# Patient Record
Sex: MALE | Race: WHITE | NOT HISPANIC OR LATINO | Employment: UNEMPLOYED | URBAN - NONMETROPOLITAN AREA
[De-identification: names, ages, dates, MRNs, and addresses within clinical notes are randomized per-mention and may not be internally consistent; named-entity substitution may affect disease eponyms.]

---

## 2017-07-14 ENCOUNTER — HOSPITAL ENCOUNTER (EMERGENCY)
Facility: HOSPITAL | Age: 67
Discharge: HOME | End: 2017-07-14
Attending: EMERGENCY MEDICINE | Admitting: EMERGENCY MEDICINE

## 2017-07-14 ENCOUNTER — TRANSCRIBE ORDERS (OUTPATIENT)
Dept: ULTRASOUND IMAGING | Facility: HOSPITAL | Age: 67
End: 2017-07-14

## 2017-07-14 ENCOUNTER — HOSPITAL ENCOUNTER (OUTPATIENT)
Dept: ULTRASOUND IMAGING | Facility: HOSPITAL | Age: 67
Discharge: HOME | End: 2017-07-14
Admitting: NURSE PRACTITIONER

## 2017-07-14 VITALS
HEART RATE: 92 BPM | SYSTOLIC BLOOD PRESSURE: 152 MMHG | OXYGEN SATURATION: 96 % | WEIGHT: 234 LBS | DIASTOLIC BLOOD PRESSURE: 95 MMHG | HEIGHT: 70 IN | TEMPERATURE: 97.4 F | RESPIRATION RATE: 18 BRPM | BODY MASS INDEX: 33.5 KG/M2

## 2017-07-14 DIAGNOSIS — R10.0 ACUTE ABDOMINAL PAIN SYNDROME: Primary | ICD-10-CM

## 2017-07-14 DIAGNOSIS — R10.0 ACUTE ABDOMINAL PAIN SYNDROME: ICD-10-CM

## 2017-07-14 DIAGNOSIS — K80.00 CALCULUS OF GALLBLADDER WITH ACUTE CHOLECYSTITIS WITHOUT OBSTRUCTION: Primary | ICD-10-CM

## 2017-07-14 LAB
ALBUMIN SERPL-MCNC: 4.6 G/DL (ref 3.4–4.8)
ALBUMIN/GLOB SERPL: 1.8 G/DL (ref 1.5–2.5)
ALP SERPL-CCNC: 83 U/L (ref 40–129)
ALT SERPL W P-5'-P-CCNC: 58 U/L (ref 10–44)
AMYLASE SERPL-CCNC: 10 U/L (ref 28–100)
ANION GAP SERPL CALCULATED.3IONS-SCNC: 7.1 MMOL/L (ref 3.6–11.2)
AST SERPL-CCNC: 31 U/L (ref 10–34)
BASOPHILS # BLD AUTO: 0.01 10*3/MM3 (ref 0–0.3)
BASOPHILS NFR BLD AUTO: 0.2 % (ref 0–2)
BILIRUB SERPL-MCNC: 0.4 MG/DL (ref 0.2–1.8)
BILIRUB UR QL STRIP: NEGATIVE
BUN BLD-MCNC: 11 MG/DL (ref 7–21)
BUN/CREAT SERPL: 16.7 (ref 7–25)
CALCIUM SPEC-SCNC: 9.4 MG/DL (ref 7.7–10)
CHLORIDE SERPL-SCNC: 105 MMOL/L (ref 99–112)
CLARITY UR: CLEAR
CO2 SERPL-SCNC: 27.9 MMOL/L (ref 24.3–31.9)
COLOR UR: YELLOW
CREAT BLD-MCNC: 0.66 MG/DL (ref 0.43–1.29)
DEPRECATED RDW RBC AUTO: 40.3 FL (ref 37–54)
EOSINOPHIL # BLD AUTO: 0.12 10*3/MM3 (ref 0–0.7)
EOSINOPHIL NFR BLD AUTO: 1.9 % (ref 0–7)
ERYTHROCYTE [DISTWIDTH] IN BLOOD BY AUTOMATED COUNT: 12.5 % (ref 11.5–14.5)
GFR SERPL CREATININE-BSD FRML MDRD: 121 ML/MIN/1.73
GLOBULIN UR ELPH-MCNC: 2.6 GM/DL
GLUCOSE BLD-MCNC: 98 MG/DL (ref 70–110)
GLUCOSE UR STRIP-MCNC: NEGATIVE MG/DL
HCT VFR BLD AUTO: 42.2 % (ref 42–52)
HGB BLD-MCNC: 14 G/DL (ref 14–18)
HGB UR QL STRIP.AUTO: NEGATIVE
IMM GRANULOCYTES # BLD: 0.04 10*3/MM3 (ref 0–0.03)
IMM GRANULOCYTES NFR BLD: 0.6 % (ref 0–0.5)
KETONES UR QL STRIP: NEGATIVE
LEUKOCYTE ESTERASE UR QL STRIP.AUTO: NEGATIVE
LIPASE SERPL-CCNC: 26 U/L (ref 13–60)
LYMPHOCYTES # BLD AUTO: 1.91 10*3/MM3 (ref 1–3)
LYMPHOCYTES NFR BLD AUTO: 30.1 % (ref 16–46)
MCH RBC QN AUTO: 29.9 PG (ref 27–33)
MCHC RBC AUTO-ENTMCNC: 33.2 G/DL (ref 33–37)
MCV RBC AUTO: 90 FL (ref 80–94)
MONOCYTES # BLD AUTO: 0.74 10*3/MM3 (ref 0.1–0.9)
MONOCYTES NFR BLD AUTO: 11.7 % (ref 0–12)
NEUTROPHILS # BLD AUTO: 3.53 10*3/MM3 (ref 1.4–6.5)
NEUTROPHILS NFR BLD AUTO: 55.5 % (ref 40–75)
NITRITE UR QL STRIP: NEGATIVE
OSMOLALITY SERPL CALC.SUM OF ELEC: 278.8 MOSM/KG (ref 273–305)
PH UR STRIP.AUTO: 6 [PH] (ref 5–8)
PLATELET # BLD AUTO: 233 10*3/MM3 (ref 130–400)
PMV BLD AUTO: 10.6 FL (ref 6–10)
POTASSIUM BLD-SCNC: 4 MMOL/L (ref 3.5–5.3)
PROT SERPL-MCNC: 7.2 G/DL (ref 6–8)
PROT UR QL STRIP: NEGATIVE
RBC # BLD AUTO: 4.69 10*6/MM3 (ref 4.7–6.1)
SODIUM BLD-SCNC: 140 MMOL/L (ref 135–153)
SP GR UR STRIP: 1.02 (ref 1–1.03)
UROBILINOGEN UR QL STRIP: NORMAL
WBC NRBC COR # BLD: 6.35 10*3/MM3 (ref 4.5–12.5)

## 2017-07-14 PROCEDURE — 81003 URINALYSIS AUTO W/O SCOPE: CPT | Performed by: PHYSICIAN ASSISTANT

## 2017-07-14 PROCEDURE — 83690 ASSAY OF LIPASE: CPT | Performed by: PHYSICIAN ASSISTANT

## 2017-07-14 PROCEDURE — 85025 COMPLETE CBC W/AUTO DIFF WBC: CPT | Performed by: PHYSICIAN ASSISTANT

## 2017-07-14 PROCEDURE — 76705 ECHO EXAM OF ABDOMEN: CPT | Performed by: RADIOLOGY

## 2017-07-14 PROCEDURE — 80053 COMPREHEN METABOLIC PANEL: CPT | Performed by: PHYSICIAN ASSISTANT

## 2017-07-14 PROCEDURE — 82150 ASSAY OF AMYLASE: CPT | Performed by: PHYSICIAN ASSISTANT

## 2017-07-14 PROCEDURE — 76705 ECHO EXAM OF ABDOMEN: CPT

## 2017-07-14 PROCEDURE — 96360 HYDRATION IV INFUSION INIT: CPT

## 2017-07-14 PROCEDURE — 99283 EMERGENCY DEPT VISIT LOW MDM: CPT

## 2017-07-14 RX ORDER — LOPERAMIDE HYDROCHLORIDE 2 MG/1
2 CAPSULE ORAL 4 TIMES DAILY PRN
Qty: 12 CAPSULE | Refills: 0 | Status: SHIPPED | OUTPATIENT
Start: 2017-07-14 | End: 2017-07-17

## 2017-07-14 RX ORDER — SODIUM CHLORIDE 0.9 % (FLUSH) 0.9 %
10 SYRINGE (ML) INJECTION AS NEEDED
Status: DISCONTINUED | OUTPATIENT
Start: 2017-07-14 | End: 2017-07-14 | Stop reason: HOSPADM

## 2017-07-14 RX ORDER — ONDANSETRON 4 MG/1
4 TABLET, FILM COATED ORAL EVERY 8 HOURS PRN
Qty: 15 TABLET | Refills: 0 | Status: SHIPPED | OUTPATIENT
Start: 2017-07-14 | End: 2017-07-19

## 2017-07-14 RX ADMIN — SODIUM CHLORIDE 500 ML: 9 INJECTION, SOLUTION INTRAVENOUS at 18:48

## 2017-07-14 NOTE — DISCHARGE INSTRUCTIONS
Please take imodium as needed for diarrhea and zofran as needed for nausea. Call Dr. Mckee's office Monday for an appointment. Please return to ER if symptoms worsen.    Hypertension  Hypertension, commonly called high blood pressure, is when the force of blood pumping through your arteries is too strong. Your arteries are the blood vessels that carry blood from your heart throughout your body. A blood pressure reading consists of a higher number over a lower number, such as 110/72. The higher number (systolic) is the pressure inside your arteries when your heart pumps. The lower number (diastolic) is the pressure inside your arteries when your heart relaxes. Ideally you want your blood pressure below 120/80.  Hypertension forces your heart to work harder to pump blood. Your arteries may become narrow or stiff. Having untreated or uncontrolled hypertension can cause heart attack, stroke, kidney disease, and other problems.  RISK FACTORS  Some risk factors for high blood pressure are controllable. Others are not.   Risk factors you cannot control include:   · Race. You may be at higher risk if you are .  · Age. Risk increases with age.  · Gender. Men are at higher risk than women before age 45 years. After age 65, women are at higher risk than men.  Risk factors you can control include:  · Not getting enough exercise or physical activity.  · Being overweight.  · Getting too much fat, sugar, calories, or salt in your diet.  · Drinking too much alcohol.  SIGNS AND SYMPTOMS  Hypertension does not usually cause signs or symptoms. Extremely high blood pressure (hypertensive crisis) may cause headache, anxiety, shortness of breath, and nosebleed.  DIAGNOSIS  To check if you have hypertension, your health care provider will measure your blood pressure while you are seated, with your arm held at the level of your heart. It should be measured at least twice using the same arm. Certain conditions can cause a  difference in blood pressure between your right and left arms. A blood pressure reading that is higher than normal on one occasion does not mean that you need treatment. If it is not clear whether you have high blood pressure, you may be asked to return on a different day to have your blood pressure checked again. Or, you may be asked to monitor your blood pressure at home for 1 or more weeks.  TREATMENT  Treating high blood pressure includes making lifestyle changes and possibly taking medicine. Living a healthy lifestyle can help lower high blood pressure. You may need to change some of your habits.  Lifestyle changes may include:  · Following the DASH diet. This diet is high in fruits, vegetables, and whole grains. It is low in salt, red meat, and added sugars.  · Keep your sodium intake below 2,300 mg per day.  · Getting at least 30-45 minutes of aerobic exercise at least 4 times per week.  · Losing weight if necessary.  · Not smoking.  · Limiting alcoholic beverages.  · Learning ways to reduce stress.  Your health care provider may prescribe medicine if lifestyle changes are not enough to get your blood pressure under control, and if one of the following is true:  · You are 18-59 years of age and your systolic blood pressure is above 140.  · You are 60 years of age or older, and your systolic blood pressure is above 150.  · Your diastolic blood pressure is above 90.  · You have diabetes, and your systolic blood pressure is over 140 or your diastolic blood pressure is over 90.  · You have kidney disease and your blood pressure is above 140/90.  · You have heart disease and your blood pressure is above 140/90.  Your personal target blood pressure may vary depending on your medical conditions, your age, and other factors.  HOME CARE INSTRUCTIONS  · Have your blood pressure rechecked as directed by your health care provider.    · Take medicines only as directed by your health care provider. Follow the directions  carefully. Blood pressure medicines must be taken as prescribed. The medicine does not work as well when you skip doses. Skipping doses also puts you at risk for problems.  · Do not smoke.    · Monitor your blood pressure at home as directed by your health care provider.   SEEK MEDICAL CARE IF:   · You think you are having a reaction to medicines taken.  · You have recurrent headaches or feel dizzy.  · You have swelling in your ankles.  · You have trouble with your vision.  SEEK IMMEDIATE MEDICAL CARE IF:  · You develop a severe headache or confusion.  · You have unusual weakness, numbness, or feel faint.  · You have severe chest or abdominal pain.  · You vomit repeatedly.  · You have trouble breathing.  MAKE SURE YOU:   · Understand these instructions.  · Will watch your condition.  · Will get help right away if you are not doing well or get worse.     This information is not intended to replace advice given to you by your health care provider. Make sure you discuss any questions you have with your health care provider.     Document Released: 12/18/2006 Document Revised: 05/03/2016 Document Reviewed: 10/10/2014  [employer] Patient Education ©2017 [employer]

## 2017-07-14 NOTE — ED PROVIDER NOTES
Subjective   HPI Comments: This 67-year-old male patient presents to the ER with a chief complaint of nausea and vomiting.  Patient says that one week ago on Friday, he began having symptoms of feeling weak as well as having diarrhea multiple times a day.  He said he couldn't get out of bed for 3 days because of this weakness.  The symptoms did continue.  He went to see his primary care physician Dr. Patricia yesterday who believed he may be having some gallbladder issues.  He then had a gallbladder ultrasound today that revealed gallstones and cholecystitis.  Besides the diarrhea and the patient has also been experiencing bloating of his abdomen as well as nausea and vomiting that started yesterday.  The vomiting quality is clear.  His history is significant for peptic ulcer disease but no other GI issues.  The patient endorses no abdominal pain.    Patient is a 67 y.o. male presenting with vomiting.   Vomiting   The primary symptoms include nausea, vomiting and diarrhea. Primary symptoms do not include fever, fatigue, abdominal pain or dysuria. The illness began 6 to 7 days ago. The onset was sudden. The problem has been gradually worsening.   The illness is also significant for bloating. The illness does not include chills, constipation or back pain. Significant associated medical issues include gallstones and PUD. Associated medical issues do not include inflammatory bowel disease, GERD, liver disease, alcohol abuse or irritable bowel syndrome.       Review of Systems   Constitutional: Negative.  Negative for chills, fatigue and fever.   HENT: Negative.  Negative for congestion and sore throat.    Respiratory: Negative.  Negative for cough, shortness of breath and wheezing.    Cardiovascular: Negative.  Negative for chest pain.   Gastrointestinal: Positive for bloating, diarrhea, nausea and vomiting. Negative for abdominal pain and constipation.   Endocrine: Negative.    Genitourinary: Negative.  Negative for  dysuria, frequency and urgency.   Musculoskeletal: Negative for back pain.   Skin: Negative.    Neurological: Negative.    Psychiatric/Behavioral: Negative.    All other systems reviewed and are negative.      Past Medical History:   Diagnosis Date   • Arthritis    • Herpes    • Peptic ulcer        Allergies   Allergen Reactions   • Penicillins        Past Surgical History:   Procedure Laterality Date   • ABDOMINAL SURGERY     • HERNIA REPAIR     • SINUS SURGERY         History reviewed. No pertinent family history.    Social History     Social History   • Marital status:      Spouse name: N/A   • Number of children: N/A   • Years of education: N/A     Social History Main Topics   • Smoking status: Current Every Day Smoker   • Smokeless tobacco: Never Used   • Alcohol use No   • Drug use: No   • Sexual activity: Not Asked     Other Topics Concern   • None     Social History Narrative   • None           Objective   Physical Exam   Constitutional: He is oriented to person, place, and time. He appears well-developed and well-nourished. No distress.   HENT:   Head: Normocephalic and atraumatic.   Right Ear: External ear normal.   Left Ear: External ear normal.   Nose: Nose normal.   Eyes: Conjunctivae and EOM are normal. Pupils are equal, round, and reactive to light.   Neck: Normal range of motion. Neck supple. No JVD present. No tracheal deviation present.   Cardiovascular: Normal rate, regular rhythm and normal heart sounds.    No murmur heard.  Pulmonary/Chest: Effort normal and breath sounds normal. No respiratory distress. He has no wheezes.   Abdominal: Soft. Bowel sounds are normal. He exhibits distension. There is no tenderness. There is no rebound and no guarding.   Musculoskeletal: Normal range of motion. He exhibits no edema or deformity.   Neurological: He is alert and oriented to person, place, and time. No cranial nerve deficit.   Skin: Skin is warm and dry. No rash noted. He is not diaphoretic. No  erythema. No pallor.   Psychiatric: He has a normal mood and affect. His behavior is normal. Thought content normal.   Nursing note and vitals reviewed.      Procedures         ED Course  ED Course   Comment By Time   Spoke with Dr. Mckee and he said the patient can follow up with him next week; no need for admission at this time. ANDERSON Castillo 07/14 1940   Will d/c the patient with zofran and imodium. Will call Dr. Mckee's office Monday to set up appointment. Will return to ER if symptoms worsen. ANDERSON Castillo 07/14 1942                  MDM  Number of Diagnoses or Management Options  Calculus of gallbladder with acute cholecystitis without obstruction:      Amount and/or Complexity of Data Reviewed  Clinical lab tests: ordered and reviewed  Discuss the patient with other providers: yes        Final diagnoses:   Calculus of gallbladder with acute cholecystitis without obstruction            ANDERSON Castillo  07/14/17 1947

## 2017-07-24 ENCOUNTER — APPOINTMENT (OUTPATIENT)
Dept: PREADMISSION TESTING | Facility: HOSPITAL | Age: 67
End: 2017-07-24

## 2017-07-24 RX ORDER — SENNOSIDES 8.6 MG
650 CAPSULE ORAL EVERY 8 HOURS PRN
COMMUNITY
End: 2021-12-08

## 2017-07-25 ENCOUNTER — ANESTHESIA (OUTPATIENT)
Dept: PERIOP | Facility: HOSPITAL | Age: 67
End: 2017-07-25

## 2017-07-25 ENCOUNTER — ANESTHESIA EVENT (OUTPATIENT)
Dept: PERIOP | Facility: HOSPITAL | Age: 67
End: 2017-07-25

## 2017-07-25 ENCOUNTER — APPOINTMENT (OUTPATIENT)
Dept: GENERAL RADIOLOGY | Facility: HOSPITAL | Age: 67
End: 2017-07-25

## 2017-07-25 ENCOUNTER — HOSPITAL ENCOUNTER (OUTPATIENT)
Facility: HOSPITAL | Age: 67
Setting detail: HOSPITAL OUTPATIENT SURGERY
Discharge: HOME | End: 2017-07-25
Attending: SURGERY | Admitting: SURGERY

## 2017-07-25 VITALS
TEMPERATURE: 97.9 F | HEIGHT: 69 IN | DIASTOLIC BLOOD PRESSURE: 90 MMHG | BODY MASS INDEX: 34.8 KG/M2 | RESPIRATION RATE: 16 BRPM | SYSTOLIC BLOOD PRESSURE: 166 MMHG | HEART RATE: 82 BPM | OXYGEN SATURATION: 100 % | WEIGHT: 235 LBS

## 2017-07-25 DIAGNOSIS — K80.20 CHOLELITHIASIS: ICD-10-CM

## 2017-07-25 PROCEDURE — 25010000002 MIDAZOLAM PER 1 MG: Performed by: NURSE ANESTHETIST, CERTIFIED REGISTERED

## 2017-07-25 PROCEDURE — 25010000002 NEOSTIGMINE 10 MG/10ML SOLUTION: Performed by: NURSE ANESTHETIST, CERTIFIED REGISTERED

## 2017-07-25 PROCEDURE — 25010000002 FENTANYL CITRATE (PF) 100 MCG/2ML SOLUTION: Performed by: NURSE ANESTHETIST, CERTIFIED REGISTERED

## 2017-07-25 PROCEDURE — 76000 FLUOROSCOPY <1 HR PHYS/QHP: CPT

## 2017-07-25 PROCEDURE — 74300 X-RAY BILE DUCTS/PANCREAS: CPT | Performed by: RADIOLOGY

## 2017-07-25 PROCEDURE — 25010000002 ONDANSETRON PER 1 MG: Performed by: NURSE ANESTHETIST, CERTIFIED REGISTERED

## 2017-07-25 PROCEDURE — 25010000002 KETOROLAC TROMETHAMINE PER 15 MG: Performed by: ANESTHESIOLOGY

## 2017-07-25 PROCEDURE — 25010000002 PROPOFOL 10 MG/ML EMULSION: Performed by: NURSE ANESTHETIST, CERTIFIED REGISTERED

## 2017-07-25 PROCEDURE — 25010000002 DEXAMETHASONE PER 1 MG: Performed by: NURSE ANESTHETIST, CERTIFIED REGISTERED

## 2017-07-25 PROCEDURE — 0 IOPAMIDOL 61 % SOLUTION: Performed by: SURGERY

## 2017-07-25 PROCEDURE — 25010000002 PROMETHAZINE PER 50 MG: Performed by: ANESTHESIOLOGY

## 2017-07-25 PROCEDURE — C1726 CATH, BAL DIL, NON-VASCULAR: HCPCS | Performed by: SURGERY

## 2017-07-25 PROCEDURE — 25010000002 DIPHENHYDRAMINE PER 50 MG: Performed by: NURSE ANESTHETIST, CERTIFIED REGISTERED

## 2017-07-25 PROCEDURE — 25010000002 HYDRALAZINE PER 20 MG: Performed by: NURSE ANESTHETIST, CERTIFIED REGISTERED

## 2017-07-25 RX ORDER — ROCURONIUM BROMIDE 10 MG/ML
INJECTION, SOLUTION INTRAVENOUS AS NEEDED
Status: DISCONTINUED | OUTPATIENT
Start: 2017-07-25 | End: 2017-07-25 | Stop reason: SURG

## 2017-07-25 RX ORDER — OMEPRAZOLE 20 MG/1
20 CAPSULE, DELAYED RELEASE ORAL DAILY
COMMUNITY
End: 2022-03-31 | Stop reason: SDUPTHER

## 2017-07-25 RX ORDER — FENTANYL CITRATE 50 UG/ML
50 INJECTION, SOLUTION INTRAMUSCULAR; INTRAVENOUS
Status: DISCONTINUED | OUTPATIENT
Start: 2017-07-25 | End: 2017-07-25 | Stop reason: HOSPADM

## 2017-07-25 RX ORDER — ONDANSETRON 2 MG/ML
INJECTION INTRAMUSCULAR; INTRAVENOUS AS NEEDED
Status: DISCONTINUED | OUTPATIENT
Start: 2017-07-25 | End: 2017-07-25 | Stop reason: SURG

## 2017-07-25 RX ORDER — MIDAZOLAM HYDROCHLORIDE 1 MG/ML
2 INJECTION INTRAMUSCULAR; INTRAVENOUS
Status: DISCONTINUED | OUTPATIENT
Start: 2017-07-25 | End: 2017-07-25 | Stop reason: HOSPADM

## 2017-07-25 RX ORDER — PROPOFOL 10 MG/ML
VIAL (ML) INTRAVENOUS AS NEEDED
Status: DISCONTINUED | OUTPATIENT
Start: 2017-07-25 | End: 2017-07-25 | Stop reason: SURG

## 2017-07-25 RX ORDER — PROMETHAZINE HYDROCHLORIDE 25 MG/ML
6.25 INJECTION, SOLUTION INTRAMUSCULAR; INTRAVENOUS ONCE
Status: COMPLETED | OUTPATIENT
Start: 2017-07-25 | End: 2017-07-25

## 2017-07-25 RX ORDER — ONDANSETRON 2 MG/ML
4 INJECTION INTRAMUSCULAR; INTRAVENOUS ONCE AS NEEDED
Status: DISCONTINUED | OUTPATIENT
Start: 2017-07-25 | End: 2017-07-25 | Stop reason: HOSPADM

## 2017-07-25 RX ORDER — FENTANYL CITRATE 50 UG/ML
INJECTION, SOLUTION INTRAMUSCULAR; INTRAVENOUS AS NEEDED
Status: DISCONTINUED | OUTPATIENT
Start: 2017-07-25 | End: 2017-07-25 | Stop reason: SURG

## 2017-07-25 RX ORDER — MEPERIDINE HYDROCHLORIDE 25 MG/ML
12.5 INJECTION INTRAMUSCULAR; INTRAVENOUS; SUBCUTANEOUS
Status: DISCONTINUED | OUTPATIENT
Start: 2017-07-25 | End: 2017-07-25 | Stop reason: HOSPADM

## 2017-07-25 RX ORDER — IPRATROPIUM BROMIDE AND ALBUTEROL SULFATE 2.5; .5 MG/3ML; MG/3ML
3 SOLUTION RESPIRATORY (INHALATION) ONCE AS NEEDED
Status: DISCONTINUED | OUTPATIENT
Start: 2017-07-25 | End: 2017-07-25 | Stop reason: HOSPADM

## 2017-07-25 RX ORDER — BUPIVACAINE HYDROCHLORIDE AND EPINEPHRINE 5; 5 MG/ML; UG/ML
INJECTION, SOLUTION EPIDURAL; INTRACAUDAL; PERINEURAL AS NEEDED
Status: DISCONTINUED | OUTPATIENT
Start: 2017-07-25 | End: 2017-07-25 | Stop reason: HOSPADM

## 2017-07-25 RX ORDER — KETOROLAC TROMETHAMINE 30 MG/ML
30 INJECTION, SOLUTION INTRAMUSCULAR; INTRAVENOUS ONCE
Status: COMPLETED | OUTPATIENT
Start: 2017-07-25 | End: 2017-07-25

## 2017-07-25 RX ORDER — SODIUM CHLORIDE 0.9 % (FLUSH) 0.9 %
1-10 SYRINGE (ML) INJECTION AS NEEDED
Status: DISCONTINUED | OUTPATIENT
Start: 2017-07-25 | End: 2017-07-25 | Stop reason: HOSPADM

## 2017-07-25 RX ORDER — HYDRALAZINE HYDROCHLORIDE 20 MG/ML
INJECTION INTRAMUSCULAR; INTRAVENOUS AS NEEDED
Status: DISCONTINUED | OUTPATIENT
Start: 2017-07-25 | End: 2017-07-25 | Stop reason: SURG

## 2017-07-25 RX ORDER — SODIUM CHLORIDE, SODIUM LACTATE, POTASSIUM CHLORIDE, CALCIUM CHLORIDE 600; 310; 30; 20 MG/100ML; MG/100ML; MG/100ML; MG/100ML
125 INJECTION, SOLUTION INTRAVENOUS CONTINUOUS
Status: DISCONTINUED | OUTPATIENT
Start: 2017-07-25 | End: 2017-07-25 | Stop reason: HOSPADM

## 2017-07-25 RX ORDER — DIPHENHYDRAMINE HYDROCHLORIDE 50 MG/ML
INJECTION INTRAMUSCULAR; INTRAVENOUS AS NEEDED
Status: DISCONTINUED | OUTPATIENT
Start: 2017-07-25 | End: 2017-07-25 | Stop reason: SURG

## 2017-07-25 RX ORDER — NEOSTIGMINE METHYLSULFATE 1 MG/ML
INJECTION, SOLUTION INTRAVENOUS AS NEEDED
Status: DISCONTINUED | OUTPATIENT
Start: 2017-07-25 | End: 2017-07-25 | Stop reason: SURG

## 2017-07-25 RX ORDER — SODIUM CHLORIDE 9 MG/ML
INJECTION, SOLUTION INTRAVENOUS AS NEEDED
Status: DISCONTINUED | OUTPATIENT
Start: 2017-07-25 | End: 2017-07-25 | Stop reason: HOSPADM

## 2017-07-25 RX ORDER — HYDROCODONE BITARTRATE AND ACETAMINOPHEN 7.5; 325 MG/1; MG/1
1-2 TABLET ORAL EVERY 4 HOURS PRN
Qty: 24 TABLET | Refills: 0 | Status: SHIPPED | OUTPATIENT
Start: 2017-07-25 | End: 2021-11-23

## 2017-07-25 RX ORDER — FAMOTIDINE 10 MG/ML
INJECTION, SOLUTION INTRAVENOUS AS NEEDED
Status: DISCONTINUED | OUTPATIENT
Start: 2017-07-25 | End: 2017-07-25 | Stop reason: SURG

## 2017-07-25 RX ORDER — GLYCOPYRROLATE 0.2 MG/ML
INJECTION INTRAMUSCULAR; INTRAVENOUS AS NEEDED
Status: DISCONTINUED | OUTPATIENT
Start: 2017-07-25 | End: 2017-07-25 | Stop reason: SURG

## 2017-07-25 RX ORDER — MIDAZOLAM HYDROCHLORIDE 1 MG/ML
INJECTION INTRAMUSCULAR; INTRAVENOUS AS NEEDED
Status: DISCONTINUED | OUTPATIENT
Start: 2017-07-25 | End: 2017-07-25 | Stop reason: SURG

## 2017-07-25 RX ORDER — MIDAZOLAM HYDROCHLORIDE 1 MG/ML
1 INJECTION INTRAMUSCULAR; INTRAVENOUS
Status: DISCONTINUED | OUTPATIENT
Start: 2017-07-25 | End: 2017-07-25 | Stop reason: HOSPADM

## 2017-07-25 RX ORDER — OXYCODONE HYDROCHLORIDE AND ACETAMINOPHEN 5; 325 MG/1; MG/1
1 TABLET ORAL ONCE AS NEEDED
Status: DISCONTINUED | OUTPATIENT
Start: 2017-07-25 | End: 2017-07-25 | Stop reason: HOSPADM

## 2017-07-25 RX ORDER — MAGNESIUM HYDROXIDE 1200 MG/15ML
LIQUID ORAL AS NEEDED
Status: DISCONTINUED | OUTPATIENT
Start: 2017-07-25 | End: 2017-07-25 | Stop reason: HOSPADM

## 2017-07-25 RX ORDER — DEXAMETHASONE SODIUM PHOSPHATE 10 MG/ML
INJECTION INTRAMUSCULAR; INTRAVENOUS AS NEEDED
Status: DISCONTINUED | OUTPATIENT
Start: 2017-07-25 | End: 2017-07-25 | Stop reason: SURG

## 2017-07-25 RX ORDER — LIDOCAINE HYDROCHLORIDE 20 MG/ML
INJECTION, SOLUTION INFILTRATION; PERINEURAL AS NEEDED
Status: DISCONTINUED | OUTPATIENT
Start: 2017-07-25 | End: 2017-07-25 | Stop reason: SURG

## 2017-07-25 RX ADMIN — ONDANSETRON 4 MG: 2 INJECTION, SOLUTION INTRAMUSCULAR; INTRAVENOUS at 11:34

## 2017-07-25 RX ADMIN — ONDANSETRON 4 MG: 2 INJECTION, SOLUTION INTRAMUSCULAR; INTRAVENOUS at 10:01

## 2017-07-25 RX ADMIN — LIDOCAINE HYDROCHLORIDE 60 MG: 20 INJECTION, SOLUTION INFILTRATION; PERINEURAL at 10:01

## 2017-07-25 RX ADMIN — OXYCODONE HYDROCHLORIDE AND ACETAMINOPHEN 1 TABLET: 5; 325 TABLET ORAL at 12:21

## 2017-07-25 RX ADMIN — FENTANYL CITRATE 100 MCG: 50 INJECTION INTRAMUSCULAR; INTRAVENOUS at 09:56

## 2017-07-25 RX ADMIN — SODIUM CHLORIDE, POTASSIUM CHLORIDE, SODIUM LACTATE AND CALCIUM CHLORIDE: 600; 310; 30; 20 INJECTION, SOLUTION INTRAVENOUS at 10:55

## 2017-07-25 RX ADMIN — GLYCOPYRROLATE 0.4 MG: 0.2 INJECTION, SOLUTION INTRAMUSCULAR; INTRAVENOUS at 10:58

## 2017-07-25 RX ADMIN — KETOROLAC TROMETHAMINE 30 MG: 30 INJECTION, SOLUTION INTRAMUSCULAR; INTRAVENOUS at 12:21

## 2017-07-25 RX ADMIN — CEFAZOLIN 1 G: 1 INJECTION, POWDER, FOR SOLUTION INTRAMUSCULAR; INTRAVENOUS; PARENTERAL at 10:50

## 2017-07-25 RX ADMIN — HYDRALAZINE HYDROCHLORIDE 5 MG: 20 INJECTION INTRAMUSCULAR; INTRAVENOUS at 10:58

## 2017-07-25 RX ADMIN — FENTANYL CITRATE 50 MCG: 50 INJECTION INTRAMUSCULAR; INTRAVENOUS at 11:05

## 2017-07-25 RX ADMIN — FENTANYL CITRATE 50 MCG: 50 INJECTION INTRAMUSCULAR; INTRAVENOUS at 11:38

## 2017-07-25 RX ADMIN — HYDRALAZINE HYDROCHLORIDE 5 MG: 20 INJECTION INTRAMUSCULAR; INTRAVENOUS at 10:25

## 2017-07-25 RX ADMIN — PROPOFOL 150 MG: 10 INJECTION, EMULSION INTRAVENOUS at 10:01

## 2017-07-25 RX ADMIN — SODIUM CHLORIDE, POTASSIUM CHLORIDE, SODIUM LACTATE AND CALCIUM CHLORIDE 125 ML/HR: 600; 310; 30; 20 INJECTION, SOLUTION INTRAVENOUS at 08:52

## 2017-07-25 RX ADMIN — ROCURONIUM BROMIDE 30 MG: 10 INJECTION INTRAVENOUS at 10:01

## 2017-07-25 RX ADMIN — FENTANYL CITRATE 50 MCG: 50 INJECTION INTRAMUSCULAR; INTRAVENOUS at 11:30

## 2017-07-25 RX ADMIN — NEOSTIGMINE METHYLSULFATE 3 MG: 1 INJECTION, SOLUTION INTRAVENOUS at 10:58

## 2017-07-25 RX ADMIN — FENTANYL CITRATE 100 MCG: 50 INJECTION INTRAMUSCULAR; INTRAVENOUS at 10:01

## 2017-07-25 RX ADMIN — MIDAZOLAM HYDROCHLORIDE 2 MG: 1 INJECTION, SOLUTION INTRAMUSCULAR; INTRAVENOUS at 09:56

## 2017-07-25 RX ADMIN — PROMETHAZINE HYDROCHLORIDE 6.25 MG: 25 INJECTION INTRAMUSCULAR; INTRAVENOUS at 11:50

## 2017-07-25 RX ADMIN — FAMOTIDINE 20 MG: 10 INJECTION, SOLUTION INTRAVENOUS at 10:01

## 2017-07-25 RX ADMIN — DEXAMETHASONE SODIUM PHOSPHATE 4 MG: 10 INJECTION INTRAMUSCULAR; INTRAVENOUS at 10:01

## 2017-07-25 RX ADMIN — DIPHENHYDRAMINE HYDROCHLORIDE 12.5 MG: 50 INJECTION INTRAMUSCULAR; INTRAVENOUS at 11:01

## 2017-07-25 NOTE — PLAN OF CARE
Problem: Perioperative Period (Adult)  Goal: Signs and Symptoms of Listed Potential Problems Will be Absent or Manageable (Perioperative Period)  Outcome: Ongoing (interventions implemented as appropriate)    07/25/17 0803   Perioperative Period   Problems Assessed (Perioperative Period) pain   Problems Present (Perioperative Period) pain

## 2017-07-25 NOTE — PLAN OF CARE
Problem: Patient Care Overview (Adult)  Goal: Plan of Care Review  Outcome: Ongoing (interventions implemented as appropriate)    07/25/17 0855   Coping/Psychosocial Response Interventions   Plan Of Care Reviewed With patient   Patient Care Overview   Progress progress toward functional goals as expected

## 2017-07-25 NOTE — PLAN OF CARE
Problem: Patient Care Overview (Adult)  Goal: Discharge Needs Assessment  Outcome: Ongoing (interventions implemented as appropriate)    07/25/17 0854   Discharge Needs Assessment   Concerns To Be Addressed no discharge needs identified   Readmission Within The Last 30 Days no previous admission in last 30 days   Equipment Needed After Discharge none   Discharge Disposition home or self-care   Current Health   Anticipated Changes Related to Illness none   Self-Care   Equipment Currently Used at Home none   Living Environment   Transportation Available car

## 2017-07-25 NOTE — OP NOTE
Procedure Note  Donaldo Roberts    Surgeon: Luis Coe MD    Pre-op Dx:  Symptomatic gallstones  Post-op Dx:  same  Procedure laparoscopic cholecystectomy with cholangiogram    Indications: Right upper quadrant pain and gallstones         Surgeon: Luis Coe MD     Assistants: Harriett Garcia    Anesthesia: General endotracheal anesthesia    ASA Class: 2          Findings:  Acutely inflamed gallbladder with 100s of stones    Estimated Blood Loss:  *No blood loss documented*           Drains: None                   Specimens:   ID Type Source Tests Collected by Time Destination   A : GALLBLADDER Tissue Gallbladder TISSUE EXAM Luis Coe MD 7/25/2017 1023               Implants and Grafts: * No implants in log *           Complications:  None    Details of operation:  After satisfactory general endotracheal anesthesia was obtained, the abdomen was prepped and draped in the usual fashion.  A subumbilical incision was made and a varies needle was introduced into the abdominal cavity.  3 L of CO2 gas were insufflated and a 5 mm port was placed.  5 mm retracting clamps were introduced subcostally at the anterior axillary line and the midclavicular line.  A 5 mm port was placed in the subxiphoid position.  Adhesions were lysed and the cystic duct was dissected out.  A negative intraoperative cholangiogram was obtained and the cystic duct and cystic artery were doubly hemoclipped and divided.  The gallbladder was dissected from the gallbladder fossa and removed through the subxiphoid position.  The subxiphoid fascia was closed with 2-0 PDS suture and then all air and all instruments were removed.  Incisions were closed with 4-0 Prolene and the wounds were injected with Marcaine.  Sterile dressings were applied.           Disposition: PACU - hemodynamically stable.           Condition: stable     C.C.:  Dr. Roderick Colmenares and Dr. Luis Coe

## 2017-07-25 NOTE — ANESTHESIA PROCEDURE NOTES
Airway  Urgency: elective    Date/Time: 7/25/2017 10:02 AM  Airway not difficult    General Information and Staff    Patient location during procedure: OR  Anesthesiologist: CHRISTIANA LOPEZ  CRNA: WILMA CABRERA    Indications and Patient Condition  Indications for airway management: airway protection    Preoxygenated: yes  MILS maintained throughout  Mask difficulty assessment: 0 - not attempted    Final Airway Details  Final airway type: endotracheal airway      Successful airway: ETT  Cuffed: yes   Successful intubation technique: direct laryngoscopy  Facilitating devices/methods: intubating stylet  Endotracheal tube insertion site: oral  Blade: Cecilia  Blade size: #3  ETT size: 7.0 mm  Cormack-Lehane Classification: grade IIb - view of arytenoids or posterior of glottis only  Placement verified by: chest auscultation, capnometry and palpation of cuff   Cuff volume (mL): 10  Measured from: lips  ETT to lips (cm): 21  Number of attempts at approach: 1    Additional Comments  Dentition and lips same as preop

## 2017-07-25 NOTE — ANESTHESIA PREPROCEDURE EVALUATION
Anesthesia Evaluation     no history of anesthetic complications:  NPO Solid Status: > 8 hours  NPO Liquid Status: > 8 hours     Airway   Mallampati: III  TM distance: >3 FB  Neck ROM: full  no difficulty expected  Dental    (+) edentulous    Pulmonary - normal exam   Cardiovascular - normal exam        Neuro/Psych  (+) psychiatric history Depression,    GI/Hepatic/Renal/Endo    (+)  GERD, PUD, liver disease,     Musculoskeletal     Abdominal  - normal exam   Substance History      OB/GYN          Other                                      Anesthesia Plan    ASA 3     general     intravenous induction   Anesthetic plan and risks discussed with patient.

## 2017-07-25 NOTE — ANESTHESIA POSTPROCEDURE EVALUATION
Patient: Donaldo Roberts    Procedure Summary     Date Anesthesia Start Anesthesia Stop Room / Location    07/25/17 0956 1108  [facility] OR 01 / [facility]       Procedure Diagnosis Surgeon Provider    CHOLECYSTECTOMY LAPAROSCOPIC INTRAOPERATIVE CHOLANGIOGRAM (N/A Abdomen) (k80.10) MD Reinier Espino MD          Anesthesia Type: general  Last vitals  BP   131/80 (07/25/17 1203)    Temp   98.1 °F (36.7 °C) (07/25/17 1138)    Pulse   88 (07/25/17 1203)   Resp   16 (07/25/17 1203)    SpO2   100 % (07/25/17 1203)      Post Anesthesia Care and Evaluation    Patient location during evaluation: PHASE II  Patient participation: complete - patient participated  Level of consciousness: awake and alert  Pain score: 1  Pain management: adequate  Airway patency: patent  Anesthetic complications: No anesthetic complications  PONV Status: controlled  Cardiovascular status: acceptable  Respiratory status: acceptable  Hydration status: acceptable

## 2017-07-31 LAB
LAB AP CASE REPORT: NORMAL
Lab: NORMAL
PATH REPORT.FINAL DX SPEC: NORMAL

## 2018-02-07 DIAGNOSIS — A60.01 HERPESVIRAL INFECTION OF PENIS: Primary | ICD-10-CM

## 2018-02-07 RX ORDER — ACYCLOVIR 50 MG/G
OINTMENT TOPICAL
Qty: 30 G | Refills: 0 | Status: SHIPPED | OUTPATIENT
Start: 2018-02-07 | End: 2018-07-06 | Stop reason: SDUPTHER

## 2018-07-06 ENCOUNTER — OFFICE VISIT (OUTPATIENT)
Dept: UROLOGY | Facility: CLINIC | Age: 68
End: 2018-07-06

## 2018-07-06 VITALS — HEIGHT: 69 IN | WEIGHT: 235 LBS | BODY MASS INDEX: 34.8 KG/M2

## 2018-07-06 DIAGNOSIS — N40.1 BENIGN PROSTATIC HYPERPLASIA WITH URINARY OBSTRUCTION: Primary | ICD-10-CM

## 2018-07-06 DIAGNOSIS — A60.01 HERPESVIRAL INFECTION OF PENIS: ICD-10-CM

## 2018-07-06 DIAGNOSIS — N13.8 BENIGN PROSTATIC HYPERPLASIA WITH URINARY OBSTRUCTION: Primary | ICD-10-CM

## 2018-07-06 LAB — PSA SERPL-MCNC: 0.78 NG/ML (ref 0–4)

## 2018-07-06 PROCEDURE — 99406 BEHAV CHNG SMOKING 3-10 MIN: CPT | Performed by: UROLOGY

## 2018-07-06 PROCEDURE — 99214 OFFICE O/P EST MOD 30 MIN: CPT | Performed by: UROLOGY

## 2018-07-06 PROCEDURE — 84153 ASSAY OF PSA TOTAL: CPT | Performed by: UROLOGY

## 2018-07-06 RX ORDER — ACYCLOVIR 50 MG/G
OINTMENT TOPICAL
Qty: 30 G | Refills: 4 | Status: SHIPPED | OUTPATIENT
Start: 2018-07-06 | End: 2019-10-28

## 2018-07-06 RX ORDER — TAMSULOSIN HYDROCHLORIDE 0.4 MG/1
1 CAPSULE ORAL NIGHTLY
Qty: 90 CAPSULE | Refills: 3 | Status: SHIPPED | OUTPATIENT
Start: 2018-07-06 | End: 2021-11-23 | Stop reason: ALTCHOICE

## 2018-07-06 NOTE — PROGRESS NOTES
Chief Complaint:          BPH and herpes    HPI:   68 y.o. male.  Pt has nocturia x 1 to 2 but he drinks propel water 1 to 2.  HPI  Pt has hx of herpes simplex and he has been using zovirax cream.  He has not had a psa this year.  Pt has some decreased force of stream and post void dribbling.      Past Medical History:        Past Medical History:   Diagnosis Date   • Acid reflux    • Anxiety    • Arthritis    • Herpes    • History of lipoma    • History of transfusion    • Inguinal hernia    • Peptic ulcer          Current Meds:     Current Outpatient Prescriptions   Medication Sig Dispense Refill   • acetaminophen (TYLENOL) 650 MG 8 hr tablet Take 650 mg by mouth Every 8 (Eight) Hours As Needed for Mild Pain (1-3).     • acyclovir (ZOVIRAX) 5 % ointment Apply thin layer to affected area 30 g 4   • HYDROcodone-acetaminophen (NORCO) 7.5-325 MG per tablet Take 1-2 tablets by mouth Every 4 (Four) Hours As Needed for Moderate Pain (4-6) (Pain). 24 tablet 0   • omeprazole (priLOSEC) 20 MG capsule Take 20 mg by mouth Daily.     • tamsulosin (FLOMAX) 0.4 MG capsule 24 hr capsule Take 1 capsule by mouth Every Night. 90 capsule 3     No current facility-administered medications for this visit.         Allergies:      Allergies   Allergen Reactions   • Penicillins Hives     Mother told him that, has taken amoxicillin with no problems        Past Surgical History:     Past Surgical History:   Procedure Laterality Date   • ABDOMINAL SURGERY     • CHOLECYSTECTOMY WITH INTRAOPERATIVE CHOLANGIOGRAM N/A 7/25/2017    Procedure: CHOLECYSTECTOMY LAPAROSCOPIC INTRAOPERATIVE CHOLANGIOGRAM;  Surgeon: Luis Coe MD;  Location: [facility];  Service:    • HERNIA REPAIR     • SINUS SURGERY     • TUMOR EXCISION           Social History:     Social History     Social History   • Marital status:      Spouse name: N/A   • Number of children: N/A   • Years of education: N/A     Occupational History   • Not on file.     Social  History Main Topics   • Smoking status: Current Every Day Smoker     Types: Cigarettes   • Smokeless tobacco: Never Used      Comment: 2 cigs per day   • Alcohol use No   • Drug use: No   • Sexual activity: Defer     Other Topics Concern   • Not on file     Social History Narrative   • No narrative on file       Family History:     History reviewed. No pertinent family history.    Review of Systems:     Review of Systems   Constitutional: Negative for chills and fever.   Respiratory: Negative for chest tightness, shortness of breath and wheezing.    Cardiovascular: Negative for chest pain.   Gastrointestinal: Negative for abdominal pain, nausea and vomiting.   Genitourinary: Negative for difficulty urinating, flank pain, frequency and urgency.   Musculoskeletal: Negative for back pain.   Neurological: Negative for dizziness, weakness and numbness.   Psychiatric/Behavioral: Negative for confusion.       I have reviewed the follow portions of the patient's history and confirmed they are accurate today:  allergies, current medications, past family history, past medical history, past social history, past surgical history, problem list and ROS  Physical Exam:     Physical Exam   Constitutional: He is oriented to person, place, and time.   Morbidly obese   HENT:   Head: Normocephalic and atraumatic.   Right Ear: External ear normal.   Left Ear: External ear normal.   Nose: Nose normal.   Mouth/Throat: Oropharynx is clear and moist.   Eyes: Conjunctivae and EOM are normal. Pupils are equal, round, and reactive to light.   Neck: Normal range of motion. Neck supple. No thyromegaly present.   Cardiovascular: Normal rate, regular rhythm, normal heart sounds and intact distal pulses.    No murmur heard.  Pulmonary/Chest: Effort normal and breath sounds normal. No respiratory distress. He has no wheezes. He has no rales. He exhibits no tenderness.   Abdominal: Soft. Bowel sounds are normal. He exhibits no distension and no mass.  "There is no tenderness. No hernia.   Genitourinary: Rectum normal, prostate normal and penis normal.   Musculoskeletal: Normal range of motion. He exhibits no edema or tenderness.   Lymphadenopathy:     He has no cervical adenopathy.   Neurological: He is alert and oriented to person, place, and time. No cranial nerve deficit. He exhibits normal muscle tone. Coordination normal.   Skin: Skin is warm. No rash noted.   Psychiatric: He has a normal mood and affect. His behavior is normal. Judgment and thought content normal.   Nursing note and vitals reviewed.      Ht 175.3 cm (69.02\")   Wt 107 kg (235 lb)   BMI 34.69 kg/m²    Procedure:         Assessment:     Encounter Diagnoses   Name Primary?   • Benign prostatic hyperplasia with urinary obstruction Yes   • Herpesviral infection of penis      Orders Placed This Encounter   Procedures   • PSA DIAGNOSTIC     Standing Status:   Future     Standing Expiration Date:   7/6/2019       Plan:   Will see pt back in a year    Patient's Body mass index is 34.69 kg/m². BMI is above normal parameters. Recommendations include: educational material.    I advised Donaldo of the risks of continuing to use tobacco, and I provided him with tobacco cessation educational materials in the After Visit Summary.     During this visit, I spent 6 minutes counseling the patient regarding tobacco cessation.    Counseling was given to patient for the following topics instructions for management as follows: bph and herpes. The interim medical history and current results were reviewed.  A treatment plan with follow-up was made for Benign prostatic hyperplasia with urinary obstruction [N40.1, N13.8].    This document has been electronically signed by Reinier Wayne MD July 6, 2018 2:01 PM  "

## 2019-01-17 DIAGNOSIS — B00.9 HERPES: Primary | ICD-10-CM

## 2019-01-17 RX ORDER — ACYCLOVIR 400 MG/1
400 TABLET ORAL 2 TIMES DAILY
Qty: 60 TABLET | Refills: 11 | Status: SHIPPED | OUTPATIENT
Start: 2019-01-17 | End: 2019-02-16

## 2019-09-05 ENCOUNTER — HOSPITAL ENCOUNTER (EMERGENCY)
Facility: HOSPITAL | Age: 69
Discharge: HOME | End: 2019-09-05
Attending: EMERGENCY MEDICINE | Admitting: EMERGENCY MEDICINE

## 2019-09-05 ENCOUNTER — APPOINTMENT (OUTPATIENT)
Dept: GENERAL RADIOLOGY | Facility: HOSPITAL | Age: 69
End: 2019-09-05

## 2019-09-05 ENCOUNTER — APPOINTMENT (OUTPATIENT)
Dept: CT IMAGING | Facility: HOSPITAL | Age: 69
End: 2019-09-05

## 2019-09-05 VITALS
SYSTOLIC BLOOD PRESSURE: 147 MMHG | HEIGHT: 69 IN | HEART RATE: 89 BPM | BODY MASS INDEX: 35.55 KG/M2 | DIASTOLIC BLOOD PRESSURE: 81 MMHG | TEMPERATURE: 98.2 F | OXYGEN SATURATION: 96 % | WEIGHT: 240 LBS | RESPIRATION RATE: 18 BRPM

## 2019-09-05 DIAGNOSIS — M47.22 CERVICAL RADICULOPATHY DUE TO DEGENERATIVE JOINT DISEASE OF SPINE: Primary | ICD-10-CM

## 2019-09-05 LAB
6-ACETYL MORPHINE: NEGATIVE
ALBUMIN SERPL-MCNC: 4.5 G/DL (ref 3.5–5.2)
ALBUMIN/GLOB SERPL: 1.5 G/DL
ALP SERPL-CCNC: 83 U/L (ref 39–117)
ALT SERPL W P-5'-P-CCNC: 32 U/L (ref 1–41)
AMPHET+METHAMPHET UR QL: NEGATIVE
ANION GAP SERPL CALCULATED.3IONS-SCNC: 11.8 MMOL/L (ref 5–15)
AST SERPL-CCNC: 20 U/L (ref 1–40)
BARBITURATES UR QL SCN: NEGATIVE
BASOPHILS # BLD AUTO: 0.02 10*3/MM3 (ref 0–0.2)
BASOPHILS NFR BLD AUTO: 0.2 % (ref 0–1.5)
BENZODIAZ UR QL SCN: NEGATIVE
BILIRUB SERPL-MCNC: 0.6 MG/DL (ref 0.2–1.2)
BILIRUB UR QL STRIP: NEGATIVE
BUN BLD-MCNC: 14 MG/DL (ref 8–23)
BUN/CREAT SERPL: 24.6 (ref 7–25)
BUPRENORPHINE SERPL-MCNC: NEGATIVE NG/ML
CALCIUM SPEC-SCNC: 9.5 MG/DL (ref 8.6–10.5)
CANNABINOIDS SERPL QL: NEGATIVE
CHLORIDE SERPL-SCNC: 102 MMOL/L (ref 98–107)
CLARITY UR: CLEAR
CO2 SERPL-SCNC: 25.2 MMOL/L (ref 22–29)
COCAINE UR QL: NEGATIVE
COLOR UR: YELLOW
CREAT BLD-MCNC: 0.57 MG/DL (ref 0.76–1.27)
D-LACTATE SERPL-SCNC: 1.2 MMOL/L (ref 0.5–2)
DEPRECATED RDW RBC AUTO: 43 FL (ref 37–54)
EOSINOPHIL # BLD AUTO: 0.16 10*3/MM3 (ref 0–0.4)
EOSINOPHIL NFR BLD AUTO: 1.7 % (ref 0.3–6.2)
ERYTHROCYTE [DISTWIDTH] IN BLOOD BY AUTOMATED COUNT: 12.8 % (ref 12.3–15.4)
ETHANOL BLD-MCNC: <10 MG/DL (ref 0–10)
ETHANOL UR QL: <0.01 %
GFR SERPL CREATININE-BSD FRML MDRD: 143 ML/MIN/1.73
GLOBULIN UR ELPH-MCNC: 3.1 GM/DL
GLUCOSE BLD-MCNC: 118 MG/DL (ref 65–99)
GLUCOSE UR STRIP-MCNC: NEGATIVE MG/DL
HCT VFR BLD AUTO: 45.8 % (ref 37.5–51)
HGB BLD-MCNC: 14.5 G/DL (ref 13–17.7)
HGB UR QL STRIP.AUTO: NEGATIVE
HOLD SPECIMEN: NORMAL
HOLD SPECIMEN: NORMAL
IMM GRANULOCYTES # BLD AUTO: 0.03 10*3/MM3 (ref 0–0.05)
IMM GRANULOCYTES NFR BLD AUTO: 0.3 % (ref 0–0.5)
KETONES UR QL STRIP: NEGATIVE
LEUKOCYTE ESTERASE UR QL STRIP.AUTO: NEGATIVE
LYMPHOCYTES # BLD AUTO: 2.13 10*3/MM3 (ref 0.7–3.1)
LYMPHOCYTES NFR BLD AUTO: 22 % (ref 19.6–45.3)
MCH RBC QN AUTO: 29.8 PG (ref 26.6–33)
MCHC RBC AUTO-ENTMCNC: 31.7 G/DL (ref 31.5–35.7)
MCV RBC AUTO: 94.2 FL (ref 79–97)
METHADONE UR QL SCN: NEGATIVE
MONOCYTES # BLD AUTO: 0.98 10*3/MM3 (ref 0.1–0.9)
MONOCYTES NFR BLD AUTO: 10.1 % (ref 5–12)
NEUTROPHILS # BLD AUTO: 6.34 10*3/MM3 (ref 1.7–7)
NEUTROPHILS NFR BLD AUTO: 65.7 % (ref 42.7–76)
NITRITE UR QL STRIP: NEGATIVE
NT-PROBNP SERPL-MCNC: 49.1 PG/ML (ref 5–900)
OPIATES UR QL: NEGATIVE
OXYCODONE UR QL SCN: NEGATIVE
PCP UR QL SCN: NEGATIVE
PH UR STRIP.AUTO: 6.5 [PH] (ref 5–8)
PLATELET # BLD AUTO: 259 10*3/MM3 (ref 140–450)
PMV BLD AUTO: 11.5 FL (ref 6–12)
POTASSIUM BLD-SCNC: 3.9 MMOL/L (ref 3.5–5.2)
PROT SERPL-MCNC: 7.6 G/DL (ref 6–8.5)
PROT UR QL STRIP: ABNORMAL
RBC # BLD AUTO: 4.86 10*6/MM3 (ref 4.14–5.8)
SODIUM BLD-SCNC: 139 MMOL/L (ref 136–145)
SP GR UR STRIP: 1.02 (ref 1–1.03)
T4 FREE SERPL-MCNC: 1.01 NG/DL (ref 0.93–1.7)
TROPONIN T SERPL-MCNC: <0.01 NG/ML (ref 0–0.03)
TSH SERPL DL<=0.05 MIU/L-ACNC: 0.48 UIU/ML (ref 0.27–4.2)
UROBILINOGEN UR QL STRIP: ABNORMAL
WBC NRBC COR # BLD: 9.66 10*3/MM3 (ref 3.4–10.8)
WHOLE BLOOD HOLD SPECIMEN: NORMAL
WHOLE BLOOD HOLD SPECIMEN: NORMAL

## 2019-09-05 PROCEDURE — 81003 URINALYSIS AUTO W/O SCOPE: CPT | Performed by: EMERGENCY MEDICINE

## 2019-09-05 PROCEDURE — 85025 COMPLETE CBC W/AUTO DIFF WBC: CPT | Performed by: EMERGENCY MEDICINE

## 2019-09-05 PROCEDURE — 71045 X-RAY EXAM CHEST 1 VIEW: CPT | Performed by: RADIOLOGY

## 2019-09-05 PROCEDURE — 71045 X-RAY EXAM CHEST 1 VIEW: CPT

## 2019-09-05 PROCEDURE — 80307 DRUG TEST PRSMV CHEM ANLYZR: CPT | Performed by: EMERGENCY MEDICINE

## 2019-09-05 PROCEDURE — 70450 CT HEAD/BRAIN W/O DYE: CPT | Performed by: RADIOLOGY

## 2019-09-05 PROCEDURE — 84443 ASSAY THYROID STIM HORMONE: CPT | Performed by: EMERGENCY MEDICINE

## 2019-09-05 PROCEDURE — 80053 COMPREHEN METABOLIC PANEL: CPT | Performed by: EMERGENCY MEDICINE

## 2019-09-05 PROCEDURE — 25010000002 ONDANSETRON PER 1 MG: Performed by: EMERGENCY MEDICINE

## 2019-09-05 PROCEDURE — 74176 CT ABD & PELVIS W/O CONTRAST: CPT

## 2019-09-05 PROCEDURE — 70450 CT HEAD/BRAIN W/O DYE: CPT

## 2019-09-05 PROCEDURE — 84484 ASSAY OF TROPONIN QUANT: CPT | Performed by: EMERGENCY MEDICINE

## 2019-09-05 PROCEDURE — 74176 CT ABD & PELVIS W/O CONTRAST: CPT | Performed by: RADIOLOGY

## 2019-09-05 PROCEDURE — 93010 ELECTROCARDIOGRAM REPORT: CPT | Performed by: INTERNAL MEDICINE

## 2019-09-05 PROCEDURE — 93005 ELECTROCARDIOGRAM TRACING: CPT | Performed by: EMERGENCY MEDICINE

## 2019-09-05 PROCEDURE — 84439 ASSAY OF FREE THYROXINE: CPT | Performed by: EMERGENCY MEDICINE

## 2019-09-05 PROCEDURE — 99285 EMERGENCY DEPT VISIT HI MDM: CPT

## 2019-09-05 PROCEDURE — 96374 THER/PROPH/DIAG INJ IV PUSH: CPT

## 2019-09-05 PROCEDURE — 83880 ASSAY OF NATRIURETIC PEPTIDE: CPT | Performed by: EMERGENCY MEDICINE

## 2019-09-05 PROCEDURE — 83605 ASSAY OF LACTIC ACID: CPT | Performed by: EMERGENCY MEDICINE

## 2019-09-05 RX ORDER — IBUPROFEN 600 MG/1
600 TABLET ORAL EVERY 6 HOURS PRN
Qty: 30 TABLET | Refills: 0 | Status: SHIPPED | OUTPATIENT
Start: 2019-09-05 | End: 2021-11-23 | Stop reason: ALTCHOICE

## 2019-09-05 RX ORDER — ONDANSETRON 2 MG/ML
4 INJECTION INTRAMUSCULAR; INTRAVENOUS ONCE
Status: COMPLETED | OUTPATIENT
Start: 2019-09-05 | End: 2019-09-05

## 2019-09-05 RX ORDER — ACETAMINOPHEN AND CODEINE PHOSPHATE 300; 30 MG/1; MG/1
1 TABLET ORAL EVERY 6 HOURS PRN
Qty: 12 TABLET | Refills: 0 | Status: SHIPPED | OUTPATIENT
Start: 2019-09-05 | End: 2021-11-23 | Stop reason: ALTCHOICE

## 2019-09-05 RX ORDER — SODIUM CHLORIDE 0.9 % (FLUSH) 0.9 %
10 SYRINGE (ML) INJECTION AS NEEDED
Status: DISCONTINUED | OUTPATIENT
Start: 2019-09-05 | End: 2019-09-05 | Stop reason: HOSPADM

## 2019-09-05 RX ORDER — CLONIDINE HYDROCHLORIDE 0.1 MG/1
0.1 TABLET ORAL ONCE
Status: DISCONTINUED | OUTPATIENT
Start: 2019-09-05 | End: 2019-09-05 | Stop reason: HOSPADM

## 2019-09-05 RX ORDER — CYCLOBENZAPRINE HCL 10 MG
10 TABLET ORAL 3 TIMES DAILY PRN
Qty: 21 TABLET | Refills: 0 | Status: SHIPPED | OUTPATIENT
Start: 2019-09-05 | End: 2021-11-23 | Stop reason: ALTCHOICE

## 2019-09-05 RX ADMIN — SODIUM CHLORIDE 1000 ML: 9 INJECTION, SOLUTION INTRAVENOUS at 07:51

## 2019-09-05 RX ADMIN — ONDANSETRON 4 MG: 2 INJECTION, SOLUTION INTRAMUSCULAR; INTRAVENOUS at 07:54

## 2019-09-05 NOTE — ED NOTES
pts work excuse was corrected and return date was hand written on it for returning on Monday, sept. 9, 2019     Lesley Rich RN  09/05/19 0450

## 2019-09-05 NOTE — ED PROVIDER NOTES
Subjective   89-year-old male in the emergency department complaining of generalized weakness, myalgias, abdominal pain, and generalized ill feelings.  He reports this is been progressing for the last few days, and came to the emergency department for further evaluation.  Denies chest pain or shortness of breath.  Denies vomiting or diarrhea.  Patient has no significant past medical history other than high blood pressure, for which he does not take medication for anymore.  Smokes 1 pack cigarettes a day.  Denies any neuro deficits.  NIH score at evaluation was a 0.        History provided by:  Patient   used: No    Weakness - Generalized   Severity:  Mild  Onset quality:  Gradual  Timing:  Constant  Progression:  Worsening  Chronicity:  New  Context: not alcohol use, not allergies, not change in medication, not decreased sleep, not dehydration, not drug use, not increased activity, not pinched nerve, not recent infection, not stress and not urinary tract infection    Relieved by:  Nothing  Worsened by:  Activity  Ineffective treatments:  None tried  Associated symptoms: abdominal pain, arthralgias, dizziness, myalgias and nausea    Associated symptoms: no anorexia, no aphasia, no ataxia, no chest pain, no cough, no diarrhea, no difficulty walking, no drooling, no dysphagia, no dysuria, no numbness in extremities, no falls, no fever, no foul-smelling urine, no frequency, no headaches, no hematochezia, no lethargy, no loss of consciousness, no melena, no near-syncope, no seizures, no sensory-motor deficit, no shortness of breath, no stroke symptoms, no syncope, no urgency, no vision change and no vomiting    Abdominal pain:     Location:  Generalized    Quality: aching      Severity:  Mild    Onset quality:  Gradual    Timing:  Constant    Progression:  Worsening  Dizziness:     Severity:  Mild    Timing:  Intermittent    Progression:  Worsening  Myalgias:     Location:  Generalized    Quality:   Aching    Severity:  Mild    Onset quality:  Gradual    Timing:  Constant    Progression:  Worsening  Nausea:     Severity:  Mild    Onset quality:  Gradual    Timing:  Constant    Progression:  Worsening  Risk factors: no anemia, no congestive heart failure, no coronary artery disease, no diabetes, no excessive menstruation, no family hx of stroke, no heart disease, no neurologic disease, no new medications and no recent stressors        Review of Systems   Constitutional: Negative for fever.   HENT: Negative for drooling.    Respiratory: Negative for cough and shortness of breath.    Cardiovascular: Negative for chest pain, syncope and near-syncope.   Gastrointestinal: Positive for abdominal pain and nausea. Negative for anorexia, diarrhea, dysphagia, hematochezia, melena and vomiting.   Genitourinary: Negative for dysuria, frequency and urgency.   Musculoskeletal: Positive for arthralgias and myalgias. Negative for falls.   Neurological: Positive for dizziness. Negative for seizures, loss of consciousness and headaches.   All other systems reviewed and are negative.      Past Medical History:   Diagnosis Date   • Acid reflux    • Anxiety    • Arthritis    • Herpes    • History of lipoma    • History of transfusion    • Inguinal hernia    • Peptic ulcer        Allergies   Allergen Reactions   • Penicillins Hives     Mother told him that, has taken amoxicillin with no problems       Past Surgical History:   Procedure Laterality Date   • ABDOMINAL SURGERY     • CHOLECYSTECTOMY WITH INTRAOPERATIVE CHOLANGIOGRAM N/A 7/25/2017    Procedure: CHOLECYSTECTOMY LAPAROSCOPIC INTRAOPERATIVE CHOLANGIOGRAM;  Surgeon: Luis Coe MD;  Location: [facility];  Service:    • HERNIA REPAIR     • SINUS SURGERY     • TUMOR EXCISION         History reviewed. No pertinent family history.    Social History     Socioeconomic History   • Marital status:      Spouse name: Not on file   • Number of children: Not on file   • Years  of education: Not on file   • Highest education level: Not on file   Tobacco Use   • Smoking status: Current Every Day Smoker     Types: Cigarettes   • Smokeless tobacco: Never Used   • Tobacco comment: 2 cigs per day   Substance and Sexual Activity   • Alcohol use: No   • Drug use: No   • Sexual activity: Defer           Objective   Physical Exam   Constitutional: He is oriented to person, place, and time. He appears well-developed and well-nourished.  Non-toxic appearance. No distress.   HENT:   Head: Normocephalic and atraumatic.   Right Ear: External ear normal.   Left Ear: External ear normal.   Nose: Nose normal.   Mouth/Throat: Oropharynx is clear and moist and mucous membranes are normal. No oropharyngeal exudate. No tonsillar exudate.   Eyes: Conjunctivae, EOM and lids are normal. Pupils are equal, round, and reactive to light.   Neck: Normal range of motion and full passive range of motion without pain. Neck supple. No thyromegaly present.   Cardiovascular: Normal rate, regular rhythm, S1 normal, S2 normal, normal heart sounds, intact distal pulses and normal pulses.   Pulmonary/Chest: Effort normal and breath sounds normal. No tachypnea. No respiratory distress. He has no decreased breath sounds. He has no wheezes. He has no rales. He exhibits no tenderness.   Abdominal: Soft. Normal appearance and bowel sounds are normal. He exhibits no distension. There is no tenderness. There is no rebound and no guarding.   Musculoskeletal: Normal range of motion. He exhibits no edema, tenderness or deformity.   Lymphadenopathy:     He has no cervical adenopathy.   Neurological: He is alert and oriented to person, place, and time. He has normal strength. No cranial nerve deficit or sensory deficit. GCS eye subscore is 4. GCS verbal subscore is 5. GCS motor subscore is 6.   Skin: Skin is warm, dry and intact. No rash noted. He is not diaphoretic. No erythema. No pallor.   Psychiatric: He has a normal mood and affect.  His speech is normal and behavior is normal. Judgment and thought content normal. Cognition and memory are normal.   Nursing note and vitals reviewed.      Procedures  Results for orders placed or performed during the hospital encounter of 09/05/19   Comprehensive Metabolic Panel   Result Value Ref Range    Glucose 118 (H) 65 - 99 mg/dL    BUN 14 8 - 23 mg/dL    Creatinine 0.57 (L) 0.76 - 1.27 mg/dL    Sodium 139 136 - 145 mmol/L    Potassium 3.9 3.5 - 5.2 mmol/L    Chloride 102 98 - 107 mmol/L    CO2 25.2 22.0 - 29.0 mmol/L    Calcium 9.5 8.6 - 10.5 mg/dL    Total Protein 7.6 6.0 - 8.5 g/dL    Albumin 4.50 3.50 - 5.20 g/dL    ALT (SGPT) 32 1 - 41 U/L    AST (SGOT) 20 1 - 40 U/L    Alkaline Phosphatase 83 39 - 117 U/L    Total Bilirubin 0.6 0.2 - 1.2 mg/dL    eGFR Non African Amer 143 >60 mL/min/1.73    Globulin 3.1 gm/dL    A/G Ratio 1.5 g/dL    BUN/Creatinine Ratio 24.6 7.0 - 25.0    Anion Gap 11.8 5.0 - 15.0 mmol/L   T4, Free   Result Value Ref Range    Free T4 1.01 0.93 - 1.70 ng/dL   TSH   Result Value Ref Range    TSH 0.478 0.270 - 4.200 uIU/mL   Urine Drug Screen - Urine, Clean Catch   Result Value Ref Range    Amphetamine Screen, Urine Negative Negative    Barbiturates Screen, Urine Negative Negative    Benzodiazepine Screen, Urine Negative Negative    Cocaine Screen, Urine Negative Negative    Methadone Screen, Urine Negative Negative    Opiate Screen Negative Negative    Phencyclidine (PCP), Urine Negative Negative    THC, Screen, Urine Negative Negative    6-ACETYL MORPHINE Negative Negative    Buprenorphine, Screen, Urine Negative Negative    Oxycodone Screen, Urine Negative Negative   Ethanol   Result Value Ref Range    Ethanol <10 0 - 10 mg/dL    Ethanol % <0.010 %   Troponin   Result Value Ref Range    Troponin T <0.010 0.000 - 0.030 ng/mL   BNP   Result Value Ref Range    proBNP 49.1 5.0 - 900.0 pg/mL   Lactic Acid, Plasma   Result Value Ref Range    Lactate 1.2 0.5 - 2.0 mmol/L   Urinalysis With  Microscopic If Indicated (No Culture) - Urine, Clean Catch   Result Value Ref Range    Color, UA Yellow Yellow, Straw    Appearance, UA Clear Clear    pH, UA 6.5 5.0 - 8.0    Specific Gravity, UA 1.025 1.005 - 1.030    Glucose, UA Negative Negative    Ketones, UA Negative Negative    Bilirubin, UA Negative Negative    Blood, UA Negative Negative    Protein, UA Trace (A) Negative    Leuk Esterase, UA Negative Negative    Nitrite, UA Negative Negative    Urobilinogen, UA 1.0 E.U./dL 0.2 - 1.0 E.U./dL   CBC Auto Differential   Result Value Ref Range    WBC 9.66 3.40 - 10.80 10*3/mm3    RBC 4.86 4.14 - 5.80 10*6/mm3    Hemoglobin 14.5 13.0 - 17.7 g/dL    Hematocrit 45.8 37.5 - 51.0 %    MCV 94.2 79.0 - 97.0 fL    MCH 29.8 26.6 - 33.0 pg    MCHC 31.7 31.5 - 35.7 g/dL    RDW 12.8 12.3 - 15.4 %    RDW-SD 43.0 37.0 - 54.0 fl    MPV 11.5 6.0 - 12.0 fL    Platelets 259 140 - 450 10*3/mm3    Neutrophil % 65.7 42.7 - 76.0 %    Lymphocyte % 22.0 19.6 - 45.3 %    Monocyte % 10.1 5.0 - 12.0 %    Eosinophil % 1.7 0.3 - 6.2 %    Basophil % 0.2 0.0 - 1.5 %    Immature Grans % 0.3 0.0 - 0.5 %    Neutrophils, Absolute 6.34 1.70 - 7.00 10*3/mm3    Lymphocytes, Absolute 2.13 0.70 - 3.10 10*3/mm3    Monocytes, Absolute 0.98 (H) 0.10 - 0.90 10*3/mm3    Eosinophils, Absolute 0.16 0.00 - 0.40 10*3/mm3    Basophils, Absolute 0.02 0.00 - 0.20 10*3/mm3    Immature Grans, Absolute 0.03 0.00 - 0.05 10*3/mm3   Light Blue Top   Result Value Ref Range    Extra Tube hold for add-on    Green Top (Gel)   Result Value Ref Range    Extra Tube Hold for add-ons.    Lavender Top   Result Value Ref Range    Extra Tube hold for add-on    Gold Top - SST   Result Value Ref Range    Extra Tube Hold for add-ons.      Ct Abdomen Pelvis Without Contrast    Result Date: 9/5/2019  Narrative: CT ABDOMEN PELVIS WO CONTRAST-  CLINICAL INDICATION: Pain, unspecified   COMPARISON: None available  TECHNIQUE: Axial images were acquired from the lung bases through the pubic  symphysis without any IV or oral contrast. Reformatted images were created in both the coronal and sagittal planes.  Radiation dose reduction techniques were utilized per ALARA protocol. Automated exposure control was initiated through either or [employer] or [employer] software packages by  protocol.     FINDINGS: Extensive coronary artery calcifications are present.  The liver is homogeneous. There is no evidence of focal hepatic mass  The spleen is homogeneous  There is no peripancreatic stranding or pancreatic head mass.  There is no adrenal enlargement.  Nonobstructing right intrarenal stone. Tiny 6 mm focus of increased density in superior pole the left kidney nonspecific and of uncertain etiology  Arthritic change in the spine.  Otherwise I do not see any free fluid or walled off fluid collections.  Sigmoid diverticuli are present but no diverticulitis.  There is no evidence of mesenteric or retroperitoneal adenopathy        Impression: : 1. Extensive coronary artery calcifications 2. Sigmoid diverticuli 3. Other findings as above     This report was finalized on 9/5/2019 8:21 AM by Dr. Jeremy Jenkins MD.      Xr Chest 1 View    Result Date: 9/5/2019  Narrative: XR CHEST 1 VW-  CLINICAL INDICATION: WEAKNESS   COMPARISON: None available  TECHNIQUE: Single frontal view of the chest.  FINDINGS:  There is no focal alveolar infiltrate or effusion. The cardiac silhouette is normal. The pulmonary vasculature is unremarkable. There is no evidence of an acute osseous abnormality. There are no suspicious-appearing parenchymal soft tissue nodules.       Impression: No evidence of active or acute cardiopulmonary disease on today's chest radiograph.  This report was finalized on 9/5/2019 8:22 AM by Dr. Jeremy Jenkins MD.      Ct Head Without Contrast Stroke Protocol    Result Date: 9/5/2019  Narrative: CT HEAD WO CONTRAST STROKE PROTOCOL-  CLINICAL INDICATION: Headache, chronic, new features or neuro deficit    COMPARISON: None available  TECHNIQUE: Axial images of the brain were obtained with out intravenous contrast.  Reformatted images were created in the sagittal and coronal planes.  DOSE:  Radiation dose reduction techniques were utilized per ALARA protocol. Automated exposure control was initiated through either or [employer] or [employer] software packages by  protocol.     FINDINGS: Today's study shows no mass, hemorrhage, or midline shift. The ventricles, cisterns, and sulci are unremarkable. There is no hydrocephalus. There is no evidence of acute ischemia. I do not see epidural or subdural hematoma. The gray-white differentiation is appropriate. The bone window setting images show no destructive calvarial lesion or acute calvarial fracture. The posterior fossa is unremarkable.       Impression: No evidence of an acute ischemic event.  Results were transferred to the emergency department at 7:51 AM. I was made aware of this exam at 7:48 AM  This report was finalized on 9/5/2019 7:52 AM by Dr. Jeremy Jenkins MD.               ED Course  ED Course as of Sep 05 1152   Thu Sep 05, 2019   0737 Sinus rhythm with sinus arrhythmia with 1st degree AV block  No Acute ST Elevation  No previous ECGs available  Vent. rate 75 BPM  RI interval 248 ms  QRS duration 102 ms  QT/QTc 398/444 ms  P-R-T axes 10 35 23 ECG 12 Lead [ES]   1141 Patient feels better at this time.  Work-up with no significant abnormalities.  Patient describes pain in his neck radiating to his arms and causing headaches in the back of his head.  Most likely consistent with some cervical arthritis and possible radiculopathy.  He also complains of being under increased stress.  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  Will discharge home and recommend he follow-up with his doctor may possibly need outpatient MRI.  [BC]      ED Course User Index  [BC] Alessio Stveenson MD  [ES] Rush Castro MD         Final Diagnosis: as of Sep  05 1152   Cervical radiculopathy due to degenerative joint disease of spine                  MDM  Number of Diagnoses or Management Options     Amount and/or Complexity of Data Reviewed  Clinical lab tests: ordered and reviewed  Tests in the radiology section of CPT®: ordered and reviewed  Tests in the medicine section of CPT®: reviewed and ordered  Review and summarize past medical records: yes  Discuss the patient with other providers: yes  Independent visualization of images, tracings, or specimens: yes    Risk of Complications, Morbidity, and/or Mortality  Presenting problems: moderate  Diagnostic procedures: moderate  Management options: moderate    Patient Progress  Patient progress: stable               Alessio Stevenson MD  09/05/19 1151       Alessio Stevneson MD  09/05/19 1152

## 2019-10-28 ENCOUNTER — OFFICE VISIT (OUTPATIENT)
Dept: UROLOGY | Facility: CLINIC | Age: 69
End: 2019-10-28

## 2019-10-28 VITALS — HEIGHT: 69 IN | WEIGHT: 247 LBS | BODY MASS INDEX: 36.58 KG/M2

## 2019-10-28 DIAGNOSIS — B00.9 HERPES SIMPLEX: Primary | ICD-10-CM

## 2019-10-28 DIAGNOSIS — N40.1 BENIGN PROSTATIC HYPERPLASIA WITH WEAK URINARY STREAM: ICD-10-CM

## 2019-10-28 DIAGNOSIS — A60.01 HERPESVIRAL INFECTION OF PENIS: ICD-10-CM

## 2019-10-28 DIAGNOSIS — R39.12 BENIGN PROSTATIC HYPERPLASIA WITH WEAK URINARY STREAM: ICD-10-CM

## 2019-10-28 PROCEDURE — 99213 OFFICE O/P EST LOW 20 MIN: CPT | Performed by: UROLOGY

## 2019-10-28 RX ORDER — ACYCLOVIR 50 MG/G
OINTMENT TOPICAL
Qty: 15 G | Refills: 7 | Status: SHIPPED | OUTPATIENT
Start: 2019-10-28 | End: 2021-11-23

## 2019-10-28 NOTE — PROGRESS NOTES
Chief Complaint:        BPH      HPI:   69 y.o. male.  Pt has cataracts and was advised to stop flomax.  He still has a slow stream and his symptoms are about the same.  HPI      Past Medical History:        Past Medical History:   Diagnosis Date   • Acid reflux    • Anxiety    • Arthritis    • Herpes    • History of lipoma    • History of transfusion    • Inguinal hernia    • Peptic ulcer          Current Meds:     Current Outpatient Medications   Medication Sig Dispense Refill   • acetaminophen (TYLENOL) 650 MG 8 hr tablet Take 650 mg by mouth Every 8 (Eight) Hours As Needed for Mild Pain (1-3).     • acetaminophen-codeine (TYLENOL #3) 300-30 MG per tablet Take 1 tablet by mouth Every 6 (Six) Hours As Needed for Moderate Pain . 12 tablet 0   • acyclovir (ZOVIRAX) 5 % ointment Apply thin layer to affected area 15 g 7   • cyclobenzaprine (FLEXERIL) 10 MG tablet Take 1 tablet by mouth 3 (Three) Times a Day As Needed for Muscle Spasms. 21 tablet 0   • HYDROcodone-acetaminophen (NORCO) 7.5-325 MG per tablet Take 1-2 tablets by mouth Every 4 (Four) Hours As Needed for Moderate Pain (4-6) (Pain). 24 tablet 0   • ibuprofen (ADVIL,MOTRIN) 600 MG tablet Take 1 tablet by mouth Every 6 (Six) Hours As Needed for Moderate Pain . 30 tablet 0   • omeprazole (priLOSEC) 20 MG capsule Take 20 mg by mouth Daily.     • tamsulosin (FLOMAX) 0.4 MG capsule 24 hr capsule Take 1 capsule by mouth Every Night. 90 capsule 3     No current facility-administered medications for this visit.         Allergies:      Allergies   Allergen Reactions   • Penicillins Hives     Mother told him that, has taken amoxicillin with no problems        Past Surgical History:     Past Surgical History:   Procedure Laterality Date   • ABDOMINAL SURGERY     • CHOLECYSTECTOMY WITH INTRAOPERATIVE CHOLANGIOGRAM N/A 7/25/2017    Procedure: CHOLECYSTECTOMY LAPAROSCOPIC INTRAOPERATIVE CHOLANGIOGRAM;  Surgeon: Luis Coe MD;  Location: [facility];  Service:    •  HERNIA REPAIR     • SINUS SURGERY     • TUMOR EXCISION           Social History:     Social History     Socioeconomic History   • Marital status:      Spouse name: Not on file   • Number of children: Not on file   • Years of education: Not on file   • Highest education level: Not on file   Tobacco Use   • Smoking status: Current Every Day Smoker     Types: Cigarettes   • Smokeless tobacco: Never Used   • Tobacco comment: 2 cigs per day   Substance and Sexual Activity   • Alcohol use: No   • Drug use: No   • Sexual activity: Defer       Family History:     Family History   Problem Relation Age of Onset   • No Known Problems Father    • No Known Problems Mother        Review of Systems:     Review of Systems   Constitutional: Negative for chills, fatigue and fever.   HENT: Negative for congestion and sinus pressure.    Respiratory: Negative for shortness of breath.    Cardiovascular: Negative for chest pain.   Gastrointestinal: Negative for abdominal pain, constipation, diarrhea, nausea and vomiting.   Genitourinary: Negative for frequency and urgency.   Musculoskeletal: Negative for back pain and neck pain.   Neurological: Negative for dizziness and headaches.   Hematological: Does not bruise/bleed easily.   Psychiatric/Behavioral: The patient is not nervous/anxious.        IPSS Questionnaire (AUA-7):  Over the past month…    1)  Incomplete Emptying  How often have you had a sensation of not emptying your bladder?  0 - Not at all   2)  Frequency  How often have you had to urinate less than every two hours? 5 - Almost always   3)  Intermittency  How often have you found you stopped and started again several times when you urinated?  0 - Not at all   4) Urgency  How often have you found it difficult to postpone urination?  5 - Almost always   5) Weak Stream  How often have you had a weak urinary stream?  5 - Almost always   6) Straining  How often have you had to push or strain to begin urination?  0 - Not at  "all   7) Nocturia  How many times did you typically get up at night to urinate?  2 - 2 times   Total Score:  17       Quality of life due to urinary symptoms:  If you were to spend the rest of your life with your urinary condition the way it is now, how would you feel about that? 4-Mostly Dissatisfied   Urine Leakage (Incontinence) 0-No Leakage       I have reviewed the follow portions of the patient's history and confirmed they are accurate today:  allergies, current medications, past family history, past medical history, past social history, past surgical history, problem list and ROS  Physical Exam:     Physical Exam    Ht 175.3 cm (69\")   Wt 112 kg (247 lb)   BMI 36.48 kg/m²    Procedure:         Assessment:     Encounter Diagnoses   Name Primary?   • Herpes simplex Yes   • Benign prostatic hyperplasia with weak urinary stream    • Herpesviral infection of penis        No orders of the defined types were placed in this encounter.      Patient reports that he is not currently experiencing any symptoms of urinary incontinence.      Plan:   Since he has had to stop flomax will try daily cialis.  His psa by hx is 1.    Patient's Body mass index is 36.48 kg/m². BMI is above normal parameters. Recommendations include: educational material.      Smoking Cessation Counseling:  Current some day smoker. less than 3 minutes spent counseling. Agreeable to stop.  Patient is going to follow up with PCP to discuss treatment/medication options to quit tobacco use.     Counseling was given to patient for the following topics impressions as follows: BPH. The interim medical history and current results were reviewed.  A treatment plan with follow-up was made for Herpes simplex [B00.9].    This document has been electronically signed by Reinier Wayne MD October 28, 2019 11:55 AM      "

## 2020-06-04 ENCOUNTER — TRANSCRIBE ORDERS (OUTPATIENT)
Dept: LAB | Facility: HOSPITAL | Age: 70
End: 2020-06-04

## 2020-06-04 DIAGNOSIS — Z01.818 PRE-OPERATIVE CLEARANCE: Primary | ICD-10-CM

## 2020-06-08 ENCOUNTER — LAB (OUTPATIENT)
Dept: LAB | Facility: HOSPITAL | Age: 70
End: 2020-06-08

## 2020-06-08 DIAGNOSIS — Z01.818 PRE-OPERATIVE CLEARANCE: ICD-10-CM

## 2020-06-08 LAB
REF LAB TEST METHOD: NORMAL
SARS-COV-2 RNA RESP QL NAA+PROBE: NOT DETECTED

## 2020-06-08 PROCEDURE — C9803 HOPD COVID-19 SPEC COLLECT: HCPCS

## 2020-06-08 PROCEDURE — U0002 COVID-19 LAB TEST NON-CDC: HCPCS | Performed by: OPHTHALMOLOGY

## 2020-06-08 PROCEDURE — U0004 COV-19 TEST NON-CDC HGH THRU: HCPCS | Performed by: OPHTHALMOLOGY

## 2021-02-26 ENCOUNTER — IMMUNIZATION (OUTPATIENT)
Dept: VACCINE CLINIC | Facility: HOSPITAL | Age: 71
End: 2021-02-26

## 2021-02-26 PROCEDURE — 0001A: CPT | Performed by: INTERNAL MEDICINE

## 2021-02-26 PROCEDURE — 91300 HC SARSCOV02 VAC 30MCG/0.3ML IM: CPT | Performed by: INTERNAL MEDICINE

## 2021-03-19 ENCOUNTER — IMMUNIZATION (OUTPATIENT)
Dept: VACCINE CLINIC | Facility: HOSPITAL | Age: 71
End: 2021-03-19

## 2021-03-19 PROCEDURE — 0002A: CPT | Performed by: INTERNAL MEDICINE

## 2021-03-19 PROCEDURE — 91300 HC SARSCOV02 VAC 30MCG/0.3ML IM: CPT | Performed by: INTERNAL MEDICINE

## 2021-10-21 ENCOUNTER — OFFICE VISIT (OUTPATIENT)
Dept: CARDIOLOGY | Facility: CLINIC | Age: 71
End: 2021-10-21

## 2021-10-21 VITALS
WEIGHT: 246 LBS | BODY MASS INDEX: 36.43 KG/M2 | RESPIRATION RATE: 16 BRPM | HEART RATE: 73 BPM | TEMPERATURE: 97.7 F | HEIGHT: 69 IN | DIASTOLIC BLOOD PRESSURE: 77 MMHG | SYSTOLIC BLOOD PRESSURE: 142 MMHG

## 2021-10-21 DIAGNOSIS — G47.33 OSA (OBSTRUCTIVE SLEEP APNEA): ICD-10-CM

## 2021-10-21 DIAGNOSIS — E78.2 MIXED HYPERLIPIDEMIA: ICD-10-CM

## 2021-10-21 DIAGNOSIS — R07.2 PRECORDIAL CHEST PAIN: ICD-10-CM

## 2021-10-21 DIAGNOSIS — I65.22 CAROTID STENOSIS, ASYMPTOMATIC, LEFT: ICD-10-CM

## 2021-10-21 DIAGNOSIS — R00.2 PALPITATIONS: Primary | ICD-10-CM

## 2021-10-21 DIAGNOSIS — I73.9 PAD (PERIPHERAL ARTERY DISEASE) (HCC): ICD-10-CM

## 2021-10-21 PROCEDURE — 99204 OFFICE O/P NEW MOD 45 MIN: CPT | Performed by: SPECIALIST

## 2021-10-21 PROCEDURE — 93000 ELECTROCARDIOGRAM COMPLETE: CPT | Performed by: SPECIALIST

## 2021-10-21 RX ORDER — LISINOPRIL 20 MG/1
20 TABLET ORAL DAILY
COMMUNITY
End: 2021-11-23

## 2021-10-21 RX ORDER — AMLODIPINE BESYLATE 10 MG/1
10 TABLET ORAL NIGHTLY
COMMUNITY
End: 2022-03-31 | Stop reason: SDUPTHER

## 2021-10-21 RX ORDER — ASPIRIN 81 MG/1
81 TABLET ORAL DAILY
COMMUNITY
End: 2022-03-31 | Stop reason: SDUPTHER

## 2021-10-21 RX ORDER — CLOPIDOGREL BISULFATE 75 MG/1
75 TABLET ORAL 2 TIMES WEEKLY
COMMUNITY
End: 2021-12-11 | Stop reason: HOSPADM

## 2021-10-21 NOTE — PROGRESS NOTES
Subjective     Donaldo Roberts is a 71 y.o. male who presents to day for No chief complaint on file..    CHIEF COMPLIANT  No chief complaint on file.      Active Problems:  Problem List Items Addressed This Visit     None          HPI  HPI    PRIOR MEDS  Current Outpatient Medications on File Prior to Visit   Medication Sig Dispense Refill   • amLODIPine (NORVASC) 10 MG tablet Take 10 mg by mouth Daily.     • aspirin 81 MG EC tablet Take 81 mg by mouth Daily.     • lisinopril (PRINIVIL,ZESTRIL) 20 MG tablet Take 20 mg by mouth Daily.     • omeprazole (priLOSEC) 20 MG capsule Take 20 mg by mouth Daily.     • acetaminophen (TYLENOL) 650 MG 8 hr tablet Take 650 mg by mouth Every 8 (Eight) Hours As Needed for Mild Pain (1-3).     • acetaminophen-codeine (TYLENOL #3) 300-30 MG per tablet Take 1 tablet by mouth Every 6 (Six) Hours As Needed for Moderate Pain . 12 tablet 0   • acyclovir (ZOVIRAX) 5 % ointment Apply thin layer to affected area 15 g 7   • cyclobenzaprine (FLEXERIL) 10 MG tablet Take 1 tablet by mouth 3 (Three) Times a Day As Needed for Muscle Spasms. 21 tablet 0   • HYDROcodone-acetaminophen (NORCO) 7.5-325 MG per tablet Take 1-2 tablets by mouth Every 4 (Four) Hours As Needed for Moderate Pain (4-6) (Pain). 24 tablet 0   • ibuprofen (ADVIL,MOTRIN) 600 MG tablet Take 1 tablet by mouth Every 6 (Six) Hours As Needed for Moderate Pain . 30 tablet 0   • tamsulosin (FLOMAX) 0.4 MG capsule 24 hr capsule Take 1 capsule by mouth Every Night. 90 capsule 3     No current facility-administered medications on file prior to visit.       ALLERGIES  Penicillins    HISTORY  Past Medical History:   Diagnosis Date   • Acid reflux    • Anxiety    • Arthritis    • Herpes    • History of lipoma    • History of transfusion    • Inguinal hernia    • Peptic ulcer        Social History     Socioeconomic History   • Marital status:    Tobacco Use   • Smoking status: Current Every Day Smoker     Types: Cigarettes   • Smokeless  tobacco: Never Used   • Tobacco comment: 2 cigs per day   Substance and Sexual Activity   • Alcohol use: No   • Drug use: No   • Sexual activity: Defer       Family History   Problem Relation Age of Onset   • No Known Problems Father    • No Known Problems Mother        Review of Systems   Constitutional: Positive for fatigue.   HENT: Positive for congestion.    Respiratory: Positive for shortness of breath.    Cardiovascular: Positive for chest pain, palpitations and leg swelling.   Genitourinary: Positive for difficulty urinating, frequency and urgency.   Musculoskeletal: Positive for arthralgias, back pain, joint swelling and myalgias.   Neurological: Positive for numbness and headaches.        Hx stroke   Hematological: Bruises/bleeds easily.   Psychiatric/Behavioral: Positive for dysphoric mood. The patient is nervous/anxious.        Objective     VITALS: There were no vitals taken for this visit.    LABS:   Lab Results (most recent)     None          IMAGING:   No Images in the past 120 days found..    EXAM:  Physical Exam    Procedure     ECG 12 Lead    Date/Time: 10/21/2021 11:57 AM  Performed by: Sonali Melgar MD  Authorized by: Sonali Melgar MD                  Assessment/Plan     There are no diagnoses linked to this encounter.    No follow-ups on file.      Advance Care Planning   {Advance Directive Status:33082}          MEDS ORDERED DURING VISIT:  No orders of the defined types were placed in this encounter.      As always, Roderick Patricia MD  I appreciate very much the opportunity to participate in the cardiovascular care of your patients. Please do not hesitate to call me with any questions with regards to Donaldo Roberts evaluation and management.         This document has been electronically signed by Sonali Melgar MD  October 21, 2021 11:55 EDT

## 2021-10-21 NOTE — PROGRESS NOTES
Sonali Melgar MD   Physician   Specialty:  Cardiology   Progress Notes       Signed   Encounter Date:  10/21/2021              Procedure Orders   ECG 12 Lead [[id number]] ordered by Sonali Melgar MD     Post-procedure Diagnoses   Pre-operative cardiovascular examination [Z01.810]          Signed        Expand AllCollapse All           Subjective      Initial consultation, preoperative risk assessment for knee surgery  Donaldo Roberts 71 y.o. male who presents to day for evaluation for preoperative risk assessment for knee surgery   CHIEF COMPLIANT       Chief Complaint   Patient presents with   • Palpitations       races,skips   • Chest Pain       rated 5-6, sharp    • Shortness of Breath       routine activity   • Med Management       AVS summary list         Active Problems:       Problem List Items Addressed This Visit                 Cardiac and Vasculature      Precordial chest pain      Relevant Orders      Stress Test With Myocardial Perfusion One Day      Adult Transthoracic Echo Complete w/ Color, Spectral and Contrast if necessary per protocol      Essential hypertension      Relevant Orders      Lipid Panel      Comprehensive Metabolic Panel      Second degree AV block, Mobitz type I      Relevant Orders      Holter Monitor - 24 Hour      Carotid stenosis, asymptomatic, right      Relevant Orders      Duplex Carotid Ultrasound CAR      Dyslipidemia      PAD (peripheral artery disease) (HCC)      Relevant Orders      US Arterial Doppler Lower Extremity Complete            Pulmonary and Pneumonias      Shortness of breath      Relevant Orders      Stress Test With Myocardial Perfusion One Day      Adult Transthoracic Echo Complete w/ Color, Spectral and Contrast if necessary per protocol            Tobacco      Cigarette nicotine dependence in remission                Other Visit Diagnoses      Pre-operative cardiovascular examination    -  Primary     Relevant Orders     Stress Test With Myocardial  Perfusion One Day     Adult Transthoracic Echo Complete w/ Color, Spectral and Contrast if necessary per protocol     PATTY (obstructive sleep apnea)         Relevant Orders     Home Sleep Study             HPI  HPI  Being here today to assess for knee surgery he was admitted to the [facility] on the last year but he had carotid endarterectomy he was told that the time has some stenosis also in the right side and he was told he that he did have an old heart attack in the past he cannot remember anything about that he walks with a walker and he does have occasional chest discomfort and quite short of breath just walking few steps no claudications but he said he has no pulses in his lower extremity is slight edema has hypertension no diabetes but he denies claudication, has hyperlipidemia he quit about 2 months ago smoked about 1 pack a week is about 45 years no cardiac family history according to him and his wife he snores at night he also quits breathing at night and he feels sleepy during the day and very fatigued and tired  PRIOR MEDS         Current Outpatient Medications on File Prior to Visit   Medication Sig Dispense Refill   • amLODIPine (NORVASC) 5 MG tablet Take 5 mg by mouth Daily.       • ARIPiprazole (ABILIFY) 2 MG tablet Take 2 mg by mouth Daily.   0   • aspirin 81 MG EC tablet Take 81 mg by mouth Daily.       • diclofenac (VOLTAREN) 1 % gel gel Apply 4 g topically to the appropriate area as directed 4 (Four) Times a Day As Needed.       • diphenhydrAMINE (BENADRYL ALLERGY) 25 mg capsule Take  by mouth.       • ezetimibe (ZETIA) 10 MG tablet Take  by mouth.       • FLUoxetine (PROzac) 10 MG capsule Take 10 mg by mouth Daily.       • hydrochlorothiazide (HYDRODIURIL) 12.5 MG tablet Take 12.5 mg by mouth Daily.       • losartan-hydrochlorothiazide (HYZAAR) 100-25 MG per tablet Take 1 tablet by mouth Daily.       • Melatonin 10 MG tablet Take  by mouth.       • mirtazapine (REMERON)  "30 MG tablet Take 30 mg by mouth Every Night.       • pantoprazole (PROTONIX) 40 MG EC tablet Take 40 mg by mouth Daily.       • rosuvastatin (CRESTOR) 20 MG tablet Take 40 mg by mouth Every Night.       • clonazePAM (KlonoPIN) 1 MG tablet Take 1 mg by mouth 2 (Two) Times a Day As Needed for seizures.       • Ezetimibe (ZETIA PO) Take 1 tablet by mouth Daily.       • gabapentin (NEURONTIN) 300 MG capsule Take 300 mg by mouth Every Night.       • methocarbamol (ROBAXIN) 500 MG tablet Take 1 tablet by mouth 2 (Two) Times a Day. 12 tablet 0   • methylPREDNISolone (MEDROL) 4 MG dose pack Take as directed on package instructions. 21 tablet 0      No current facility-administered medications on file prior to visit.         ALLERGIES  Bactrim [sulfamethoxazole-trimethoprim] and Penicillins     HISTORY       Past Medical History:   Diagnosis Date   • Acid reflux     • Diverticulitis     • Hyperlipidemia     • Hypertension     • Sleep apnea           Social History            Socioeconomic History   • Marital status:    Tobacco Use   • Smoking status: Former Smoker       Packs/day: 0.50       Types: Cigarettes       Quit date:        Years since quittin.8   • Smokeless tobacco: Never Used   Substance and Sexual Activity   • Alcohol use: No   • Drug use: No   • Sexual activity: Defer               Family History   Problem Relation Age of Onset   • Osteoporosis Sister     • Diabetes Sister     • Hypertension Sister     • Gout Brother     • Cancer Brother     • Diabetes Brother     • Hypertension Brother           Review of Systems   Respiratory: Positive for apnea, chest tightness and shortness of breath. Negative for cough, choking, wheezing and stridor.    Cardiovascular: Positive for leg swelling. Negative for chest pain and palpitations.               Objective         VITALS: /74 (BP Location: Right leg, Patient Position: Sitting)   Pulse 72   Temp 97.1 °F (36.2 °C)   Resp 16   Ht 162.6 cm (64\")  "  Wt 66.7 kg (147 lb)   BMI 25.23 kg/m²      LABS:       Lab Results (most recent)      None             IMAGING:   XR Femur 2 View Left     Result Date: 9/9/2021  No acute bony abnormality  This report was finalized on 9/9/2021 12:23 PM by Dr. Jeremy Jenkins MD.       CT Pelvis Without Contrast     Result Date: 9/9/2021  1. No acute fracture of the left hip identified on the presented study. 2. Horseshoe kidney 3. Right hip prosthesis  This report was finalized on 9/9/2021 12:28 PM by Dr. Jeremy Jenkins MD.          EXAM:  Physical Exam  Vitals reviewed.   Constitutional:       Appearance: She is well-developed.   HENT:      Head: Normocephalic and atraumatic.   Eyes:      Pupils: Pupils are equal, round, and reactive to light.   Neck:      Thyroid: No thyromegaly.      Vascular: No JVD.      Comments: Healed left carotid endarterectomy scar  Cardiovascular:      Rate and Rhythm: Normal rate and regular rhythm.      Heart sounds: Normal heart sounds. No murmur heard.  No friction rub. No gallop.       Comments: Irregular heartbeat consistent with the Mobitz type I  Trace lower extremity edema with diminished peripheral pulsations  Pulmonary:      Effort: Pulmonary effort is normal. No respiratory distress.      Breath sounds: Normal breath sounds. No stridor. No wheezing or rales.   Chest:      Chest wall: No tenderness.   Musculoskeletal:         General: No tenderness or deformity.      Cervical back: Neck supple.   Skin:     General: Skin is warm and dry.   Neurological:      Mental Status: She is alert and oriented to person, place, and time.      Cranial Nerves: No cranial nerve deficit.      Coordination: Coordination normal.                Procedure        ECG 12 Lead     Date/Time: 10/21/2021 12:32 PM  Performed by: Sonali Melgar MD  Authorized by: Sonali Melgar MD               EKG: Normal sinus rhythm with second-degree AV block Mobitz type I otherwise unremarkable EKG compared with EKG on 11/24/2020 no  heart block at the time but there was LVH pattern              Assessment/Plan         Diagnoses and all orders for this visit:     1. Pre-operative cardiovascular examination (Primary)  -     Stress Test With Myocardial Perfusion One Day; Future  -     Adult Transthoracic Echo Complete w/ Color, Spectral and Contrast if necessary per protocol; Future     2. Carotid stenosis, asymptomatic, right  -     Duplex Carotid Ultrasound CAR; Future     3. Dyslipidemia     4. Essential hypertension  -     Lipid Panel; Future  -     Comprehensive Metabolic Panel; Future     5. PAD (peripheral artery disease) (HCC)  -     US Arterial Doppler Lower Extremity Complete; Future     6. Precordial chest pain  -     Stress Test With Myocardial Perfusion One Day; Future  -     Adult Transthoracic Echo Complete w/ Color, Spectral and Contrast if necessary per protocol; Future     7. Second degree AV block, Mobitz type I  -     Holter Monitor - 24 Hour; Future     8. Shortness of breath  -     Stress Test With Myocardial Perfusion One Day; Future  -     Adult Transthoracic Echo Complete w/ Color, Spectral and Contrast if necessary per protocol; Future     9. Cigarette nicotine dependence in remission     10. PATTY (obstructive sleep apnea)  -     Home Sleep Study; Future     Other orders  -     ECG 12 Lead     1. His blood pressure was very high now repeat blood pressure is much better I suspect he has diastolic dysfunction with exercise-induced hypotension will monitor for now  2. We'll try to get records from the [facility]  3. He has chest pain get nuclear stress testing he is walking with a walker we'll get a pharmacological nuclear stress test for assessment of ischemia  4. We'll get an echocardiac to his cardiac function wall motion valve morphology  5. He had carotid endarterectomy last year he was told that the other side also has some blockage but it was at the time was not severe enough I'm going to repeat the  carotid Doppler for assessment  6. His symptoms are consistent with sleep apnea will refer him for sleep study  7. Guarding Duongitz two block it could be related to sleep apnea I'm going to get a Holter monitor for assessment  8. I'll get his lipid profile for assessment of hyperlipidemia  9. He has diminished his lower extremity I'm going to get lower extremity arterial Doppler for assessment of the nurses     Return after stress test.              Advance Care Planning     ACP discussion was declined by the patient. Patient does not have an advance directive, declines further assistance.               MEDS ORDERED DURING VISIT:  No orders of the defined types were placed in this encounter.        As always,  Roderick Colmenares MD  I appreciate very much the opportunity to participate in the cardiovascular care of your patients. Please do not hesitate to call me with any questions with regards to Adriana Maresdaendre evaluation and management.           This document has been electronically signed by Sonali Melgar MD  October 21, 2021 12:51 EDT

## 2021-10-22 ENCOUNTER — PATIENT ROUNDING (BHMG ONLY) (OUTPATIENT)
Dept: CARDIOLOGY | Facility: CLINIC | Age: 71
End: 2021-10-22

## 2021-10-22 NOTE — PROGRESS NOTES
October 22, 2021    Hello, may I speak with Donaldo Roberts?    My name is Niki      I am  with [facility]  [facility]  [address] [place] [address] [phone].    Before we get started may I verify your date of birth? 1950    I am calling to officially welcome you to our practice and ask about your recent visit. Is this a good time to talk? YES     Tell me about your visit with us. What things went well?  Everything was fine. Tests were ordered for me at the hospital.       We're always looking for ways to make our patients' experiences even better. Do you have recommendations on ways we may improve?  NO    Overall were you satisfied with your first visit to our practice?  NO       I appreciate you taking the time to speak with me today. Is there anything else I can do for you? NO      Thank you, and have a great day.

## 2021-10-25 NOTE — PROGRESS NOTES
Procedure     ECG 12 Lead    Date/Time: 10/25/2021 12:59 PM  Performed by: Sonali Melgar MD  Authorized by: Sonali Melgar MD           EKG: Normal sinus rhythm with Wenckebach block and old inferior infarction, no previous EKGs available for comparison

## 2021-11-01 ENCOUNTER — HOSPITAL ENCOUNTER (OUTPATIENT)
Dept: CARDIOLOGY | Facility: HOSPITAL | Age: 71
Discharge: HOME | End: 2021-11-01

## 2021-11-01 ENCOUNTER — HOSPITAL ENCOUNTER (OUTPATIENT)
Dept: NUCLEAR MEDICINE | Facility: HOSPITAL | Age: 71
Discharge: HOME | End: 2021-11-01

## 2021-11-01 DIAGNOSIS — I65.22 CAROTID STENOSIS, ASYMPTOMATIC, LEFT: Primary | ICD-10-CM

## 2021-11-01 DIAGNOSIS — R07.2 PRECORDIAL CHEST PAIN: ICD-10-CM

## 2021-11-01 DIAGNOSIS — R00.2 PALPITATIONS: ICD-10-CM

## 2021-11-01 DIAGNOSIS — I65.22 CAROTID STENOSIS, ASYMPTOMATIC, LEFT: ICD-10-CM

## 2021-11-01 DIAGNOSIS — I73.9 PAD (PERIPHERAL ARTERY DISEASE) (HCC): ICD-10-CM

## 2021-11-01 LAB
BH CV ECHO MEAS - ACS: 3 CM
BH CV ECHO MEAS - AO ROOT AREA (BSA CORRECTED): 1.6
BH CV ECHO MEAS - AO ROOT AREA: 9.9 CM^2
BH CV ECHO MEAS - AO ROOT DIAM: 3.6 CM
BH CV ECHO MEAS - BSA(HAYCOCK): 2.4 M^2
BH CV ECHO MEAS - BSA: 2.3 M^2
BH CV ECHO MEAS - BZI_BMI: 36.3 KILOGRAMS/M^2
BH CV ECHO MEAS - BZI_METRIC_HEIGHT: 175.3 CM
BH CV ECHO MEAS - BZI_METRIC_WEIGHT: 111.6 KG
BH CV ECHO MEAS - EDV(CUBED): 68.9 ML
BH CV ECHO MEAS - EDV(MOD-SP4): 53.9 ML
BH CV ECHO MEAS - EDV(TEICH): 74.2 ML
BH CV ECHO MEAS - EF(CUBED): 37.8 %
BH CV ECHO MEAS - EF(MOD-SP4): 54.5 %
BH CV ECHO MEAS - EF(TEICH): 31.5 %
BH CV ECHO MEAS - ESV(CUBED): 42.9 ML
BH CV ECHO MEAS - ESV(MOD-SP4): 24.5 ML
BH CV ECHO MEAS - ESV(TEICH): 50.9 ML
BH CV ECHO MEAS - FS: 14.6 %
BH CV ECHO MEAS - IVS/LVPW: 0.94
BH CV ECHO MEAS - IVSD: 1.5 CM
BH CV ECHO MEAS - LA DIMENSION: 4.8 CM
BH CV ECHO MEAS - LA/AO: 1.3
BH CV ECHO MEAS - LV DIASTOLIC VOL/BSA (35-75): 23.9 ML/M^2
BH CV ECHO MEAS - LV MASS(C)D: 253.8 GRAMS
BH CV ECHO MEAS - LV MASS(C)DI: 112.5 GRAMS/M^2
BH CV ECHO MEAS - LV SYSTOLIC VOL/BSA (12-30): 10.9 ML/M^2
BH CV ECHO MEAS - LVIDD: 4.1 CM
BH CV ECHO MEAS - LVIDS: 3.5 CM
BH CV ECHO MEAS - LVLD AP4: 6.7 CM
BH CV ECHO MEAS - LVLS AP4: 5.6 CM
BH CV ECHO MEAS - LVOT AREA (M): 4.2 CM^2
BH CV ECHO MEAS - LVOT AREA: 4.2 CM^2
BH CV ECHO MEAS - LVOT DIAM: 2.3 CM
BH CV ECHO MEAS - LVPWD: 1.6 CM
BH CV ECHO MEAS - MV A MAX VEL: 44.1 CM/SEC
BH CV ECHO MEAS - MV E MAX VEL: 134 CM/SEC
BH CV ECHO MEAS - MV E/A: 3
BH CV ECHO MEAS - PA ACC TIME: 0.08 SEC
BH CV ECHO MEAS - PA PR(ACCEL): 42.6 MMHG
BH CV ECHO MEAS - SI(CUBED): 11.5 ML/M^2
BH CV ECHO MEAS - SI(MOD-SP4): 13 ML/M^2
BH CV ECHO MEAS - SI(TEICH): 10.4 ML/M^2
BH CV ECHO MEAS - SV(CUBED): 26 ML
BH CV ECHO MEAS - SV(MOD-SP4): 29.4 ML
BH CV ECHO MEAS - SV(TEICH): 23.4 ML
BH CV NUCLEAR PRIOR STUDY: 3
BH CV REST NUCLEAR ISOTOPE DOSE: 9.2 MCI
BH CV STRESS BP STAGE 1: NORMAL
BH CV STRESS BP STAGE 2: NORMAL
BH CV STRESS COMMENTS STAGE 1: NORMAL
BH CV STRESS COMMENTS STAGE 2: NORMAL
BH CV STRESS DOSE REGADENOSON STAGE 1: 0.4
BH CV STRESS DURATION MIN STAGE 1: 0
BH CV STRESS DURATION MIN STAGE 2: 4
BH CV STRESS DURATION SEC STAGE 1: 10
BH CV STRESS DURATION SEC STAGE 2: 0
BH CV STRESS HR STAGE 1: 93
BH CV STRESS HR STAGE 2: 96
BH CV STRESS NUCLEAR ISOTOPE DOSE: 29.2 MCI
BH CV STRESS PROTOCOL 1: NORMAL
BH CV STRESS RECOVERY BP: NORMAL MMHG
BH CV STRESS RECOVERY HR: 85 BPM
BH CV STRESS STAGE 1: 1
BH CV STRESS STAGE 2: 2
LV EF NUC BP: 53 %
MAXIMAL PREDICTED HEART RATE: 151 BPM
PERCENT MAX PREDICTED HR: 63.58 %
STRESS BASELINE BP: NORMAL MMHG
STRESS BASELINE HR: 76 BPM
STRESS PERCENT HR: 75 %
STRESS POST PEAK BP: NORMAL MMHG
STRESS POST PEAK HR: 96 BPM
STRESS TARGET HR: 128 BPM

## 2021-11-01 PROCEDURE — 93880 EXTRACRANIAL BILAT STUDY: CPT

## 2021-11-01 PROCEDURE — 78452 HT MUSCLE IMAGE SPECT MULT: CPT

## 2021-11-01 PROCEDURE — 93926 LOWER EXTREMITY STUDY: CPT

## 2021-11-01 PROCEDURE — A9500 TC99M SESTAMIBI: HCPCS | Performed by: SPECIALIST

## 2021-11-01 PROCEDURE — 0 TECHNETIUM SESTAMIBI: Performed by: SPECIALIST

## 2021-11-01 PROCEDURE — 25010000002 REGADENOSON 0.4 MG/5ML SOLUTION: Performed by: SPECIALIST

## 2021-11-01 PROCEDURE — 93018 CV STRESS TEST I&R ONLY: CPT | Performed by: SPECIALIST

## 2021-11-01 PROCEDURE — 93926 LOWER EXTREMITY STUDY: CPT | Performed by: RADIOLOGY

## 2021-11-01 PROCEDURE — 78452 HT MUSCLE IMAGE SPECT MULT: CPT | Performed by: SPECIALIST

## 2021-11-01 PROCEDURE — 93017 CV STRESS TEST TRACING ONLY: CPT

## 2021-11-01 PROCEDURE — 93880 EXTRACRANIAL BILAT STUDY: CPT | Performed by: RADIOLOGY

## 2021-11-01 PROCEDURE — 93306 TTE W/DOPPLER COMPLETE: CPT | Performed by: SPECIALIST

## 2021-11-01 PROCEDURE — 93306 TTE W/DOPPLER COMPLETE: CPT

## 2021-11-01 RX ADMIN — REGADENOSON 0.4 MG: 0.08 INJECTION, SOLUTION INTRAVENOUS at 10:31

## 2021-11-01 RX ADMIN — TECHNETIUM TC 99M SESTAMIBI 1 DOSE: 1 INJECTION INTRAVENOUS at 10:31

## 2021-11-01 RX ADMIN — TECHNETIUM TC 99M SESTAMIBI 1 DOSE: 1 INJECTION INTRAVENOUS at 09:02

## 2021-11-23 ENCOUNTER — OFFICE VISIT (OUTPATIENT)
Dept: CARDIOLOGY | Facility: CLINIC | Age: 71
End: 2021-11-23

## 2021-11-23 VITALS
TEMPERATURE: 97 F | OXYGEN SATURATION: 94 % | DIASTOLIC BLOOD PRESSURE: 78 MMHG | HEIGHT: 69 IN | WEIGHT: 252 LBS | HEART RATE: 84 BPM | SYSTOLIC BLOOD PRESSURE: 149 MMHG | BODY MASS INDEX: 37.33 KG/M2

## 2021-11-23 DIAGNOSIS — I44.1 WENCKEBACH BLOCK: ICD-10-CM

## 2021-11-23 DIAGNOSIS — Z01.810 PRE-OPERATIVE CARDIOVASCULAR EXAMINATION: ICD-10-CM

## 2021-11-23 DIAGNOSIS — I10 ESSENTIAL HYPERTENSION: ICD-10-CM

## 2021-11-23 DIAGNOSIS — I65.21 CAROTID ARTERY STENOSIS, SYMPTOMATIC, RIGHT: ICD-10-CM

## 2021-11-23 DIAGNOSIS — G47.33 OSA (OBSTRUCTIVE SLEEP APNEA): ICD-10-CM

## 2021-11-23 DIAGNOSIS — E78.5 DYSLIPIDEMIA: ICD-10-CM

## 2021-11-23 DIAGNOSIS — F17.211 CIGARETTE NICOTINE DEPENDENCE IN REMISSION: ICD-10-CM

## 2021-11-23 DIAGNOSIS — R06.02 SHORTNESS OF BREATH: Primary | ICD-10-CM

## 2021-11-23 DIAGNOSIS — A60.01 HERPESVIRAL INFECTION OF PENIS: ICD-10-CM

## 2021-11-23 PROCEDURE — 99214 OFFICE O/P EST MOD 30 MIN: CPT | Performed by: SPECIALIST

## 2021-11-23 RX ORDER — LISINOPRIL 40 MG/1
40 TABLET ORAL DAILY
Qty: 90 TABLET | Refills: 1 | Status: SHIPPED | OUTPATIENT
Start: 2021-11-23 | End: 2022-03-31 | Stop reason: SDUPTHER

## 2021-11-23 RX ORDER — LISINOPRIL 40 MG/1
40 TABLET ORAL DAILY
Qty: 90 TABLET | Refills: 1 | Status: SHIPPED | COMMUNITY
Start: 2021-11-23 | End: 2021-11-23 | Stop reason: SDUPTHER

## 2021-11-23 NOTE — PROGRESS NOTES
Subjective   Follow up  Donaldo Roberts is a 71 y.o. male who presents to day for Follow-up (STRESS/ ECHO/DOPPLER/CAROTID-THEY WERE NEVER CALLED ABOUT SLEEP SYUDY).    CHIEF COMPLIANT  Chief Complaint   Patient presents with   • Follow-up     STRESS/ ECHO/DOPPLER/CAROTID-THEY WERE NEVER CALLED ABOUT SLEEP SYUDY       Active Problems:  Problem List Items Addressed This Visit        Cardiac and Vasculature    Carotid artery stenosis, symptomatic, right    Relevant Orders    CT Angiogram Carotids    Essential hypertension    Relevant Medications    lisinopril (PRINIVIL,ZESTRIL) 40 MG tablet    Dyslipidemia    Relevant Orders    Comprehensive Metabolic Panel    Lipid Panel    Wenckebach block    Relevant Orders    Holter Monitor - 24 Hour       Pulmonary and Pneumonias    Shortness of breath - Primary       Sleep    PATTY (obstructive sleep apnea)    Relevant Orders    Home Sleep Study       Tobacco    Cigarette nicotine dependence in remission       Other    Herpesviral infection of penis          HPI  HPI  Is no new change no recent chest pain he is sedentary because of bilateral knee arthritis he is continues have shortness of breath of exertion no edema no palpitations no chest pain no dizziness having symptoms sleep apnea  PRIOR MEDS  Current Outpatient Medications on File Prior to Visit   Medication Sig Dispense Refill   • acetaminophen (TYLENOL) 650 MG 8 hr tablet Take 650 mg by mouth Every 8 (Eight) Hours As Needed for Mild Pain (1-3).     • amLODIPine (NORVASC) 10 MG tablet Take 10 mg by mouth Daily.     • aspirin 81 MG EC tablet Take 81 mg by mouth Daily.     • clopidogrel (PLAVIX) 75 MG tablet Take 75 mg by mouth 3 (Three) Times a Week.     • omeprazole (priLOSEC) 20 MG capsule Take 20 mg by mouth Daily.     • [DISCONTINUED] lisinopril (PRINIVIL,ZESTRIL) 20 MG tablet Take 20 mg by mouth Daily.     • [DISCONTINUED] lisinopril (PRINIVIL,ZESTRIL) 40 MG tablet Take 1 tablet by mouth Daily. 90 tablet 1   •  [DISCONTINUED] acetaminophen-codeine (TYLENOL #3) 300-30 MG per tablet Take 1 tablet by mouth Every 6 (Six) Hours As Needed for Moderate Pain . 12 tablet 0   • [DISCONTINUED] acyclovir (ZOVIRAX) 5 % ointment Apply thin layer to affected area 15 g 7   • [DISCONTINUED] cyclobenzaprine (FLEXERIL) 10 MG tablet Take 1 tablet by mouth 3 (Three) Times a Day As Needed for Muscle Spasms. 21 tablet 0   • [DISCONTINUED] HYDROcodone-acetaminophen (NORCO) 7.5-325 MG per tablet Take 1-2 tablets by mouth Every 4 (Four) Hours As Needed for Moderate Pain (4-6) (Pain). 24 tablet 0   • [DISCONTINUED] ibuprofen (ADVIL,MOTRIN) 600 MG tablet Take 1 tablet by mouth Every 6 (Six) Hours As Needed for Moderate Pain . 30 tablet 0   • [DISCONTINUED] tamsulosin (FLOMAX) 0.4 MG capsule 24 hr capsule Take 1 capsule by mouth Every Night. 90 capsule 3     No current facility-administered medications on file prior to visit.       ALLERGIES  Penicillins    HISTORY  Past Medical History:   Diagnosis Date   • Acid reflux    • Anxiety    • Arthritis    • Carotid artery stenosis, symptomatic, right 11/23/2021   • Essential hypertension 11/23/2021   • Herpes    • History of lipoma    • History of transfusion    • Inguinal hernia    • Peptic ulcer        Social History     Socioeconomic History   • Marital status:    Tobacco Use   • Smoking status: Current Some Day Smoker     Packs/day: 0.50     Types: Cigarettes   • Smokeless tobacco: Never Used   • Tobacco comment: 2 cigs per day   Substance and Sexual Activity   • Alcohol use: No   • Drug use: No   • Sexual activity: Defer       Family History   Problem Relation Age of Onset   • No Known Problems Father    • Heart disease Mother        Review of Systems   Respiratory: Positive for shortness of breath. Negative for apnea, cough, choking, chest tightness, wheezing and stridor.    Cardiovascular: Negative for chest pain, palpitations and leg swelling.       Objective     VITALS: /78   Pulse  "84   Temp 97 °F (36.1 °C)   Ht 175.3 cm (69\")   Wt 114 kg (252 lb)   SpO2 94%   BMI 37.21 kg/m²     LABS:   Lab Results (most recent)     None          IMAGING:   US Arterial Doppler Lower Extremity Left    Result Date: 11/1/2021  Atherosclerotic changes are noted in the artery of the left lower extremity. No occluded vessels or focal areas of stenosis were demonstrated.  This report was finalized on 11/1/2021 9:47 AM by Dr. Donaldo Rausch II, MD.      US Carotid Bilateral    Result Date: 11/1/2021  High-grade stenosis is noted in the internal carotid artery on the right with peak systolic velocity of 257 cm/s. This would correspond to a 70-99% stenosis. On the left, there is no elevated velocity at the site of the previous endarterectomy. Both vertebral arteries show antegrade flow.      Grading of ICA stenosis  <50% stenosis      PSV <125 cm/sec       PSVR <2.0  50-69% stenosis   -229 cm/sec  PSVR = 2.0-3.9  70-99% stenosis   PSV >230 cm/sec       PSVR >4  This report was finalized on 11/1/2021 9:47 AM by Dr. Donaldo Rausch II, MD.        EXAM:  Physical Exam  Constitutional:       Appearance: He is well-developed.   HENT:      Head: Normocephalic and atraumatic.   Eyes:      Pupils: Pupils are equal, round, and reactive to light.   Neck:      Thyroid: No thyromegaly.      Vascular: No JVD.      Comments: Left carotid endartrectomy scar  Cardiovascular:      Rate and Rhythm: Normal rate and regular rhythm.      Chest Wall: PMI is not displaced.      Pulses: Normal pulses.      Heart sounds: Normal heart sounds, S1 normal and S2 normal. No murmur heard.  No friction rub. No gallop.    Pulmonary:      Effort: Pulmonary effort is normal. No respiratory distress.      Breath sounds: Normal breath sounds. No stridor. No wheezing or rales.   Chest:      Chest wall: No tenderness.   Abdominal:      General: Bowel sounds are normal. There is no distension.      Palpations: Abdomen is soft. There is no mass.    "   Tenderness: There is no abdominal tenderness. There is no guarding or rebound.   Musculoskeletal:      Cervical back: Neck supple. No edema.   Skin:     General: Skin is warm and dry.      Coloration: Skin is not pale.      Findings: No erythema or rash.   Neurological:      Mental Status: He is alert and oriented to person, place, and time.      Cranial Nerves: No cranial nerve deficit.      Coordination: Coordination normal.   Psychiatric:         Behavior: Behavior normal.         Procedure   Procedures       Assessment/Plan     Diagnoses and all orders for this visit:    1. Shortness of breath (Primary)    2. Herpesviral infection of penis    3. Wenckebach block  -     Holter Monitor - 24 Hour; Future    4. Essential hypertension  -     lisinopril (PRINIVIL,ZESTRIL) 40 MG tablet; Take 1 tablet by mouth Daily.  Dispense: 90 tablet; Refill: 1    5. Dyslipidemia  -     Comprehensive Metabolic Panel; Future  -     Lipid Panel; Future    6. Carotid artery stenosis, symptomatic, right  -     CT Angiogram Carotids; Future    7. Cigarette nicotine dependence in remission    8. PATTY (obstructive sleep apnea)  -     Home Sleep Study; Future    1.  We discussed the results of the stress test and that showed no ischemia echocardiogram also discussed with LVH and normal systolic function  2.  His blood pressure is elevated I am going to increase the dose of lisinopril to 20 mg/day  3.  We discussed the results of the carotid Doppler with high-grade stenosis of the right carotid we will going to do a CTA of the carotids  4.  He did not have the sleep study we will reschedule the sleep study  5.  He did not have the Holter monitor with the Wenckebach block we will try to reschedule that  6.  He did not have any labs done we will try to reschedule the labs including lipids and CMP  7.  Discussed the results of the lower extremity Doppler with no significant stenosis  8.  Final reassessment of the preoperative cardiac risk  after the work-up is finished    Return in about 4 weeks (around 12/21/2021).             MEDS ORDERED DURING VISIT:  New Medications Ordered This Visit   Medications   • lisinopril (PRINIVIL,ZESTRIL) 40 MG tablet     Sig: Take 1 tablet by mouth Daily.     Dispense:  90 tablet     Refill:  1       As always, Roderick Patricia MD  I appreciate very much the opportunity to participate in the cardiovascular care of your patients. Please do not hesitate to call me with any questions with regards to Donaldo Roberts evaluation and management.         This document has been electronically signed by Sonali Melgar MD  November 23, 2021 12:08 EST

## 2021-11-29 ENCOUNTER — HOSPITAL ENCOUNTER (OUTPATIENT)
Dept: RESPIRATORY THERAPY | Facility: HOSPITAL | Age: 71
Discharge: HOME | End: 2021-11-29

## 2021-11-29 ENCOUNTER — LAB (OUTPATIENT)
Dept: LAB | Facility: HOSPITAL | Age: 71
End: 2021-11-29

## 2021-11-29 DIAGNOSIS — I44.1 WENCKEBACH BLOCK: ICD-10-CM

## 2021-11-29 DIAGNOSIS — E78.5 DYSLIPIDEMIA: ICD-10-CM

## 2021-11-29 LAB
ALBUMIN SERPL-MCNC: 4.86 G/DL (ref 3.5–5.2)
ALBUMIN/GLOB SERPL: 1.5 G/DL
ALP SERPL-CCNC: 102 U/L (ref 39–117)
ALT SERPL W P-5'-P-CCNC: 23 U/L (ref 1–41)
ANION GAP SERPL CALCULATED.3IONS-SCNC: 10.4 MMOL/L (ref 5–15)
AST SERPL-CCNC: 17 U/L (ref 1–40)
BILIRUB SERPL-MCNC: 0.4 MG/DL (ref 0–1.2)
BUN SERPL-MCNC: 17 MG/DL (ref 8–23)
BUN/CREAT SERPL: 23.9 (ref 7–25)
CALCIUM SPEC-SCNC: 10.3 MG/DL (ref 8.6–10.5)
CHLORIDE SERPL-SCNC: 100 MMOL/L (ref 98–107)
CHOLEST SERPL-MCNC: 245 MG/DL (ref 0–200)
CO2 SERPL-SCNC: 28.6 MMOL/L (ref 22–29)
CREAT SERPL-MCNC: 0.71 MG/DL (ref 0.76–1.27)
GFR SERPL CREATININE-BSD FRML MDRD: 110 ML/MIN/1.73
GLOBULIN UR ELPH-MCNC: 3.2 GM/DL
GLUCOSE SERPL-MCNC: 107 MG/DL (ref 65–99)
HDLC SERPL-MCNC: 45 MG/DL (ref 40–60)
LDLC SERPL CALC-MCNC: 161 MG/DL (ref 0–100)
LDLC/HDLC SERPL: 3.5 {RATIO}
POTASSIUM SERPL-SCNC: 4.5 MMOL/L (ref 3.5–5.2)
PROT SERPL-MCNC: 8.1 G/DL (ref 6–8.5)
SODIUM SERPL-SCNC: 139 MMOL/L (ref 136–145)
TRIGL SERPL-MCNC: 213 MG/DL (ref 0–150)
VLDLC SERPL-MCNC: 39 MG/DL (ref 5–40)

## 2021-11-29 PROCEDURE — 93225 XTRNL ECG REC<48 HRS REC: CPT

## 2021-11-29 PROCEDURE — 80061 LIPID PANEL: CPT

## 2021-11-29 PROCEDURE — 80053 COMPREHEN METABOLIC PANEL: CPT

## 2021-11-29 PROCEDURE — 36415 COLL VENOUS BLD VENIPUNCTURE: CPT

## 2021-12-08 ENCOUNTER — HOSPITAL ENCOUNTER (INPATIENT)
Facility: HOSPITAL | Age: 71
LOS: 2 days | Discharge: HOME | End: 2021-12-11
Attending: EMERGENCY MEDICINE | Admitting: HOSPITALIST

## 2021-12-08 ENCOUNTER — APPOINTMENT (OUTPATIENT)
Dept: GENERAL RADIOLOGY | Facility: HOSPITAL | Age: 71
End: 2021-12-08

## 2021-12-08 ENCOUNTER — TELEPHONE (OUTPATIENT)
Dept: CARDIOLOGY | Facility: CLINIC | Age: 71
End: 2021-12-08

## 2021-12-08 DIAGNOSIS — Z01.810 PRE-OPERATIVE CARDIOVASCULAR EXAMINATION: ICD-10-CM

## 2021-12-08 DIAGNOSIS — I44.30 AV BLOCK: Primary | ICD-10-CM

## 2021-12-08 DIAGNOSIS — Z95.0 S/P PLACEMENT OF CARDIAC PACEMAKER: ICD-10-CM

## 2021-12-08 LAB
ALBUMIN SERPL-MCNC: 4.68 G/DL (ref 3.5–5.2)
ALBUMIN/GLOB SERPL: 1.5 G/DL
ALP SERPL-CCNC: 100 U/L (ref 39–117)
ALT SERPL W P-5'-P-CCNC: 25 U/L (ref 1–41)
ANION GAP SERPL CALCULATED.3IONS-SCNC: 9.1 MMOL/L (ref 5–15)
APTT PPP: 29.1 SECONDS (ref 25.5–35.4)
AST SERPL-CCNC: 18 U/L (ref 1–40)
BASOPHILS # BLD AUTO: 0.03 10*3/MM3 (ref 0–0.2)
BASOPHILS NFR BLD AUTO: 0.4 % (ref 0–1.5)
BILIRUB SERPL-MCNC: 0.2 MG/DL (ref 0–1.2)
BUN SERPL-MCNC: 12 MG/DL (ref 8–23)
BUN/CREAT SERPL: 16 (ref 7–25)
CALCIUM SPEC-SCNC: 10.2 MG/DL (ref 8.6–10.5)
CHLORIDE SERPL-SCNC: 98 MMOL/L (ref 98–107)
CO2 SERPL-SCNC: 30.9 MMOL/L (ref 22–29)
CREAT SERPL-MCNC: 0.75 MG/DL (ref 0.76–1.27)
DEPRECATED RDW RBC AUTO: 44.1 FL (ref 37–54)
EOSINOPHIL # BLD AUTO: 0.14 10*3/MM3 (ref 0–0.4)
EOSINOPHIL NFR BLD AUTO: 2 % (ref 0.3–6.2)
ERYTHROCYTE [DISTWIDTH] IN BLOOD BY AUTOMATED COUNT: 12.9 % (ref 12.3–15.4)
FLUAV RNA RESP QL NAA+PROBE: NOT DETECTED
FLUBV RNA RESP QL NAA+PROBE: NOT DETECTED
GFR SERPL CREATININE-BSD FRML MDRD: 103 ML/MIN/1.73
GLOBULIN UR ELPH-MCNC: 3 GM/DL
GLUCOSE SERPL-MCNC: 110 MG/DL (ref 65–99)
HCT VFR BLD AUTO: 47.4 % (ref 37.5–51)
HGB BLD-MCNC: 15.1 G/DL (ref 13–17.7)
HOLD SPECIMEN: NORMAL
HOLD SPECIMEN: NORMAL
IMM GRANULOCYTES # BLD AUTO: 0.04 10*3/MM3 (ref 0–0.05)
IMM GRANULOCYTES NFR BLD AUTO: 0.6 % (ref 0–0.5)
INR PPP: 0.82 (ref 0.9–1.1)
LYMPHOCYTES # BLD AUTO: 1.7 10*3/MM3 (ref 0.7–3.1)
LYMPHOCYTES NFR BLD AUTO: 23.7 % (ref 19.6–45.3)
MAGNESIUM SERPL-MCNC: 2.1 MG/DL (ref 1.6–2.4)
MCH RBC QN AUTO: 29.5 PG (ref 26.6–33)
MCHC RBC AUTO-ENTMCNC: 31.9 G/DL (ref 31.5–35.7)
MCV RBC AUTO: 92.8 FL (ref 79–97)
MONOCYTES # BLD AUTO: 0.78 10*3/MM3 (ref 0.1–0.9)
MONOCYTES NFR BLD AUTO: 10.9 % (ref 5–12)
NEUTROPHILS NFR BLD AUTO: 4.48 10*3/MM3 (ref 1.7–7)
NEUTROPHILS NFR BLD AUTO: 62.4 % (ref 42.7–76)
NRBC BLD AUTO-RTO: 0 /100 WBC (ref 0–0.2)
PLATELET # BLD AUTO: 263 10*3/MM3 (ref 140–450)
PMV BLD AUTO: 10.5 FL (ref 6–12)
POTASSIUM SERPL-SCNC: 4.6 MMOL/L (ref 3.5–5.2)
PROT SERPL-MCNC: 7.7 G/DL (ref 6–8.5)
PROTHROMBIN TIME: 11.7 SECONDS (ref 12.8–14.5)
RBC # BLD AUTO: 5.11 10*6/MM3 (ref 4.14–5.8)
SARS-COV-2 RNA RESP QL NAA+PROBE: NOT DETECTED
SODIUM SERPL-SCNC: 138 MMOL/L (ref 136–145)
T4 FREE SERPL-MCNC: 1.11 NG/DL (ref 0.93–1.7)
TROPONIN T SERPL-MCNC: <0.01 NG/ML (ref 0–0.03)
TROPONIN T SERPL-MCNC: <0.01 NG/ML (ref 0–0.03)
TSH SERPL DL<=0.05 MIU/L-ACNC: 0.68 UIU/ML (ref 0.27–4.2)
WBC NRBC COR # BLD: 7.17 10*3/MM3 (ref 3.4–10.8)
WHOLE BLOOD HOLD SPECIMEN: NORMAL
WHOLE BLOOD HOLD SPECIMEN: NORMAL

## 2021-12-08 PROCEDURE — 83735 ASSAY OF MAGNESIUM: CPT | Performed by: EMERGENCY MEDICINE

## 2021-12-08 PROCEDURE — 93005 ELECTROCARDIOGRAM TRACING: CPT | Performed by: EMERGENCY MEDICINE

## 2021-12-08 PROCEDURE — 84484 ASSAY OF TROPONIN QUANT: CPT | Performed by: EMERGENCY MEDICINE

## 2021-12-08 PROCEDURE — 71045 X-RAY EXAM CHEST 1 VIEW: CPT

## 2021-12-08 PROCEDURE — 84443 ASSAY THYROID STIM HORMONE: CPT | Performed by: EMERGENCY MEDICINE

## 2021-12-08 PROCEDURE — 87636 SARSCOV2 & INF A&B AMP PRB: CPT | Performed by: EMERGENCY MEDICINE

## 2021-12-08 PROCEDURE — 85025 COMPLETE CBC W/AUTO DIFF WBC: CPT | Performed by: EMERGENCY MEDICINE

## 2021-12-08 PROCEDURE — 80053 COMPREHEN METABOLIC PANEL: CPT | Performed by: EMERGENCY MEDICINE

## 2021-12-08 PROCEDURE — 85610 PROTHROMBIN TIME: CPT | Performed by: EMERGENCY MEDICINE

## 2021-12-08 PROCEDURE — 99284 EMERGENCY DEPT VISIT MOD MDM: CPT

## 2021-12-08 PROCEDURE — 36415 COLL VENOUS BLD VENIPUNCTURE: CPT

## 2021-12-08 PROCEDURE — 84439 ASSAY OF FREE THYROXINE: CPT | Performed by: EMERGENCY MEDICINE

## 2021-12-08 PROCEDURE — 93227 XTRNL ECG REC<48 HR R&I: CPT | Performed by: SPECIALIST

## 2021-12-08 PROCEDURE — 85730 THROMBOPLASTIN TIME PARTIAL: CPT | Performed by: EMERGENCY MEDICINE

## 2021-12-08 PROCEDURE — 93010 ELECTROCARDIOGRAM REPORT: CPT | Performed by: INTERNAL MEDICINE

## 2021-12-08 RX ORDER — ASPIRIN 81 MG/1
324 TABLET, CHEWABLE ORAL ONCE
Status: COMPLETED | OUTPATIENT
Start: 2021-12-08 | End: 2021-12-08

## 2021-12-08 RX ADMIN — ASPIRIN 324 MG: 81 TABLET, CHEWABLE ORAL at 20:41

## 2021-12-08 NOTE — TELEPHONE ENCOUNTER
Patient was called by me regarding the results of the Holter monitor which showed high-grade AV block as well as 7 beats run of ventricular tachycardia, he said that he was asymptomatic with no syncope or palpitations, however advised him to go immediately to the emergency room to be admitted and being put on monitor as likely he will need to have a pacemaker placement, patient understood and he said he will be heading to the ER, I called the ER informed them of the patient arrival and his status

## 2021-12-09 PROBLEM — I44.30 AV BLOCK: Status: ACTIVE | Noted: 2021-12-09

## 2021-12-09 LAB
NT-PROBNP SERPL-MCNC: 71.1 PG/ML (ref 0–900)
QT INTERVAL: 410 MS
QTC INTERVAL: 433 MS

## 2021-12-09 PROCEDURE — 25010000002 HEPARIN (PORCINE) PER 1000 UNITS: Performed by: PHYSICIAN ASSISTANT

## 2021-12-09 PROCEDURE — 83880 ASSAY OF NATRIURETIC PEPTIDE: CPT | Performed by: HOSPITALIST

## 2021-12-09 PROCEDURE — 99223 1ST HOSP IP/OBS HIGH 75: CPT | Performed by: PHYSICIAN ASSISTANT

## 2021-12-09 PROCEDURE — 94799 UNLISTED PULMONARY SVC/PX: CPT

## 2021-12-09 RX ORDER — PANTOPRAZOLE SODIUM 40 MG/1
40 TABLET, DELAYED RELEASE ORAL
Status: DISCONTINUED | OUTPATIENT
Start: 2021-12-09 | End: 2021-12-11 | Stop reason: HOSPADM

## 2021-12-09 RX ORDER — ATORVASTATIN CALCIUM 40 MG/1
80 TABLET, FILM COATED ORAL NIGHTLY
Status: DISCONTINUED | OUTPATIENT
Start: 2021-12-09 | End: 2021-12-11 | Stop reason: HOSPADM

## 2021-12-09 RX ORDER — AMLODIPINE BESYLATE 10 MG/1
10 TABLET ORAL NIGHTLY
Status: DISCONTINUED | OUTPATIENT
Start: 2021-12-09 | End: 2021-12-11 | Stop reason: HOSPADM

## 2021-12-09 RX ORDER — CLOPIDOGREL BISULFATE 75 MG/1
75 TABLET ORAL 2 TIMES WEEKLY
Status: DISCONTINUED | OUTPATIENT
Start: 2021-12-10 | End: 2021-12-09

## 2021-12-09 RX ORDER — CHOLECALCIFEROL (VITAMIN D3) 125 MCG
5 CAPSULE ORAL NIGHTLY PRN
Status: DISCONTINUED | OUTPATIENT
Start: 2021-12-09 | End: 2021-12-11 | Stop reason: HOSPADM

## 2021-12-09 RX ORDER — SODIUM CHLORIDE 0.9 % (FLUSH) 0.9 %
1-10 SYRINGE (ML) INJECTION AS NEEDED
Status: DISCONTINUED | OUTPATIENT
Start: 2021-12-09 | End: 2021-12-11 | Stop reason: HOSPADM

## 2021-12-09 RX ORDER — ASPIRIN 81 MG/1
81 TABLET ORAL DAILY
Status: DISCONTINUED | OUTPATIENT
Start: 2021-12-10 | End: 2021-12-11 | Stop reason: HOSPADM

## 2021-12-09 RX ORDER — SODIUM CHLORIDE 0.9 % (FLUSH) 0.9 %
10 SYRINGE (ML) INJECTION EVERY 12 HOURS SCHEDULED
Status: DISCONTINUED | OUTPATIENT
Start: 2021-12-09 | End: 2021-12-11 | Stop reason: HOSPADM

## 2021-12-09 RX ORDER — ATORVASTATIN CALCIUM 40 MG/1
40 TABLET, FILM COATED ORAL NIGHTLY
Status: DISCONTINUED | OUTPATIENT
Start: 2021-12-09 | End: 2021-12-09

## 2021-12-09 RX ORDER — LISINOPRIL 10 MG/1
40 TABLET ORAL DAILY
Status: DISCONTINUED | OUTPATIENT
Start: 2021-12-09 | End: 2021-12-11 | Stop reason: HOSPADM

## 2021-12-09 RX ORDER — ACETAMINOPHEN 325 MG/1
650 TABLET ORAL EVERY 6 HOURS PRN
Status: DISCONTINUED | OUTPATIENT
Start: 2021-12-09 | End: 2021-12-11 | Stop reason: HOSPADM

## 2021-12-09 RX ORDER — HEPARIN SODIUM 5000 [USP'U]/ML
5000 INJECTION, SOLUTION INTRAVENOUS; SUBCUTANEOUS EVERY 12 HOURS SCHEDULED
Status: DISCONTINUED | OUTPATIENT
Start: 2021-12-09 | End: 2021-12-11 | Stop reason: HOSPADM

## 2021-12-09 RX ADMIN — HEPARIN SODIUM 5000 UNITS: 5000 INJECTION INTRAVENOUS; SUBCUTANEOUS at 15:54

## 2021-12-09 RX ADMIN — AMLODIPINE BESYLATE 10 MG: 10 TABLET ORAL at 20:08

## 2021-12-09 RX ADMIN — HEPARIN SODIUM 5000 UNITS: 5000 INJECTION INTRAVENOUS; SUBCUTANEOUS at 20:09

## 2021-12-09 RX ADMIN — ATORVASTATIN CALCIUM 80 MG: 40 TABLET, FILM COATED ORAL at 20:09

## 2021-12-09 RX ADMIN — PANTOPRAZOLE SODIUM 40 MG: 40 TABLET, DELAYED RELEASE ORAL at 11:46

## 2021-12-09 RX ADMIN — LISINOPRIL 40 MG: 10 TABLET ORAL at 11:46

## 2021-12-09 RX ADMIN — SODIUM CHLORIDE, PRESERVATIVE FREE 10 ML: 5 INJECTION INTRAVENOUS at 15:38

## 2021-12-09 RX ADMIN — ACETAMINOPHEN 650 MG: 325 TABLET ORAL at 15:54

## 2021-12-09 NOTE — CONSULTS
Date of Admit: 12/8/2021  Date of Consult: 12/09/21  No ref. provider found        * No active hospital problems. *      Assessment      1. High-grade AV block on Holter monitor  2. 7 beat runs of monomorphic nonsustained V. tach on the Holter monitor  3. Echocardiogram 11/1/2021 with EF about 66-70%  4. Stress test 11/1/2021 with no ischemia  5. High-grade right carotid stenosis on carotid Doppler, CT angio of the carotid is pending  6. Probable obstructive sleep apnea pending sleep study results  7. Hyperlipidemia      Recommendations     1. Patient will need probable dual-chamber pacemaker for intermittent high-grade block  2. We will get CT angio of the carotid  3. Once patient has pacemaker will advance beta-blockade for the short run of V. Tach  4. Will start statin for significant hyperlipidemia        Reason for consultation: High grade AV block    Subjective       Subjective     History of Present Illness     Donaldo Roberts is a 71 year old male with a past medical history significant for carotid artery stenosis, essential hypertension and arrhythmia.  Patient was seen in the cardiology office and a 24-hour Holter monitor was ordered.  Holter monitor showed first-degree AV block intermittent episodes of high grade block with 7 beat run of ventricular tachycardia and was contacted by the cardiology office to come to the ER for further evaluation.  Patient states he has been having intermittent palpitations and weakness over the last couple of months.  Denies any chest pain or shortness of breath.  Troponin has been negative.  Magnesium normal.  Echocardiogram in November 2021 showed normal LV function with mild to moderate concentric hypertrophy.  TSH normal.  Nuclear stress test on November 1, 2021 showed no evidence of ischemia.        Cardiac risk factors:hypercholesterolemia, hypertension, Sedentary life style and Obesity    Last Echo: 11/1/2021  · Left ventricular wall thickness is consistent with mild  to moderate concentric hypertrophy.  · Left ventricular ejection fraction appears to be 66 - 70%. Left ventricular systolic function is normal.  · Left ventricular diastolic function was normal.  · The right atrial cavity is mildly dilated.    Last Stress: 11/1/2021  · Left ventricular ejection fraction is borderline normal. (Calculated EF = 53%).  · Myocardial perfusion imaging indicates a normal myocardial perfusion study with no evidence of ischemia.  · Impressions are consistent with a low risk study.  · There is no prior study available for comparison.  · Findings consistent with a normal ECG stress test.    Past Medical History:   Diagnosis Date   • Acid reflux    • Anxiety    • Arthritis    • Carotid artery stenosis, symptomatic, right 11/23/2021   • Essential hypertension 11/23/2021   • Herpes    • History of lipoma    • History of transfusion    • Inguinal hernia    • Peptic ulcer      Past Surgical History:   Procedure Laterality Date   • ABDOMINAL SURGERY     • CHOLECYSTECTOMY WITH INTRAOPERATIVE CHOLANGIOGRAM N/A 7/25/2017    Procedure: CHOLECYSTECTOMY LAPAROSCOPIC INTRAOPERATIVE CHOLANGIOGRAM;  Surgeon: Luis Coe MD;  Location: [facility];  Service:    • HERNIA REPAIR     • SINUS SURGERY     • TUMOR EXCISION       Family History   Problem Relation Age of Onset   • No Known Problems Father    • Heart disease Mother      Social History     Tobacco Use   • Smoking status: Current Some Day Smoker     Packs/day: 0.50     Types: Cigarettes   • Smokeless tobacco: Never Used   • Tobacco comment: 2 cigs per day   Substance Use Topics   • Alcohol use: No   • Drug use: No     (Not in a hospital admission)    Allergies:  Penicillins    Review of Systems   Constitutional: Positive for fatigue. Negative for diaphoresis and fever.   HENT: Negative for congestion and trouble swallowing.    Eyes: Negative for photophobia and visual disturbance.   Respiratory: Negative for chest tightness and shortness of breath.     Cardiovascular: Negative for chest pain, palpitations and leg swelling.   Gastrointestinal: Negative for abdominal pain, nausea and vomiting.   Endocrine: Negative for polyphagia and polyuria.   Genitourinary: Negative for dysuria and hematuria.   Musculoskeletal: Negative for neck pain and neck stiffness.   Skin: Negative for rash and wound.   Allergic/Immunologic: Negative for food allergies and immunocompromised state.   Neurological: Positive for weakness. Negative for dizziness and syncope.   Hematological: Negative for adenopathy. Does not bruise/bleed easily.   Psychiatric/Behavioral: Negative for confusion and suicidal ideas.       Objective       Objective      Vital Signs  Temp:  [98.5 °F (36.9 °C)] 98.5 °F (36.9 °C)  Heart Rate:  [40-81] 62  Resp:  [20] 20  BP: (115-170)/(67-99) 144/99  Vital Signs (last 72 hrs)       12/06 0700  12/07 0659 12/07 0700  12/08 0659 12/08 0700 12/09 0659 12/09 0700  12/09 1112   Most Recent      Temp (°F)       98.5       98.5 (36.9) 12/08 1938    Heart Rate     40 -  81    41 -  76     62 12/09 1103    Resp       20       20 12/08 1938    BP     135/73 -  169/97    115/72 -  170/91     144/99 12/09 1103    SpO2 (%)     90 -  99    93 -  100     99 12/09 1103        Body mass index is 36.18 kg/m².  Documented weights    12/08/21 1938   Weight: 111 kg (245 lb)          No intake or output data in the 24 hours ending 12/09/21 1112     Physical Exam  Constitutional:       General: He is not in acute distress.     Appearance: Normal appearance. He is well-developed.   HENT:      Head: Normocephalic and atraumatic.      Mouth/Throat:      Mouth: Mucous membranes are moist.   Eyes:      Extraocular Movements: Extraocular movements intact.      Pupils: Pupils are equal, round, and reactive to light.   Neck:      Vascular: No JVD.   Cardiovascular:      Rate and Rhythm: Normal rate and regular rhythm.      Heart sounds: Normal heart sounds. No murmur heard.  No S3 or S4 sounds.     Pulmonary:      Effort: Pulmonary effort is normal. No respiratory distress.      Breath sounds: Normal breath sounds. No wheezing.   Abdominal:      General: Bowel sounds are normal. There is no distension.      Palpations: Abdomen is soft. There is no hepatomegaly.      Tenderness: There is no abdominal tenderness.   Musculoskeletal:         General: Normal range of motion.      Cervical back: Normal range of motion and neck supple.   Skin:     General: Skin is warm and dry.      Coloration: Skin is not jaundiced or pale.   Neurological:      General: No focal deficit present.      Mental Status: He is alert and oriented to person, place, and time. Mental status is at baseline.   Psychiatric:         Mood and Affect: Mood normal.         Behavior: Behavior normal.         Thought Content: Thought content normal.         Judgment: Judgment normal.         Results review     Results Review:    I reviewed the patient's new clinical results.  Results from last 7 days   Lab Units 12/08/21  2216 12/08/21 2000   TROPONIN T ng/mL <0.010 <0.010     Results from last 7 days   Lab Units 12/08/21 2000   WBC 10*3/mm3 7.17   HEMOGLOBIN g/dL 15.1   PLATELETS 10*3/mm3 263     Results from last 7 days   Lab Units 12/08/21 2000   SODIUM mmol/L 138   POTASSIUM mmol/L 4.6   CHLORIDE mmol/L 98   CO2 mmol/L 30.9*   BUN mg/dL 12   CREATININE mg/dL 0.75*   CALCIUM mg/dL 10.2   GLUCOSE mg/dL 110*   ALT (SGPT) U/L 25   AST (SGOT) U/L 18     Lab Results   Component Value Date    INR 0.82 (L) 12/08/2021     Lab Results   Component Value Date    MG 2.1 12/08/2021     Lab Results   Component Value Date    TSH 0.681 12/08/2021    PSA 0.780 07/06/2018    TRIG 213 (H) 11/29/2021    HDL 45 11/29/2021     (H) 11/29/2021      Lab Results   Component Value Date    PROBNP 49.1 09/05/2019       ECG         ECG/EMG Results (last 24 hours)     Procedure Component Value Units Date/Time    ECG 12 Lead [[id number]] Collected: 12/08/21 1959      Updated: 12/08/21 2000        Imaging Results (Last 72 Hours)     Procedure Component Value Units Date/Time    XR Chest 1 View [[id number]] Collected: 12/08/21 2007     Updated: 12/08/21 2009    Narrative:      CR Chest 1 Vw    INDICATION:   Chest pain.     COMPARISON:    9/5/2019    FINDINGS:  Portable AP view(s) of the chest.  The heart and mediastinal contours are normal. Low lung volumes and bibasilar atelectasis appear similar to prior.. No pneumothorax or pleural effusion.      Impression:      No acute cardiopulmonary findings. Chronic changes appear stable.    Signer Name: Elo Wells MD   Signed: 12/8/2021 8:07 PM   Workstation Name: [id number]    [facility]          I have discussed my impression and recommendations with the patient and family.    Thank you very much for asking us to be involved in this patient's care.  We will follow along with you.    Electronically signed by LARY Kelley, 12/09/21, 4:57 PM EST.  Electronically signed by Sonali Melgar MD, 12/09/21, 5:09 PM EST.    Please note that portions of this note were completed with a voice recognition program.

## 2021-12-09 NOTE — PLAN OF CARE
Goal Outcome Evaluation:  Plan of Care Reviewed With: patient        Progress: no change  Outcome Summary: Pt resting in bed. Pt voices no concerns or complaints at this time. No s/s of acute distress. Will follow care of plan.

## 2021-12-09 NOTE — H&P
[facility]  History & Physical    Patient Identification:  Name:  Donaldo Roberts  Age:  71 y.o.  Sex:  male  :  1950  MRN:  [id number]   Visit Number:  [id number]  Admit Date: 2021   Primary Care Physician:  Roderick Patricia MD    Subjective     Chief complaint: Sent by Dr. Melgar for admission and pacemaker    History of presenting illness:      Donaldo Roberts is a 71 y.o. male with past medical history significant for carotid artery stenosis, essential HTN, PUD, inguinal hernia, arrhythmia.  He has been following with Dr. Melgar's office and wearing holter monitor for further evaluation of Wenkebach block.  He was contacted on 21 by Dr. Melgar's office as recent Holter monitor showed high-grade AV block as well as 7 beat run of ventricular tachycardia.  He did report over the phone that he was somatic without syncope or palpitations.  He was advised to go immediately to the emergency department to be admitted and to be put on a monitor as he will likely need to have a pacemaker placement.  He expressed understanding and then presented further to the emergency department.      Upon arrival to the ED on the evening of 21, vital signs were T 98.5F, pulse 68 but later as low as 41, RR 20, and /79 with room air oxygen saturation of 96%.  Troponin T was unremarkable x2.  CO2 was found to be 30.9 glucose 110 TSH 0.681.  Hemoglobin was within normal limits.  Platelet count was within normal limits.    Mr. Roberts denies chest pain and cough.  He reports periodic shortness of breath with known COPD and denies shortness of breath being outside of prior values.  He reports he does have an upcoming appointment with Dr. Becerra on  for further evaluation of right carotid stenosis. He denies abdominal pain, nausea, and vomiting.  He denies headache.  He reports he was having some dizziness and concern for arrythmia.  He denies known syncopal events. He  reports he was a  for over 40 years prior to retiring.        ---------------------------------------------------------------------------------------------------------------------   Review of Systems   Constitutional: Positive for fatigue. Negative for chills and fever.   HENT: Negative for congestion and drooling.    Eyes: Negative for pain and discharge.   Respiratory: Negative for cough, shortness of breath and wheezing.    Cardiovascular: Negative for chest pain and leg swelling.   Gastrointestinal: Negative for abdominal distention, diarrhea, nausea and vomiting.   Endocrine: Negative for cold intolerance and heat intolerance.   Genitourinary: Negative for difficulty urinating and dysuria.   Musculoskeletal: Negative for arthralgias and back pain.   Skin: Negative for color change and rash.   Allergic/Immunologic: Negative for environmental allergies.   Neurological: Negative for dizziness and headaches.   Hematological: Negative for adenopathy. Does not bruise/bleed easily.   Psychiatric/Behavioral: Positive for confusion. Negative for agitation.        ---------------------------------------------------------------------------------------------------------------------   Past Medical History:   Diagnosis Date   • Acid reflux    • Anxiety    • Arthritis    • Carotid artery stenosis, symptomatic, right 11/23/2021   • Essential hypertension 11/23/2021   • Herpes    • History of lipoma    • History of transfusion    • Inguinal hernia    • Peptic ulcer      Past Surgical History:   Procedure Laterality Date   • ABDOMINAL SURGERY     • CHOLECYSTECTOMY WITH INTRAOPERATIVE CHOLANGIOGRAM N/A 7/25/2017    Procedure: CHOLECYSTECTOMY LAPAROSCOPIC INTRAOPERATIVE CHOLANGIOGRAM;  Surgeon: uLis Coe MD;  Location: [facility];  Service:    • HERNIA REPAIR     • SINUS SURGERY     • TUMOR EXCISION       Family History   Problem Relation Age of Onset   • No Known Problems Father    • Heart disease Mother       Social History     Socioeconomic History   • Marital status:    Tobacco Use   • Smoking status: Current Some Day Smoker     Packs/day: 0.50     Types: Cigarettes   • Smokeless tobacco: Never Used   • Tobacco comment: 2 cigs per day   Substance and Sexual Activity   • Alcohol use: No   • Drug use: No   • Sexual activity: Defer     ---------------------------------------------------------------------------------------------------------------------   Allergies:  Penicillins  ---------------------------------------------------------------------------------------------------------------------   Home medications:    Medications below are reported home medications pulling from within the system; at this time, these medications have not been reconciled unless otherwise specified and are in the verification process for further verifcation as current home medications.  Medications Prior to Admission   Medication Sig Dispense Refill Last Dose   • amLODIPine (NORVASC) 10 MG tablet Take 10 mg by mouth Every Night.   12/7/2021 at Unknown time   • aspirin 81 MG EC tablet Take 81 mg by mouth Daily.   12/8/2021 at 0800   • clopidogrel (PLAVIX) 75 MG tablet Take 75 mg by mouth 2 (Two) Times a Week. PTA: takes on mondays and fridays 12/6/2021 at Unknown time   • lisinopril (PRINIVIL,ZESTRIL) 40 MG tablet Take 1 tablet by mouth Daily. 90 tablet 1 12/8/2021 at 0800   • omeprazole (priLOSEC) 20 MG capsule Take 20 mg by mouth Daily.   12/8/2021 at 0800       Hospital Scheduled Meds:  amLODIPine, 10 mg, Oral, Nightly  [START ON 12/10/2021] aspirin, 81 mg, Oral, Daily  atorvastatin, 40 mg, Oral, Nightly  [START ON 12/10/2021] clopidogrel, 75 mg, Oral, Once per day on Mon Fri  heparin (porcine), 5,000 Units, Subcutaneous, Q12H  lisinopril, 40 mg, Oral, Daily  pantoprazole, 40 mg, Oral, QAM AC  sodium chloride, 10 mL, Intravenous, Q12H           Current listed hospital scheduled medications may not yet reflect those currently  placed in orders that are signed and held awaiting patient's arrival to floor.   ---------------------------------------------------------------------------------------------------------------------     Objective     Vital Signs:  Temp:  [98.4 °F (36.9 °C)-98.5 °F (36.9 °C)] 98.4 °F (36.9 °C)  Heart Rate:  [40-82] 70  Resp:  [18-20] 18  BP: (115-170)/(67-99) 168/93      12/08/21 1938   Weight: 111 kg (245 lb)     Body mass index is 36.18 kg/m².  ---------------------------------------------------------------------------------------------------------------------       Physical Exam  Vitals and nursing note reviewed.   Constitutional:       General: He is awake.      Appearance: Normal appearance. He is well-developed and normal weight.   HENT:      Head: Normocephalic and atraumatic.      Mouth/Throat:      Lips: Pink.      Mouth: Mucous membranes are moist.   Eyes:      General: Lids are normal.      Conjunctiva/sclera:      Right eye: Right conjunctiva is not injected.      Left eye: Left conjunctiva is not injected.   Cardiovascular:      Rate and Rhythm: Normal rate and regular rhythm.      Heart sounds: S1 normal and S2 normal.   Pulmonary:      Effort: No tachypnea or bradypnea.      Breath sounds: No decreased breath sounds, wheezing, rhonchi or rales.   Abdominal:      General: Bowel sounds are normal.      Palpations: Abdomen is soft.      Tenderness: There is no abdominal tenderness.   Musculoskeletal:      Right lower leg: No swelling. No edema.      Left lower leg: No swelling. No edema.   Skin:     General: Skin is warm and dry.   Neurological:      Mental Status: He is alert and oriented to person, place, and time.   Psychiatric:         Attention and Perception: Attention normal.         Mood and Affect: Mood normal.         Behavior: Behavior is cooperative.           ---------------------------------------------------------------------------------------------------------------------  EKG:                       ---------------------------------------------------------------------------------------------------------------------   Results from last 7 days   Lab Units 12/08/21 2000   WBC 10*3/mm3 7.17   HEMOGLOBIN g/dL 15.1   HEMATOCRIT % 47.4   MCV fL 92.8   MCHC g/dL 31.9   PLATELETS 10*3/mm3 263   INR  0.82*         Results from last 7 days   Lab Units 12/08/21 2000   SODIUM mmol/L 138   POTASSIUM mmol/L 4.6   MAGNESIUM mg/dL 2.1   CHLORIDE mmol/L 98   CO2 mmol/L 30.9*   BUN mg/dL 12   CREATININE mg/dL 0.75*   EGFR IF NONAFRICN AM mL/min/1.73 103   CALCIUM mg/dL 10.2   GLUCOSE mg/dL 110*   ALBUMIN g/dL 4.68   BILIRUBIN mg/dL 0.2   ALK PHOS U/L 100   AST (SGOT) U/L 18   ALT (SGPT) U/L 25   Estimated Creatinine Clearance: 107 mL/min (A) (by C-G formula based on SCr of 0.75 mg/dL (L)).  No results found for: AMMONIA  Results from last 7 days   Lab Units 12/08/21  2216 12/08/21 2000   TROPONIN T ng/mL <0.010 <0.010         No results found for: HGBA1C  Lab Results   Component Value Date    TSH 0.681 12/08/2021    FREET4 1.11 12/08/2021     No results found for: PREGTESTUR, PREGSERUM, HCG, HCGQUANT  Pain Management Panel     Pain Management Panel Latest Ref Rng & Units 9/5/2019    AMPHETAMINES SCREEN, URINE Negative Negative    BARBITURATES SCREEN Negative Negative    BENZODIAZEPINE SCREEN, URINE Negative Negative    BUPRENORPHINEUR Negative Negative    COCAINE SCREEN, URINE Negative Negative    METHADONE SCREEN, URINE Negative Negative        No results found for: BLOODCX  No results found for: URINECX  No results found for: WOUNDCX  No results found for: STOOLCX      ---------------------------------------------------------------------------------------------------------------------  Imaging Results (Last 7 Days)     Procedure Component Value Units Date/Time    XR Chest 1 View [[id number]] Collected: 12/08/21 2007     Updated: 12/08/21 2009    Narrative:      CR Chest 1 Vw    INDICATION:   Chest pain.      COMPARISON:    9/5/2019    FINDINGS:  Portable AP view(s) of the chest.  The heart and mediastinal contours are normal. Low lung volumes and bibasilar atelectasis appear similar to prior.. No pneumothorax or pleural effusion.      Impression:      No acute cardiopulmonary findings. Chronic changes appear stable.    Signer Name: Elo Wells MD   Signed: 12/8/2021 8:07 PM   Workstation Name: [id number]    [facility]          Cultures:  No results found for: BLOODCX, URINECX, WOUNDCX, MRSACX, RESPCX, STOOLCX    Last echocardiogram:  Results for orders placed during the hospital encounter of 11/01/21    Adult Transthoracic Echo Complete w/ Color, Spectral and Contrast if necessary per protocol    Interpretation Summary  · Left ventricular wall thickness is consistent with mild to moderate concentric hypertrophy.  · Left ventricular ejection fraction appears to be 66 - 70%. Left ventricular systolic function is normal.  · Left ventricular diastolic function was normal.  · The right atrial cavity is mildly dilated.                I have personally reviewed the above radiology images and read the final radiology report on 12/09/21  ---------------------------------------------------------------------------------------------------------------------  Assessment / Plan     Active Hospital Problems    Diagnosis  POA   • AV block [I44.30]  Yes       ASSESSMENT/PLAN:  -First degre AV block with intermittent high-grade block  -7 beats run of Vtach  • Consult Cardiology  • Continuous cardiac monitoring.   • NPO after midnight with likelihood of pacemaker placement.   • TSH WNL.   • Holter monitor report reviewed.   • Cardiac diet placed per discussion with Cards NP regarding likely no procedure on this date.   • Telemetry protocol in place.     -High-grade stenosis of BRYSON with 70-99% stenosis:   -Hx prior left carotid endarterectomy:   · Keep 12/23 appointment with Dr. Becerra.    · Neuro checks.    · Advise tobacco cessation.   · Plavix on board.   · 11/29/21 lipid panel reviewed. Total cholesterol 245 with  and HDL 45 with triglycerides of 213.    · Atorvastatin 40mg added.        -Essential HTN:   · Vitals per hospital protocol.   · Home Norvasc on board.     -GERD:  -PUD:   · PPI in place.         ----------  -DVT prophylaxis: SQ heparin added  -Activity: Up with assist/Fall precuations  -Expected length of stay: INPATIENT status due to the need for care which can only be reasonably provided in an hospital setting such as aggressive/expedited ancillary services and/or consultation services, the necessity for IV medications, close physician monitoring and/or the possible need for procedures.  In such, I feel patient’s risk for adverse outcomes and need for care warrant INPATIENT evaluation and predict the patient’s care encounter to likely last beyond 2 midnights.  -Disposition:  Plans to return home at discharge    High risk secondary to high grade AV block  Requiring possible PPM placement        Yakelin Stacy PA-C  12/09/21  14:03 EST  Pager # [phone]  ---------------------------------------------------------------------------------------------------------------------

## 2021-12-09 NOTE — ED NOTES
MEDICAL SCREENING:    Reason for Visit: Chest Pain    Patient initially seen in triage.  The patient was advised further evaluation and diagnostic testing will be needed, some of the treatment and testing will be initiated in the lobby in order to begin the process.  The patient will be returned to the waiting area for the time being and possibly be re-assessed by a subsequent ED provider.  The patient will be brought back to the treatment area in as timely manner as possible.       Rush Castro MD  12/08/21 1916

## 2021-12-09 NOTE — ED PROVIDER NOTES
Subjective   71-year-old male presents upon referral from cardiology office.  He has a history of irregular heartbeat and just completed a Holter monitor which showed intermittent high-grade AV block and a run of VT.  He needs a pacemaker placed.  He reports chronic fatigue and shortness of breath.  He denies chest pain or other complaints at this time.  No syncopal events.  No exacerbating or alleviating factors.      History provided by:  Patient and medical records   used: No        Review of Systems   Constitutional: Positive for fatigue. Negative for fever.   HENT: Negative.    Eyes: Negative.    Respiratory: Positive for shortness of breath (chronic).    Cardiovascular: Negative.  Negative for chest pain.   Gastrointestinal: Negative.  Negative for abdominal pain.   Genitourinary: Negative.  Negative for dysuria.   Musculoskeletal: Negative.    Skin: Negative.    Neurological: Negative.    Psychiatric/Behavioral: Negative.    All other systems reviewed and are negative.      Past Medical History:   Diagnosis Date   • Acid reflux    • Anxiety    • Arthritis    • Carotid artery stenosis, symptomatic, right 11/23/2021   • Essential hypertension 11/23/2021   • Herpes    • History of lipoma    • History of transfusion    • Inguinal hernia    • Peptic ulcer        Allergies   Allergen Reactions   • Penicillins Hives     Mother told him that, has taken amoxicillin with no problems       Past Surgical History:   Procedure Laterality Date   • ABDOMINAL SURGERY     • CHOLECYSTECTOMY WITH INTRAOPERATIVE CHOLANGIOGRAM N/A 7/25/2017    Procedure: CHOLECYSTECTOMY LAPAROSCOPIC INTRAOPERATIVE CHOLANGIOGRAM;  Surgeon: Luis Coe MD;  Location: [facility];  Service:    • HERNIA REPAIR     • SINUS SURGERY     • TUMOR EXCISION         Family History   Problem Relation Age of Onset   • No Known Problems Father    • Heart disease Mother        Social History     Socioeconomic History   • Marital status:     Tobacco Use   • Smoking status: Current Some Day Smoker     Packs/day: 0.50     Types: Cigarettes   • Smokeless tobacco: Never Used   • Tobacco comment: 2 cigs per day   Substance and Sexual Activity   • Alcohol use: No   • Drug use: No   • Sexual activity: Defer           Objective   Physical Exam  Vitals and nursing note reviewed.   Constitutional:       General: He is not in acute distress.     Appearance: He is well-developed. He is not diaphoretic.   HENT:      Head: Normocephalic and atraumatic.      Right Ear: External ear normal.      Left Ear: External ear normal.      Nose: Nose normal.   Eyes:      Conjunctiva/sclera: Conjunctivae normal.      Pupils: Pupils are equal, round, and reactive to light.   Neck:      Vascular: No JVD.      Trachea: No tracheal deviation.   Cardiovascular:      Rate and Rhythm: Normal rate. Rhythm irregular.      Heart sounds: Normal heart sounds. No murmur heard.      Pulmonary:      Effort: Pulmonary effort is normal. No respiratory distress.      Breath sounds: Normal breath sounds. No wheezing.   Abdominal:      General: Bowel sounds are normal.      Palpations: Abdomen is soft.      Tenderness: There is no abdominal tenderness.   Musculoskeletal:         General: No deformity. Normal range of motion.      Cervical back: Normal range of motion and neck supple.   Skin:     General: Skin is warm and dry.      Coloration: Skin is not pale.      Findings: No erythema or rash.   Neurological:      Mental Status: He is alert and oriented to person, place, and time.      Cranial Nerves: No cranial nerve deficit.   Psychiatric:         Behavior: Behavior normal.         Thought Content: Thought content normal.         Procedures       XR Chest 1 View   Final Result   No acute cardiopulmonary findings. Chronic changes appear stable.      Signer Name: Elo Wells MD    Signed: 12/8/2021 8:07 PM    Workstation Name: [id number]     [facility]             ED Course  ED Course as of 12/09/21 1403   Wed Dec 08, 2021   2031 EKG: ? Sinus with variable block, rate 67, QRS 78, QTc 433, no STEMI. [DH]   2130 No beds available for admission at this time, will board pending bed availability and cardiology eval in the morning. [DH]   Thu Dec 09, 2021   0650 Patient care transferred to Dr. Schumacher at change of shift.    Roderick Munoz MD  6:50 AM EST [DH]   0912 I assumed care of this patient at shift change.  I spoke with Dr. Monte to consider for possible permanent pacemaker placement.  He noted that the patient will require [SF]   1402 CBC and CCP noted no significant abnormalities.  Troponin within normal limits.  Coagulation studies unremarkable.  I spoke with the hospitalist team about the cardiology requested pacemaker placement.  Cardiology is involved.  Recommend admission for further work up and treatment.  Hospitalist team consulted and made aware of the patient.  Consults and orders placed per hospitalist request.  Patient was agreeable to admission plan.  Vitals stable on admission. [SF]      ED Course User Index  [DH] Roderick Munoz MD  [SF] Vahe Schumacher,                                                  [facility]    Final diagnoses:   AV block       ED Disposition  ED Disposition     ED Disposition Condition Comment    Decision to Admit  Level of Care: Telemetry [5]   Diagnosis: AV block [421639]   Certification: I Certify That Inpatient Hospital Services Are Medically Necessary For Greater Than 2 Midnights            No follow-up provider specified.       Medication List      No changes were made to your prescriptions during this visit.          Vahe Schumacher DO  12/09/21 1403       Vahe Schumacher DO  12/09/21 1407

## 2021-12-10 ENCOUNTER — APPOINTMENT (OUTPATIENT)
Dept: ULTRASOUND IMAGING | Facility: HOSPITAL | Age: 71
End: 2021-12-10

## 2021-12-10 ENCOUNTER — APPOINTMENT (OUTPATIENT)
Dept: GENERAL RADIOLOGY | Facility: HOSPITAL | Age: 71
End: 2021-12-10

## 2021-12-10 LAB
ANION GAP SERPL CALCULATED.3IONS-SCNC: 11.8 MMOL/L (ref 5–15)
BASOPHILS # BLD AUTO: 0.02 10*3/MM3 (ref 0–0.2)
BASOPHILS NFR BLD AUTO: 0.3 % (ref 0–1.5)
BILIRUB UR QL STRIP: NEGATIVE
BUN SERPL-MCNC: 13 MG/DL (ref 8–23)
BUN/CREAT SERPL: 17.8 (ref 7–25)
CALCIUM SPEC-SCNC: 10 MG/DL (ref 8.6–10.5)
CHLORIDE SERPL-SCNC: 99 MMOL/L (ref 98–107)
CLARITY UR: CLEAR
CO2 SERPL-SCNC: 26.2 MMOL/L (ref 22–29)
COLOR UR: YELLOW
CREAT SERPL-MCNC: 0.73 MG/DL (ref 0.76–1.27)
DEPRECATED RDW RBC AUTO: 43.5 FL (ref 37–54)
EOSINOPHIL # BLD AUTO: 0.11 10*3/MM3 (ref 0–0.4)
EOSINOPHIL NFR BLD AUTO: 1.6 % (ref 0.3–6.2)
ERYTHROCYTE [DISTWIDTH] IN BLOOD BY AUTOMATED COUNT: 12.9 % (ref 12.3–15.4)
GFR SERPL CREATININE-BSD FRML MDRD: 107 ML/MIN/1.73
GLUCOSE BLDC GLUCOMTR-MCNC: 111 MG/DL (ref 70–130)
GLUCOSE SERPL-MCNC: 109 MG/DL (ref 65–99)
GLUCOSE UR STRIP-MCNC: NEGATIVE MG/DL
HCT VFR BLD AUTO: 48.9 % (ref 37.5–51)
HGB BLD-MCNC: 15.5 G/DL (ref 13–17.7)
HGB UR QL STRIP.AUTO: NEGATIVE
IMM GRANULOCYTES # BLD AUTO: 0.04 10*3/MM3 (ref 0–0.05)
IMM GRANULOCYTES NFR BLD AUTO: 0.6 % (ref 0–0.5)
KETONES UR QL STRIP: NEGATIVE
LEUKOCYTE ESTERASE UR QL STRIP.AUTO: NEGATIVE
LYMPHOCYTES # BLD AUTO: 2.12 10*3/MM3 (ref 0.7–3.1)
LYMPHOCYTES NFR BLD AUTO: 30.7 % (ref 19.6–45.3)
MCH RBC QN AUTO: 29.2 PG (ref 26.6–33)
MCHC RBC AUTO-ENTMCNC: 31.7 G/DL (ref 31.5–35.7)
MCV RBC AUTO: 92.1 FL (ref 79–97)
MONOCYTES # BLD AUTO: 0.66 10*3/MM3 (ref 0.1–0.9)
MONOCYTES NFR BLD AUTO: 9.6 % (ref 5–12)
NEUTROPHILS NFR BLD AUTO: 3.95 10*3/MM3 (ref 1.7–7)
NEUTROPHILS NFR BLD AUTO: 57.2 % (ref 42.7–76)
NITRITE UR QL STRIP: NEGATIVE
NRBC BLD AUTO-RTO: 0 /100 WBC (ref 0–0.2)
PH UR STRIP.AUTO: 7 [PH] (ref 5–8)
PLATELET # BLD AUTO: 257 10*3/MM3 (ref 140–450)
PMV BLD AUTO: 10.4 FL (ref 6–12)
POTASSIUM SERPL-SCNC: 4.4 MMOL/L (ref 3.5–5.2)
PROT UR QL STRIP: NEGATIVE
QT INTERVAL: 426 MS
QT INTERVAL: 426 MS
QT INTERVAL: 438 MS
QTC INTERVAL: 407 MS
QTC INTERVAL: 509 MS
QTC INTERVAL: 549 MS
RBC # BLD AUTO: 5.31 10*6/MM3 (ref 4.14–5.8)
SODIUM SERPL-SCNC: 137 MMOL/L (ref 136–145)
SP GR UR STRIP: 1.02 (ref 1–1.03)
UROBILINOGEN UR QL STRIP: NORMAL
WBC NRBC COR # BLD: 6.9 10*3/MM3 (ref 3.4–10.8)

## 2021-12-10 PROCEDURE — 85025 COMPLETE CBC W/AUTO DIFF WBC: CPT | Performed by: PHYSICIAN ASSISTANT

## 2021-12-10 PROCEDURE — L3660 SO 8 AB RSTR CAN/WEB PRE OTS: HCPCS | Performed by: INTERNAL MEDICINE

## 2021-12-10 PROCEDURE — C1785 PMKR, DUAL, RATE-RESP: HCPCS | Performed by: INTERNAL MEDICINE

## 2021-12-10 PROCEDURE — 25010000002 VANCOMYCIN 5 G RECONSTITUTED SOLUTION 5,000 MG VIAL: Performed by: INTERNAL MEDICINE

## 2021-12-10 PROCEDURE — 93970 EXTREMITY STUDY: CPT

## 2021-12-10 PROCEDURE — 25010000002 FENTANYL CITRATE (PF) 50 MCG/ML SOLUTION: Performed by: INTERNAL MEDICINE

## 2021-12-10 PROCEDURE — 02HK3JZ INSERTION OF PACEMAKER LEAD INTO RIGHT VENTRICLE, PERCUTANEOUS APPROACH: ICD-10-PCS | Performed by: INTERNAL MEDICINE

## 2021-12-10 PROCEDURE — 81003 URINALYSIS AUTO W/O SCOPE: CPT | Performed by: PHYSICIAN ASSISTANT

## 2021-12-10 PROCEDURE — 25010000002 MIDAZOLAM PER 1 MG: Performed by: INTERNAL MEDICINE

## 2021-12-10 PROCEDURE — 0 IOPAMIDOL PER 1 ML: Performed by: INTERNAL MEDICINE

## 2021-12-10 PROCEDURE — 71045 X-RAY EXAM CHEST 1 VIEW: CPT

## 2021-12-10 PROCEDURE — 33208 INSRT HEART PM ATRIAL & VENT: CPT | Performed by: INTERNAL MEDICINE

## 2021-12-10 PROCEDURE — C1898 LEAD, PMKR, OTHER THAN TRANS: HCPCS | Performed by: INTERNAL MEDICINE

## 2021-12-10 PROCEDURE — 93005 ELECTROCARDIOGRAM TRACING: CPT | Performed by: NURSE PRACTITIONER

## 2021-12-10 PROCEDURE — 25010000002 VANCOMYCIN 1 G RECONSTITUTED SOLUTION 1 EACH VIAL: Performed by: INTERNAL MEDICINE

## 2021-12-10 PROCEDURE — 80048 BASIC METABOLIC PNL TOTAL CA: CPT | Performed by: PHYSICIAN ASSISTANT

## 2021-12-10 PROCEDURE — 94799 UNLISTED PULMONARY SVC/PX: CPT

## 2021-12-10 PROCEDURE — 82962 GLUCOSE BLOOD TEST: CPT

## 2021-12-10 PROCEDURE — C1894 INTRO/SHEATH, NON-LASER: HCPCS | Performed by: INTERNAL MEDICINE

## 2021-12-10 PROCEDURE — 02H63JZ INSERTION OF PACEMAKER LEAD INTO RIGHT ATRIUM, PERCUTANEOUS APPROACH: ICD-10-PCS | Performed by: INTERNAL MEDICINE

## 2021-12-10 PROCEDURE — 0JH606Z INSERTION OF PACEMAKER, DUAL CHAMBER INTO CHEST SUBCUTANEOUS TISSUE AND FASCIA, OPEN APPROACH: ICD-10-PCS | Performed by: INTERNAL MEDICINE

## 2021-12-10 PROCEDURE — 93005 ELECTROCARDIOGRAM TRACING: CPT | Performed by: INTERNAL MEDICINE

## 2021-12-10 PROCEDURE — 93005 ELECTROCARDIOGRAM TRACING: CPT | Performed by: PHYSICIAN ASSISTANT

## 2021-12-10 PROCEDURE — 99232 SBSQ HOSP IP/OBS MODERATE 35: CPT | Performed by: INTERNAL MEDICINE

## 2021-12-10 PROCEDURE — 93010 ELECTROCARDIOGRAM REPORT: CPT | Performed by: INTERNAL MEDICINE

## 2021-12-10 PROCEDURE — 25010000002 HEPARIN (PORCINE) PER 1000 UNITS: Performed by: INTERNAL MEDICINE

## 2021-12-10 PROCEDURE — 99233 SBSQ HOSP IP/OBS HIGH 50: CPT | Performed by: PHYSICIAN ASSISTANT

## 2021-12-10 DEVICE — GEN PM ASSURITY MRI DR RF PM2272: Type: IMPLANTABLE DEVICE | Status: FUNCTIONAL

## 2021-12-10 DEVICE — LD PM TENDRIL STS 6F52CM 2088TC52: Type: IMPLANTABLE DEVICE | Status: FUNCTIONAL

## 2021-12-10 DEVICE — LD PM TENDRIL STS 6F58CM 2088TC58: Type: IMPLANTABLE DEVICE | Status: FUNCTIONAL

## 2021-12-10 RX ORDER — SODIUM CHLORIDE 0.9 % (FLUSH) 0.9 %
10 SYRINGE (ML) INJECTION EVERY 12 HOURS SCHEDULED
Status: DISCONTINUED | OUTPATIENT
Start: 2021-12-10 | End: 2021-12-11 | Stop reason: HOSPADM

## 2021-12-10 RX ORDER — FENTANYL CITRATE 50 UG/ML
INJECTION, SOLUTION INTRAMUSCULAR; INTRAVENOUS AS NEEDED
Status: DISCONTINUED | OUTPATIENT
Start: 2021-12-10 | End: 2021-12-10 | Stop reason: HOSPADM

## 2021-12-10 RX ORDER — HYDROCODONE BITARTRATE AND ACETAMINOPHEN 5; 325 MG/1; MG/1
1 TABLET ORAL EVERY 8 HOURS PRN
Status: DISCONTINUED | OUTPATIENT
Start: 2021-12-10 | End: 2021-12-11 | Stop reason: HOSPADM

## 2021-12-10 RX ORDER — MIDAZOLAM HYDROCHLORIDE 1 MG/ML
INJECTION INTRAMUSCULAR; INTRAVENOUS AS NEEDED
Status: DISCONTINUED | OUTPATIENT
Start: 2021-12-10 | End: 2021-12-10 | Stop reason: HOSPADM

## 2021-12-10 RX ORDER — SODIUM CHLORIDE 9 MG/ML
INJECTION, SOLUTION INTRAVENOUS CONTINUOUS PRN
Status: COMPLETED | OUTPATIENT
Start: 2021-12-10 | End: 2021-12-10

## 2021-12-10 RX ORDER — SODIUM CHLORIDE 0.9 % (FLUSH) 0.9 %
10 SYRINGE (ML) INJECTION AS NEEDED
Status: DISCONTINUED | OUTPATIENT
Start: 2021-12-10 | End: 2021-12-11 | Stop reason: HOSPADM

## 2021-12-10 RX ORDER — LIDOCAINE HYDROCHLORIDE 20 MG/ML
INJECTION, SOLUTION INFILTRATION; PERINEURAL AS NEEDED
Status: DISCONTINUED | OUTPATIENT
Start: 2021-12-10 | End: 2021-12-10 | Stop reason: HOSPADM

## 2021-12-10 RX ADMIN — SODIUM CHLORIDE, PRESERVATIVE FREE 10 ML: 5 INJECTION INTRAVENOUS at 08:20

## 2021-12-10 RX ADMIN — HEPARIN SODIUM 5000 UNITS: 5000 INJECTION INTRAVENOUS; SUBCUTANEOUS at 20:24

## 2021-12-10 RX ADMIN — ACETAMINOPHEN 650 MG: 325 TABLET ORAL at 20:24

## 2021-12-10 RX ADMIN — LISINOPRIL 40 MG: 10 TABLET ORAL at 08:20

## 2021-12-10 RX ADMIN — SODIUM CHLORIDE, PRESERVATIVE FREE 10 ML: 5 INJECTION INTRAVENOUS at 20:24

## 2021-12-10 RX ADMIN — AMLODIPINE BESYLATE 10 MG: 10 TABLET ORAL at 20:59

## 2021-12-10 RX ADMIN — SODIUM CHLORIDE, PRESERVATIVE FREE 10 ML: 5 INJECTION INTRAVENOUS at 12:12

## 2021-12-10 RX ADMIN — ATORVASTATIN CALCIUM 80 MG: 40 TABLET, FILM COATED ORAL at 20:24

## 2021-12-10 NOTE — PAYOR COMM NOTE
"CONTACT:  Evi Moran, RICKI    Utilization Management Dept.   [facility]  [address] [place] [address]    Phone:[phone]  Fax: [phone]    REQUEST FOR INPATIENT AUTHORIZATION  REF # [id number]  ICD 10:I44.30   ATTENDING MD:   Leilani Hannon   NPI:[id number]  FACILITY NPI: [id number]  ADM DATE: 12/9/21      Donaldo Roberts (71 y.o. Male)             Date of Birth Social Security Number Address Home Phone MRN    1950  [address]  [place] [address] [phone] [id number]    Spiritism Marital Status             None        Admission Date Admission Type Admitting Provider Attending Provider Department, Room/Bed    12/8/21 Emergency Leilani Hannon MD Srinivas, Priyanka, MD [facility] 3 [facility], 3320/1P    Discharge Date Discharge Disposition Discharge Destination                         Attending Provider: Leilani Hannon MD    Allergies: Penicillins    Isolation: None   Infection: None   Code Status: CPR   Advance Care Planning Activity    Ht: 175.3 cm (69\")   Wt: 111 kg (245 lb)    Admission Cmt: None   Principal Problem: None                Active Insurance as of 12/8/2021     Primary Coverage     Payor Plan Insurance Group Employer/Plan Group    ANTHEM MEDICARE REPLACEMENT ANTHEM MEDICARE ADVANTAGE [id number]     Payor Plan Address Payor Plan Phone Number Payor Plan Fax Number Effective Dates    [address] [phone]  4/1/2020 - None Entered    [facility] [id number]       Subscriber Name Subscriber Birth Date Member ID       DONALDO ROBERTS 1950 [id number]                 Emergency Contacts      (Rel.) Home Phone Work Phone Mobile Phone    Inna Mercer (Spouse) [phone] -- [phone]               History & Physical      Yakelin Stacy PA-C at 12/09/21 1317     Attestation signed by Leilani Hannon MD at 12/09/21 1836    Patient seen and examined without any family at the bedside. Patient denies syncope, " palpitations and dizziness. Denies recent illness. Has chronic shortness of breath, which he attributes to COPD and chronic pedal edema.   Discussed with Dr. Monte, will hold plavix for now. Dr. Monte to evaluate in am. I have reviewed this documentation and agree.                        [facility]  History & Physical    Patient Identification:  Name:  Donaldo Roberts  Age:  71 y.o.  Sex:  male  :  1950  MRN:  [id number]   Visit Number:  [id number]  Admit Date: 2021   Primary Care Physician:  Roderick Patricia MD    Subjective     Chief complaint: Sent by Dr. Melgar for admission and pacemaker    History of presenting illness:      Donaldo Roberts is a 71 y.o. male with past medical history significant for carotid artery stenosis, essential HTN, PUD, inguinal hernia, arrhythmia.  He has been following with Dr. Melgar's office and wearing holter monitor for further evaluation of Wenkebach block.  He was contacted on 21 by Dr. Melgar's office as recent Holter monitor showed high-grade AV block as well as 7 beat run of ventricular tachycardia.  He did report over the phone that he was somatic without syncope or palpitations.  He was advised to go immediately to the emergency department to be admitted and to be put on a monitor as he will likely need to have a pacemaker placement.  He expressed understanding and then presented further to the emergency department.      Upon arrival to the ED on the evening of 21, vital signs were T 98.5F, pulse 68 but later as low as 41, RR 20, and /79 with room air oxygen saturation of 96%.  Troponin T was unremarkable x2.  CO2 was found to be 30.9 glucose 110 TSH 0.681.  Hemoglobin was within normal limits.  Platelet count was within normal limits.    Mr. Roberts denies chest pain and cough.  He reports periodic shortness of breath with known COPD and denies shortness of breath being outside of prior values.  He reports he  does have an upcoming appointment with Dr. Becerra on 12/23 for further evaluation of right carotid stenosis. He denies abdominal pain, nausea, and vomiting.  He denies headache.  He reports he was having some dizziness and concern for arrythmia.  He denies known syncopal events. He reports he was a  for over 40 years prior to retiring.        ---------------------------------------------------------------------------------------------------------------------   Review of Systems   Constitutional: Positive for fatigue. Negative for chills and fever.   HENT: Negative for congestion and drooling.    Eyes: Negative for pain and discharge.   Respiratory: Negative for cough, shortness of breath and wheezing.    Cardiovascular: Negative for chest pain and leg swelling.   Gastrointestinal: Negative for abdominal distention, diarrhea, nausea and vomiting.   Endocrine: Negative for cold intolerance and heat intolerance.   Genitourinary: Negative for difficulty urinating and dysuria.   Musculoskeletal: Negative for arthralgias and back pain.   Skin: Negative for color change and rash.   Allergic/Immunologic: Negative for environmental allergies.   Neurological: Negative for dizziness and headaches.   Hematological: Negative for adenopathy. Does not bruise/bleed easily.   Psychiatric/Behavioral: Positive for confusion. Negative for agitation.        ---------------------------------------------------------------------------------------------------------------------   Past Medical History:   Diagnosis Date   • Acid reflux    • Anxiety    • Arthritis    • Carotid artery stenosis, symptomatic, right 11/23/2021   • Essential hypertension 11/23/2021   • Herpes    • History of lipoma    • History of transfusion    • Inguinal hernia    • Peptic ulcer      Past Surgical History:   Procedure Laterality Date   • ABDOMINAL SURGERY     • CHOLECYSTECTOMY WITH INTRAOPERATIVE CHOLANGIOGRAM N/A 7/25/2017    Procedure:  CHOLECYSTECTOMY LAPAROSCOPIC INTRAOPERATIVE CHOLANGIOGRAM;  Surgeon: Luis Coe MD;  [facility]: [facility];  Service:    • HERNIA REPAIR     • SINUS SURGERY     • TUMOR EXCISION       Family History   Problem Relation Age of Onset   • No Known Problems Father    • Heart disease Mother      Social History     Socioeconomic History   • Marital status:    Tobacco Use   • Smoking status: Current Some Day Smoker     Packs/day: 0.50     Types: Cigarettes   • Smokeless tobacco: Never Used   • Tobacco comment: 2 cigs per day   Substance and Sexual Activity   • Alcohol use: No   • Drug use: No   • Sexual activity: Defer     ---------------------------------------------------------------------------------------------------------------------   Allergies:  Penicillins  ---------------------------------------------------------------------------------------------------------------------   Home medications:    Medications below are reported home medications pulling from within the system; at this time, these medications have not been reconciled unless otherwise specified and are in the verification process for further verifcation as current home medications.  Medications Prior to Admission   Medication Sig Dispense Refill Last Dose   • amLODIPine (NORVASC) 10 MG tablet Take 10 mg by mouth Every Night.   12/7/2021 at Unknown time   • aspirin 81 MG EC tablet Take 81 mg by mouth Daily.   12/8/2021 at 0800   • clopidogrel (PLAVIX) 75 MG tablet Take 75 mg by mouth 2 (Two) Times a Week. PTA: takes on mondays and fridays 12/6/2021 at Unknown time   • lisinopril (PRINIVIL,ZESTRIL) 40 MG tablet Take 1 tablet by mouth Daily. 90 tablet 1 12/8/2021 at 0800   • omeprazole (priLOSEC) 20 MG capsule Take 20 mg by mouth Daily.   12/8/2021 at 0800       Hospital Scheduled Meds:  amLODIPine, 10 mg, Oral, Nightly  [START ON 12/10/2021] aspirin, 81 mg, Oral, Daily  atorvastatin, 40 mg, Oral, Nightly  [START ON 12/10/2021] clopidogrel,  75 mg, Oral, Once per day on Mon Fri  heparin (porcine), 5,000 Units, Subcutaneous, Q12H  lisinopril, 40 mg, Oral, Daily  pantoprazole, 40 mg, Oral, QAM   sodium chloride, 10 mL, Intravenous, Q12H           Current listed hospital scheduled medications may not yet reflect those currently placed in orders that are signed and held awaiting patient's arrival to floor.   ---------------------------------------------------------------------------------------------------------------------     Objective     Vital Signs:  Temp:  [98.4 °F (36.9 °C)-98.5 °F (36.9 °C)] 98.4 °F (36.9 °C)  Heart Rate:  [40-82] 70  Resp:  [18-20] 18  BP: (115-170)/(67-99) 168/93      12/08/21 1938   Weight: 111 kg (245 lb)     Body mass index is 36.18 kg/m².  ---------------------------------------------------------------------------------------------------------------------       Physical Exam  Vitals and nursing note reviewed.   Constitutional:       General: He is awake.      Appearance: Normal appearance. He is well-developed and normal weight.   HENT:      Head: Normocephalic and atraumatic.      Mouth/Throat:      Lips: Pink.      Mouth: Mucous membranes are moist.   Eyes:      General: Lids are normal.      Conjunctiva/sclera:      Right eye: Right conjunctiva is not injected.      Left eye: Left conjunctiva is not injected.   Cardiovascular:      Rate and Rhythm: Normal rate and regular rhythm.      Heart sounds: S1 normal and S2 normal.   Pulmonary:      Effort: No tachypnea or bradypnea.      Breath sounds: No decreased breath sounds, wheezing, rhonchi or rales.   Abdominal:      General: Bowel sounds are normal.      Palpations: Abdomen is soft.      Tenderness: There is no abdominal tenderness.   Musculoskeletal:      Right lower leg: No swelling. No edema.      Left lower leg: No swelling. No edema.   Skin:     General: Skin is warm and dry.   Neurological:      Mental Status: He is alert and oriented to person, place, and time.    Psychiatric:         Attention and Perception: Attention normal.         Mood and Affect: Mood normal.         Behavior: Behavior is cooperative.           ---------------------------------------------------------------------------------------------------------------------  EKG:                      ---------------------------------------------------------------------------------------------------------------------   Results from last 7 days   Lab Units 12/08/21 2000   WBC 10*3/mm3 7.17   HEMOGLOBIN g/dL 15.1   HEMATOCRIT % 47.4   MCV fL 92.8   MCHC g/dL 31.9   PLATELETS 10*3/mm3 263   INR  0.82*         Results from last 7 days   Lab Units 12/08/21 2000   SODIUM mmol/L 138   POTASSIUM mmol/L 4.6   MAGNESIUM mg/dL 2.1   CHLORIDE mmol/L 98   CO2 mmol/L 30.9*   BUN mg/dL 12   CREATININE mg/dL 0.75*   EGFR IF NONAFRICN AM mL/min/1.73 103   CALCIUM mg/dL 10.2   GLUCOSE mg/dL 110*   ALBUMIN g/dL 4.68   BILIRUBIN mg/dL 0.2   ALK PHOS U/L 100   AST (SGOT) U/L 18   ALT (SGPT) U/L 25   Estimated Creatinine Clearance: 107 mL/min (A) (by C-G formula based on SCr of 0.75 mg/dL (L)).  No results found for: AMMONIA  Results from last 7 days   Lab Units 12/08/21 2216 12/08/21 2000   TROPONIN T ng/mL <0.010 <0.010         No results found for: HGBA1C  Lab Results   Component Value Date    TSH 0.681 12/08/2021    FREET4 1.11 12/08/2021     No results found for: PREGTESTUR, PREGSERUM, HCG, HCGQUANT  Pain Management Panel     Pain Management Panel Latest Ref Rng & Units 9/5/2019    AMPHETAMINES SCREEN, URINE Negative Negative    BARBITURATES SCREEN Negative Negative    BENZODIAZEPINE SCREEN, URINE Negative Negative    BUPRENORPHINEUR Negative Negative    COCAINE SCREEN, URINE Negative Negative    METHADONE SCREEN, URINE Negative Negative        No results found for: BLOODCX  No results found for: URINECX  No results found for: WOUNDCX  No results found for: STOOLCX       ---------------------------------------------------------------------------------------------------------------------  Imaging Results (Last 7 Days)     Procedure Component Value Units Date/Time    XR Chest 1 View [[id number]] Collected: 12/08/21 2007     Updated: 12/08/21 2009    Narrative:      CR Chest 1 Vw    INDICATION:   Chest pain.     COMPARISON:    9/5/2019    FINDINGS:  Portable AP view(s) of the chest.  The heart and mediastinal contours are normal. Low lung volumes and bibasilar atelectasis appear similar to prior.. No pneumothorax or pleural effusion.      Impression:      No acute cardiopulmonary findings. Chronic changes appear stable.    Signer Name: Elo Wells MD   Signed: 12/8/2021 8:07 PM   Workstation Name: [id number]    [facility]          Cultures:  No results found for: BLOODCX, URINECX, WOUNDCX, MRSACX, RESPCX, STOOLCX    Last echocardiogram:  Results for orders placed during the hospital encounter of 11/01/21    Adult Transthoracic Echo Complete w/ Color, Spectral and Contrast if necessary per protocol    Interpretation Summary  · Left ventricular wall thickness is consistent with mild to moderate concentric hypertrophy.  · Left ventricular ejection fraction appears to be 66 - 70%. Left ventricular systolic function is normal.  · Left ventricular diastolic function was normal.  · The right atrial cavity is mildly dilated.                I have personally reviewed the above radiology images and read the final radiology report on 12/09/21  ---------------------------------------------------------------------------------------------------------------------  Assessment / Plan     Active Hospital Problems    Diagnosis  POA   • AV block [I44.30]  Yes       ASSESSMENT/PLAN:  -First degre AV block with intermittent high-grade block  -7 beats run of Vtach  • Consult Cardiology  • Continuous cardiac monitoring.   • NPO after midnight with likelihood of pacemaker placement.    • TSH WNL.   • Holter monitor report reviewed.   • Cardiac diet placed per discussion with Cards NP regarding likely no procedure on this date.   • Telemetry protocol in place.     -High-grade stenosis of BRYSON with 70-99% stenosis:   -Hx prior left carotid endarterectomy:   · Keep 12/23 appointment with Dr. Becerra.    · Neuro checks.   · Advise tobacco cessation.   · Plavix on board.   · 11/29/21 lipid panel reviewed. Total cholesterol 245 with  and HDL 45 with triglycerides of 213.    · Atorvastatin 40mg added.        -Essential HTN:   · Vitals per hospital protocol.   · Home Norvasc on board.     -GERD:  -PUD:   · PPI in place.         ----------  -DVT prophylaxis: SQ heparin added  -Activity: Up with assist/Fall precuations  -Expected length of stay: INPATIENT status due to the need for care which can only be reasonably provided in an hospital setting such as aggressive/expedited ancillary services and/or consultation services, the necessity for IV medications, close physician monitoring and/or the possible need for procedures.  In such, I feel patient’s risk for adverse outcomes and need for care warrant INPATIENT evaluation and predict the patient’s care encounter to likely last beyond 2 midnights.  -Disposition:  Plans to return home at discharge    High risk secondary to high grade AV block  Requiring possible PPM placement        Yakelin Stacy PA-C  12/09/21  14:03 EST  Pager # [phone]  ---------------------------------------------------------------------------------------------------------------------             Electronically signed by Leilani Hannon MD at 12/09/21 6622          Emergency Department Notes      Rush Castro MD at 12/08/21 1915                 MEDICAL SCREENING:    Reason for Visit: Chest Pain    Patient initially seen in triage.  The patient was advised further evaluation and diagnostic testing will be needed, some of the treatment and testing will  be initiated in the lobby in order to begin the process.  The patient will be returned to the waiting area for the time being and possibly be re-assessed by a subsequent ED provider.  The patient will be brought back to the treatment area in as timely manner as possible.       Rush Castro MD  12/08/21 1916      Electronically signed by Rush Castro MD at 12/08/21 1916     Vahe Schumacher DO at 12/08/21 2110          Subjective   71-year-old male presents upon referral from cardiology office.  He has a history of irregular heartbeat and just completed a Holter monitor which showed intermittent high-grade AV block and a run of VT.  He needs a pacemaker placed.  He reports chronic fatigue and shortness of breath.  He denies chest pain or other complaints at this time.  No syncopal events.  No exacerbating or alleviating factors.      History provided by:  Patient and medical records   used: No        Review of Systems   Constitutional: Positive for fatigue. Negative for fever.   HENT: Negative.    Eyes: Negative.    Respiratory: Positive for shortness of breath (chronic).    Cardiovascular: Negative.  Negative for chest pain.   Gastrointestinal: Negative.  Negative for abdominal pain.   Genitourinary: Negative.  Negative for dysuria.   Musculoskeletal: Negative.    Skin: Negative.    Neurological: Negative.    Psychiatric/Behavioral: Negative.    All other systems reviewed and are negative.      Past Medical History:   Diagnosis Date   • Acid reflux    • Anxiety    • Arthritis    • Carotid artery stenosis, symptomatic, right 11/23/2021   • Essential hypertension 11/23/2021   • Herpes    • History of lipoma    • History of transfusion    • Inguinal hernia    • Peptic ulcer        Allergies   Allergen Reactions   • Penicillins Hives     Mother told him that, has taken amoxicillin with no problems       Past Surgical History:   Procedure Laterality Date   • ABDOMINAL  SURGERY     • CHOLECYSTECTOMY WITH INTRAOPERATIVE CHOLANGIOGRAM N/A 7/25/2017    Procedure: CHOLECYSTECTOMY LAPAROSCOPIC INTRAOPERATIVE CHOLANGIOGRAM;  Surgeon: Luis Coe MD;  Location: [facility];  Service:    • HERNIA REPAIR     • SINUS SURGERY     • TUMOR EXCISION         Family History   Problem Relation Age of Onset   • No Known Problems Father    • Heart disease Mother        Social History     Socioeconomic History   • Marital status:    Tobacco Use   • Smoking status: Current Some Day Smoker     Packs/day: 0.50     Types: Cigarettes   • Smokeless tobacco: Never Used   • Tobacco comment: 2 cigs per day   Substance and Sexual Activity   • Alcohol use: No   • Drug use: No   • Sexual activity: Defer           Objective   Physical Exam  Vitals and nursing note reviewed.   Constitutional:       General: He is not in acute distress.     Appearance: He is well-developed. He is not diaphoretic.   HENT:      Head: Normocephalic and atraumatic.      Right Ear: External ear normal.      Left Ear: External ear normal.      Nose: Nose normal.   Eyes:      Conjunctiva/sclera: Conjunctivae normal.      Pupils: Pupils are equal, round, and reactive to light.   Neck:      Vascular: No JVD.      Trachea: No tracheal deviation.   Cardiovascular:      Rate and Rhythm: Normal rate. Rhythm irregular.      Heart sounds: Normal heart sounds. No murmur heard.      Pulmonary:      Effort: Pulmonary effort is normal. No respiratory distress.      Breath sounds: Normal breath sounds. No wheezing.   Abdominal:      General: Bowel sounds are normal.      Palpations: Abdomen is soft.      Tenderness: There is no abdominal tenderness.   Musculoskeletal:         General: No deformity. Normal range of motion.      Cervical back: Normal range of motion and neck supple.   Skin:     General: Skin is warm and dry.      Coloration: Skin is not pale.      Findings: No erythema or rash.   Neurological:      Mental Status: He is alert  and oriented to person, place, and time.      Cranial Nerves: No cranial nerve deficit.   Psychiatric:         Behavior: Behavior normal.         Thought Content: Thought content normal.         Procedures      XR Chest 1 View   Final Result   No acute cardiopulmonary findings. Chronic changes appear stable.      Signer Name: Elo Wells MD    Signed: 12/8/2021 8:07 PM    Workstation Name: [id number]     [facility]            ED Course  ED Course as of 12/09/21 1403   Wed Dec 08, 2021   2031 EKG: ? Sinus with variable block, rate 67, QRS 78, QTc 433, no STEMI. [DH]   2130 No beds available for admission at this time, will board pending bed availability and cardiology eval in the morning. [DH]   Thu Dec 09, 2021   0650 Patient care transferred to Dr. Schumacher at change of shift.    Roderick Munoz MD  6:50 AM EST [DH]   0912 I assumed care of this patient at shift change.  I spoke with Dr. Monet to consider for possible permanent pacemaker placement.  He noted that the patient will require [SF]   1402 CBC and CCP noted no significant abnormalities.  Troponin within normal limits.  Coagulation studies unremarkable.  I spoke with the hospitalist team about the cardiology requested pacemaker placement.  Cardiology is involved.  Recommend admission for further work up and treatment.  Hospitalist team consulted and made aware of the patient.  Consults and orders placed per hospitalist request.  Patient was agreeable to admission plan.  Vitals stable on admission. [SF]      ED Course User Index  [DH] Roderick Munoz MD  [SF] Vahe Schumacher DO                                                 [facility]    Final diagnoses:   AV block       ED Disposition  ED Disposition     ED Disposition Condition Comment    Decision to Admit  Level of Care: Telemetry [5]   Diagnosis: AV block [459571]   Certification: I Certify That Inpatient Hospital Services Are Medically Necessary For Greater Than 2  Midnights            No follow-up provider specified.       Medication List      No changes were made to your prescriptions during this visit.          Vahe Schumacher,   12/09/21 1403       Vahe Schumacher,   12/09/21 1407      Electronically signed by Vahe Schumacher DO at 12/09/21 1407       Vital Signs (last day)     Date/Time Temp Temp src Pulse Resp BP Patient Position SpO2    12/10/21 0631 97.8 (36.6) Oral 65 20 155/82 Lying 94    12/10/21 0311 98.2 (36.8) Oral 73 20 155/86 Lying 93    12/09/21 2300 -- -- -- -- -- -- 94    12/09/21 2008 -- -- 106 -- -- -- --    12/09/21 1910 97.9 (36.6) Oral 66 18 148/78 Lying 94    12/09/21 1358 98.4 (36.9) Oral 70 18 168/93 Sitting 92    12/09/21 1332 -- -- 78 -- 149/92 -- 93    12/09/21 1318 -- -- -- -- 143/84 -- 93    12/09/21 1302 -- -- -- -- 151/87 -- 93    12/09/21 1248 -- -- 82 -- 160/92 -- 94    12/09/21 1233 -- -- 77 -- 133/83 -- 90    12/09/21 1202 -- -- -- -- 133/87 -- 94    12/09/21 1147 -- -- 70 -- 152/85 -- 93    12/09/21 1146 -- -- 67 -- 152/85 -- --    12/09/21 1133 -- -- 51 -- 143/77 -- 90    12/09/21 1118 -- -- 48 -- 155/72 -- 96    12/09/21 1103 -- -- 62 -- 144/99 -- 99    12/09/21 1047 -- -- 53 -- 147/87 -- 97    12/09/21 1033 -- -- 54 -- 145/83 -- 100    12/09/21 1018 -- -- 66 -- 154/82 -- 93    12/09/21 0948 -- -- 52 -- 147/82 -- 98    12/09/21 0933 -- -- 50 -- 148/83 -- 96    12/09/21 0918 -- -- 75 -- 146/85 -- 94    12/09/21 0903 -- -- 41 -- 151/74 -- 93    12/09/21 0848 -- -- 65 -- 140/84 -- 94    12/09/21 0833 -- -- -- -- 115/72 -- 94    12/09/21 0803 -- -- -- -- 159/88 -- 93    12/09/21 0747 -- -- 76 -- 170/91 -- 95    12/09/21 0733 -- -- 76 -- 165/93 -- 96    12/09/21 0718 -- -- 71 -- 137/78 -- 96    12/09/21 0703 -- -- 71 -- 155/84 -- 93    12/09/21 0633 -- -- 81 -- 144/67 -- 92    12/09/21 0618 -- -- 58 -- 154/86 -- 93    12/09/21 0615 -- -- 45 -- -- -- 94    12/09/21 0613 -- -- 40 -- -- -- 93    12/09/21 0606 -- -- 58 -- -- -- 94     12/09/21 0603 -- -- -- -- 169/97 -- 94    12/09/21 0553 -- -- 63 -- -- -- 94    12/09/21 0534 -- -- 47 -- -- -- 98    12/09/21 0533 -- -- 49 -- 166/86 -- 97    12/09/21 0526 -- -- 49 -- -- -- 97    12/09/21 0518 -- -- 60 -- 145/89 -- 97    12/09/21 0507 -- -- 46 -- -- -- 96    12/09/21 0504 -- -- 43 -- -- -- 96    12/09/21 0447 -- -- 54 -- 156/84 -- 92    12/09/21 0349 -- -- 46 -- -- -- 93    12/09/21 0348 -- -- 54 -- 135/73 -- 99    12/09/21 0335 -- -- 46 -- -- -- 94    12/09/21 0333 -- -- 55 -- 142/79 -- 94    12/09/21 0240 -- -- 55 -- -- -- 90    12/09/21 0236 -- -- 49 -- -- -- 95    12/09/21 0234 -- -- 48 -- -- -- 97            Current Facility-Administered Medications   Medication Dose Route Frequency Provider Last Rate Last Admin   • acetaminophen (TYLENOL) tablet 650 mg  650 mg Oral Q6H PRN Yakelin Stacy PA-C   650 mg at 12/09/21 1554   • amLODIPine (NORVASC) tablet 10 mg  10 mg Oral Nightly Vahe Schumacher DO   10 mg at 12/09/21 2008   • aspirin EC tablet 81 mg  81 mg Oral Daily Vahe Schumacher DO       • atorvastatin (LIPITOR) tablet 80 mg  80 mg Oral Nightly Sonali Melgar MD   80 mg at 12/09/21 2009   • heparin (porcine) 5000 UNIT/ML injection 5,000 Units  5,000 Units Subcutaneous Q12H Yakelin Stacy PA-C   5,000 Units at 12/09/21 2009   • lisinopril (PRINIVIL,ZESTRIL) tablet 40 mg  40 mg Oral Daily Vahe Schumacher, DO   40 mg at 12/10/21 0820   • melatonin tablet 5 mg  5 mg Oral Nightly PRN Marnie Eid PA-C       • pantoprazole (PROTONIX) EC tablet 40 mg  40 mg Oral QAM AC SchumacherVahe klein A, DO   40 mg at 12/09/21 1146   • sodium chloride 0.9 % flush 1-10 mL  1-10 mL Intravenous PRN Yakelin Stacy PA-C       • sodium chloride 0.9 % flush 10 mL  10 mL Intravenous Q12H Yakelin Stacy PA-C   10 mL at 12/10/21 0820       Lab Results (last 24 hours)     Procedure Component Value Units Date/Time    Basic Metabolic Panel [[id number]]  (Abnormal) Collected: 12/10/21 3754     Specimen: Blood Updated: 12/10/21 0406     Glucose 109 mg/dL      BUN 13 mg/dL      Creatinine 0.73 mg/dL      Sodium 137 mmol/L      Potassium 4.4 mmol/L      Chloride 99 mmol/L      CO2 26.2 mmol/L      Calcium 10.0 mg/dL      eGFR Non African Amer 107 mL/min/1.73      BUN/Creatinine Ratio 17.8     Anion Gap 11.8 mmol/L     Narrative:      GFR Normal >60  Chronic Kidney Disease <60  Kidney Failure <15      CBC & Differential [[id number]]  (Abnormal) Collected: 12/10/21 0326    Specimen: Blood Updated: 12/10/21 0346    Narrative:      The following orders were created for panel order CBC & Differential.  Procedure                               Abnormality         Status                     ---------                               -----------         ------                     CBC Auto Differential[[id number]]        Abnormal            Final result                 Please view results for these tests on the individual orders.    CBC Auto Differential [[id number]]  (Abnormal) Collected: 12/10/21 0326    Specimen: Blood Updated: 12/10/21 0346     WBC 6.90 10*3/mm3      RBC 5.31 10*6/mm3      Hemoglobin 15.5 g/dL      Hematocrit 48.9 %      MCV 92.1 fL      MCH 29.2 pg      MCHC 31.7 g/dL      RDW 12.9 %      RDW-SD 43.5 fl      MPV 10.4 fL      Platelets 257 10*3/mm3      Neutrophil % 57.2 %      Lymphocyte % 30.7 %      Monocyte % 9.6 %      Eosinophil % 1.6 %      Basophil % 0.3 %      Immature Grans % 0.6 %      Neutrophils, Absolute 3.95 10*3/mm3      Lymphocytes, Absolute 2.12 10*3/mm3      Monocytes, Absolute 0.66 10*3/mm3      Eosinophils, Absolute 0.11 10*3/mm3      Basophils, Absolute 0.02 10*3/mm3      Immature Grans, Absolute 0.04 10*3/mm3      nRBC 0.0 /100 WBC     BNP [[id number]]  (Normal) Collected: 12/09/21 1401    Specimen: Blood Updated: 12/09/21 1447     proBNP 71.1 pg/mL     Narrative:      Among patients with dyspnea, NT-proBNP is highly sensitive for the detection of acute congestive heart failure.  In addition NT-proBNP of <300 pg/ml effectively rules out acute congestive heart failure with 99% negative predictive value.    Results may be falsely decreased if patient taking Biotin.          Imaging Results (Last 24 Hours)     ** No results found for the last 24 hours. **        ECG/EMG Results (last 24 hours)     Procedure Component Value Units Date/Time    ECG 12 Lead [[id number]] Collected: 12/08/21 1959     Updated: 12/09/21 2026     QT Interval 410 ms      QTC Interval 433 ms     Narrative:      Test Reason : Chest Pain  Blood Pressure :   */*   mmHG  Vent. Rate :  67 BPM     Atrial Rate :  85 BPM     P-R Int :   * ms          QRS Dur :  78 ms      QT Int : 410 ms       P-R-T Axes :   *  44  44 degrees     QTc Int : 433 ms    Atrial fibrillation  When compared with ECG of 05-SEP-2019 06:00,  Atrial fibrillation has replaced Sinus rhythm  QRS duration has decreased  Borderline criteria for Inferior infarct are no longer present  ST elevation now present in Inferior leads  Confirmed by Jony Monte ([id number]) on 12/9/2021 8:26:01 PM    Referred By: LUZ ELENA           Confirmed By: Jony Monte    ECG 12 Lead [[id number]] Collected: 12/10/21 0553     Updated: 12/10/21 0555     QT Interval 438 ms      QTC Interval 407 ms     Narrative:      Test Reason : Eval QTc  Blood Pressure :   */*   mmHG  Vent. Rate :  52 BPM     Atrial Rate :  78 BPM     P-R Int :   * ms          QRS Dur : 102 ms      QT Int : 438 ms       P-R-T Axes :  54  61  62 degrees     QTc Int : 407 ms    Sinus rhythm with 2nd degree AV block (Mobitz I)  Abnormal ECG  When compared with ECG of 08-DEC-2021 19:59,  Sinus rhythm has replaced Atrial fibrillation  QRS duration has increased  ST no longer elevated in Inferior leads    Referred By: OTONIEL           Confirmed By:                  Consult Notes (most recent note)      Sonali Melgar MD at 12/09/21 1112          Date of Admit: 12/8/2021  Date of Consult: 12/09/21  No ref. provider found        *  No active hospital problems. *      Assessment      1. High-grade AV block on Holter monitor  2. 7 beat runs of monomorphic nonsustained V. tach on the Holter monitor  3. Echocardiogram 11/1/2021 with EF about 66-70%  4. Stress test 11/1/2021 with no ischemia  5. High-grade right carotid stenosis on carotid Doppler, CT angio of the carotid is pending  6. Probable obstructive sleep apnea pending sleep study results  7. Hyperlipidemia      Recommendations     1. Patient will need probable dual-chamber pacemaker for intermittent high-grade block  2. We will get CT angio of the carotid  3. Once patient has pacemaker will advance beta-blockade for the short run of V. Tach  4. Will start statin for significant hyperlipidemia        Reason for consultation: High grade AV block    Subjective       Subjective     History of Present Illness     Donaldo Roberts is a 71 year old male with a past medical history significant for carotid artery stenosis, essential hypertension and arrhythmia.  Patient was seen in the cardiology office and a 24-hour Holter monitor was ordered.  Holter monitor showed first-degree AV block intermittent episodes of high grade block with 7 beat run of ventricular tachycardia and was contacted by the cardiology office to come to the ER for further evaluation.  Patient states he has been having intermittent palpitations and weakness over the last couple of months.  Denies any chest pain or shortness of breath.  Troponin has been negative.  Magnesium normal.  Echocardiogram in November 2021 showed normal LV function with mild to moderate concentric hypertrophy.  TSH normal.  Nuclear stress test on November 1, 2021 showed no evidence of ischemia.        Cardiac risk factors:hypercholesterolemia, hypertension, Sedentary life style and Obesity    Last Echo: 11/1/2021  · Left ventricular wall thickness is consistent with mild to moderate concentric hypertrophy.  · Left ventricular ejection fraction appears to  be 66 - 70%. Left ventricular systolic function is normal.  · Left ventricular diastolic function was normal.  · The right atrial cavity is mildly dilated.    Last Stress: 11/1/2021  · Left ventricular ejection fraction is borderline normal. (Calculated EF = 53%).  · Myocardial perfusion imaging indicates a normal myocardial perfusion study with no evidence of ischemia.  · Impressions are consistent with a low risk study.  · There is no prior study available for comparison.  · Findings consistent with a normal ECG stress test.    Past Medical History:   Diagnosis Date   • Acid reflux    • Anxiety    • Arthritis    • Carotid artery stenosis, symptomatic, right 11/23/2021   • Essential hypertension 11/23/2021   • Herpes    • History of lipoma    • History of transfusion    • Inguinal hernia    • Peptic ulcer      Past Surgical History:   Procedure Laterality Date   • ABDOMINAL SURGERY     • CHOLECYSTECTOMY WITH INTRAOPERATIVE CHOLANGIOGRAM N/A 7/25/2017    Procedure: CHOLECYSTECTOMY LAPAROSCOPIC INTRAOPERATIVE CHOLANGIOGRAM;  Surgeon: Luis Coe MD;  Location: [facility];  Service:    • HERNIA REPAIR     • SINUS SURGERY     • TUMOR EXCISION       Family History   Problem Relation Age of Onset   • No Known Problems Father    • Heart disease Mother      Social History     Tobacco Use   • Smoking status: Current Some Day Smoker     Packs/day: 0.50     Types: Cigarettes   • Smokeless tobacco: Never Used   • Tobacco comment: 2 cigs per day   Substance Use Topics   • Alcohol use: No   • Drug use: No     (Not in a hospital admission)    Allergies:  Penicillins    Review of Systems   Constitutional: Positive for fatigue. Negative for diaphoresis and fever.   HENT: Negative for congestion and trouble swallowing.    Eyes: Negative for photophobia and visual disturbance.   Respiratory: Negative for chest tightness and shortness of breath.    Cardiovascular: Negative for chest pain, palpitations and leg swelling.    Gastrointestinal: Negative for abdominal pain, nausea and vomiting.   Endocrine: Negative for polyphagia and polyuria.   Genitourinary: Negative for dysuria and hematuria.   Musculoskeletal: Negative for neck pain and neck stiffness.   Skin: Negative for rash and wound.   Allergic/Immunologic: Negative for food allergies and immunocompromised state.   Neurological: Positive for weakness. Negative for dizziness and syncope.   Hematological: Negative for adenopathy. Does not bruise/bleed easily.   Psychiatric/Behavioral: Negative for confusion and suicidal ideas.       Objective       Objective      Vital Signs  Temp:  [98.5 °F (36.9 °C)] 98.5 °F (36.9 °C)  Heart Rate:  [40-81] 62  Resp:  [20] 20  BP: (115-170)/(67-99) 144/99  Vital Signs (last 72 hrs)       12/06 0700  12/07 0659 12/07 0700  12/08 0659 12/08 0700 12/09 0659 12/09 0700 12/09 1112   Most Recent      Temp (°F)       98.5       98.5 (36.9) 12/08 1938    Heart Rate     40 -  81    41 -  76     62 12/09 1103    Resp       20       20 12/08 1938    BP     135/73 -  169/97    115/72 -  170/91     144/99 12/09 1103    SpO2 (%)     90 -  99    93 -  100     99 12/09 1103        Body mass index is 36.18 kg/m².  Documented weights    12/08/21 1938   Weight: 111 kg (245 lb)          No intake or output data in the 24 hours ending 12/09/21 1112     Physical Exam  Constitutional:       General: He is not in acute distress.     Appearance: Normal appearance. He is well-developed.   HENT:      Head: Normocephalic and atraumatic.      Mouth/Throat:      Mouth: Mucous membranes are moist.   Eyes:      Extraocular Movements: Extraocular movements intact.      Pupils: Pupils are equal, round, and reactive to light.   Neck:      Vascular: No JVD.   Cardiovascular:      Rate and Rhythm: Normal rate and regular rhythm.      Heart sounds: Normal heart sounds. No murmur heard.  No S3 or S4 sounds.    Pulmonary:      Effort: Pulmonary effort is normal. No respiratory  distress.      Breath sounds: Normal breath sounds. No wheezing.   Abdominal:      General: Bowel sounds are normal. There is no distension.      Palpations: Abdomen is soft. There is no hepatomegaly.      Tenderness: There is no abdominal tenderness.   Musculoskeletal:         General: Normal range of motion.      Cervical back: Normal range of motion and neck supple.   Skin:     General: Skin is warm and dry.      Coloration: Skin is not jaundiced or pale.   Neurological:      General: No focal deficit present.      Mental Status: He is alert and oriented to person, place, and time. Mental status is at baseline.   Psychiatric:         Mood and Affect: Mood normal.         Behavior: Behavior normal.         Thought Content: Thought content normal.         Judgment: Judgment normal.         Results review     Results Review:    I reviewed the patient's new clinical results.  Results from last 7 days   Lab Units 12/08/21  2216 12/08/21 2000   TROPONIN T ng/mL <0.010 <0.010     Results from last 7 days   Lab Units 12/08/21 2000   WBC 10*3/mm3 7.17   HEMOGLOBIN g/dL 15.1   PLATELETS 10*3/mm3 263     Results from last 7 days   Lab Units 12/08/21 2000   SODIUM mmol/L 138   POTASSIUM mmol/L 4.6   CHLORIDE mmol/L 98   CO2 mmol/L 30.9*   BUN mg/dL 12   CREATININE mg/dL 0.75*   CALCIUM mg/dL 10.2   GLUCOSE mg/dL 110*   ALT (SGPT) U/L 25   AST (SGOT) U/L 18     Lab Results   Component Value Date    INR 0.82 (L) 12/08/2021     Lab Results   Component Value Date    MG 2.1 12/08/2021     Lab Results   Component Value Date    TSH 0.681 12/08/2021    PSA 0.780 07/06/2018    TRIG 213 (H) 11/29/2021    HDL 45 11/29/2021     (H) 11/29/2021      Lab Results   Component Value Date    PROBNP 49.1 09/05/2019       ECG         ECG/EMG Results (last 24 hours)     Procedure Component Value Units Date/Time    ECG 12 Lead [[id number]] Collected: 12/08/21 1959     Updated: 12/08/21 2000        Imaging Results (Last 72 Hours)      Procedure Component Value Units Date/Time    XR Chest 1 View [[id number]] Collected: 12/08/21 2007     Updated: 12/08/21 2009    Narrative:      CR Chest 1 Vw    INDICATION:   Chest pain.     COMPARISON:    9/5/2019    FINDINGS:  Portable AP view(s) of the chest.  The heart and mediastinal contours are normal. Low lung volumes and bibasilar atelectasis appear similar to prior.. No pneumothorax or pleural effusion.      Impression:      No acute cardiopulmonary findings. Chronic changes appear stable.    Signer Name: Elo Wells MD   Signed: 12/8/2021 8:07 PM   Workstation Name: [id number]    [facility]          I have discussed my impression and recommendations with the patient and family.    Thank you very much for asking us to be involved in this patient's care.  We will follow along with you.    Electronically signed by LARY Kelley, 12/09/21, 4:57 PM EST.  Electronically signed by Sonali Melgar MD, 12/09/21, 5:09 PM EST.    Please note that portions of this note were completed with a voice recognition program.          Electronically signed by Sonali Melgar MD at 12/09/21 7149

## 2021-12-10 NOTE — PLAN OF CARE
Goal Outcome Evaluation:  Plan of Care Reviewed With: patient           Outcome Summary: Pt resting in bed this shift. Pt's HR has dropped down into high 30s a couple of times, as well as had a couple of 2.3 sec pauses. AVELINA BARRON aware. No new orders placed. Pt asymptomatic. NPO since midnight for pacemaker placement tomorrow. No s/s of acute distress noted. Will cont to follow POC.

## 2021-12-10 NOTE — PROGRESS NOTES
"Patient Identification:  Name:  Donaldo Roberts  Age:  71 y.o.  Sex:  male  :  1950  MRN:  [id number]  Visit Number:  [id number]  Primary Care Provider:  Roderick Patricia MD    Length of stay:  1    Subjective/Interval History/Consultants/Procedures     Chief complaint: 2.3 second pause, bradycardia, urinary frequency/retention; Follow-up AV block      HPI/Hospital course:     Donaldo Roberts is a 71 y.o. male with past medical history significant for carotid artery stenosis, essential HTN, PUD, inguinal hernia, arrhythmia.  He has been following with Dr. Melgar's office and wearing holter monitor for further evaluation of Wenkebach block.  He was contacted on 21 by Dr. Melgar's office as recent Holter monitor showed high-grade AV block as well as 7 beat run of ventricular tachycardia.  He did report over the phone that he was asymptomatic without syncope or palpitations.  He was advised to go immediately to the emergency department to be admitted and to be put on a monitor as he will likely need to have a pacemaker placement.  He expressed understanding and then presented further to the emergency department. Please see admitting H&P.     Mr. Roberts was admitted to the telemetry floor with Cardiology consultation.  On telemetry monitoring overnight he had episodes of bradycardia, evidence of AV blocks, and some 2 second pauses.  AM EKG on 12/10 continued to be concerning for AV block.  Plavix was held overnight and this AM for possible pacemaker placement on 12/10/21.       Subjective:      Mr. Roberts is resting in bed. He denies chest pain and dyspnea. He reports he did not sleep well last night.  He reports he does not sleep well at home either due to frequent urination. He reports he tried flomax in the past but it made him \"hurt in his straddle\".  We discuss further outpatient work-up of urinary retention at the discretion of PCP.     He reports he continues to be without dizziness, dyspnea, chest " pain, and syncope.      ----------------------------------------------------------------------------------------------------------------------  [facility] Meds:  amLODIPine, 10 mg, Oral, Nightly  aspirin, 81 mg, Oral, Daily  atorvastatin, 80 mg, Oral, Nightly  heparin (porcine), 5,000 Units, Subcutaneous, Q12H  lisinopril, 40 mg, Oral, Daily  pantoprazole, 40 mg, Oral, QAM AC  sodium chloride, 10 mL, Intravenous, Q12H         ----------------------------------------------------------------------------------------------------------------------      Objective     Vital Signs:  Temp:  [97.8 °F (36.6 °C)-98.4 °F (36.9 °C)] 97.8 °F (36.6 °C)  Heart Rate:  [] 65  Resp:  [18-20] 20  BP: (133-168)/(72-99) 155/82      12/08/21  1938   Weight: 111 kg (245 lb)     Body mass index is 36.18 kg/m².  No intake or output data in the 24 hours ending 12/10/21 0930  No intake/output data recorded.  NPO Diet  ----------------------------------------------------------------------------------------------------------------------    Physical Exam  Vitals and nursing note reviewed.   Constitutional:       General: He is awake.      Appearance: Normal appearance. He is well-developed.   HENT:      Head: Normocephalic and atraumatic.   Eyes:      General: Lids are normal.      Conjunctiva/sclera:      Right eye: Right conjunctiva is not injected.      Left eye: Left conjunctiva is not injected.   Cardiovascular:      Rate and Rhythm: Bradycardia present.      Heart sounds: S1 normal and S2 normal.   Pulmonary:      Effort: No tachypnea or bradypnea.      Breath sounds: No decreased breath sounds, wheezing, rhonchi or rales.   Abdominal:      General: Abdomen is protuberant. Bowel sounds are normal.      Palpations: Abdomen is soft.      Tenderness: There is no abdominal tenderness.   Musculoskeletal:      Right lower leg: No swelling. No edema.      Left lower leg: No swelling. No edema.   Skin:     General: Skin is warm and dry.    Neurological:      Mental Status: He is alert and oriented to person, place, and time.   Psychiatric:         Attention and Perception: Attention normal.         Mood and Affect: Mood is anxious.         Behavior: Behavior is cooperative.                ----------------------------------------------------------------------------------------------------------------------  Tele:                    ----------------------------------------------------------------------------------------------------------------------  Results from last 7 days   Lab Units 12/09/21  1401 12/08/21 2216 12/08/21 2000   TROPONIN T ng/mL  --  <0.010 <0.010   PROBNP pg/mL 71.1  --   --      Results from last 7 days   Lab Units 12/10/21  0326 12/08/21  2000   WBC 10*3/mm3 6.90 7.17   HEMOGLOBIN g/dL 15.5 15.1   HEMATOCRIT % 48.9 47.4   MCV fL 92.1 92.8   MCHC g/dL 31.7 31.9   PLATELETS 10*3/mm3 257 263   INR   --  0.82*         Results from last 7 days   Lab Units 12/10/21  0326 12/08/21  2000   SODIUM mmol/L 137 138   POTASSIUM mmol/L 4.4 4.6   MAGNESIUM mg/dL  --  2.1   CHLORIDE mmol/L 99 98   CO2 mmol/L 26.2 30.9*   BUN mg/dL 13 12   CREATININE mg/dL 0.73* 0.75*   EGFR IF NONAFRICN AM mL/min/1.73 107 103   CALCIUM mg/dL 10.0 10.2   GLUCOSE mg/dL 109* 110*   ALBUMIN g/dL  --  4.68   BILIRUBIN mg/dL  --  0.2   ALK PHOS U/L  --  100   AST (SGOT) U/L  --  18   ALT (SGPT) U/L  --  25   Estimated Creatinine Clearance: 107 mL/min (A) (by C-G formula based on SCr of 0.73 mg/dL (L)).  No results found for: AMMONIA      No results found for: BLOODCX  No results found for: URINECX  No results found for: WOUNDCX  No results found for: STOOLCX  ----------------------------------------------------------------------------------------------------------------------  Imaging Results (Last 24 Hours)     ** No results found for the last 24 hours. **         ----------------------------------------------------------------------------------------------------------------------   I have reviewed the above laboratory values for 12/10/21    Assessment/Plan     Active Hospital Problems    Diagnosis  POA   • AV block [I44.30]  Yes         ASSESSMENT/PLAN:  -First degree AV block with intermittent high-grade block  -7 beats run of Vtach:  -Rate drops into 30s overnight:  -2.3 second pause:   · Consult Cardiology  · Continuous cardiac monitoring.   · NPO with PPM today.   · EKG reviewed with AV block.   · TSH WNL.   · Holter monitor report reviewed.   · Cardiac diet placed per discussion with Cards NP regarding likely no procedure on this date.   · Telemetry protocol in place.      -High-grade stenosis of BRYSON with 70-99% stenosis:   -Hx prior left carotid endarterectomy:   · Keep 12/23 appointment with Dr. Becerra.    · Neuro checks.   · Advise tobacco cessation.   · Plavix held for possible PPM placement.     · 11/29/21 lipid panel reviewed. Total cholesterol 245 with  and HDL 45 with triglycerides of 213.    · Atorvastatin 40mg added.          -Essential HTN:   · Vitals per hospital protocol.   · Home Norvasc on board.      -GERD:  -PUD:   · PPI in place.      -Urinary retention:  -Noctiuria:   -Prior BPH dx   · Recommend further outpatient urology work-up.   · Check UA.   · Past outpatient Urology visit note from 2019 reviewed with Valorie dillon duet I cataracts.        -----------  -DVT prophylaxis: SQ Heparin  -GI prophylaxis: PPI on board      The patient is high risk due to the following diagnoses/reasons:  AV block, sinus pause, bradycardia, urinary retention        Yakelin Stacy PA-C  12/10/21  09:30 EST  Pager # [phone]

## 2021-12-10 NOTE — PLAN OF CARE
Goal Outcome Evaluation:           Progress: improving   Pt resting in bed with spouse at bedside. Pacemaker placed in L chest wall, dressing and sling remain in place. Pt is running paced HR 80s per telemetry. VSS. Will continue to monitor and follow plan of care

## 2021-12-10 NOTE — PROGRESS NOTES
LOS: 1 day     Name: Donaldo Roberts  Age/Sex: 71 y.o. male  :  1950        PCP: Roderick Patricia MD  REF: No Known Provider    Active Problems:    AV block      Reason for follow-up: Intermittent high-grade AV block and episode of nonsustained V. tach on recent Holter monitor and has been recommended to have a permanent pacemaker implantation by Dr. Melgar.    Subjective       Subjective Ms. Roberts is a pleasant 71-year-old  male with a diagnosis of high-grade AV block and episodes of nonsustained ventricular tachycardia on recent Holter monitor during evaluation by Dr. Melgar.  He has been recommended to have permanent pacemaker implantation.      Interval History: Patient denies any complaints of dizziness or syncope.    ROS    Vital Signs  Temp:  [97.8 °F (36.6 °C)-98.4 °F (36.9 °C)] 97.8 °F (36.6 °C)  Heart Rate:  [] 65  Resp:  [18-20] 20  BP: (133-168)/(72-99) 155/82  Vital Signs (last 72 hrs)        0700  12/08 0659 12/08 0700  12/09 0659 12/09 0700  12/10 0659 12/10 0700  12/10 0842   Most Recent      Temp (°F)     98.5    97.8 -  98.4       97.8 (36.6) 12/10 0631    Heart Rate   40 -  81    41 -  106       65 12/10 0631    Resp     20    18 -  20       20 12/10 0631    BP   135/73 -  169/97    115/72 -  170/91       155/82 12/10 0631    SpO2 (%)   90 -  99    90 -  100       94 12/10 0631        Documented weights    21   Weight: 111 kg (245 lb)      Body mass index is 36.18 kg/m².  No intake or output data in the 24 hours ending 12/10/21 0842  Objective    Objective       Physical Exam:     General Appearance:    Alert, cooperative, in no acute distress   Head:    Normocephalic, without obvious abnormality, atraumatic   Eyes:            Conjunctivae and sclerae normal, no   icterus, no pallor, corneas clear.   Neck:   No adenopathy, supple, trachea midline, no thyromegaly, no   carotid bruit, no JVD   Lungs:     Clear to auscultation,respirations regular, even and                   unlabored    Heart:    Regular rhythm and normal rate, normal S1 and S2, no            murmur, no gallop, no rub, no click   Chest Wall:    No abnormalities observed   Abdomen:     Normal bowel sounds, no masses, no organomegaly, soft        non-tender, non-distended, no guarding, no rebound                tenderness   Extremities:   Moves all extremities well, no edema, no cyanosis, no             redness   Pulses:   Pulses palpable and equal bilaterally   Skin:   No bleeding, bruising or rash       Neurologic:             Procedures    Results review       Results Review:   Results from last 7 days   Lab Units 12/10/21  0326 12/08/21 2000   WBC 10*3/mm3 6.90 7.17   HEMOGLOBIN g/dL 15.5 15.1   PLATELETS 10*3/mm3 257 263     Results from last 7 days   Lab Units 12/10/21  0326 12/08/21  2000   SODIUM mmol/L 137 138   POTASSIUM mmol/L 4.4 4.6   CHLORIDE mmol/L 99 98   CO2 mmol/L 26.2 30.9*   BUN mg/dL 13 12   CREATININE mg/dL 0.73* 0.75*   CALCIUM mg/dL 10.0 10.2   GLUCOSE mg/dL 109* 110*   ALT (SGPT) U/L  --  25   AST (SGOT) U/L  --  18     Results from last 7 days   Lab Units 12/08/21  2216 12/08/21 2000   TROPONIN T ng/mL <0.010 <0.010     Lab Results   Component Value Date    INR 0.82 (L) 12/08/2021     Lab Results   Component Value Date    MG 2.1 12/08/2021     Lab Results   Component Value Date    TSH 0.681 12/08/2021    PSA 0.780 07/06/2018    TRIG 213 (H) 11/29/2021    HDL 45 11/29/2021     (H) 11/29/2021      Imaging Results (Last 48 Hours)     Procedure Component Value Units Date/Time    XR Chest 1 View [[id number]] Collected: 12/08/21 2007     Updated: 12/08/21 2009    Narrative:      CR Chest 1 Vw    INDICATION:   Chest pain.     COMPARISON:    9/5/2019    FINDINGS:  Portable AP view(s) of the chest.  The heart and mediastinal contours are normal. Low lung volumes and bibasilar atelectasis appear similar to prior.. No pneumothorax or pleural effusion.      Impression:      No acute  cardiopulmonary findings. Chronic changes appear stable.    Signer Name: Elo Wells MD   Signed: 12/8/2021 8:07 PM   Workstation Name: [id number]    [facility]        No results found for: BNP           ECG      Echo   Results for orders placed during the hospital encounter of 11/01/21    Adult Transthoracic Echo Complete w/ Color, Spectral and Contrast if necessary per protocol    Interpretation Summary  · Left ventricular wall thickness is consistent with mild to moderate concentric hypertrophy.  · Left ventricular ejection fraction appears to be 66 - 70%. Left ventricular systolic function is normal.  · Left ventricular diastolic function was normal.  · The right atrial cavity is mildly dilated.      Nuclear Stress Test  .img     I reviewed the patient's new clinical results.    Telemetry: Sinus rhythm with episodes of sinus bradycardia in the 30s with a 2.6-second pauses.       Medication Review:   amLODIPine, 10 mg, Oral, Nightly  aspirin, 81 mg, Oral, Daily  atorvastatin, 80 mg, Oral, Nightly  heparin (porcine), 5,000 Units, Subcutaneous, Q12H  lisinopril, 40 mg, Oral, Daily  pantoprazole, 40 mg, Oral, QAM AC  sodium chloride, 10 mL, Intravenous, Q12H             Assessment      Assessment:  1. Second-degree AV block with episodes of nonsustained ventricle tachycardia.  2. No evidence of significant structural heart disease or coronary artery disease.  3. History of high-grade right carotid stenosis.    Plan     Recommendations:  1. I have discussed with Mr. Roberts about the procedure permanent pacemaker implantation, potential risks and benefits.  He expressed understanding of same and is wanting to proceed.  2. We will add a beta-blocker after he gets the pacemaker implantation.    I discussed the patients findings and my recommendations with patient.      Jony Monte MD[facility]  12/10/21  08:42 EST    Please note that portions of this note were completed with a voice recognition  program.

## 2021-12-11 VITALS
TEMPERATURE: 98.3 F | BODY MASS INDEX: 36.29 KG/M2 | DIASTOLIC BLOOD PRESSURE: 88 MMHG | OXYGEN SATURATION: 96 % | HEIGHT: 69 IN | RESPIRATION RATE: 18 BRPM | SYSTOLIC BLOOD PRESSURE: 154 MMHG | HEART RATE: 89 BPM | WEIGHT: 245 LBS

## 2021-12-11 PROBLEM — I44.30 AV BLOCK: Status: RESOLVED | Noted: 2021-12-09 | Resolved: 2021-12-11

## 2021-12-11 LAB
ANION GAP SERPL CALCULATED.3IONS-SCNC: 7.6 MMOL/L (ref 5–15)
BASOPHILS # BLD AUTO: 0.02 10*3/MM3 (ref 0–0.2)
BASOPHILS NFR BLD AUTO: 0.3 % (ref 0–1.5)
BUN SERPL-MCNC: 14 MG/DL (ref 8–23)
BUN/CREAT SERPL: 17.1 (ref 7–25)
CALCIUM SPEC-SCNC: 9.8 MG/DL (ref 8.6–10.5)
CHLORIDE SERPL-SCNC: 101 MMOL/L (ref 98–107)
CO2 SERPL-SCNC: 26.4 MMOL/L (ref 22–29)
CREAT SERPL-MCNC: 0.82 MG/DL (ref 0.76–1.27)
DEPRECATED RDW RBC AUTO: 44.4 FL (ref 37–54)
EOSINOPHIL # BLD AUTO: 0.11 10*3/MM3 (ref 0–0.4)
EOSINOPHIL NFR BLD AUTO: 1.4 % (ref 0.3–6.2)
ERYTHROCYTE [DISTWIDTH] IN BLOOD BY AUTOMATED COUNT: 13 % (ref 12.3–15.4)
GFR SERPL CREATININE-BSD FRML MDRD: 93 ML/MIN/1.73
GLUCOSE SERPL-MCNC: 175 MG/DL (ref 65–99)
HCT VFR BLD AUTO: 46.9 % (ref 37.5–51)
HGB BLD-MCNC: 15.1 G/DL (ref 13–17.7)
IMM GRANULOCYTES # BLD AUTO: 0.03 10*3/MM3 (ref 0–0.05)
IMM GRANULOCYTES NFR BLD AUTO: 0.4 % (ref 0–0.5)
LYMPHOCYTES # BLD AUTO: 1.72 10*3/MM3 (ref 0.7–3.1)
LYMPHOCYTES NFR BLD AUTO: 22.2 % (ref 19.6–45.3)
MAGNESIUM SERPL-MCNC: 2 MG/DL (ref 1.6–2.4)
MCH RBC QN AUTO: 29.9 PG (ref 26.6–33)
MCHC RBC AUTO-ENTMCNC: 32.2 G/DL (ref 31.5–35.7)
MCV RBC AUTO: 92.9 FL (ref 79–97)
MONOCYTES # BLD AUTO: 0.61 10*3/MM3 (ref 0.1–0.9)
MONOCYTES NFR BLD AUTO: 7.9 % (ref 5–12)
NEUTROPHILS NFR BLD AUTO: 5.27 10*3/MM3 (ref 1.7–7)
NEUTROPHILS NFR BLD AUTO: 67.8 % (ref 42.7–76)
NRBC BLD AUTO-RTO: 0 /100 WBC (ref 0–0.2)
PLAT MORPH BLD: NORMAL
PLATELET # BLD AUTO: 228 10*3/MM3 (ref 140–450)
PMV BLD AUTO: 10.8 FL (ref 6–12)
POTASSIUM SERPL-SCNC: 3.7 MMOL/L (ref 3.5–5.2)
RBC # BLD AUTO: 5.05 10*6/MM3 (ref 4.14–5.8)
RBC MORPH BLD: NORMAL
SODIUM SERPL-SCNC: 135 MMOL/L (ref 136–145)
WBC NRBC COR # BLD: 7.76 10*3/MM3 (ref 3.4–10.8)

## 2021-12-11 PROCEDURE — 85007 BL SMEAR W/DIFF WBC COUNT: CPT | Performed by: INTERNAL MEDICINE

## 2021-12-11 PROCEDURE — 99239 HOSP IP/OBS DSCHRG MGMT >30: CPT | Performed by: PHYSICIAN ASSISTANT

## 2021-12-11 PROCEDURE — 25010000002 HEPARIN (PORCINE) PER 1000 UNITS: Performed by: INTERNAL MEDICINE

## 2021-12-11 PROCEDURE — 83735 ASSAY OF MAGNESIUM: CPT | Performed by: INTERNAL MEDICINE

## 2021-12-11 PROCEDURE — 85025 COMPLETE CBC W/AUTO DIFF WBC: CPT | Performed by: INTERNAL MEDICINE

## 2021-12-11 PROCEDURE — 80048 BASIC METABOLIC PNL TOTAL CA: CPT | Performed by: INTERNAL MEDICINE

## 2021-12-11 RX ORDER — ATORVASTATIN CALCIUM 80 MG/1
80 TABLET, FILM COATED ORAL NIGHTLY
Qty: 30 TABLET | Refills: 0 | Status: SHIPPED | OUTPATIENT
Start: 2021-12-11 | End: 2022-01-12

## 2021-12-11 RX ORDER — HYDROCODONE BITARTRATE AND ACETAMINOPHEN 5; 325 MG/1; MG/1
1 TABLET ORAL EVERY 8 HOURS PRN
Qty: 4 TABLET | Refills: 0 | Status: SHIPPED | OUTPATIENT
Start: 2021-12-11 | End: 2022-01-12

## 2021-12-11 RX ADMIN — HEPARIN SODIUM 5000 UNITS: 5000 INJECTION INTRAVENOUS; SUBCUTANEOUS at 08:24

## 2021-12-11 RX ADMIN — LISINOPRIL 40 MG: 10 TABLET ORAL at 08:23

## 2021-12-11 RX ADMIN — ACETAMINOPHEN 650 MG: 325 TABLET ORAL at 03:04

## 2021-12-11 RX ADMIN — HYDROCODONE BITARTRATE AND ACETAMINOPHEN 1 TABLET: 5; 325 TABLET ORAL at 08:31

## 2021-12-11 RX ADMIN — ASPIRIN 81 MG: 81 TABLET, COATED ORAL at 08:23

## 2021-12-11 RX ADMIN — SODIUM CHLORIDE, PRESERVATIVE FREE 10 ML: 5 INJECTION INTRAVENOUS at 08:24

## 2021-12-11 RX ADMIN — PANTOPRAZOLE SODIUM 40 MG: 40 TABLET, DELAYED RELEASE ORAL at 08:23

## 2021-12-11 NOTE — DISCHARGE SUMMARY
[facility] Medicine Services  DISCHARGE SUMMARY    Date of Admission: 12/8/2021    Date of Discharge:  12/11/2021    PCP: Roderick Patricia MD    Discharging Provider: Yakelin Stacy PA-C  Attending Physician on day of DC: Dr. Leilani Hannon    Admission Diagnosis:   Please see admission H&P    Discharge Diagnosis:   · First Degree AV block with intermittent high-grade block  · 7 beats Vtach on holter monitor prior to admission  · High-grade stenosis of BRYSON with 70-99% stenosis  · Prior left carotid endarterectomy  · Essential HTN  · GERD  · PUD  · Urinary retention  · BPH      Procedures Performed:  12/10/21 placement of Permanent Dual Chamber Pacemaker by Dr. Monte with   Pacemaker generator lead data:Pacemaker generator is a [employer] device with model number PM [id number] and serial #[id number]. Atrial lead is a [employer] lead with model #2088 TC/52 and serial number [id number]. Ventricular lead is [employer] lead model #2088 TC/58 and serial number [id number].At the time of implantation the pacemaker was set at a minimum rate of 60 bpm and a maximum tracking rate of 130 bpm.  It was in a DDDR mode.       Consults:   Consults     Date and Time Order Name Status Description    12/9/2021  1:45 PM Inpatient Cardiology Consult            HPI     History of Present Illness:  Donaldo Roberts is a 71 y.o. male with past medical history significant for carotid artery stenosis, essential HTN, PUD, inguinal hernia, arrhythmia.  He has been following with Dr. Melgar's office and wearing holter monitor for further evaluation of Wenkebach block.  He was contacted on 12/8/21 by Dr. Melgar's office as recent Holter monitor showed high-grade AV block as well as 7 beat run of ventricular tachycardia.  He did report over the phone that he was asymptomatic without syncope or palpitations.  He was advised to go immediately to the emergency department to be admitted and to be put on a monitor  as he will likely need to have a pacemaker placement.  He expressed understanding and then presented further to the emergency department. Please see admitting H&P.        Hospital Course     Hospital Course  Donaldo Roberts was admitted as outlined in above HPI.     Mr. Roberts was admitted to the telemetry floor with Cardiology consultation.  On telemetry monitoring overnight he had episodes of bradycardia, evidence of AV blocks, and some 2 second pauses.  AM EKG on 12/10 continued to be concerning for AV block.  Plavix was held overnight and this AM for possible pacemaker placement on 12/10/21.     Mr. Roberts underwent pacemaker placement on 12/10/21 without known significant difficulty.  He was monitored overnight with continuous cardiac monitoring without known significant events overnight per review of telemetry and discussion with nursing and telemetry staff.  He was discussed on 12/10 with Dr. Monte with plans for discharge on 12/11 and for Mr. Roberts to follow-up on Monday 12/13 at the office of Dr. Monte. Plavix remains held at the time of discharge until resumed by cardiology.      During admission, Mr. Roberts did report noctiuria and history of BPH.  Further outpatient Urology referral at discretion of PCP was recommended in further discussion with Mr. Roberts.     Mr. Roberts was instructed to not shower for one week and to keep swing & swath in place.  He was also instructed to follow-up on Monday at Dr. Monte's office.  At the time of discharge, US venous doppler of his lower extremities was pending.  It was felt he likely had pedal edema from underlying diastolic dysfunction.    Of note, lipid panel was reviewed while hospitalized with high dose statin started given history of carotid stenosis.      Pertinent Laboratory and Radiology Results     Pertinent Test Results:          Results from last 7 days   Lab Units 12/11/21  0549 12/10/21  0326 12/08/21 2000   WBC 10*3/mm3 7.76 6.90 7.17    HEMOGLOBIN g/dL 15.1 15.5 15.1   HEMATOCRIT % 46.9 48.9 47.4   PLATELETS 10*3/mm3 228 257 263   MCV fL 92.9 92.1 92.8   SODIUM mmol/L 135* 137 138   POTASSIUM mmol/L 3.7 4.4 4.6   CHLORIDE mmol/L 101 99 98   CO2 mmol/L 26.4 26.2 30.9*   BUN mg/dL 14 13 12   CREATININE mg/dL 0.82 0.73* 0.75*   GLUCOSE mg/dL 175* 109* 110*   CALCIUM mg/dL 9.8 10.0 10.2        Results from last 7 days   Lab Units 12/09/21  1401 12/08/21  2216 12/08/21 2000   TROPONIN T ng/mL  --  <0.010 <0.010   PROBNP pg/mL 71.1  --   --      Results from last 7 days   Lab Units 12/11/21  0549 12/10/21  0326 12/08/21 2000   WBC 10*3/mm3 7.76 6.90 7.17     Results from last 7 days   Lab Units 12/08/21 2000   BILIRUBIN mg/dL 0.2   ALK PHOS U/L 100   ALT (SGPT) U/L 25   AST (SGOT) U/L 18   PROTIME Seconds 11.7*   INR  0.82*   APTT seconds 29.1           Invalid input(s): TG, LDLCALC, LDLREALC  Results from last 7 days   Lab Units 12/08/21 2000   TSH uIU/mL 0.681     Brief Urine Lab Results  (Last result in the past 365 days)      Color   Clarity   Blood   Leuk Est   Nitrite   Protein   CREAT   Urine HCG        12/10/21 0945 Yellow   Clear   Negative   Negative   Negative   Negative                         Results for orders placed during the hospital encounter of 11/01/21    Adult Transthoracic Echo Complete w/ Color, Spectral and Contrast if necessary per protocol    Interpretation Summary  · Left ventricular wall thickness is consistent with mild to moderate concentric hypertrophy.  · Left ventricular ejection fraction appears to be 66 - 70%. Left ventricular systolic function is normal.  · Left ventricular diastolic function was normal.  · The right atrial cavity is mildly dilated.            ----------------------------------------------------------------------------------------------------------------------  XR Chest 1 View    Result Date: 12/10/2021  1.  Interval left cardiac pacer device placement. No pneumothorax. 2.  Otherwise stable  chest.  This report was finalized on 12/10/2021 2:01 PM by Dr. Jasvir Sheridan MD.      XR Chest 1 View    Result Date: 12/8/2021  No acute cardiopulmonary findings. Chronic changes appear stable. Signer Name: Elo Wells MD  Signed: 12/8/2021 8:07 PM  Workstation Name: [id number]  [facility]        Microbiology Results (last 10 days)     Procedure Component Value - Date/Time    COVID-19 and FLU A/B PCR - Swab, Nasopharynx [[id number]]  (Normal) Collected: 12/08/21 2000    Lab Status: Final result Specimen: Swab from Nasopharynx Updated: 12/08/21 2028     COVID19 Not Detected     Influenza A PCR Not Detected     Influenza B PCR Not Detected    Narrative:      Fact sheet for providers: https://www.fda.gov/media/752807/download    Fact sheet for patients: https://www.fda.gov/media/987660/download    Test performed by PCR.          Labs above have been reviewed on the day of discharge.  Radiology images from prior 30 days were reviewed prior to discharge as incorporated into this document.     Discharge Vitals and Physical Examination       Vital Signs  Temp:  [97.5 °F (36.4 °C)-98.5 °F (36.9 °C)] 98.3 °F (36.8 °C)  Heart Rate:  [77-99] 89  Resp:  [18-20] 18  BP: (135-155)/(76-90) 154/88     PHYSICAL EXAMINATION:   Physical Exam  Vitals and nursing note reviewed.   Constitutional:       Appearance: Normal appearance.   HENT:      Head: Normocephalic and atraumatic.      Mouth/Throat:      Mouth: Mucous membranes are moist.   Cardiovascular:      Rate and Rhythm: Normal rate and regular rhythm.      Pulses: Normal pulses.      Heart sounds: Normal heart sounds.   Pulmonary:      Effort: Pulmonary effort is normal.      Breath sounds: Normal breath sounds.   Abdominal:      General: There is no distension.      Palpations: Abdomen is soft.   Skin:     General: Skin is warm and dry.   Neurological:      Mental Status: He is alert.           Discharge Disposition, Discharge Medications, and Discharge  Appointments     Discharge Disposition:   home    Condition on Discharge:  Fair    Discharge Medications:     Discharge Medications      New Medications      Instructions Start Date   atorvastatin 80 MG tablet  Commonly known as: LIPITOR   80 mg, Oral, Nightly      HYDROcodone-acetaminophen 5-325 MG per tablet  Commonly known as: NORCO   1 tablet, Oral, Every 8 Hours PRN         Continue These Medications      Instructions Start Date   amLODIPine 10 MG tablet  Commonly known as: NORVASC   10 mg, Oral, Nightly      aspirin 81 MG EC tablet   81 mg, Oral, Daily      lisinopril 40 MG tablet  Commonly known as: PRINIVIL,ZESTRIL   40 mg, Oral, Daily      omeprazole 20 MG capsule  Commonly known as: priLOSEC   20 mg, Oral, Daily         Stop These Medications    clopidogrel 75 MG tablet  Commonly known as: PLAVIX            Discharged medication regimen discussed with attending physician prior to discharge.     Discharge Diet:     Dietary Orders (From admission, onward)     Start     Ordered    12/11/21 0321  Diet Regular; Cardiac  Diet Effective Now        Question Answer Comment   Diet Texture / Consistency Regular    Common Modifiers Cardiac        12/11/21 0320                Activity at Discharge:  activity as tolerated         Discharge Disposition:    Home or Self Care        Follow-up Appointments:  Your Scheduled Appointments    Dec 23, 2021  8:00 AM  CT cor angiogram carotids with [facility] CT 1  [facility] IMAGING  CT ([facility]) [address] [facility] [id number] [phone]   NPO 6 hrs     Jan 12, 2022  1:40 PM  Follow Up with LARY Osorio  [facility] ([place]) [address]  [place] [address]  [phone]   -Bring photo ID, insurance card, and list of medications to appointment  -If testing was completed outside of [facility] then patient must bring images on a disc  -Copay will be collected at time of appointment  -Established  patients should arrive 10 minutes prior to appointment     Additional instructions:    Roderick patricia  office  closed  on  the  weekend  will  call on  Monday  and  call pt  no  pat  needed  for  dr rodríguez  on  Monday December 13  hours   between  8  am and  12  noon  for  pacemaker check           Additional Instructions for the Follow-ups that You Need to Schedule     Discharge Follow-up with PCP   As directed       Currently Documented PCP:    Roderick Patricia MD    PCP Phone Number:    [phone]     Follow Up Details: Hospital follow-up pacemaker placement         Discharge Follow-up with Specified Provider: See Dr. Rodríguez on Monday for Pacemaker follow-up per Dr. Rodríguez's instructions   As directed      To: See Dr. Rodríguez on Monday for Pacemaker follow-up per Dr. Rodríguez's instructions            Follow-up Information     Roderick Patricia MD .    Specialty: Family Medicine  Why: Hospital follow-up pacemaker placement  Contact information:  [address] [place] [id number]  [phone]                         Additional Instructions for the Follow-ups that You Need to Schedule     Discharge Follow-up with PCP   As directed       Currently Documented PCP:    Roderick Patricia MD    PCP Phone Number:    [phone]     Follow Up Details: Hospital follow-up pacemaker placement         Discharge Follow-up with Specified Provider: See Dr. Rodríguez on Monday for Pacemaker follow-up per Dr. Rodríguez's instructions   As directed      To: See Dr. Rodríguez on Monday for Pacemaker follow-up per Dr. Rodríguez's instructions               Test Results Pending at Discharge:           Yakelin Stacy PA-C  [facility] Medicine Team  12/11/21  09:31 EST      Time: Greater than 30 minutes spent on this discharge.  I spent 45 minutes on this discharge activity which included:  Face-to-face encounter with the patient, discussing plan with attending physician, reviewing the data in the system, coordination of the care with the nursing staff as well as  consultations, documentation, and entering orders.

## 2021-12-11 NOTE — DISCHARGE INSTR - APPOINTMENTS
Roderick jolly  office  closed  on  the  weekend  will  call on  Monday  and  call pt  no  pat  needed  for  dr rodríguez  on  Monday December 13  hours   between  8  am and  12  noon  for  pacemaker check

## 2021-12-11 NOTE — NURSING NOTE
Spoke with pt's spouse regarding transportation home. Stated that she was working on getting transport, that she was attempting to get a hold of son.

## 2021-12-11 NOTE — PROGRESS NOTES
"  Patient Identification:  Name:  Donaldo Roberts  Age:  71 y.o.  Sex:  male  :  1950  MRN:  [id number]  Visit Number:  [id number]  Primary care provider:  Roderick Patricia MD    Reason for follow-up:  High-grade AV block and s/p permanent pacemaker implantation    Subjective     Patient denied history of dizziness.  No history of any cardiac symptoms.  No pain or swelling in the pacemaker implantation site.  Telemetry showed paced rhythm.      Medication Review:   amLODIPine, 10 mg, Oral, Nightly  aspirin, 81 mg, Oral, Daily  atorvastatin, 80 mg, Oral, Nightly  heparin (porcine), 5,000 Units, Subcutaneous, Q12H  lisinopril, 40 mg, Oral, Daily  pantoprazole, 40 mg, Oral, QAM AC  sodium chloride, 10 mL, Intravenous, Q12H  sodium chloride, 10 mL, Intravenous, Q12H          Vital Sign Min/Max for last 24 hours  Temp  Min: 97.6 °F (36.4 °C)  Max: 98.5 °F (36.9 °C)   BP  Min: 135/76  Max: 155/87   Pulse  Min: 79  Max: 99   Resp  Min: 18  Max: 20   SpO2  Min: 90 %  Max: 96 %   No data recorded   No data recorded     Flowsheet Rows      First Filed Value   Admission Height 175.3 cm (69\") Documented at 2021   Admission Weight 111 kg (245 lb) Documented at 2021          Objective       Physical Exam:     General Appearance:    Alert, cooperative, in no acute distress   Head:    Normocephalic, without obvious abnormality, atraumatic   Eyes:            Conjunctivae and sclerae normal, no   icterus, no pallor, corneas clear.   Neck:   No adenopathy, supple, trachea midline, no thyromegaly, no   carotid bruit, no JVD   Lungs:     Clear to auscultation,respirations regular, even and                  unlabored    Heart:    Regular rhythm and normal rate, normal S1 and S2, no            murmur, no gallop, no rub, no click   Chest Wall:    No abnormalities observed.  Pacemaker site looks good with no complications   Abdomen:     Normal bowel sounds, no masses, no organomegaly, soft        non-tender, " non-distended, no guarding, no rebound                tenderness   Extremities:   Moves all extremities well, no edema, no cyanosis, no             redness          Telemetry:  Paced rhythm      Labs  Results from last 7 days   Lab Units 12/11/21  0549 12/10/21  0326 12/08/21  2000   WBC 10*3/mm3 7.76 6.90 7.17   HEMOGLOBIN g/dL 15.1 15.5 15.1   HEMATOCRIT % 46.9 48.9 47.4   PLATELETS 10*3/mm3 228 257 263     Results from last 7 days   Lab Units 12/11/21  0549 12/10/21  0326 12/08/21  2000   SODIUM mmol/L 135* 137 138   POTASSIUM mmol/L 3.7 4.4 4.6   CHLORIDE mmol/L 101 99 98   CO2 mmol/L 26.4 26.2 30.9*   BUN mg/dL 14 13 12   CREATININE mg/dL 0.82 0.73* 0.75*   CALCIUM mg/dL 9.8 10.0 10.2   GLUCOSE mg/dL 175* 109* 110*     Results from last 7 days   Lab Units 12/08/21 2000   BILIRUBIN mg/dL 0.2   ALK PHOS U/L 100   AST (SGOT) U/L 18   ALT (SGPT) U/L 25     Results from last 7 days   Lab Units 12/11/21  0549 12/08/21 2000   MAGNESIUM mg/dL 2.0 2.1     Results from last 7 days   Lab Units 12/08/21 2000   INR  0.82*         Results from last 7 days   Lab Units 12/08/21  2216 12/08/21 2000   TROPONIN T ng/mL <0.010 <0.010             Assessment      High-grade AV block s/p permanent pacemaker implantation  Pacemaker site looks good with no complications    Plan     Patient can be discharged from cardiac standpoint view.  Follow-up at Dr. Monte's office on Monday for wound check.  Follow-up with Dr. Melgar in 3 to 4 weeks.    I will sign off.  Please call me if needed.      Erica Barrett MD [facility]  12/11/21  11:12 EST

## 2021-12-11 NOTE — PLAN OF CARE
Goal Outcome Evaluation:   Pt resting in bed at this time. Pt continues to take arm out of sling after pacemaker placement after being educated on the importance of leaving arm in sling. Will continue to follow plan of care.

## 2021-12-12 ENCOUNTER — READMISSION MANAGEMENT (OUTPATIENT)
Dept: CALL CENTER | Facility: HOSPITAL | Age: 71
End: 2021-12-12

## 2021-12-12 NOTE — OUTREACH NOTE
Prep Survey      Responses   Restoration facility patient discharged from? [facility]   Is LACE score < 7 ? No   Emergency Room discharge w/ pulse ox? No   Eligibility Readm Mgmt   Discharge diagnosis AV Block   Does the patient have one of the following disease processes/diagnoses(primary or secondary)? Other   Does the patient have Home health ordered? No   Is there a DME ordered? No   Comments regarding appointments Follow up with Roderick Patricia MD   Medication alerts for this patient Lipitor and Norco,  STOP - Plavix   General alerts for this patient placement of Permanent Dual Chamber Pacemaker   Prep survey completed? Yes          Ana Post RN

## 2021-12-12 NOTE — PAYOR COMM NOTE
"[facility]  [place]  PHONE  [phone]  FAX  [phone]  NPI:  [id number]    PATIENT D/C 12/11/2021    Donaldo Roberts (71 y.o. Male)             Date of Birth Social Security Number Address Home Phone MRN    1950  [address] [place] [address] [phone] [id number]    Gnosticist Marital Status             None        Admission Date Admission Type Admitting Provider Attending Provider Department, Room/Bed    12/8/21 Emergency Leilani Hannon MD  [facility], 3320/1P    Discharge Date Discharge Disposition Discharge Destination          12/11/2021 Home or Self Care              Attending Provider: (none)   Allergies: Penicillins    Isolation: None   Infection: None   Code Status: Prior   Advance Care Planning Activity    Ht: 175.3 cm (69\")   Wt: 111 kg (245 lb)    Admission Cmt: None   Principal Problem: None                Active Insurance as of 12/8/2021     Primary Coverage     Payor Plan Insurance Group Employer/Plan Group    ANTHEM MEDICARE REPLACEMENT ANTHEM MEDICARE ADVANTAGE [id number]     Payor Plan Address Payor Plan Phone Number Payor Plan Fax Number Effective Dates    [address] [phone]  4/1/2020 - None Entered    [facility] [id number]       Subscriber Name Subscriber Birth Date Member ID       DONALDO ROBERTS 1950 [id number]                 Emergency Contacts      (Rel.) Home Phone Work Phone Mobile Phone    Inna Mercer (Spouse) [phone] -- [phone]              "

## 2021-12-13 ENCOUNTER — CLINICAL SUPPORT (OUTPATIENT)
Dept: CARDIOLOGY | Facility: CLINIC | Age: 71
End: 2021-12-13

## 2021-12-13 ENCOUNTER — TELEPHONE (OUTPATIENT)
Dept: TELEMETRY | Facility: HOSPITAL | Age: 71
End: 2021-12-13

## 2021-12-13 NOTE — CASE MANAGEMENT/SOCIAL WORK
Case Management Discharge Note      Final Note: Patient discharged home on 12/11/21.  No needs identified.         Selected Continued Care - Discharged on 12/11/2021 Admission date: 12/8/2021 - Discharge disposition: Home or Self Care                 Final Discharge Disposition Code: 01 - home or self-care

## 2021-12-13 NOTE — PROGRESS NOTES
Pt came in for wound check after having pacemaker placed 3 days ago. Wound looked very well and clear of any signs of infection or discharge. Pt had a little bruising but no more than expected. Wound was recovered with wound patch. Pt understood to come back in 2 to 3 days for suture removal

## 2021-12-14 ENCOUNTER — READMISSION MANAGEMENT (OUTPATIENT)
Dept: CALL CENTER | Facility: HOSPITAL | Age: 71
End: 2021-12-14

## 2021-12-14 NOTE — OUTREACH NOTE
Medical Week 1 Survey      Responses   [facility] patient discharged from? [facility]   Does the patient have one of the following disease processes/diagnoses(primary or secondary)? Other   Week 1 attempt successful? Yes   Call start time 1007   Call end time 1009   General alerts for this patient placement of Permanent Dual Chamber Pacemaker   Discharge diagnosis AV Block   Is patient permission given to speak with other caregiver? Yes   Person spoke with today (if not patient) and relationship Spouse   Meds reviewed with patient/caregiver? Yes   Is the patient having any side effects they believe may be caused by any medication additions or changes? No   Does the patient have all medications ordered at discharge? Yes   Is the patient taking all medications as directed (includes completed medication regime)? Yes   Does the patient have a primary care provider?  Yes   Does the patient have an appointment with their PCP within 7 days of discharge? Yes   Has the patient kept scheduled appointments due by today? Yes   Comments Staples out on Thursday, wound looked at dressing change.   Has home health visited the patient within 72 hours of discharge? N/A   Psychosocial issues? No   Did the patient receive a copy of their discharge instructions? Yes   Nursing interventions Reviewed instructions with patient   What is the patient's perception of their health status since discharge? Improving   Is the patient/caregiver able to teach back signs and symptoms related to disease process for when to call PCP? Yes   Is the patient/caregiver able to teach back signs and symptoms related to disease process for when to call 911? Yes   Is the patient/caregiver able to teach back the hierarchy of who to call/visit for symptoms/problems? PCP, Specialist, Home health nurse, Urgent Care, ED, 911 Yes   If the patient is a current smoker, are they able to teach back resources for cessation? Not a smoker   Additional teach back comments  Following restrictions as ordered. He is doing most of his normal activities.   Week 1 call completed? Yes   Wrap up additional comments Improving.          Lashawn Johnson RN

## 2021-12-16 ENCOUNTER — CLINICAL SUPPORT (OUTPATIENT)
Dept: CARDIOLOGY | Facility: CLINIC | Age: 71
End: 2021-12-16

## 2021-12-16 NOTE — PROGRESS NOTES
Pt presents to office today for staple removal from pacemaker placement and staples was removed with no problem and incision site looked well with no sign of redness or infection. There was a little bruising at the site but no more thannormal

## 2021-12-21 ENCOUNTER — READMISSION MANAGEMENT (OUTPATIENT)
Dept: CALL CENTER | Facility: HOSPITAL | Age: 71
End: 2021-12-21

## 2021-12-21 NOTE — OUTREACH NOTE
Medical Week 2 Survey      Responses   [facility] patient discharged from? [facility]   Does the patient have one of the following disease processes/diagnoses(primary or secondary)? Other   Week 2 attempt successful? Yes   Call start time 1601   General alerts for this patient placement of Permanent Dual Chamber Pacemaker   Discharge diagnosis AV Block   Call end time 1602   Is patient permission given to speak with other caregiver? Yes   Person spoke with today (if not patient) and relationship Spouse   Medication alerts for this patient Lipitor and Norco,  STOP - Plavix   Meds reviewed with patient/caregiver? Yes   Is the patient having any side effects they believe may be caused by any medication additions or changes? No   Comments regarding appointments Follow up with Roderick Patricia MD   Does the patient have a primary care provider?  Yes   Does the patient have an appointment with their PCP within 7 days of discharge? Yes   Has the patient kept scheduled appointments due by today? Yes   Did the patient receive a copy of their discharge instructions? Yes   Nursing interventions Reviewed instructions with patient   What is the patient's perception of their health status since discharge? Improving   Is the patient/caregiver able to teach back signs and symptoms related to disease process for when to call PCP? Yes   Is the patient/caregiver able to teach back signs and symptoms related to disease process for when to call 911? Yes   Is the patient/caregiver able to teach back the hierarchy of who to call/visit for symptoms/problems? PCP, Specialist, Home health nurse, Urgent Care, ED, 911 Yes   If the patient is a current smoker, are they able to teach back resources for cessation? Not a smoker   Additional teach back comments The pacemaker site is dry intact no signs of infection.    Week 2 Call Completed? Yes          Nan Islsa RN

## 2021-12-23 ENCOUNTER — APPOINTMENT (OUTPATIENT)
Dept: CT IMAGING | Facility: HOSPITAL | Age: 71
End: 2021-12-23

## 2021-12-24 ENCOUNTER — TREATMENT (OUTPATIENT)
Dept: CARDIOLOGY | Facility: CLINIC | Age: 71
End: 2021-12-24

## 2021-12-24 DIAGNOSIS — I49.5 SSS (SICK SINUS SYNDROME): Primary | ICD-10-CM

## 2021-12-24 PROCEDURE — 93294 REM INTERROG EVL PM/LDLS PM: CPT | Performed by: INTERNAL MEDICINE

## 2021-12-24 PROCEDURE — 93296 REM INTERROG EVL PM/IDS: CPT | Performed by: INTERNAL MEDICINE

## 2021-12-30 DIAGNOSIS — G47.33 OSA (OBSTRUCTIVE SLEEP APNEA): Primary | ICD-10-CM

## 2022-01-12 ENCOUNTER — OFFICE VISIT (OUTPATIENT)
Dept: CARDIOLOGY | Facility: CLINIC | Age: 72
End: 2022-01-12

## 2022-01-12 VITALS
TEMPERATURE: 97 F | BODY MASS INDEX: 36.58 KG/M2 | WEIGHT: 247 LBS | OXYGEN SATURATION: 96 % | HEART RATE: 93 BPM | SYSTOLIC BLOOD PRESSURE: 146 MMHG | HEIGHT: 69 IN | DIASTOLIC BLOOD PRESSURE: 78 MMHG

## 2022-01-12 DIAGNOSIS — I65.21 CAROTID ARTERY STENOSIS, SYMPTOMATIC, RIGHT: Primary | ICD-10-CM

## 2022-01-12 DIAGNOSIS — G47.33 OSA (OBSTRUCTIVE SLEEP APNEA): ICD-10-CM

## 2022-01-12 DIAGNOSIS — I44.1 WENCKEBACH BLOCK: ICD-10-CM

## 2022-01-12 DIAGNOSIS — I10 ESSENTIAL HYPERTENSION: ICD-10-CM

## 2022-01-12 DIAGNOSIS — Z95.0 PRESENCE OF CARDIAC PACEMAKER: ICD-10-CM

## 2022-01-12 DIAGNOSIS — E78.5 DYSLIPIDEMIA: ICD-10-CM

## 2022-01-12 PROCEDURE — 99214 OFFICE O/P EST MOD 30 MIN: CPT | Performed by: NURSE PRACTITIONER

## 2022-01-12 RX ORDER — ATORVASTATIN CALCIUM 80 MG/1
80 TABLET, FILM COATED ORAL DAILY
COMMUNITY
End: 2022-01-12 | Stop reason: SDUPTHER

## 2022-01-12 RX ORDER — CLOPIDOGREL BISULFATE 75 MG/1
75 TABLET ORAL DAILY
Qty: 30 TABLET | Refills: 11 | Status: SHIPPED | OUTPATIENT
Start: 2022-01-12 | End: 2022-03-31 | Stop reason: SDUPTHER

## 2022-01-12 NOTE — PROGRESS NOTES
Roderick Patricia MD  Donaldo Roberts  1950 01/12/2022    Patient Active Problem List   Diagnosis   • Benign prostatic hyperplasia with urinary obstruction   • Herpesviral infection of penis   • Shortness of breath   • Carotid artery stenosis, symptomatic, right   • Essential hypertension   • Dyslipidemia   • Wenckebach block   • Cigarette nicotine dependence in remission   • PATTY (obstructive sleep apnea)   • Pre-operative cardiovascular examination   • Presence of cardiac pacemaker       Dear Roderick Patricia MD:    Subjective     Chief Complaint   Patient presents with   • Med Management     list.    • Results     labs, holter, home sleep study.    • Shortness of Breath   • Palpitations     History of Present Illness:    Donaldo Roberts is a 71 y.o. male with a history of high-grade AV block status post permanent pacemaker implantation, hyperlipidemia, essential hypertension, and carotid artery stenosis s/p endarterectomy. Patient presents today for routine cardiology follow-up. Patient was admitted to [facility] in December 2021 for which he was found to have high-grade AV block and underwent dual-chamber permanent pacemaker implantation with [employer]resh device.  He was also found to have 79% stenosis in the right internal carotid artery and was scheduled for CT angiogram however did not have this performed and states it is scheduled for next week.  Nuclear stress test and November 2021 show evidence of ischemia.  Echocardiogram in November 2021 showed normal LV function.  At home sleep study was positive for possible obstructive sleep apnea and he is following up with pulmonology.  Denies any chest pain, dizziness or shortness of breath.    Allergies   Allergen Reactions   • Penicillins Hives     Mother told him that, has taken amoxicillin with no problems   :      Current Outpatient Medications:   •  amLODIPine (NORVASC) 10 MG tablet, Take 10 mg by mouth Every Night., Disp: , Rfl:   •  aspirin 81 MG  "EC tablet, Take 81 mg by mouth Daily., Disp: , Rfl:   •  atorvastatin (LIPITOR) 80 MG tablet, Take 1 tablet by mouth Every Night for 30 days., Disp: 30 tablet, Rfl: 0  •  lisinopril (PRINIVIL,ZESTRIL) 40 MG tablet, Take 1 tablet by mouth Daily., Disp: 90 tablet, Rfl: 1  •  omeprazole (priLOSEC) 20 MG capsule, Take 20 mg by mouth Daily., Disp: , Rfl:   •  clopidogrel (PLAVIX) 75 MG tablet, Take 1 tablet by mouth Daily., Disp: 30 tablet, Rfl: 11      The following portions of the patient's history were reviewed today and updated today as appropriate: allergies, current medications, past family history, past medical history, past social history, past surgical history and problem list.    Social History     Tobacco Use   • Smoking status: Current Some Day Smoker     Packs/day: 0.50     Types: Cigarettes   • Smokeless tobacco: Never Used   • Tobacco comment: 2 cigs per day   Substance Use Topics   • Alcohol use: No   • Drug use: No     Objective   Vitals:    01/12/22 1350   BP: 146/78   BP Location: Left arm   Patient Position: Sitting   Cuff Size: Adult   Pulse: 93   Temp: 97 °F (36.1 °C)   TempSrc: Infrared   SpO2: 96%   Weight: 112 kg (247 lb)   Height: 175.3 cm (69\")     Body mass index is 36.48 kg/m².    Constitutional:       Appearance: Healthy appearance. Well-developed.   Eyes:      Pupils: Pupils are equal, round, and reactive to light.   HENT:      Head: Normocephalic.   Neck:      Vascular: Carotid bruit (left) present.      Lymphadenopathy: No cervical adenopathy.   Pulmonary:      Effort: Pulmonary effort is normal. No respiratory distress.      Breath sounds: Normal breath sounds. No wheezing.   Cardiovascular:      Normal rate. Regular rhythm. Normal S1. Normal S2.      . No S3 and S4 gallop.   Pulses:     Intact distal pulses.   Abdominal:      General: Bowel sounds are normal. There is no distension.      Palpations: Abdomen is soft.      Tenderness: There is no abdominal tenderness.   Musculoskeletal: "      Cervical back: Normal range of motion. Skin:     General: Skin is warm and dry.      Findings: No erythema.   Neurological:      Mental Status: Alert, oriented to person, place, and time and oriented to person, place and time.   Psychiatric:         Attention and Perception: Attention normal.         Mood and Affect: Mood normal.         Behavior: Behavior normal.         Thought Content: Thought content normal.         Lab Results   Component Value Date     (L) 12/11/2021    K 3.7 12/11/2021     12/11/2021    CO2 26.4 12/11/2021    BUN 14 12/11/2021    CREATININE 0.82 12/11/2021    GLUCOSE 175 (H) 12/11/2021    CALCIUM 9.8 12/11/2021    AST 18 12/08/2021    ALT 25 12/08/2021    ALKPHOS 100 12/08/2021     No results found for: CKTOTAL  Lab Results   Component Value Date    WBC 7.76 12/11/2021    HGB 15.1 12/11/2021    HCT 46.9 12/11/2021     12/11/2021     Lab Results   Component Value Date    INR 0.82 (L) 12/08/2021     Lab Results   Component Value Date    MG 2.0 12/11/2021     Lab Results   Component Value Date    TSH 0.681 12/08/2021    PSA 0.780 07/06/2018    TRIG 213 (H) 11/29/2021    HDL 45 11/29/2021     (H) 11/29/2021        Assessment/Plan    Diagnosis Plan   1. Carotid artery stenosis, symptomatic, right  clopidogrel (PLAVIX) 75 MG tablet   2. Essential hypertension     3. Dyslipidemia     4. Wenckebach block     5. Presence of cardiac pacemaker     6. PATTY (obstructive sleep apnea)         Recommendations:  1. Carotid artery stenosis  · Carotid ultrasound in November 2021 showed 70 to 99% stenosis in the right carotid artery.  Patient is scheduled for CT angiogram of the carotids next week per patient.  Continue with high-dose statin and aspirin.  · Can resume Plavix 75 mg p.o. daily.    2.  Second-degree AV block  · Patient underwent dual-chamber permanent pacemaker implantation with [employer] Naresh device in December 2021.  Pacemaker site is healing well.  · Scheduled pacemaker  check with the office.     3. Dyslipidemia  ·  in November 2021.  Continue high-dose statin.  Follow-up lipid panel in a couple of months.    4.  Essential hypertension  · Blood pressure elevated in office today.  Patient reports blood pressure normally runs systolic 130s at home.  Continue with lisinopril and amlodipine for now.    5.  Obstructive sleep apnea  · Patient had positive home sleep study.  He is following up with pulmonology at the end of this month.      Return in about 3 months (around 4/12/2022) for Next scheduled follow up.    As always, I appreciate very much the opportunity to participate in the cardiovascular care of your patients.      With Best Regards,          LARY Kelley

## 2022-01-19 ENCOUNTER — APPOINTMENT (OUTPATIENT)
Dept: CT IMAGING | Facility: HOSPITAL | Age: 72
End: 2022-01-19

## 2022-02-09 ENCOUNTER — APPOINTMENT (OUTPATIENT)
Dept: CT IMAGING | Facility: HOSPITAL | Age: 72
End: 2022-02-09

## 2022-03-25 ENCOUNTER — TREATMENT (OUTPATIENT)
Dept: CARDIOLOGY | Facility: CLINIC | Age: 72
End: 2022-03-25

## 2022-03-25 DIAGNOSIS — I49.5 SSS (SICK SINUS SYNDROME): Primary | ICD-10-CM

## 2022-03-25 PROCEDURE — 93294 REM INTERROG EVL PM/LDLS PM: CPT | Performed by: INTERNAL MEDICINE

## 2022-03-28 PROCEDURE — 93296 REM INTERROG EVL PM/IDS: CPT | Performed by: INTERNAL MEDICINE

## 2022-03-31 ENCOUNTER — OFFICE VISIT (OUTPATIENT)
Dept: CARDIOLOGY | Facility: CLINIC | Age: 72
End: 2022-03-31

## 2022-03-31 VITALS
SYSTOLIC BLOOD PRESSURE: 138 MMHG | RESPIRATION RATE: 16 BRPM | BODY MASS INDEX: 37.09 KG/M2 | HEIGHT: 69 IN | DIASTOLIC BLOOD PRESSURE: 84 MMHG | TEMPERATURE: 96.8 F | HEART RATE: 89 BPM | WEIGHT: 250.4 LBS

## 2022-03-31 DIAGNOSIS — I65.21 CAROTID ARTERY STENOSIS, SYMPTOMATIC, RIGHT: ICD-10-CM

## 2022-03-31 DIAGNOSIS — I10 ESSENTIAL HYPERTENSION: ICD-10-CM

## 2022-03-31 DIAGNOSIS — F17.211 CIGARETTE NICOTINE DEPENDENCE IN REMISSION: ICD-10-CM

## 2022-03-31 DIAGNOSIS — Z95.0 PRESENCE OF CARDIAC PACEMAKER: Primary | ICD-10-CM

## 2022-03-31 DIAGNOSIS — G47.33 OSA (OBSTRUCTIVE SLEEP APNEA): ICD-10-CM

## 2022-03-31 DIAGNOSIS — E78.5 DYSLIPIDEMIA: ICD-10-CM

## 2022-03-31 DIAGNOSIS — R06.02 SHORTNESS OF BREATH: ICD-10-CM

## 2022-03-31 PROCEDURE — 99214 OFFICE O/P EST MOD 30 MIN: CPT | Performed by: SPECIALIST

## 2022-03-31 RX ORDER — ROSUVASTATIN CALCIUM 20 MG/1
20 TABLET, COATED ORAL DAILY
Qty: 90 TABLET | Refills: 1 | Status: SHIPPED | OUTPATIENT
Start: 2022-03-31 | End: 2022-06-14 | Stop reason: SDUPTHER

## 2022-03-31 RX ORDER — OMEPRAZOLE 20 MG/1
20 CAPSULE, DELAYED RELEASE ORAL DAILY
Qty: 90 CAPSULE | Refills: 1 | Status: SHIPPED | OUTPATIENT
Start: 2022-03-31

## 2022-03-31 RX ORDER — ASPIRIN 81 MG/1
81 TABLET ORAL DAILY
Qty: 90 TABLET | Refills: 1 | Status: SHIPPED | OUTPATIENT
Start: 2022-03-31

## 2022-03-31 RX ORDER — LISINOPRIL 40 MG/1
20 TABLET ORAL DAILY
Qty: 90 TABLET | Refills: 1 | Status: SHIPPED | OUTPATIENT
Start: 2022-03-31 | End: 2022-06-14

## 2022-03-31 RX ORDER — CLOPIDOGREL BISULFATE 75 MG/1
75 TABLET ORAL DAILY
Qty: 30 TABLET | Refills: 11 | Status: SHIPPED | OUTPATIENT
Start: 2022-03-31 | End: 2022-06-14 | Stop reason: SDUPTHER

## 2022-03-31 RX ORDER — AMLODIPINE BESYLATE 10 MG/1
10 TABLET ORAL NIGHTLY
Qty: 90 TABLET | Refills: 1 | Status: SHIPPED | OUTPATIENT
Start: 2022-03-31 | End: 2022-06-14 | Stop reason: SDUPTHER

## 2022-03-31 NOTE — PROGRESS NOTES
Subjective   Follow up, hypertension, carotid stenosis  Donaldo Roberts is a 71 y.o. male who presents to day for Follow-up (3 mo), Palpitations (AV block s/p PPI), Shortness of Breath (Routine activity), and Med Management (verbal).    CHIEF COMPLIANT  Chief Complaint   Patient presents with   • Follow-up     3 mo   • Palpitations     AV block s/p PPI   • Shortness of Breath     Routine activity   • Med Management     verbal       Active Problems:  Problem List Items Addressed This Visit        Cardiac and Vasculature    Carotid artery stenosis, symptomatic, right    Relevant Medications    clopidogrel (PLAVIX) 75 MG tablet    aspirin 81 MG EC tablet    Other Relevant Orders    CT Angiogram Carotids    Essential hypertension    Relevant Medications    lisinopril (PRINIVIL,ZESTRIL) 40 MG tablet    amLODIPine (NORVASC) 10 MG tablet    omeprazole (priLOSEC) 20 MG capsule    Dyslipidemia    Relevant Medications    rosuvastatin (CRESTOR) 20 MG tablet    Other Relevant Orders    Comprehensive Metabolic Panel    Lipid Panel    Presence of cardiac pacemaker - Primary       Pulmonary and Pneumonias    Shortness of breath       Sleep    PATTY (obstructive sleep apnea)    Relevant Orders    Ambulatory Referral to Pulmonology       Tobacco    Cigarette nicotine dependence in remission          HPI  HPI  Is denying any new symptoms has mild shortness of breath on exertion rare intermittent pedal edema no chest pain no palpitations no dizziness still feels fatigue and tired  PRIOR MEDS  Current Outpatient Medications on File Prior to Visit   Medication Sig Dispense Refill   • [DISCONTINUED] amLODIPine (NORVASC) 10 MG tablet Take 10 mg by mouth Every Night.     • [DISCONTINUED] aspirin 81 MG EC tablet Take 81 mg by mouth Daily.     • [DISCONTINUED] lisinopril (PRINIVIL,ZESTRIL) 40 MG tablet Take 1 tablet by mouth Daily. (Patient taking differently: Take 20 mg by mouth Daily.) 90 tablet 1   • [DISCONTINUED] omeprazole (priLOSEC) 20  "MG capsule Take 20 mg by mouth Daily.     • [DISCONTINUED] clopidogrel (PLAVIX) 75 MG tablet Take 1 tablet by mouth Daily. 30 tablet 11     No current facility-administered medications on file prior to visit.       ALLERGIES  Penicillins    HISTORY  Past Medical History:   Diagnosis Date   • Acid reflux    • Anxiety    • Arthritis    • Carotid artery stenosis, symptomatic, right 11/23/2021   • Essential hypertension 11/23/2021   • Herpes    • History of lipoma    • History of transfusion    • Inguinal hernia    • Peptic ulcer        Social History     Socioeconomic History   • Marital status:    Tobacco Use   • Smoking status: Current Some Day Smoker     Packs/day: 0.50     Types: Cigarettes   • Smokeless tobacco: Never Used   • Tobacco comment: 2 cigs per day   Substance and Sexual Activity   • Alcohol use: No   • Drug use: No   • Sexual activity: Defer       Family History   Problem Relation Age of Onset   • No Known Problems Father    • Heart disease Mother        Review of Systems   Respiratory: Positive for shortness of breath.    Cardiovascular: Positive for leg swelling. Negative for chest pain and palpitations.       Objective     VITALS: /84   Pulse 89   Temp 96.8 °F (36 °C)   Resp 16   Ht 175.3 cm (69\")   Wt 114 kg (250 lb 6.4 oz)   BMI 36.98 kg/m²     LABS:   Lab Results (most recent)     None          IMAGING:   XR Chest 1 View    Result Date: 12/10/2021  1.  Interval left cardiac pacer device placement. No pneumothorax. 2.  Otherwise stable chest.  This report was finalized on 12/10/2021 2:01 PM by Dr. Jasvir Sheridan MD.      XR Chest 1 View    Result Date: 12/8/2021  No acute cardiopulmonary findings. Chronic changes appear stable. Signer Name: Elo Wells MD  Signed: 12/8/2021 8:07 PM  Workstation Name: [id number]  [facility] Venous Doppler Lower Extremity Bilateral (duplex)    Result Date: 12/11/2021  No DVT. Soft tissue edema.  This report was " finalized on 12/11/2021 12:30 PM by Dr. Jasvir Sheridan MD.        EXAM:  Physical Exam  Constitutional:       Appearance: He is well-developed.   HENT:      Head: Normocephalic and atraumatic.   Eyes:      Pupils: Pupils are equal, round, and reactive to light.   Neck:      Thyroid: No thyromegaly.      Vascular: No JVD.   Cardiovascular:      Rate and Rhythm: Normal rate and regular rhythm.      Chest Wall: PMI is not displaced.      Pulses: Normal pulses.      Heart sounds: Normal heart sounds, S1 normal and S2 normal. No murmur heard.    No friction rub. No gallop.      Comments: Non tender pacer pocket  Pulmonary:      Effort: Pulmonary effort is normal. No respiratory distress.      Breath sounds: Normal breath sounds. No stridor. No wheezing or rales.   Chest:      Chest wall: No tenderness.   Abdominal:      General: Bowel sounds are normal. There is no distension.      Palpations: Abdomen is soft. There is no mass.      Tenderness: There is no abdominal tenderness. There is no guarding or rebound.   Musculoskeletal:      Cervical back: Neck supple. No edema.   Skin:     General: Skin is warm and dry.      Coloration: Skin is not pale.      Findings: No erythema or rash.   Neurological:      Mental Status: He is alert and oriented to person, place, and time.      Cranial Nerves: No cranial nerve deficit.      Coordination: Coordination normal.   Psychiatric:         Behavior: Behavior normal.         Procedure   Procedures       Assessment/Plan     Diagnoses and all orders for this visit:    1. Presence of cardiac pacemaker (Primary)    2. Essential hypertension  -     lisinopril (PRINIVIL,ZESTRIL) 40 MG tablet; Take 0.5 tablets by mouth Daily.  Dispense: 90 tablet; Refill: 1  -     amLODIPine (NORVASC) 10 MG tablet; Take 1 tablet by mouth Every Night.  Dispense: 90 tablet; Refill: 1  -     omeprazole (priLOSEC) 20 MG capsule; Take 1 capsule by mouth Daily.  Dispense: 90 capsule; Refill: 1    3.  Dyslipidemia  -     rosuvastatin (CRESTOR) 20 MG tablet; Take 1 tablet by mouth Daily.  Dispense: 90 tablet; Refill: 1  -     Comprehensive Metabolic Panel; Future  -     Lipid Panel; Future    4. Carotid artery stenosis, symptomatic, right  -     CT Angiogram Carotids; Future  -     clopidogrel (PLAVIX) 75 MG tablet; Take 1 tablet by mouth Daily.  Dispense: 30 tablet; Refill: 11  -     aspirin 81 MG EC tablet; Take 1 tablet by mouth Daily.  Dispense: 90 tablet; Refill: 1    5. Shortness of breath    6. PATTY (obstructive sleep apnea)  -     Ambulatory Referral to Pulmonology    7. Cigarette nicotine dependence in remission    1.  Pacemaker interrogated with normal function  2.  Unfortunately he did not have the CT scan and he was kind of not willing to go but now he is agreeable to go for the CT scan of the carotid for assessment so we will reschedule  3. fib blood pressure acceptable continue current management  4.  He did not see the pulmonology for CPAP trial his AHI is 10 I had a long discussion with him about the risks of untreated sleep apnea now he is agreeable to go for the trial  5.  No recent lipid profile lipid profile from November showed significant elevated LDL of 161 triglycerides of 213 unfortunately has not been taking his medications regularly I had a long discussion again with him about how important it is to take the medicine  6.  He still not smoking        Return in about 6 weeks (around 5/12/2022).               MEDS ORDERED DURING VISIT:  New Medications Ordered This Visit   Medications   • rosuvastatin (CRESTOR) 20 MG tablet     Sig: Take 1 tablet by mouth Daily.     Dispense:  90 tablet     Refill:  1   • lisinopril (PRINIVIL,ZESTRIL) 40 MG tablet     Sig: Take 0.5 tablets by mouth Daily.     Dispense:  90 tablet     Refill:  1   • clopidogrel (PLAVIX) 75 MG tablet     Sig: Take 1 tablet by mouth Daily.     Dispense:  30 tablet     Refill:  11   • amLODIPine (NORVASC) 10 MG tablet     Sig:  Take 1 tablet by mouth Every Night.     Dispense:  90 tablet     Refill:  1   • aspirin 81 MG EC tablet     Sig: Take 1 tablet by mouth Daily.     Dispense:  90 tablet     Refill:  1   • omeprazole (priLOSEC) 20 MG capsule     Sig: Take 1 capsule by mouth Daily.     Dispense:  90 capsule     Refill:  1       As always, Roderick Patricia MD  I appreciate very much the opportunity to participate in the cardiovascular care of your patients. Please do not hesitate to call me with any questions with regards to Donaldo Roberts evaluation and management.         This document has been electronically signed by Sonali Melgar MD  March 31, 2022 10:55 EDT

## 2022-04-05 ENCOUNTER — LAB (OUTPATIENT)
Dept: LAB | Facility: HOSPITAL | Age: 72
End: 2022-04-05

## 2022-04-05 DIAGNOSIS — E78.5 DYSLIPIDEMIA: ICD-10-CM

## 2022-04-05 DIAGNOSIS — E78.2 MIXED HYPERLIPIDEMIA: ICD-10-CM

## 2022-04-05 LAB
ALBUMIN SERPL-MCNC: 5.1 G/DL (ref 3.5–5.2)
ALBUMIN/GLOB SERPL: 1.8 G/DL
ALP SERPL-CCNC: 97 U/L (ref 39–117)
ALT SERPL W P-5'-P-CCNC: 25 U/L (ref 1–41)
ANION GAP SERPL CALCULATED.3IONS-SCNC: 14.5 MMOL/L (ref 5–15)
AST SERPL-CCNC: 21 U/L (ref 1–40)
BILIRUB SERPL-MCNC: 0.4 MG/DL (ref 0–1.2)
BUN SERPL-MCNC: 14 MG/DL (ref 8–23)
BUN/CREAT SERPL: 20 (ref 7–25)
CALCIUM SPEC-SCNC: 10.3 MG/DL (ref 8.6–10.5)
CHLORIDE SERPL-SCNC: 98 MMOL/L (ref 98–107)
CHOLEST SERPL-MCNC: 224 MG/DL (ref 0–200)
CO2 SERPL-SCNC: 25.5 MMOL/L (ref 22–29)
CREAT SERPL-MCNC: 0.7 MG/DL (ref 0.76–1.27)
EGFRCR SERPLBLD CKD-EPI 2021: 99.7 ML/MIN/1.73
GLOBULIN UR ELPH-MCNC: 2.9 GM/DL
GLUCOSE SERPL-MCNC: 107 MG/DL (ref 65–99)
HDLC SERPL-MCNC: 39 MG/DL (ref 40–60)
LDLC SERPL CALC-MCNC: 148 MG/DL (ref 0–100)
LDLC/HDLC SERPL: 3.7 {RATIO}
POTASSIUM SERPL-SCNC: 4.3 MMOL/L (ref 3.5–5.2)
PROT SERPL-MCNC: 8 G/DL (ref 6–8.5)
SODIUM SERPL-SCNC: 138 MMOL/L (ref 136–145)
TRIGL SERPL-MCNC: 204 MG/DL (ref 0–150)
VLDLC SERPL-MCNC: 37 MG/DL (ref 5–40)

## 2022-04-05 PROCEDURE — 80061 LIPID PANEL: CPT

## 2022-04-05 PROCEDURE — 36415 COLL VENOUS BLD VENIPUNCTURE: CPT

## 2022-04-05 PROCEDURE — 80053 COMPREHEN METABOLIC PANEL: CPT

## 2022-04-19 ENCOUNTER — OFFICE VISIT (OUTPATIENT)
Dept: PULMONOLOGY | Facility: CLINIC | Age: 72
End: 2022-04-19

## 2022-04-19 VITALS
BODY MASS INDEX: 35.55 KG/M2 | SYSTOLIC BLOOD PRESSURE: 182 MMHG | TEMPERATURE: 98 F | OXYGEN SATURATION: 91 % | WEIGHT: 240 LBS | HEIGHT: 69 IN | DIASTOLIC BLOOD PRESSURE: 72 MMHG | HEART RATE: 98 BPM

## 2022-04-19 DIAGNOSIS — E66.9 OBESITY (BMI 30-39.9): ICD-10-CM

## 2022-04-19 DIAGNOSIS — G47.33 OSA (OBSTRUCTIVE SLEEP APNEA): Primary | ICD-10-CM

## 2022-04-19 PROCEDURE — 99214 OFFICE O/P EST MOD 30 MIN: CPT | Performed by: NURSE PRACTITIONER

## 2022-04-19 RX ORDER — ALBUTEROL SULFATE 90 UG/1
2 AEROSOL, METERED RESPIRATORY (INHALATION) EVERY 4 HOURS PRN
Qty: 6.7 G | Refills: 5 | Status: SHIPPED | OUTPATIENT
Start: 2022-04-19 | End: 2022-12-29 | Stop reason: SDUPTHER

## 2022-04-19 NOTE — PROGRESS NOTES
"Chief Complaint  Sleep Apnea    Subjective          Donaldo Roberts presents to [facility] PULMONARY & CRITICAL CARE MEDICINE  History of Present Illness     Mr. Roberts is a 71 year old male with a medical history significant for hypertension, GERD, and anxiety.    He presents today for evaluation of sleep apnea. He underwent a sleep study which showed mild sleep apnea with significant oxygen desaturations. He is a current smoker of about 1 pack per week.  He denies any major shortness of breath.  He does have some shortness of breath intermittently with exertion.    Objective   Vital Signs:   BP (!) 182/72   Pulse 98   Temp 98 °F (36.7 °C) (Temporal)   Ht 175.3 cm (69\")   Wt 109 kg (240 lb)   SpO2 91%   BMI 35.44 kg/m²     BMI is above normal parameters. Recommendations: exercise counseling/recommendations and nutrition counseling/recommendations       Physical Exam     GENERAL APPEARANCE: Well developed, well nourished, alert and cooperative, and appears to be in no acute distress.    HEAD: normocephalic. Atraumatic.    EYES: PERRL, EOMI. Vision is grossly intact.    THROAT: Oral cavity and pharynx normal. No inflammation, swelling, exudate, or lesions.     NECK: Neck supple.  No thyromegaly.    CARDIAC: Normal S1 and S2. No S3, S4 or murmurs. Rhythm is regular.     RESPIRATORY:Bilateral air entry positive. Bilateral diminished breath sounds. No wheezing, crackles or rhonchi noted.    GI: Positive bowel sounds. Soft, nondistended, nontender.     MUSCULOSKELETAL: No significant deformity or joint abnormality. No edema. Peripheral pulses intact. No varicosities.    NEUROLOGICAL: Strength and sensation symmetric and intact throughout.     PSYCHIATRIC: The mental examination revealed the patient was oriented to person, place, and time.     Result Review :   The following data was reviewed by: LARY Short on 04/19/2022:  Common labs    Common Labsle 12/10/21 12/10/21 12/11/21 " 12/11/21 4/5/22 4/5/22    0326 [id number] 0549 0549 [id number]   Glucose  109 (A) 175 (A)   107 (A)   BUN  13 14   14   Creatinine  0.73 (A) 0.82   0.70 (A)   eGFR Non African Am  107 93      Sodium  137 135 (A)   138   Potassium  4.4 3.7   4.3   Chloride  99 101   98   Calcium  10.0 9.8   10.3   Albumin      5.10   Total Bilirubin      0.4   Alkaline Phosphatase      97   AST (SGOT)      21   ALT (SGPT)      25   WBC 6.90   7.76     Hemoglobin 15.5   15.1     Hematocrit 48.9   46.9     Platelets 257   228     Total Cholesterol     224 (A)    Triglycerides     204 (A)    HDL Cholesterol     39 (A)    LDL Cholesterol      148 (A)    (A) Abnormal value       Comments are available for some flowsheets but are not being displayed.           Data reviewed: Sleep study          Assessment and Plan    Diagnoses and all orders for this visit:    1. PATTY (obstructive sleep apnea) (Primary)  -     Detailed AutoPAP Order  -     Oxygen Therapy    2. Obesity (BMI 30-39.9)    Other orders  -     albuterol sulfate  (90 Base) MCG/ACT inhaler; Inhale 2 puffs Every 4 (Four) Hours As Needed for Wheezing.  Dispense: 6.7 g; Refill: 5        PATTY:  -Sleep study was reviewed.  -ordered autopap with oxygen.  -Patient's Body mass index is 35.44 kg/m². indicating that he is obese (BMI >30). Obesity-related health conditions include the following: hypertension and GERD. Obesity is unchanged. BMI is is above average; BMI management plan is completed. We discussed portion control and increasing exercise..  - Patient was educated on positive airway pressure treatment.  As per CMS guidelines, more than 4 hours on 70% of observed nights is considered adherence. Patient was strongly encouraged to use CPAP as much as possible during sleep as more CPAP use is equal to more benefit. Use of heated humidification in positive airway pressure treatment to improve the adherence to the device.  In case of claustrophobia, we will provide the patient cognitive  behavioral therapy and desensitization. Oral appliances use will be discussed with the patient in case of mild to moderate sleep apnea or if the patient with severe disease fail positive airway pressure treatment.       The patient was extensively educated on the consequences of untreated obstructive sleep apnea namely cardiovascular/metabolic disorder, neurocognitive deficit, daytime sleepiness, motor vehicle accidents, depression, mood disorders and reduced quality of life.  At the end of conversation, the patient voices understanding of the disease process and treatment modality.  Patient also understands the risk of untreated obstructive sleep apnea and benefit benefits of the treatment.    Counseling time was greater than 10 minutes.    Current Smoker:    Currently smoking about 1 pack per week.     Will send in prescription for albuterol inhaler to use as needed.    He is not interested in PFT at this time.       Follow Up   Return in about 3 months (around 7/19/2022).  Patient was given instructions and counseling regarding his condition or for health maintenance advice. Please see specific information pulled into the AVS if appropriate.

## 2022-04-22 ENCOUNTER — PATIENT ROUNDING (BHMG ONLY) (OUTPATIENT)
Dept: PULMONOLOGY | Facility: CLINIC | Age: 72
End: 2022-04-22

## 2022-04-22 NOTE — PROGRESS NOTES
April 22, 2022    Hello, may I speak with Donaldo Roberts?    My name is Janene Yusuf      I am  with [facility] PULMONARY & CRITICAL CARE MEDICINE  [address]  [place] [address] [phone].    Before we get started may I verify your date of birth? 1950    I am calling to officially welcome you to our practice and ask about your recent visit. Is this a good time to talk? yes    Tell me about your visit with us. What things went well?  Everything was perfect, I had the best visit.       We're always looking for ways to make our patients' experiences even better. Do you have recommendations on ways we may improve?  no    Overall were you satisfied with your first visit to our practice? yes       I appreciate you taking the time to speak with me today. Is there anything else I can do for you? no      Thank you, and have a great day.

## 2022-05-03 ENCOUNTER — APPOINTMENT (OUTPATIENT)
Dept: CT IMAGING | Facility: HOSPITAL | Age: 72
End: 2022-05-03

## 2022-06-14 ENCOUNTER — OFFICE VISIT (OUTPATIENT)
Dept: CARDIOLOGY | Facility: CLINIC | Age: 72
End: 2022-06-14

## 2022-06-14 VITALS
BODY MASS INDEX: 37.03 KG/M2 | OXYGEN SATURATION: 96 % | HEART RATE: 71 BPM | HEIGHT: 69 IN | WEIGHT: 250 LBS | SYSTOLIC BLOOD PRESSURE: 150 MMHG | TEMPERATURE: 97.1 F | DIASTOLIC BLOOD PRESSURE: 84 MMHG

## 2022-06-14 DIAGNOSIS — I10 ESSENTIAL HYPERTENSION: Primary | ICD-10-CM

## 2022-06-14 DIAGNOSIS — E78.5 DYSLIPIDEMIA: ICD-10-CM

## 2022-06-14 DIAGNOSIS — R06.02 SHORTNESS OF BREATH: ICD-10-CM

## 2022-06-14 DIAGNOSIS — I65.21 CAROTID ARTERY STENOSIS, SYMPTOMATIC, RIGHT: ICD-10-CM

## 2022-06-14 DIAGNOSIS — F17.211 CIGARETTE NICOTINE DEPENDENCE IN REMISSION: ICD-10-CM

## 2022-06-14 DIAGNOSIS — I44.1 WENCKEBACH BLOCK: ICD-10-CM

## 2022-06-14 DIAGNOSIS — G47.33 OSA (OBSTRUCTIVE SLEEP APNEA): ICD-10-CM

## 2022-06-14 DIAGNOSIS — Z95.0 PRESENCE OF CARDIAC PACEMAKER: ICD-10-CM

## 2022-06-14 PROCEDURE — 93000 ELECTROCARDIOGRAM COMPLETE: CPT | Performed by: SPECIALIST

## 2022-06-14 PROCEDURE — 99214 OFFICE O/P EST MOD 30 MIN: CPT | Performed by: SPECIALIST

## 2022-06-14 RX ORDER — ROSUVASTATIN CALCIUM 20 MG/1
20 TABLET, COATED ORAL DAILY
Qty: 90 TABLET | Refills: 1 | Status: SHIPPED | OUTPATIENT
Start: 2022-06-14 | End: 2022-08-31

## 2022-06-14 RX ORDER — CLOPIDOGREL BISULFATE 75 MG/1
75 TABLET ORAL DAILY
Qty: 30 TABLET | Refills: 11 | Status: SHIPPED | OUTPATIENT
Start: 2022-06-14 | End: 2022-10-04 | Stop reason: SDUPTHER

## 2022-06-14 RX ORDER — AMLODIPINE BESYLATE 10 MG/1
10 TABLET ORAL NIGHTLY
Qty: 90 TABLET | Refills: 1 | Status: SHIPPED | OUTPATIENT
Start: 2022-06-14 | End: 2022-10-04 | Stop reason: SDUPTHER

## 2022-06-14 RX ORDER — LOSARTAN POTASSIUM 50 MG/1
50 TABLET ORAL DAILY
Qty: 90 TABLET | Refills: 1 | Status: SHIPPED | OUTPATIENT
Start: 2022-06-14 | End: 2022-08-31

## 2022-06-14 NOTE — PROGRESS NOTES
Subjective   Follow up, hypertension, pacemaker, carotid stenosis  Donaldo Roberts is a 71 y.o. male who presents to day for Med Management (list) and Follow-up.    CHIEF COMPLIANT  Chief Complaint   Patient presents with   • Med Management     list   • Follow-up       Active Problems:  Problem List Items Addressed This Visit        Cardiac and Vasculature    Carotid artery stenosis, symptomatic, right    Relevant Medications    clopidogrel (PLAVIX) 75 MG tablet    Other Relevant Orders    Ambulatory Referral to Cardiothoracic Surgery    CT Angiogram Carotids    Essential hypertension - Primary    Relevant Medications    amLODIPine (NORVASC) 10 MG tablet    losartan (Cozaar) 50 MG tablet    Dyslipidemia    Relevant Medications    rosuvastatin (CRESTOR) 20 MG tablet    Other Relevant Orders    Lipid Panel    Comprehensive Metabolic Panel    Wenckebach block    Relevant Orders    Cardiac Event Monitor    Presence of cardiac pacemaker    Relevant Orders    ECG 12 Lead    Cardiac Event Monitor       Pulmonary and Pneumonias    Shortness of breath       Sleep    PATTY (obstructive sleep apnea)       Tobacco    Cigarette nicotine dependence in remission          HPI  HPI  He has been feeling weak and tired he thought this could be related to his medication he quit taking the lisinopril and also the clopidogrel no significant chest pain no dizziness no edema no palpitations but he notices that his heart rate according to his pulse ox goes to about 30 bpm  PRIOR MEDS  Current Outpatient Medications on File Prior to Visit   Medication Sig Dispense Refill   • albuterol sulfate  (90 Base) MCG/ACT inhaler Inhale 2 puffs Every 4 (Four) Hours As Needed for Wheezing. 6.7 g 5   • aspirin 81 MG EC tablet Take 1 tablet by mouth Daily. 90 tablet 1   • omeprazole (priLOSEC) 20 MG capsule Take 1 capsule by mouth Daily. 90 capsule 1   • [DISCONTINUED] amLODIPine (NORVASC) 10 MG tablet Take 1 tablet by mouth Every Night. 90 tablet 1  "  • [DISCONTINUED] clopidogrel (PLAVIX) 75 MG tablet Take 1 tablet by mouth Daily. 30 tablet 11   • [DISCONTINUED] lisinopril (PRINIVIL,ZESTRIL) 40 MG tablet Take 0.5 tablets by mouth Daily. 90 tablet 1   • [DISCONTINUED] rosuvastatin (CRESTOR) 20 MG tablet Take 1 tablet by mouth Daily. 90 tablet 1     No current facility-administered medications on file prior to visit.       ALLERGIES  Penicillins    HISTORY  Past Medical History:   Diagnosis Date   • Acid reflux    • Anxiety    • Arthritis    • Carotid artery stenosis, symptomatic, right 11/23/2021   • Essential hypertension 11/23/2021   • Herpes    • History of lipoma    • History of transfusion    • Inguinal hernia    • Peptic ulcer        Social History     Socioeconomic History   • Marital status:    Tobacco Use   • Smoking status: Current Some Day Smoker     Packs/day: 0.25     Types: Cigarettes   • Smokeless tobacco: Never Used   • Tobacco comment: 2 cigs per day/ A pack per week.   Vaping Use   • Vaping Use: Never used   Substance and Sexual Activity   • Alcohol use: No   • Drug use: No   • Sexual activity: Defer       Family History   Problem Relation Age of Onset   • No Known Problems Father    • Heart disease Mother        Review of Systems   Respiratory: Negative for apnea, cough, choking, chest tightness, shortness of breath, wheezing and stridor.    Cardiovascular: Negative for chest pain, palpitations and leg swelling.       Objective     VITALS: /84 (BP Location: Right arm, Patient Position: Sitting, Cuff Size: Adult)   Pulse 71   Temp 97.1 °F (36.2 °C) (Temporal)   Ht 175.3 cm (69\")   Wt 113 kg (250 lb)   SpO2 96%   BMI 36.92 kg/m²     LABS:   Lab Results (most recent)     None          IMAGING:   No Images in the past 120 days found..    EXAM:  Physical Exam  Constitutional:       Appearance: He is well-developed.   HENT:      Head: Normocephalic and atraumatic.   Eyes:      Pupils: Pupils are equal, round, and reactive to " light.   Neck:      Thyroid: No thyromegaly.      Vascular: No JVD.   Cardiovascular:      Rate and Rhythm: Normal rate and regular rhythm.      Chest Wall: PMI is not displaced.      Pulses: Normal pulses.      Heart sounds: Normal heart sounds, S1 normal and S2 normal. No murmur heard.    No friction rub. No gallop.   Pulmonary:      Effort: Pulmonary effort is normal. No respiratory distress.      Breath sounds: Normal breath sounds. No stridor. No wheezing or rales.   Chest:      Chest wall: No tenderness.   Abdominal:      General: Bowel sounds are normal. There is no distension.      Palpations: Abdomen is soft. There is no mass.      Tenderness: There is no abdominal tenderness. There is no guarding or rebound.   Musculoskeletal:      Cervical back: Neck supple. No edema.   Skin:     General: Skin is warm and dry.      Coloration: Skin is not pale.      Findings: No erythema or rash.   Neurological:      Mental Status: He is alert and oriented to person, place, and time.      Cranial Nerves: No cranial nerve deficit.      Coordination: Coordination normal.   Psychiatric:         Behavior: Behavior normal.         Procedure     ECG 12 Lead    Date/Time: 6/14/2022 10:28 AM  Performed by: Sonali Melgar MD  Authorized by: Sonali Melgar MD           EKG: Normal sinus rhythm with paced ventricular sponsor and with the magnet with sequential AV pacing with bundle branch block pattern otherwise unremarkable EKG, compared with EKG on 12,10, 2021 no significant change       Assessment & Plan     Diagnoses and all orders for this visit:    1. Essential hypertension (Primary)  -     amLODIPine (NORVASC) 10 MG tablet; Take 1 tablet by mouth Every Night.  Dispense: 90 tablet; Refill: 1  -     losartan (Cozaar) 50 MG tablet; Take 1 tablet by mouth Daily.  Dispense: 90 tablet; Refill: 1    2. Carotid artery stenosis, symptomatic, right  -     clopidogrel (PLAVIX) 75 MG tablet; Take 1 tablet by mouth Daily.  Dispense: 30  tablet; Refill: 11  -     Ambulatory Referral to Cardiothoracic Surgery  -     CT Angiogram Carotids; Future    3. Dyslipidemia  -     rosuvastatin (CRESTOR) 20 MG tablet; Take 1 tablet by mouth Daily.  Dispense: 90 tablet; Refill: 1  -     Lipid Panel; Future  -     Comprehensive Metabolic Panel; Future    4. Presence of cardiac pacemaker  -     ECG 12 Lead  -     Cardiac Event Monitor; Future    5. Wenckebach block  -     Cardiac Event Monitor; Future    6. Shortness of breath    7. PATTY (obstructive sleep apnea)    8. Cigarette nicotine dependence in remission    1.  His blood pressure is elevated he quit taking the lisinopril I am going to start him on losartan to try to get better blood pressure control  2.  He still unable to get the CT scan of the carotid I am going to refer him to Dr. Becerra and we will try again to reschedule the CT scan  3.  His labs from 2 months ago showed elevated LDL of 148 HDL of 39 and triglycerides of 204 he is going to start taking the rosuvastatin in the morning he feels a little bit weak when he takes it and will monitor that  4.  He said his heart rate goes down by the pulse ox to 30 bpm his pacemaker was transferred for telephonically interrogated on March with good function I am going to get a monitor assessment as I concerned if this could be arrhythmia  5.  He started using the CPAP only intermittently he is having some difficulty with the pressure adjustment advised to use it every night  6.  He is advised to use clopidogrel every day    Return in about 4 weeks (around 7/12/2022).             MEDS ORDERED DURING VISIT:  New Medications Ordered This Visit   Medications   • amLODIPine (NORVASC) 10 MG tablet     Sig: Take 1 tablet by mouth Every Night.     Dispense:  90 tablet     Refill:  1   • clopidogrel (PLAVIX) 75 MG tablet     Sig: Take 1 tablet by mouth Daily.     Dispense:  30 tablet     Refill:  11   • rosuvastatin (CRESTOR) 20 MG tablet     Sig: Take 1 tablet by mouth  Daily.     Dispense:  90 tablet     Refill:  1   • losartan (Cozaar) 50 MG tablet     Sig: Take 1 tablet by mouth Daily.     Dispense:  90 tablet     Refill:  1       As always, Roderick Patricia MD  I appreciate very much the opportunity to participate in the cardiovascular care of your patients. Please do not hesitate to call me with any questions with regards to Donaldo Roberts evaluation and management.         This document has been electronically signed by Sonali Melgar MD  June 14, 2022 11:21 EDT

## 2022-06-17 ENCOUNTER — LAB (OUTPATIENT)
Dept: LAB | Facility: HOSPITAL | Age: 72
End: 2022-06-17

## 2022-06-17 ENCOUNTER — TELEPHONE (OUTPATIENT)
Dept: CARDIOLOGY | Facility: CLINIC | Age: 72
End: 2022-06-17

## 2022-06-17 DIAGNOSIS — E78.5 DYSLIPIDEMIA: ICD-10-CM

## 2022-06-17 DIAGNOSIS — Z95.0 PRESENCE OF CARDIAC PACEMAKER: ICD-10-CM

## 2022-06-17 DIAGNOSIS — I44.1 WENCKEBACH BLOCK: ICD-10-CM

## 2022-06-17 LAB
ALBUMIN SERPL-MCNC: 4.6 G/DL (ref 3.5–5.2)
ALBUMIN/GLOB SERPL: 1.8 G/DL
ALP SERPL-CCNC: 95 U/L (ref 39–117)
ALT SERPL W P-5'-P-CCNC: 29 U/L (ref 1–41)
ANION GAP SERPL CALCULATED.3IONS-SCNC: 15.4 MMOL/L (ref 5–15)
AST SERPL-CCNC: 22 U/L (ref 1–40)
BILIRUB SERPL-MCNC: 0.3 MG/DL (ref 0–1.2)
BUN SERPL-MCNC: 13 MG/DL (ref 8–23)
BUN/CREAT SERPL: 19.1 (ref 7–25)
CALCIUM SPEC-SCNC: 9.7 MG/DL (ref 8.6–10.5)
CHLORIDE SERPL-SCNC: 100 MMOL/L (ref 98–107)
CHOLEST SERPL-MCNC: 186 MG/DL (ref 0–200)
CO2 SERPL-SCNC: 20.6 MMOL/L (ref 22–29)
CREAT SERPL-MCNC: 0.68 MG/DL (ref 0.76–1.27)
EGFRCR SERPLBLD CKD-EPI 2021: 100.6 ML/MIN/1.73
GLOBULIN UR ELPH-MCNC: 2.5 GM/DL
GLUCOSE SERPL-MCNC: 99 MG/DL (ref 65–99)
HDLC SERPL-MCNC: 41 MG/DL (ref 40–60)
LDLC SERPL CALC-MCNC: 116 MG/DL (ref 0–100)
LDLC/HDLC SERPL: 2.75 {RATIO}
POTASSIUM SERPL-SCNC: 4.1 MMOL/L (ref 3.5–5.2)
PROT SERPL-MCNC: 7.1 G/DL (ref 6–8.5)
SODIUM SERPL-SCNC: 136 MMOL/L (ref 136–145)
TRIGL SERPL-MCNC: 162 MG/DL (ref 0–150)
VLDLC SERPL-MCNC: 29 MG/DL (ref 5–40)

## 2022-06-17 PROCEDURE — 80053 COMPREHEN METABOLIC PANEL: CPT

## 2022-06-17 PROCEDURE — 80061 LIPID PANEL: CPT

## 2022-06-17 PROCEDURE — 36415 COLL VENOUS BLD VENIPUNCTURE: CPT

## 2022-06-22 ENCOUNTER — TELEPHONE (OUTPATIENT)
Dept: CARDIOLOGY | Facility: CLINIC | Age: 72
End: 2022-06-22

## 2022-06-22 NOTE — TELEPHONE ENCOUNTER
Spoke to Dr. Melgar in regards to abnormal event report received from [facility] showing Afib.  After he reviewed it he tsated he did not believe it was true a-fib and we will arrange a pacer interrogation..  Called and spoke with Inna-Patient's wife -per Hipp[employer] guidelines and she verbalized understanding..  They are aware they will be getting a call from Lala tomorrow to get this set up

## 2022-07-08 ENCOUNTER — TELEPHONE (OUTPATIENT)
Dept: UROLOGY | Facility: CLINIC | Age: 72
End: 2022-07-08

## 2022-07-08 NOTE — TELEPHONE ENCOUNTER
"  Caller: BILLIE MELISSA     Relationship: SELF     Best call back number: [phone]    What is the best time to reach you: ANYTIME     INCOMING CALL FROM PT. PT PREVIOUSLY SEEN BY DR. URRUTIA FOR DX: HERPES. PT DOES NOT LIKE TO GO INTO DETAIL ABOUT SITUATION. SCHEDULED PT FOR NEXT AVAIL, DOES NOT WANT TO SEE A WOMAN (RICARDO) PT SAYS HIS CONDITION IS \"TORMENTING\" AND IS WANTING TO KNOW IF HE CAN HAVE ANY PILLS/CREAM CALLED IN FOR HIM.  -  "

## 2022-07-08 NOTE — TELEPHONE ENCOUNTER
I called the pt and told him there was absolutely no way we could treat him for this at this time. He has not been seen since November 2020. I told him that he had a first available appointment and should keep it because he books out in advance.

## 2022-07-14 ENCOUNTER — CLINICAL SUPPORT (OUTPATIENT)
Dept: CARDIOLOGY | Facility: CLINIC | Age: 72
End: 2022-07-14

## 2022-07-14 ENCOUNTER — APPOINTMENT (OUTPATIENT)
Dept: CT IMAGING | Facility: HOSPITAL | Age: 72
End: 2022-07-14

## 2022-07-14 DIAGNOSIS — I49.5 SSS (SICK SINUS SYNDROME): Primary | ICD-10-CM

## 2022-07-14 PROCEDURE — 93280 PM DEVICE PROGR EVAL DUAL: CPT | Performed by: INTERNAL MEDICINE

## 2022-07-25 ENCOUNTER — TREATMENT (OUTPATIENT)
Dept: CARDIOLOGY | Facility: CLINIC | Age: 72
End: 2022-07-25

## 2022-07-25 DIAGNOSIS — Z95.0 PRESENCE OF CARDIAC PACEMAKER: Primary | ICD-10-CM

## 2022-07-25 DIAGNOSIS — R00.1 SEVERE SINUS BRADYCARDIA: ICD-10-CM

## 2022-07-25 PROCEDURE — 93228 REMOTE 30 DAY ECG REV/REPORT: CPT | Performed by: SPECIALIST

## 2022-07-28 PROBLEM — R00.1 SEVERE SINUS BRADYCARDIA: Status: ACTIVE | Noted: 2022-07-28

## 2022-07-29 ENCOUNTER — HOSPITAL ENCOUNTER (OUTPATIENT)
Dept: CT IMAGING | Facility: HOSPITAL | Age: 72
Discharge: HOME | End: 2022-07-29
Admitting: SPECIALIST

## 2022-07-29 DIAGNOSIS — I65.21 CAROTID ARTERY STENOSIS, SYMPTOMATIC, RIGHT: ICD-10-CM

## 2022-07-29 LAB — CREAT BLDA-MCNC: 0.6 MG/DL (ref 0.6–1.3)

## 2022-07-29 PROCEDURE — 82565 ASSAY OF CREATININE: CPT

## 2022-07-29 PROCEDURE — 70498 CT ANGIOGRAPHY NECK: CPT

## 2022-07-29 PROCEDURE — 70498 CT ANGIOGRAPHY NECK: CPT | Performed by: RADIOLOGY

## 2022-07-29 PROCEDURE — 25010000002 IOPAMIDOL 61 % SOLUTION: Performed by: SPECIALIST

## 2022-07-29 RX ADMIN — IOPAMIDOL 80 ML: 612 INJECTION, SOLUTION INTRAVENOUS at 09:09

## 2022-08-18 ENCOUNTER — TELEPHONE (OUTPATIENT)
Dept: CARDIOLOGY | Facility: CLINIC | Age: 72
End: 2022-08-18

## 2022-08-18 NOTE — TELEPHONE ENCOUNTER
Patient called and said appointment was cancelled due to not having monitor info all back. They want to know if the results are back now.     Thanks!

## 2022-08-31 ENCOUNTER — OFFICE VISIT (OUTPATIENT)
Dept: PULMONOLOGY | Facility: CLINIC | Age: 72
End: 2022-08-31

## 2022-08-31 VITALS
SYSTOLIC BLOOD PRESSURE: 128 MMHG | HEIGHT: 69 IN | BODY MASS INDEX: 35.55 KG/M2 | HEART RATE: 91 BPM | OXYGEN SATURATION: 94 % | WEIGHT: 240 LBS | TEMPERATURE: 97.7 F | DIASTOLIC BLOOD PRESSURE: 72 MMHG

## 2022-08-31 DIAGNOSIS — G47.33 OSA (OBSTRUCTIVE SLEEP APNEA): Primary | ICD-10-CM

## 2022-08-31 DIAGNOSIS — J42 CHRONIC BRONCHITIS, UNSPECIFIED CHRONIC BRONCHITIS TYPE: ICD-10-CM

## 2022-08-31 DIAGNOSIS — F17.210 CIGARETTE NICOTINE DEPENDENCE WITHOUT COMPLICATION: ICD-10-CM

## 2022-08-31 PROCEDURE — 99214 OFFICE O/P EST MOD 30 MIN: CPT | Performed by: PHYSICIAN ASSISTANT

## 2022-08-31 RX ORDER — LISINOPRIL 20 MG/1
20 TABLET ORAL DAILY
COMMUNITY
Start: 2022-08-16 | End: 2022-10-04 | Stop reason: SDUPTHER

## 2022-08-31 NOTE — PROGRESS NOTES
"Chief Complaint  Sleep Apnea (Sent CPAP back about a month ago, could not tolerate.)    Subjective        Donaldo Roberts presents to [facility] PULMONARY & CRITICAL CARE MEDICINE  History of Present Illness    Patient presents today for follow-up of obstructive sleep apnea, cigarette dependence.  He has been well in the past for increased sputum production, typically clear/white, but states that this has decreased over time.  He is also decreased his smoking use.  Previously smoked as much as 1 to 2 packs/day since age 17 and decreased around age 32.  Now only smoking a few cigarettes per day in the morning after drinking his coffee.  Estimates 3 cigarettes each morning.  He does note shortness of breath upon exertion, and shortness of breath with increased heat exposure.  He tries to maintain physical activity with an exercise bicycle, but cannot tolerate this long as it is located outside and has to be considered of the weather.  He ambulates today with a walker for assistance due to bilateral knee pain.  Otherwise, no acute exacerbation of symptoms today.  After recent diagnosis of mild sleep apnea and initiation of AutoPap, he was unable to adjust to use as the pressure seemed too strong.  This was even after multiple pressure setting adjustments per his wife.  He has now switched to using nasal cannula on 2 L/min and is tolerating this much better.      Objective   Vital Signs:  /72 (BP Location: Right arm, Patient Position: Sitting)   Pulse 91   Temp 97.7 °F (36.5 °C)   Ht 175.3 cm (69\")   Wt 109 kg (240 lb)   SpO2 94%   BMI 35.44 kg/m²   Estimated body mass index is 35.44 kg/m² as calculated from the following:    Height as of this encounter: 175.3 cm (69\").    Weight as of this encounter: 109 kg (240 lb).      Physical Exam  Vitals reviewed.   Constitutional:       General: He is not in acute distress.     Appearance: He is well-developed. He is not diaphoretic.   HENT:      " Head: Normocephalic and atraumatic.   Cardiovascular:      Rate and Rhythm: Normal rate and regular rhythm.      Heart sounds: Normal heart sounds, S1 normal and S2 normal.   Pulmonary:      Effort: Pulmonary effort is normal.      Breath sounds: No wheezing, rhonchi or rales.   Neurological:      Mental Status: He is alert and oriented to person, place, and time.   Psychiatric:         Behavior: Behavior normal.        Result Review :  The following data was reviewed by: Stephy Estrada PA-C on 08/31/2022:    Reviewed the sleep study from 2021 - mild sleep apnea.     Reviewed the chest xray from December 2021.        Assessment and Plan   Diagnoses and all orders for this visit:    1. PATTY (obstructive sleep apnea) (Primary)    2. Chronic bronchitis, unspecified chronic bronchitis type (HCC)    3. Cigarette nicotine dependence without complication        Obstructive sleep apnea:   Mild sleep apnea per previous testing  · Unable to tolerate AutoPap therapy despite pressure changes.  · Now using nasal cannula 2 L at nighttime      Cigarette dependence:   Previously smoked as much as 1 to 2 packs/day, now smoking only a few cigarettes per day and has noticed decreased phlegm production over time with discontinued use.  Smokes since age 17, decreased use in his 30s.  · Qualifies for low-dose CT screenings.  Discussed, but does not wish to complete at this time      Chronic bronchitis:  Previously noticed intermittent cough with increased phlegm production, which has decreased over time, likely related to decreased smoking.  Due to extensive smoking history, suspect underlying COPD.  Also reports shortness of breath on exertion  · Not interested in PFT testing  · Has albuterol inhaler to use as needed  · Started on trial of Trelegy Ellipta 100 mcg once daily  he did seem to notice some benefit while in office  reminded to rinse orally after use  prescription ordered.      Follow Up   Return in about 4 months (around  12/31/2022), or if symptoms worsen or fail to improve, for Next scheduled follow up.  Patient was given instructions and counseling regarding his condition or for health maintenance advice. Please see specific information pulled into the AVS if appropriate.

## 2022-09-14 ENCOUNTER — OFFICE VISIT (OUTPATIENT)
Dept: CARDIAC SURGERY | Facility: CLINIC | Age: 72
End: 2022-09-14

## 2022-09-14 VITALS
TEMPERATURE: 97.3 F | HEIGHT: 69 IN | OXYGEN SATURATION: 99 % | BODY MASS INDEX: 35.55 KG/M2 | SYSTOLIC BLOOD PRESSURE: 142 MMHG | WEIGHT: 240 LBS | HEART RATE: 87 BPM | DIASTOLIC BLOOD PRESSURE: 70 MMHG

## 2022-09-14 DIAGNOSIS — I65.21 CAROTID STENOSIS, ASYMPTOMATIC, RIGHT: Primary | ICD-10-CM

## 2022-09-14 PROCEDURE — 99203 OFFICE O/P NEW LOW 30 MIN: CPT | Performed by: THORACIC SURGERY (CARDIOTHORACIC VASCULAR SURGERY)

## 2022-09-14 NOTE — PROGRESS NOTES
09/14/2022  Patient Information  Donaldo Roberts                                                                                          [address] [place] [address]   1950  'PCP/Referring Physician'  Roderick Patricia MD  [phone]  Sonali Melgar MD  [phone]  Chief Complaint   Patient presents with   • Carotid Artery Disease     Referred by Dr. Sonali Melgar for carotid artery stenosis.       History of Present Illness: 72-year-old  male with a history of hypertension, hyperlipidemia, coronary artery disease, stroke, carotid artery disease and active tobacco abuse who presents with carotid artery stenosis.  The patient had a previous stroke with speaking difficulties that prompted left carotid endarterectomy at [facility].  Since his carotid surgery he denies any new neurological events.  The patient denies vision loss, speaking difficulties or focal weakness.  Mr. Roberts is trying to lose weight, but has limited activity given his bilateral knee pain.  He does note some short-term memory loss.      Patient Active Problem List   Diagnosis   • Benign prostatic hyperplasia with urinary obstruction   • Herpesviral infection of penis   • Shortness of breath   • Carotid artery stenosis, symptomatic, right   • Essential hypertension   • Dyslipidemia   • Wenckebach block   • Cigarette nicotine dependence in remission   • PATTY (obstructive sleep apnea)   • Pre-operative cardiovascular examination   • Presence of cardiac pacemaker   • Severe sinus bradycardia   • Chronic bronchitis (HCC)   • Cigarette nicotine dependence without complication     Past Medical History:   Diagnosis Date   • Acid reflux    • Anxiety    • Arthritis    • Carotid artery stenosis, symptomatic, right 11/23/2021   • Essential hypertension 11/23/2021   • Herpes    • History of lipoma    • History of transfusion    • Hyperlipidemia    • Inguinal hernia    • Peptic ulcer    • Stroke (HCC)      Past Surgical History:   Procedure  Laterality Date   • ABDOMINAL SURGERY     • CARDIAC ELECTROPHYSIOLOGY PROCEDURE N/A 12/10/2021    Procedure: Pacemaker DC new;  Surgeon: Jony Monte MD;  Location: [facility] CATH INVASIVE LOCATION;  Service: Cardiology;  Laterality: N/A;   • CAROTID ENDARTERECTOMY Left 2021   • CHOLECYSTECTOMY WITH INTRAOPERATIVE CHOLANGIOGRAM N/A 07/25/2017    Procedure: CHOLECYSTECTOMY LAPAROSCOPIC INTRAOPERATIVE CHOLANGIOGRAM;  Surgeon: Luis Coe MD;  Location: [facility] OR;  Service:    • HERNIA REPAIR      inguinal   • SINUS SURGERY     • TUMOR EXCISION         Current Outpatient Medications:   •  albuterol sulfate  (90 Base) MCG/ACT inhaler, Inhale 2 puffs Every 4 (Four) Hours As Needed for Wheezing., Disp: 6.7 g, Rfl: 5  •  amLODIPine (NORVASC) 10 MG tablet, Take 1 tablet by mouth Every Night., Disp: 90 tablet, Rfl: 1  •  aspirin 81 MG EC tablet, Take 1 tablet by mouth Daily., Disp: 90 tablet, Rfl: 1  •  clopidogrel (PLAVIX) 75 MG tablet, Take 1 tablet by mouth Daily., Disp: 30 tablet, Rfl: 11  •  Fluticasone-Umeclidin-Vilant (Trelegy Ellipta) 100-62.5-25 MCG/INH inhaler, Inhale 1 puff Daily., Disp: 1 each, Rfl: 8  •  lisinopril (PRINIVIL,ZESTRIL) 20 MG tablet, Take 20 mg by mouth Daily. as directed, Disp: , Rfl:   •  omeprazole (priLOSEC) 20 MG capsule, Take 1 capsule by mouth Daily., Disp: 90 capsule, Rfl: 1  Allergies   Allergen Reactions   • Penicillins Hives     Mother told him that, has taken amoxicillin with no problems     Social History     Socioeconomic History   • Marital status:    • Number of children: 6   Tobacco Use   • Smoking status: Current Some Day Smoker     Packs/day: 0.25     Years: 55.00     Pack years: 13.75     Types: Cigarettes   • Smokeless tobacco: Never Used   • Tobacco comment: 2 cigs per day/ A pack per week.   Vaping Use   • Vaping Use: Never used   Substance and Sexual Activity   • Alcohol use: No   • Drug use: No   • Sexual activity: Defer     Family History   Problem  "Relation Age of Onset   • Stroke Mother    • Heart disease Mother    • Diabetes Mother    • No Known Problems Father    • Diabetes Sister      Review of Systems   Constitutional: Negative for chills, fever, malaise/fatigue, night sweats and weight loss.   HENT: Negative for hearing loss, odynophagia and sore throat.    Cardiovascular: Positive for leg swelling and palpitations. Negative for chest pain, dyspnea on exertion and orthopnea.   Respiratory: Negative for cough and hemoptysis.    Endocrine: Negative for cold intolerance, heat intolerance, polydipsia, polyphagia and polyuria.   Hematologic/Lymphatic: Does not bruise/bleed easily.   Skin: Negative for itching and rash.   Musculoskeletal: Negative for joint pain, joint swelling and myalgias.   Gastrointestinal: Negative for abdominal pain, constipation, diarrhea, hematemesis, hematochezia, melena, nausea and vomiting.   Genitourinary: Positive for frequency. Negative for dysuria and hematuria.   Neurological: Positive for weakness. Negative for focal weakness, headaches, numbness and seizures.   Psychiatric/Behavioral: Negative for suicidal ideas.   All other systems reviewed and are negative.    Vitals:    09/14/22 1350   BP: 142/70   BP Location: Left arm   Patient Position: Sitting   Pulse: 87   Temp: 97.3 °F (36.3 °C)   SpO2: 99%   Weight: 109 kg (240 lb)   Height: 175.3 cm (69\")      Physical Exam  Vitals reviewed.   Constitutional:       General: He is not in acute distress.     Appearance: He is well-developed. He is not diaphoretic.      Comments:  male who appears stated age and is present with his wife.  The patient is in a hospital provided wheelchair.  He has bilateral knee braces in place   HENT:      Head: Normocephalic and atraumatic.   Eyes:      General: No scleral icterus.     Conjunctiva/sclera: Conjunctivae normal.   Neck:      Vascular: No JVD.      Trachea: No tracheal deviation.   Cardiovascular:      Rate and Rhythm: Normal " rate and regular rhythm.      Heart sounds: Normal heart sounds. No murmur heard.    No friction rub. No gallop.   Pulmonary:      Effort: Pulmonary effort is normal. No respiratory distress.      Breath sounds: Normal breath sounds. No wheezing or rales.   Abdominal:      General: There is no distension.      Palpations: Abdomen is soft. There is no mass.      Tenderness: There is no abdominal tenderness. There is no guarding or rebound.   Musculoskeletal:         General: Normal range of motion.      Cervical back: Neck supple.   Skin:     General: Skin is warm and dry.      Findings: No erythema or rash.   Neurological:      Mental Status: He is alert and oriented to person, place, and time.   Psychiatric:         Behavior: Behavior normal.         Thought Content: Thought content normal.         Judgment: Judgment normal.         The ROS, past medical history, surgical history, family history, social history and vitals were reviewed by myself and corrected as needed.      Labs/Imaging:  -CTA of the carotids performed 7/29/2022, personally reviewed, demonstrates 60% stenosis of the right internal carotid artery.  No significant disease noted on the left.  -Carotid duplex performed 11/1/2021, personally reviewed, demonstrates 70 to 99% right carotid artery stenosis with a peak systolic velocity of 257 cm/s and ICA to CCA ratio of 2.3.  The left internal carotid artery peak systolic velocity 104 cm/s.    Assessment/Plan:  72-year-old  male with a history of hypertension, hyperlipidemia, coronary artery disease, stroke, carotid artery disease and active tobacco abuse who presents with carotid artery stenosis.  The patient has asymptomatic right internal carotid artery stenosis.  [facility] will be obtained including his discharge summary for hospitalization of stroke and MRI of the brain.  Given his asymptomatic moderate right carotid stenosis, a repeat carotid duplex will be  obtained for continued surveillance of his right carotid disease.  I will have the patient return to clinic in 1 year to discuss the results of his repeat carotid duplex.  Continue lifelong aspirin and Plavix therapy.  Consideration should be made for statin and beta-blocker therapy.  I discussed with the patient the importance of smoking cessation in the setting of hypertension, coronary artery disease, stroke and carotid artery disease.      Patient Active Problem List   Diagnosis   • Benign prostatic hyperplasia with urinary obstruction   • Herpesviral infection of penis   • Shortness of breath   • Carotid artery stenosis, symptomatic, right   • Essential hypertension   • Dyslipidemia   • Wenckebach block   • Cigarette nicotine dependence in remission   • PATTY (obstructive sleep apnea)   • Pre-operative cardiovascular examination   • Presence of cardiac pacemaker   • Severe sinus bradycardia   • Chronic bronchitis (HCC)   • Cigarette nicotine dependence without complication

## 2022-10-03 PROCEDURE — 93296 REM INTERROG EVL PM/IDS: CPT | Performed by: INTERNAL MEDICINE

## 2022-10-03 PROCEDURE — 93294 REM INTERROG EVL PM/LDLS PM: CPT | Performed by: INTERNAL MEDICINE

## 2022-10-04 ENCOUNTER — OFFICE VISIT (OUTPATIENT)
Dept: CARDIOLOGY | Facility: CLINIC | Age: 72
End: 2022-10-04

## 2022-10-04 VITALS
OXYGEN SATURATION: 95 % | SYSTOLIC BLOOD PRESSURE: 125 MMHG | HEIGHT: 69 IN | WEIGHT: 240 LBS | BODY MASS INDEX: 35.55 KG/M2 | DIASTOLIC BLOOD PRESSURE: 72 MMHG | HEART RATE: 83 BPM

## 2022-10-04 DIAGNOSIS — Z01.810 PRE-OPERATIVE CARDIOVASCULAR EXAMINATION: ICD-10-CM

## 2022-10-04 DIAGNOSIS — I65.21 CAROTID ARTERY STENOSIS, SYMPTOMATIC, RIGHT: ICD-10-CM

## 2022-10-04 DIAGNOSIS — E78.5 DYSLIPIDEMIA: ICD-10-CM

## 2022-10-04 DIAGNOSIS — I10 ESSENTIAL HYPERTENSION: Primary | ICD-10-CM

## 2022-10-04 DIAGNOSIS — Z95.0 PRESENCE OF CARDIAC PACEMAKER: ICD-10-CM

## 2022-10-04 DIAGNOSIS — G47.33 OSA (OBSTRUCTIVE SLEEP APNEA): ICD-10-CM

## 2022-10-04 DIAGNOSIS — F17.211 CIGARETTE NICOTINE DEPENDENCE IN REMISSION: ICD-10-CM

## 2022-10-04 DIAGNOSIS — R06.02 SHORTNESS OF BREATH: ICD-10-CM

## 2022-10-04 PROCEDURE — 99214 OFFICE O/P EST MOD 30 MIN: CPT | Performed by: SPECIALIST

## 2022-10-04 RX ORDER — ROSUVASTATIN CALCIUM 10 MG/1
10 TABLET, COATED ORAL DAILY
Qty: 90 TABLET | Refills: 1 | Status: SHIPPED | OUTPATIENT
Start: 2022-10-04

## 2022-10-04 RX ORDER — AMLODIPINE BESYLATE 10 MG/1
10 TABLET ORAL NIGHTLY
Qty: 90 TABLET | Refills: 1 | Status: SHIPPED | OUTPATIENT
Start: 2022-10-04

## 2022-10-04 RX ORDER — LISINOPRIL 20 MG/1
20 TABLET ORAL DAILY
Qty: 90 TABLET | Refills: 1 | Status: SHIPPED | OUTPATIENT
Start: 2022-10-04

## 2022-10-04 RX ORDER — CLOPIDOGREL BISULFATE 75 MG/1
75 TABLET ORAL DAILY
Qty: 30 TABLET | Refills: 11 | Status: SHIPPED | OUTPATIENT
Start: 2022-10-04

## 2022-10-04 NOTE — PROGRESS NOTES
Subjective   Follow up, helder Roberts is a 72 y.o. male who presents to day for Med Management (List. ), Results (Labs, event, CT angiogram. ), Shortness of Breath, Edema, and Dizziness.    CHIEF COMPLIANT  Chief Complaint   Patient presents with   • Med Management     List.    • Results     Labs, event, CT angiogram.    • Shortness of Breath   • Edema   • Dizziness       Active Problems:  Problem List Items Addressed This Visit        Cardiac and Vasculature    Essential hypertension - Primary    Relevant Medications    amLODIPine (NORVASC) 10 MG tablet    lisinopril (PRINIVIL,ZESTRIL) 20 MG tablet    Dyslipidemia    Relevant Orders    Lipid Panel    Comprehensive Metabolic Panel    Pre-operative cardiovascular examination    Presence of cardiac pacemaker       Pulmonary and Pneumonias    Shortness of breath       Sleep    PATTY (obstructive sleep apnea)       Tobacco    Cigarette nicotine dependence in remission      Other Visit Diagnoses     Carotid artery stenosis, symptomatic, right        Relevant Medications    clopidogrel (PLAVIX) 75 MG tablet          HPI  HPI  He has been doing about the same since he was last seen with no dizziness he does have some shortness of breath on lymphedema but no chest pains or palpitations  PRIOR MEDS  Current Outpatient Medications on File Prior to Visit   Medication Sig Dispense Refill   • albuterol sulfate  (90 Base) MCG/ACT inhaler Inhale 2 puffs Every 4 (Four) Hours As Needed for Wheezing. 6.7 g 5   • aspirin 81 MG EC tablet Take 1 tablet by mouth Daily. 90 tablet 1   • Fluticasone-Umeclidin-Vilant (Trelegy Ellipta) 100-62.5-25 MCG/INH inhaler Inhale 1 puff Daily. 1 each 8   • omeprazole (priLOSEC) 20 MG capsule Take 1 capsule by mouth Daily. 90 capsule 1   • [DISCONTINUED] amLODIPine (NORVASC) 10 MG tablet Take 1 tablet by mouth Every Night. 90 tablet 1   • [DISCONTINUED] clopidogrel (PLAVIX) 75 MG tablet Take 1 tablet by mouth Daily. 30 tablet 11   •  "[DISCONTINUED] lisinopril (PRINIVIL,ZESTRIL) 20 MG tablet Take 20 mg by mouth Daily. as directed       No current facility-administered medications on file prior to visit.       ALLERGIES  Penicillins    HISTORY  Past Medical History:   Diagnosis Date   • Acid reflux    • Anxiety    • Arthritis    • Carotid artery stenosis, symptomatic, right 11/23/2021   • Essential hypertension 11/23/2021   • Herpes    • History of lipoma    • History of transfusion    • Hyperlipidemia    • Inguinal hernia    • Peptic ulcer    • Stroke (HCC)        Social History     Socioeconomic History   • Marital status:    • Number of children: 6   Tobacco Use   • Smoking status: Current Some Day Smoker     Packs/day: 0.25     Years: 55.00     Pack years: 13.75     Types: Cigarettes   • Smokeless tobacco: Never Used   • Tobacco comment: 2 cigs per day/ A pack per week.   Vaping Use   • Vaping Use: Never used   Substance and Sexual Activity   • Alcohol use: No   • Drug use: No   • Sexual activity: Defer       Family History   Problem Relation Age of Onset   • Stroke Mother    • Heart disease Mother    • Diabetes Mother    • No Known Problems Father    • Diabetes Sister        Review of Systems   Respiratory: Positive for shortness of breath. Negative for apnea, cough, choking, chest tightness, wheezing and stridor.    Cardiovascular: Positive for leg swelling. Negative for chest pain and palpitations.       Objective     VITALS: /72 (BP Location: Left arm, Patient Position: Sitting, Cuff Size: Adult)   Pulse 83   Ht 175.3 cm (69\")   Wt 109 kg (240 lb)   SpO2 95%   BMI 35.44 kg/m²     LABS:   Lab Results (most recent)     None          IMAGING:   CT Angiogram Carotids    Result Date: 7/29/2022  Atherosclerotic plaques are noted involving the carotid systems. On the right side there is approximately 60% diameter stenosis. There was no significant stenosis identified on the left.  This report was finalized on 7/29/2022 1:12 PM " by Dr. Donaldo Rausch II, MD.        EXAM:  Physical Exam  Constitutional:       Appearance: He is well-developed.   HENT:      Head: Normocephalic and atraumatic.   Eyes:      Pupils: Pupils are equal, round, and reactive to light.   Neck:      Thyroid: No thyromegaly.      Vascular: No JVD.   Cardiovascular:      Rate and Rhythm: Normal rate and regular rhythm.      Chest Wall: PMI is not displaced.      Pulses: Normal pulses.      Heart sounds: Normal heart sounds, S1 normal and S2 normal. No murmur heard.    No friction rub. No gallop.      Comments: Pacer pocket is clean and nontender  Trace LE edema  Pulmonary:      Effort: Pulmonary effort is normal. No respiratory distress.      Breath sounds: Normal breath sounds. No stridor. No wheezing or rales.   Chest:      Chest wall: No tenderness.   Abdominal:      General: Bowel sounds are normal. There is no distension.      Palpations: Abdomen is soft. There is no mass.      Tenderness: There is no abdominal tenderness. There is no guarding or rebound.   Musculoskeletal:      Cervical back: Neck supple. No edema.   Skin:     General: Skin is warm and dry.      Coloration: Skin is not pale.      Findings: No erythema or rash.   Neurological:      Mental Status: He is alert and oriented to person, place, and time.      Cranial Nerves: No cranial nerve deficit.      Coordination: Coordination normal.   Psychiatric:         Behavior: Behavior normal.         Procedure   Procedures       Assessment & Plan     Diagnoses and all orders for this visit:    1. Essential hypertension (Primary)  -     amLODIPine (NORVASC) 10 MG tablet; Take 1 tablet by mouth Every Night.  Dispense: 90 tablet; Refill: 1  -     lisinopril (PRINIVIL,ZESTRIL) 20 MG tablet; Take 1 tablet by mouth Daily. as directed  Dispense: 90 tablet; Refill: 1    2. Dyslipidemia  -     Lipid Panel; Future  -     Comprehensive Metabolic Panel; Future    3. Presence of cardiac pacemaker    4. Shortness of  breath    5. PATTY (obstructive sleep apnea)    6. Cigarette nicotine dependence in remission    7. Pre-operative cardiovascular examination    8. Carotid artery stenosis, symptomatic, right  -     clopidogrel (PLAVIX) 75 MG tablet; Take 1 tablet by mouth Daily.  Dispense: 30 tablet; Refill: 11    Other orders  -     rosuvastatin (CRESTOR) 10 MG tablet; Take 1 tablet by mouth Daily.  Dispense: 90 tablet; Refill: 1    1.  His blood pressure is well controlled we will continue current management  2.  I discussed the results of the monitor which shows extra systoles on the pacemaker interrogation there is no atrial fibrillation or atrial flutter otherwise normal function of the pacemaker  3.  He could not tolerate using CPAP.  His AHI is 9.8 he is aware that not being able to use a CPAP this can cause health damage and I recommended cardiac catheterization I discussed with the patient's the benefits and risks of the cardiac catheterization risks include but not limited to infection neurovascular injury stroke myocardial infarction renal failure and even death patient understood and agreed to go for the procedure  4.  Continues to be short of breath he wants to have knee surgery, his stress test last year was negative as well as normal systolic function he is a moderate perioperative cardiac risk for surgery if he has surgery he will need magnet over the pacemaker during the procedure and can hold aspirin and Plavix 5 to 7 days prior to the procedure  5.  He was seen by Dr. Becerra regarding his right carotid stenosis who is going to monitor him as an outpatient he will have another scan next year  6.  I discussed the lipid profile with hyperlipidemia with elevated triglycerides 162 and LDL of 114 we will start him on rosuvastatin    Return in about 6 months (around 4/4/2023).                   MEDS ORDERED DURING VISIT:  New Medications Ordered This Visit   Medications   • rosuvastatin (CRESTOR) 10 MG tablet     Sig: Take  1 tablet by mouth Daily.     Dispense:  90 tablet     Refill:  1   • amLODIPine (NORVASC) 10 MG tablet     Sig: Take 1 tablet by mouth Every Night.     Dispense:  90 tablet     Refill:  1   • clopidogrel (PLAVIX) 75 MG tablet     Sig: Take 1 tablet by mouth Daily.     Dispense:  30 tablet     Refill:  11   • lisinopril (PRINIVIL,ZESTRIL) 20 MG tablet     Sig: Take 1 tablet by mouth Daily. as directed     Dispense:  90 tablet     Refill:  1       As always, Roderick Patricia MD  I appreciate very much the opportunity to participate in the cardiovascular care of your patients. Please do not hesitate to call me with any questions with regards to Donaldo Roberts evaluation and management.         This document has been electronically signed by Sonali Melgar MD  October 4, 2022 12:28 EDT

## 2022-12-29 ENCOUNTER — OFFICE VISIT (OUTPATIENT)
Dept: PULMONOLOGY | Facility: CLINIC | Age: 72
End: 2022-12-29

## 2022-12-29 VITALS
OXYGEN SATURATION: 92 % | HEART RATE: 98 BPM | BODY MASS INDEX: 35.55 KG/M2 | DIASTOLIC BLOOD PRESSURE: 80 MMHG | SYSTOLIC BLOOD PRESSURE: 148 MMHG | WEIGHT: 240 LBS | TEMPERATURE: 99.3 F | HEIGHT: 69 IN

## 2022-12-29 DIAGNOSIS — R09.02 HYPOXIA: ICD-10-CM

## 2022-12-29 DIAGNOSIS — G47.33 OSA (OBSTRUCTIVE SLEEP APNEA): Primary | ICD-10-CM

## 2022-12-29 DIAGNOSIS — F17.210 CIGARETTE NICOTINE DEPENDENCE WITHOUT COMPLICATION: ICD-10-CM

## 2022-12-29 DIAGNOSIS — J42 CHRONIC BRONCHITIS, UNSPECIFIED CHRONIC BRONCHITIS TYPE: ICD-10-CM

## 2022-12-29 PROCEDURE — 99214 OFFICE O/P EST MOD 30 MIN: CPT | Performed by: PHYSICIAN ASSISTANT

## 2022-12-29 RX ORDER — ALBUTEROL SULFATE 90 UG/1
2 AEROSOL, METERED RESPIRATORY (INHALATION) EVERY 4 HOURS PRN
Qty: 18 G | Refills: 8 | Status: SHIPPED | OUTPATIENT
Start: 2022-12-29

## 2022-12-29 NOTE — PROGRESS NOTES
"Chief Complaint  The following data was reviewed by: Stephy Estrada PA-C on 12/29/2022:    Subjective        Donaldo Roberts presents to [facility] PULMONARY & CRITICAL CARE MEDICINE  History of Present Illness    Presents today for follow up of PATTY/nocturnal hypoxia, and suspected COPD.   He does have some shortness of breath on exertion.  Uses walker due to significant joint pain for assistance.  Keeps a pulse oximeter in his pocket, and has noticed desaturation recently to the upper 80s after exertion of approximately 20 feet or so.  Does not have portable supplies, but has nocturnal supplies as he was not able to tolerate AutoPap past.  Continues with use of 2 L/min nightly.  He was previously using a trilogy inhaler, but decided to discontinue use.  Also has a rescue inhaler available for as needed use.  Patient is accompanied by his wife today.    Objective   Vital Signs:  /80 (BP Location: Left arm, Patient Position: Sitting)   Pulse 98   Temp 99.3 °F (37.4 °C)   Ht 175.3 cm (69\")   Wt 109 kg (240 lb)   SpO2 92%   BMI 35.44 kg/m²   Estimated body mass index is 35.44 kg/m² as calculated from the following:    Height as of this encounter: 175.3 cm (69\").    Weight as of this encounter: 109 kg (240 lb).    Desaturated to 88% on room air after ambulation approximately 20 feet.       Physical Exam  Vitals reviewed.   Constitutional:       General: He is not in acute distress.     Appearance: He is well-developed. He is not diaphoretic.      Comments: Large body habitus.  Ambulates with walker for assistance.   HENT:      Head: Normocephalic and atraumatic.   Cardiovascular:      Rate and Rhythm: Normal rate and regular rhythm.      Heart sounds: Normal heart sounds, S1 normal and S2 normal.   Pulmonary:      Effort: Pulmonary effort is normal.      Breath sounds: No wheezing, rhonchi or rales.   Neurological:      Mental Status: He is alert and oriented to person, place, and time. "   Psychiatric:         Behavior: Behavior normal.        Result Review :  The following data was reviewed by: Stephy Estrada PA-C on 12/29/2022:    Reviewed previous chest xray.   Reviewed previous sleep study.     Assessment and Plan   Diagnoses and all orders for this visit:      Obstructive sleep apnea:   Mild sleep apnea per previous testing  • Unable to tolerate AutoPap therapy despite pressure changes.  • Continues with nasal cannula 2 L at nighttime        Cigarette dependence:   Previously smoked as much as 1 to 2 packs/day, now smoking less than 1/4 pack/day.  Use.    Smoked since age 17, decreased use in his 30s.  • Qualifies for low-dose CT screenings, but prefers to await imaging at this time.        Chronic bronchitis:  Previously noticed intermittent cough with increased phlegm production, which has decreased over time, likely related to decreased smoking.  Due to extensive smoking history, suspect underlying COPD.  Also reports shortness of breath on exertion  • Not interested in PFT testing  • Has albuterol inhaler to use as needed  • Has Trelegy ellipta 100 mcg to use daily   Previously noted benefit from use, was using but discontinued.   Discussed that he can resume use when ready  Rinse after use.   • Prefers to avoid PFT.       Hypoxia:   Started in lower 90s. After ambulating approximately 20 feet, desaturated to 88%.   · Currently uses O2 on 2 L at nighttime.   · Order placed for portable supplies (POC preferred) on 2-3 L with ambulation as needed.   Improves to 90s on room air with rest.       Follow Up   Follow up in 5 months (may 2023) or sooner as needed.   Patient was given instructions and counseling regarding his condition or for health maintenance advice. Please see specific information pulled into the AVS if appropriate.

## 2023-01-16 ENCOUNTER — OFFICE VISIT (OUTPATIENT)
Dept: UROLOGY | Facility: CLINIC | Age: 73
End: 2023-01-16
Payer: MEDICARE

## 2023-01-16 VITALS — HEIGHT: 69 IN | BODY MASS INDEX: 35.7 KG/M2 | WEIGHT: 241 LBS

## 2023-01-16 DIAGNOSIS — A60.01 HERPESVIRAL INFECTION OF PENIS: ICD-10-CM

## 2023-01-16 DIAGNOSIS — N32.81 DETRUSOR INSTABILITY: ICD-10-CM

## 2023-01-16 DIAGNOSIS — N42.9 DISORDER OF PROSTATE: Primary | ICD-10-CM

## 2023-01-16 DIAGNOSIS — N40.1 BENIGN PROSTATIC HYPERPLASIA WITH URINARY OBSTRUCTION: ICD-10-CM

## 2023-01-16 DIAGNOSIS — N13.8 BENIGN PROSTATIC HYPERPLASIA WITH URINARY OBSTRUCTION: ICD-10-CM

## 2023-01-16 LAB
BILIRUB BLD-MCNC: NEGATIVE MG/DL
CLARITY, POC: CLEAR
COLOR UR: YELLOW
EXPIRATION DATE: NORMAL
GLUCOSE UR STRIP-MCNC: NEGATIVE MG/DL
KETONES UR QL: NEGATIVE
LEUKOCYTE EST, POC: NEGATIVE
Lab: NORMAL
NITRITE UR-MCNC: NEGATIVE MG/ML
PH UR: 7.5 [PH] (ref 5–8)
PROT UR STRIP-MCNC: NEGATIVE MG/DL
RBC # UR STRIP: NEGATIVE /UL
SP GR UR: 1.01 (ref 1–1.03)
UROBILINOGEN UR QL: NORMAL

## 2023-01-16 PROCEDURE — 99203 OFFICE O/P NEW LOW 30 MIN: CPT | Performed by: UROLOGY

## 2023-01-16 PROCEDURE — 84403 ASSAY OF TOTAL TESTOSTERONE: CPT | Performed by: UROLOGY

## 2023-01-16 PROCEDURE — 84153 ASSAY OF PSA TOTAL: CPT | Performed by: UROLOGY

## 2023-01-16 PROCEDURE — 81003 URINALYSIS AUTO W/O SCOPE: CPT | Performed by: UROLOGY

## 2023-01-16 PROCEDURE — 85027 COMPLETE CBC AUTOMATED: CPT | Performed by: UROLOGY

## 2023-01-16 PROCEDURE — 82670 ASSAY OF TOTAL ESTRADIOL: CPT | Performed by: UROLOGY

## 2023-01-16 PROCEDURE — 36415 COLL VENOUS BLD VENIPUNCTURE: CPT | Performed by: UROLOGY

## 2023-01-16 RX ORDER — ACYCLOVIR 50 MG/G
OINTMENT TOPICAL
Qty: 15 G | Refills: 6 | Status: SHIPPED | OUTPATIENT
Start: 2023-01-16 | End: 2023-01-22

## 2023-01-16 NOTE — PROGRESS NOTES
Chief Complaint:      Chief Complaint   Patient presents with   • Benign Prostatic Hypertrophy     Follow up        HPI:   72 y.o. male returns today with painful urination urinary frequency and negative urinalysis previously saw Dr. Reinier Wayne 2 weeks ago uses a walker he has extreme urinary frequency.  He has cataracts and he stopped his alpha blockade he has herpes he takes acyclovir he gets it about every year and he wanted a refill of his acyclovir I will initiate a work-up.  I Liliana set him up for urodynamics.  He has not had a stroke.  I will follow-up with him based on this.    Past Medical History:     Past Medical History:   Diagnosis Date   • Acid reflux    • Anxiety    • Arthritis    • Carotid artery stenosis, symptomatic, right 11/23/2021   • Essential hypertension 11/23/2021   • Herpes    • History of lipoma    • History of transfusion    • Hyperlipidemia    • Inguinal hernia    • Peptic ulcer    • Stroke (HCC)        Current Meds:     Current Outpatient Medications   Medication Sig Dispense Refill   • albuterol sulfate  (90 Base) MCG/ACT inhaler Inhale 2 puffs Every 4 (Four) Hours As Needed for Wheezing or Shortness of Air. 18 g 8   • amLODIPine (NORVASC) 10 MG tablet Take 1 tablet by mouth Every Night. 90 tablet 1   • aspirin 81 MG EC tablet Take 1 tablet by mouth Daily. 90 tablet 1   • clopidogrel (PLAVIX) 75 MG tablet Take 1 tablet by mouth Daily. 30 tablet 11   • Fluticasone-Umeclidin-Vilant (Trelegy Ellipta) 100-62.5-25 MCG/INH inhaler Inhale 1 puff Daily. 1 each 8   • lisinopril (PRINIVIL,ZESTRIL) 20 MG tablet Take 1 tablet by mouth Daily. as directed 90 tablet 1   • omeprazole (priLOSEC) 20 MG capsule Take 1 capsule by mouth Daily. 90 capsule 1   • rosuvastatin (CRESTOR) 10 MG tablet Take 1 tablet by mouth Daily. 90 tablet 1     No current facility-administered medications for this visit.        Allergies:      Allergies   Allergen Reactions   • Penicillins Hives     Mother told him  that, has taken amoxicillin with no problems        Past Surgical History:     Past Surgical History:   Procedure Laterality Date   • ABDOMINAL SURGERY     • CARDIAC ELECTROPHYSIOLOGY PROCEDURE N/A 12/10/2021    Procedure: Pacemaker DC new;  Surgeon: Jony Monte MD;  Location: [facility] INVASIVE LOCATION;  Service: Cardiology;  Laterality: N/A;   • CAROTID ENDARTERECTOMY Left 2021   • CHOLECYSTECTOMY WITH INTRAOPERATIVE CHOLANGIOGRAM N/A 07/25/2017    Procedure: CHOLECYSTECTOMY LAPAROSCOPIC INTRAOPERATIVE CHOLANGIOGRAM;  Surgeon: Luis Coe MD;  Location: [facility] OR;  Service:    • HERNIA REPAIR      inguinal   • SINUS SURGERY     • TUMOR EXCISION         Social History:     Social History     Socioeconomic History   • Marital status:    • Number of children: 6   Tobacco Use   • Smoking status: Some Days     Packs/day: 0.25     Years: 55.00     Pack years: 13.75     Types: Cigarettes   • Smokeless tobacco: Never   • Tobacco comments:     2 cigs per day/ A pack per week.   Vaping Use   • Vaping Use: Never used   Substance and Sexual Activity   • Alcohol use: No   • Drug use: No   • Sexual activity: Defer       Family History:     Family History   Problem Relation Age of Onset   • Stroke Mother    • Heart disease Mother    • Diabetes Mother    • No Known Problems Father    • Diabetes Sister        Review of Systems:     Review of Systems   Constitutional: Negative.    HENT: Negative.    Eyes: Negative.    Respiratory: Negative.    Cardiovascular: Negative.    Gastrointestinal: Negative.    Endocrine: Negative.    Genitourinary: Positive for difficulty urinating, frequency and urgency.   Musculoskeletal: Negative.    Allergic/Immunologic: Negative.    Neurological: Negative.    Hematological: Negative.    Psychiatric/Behavioral: Negative.        Physical Exam:     Physical Exam  Vitals and nursing note reviewed.   Constitutional:       Appearance: He is well-developed.   HENT:      Head:  Normocephalic and atraumatic.   Eyes:      Conjunctiva/sclera: Conjunctivae normal.      Pupils: Pupils are equal, round, and reactive to light.   Cardiovascular:      Rate and Rhythm: Normal rate and regular rhythm.      Heart sounds: Normal heart sounds.   Pulmonary:      Effort: Pulmonary effort is normal.      Breath sounds: Normal breath sounds.   Abdominal:      General: Bowel sounds are normal.      Palpations: Abdomen is soft.   Musculoskeletal:         General: Normal range of motion.      Cervical back: Normal range of motion.   Skin:     General: Skin is warm and dry.   Neurological:      Mental Status: He is alert and oriented to person, place, and time.      Deep Tendon Reflexes: Reflexes are normal and symmetric.   Psychiatric:         Behavior: Behavior normal.         Thought Content: Thought content normal.         Judgment: Judgment normal.         I have reviewed the following portions of the patient's history: Allergies, current medications, past family history, past medical history, past social history, past surgical history, problem list, and ROS and confirm it is accurate.    Recent Image (CT and/or KUB):      CT Abdomen and Pelvis: No results found for this or any previous visit.       CT Stone Protocol: No results found for this or any previous visit.       KUB: No results found for this or any previous visit.       Labs (past 3 months):      No visits with results within 3 Month(s) from this visit.   Latest known visit with results is:   Hospital Outpatient Visit on 07/29/2022   Component Date Value Ref Range Status   • Creatinine 07/29/2022 0.60  0.60 - 1.30 mg/dL Final    Serial Number: [id number]:  [id number]        Procedure:       Assessment/Plan:   Herpes simplex virus recurrence we will restart topical acyclovir  Detrusor instability-patient has been diagnosed with detrusor instability which is an irritative bladder symptomatology most likely related to factors such as intake of  bladder irritants, postinfectious irritation, prolapse, with a very large differential diagnosis.  The mainstay of treatment has been tight cholinergics which basically cause the bladder to have decreased contractility.  We have discussed the side effects of these treatments including dry mouth, double vision, and increasing constipation.  I will set him up for urodynamics.            This document has been electronically signed by ELIZABETH LESTER MD January 16, 2023 13:14 EST    Dictated Utilizing Dragon Dictation: Part of this note may be an electronic transcription/translation of spoken language to printed text using the Dragon Dictation System.

## 2023-01-17 LAB
DEPRECATED RDW RBC AUTO: 41.4 FL (ref 37–54)
ERYTHROCYTE [DISTWIDTH] IN BLOOD BY AUTOMATED COUNT: 12.4 % (ref 12.3–15.4)
ESTRADIOL SERPL HS-MCNC: 27.1 PG/ML
HCT VFR BLD AUTO: 46.8 % (ref 37.5–51)
HGB BLD-MCNC: 14.8 G/DL (ref 13–17.7)
MCH RBC QN AUTO: 29 PG (ref 26.6–33)
MCHC RBC AUTO-ENTMCNC: 31.6 G/DL (ref 31.5–35.7)
MCV RBC AUTO: 91.8 FL (ref 79–97)
PLATELET # BLD AUTO: 272 10*3/MM3 (ref 140–450)
PMV BLD AUTO: 11.6 FL (ref 6–12)
PSA SERPL-MCNC: 1.57 NG/ML (ref 0–4)
RBC # BLD AUTO: 5.1 10*6/MM3 (ref 4.14–5.8)
TESTOST SERPL-MCNC: 274 NG/DL (ref 193–740)
WBC NRBC COR # BLD: 8.34 10*3/MM3 (ref 3.4–10.8)

## 2023-02-01 ENCOUNTER — PROCEDURE VISIT (OUTPATIENT)
Dept: UROLOGY | Facility: CLINIC | Age: 73
End: 2023-02-01
Payer: MEDICARE

## 2023-02-01 VITALS — WEIGHT: 240.3 LBS | HEIGHT: 69 IN | BODY MASS INDEX: 35.59 KG/M2

## 2023-02-01 DIAGNOSIS — N39.41 URGE INCONTINENCE: ICD-10-CM

## 2023-02-01 DIAGNOSIS — R35.1 NOCTURIA: ICD-10-CM

## 2023-02-01 DIAGNOSIS — R39.15 URGENCY OF URINATION: ICD-10-CM

## 2023-02-01 PROCEDURE — 99024 POSTOP FOLLOW-UP VISIT: CPT | Performed by: UROLOGY

## 2023-02-01 PROCEDURE — 51797 INTRAABDOMINAL PRESSURE TEST: CPT | Performed by: UROLOGY

## 2023-02-01 PROCEDURE — 51741 ELECTRO-UROFLOWMETRY FIRST: CPT | Performed by: UROLOGY

## 2023-02-01 PROCEDURE — 51728 CYSTOMETROGRAM W/VP: CPT | Performed by: UROLOGY

## 2023-02-01 NOTE — PROGRESS NOTES
PATIENT HERE FOR URODYNAMICS STUDY WITH SANDI GUTIÉRREZ WITH ME IN ATTENDANCE. SEE ATTACHMENT FOR RESULTS

## 2023-03-23 ENCOUNTER — TELEPHONE (OUTPATIENT)
Dept: CARDIOLOGY | Facility: CLINIC | Age: 73
End: 2023-03-23

## 2023-03-23 NOTE — TELEPHONE ENCOUNTER
Caller: Inna Roberts    Relationship: Emergency Contact    Best call back number: [phone]    What is the best time to reach you: ANYTIME     Who are you requesting to speak with (clinical staff, provider,  specific staff member): ANYONE     What was the call regarding: PATIENT AND WIFE WOULD LIKE TO KNOW IF COMPREHENSIVE METABOLIC PANEL AND LIPID PANEL LAB ORDERS ARE STILL GOOD TO GO OR IF NEW ORDERS NEED TO BE SENT IN.     Do you require a callback: YES

## 2023-03-27 ENCOUNTER — LAB (OUTPATIENT)
Dept: LAB | Facility: HOSPITAL | Age: 73
End: 2023-03-27
Payer: MEDICARE

## 2023-03-27 DIAGNOSIS — E78.5 DYSLIPIDEMIA: ICD-10-CM

## 2023-03-27 LAB
CHOLEST SERPL-MCNC: 112 MG/DL (ref 0–200)
HDLC SERPL-MCNC: 37 MG/DL (ref 40–60)
LDLC SERPL CALC-MCNC: 46 MG/DL (ref 0–100)
LDLC/HDLC SERPL: 1.08 {RATIO}
TRIGL SERPL-MCNC: 175 MG/DL (ref 0–150)
VLDLC SERPL-MCNC: 29 MG/DL (ref 5–40)

## 2023-03-27 PROCEDURE — 80061 LIPID PANEL: CPT

## 2023-03-27 PROCEDURE — 36415 COLL VENOUS BLD VENIPUNCTURE: CPT

## 2023-04-13 RX ORDER — ROSUVASTATIN CALCIUM 10 MG/1
10 TABLET, COATED ORAL DAILY
Qty: 90 TABLET | Refills: 1 | Status: SHIPPED | OUTPATIENT
Start: 2023-04-13

## 2023-05-10 ENCOUNTER — OFFICE VISIT (OUTPATIENT)
Dept: CARDIOLOGY | Facility: CLINIC | Age: 73
End: 2023-05-10
Payer: MEDICARE

## 2023-05-10 VITALS
HEART RATE: 101 BPM | BODY MASS INDEX: 34.07 KG/M2 | OXYGEN SATURATION: 93 % | HEIGHT: 69 IN | WEIGHT: 230 LBS | SYSTOLIC BLOOD PRESSURE: 144 MMHG | DIASTOLIC BLOOD PRESSURE: 80 MMHG

## 2023-05-10 DIAGNOSIS — E78.5 DYSLIPIDEMIA: ICD-10-CM

## 2023-05-10 DIAGNOSIS — Z72.0 TOBACCO ABUSE: ICD-10-CM

## 2023-05-10 DIAGNOSIS — I65.21 CAROTID STENOSIS, ASYMPTOMATIC, RIGHT: ICD-10-CM

## 2023-05-10 DIAGNOSIS — G47.33 OSA (OBSTRUCTIVE SLEEP APNEA): ICD-10-CM

## 2023-05-10 DIAGNOSIS — I10 ESSENTIAL HYPERTENSION: ICD-10-CM

## 2023-05-10 DIAGNOSIS — Z95.0 PRESENCE OF CARDIAC PACEMAKER: Primary | ICD-10-CM

## 2023-05-10 RX ORDER — ATORVASTATIN CALCIUM 20 MG/1
20 TABLET, FILM COATED ORAL DAILY
COMMUNITY

## 2023-05-10 NOTE — PROGRESS NOTES
[facility]             RuthLARY Forbes David J, MD  Donaldo Roberts  1950  05/10/2023    Patient Active Problem List   Diagnosis   • Benign prostatic hyperplasia with urinary obstruction   • Herpesviral infection of penis   • Shortness of breath   • Carotid stenosis, asymptomatic, right   • Essential hypertension   • Dyslipidemia   • Wenckebach block   • Cigarette nicotine dependence in remission   • PATTY (obstructive sleep apnea)   • Pre-operative cardiovascular examination   • Presence of cardiac pacemaker   • Severe sinus bradycardia   • Chronic bronchitis   • Cigarette nicotine dependence without complication   • Hypoxia   • Detrusor instability   • Tobacco abuse       Dear Roderick Patricia MD:    Subjective     Chief Complaint   Patient presents with   • Follow-up     6 mth   • Med Management     VERBAL        HPI:     This is a 72 y.o. male with known past medical history of hypertension, hyperlipidemia, permanent pacemaker implantation 2021, coronary artery disease, history of stroke, carotid artery disease and tobacco abuse.    Donaldo Roberts presents today for routine cardiology follow up.  Patient states he been doing overall well since his last visit.  Denies any chest pain, palpitations or shortness of breath.  States has been having some knee issues for which she is using a walker to ambulate.  Blood pressure mildly elevated in the office today however is controlled at home in the 130s systolic.  Reports compliance with his medications.  Has been around 1 year since patient had pacemaker interrogation.  Remote monitoring recently showed no acute events.  Unable to tolerate CPAP.    • Diagnostic Testing  1. Echocardiogram 11/2021: EF 66 to 70%  2. Carotid ultrasound 11/2021: High-grade stenosis in the internal carotid artery on the right  3. Nuclear stress test 11/2021: No evidence of ischemia  4. 24-hour Holter monitor 12/2021: Predominant  normal sinus rhythm with episodes of sinus tachycardia and sinus bradycardia with bradycardic episodes about 12.4% with first-degree AV block and intermittent episodes of high-grade block, 7 beat run of ventricular tachycardia  5. Implantation of permanent dual-chamber pacemaker 12/2021  6. Cardiac event monitor 7/2022: Normal sinus rhythm with underlying atrial fibrillation     All other systems were reviewed and were negative.    Patient Active Problem List   Diagnosis   • Benign prostatic hyperplasia with urinary obstruction   • Herpesviral infection of penis   • Shortness of breath   • Carotid stenosis, asymptomatic, right   • Essential hypertension   • Dyslipidemia   • Wenckebach block   • Cigarette nicotine dependence in remission   • PATTY (obstructive sleep apnea)   • Pre-operative cardiovascular examination   • Presence of cardiac pacemaker   • Severe sinus bradycardia   • Chronic bronchitis   • Cigarette nicotine dependence without complication   • Hypoxia   • Detrusor instability   • Tobacco abuse       family history includes Diabetes in his mother and sister; Heart disease in his mother; No Known Problems in his father; Stroke in his mother.     reports that he has been smoking cigarettes. He has a 13.75 pack-year smoking history. He has never used smokeless tobacco. He reports that he does not drink alcohol and does not use drugs.    Allergies   Allergen Reactions   • Penicillins Hives     Mother told him that, has taken amoxicillin with no problems         Current Outpatient Medications:   •  albuterol sulfate  (90 Base) MCG/ACT inhaler, Inhale 2 puffs Every 4 (Four) Hours As Needed for Wheezing or Shortness of Air., Disp: 18 g, Rfl: 8  •  amLODIPine (NORVASC) 10 MG tablet, Take 1 tablet by mouth Every Night., Disp: 90 tablet, Rfl: 1  •  aspirin 81 MG EC tablet, Take 1 tablet by mouth Daily., Disp: 90 tablet, Rfl: 1  •  atorvastatin (LIPITOR) 20 MG tablet, Take 1 tablet by mouth Daily., Disp: ,  Rfl:   •  clopidogrel (PLAVIX) 75 MG tablet, Take 1 tablet by mouth Daily., Disp: 30 tablet, Rfl: 11  •  Fluticasone-Umeclidin-Vilant (Trelegy Ellipta) 100-62.5-25 MCG/INH inhaler, Inhale 1 puff Daily., Disp: 1 each, Rfl: 8  •  lisinopril (PRINIVIL,ZESTRIL) 20 MG tablet, Take 1 tablet by mouth Daily. as directed, Disp: 90 tablet, Rfl: 1  •  omeprazole (priLOSEC) 20 MG capsule, Take 1 capsule by mouth Daily., Disp: 90 capsule, Rfl: 1  •  acyclovir (Zovirax) 5 % ointment, Apply 1 application topically to the appropriate area as directed Every 3 (Three) Hours for 3 days, THEN 1 application Every 3 (Three) Hours for 3 days. Apply thin layer to affected area BID, Disp: 15 g, Rfl: 6      Physical Exam:  I have reviewed the patient's current vital signs as documented in the patient's EMR.   Vitals:    05/10/23 1049   BP: 144/80   Pulse: 101   SpO2: 93%     Body mass index is 33.95 kg/m².       05/10/23  1049   Weight: 104 kg (230 lb)      Physical Exam  Constitutional:       General: He is not in acute distress.     Appearance: Normal appearance. He is well-developed.   HENT:      Head: Normocephalic and atraumatic.      Mouth/Throat:      Mouth: Mucous membranes are moist.   Eyes:      Extraocular Movements: Extraocular movements intact.      Pupils: Pupils are equal, round, and reactive to light.   Neck:      Vascular: No JVD.   Cardiovascular:      Rate and Rhythm: Normal rate and regular rhythm.      Heart sounds: Normal heart sounds. No murmur heard.    No S3 or S4 sounds.   Pulmonary:      Effort: Pulmonary effort is normal. No respiratory distress.      Breath sounds: Normal breath sounds. No wheezing.   Abdominal:      General: Bowel sounds are normal. There is no distension.      Palpations: Abdomen is soft. There is no hepatomegaly.      Tenderness: There is no abdominal tenderness.   Musculoskeletal:         General: Normal range of motion.      Cervical back: Normal range of motion and neck supple.   Skin:      General: Skin is warm and dry.      Coloration: Skin is not jaundiced or pale.   Neurological:      General: No focal deficit present.      Mental Status: He is alert and oriented to person, place, and time. Mental status is at baseline.   Psychiatric:         Mood and Affect: Mood normal.         Behavior: Behavior normal.         Thought Content: Thought content normal.         Judgment: Judgment normal.       DATA REVIEWED:     TTE/SIXTO:  Results for orders placed during the hospital encounter of 11/01/21    Adult Transthoracic Echo Complete w/ Color, Spectral and Contrast if necessary per protocol    Interpretation Summary  · Left ventricular wall thickness is consistent with mild to moderate concentric hypertrophy.  · Left ventricular ejection fraction appears to be 66 - 70%. Left ventricular systolic function is normal.  · Left ventricular diastolic function was normal.  · The right atrial cavity is mildly dilated.      Laboratory evaluations:    Lab Results   Component Value Date    GLUCOSE 99 06/17/2022    BUN 13 06/17/2022    CREATININE 0.60 07/29/2022    EGFRIFNONA 93 12/11/2021    BCR 19.1 06/17/2022    K 4.1 06/17/2022    CO2 20.6 (L) 06/17/2022    CALCIUM 9.7 06/17/2022    ALBUMIN 4.60 06/17/2022    AST 22 06/17/2022    ALT 29 06/17/2022     Lab Results   Component Value Date    WBC 8.34 01/16/2023    HGB 14.8 01/16/2023    HCT 46.8 01/16/2023    MCV 91.8 01/16/2023     01/16/2023     Lab Results   Component Value Date    CHOL 112 03/27/2023    TRIG 175 (H) 03/27/2023    HDL 37 (L) 03/27/2023    LDL 46 03/27/2023     Lab Results   Component Value Date    TSH 0.681 12/08/2021     No results found for: HGBA1C  Lab Results   Component Value Date    ALT 29 06/17/2022     No results found for: HGBA1C  Lab Results   Component Value Date    CREATININE 0.60 07/29/2022     No results found for: IRON, TIBC, FERRITIN  Lab Results   Component Value Date    INR 0.82 (L) 12/08/2021    PROTIME 11.7 (L)  12/08/2021             ECG 12 Lead    Date/Time: 5/10/2023 11:39 AM  Performed by: Ruth Aguayo APRN  Authorized by: Ruth Aguayo APRN   Comparison: compared with previous ECG   Rhythm: paced  Other findings: non-specific ST-T wave changes  Pacing: dual chamber pacing  Clinical impression: non-specific ECG          --------------------------------------------------------------------------------------------------------------------------    ASSESSMENT/PLAN:      Diagnosis Plan   1. Presence of cardiac pacemaker        2. Essential hypertension        3. Carotid stenosis, asymptomatic, right        4. Dyslipidemia        5. Tobacco abuse        6. PATTY (obstructive sleep apnea)            1. Pacemaker implantation  • Schedule patient for routine interrogation.  Recent remote monitoring showed no acute findings.    2. Essential hypertension  • Blood pressure mildly elevated in the office today however patient reports it is controlled at home at 130s systolic.  Continue with lisinopril.    3.  Dyslipidemia  · Recent lipid panel showed LDL at goal.  Continue with atorvastatin.    4.  Carotid artery disease  · Patient follows with CT surgery Dr. Becerra and is scheduled to see them in September 2023.  Continue with aspirin, Plavix and statin.    5.  Tobacco abuse  · Patient continues to smoke.  Discussed about the importance of smoking cessation to reduce his risk of cardiovascular disease.    6.  Obstructive sleep apnea  · Was unable to tolerate CPAP.      This document has been @Electronically signed by LARY Kelley, 05/10/23, 10:31 AM EDT.       Dictated Utilizing Dragon Dictation: Part of this note may be an electronic transcription/translation of spoken language to printed text using the Dragon Dictation System.    Follow-up appointment and medication changes provided in hand delivered After Visit Summary as well as reviewed in the room.

## 2023-05-22 DIAGNOSIS — I10 ESSENTIAL HYPERTENSION: ICD-10-CM

## 2023-05-22 RX ORDER — LISINOPRIL 20 MG/1
20 TABLET ORAL DAILY
Qty: 90 TABLET | Refills: 1 | Status: SHIPPED | OUTPATIENT
Start: 2023-05-22

## 2023-06-08 ENCOUNTER — CLINICAL SUPPORT NO REQUIREMENTS (OUTPATIENT)
Dept: CARDIOLOGY | Facility: CLINIC | Age: 73
End: 2023-06-08
Payer: MEDICARE

## 2023-06-08 DIAGNOSIS — Z95.0 CARDIAC PACEMAKER IN SITU: Primary | ICD-10-CM

## 2023-07-24 DIAGNOSIS — I65.21 CAROTID STENOSIS, ASYMPTOMATIC, RIGHT: Primary | ICD-10-CM

## 2023-08-22 ENCOUNTER — OFFICE VISIT (OUTPATIENT)
Dept: PULMONOLOGY | Facility: CLINIC | Age: 73
End: 2023-08-22
Payer: MEDICARE

## 2023-08-22 VITALS
HEIGHT: 69 IN | TEMPERATURE: 98 F | HEART RATE: 95 BPM | DIASTOLIC BLOOD PRESSURE: 80 MMHG | WEIGHT: 235 LBS | SYSTOLIC BLOOD PRESSURE: 138 MMHG | BODY MASS INDEX: 34.8 KG/M2 | OXYGEN SATURATION: 76 %

## 2023-08-22 DIAGNOSIS — F17.210 CIGARETTE NICOTINE DEPENDENCE WITHOUT COMPLICATION: ICD-10-CM

## 2023-08-22 DIAGNOSIS — R09.02 HYPOXIA: ICD-10-CM

## 2023-08-22 DIAGNOSIS — G47.33 OSA (OBSTRUCTIVE SLEEP APNEA): ICD-10-CM

## 2023-08-22 DIAGNOSIS — J44.9 CHRONIC OBSTRUCTIVE PULMONARY DISEASE, UNSPECIFIED COPD TYPE: Primary | ICD-10-CM

## 2023-08-22 PROCEDURE — 99214 OFFICE O/P EST MOD 30 MIN: CPT | Performed by: PHYSICIAN ASSISTANT

## 2023-08-22 PROCEDURE — 1159F MED LIST DOCD IN RCRD: CPT | Performed by: PHYSICIAN ASSISTANT

## 2023-08-22 PROCEDURE — 3079F DIAST BP 80-89 MM HG: CPT | Performed by: PHYSICIAN ASSISTANT

## 2023-08-22 PROCEDURE — 1160F RVW MEDS BY RX/DR IN RCRD: CPT | Performed by: PHYSICIAN ASSISTANT

## 2023-08-22 PROCEDURE — 3075F SYST BP GE 130 - 139MM HG: CPT | Performed by: PHYSICIAN ASSISTANT

## 2023-08-22 RX ORDER — ROSUVASTATIN CALCIUM 20 MG/1
20 TABLET, COATED ORAL DAILY
COMMUNITY
Start: 2023-08-15

## 2023-08-22 NOTE — PROGRESS NOTES
"Chief Complaint  Sleep Apnea and Shortness of Breath    Subjective        Donaldo Roberts presents to [facility]  History of Present Illness    Mr. Roberts presents today for follow up of suspected COPD. He was notable for desaturation upon coming into the office today. Has oxygen supplies but wife states that he will rarely use them. He admits to noticing some generalized weakness, shortness of breath with exertion. Not using inhalers regularly at this time either. Patient is notable for previous stroke, and significant cardiac history. Notes some forgetfulness at times and some general weakness with ambulation.       Objective   Vital Signs:  /80 (BP Location: Left arm, Patient Position: Sitting)   Pulse 95   Temp 98 °F (36.7 °C)   Ht 175.3 cm (69\")   Wt 107 kg (235 lb)   SpO2 (!) 76% Comment: up to 95 on 3L  BMI 34.70 kg/m²   Estimated body mass index is 34.7 kg/m² as calculated from the following:    Height as of this encounter: 175.3 cm (69\").    Weight as of this encounter: 107 kg (235 lb).         Physical Exam  Vitals reviewed.   Constitutional:       General: He is not in acute distress.     Appearance: He is well-developed. He is not diaphoretic.   HENT:      Head: Normocephalic and atraumatic.   Cardiovascular:      Rate and Rhythm: Normal rate and regular rhythm.   Pulmonary:      Effort: Pulmonary effort is normal.      Breath sounds: No wheezing, rhonchi or rales.   Neurological:      Mental Status: He is alert and oriented to person, place, and time.   Psychiatric:         Behavior: Behavior normal.      Result Review :  The following data was reviewed by: Stephy Estrada PA-C on 08/22/2023:    No recent imaging available.   Reviewed the chest xray report from 2021.   Reviewed the previous sleep study report from 2021.       Assessment and Plan   Diagnoses and all orders for this visit:    1. Chronic obstructive pulmonary disease, " unspecified COPD type (Primary)    2. Hypoxia    3. PATTY (obstructive sleep apnea)    4. Cigarette nicotine dependence without complication        COPD, Cigarette dependence:   Use albuterol inhaler as needed per directions  Will hold off on Trelegy.   May not have the proper inspiratory strength for it.   Started on sample of Breztri, 2 puffs twice daily  Technique was discussed. Patient tried the sample out in office. Noted some lightheadedness with use.   Rather than the full 4 puffs daily, recommend to try 2 puffs daily.   Rinse mouth out after each use.   Preferred to await imaging today (chest xray, CT chest).   Preferred to await spirometry today. Considered prior to inhaler trial, but awaited due to lightheadedness after inhaler trial.   Remains a current smoker. Did not discuss smoking cessation today.       Chronic hypoxic respiratory failure (suspect decline of baseline state rather than acute failure), PATTY history:   Saturation noted at 76% on room air.   Patient does not like to use the oxygen supplies that he already has. Does have a tank out in the car, but did not ambulate with it into the office today.   Keeps finger pulse oximeter with him.   Monitored throughout the visit to show him how SPO2 continued to fluctuate in the lower 90s (occasional dip to the upper 80s) even while seated on room air.   He was placed on 3 L continuous and improved up to 95%.   Recommended to use supplemental oxygen on 3 L with ambulation, and can titrate down as tolerated to keep saturation above 88%.   Desaturations can results in hypoxic organ injury, fall and physical injury.   Wife was present, appeared to understand the importance for use and understood the need to prevent desaturations.   Patient still seemed hesitant towards use of supplemental oxygen.   Recommended slower, deep breathing (notable for shallow breaths)  Recommended incentive spirometer use.   Could not tolerate previous trial of PAP therapy for  diagnosed PATTY.       Follow Up   Return in about 3 months (around 11/22/2023), or if symptoms worsen or fail to improve, for Next scheduled follow up.  Patient was given instructions and counseling regarding his condition or for health maintenance advice. Please see specific information pulled into the AVS if appropriate.

## 2023-09-01 PROBLEM — F17.211 CIGARETTE NICOTINE DEPENDENCE IN REMISSION: Status: RESOLVED | Noted: 2021-11-23 | Resolved: 2023-09-01

## 2023-09-05 DIAGNOSIS — I65.21 CAROTID STENOSIS, ASYMPTOMATIC, RIGHT: Primary | ICD-10-CM

## 2023-09-22 ENCOUNTER — HOSPITAL ENCOUNTER (OUTPATIENT)
Dept: CARDIOLOGY | Facility: HOSPITAL | Age: 73
Discharge: HOME | End: 2023-09-22
Payer: MEDICARE

## 2023-09-22 DIAGNOSIS — I65.21 CAROTID STENOSIS, ASYMPTOMATIC, RIGHT: ICD-10-CM

## 2023-09-22 PROCEDURE — 93880 EXTRACRANIAL BILAT STUDY: CPT

## 2023-11-03 ENCOUNTER — TELEPHONE (OUTPATIENT)
Dept: CARDIOLOGY | Facility: CLINIC | Age: 73
End: 2023-11-03
Payer: MEDICARE

## 2023-11-03 DIAGNOSIS — I10 ESSENTIAL HYPERTENSION: Primary | ICD-10-CM

## 2023-11-03 DIAGNOSIS — R60.9 SWELLING: ICD-10-CM

## 2023-11-03 DIAGNOSIS — R06.02 SHORTNESS OF BREATH: ICD-10-CM

## 2023-11-03 NOTE — TELEPHONE ENCOUNTER
Pt called stated that he is having some swelling and was wanting to know if we could call him something in and was also asking for a call back

## 2023-11-03 NOTE — TELEPHONE ENCOUNTER
Called pt and he stated his edema is in his feet and legs. He has not had any labs done recently.

## 2023-11-03 NOTE — TELEPHONE ENCOUNTER
Name: Inna Roberts      Relationship: Emergency Contact      Best Callback Number: [phone]      HUB PROVIDED THE RELAY MESSAGE FROM THE OFFICE      PATIENT: HAS FURTHER QUESTIONS AND WOULD LIKE A CALL BACK AT THE FOLLOWING PHONE NUMBER [phone]    ADDITIONAL INFORMATION: PATIENT WOULD LIKE TO KNOW IF THEY NEED AN APPT FOR THE LAB OR IF THEY JUST GO TO THE HOSPITAL AT ANY TIME.

## 2023-11-06 ENCOUNTER — LAB (OUTPATIENT)
Dept: LAB | Facility: HOSPITAL | Age: 73
End: 2023-11-06
Payer: MEDICARE

## 2023-11-06 DIAGNOSIS — R60.9 SWELLING: ICD-10-CM

## 2023-11-06 DIAGNOSIS — R06.02 SHORTNESS OF BREATH: ICD-10-CM

## 2023-11-06 DIAGNOSIS — I10 ESSENTIAL HYPERTENSION: ICD-10-CM

## 2023-11-06 LAB
ANION GAP SERPL CALCULATED.3IONS-SCNC: 6 MMOL/L (ref 5–15)
BUN SERPL-MCNC: 12 MG/DL (ref 8–23)
BUN/CREAT SERPL: 16.9 (ref 7–25)
CALCIUM SPEC-SCNC: 9.6 MG/DL (ref 8.6–10.5)
CHLORIDE SERPL-SCNC: 95 MMOL/L (ref 98–107)
CO2 SERPL-SCNC: 37 MMOL/L (ref 22–29)
CREAT SERPL-MCNC: 0.71 MG/DL (ref 0.76–1.27)
EGFRCR SERPLBLD CKD-EPI 2021: 98.1 ML/MIN/1.73
GLUCOSE SERPL-MCNC: 236 MG/DL (ref 65–99)
NT-PROBNP SERPL-MCNC: 185 PG/ML (ref 0–900)
POTASSIUM SERPL-SCNC: 4 MMOL/L (ref 3.5–5.2)
SODIUM SERPL-SCNC: 138 MMOL/L (ref 136–145)

## 2023-11-06 PROCEDURE — 36415 COLL VENOUS BLD VENIPUNCTURE: CPT

## 2023-11-06 PROCEDURE — 80048 BASIC METABOLIC PNL TOTAL CA: CPT

## 2023-11-06 PROCEDURE — 83880 ASSAY OF NATRIURETIC PEPTIDE: CPT

## 2023-11-07 RX ORDER — FUROSEMIDE 20 MG/1
20 TABLET ORAL DAILY
Qty: 5 TABLET | Refills: 0 | Status: SHIPPED | OUTPATIENT
Start: 2023-11-07

## 2023-11-10 ENCOUNTER — OFFICE VISIT (OUTPATIENT)
Dept: CARDIOLOGY | Facility: CLINIC | Age: 73
End: 2023-11-10
Payer: MEDICARE

## 2023-11-10 VITALS
BODY MASS INDEX: 35.55 KG/M2 | HEIGHT: 69 IN | DIASTOLIC BLOOD PRESSURE: 65 MMHG | SYSTOLIC BLOOD PRESSURE: 118 MMHG | WEIGHT: 240 LBS | HEART RATE: 88 BPM

## 2023-11-10 DIAGNOSIS — R06.02 SHORTNESS OF BREATH: ICD-10-CM

## 2023-11-10 DIAGNOSIS — R09.02 HYPOXIA: ICD-10-CM

## 2023-11-10 DIAGNOSIS — I10 ESSENTIAL HYPERTENSION: ICD-10-CM

## 2023-11-10 DIAGNOSIS — Z95.0 PRESENCE OF CARDIAC PACEMAKER: ICD-10-CM

## 2023-11-10 DIAGNOSIS — E78.5 DYSLIPIDEMIA: ICD-10-CM

## 2023-11-10 DIAGNOSIS — J44.9 CHRONIC OBSTRUCTIVE PULMONARY DISEASE, UNSPECIFIED COPD TYPE: Primary | ICD-10-CM

## 2023-11-10 DIAGNOSIS — I65.21 CAROTID STENOSIS, ASYMPTOMATIC, RIGHT: Primary | ICD-10-CM

## 2023-11-10 PROCEDURE — 1160F RVW MEDS BY RX/DR IN RCRD: CPT | Performed by: NURSE PRACTITIONER

## 2023-11-10 PROCEDURE — 3074F SYST BP LT 130 MM HG: CPT | Performed by: NURSE PRACTITIONER

## 2023-11-10 PROCEDURE — 3078F DIAST BP <80 MM HG: CPT | Performed by: NURSE PRACTITIONER

## 2023-11-10 PROCEDURE — 99214 OFFICE O/P EST MOD 30 MIN: CPT | Performed by: NURSE PRACTITIONER

## 2023-11-10 PROCEDURE — 1159F MED LIST DOCD IN RCRD: CPT | Performed by: NURSE PRACTITIONER

## 2023-11-10 NOTE — PROGRESS NOTES
[facility]             RuthLARY Forbes David J, MD  Donaldo Roberts  1950  11/10/2023    Patient Active Problem List   Diagnosis    Benign prostatic hyperplasia with urinary obstruction    Herpesviral infection of penis    Shortness of breath    Carotid stenosis, asymptomatic, right    Essential hypertension    Dyslipidemia    Wenckebach block    PATTY (obstructive sleep apnea)    Pre-operative cardiovascular examination    Presence of cardiac pacemaker    Severe sinus bradycardia    Chronic bronchitis    Cigarette nicotine dependence without complication    Hypoxia    Detrusor instability    Tobacco abuse       Dear Roderick Patricia MD:    Subjective     Chief Complaint   Patient presents with    Follow-up     routine       HPI:     This is a 73 y.o. male with known past medical history of hypertension, hyperlipidemia, permanent pacemaker implantation 2021, coronary artery disease, history of stroke, carotid artery disease and tobacco abuse.     Donaldo Roberts presents today for routine cardiology follow up.  Upon presentation to our clinic today oxygen saturation noted to be 76% on 2 L of oxygen via nasal cannula.  He was increased to 3 L with improvement of O2 sat to 92%.  I did review last pulmonology note from September 2023 and it was noted that patient is not compliant with wearing his oxygen or using his inhalers.  They did offer further chest imaging at that time and he declined.  Noted to be short of breath on examination today with wheezing noted.  Blood pressure stable.  Recent lab work showed stable kidney function and electrolytes.    Diagnostic Testing  Echocardiogram 11/2021: EF 66 to 70%  Carotid ultrasound 11/2021: High-grade stenosis in the internal carotid artery on the right  Nuclear stress test 11/2021: No evidence of ischemia  24-hour Holter monitor 12/2021: Predominant normal sinus rhythm with episodes of sinus tachycardia and sinus  bradycardia with bradycardic episodes about 12.4% with first-degree AV block and intermittent episodes of high-grade block, 7 beat run of ventricular tachycardia  Implantation of permanent dual-chamber pacemaker 12/2021  Cardiac event monitor 7/2022: Normal sinus rhythm with underlying atrial fibrillation     All other systems were reviewed and were negative.    Patient Active Problem List   Diagnosis    Benign prostatic hyperplasia with urinary obstruction    Herpesviral infection of penis    Shortness of breath    Carotid stenosis, asymptomatic, right    Essential hypertension    Dyslipidemia    Wenckebach block    PATTY (obstructive sleep apnea)    Pre-operative cardiovascular examination    Presence of cardiac pacemaker    Severe sinus bradycardia    Chronic bronchitis    Cigarette nicotine dependence without complication    Hypoxia    Detrusor instability    Tobacco abuse       family history includes Diabetes in his mother and sister; Heart disease in his mother; No Known Problems in his father; Stroke in his mother.     reports that he has been smoking cigarettes. He has a 13.75 pack-year smoking history. He has been exposed to tobacco smoke. He has never used smokeless tobacco. He reports that he does not drink alcohol and does not use drugs.    Allergies   Allergen Reactions    Penicillins Hives     Mother told him that, has taken amoxicillin with no problems         Current Outpatient Medications:     albuterol sulfate  (90 Base) MCG/ACT inhaler, Inhale 2 puffs Every 4 (Four) Hours As Needed for Wheezing or Shortness of Air., Disp: 18 g, Rfl: 8    amLODIPine (NORVASC) 10 MG tablet, Take 1 tablet by mouth Every Night., Disp: 90 tablet, Rfl: 1    aspirin 81 MG EC tablet, Take 1 tablet by mouth Daily., Disp: 90 tablet, Rfl: 1    clopidogrel (PLAVIX) 75 MG tablet, Take 1 tablet by mouth Daily., Disp: 30 tablet, Rfl: 11    furosemide (LASIX) 20 MG tablet, Take 1 tablet by mouth Daily., Disp: 5 tablet, Rfl:  0    lisinopril (PRINIVIL,ZESTRIL) 20 MG tablet, TAKE 1 TABLET BY MOUTH DAILY AS DIRECTED, Disp: 90 tablet, Rfl: 1    omeprazole (priLOSEC) 20 MG capsule, Take 1 capsule by mouth Daily., Disp: 90 capsule, Rfl: 1    rosuvastatin (CRESTOR) 20 MG tablet, Take 1 tablet by mouth Daily., Disp: , Rfl:       Physical Exam:  I have reviewed the patient's current vital signs as documented in the patient's EMR.   Vitals:    11/10/23 1105   BP: 118/65   Pulse: 88     Body mass index is 35.44 kg/m².       11/10/23  1105   Weight: 109 kg (240 lb)      Physical Exam  Constitutional:       General: He is not in acute distress.     Appearance: Normal appearance. He is well-developed.   HENT:      Head: Normocephalic and atraumatic.      Mouth/Throat:      Mouth: Mucous membranes are moist.   Eyes:      Extraocular Movements: Extraocular movements intact.      Pupils: Pupils are equal, round, and reactive to light.   Neck:      Vascular: No JVD.   Cardiovascular:      Rate and Rhythm: Normal rate and regular rhythm.      Heart sounds: Normal heart sounds. No murmur heard.     No S3 or S4 sounds.   Pulmonary:      Effort: Pulmonary effort is normal. No respiratory distress.      Breath sounds: Normal breath sounds. Wheezing present.   Abdominal:      General: Bowel sounds are normal. There is no distension.      Palpations: Abdomen is soft. There is no hepatomegaly.      Tenderness: There is no abdominal tenderness.   Musculoskeletal:         General: Normal range of motion.      Cervical back: Normal range of motion and neck supple.   Skin:     General: Skin is warm and dry.      Coloration: Skin is not jaundiced or pale.   Neurological:      General: No focal deficit present.      Mental Status: He is alert and oriented to person, place, and time. Mental status is at baseline.   Psychiatric:         Mood and Affect: Mood normal.         Behavior: Behavior normal.         Thought Content: Thought content normal.         Judgment:  "Judgment normal.            DATA REVIEWED:     TTE/SIXTO:  Results for orders placed during the hospital encounter of 11/01/21    Adult Transthoracic Echo Complete w/ Color, Spectral and Contrast if necessary per protocol    Interpretation Summary  · Left ventricular wall thickness is consistent with mild to moderate concentric hypertrophy.  · Left ventricular ejection fraction appears to be 66 - 70%. Left ventricular systolic function is normal.  · Left ventricular diastolic function was normal.  · The right atrial cavity is mildly dilated.      Laboratory evaluations:    Lab Results   Component Value Date    GLUCOSE 236 (H) 11/06/2023    BUN 12 11/06/2023    CREATININE 0.71 (L) 11/06/2023    EGFRIFNONA 93 12/11/2021    BCR 16.9 11/06/2023    K 4.0 11/06/2023    CO2 37.0 (H) 11/06/2023    CALCIUM 9.6 11/06/2023    ALBUMIN 4.60 06/17/2022    AST 22 06/17/2022    ALT 29 06/17/2022     Lab Results   Component Value Date    WBC 8.34 01/16/2023    HGB 14.8 01/16/2023    HCT 46.8 01/16/2023    MCV 91.8 01/16/2023     01/16/2023     Lab Results   Component Value Date    CHOL 112 03/27/2023    TRIG 175 (H) 03/27/2023    HDL 37 (L) 03/27/2023    LDL 46 03/27/2023     Lab Results   Component Value Date    TSH 0.681 12/08/2021     No results found for: \"HGBA1C\"  Lab Results   Component Value Date    ALT 29 06/17/2022     No results found for: \"HGBA1C\"  Lab Results   Component Value Date    CREATININE 0.71 (L) 11/06/2023     No results found for: \"IRON\", \"TIBC\", \"FERRITIN\"  Lab Results   Component Value Date    INR 0.82 (L) 12/08/2021    PROTIME 11.7 (L) 12/08/2021           --------------------------------------------------------------------------------------------------------------------------    ASSESSMENT/PLAN:      Diagnosis Plan   1. Carotid stenosis, asymptomatic, right        2. Essential hypertension        3. Dyslipidemia        4. Presence of cardiac pacemaker        5. Shortness of breath        6. Hypoxia   "          Shortness of breath  Tobacco abuse  Upon presentation to our office today patient's O2 sat noted to be 76 and noted to be short of breath.  Was wearing 2 L of oxygen and therefore we increased to 3 L with improvement of O2 sat to 92%.  I did review pulmonology's note and it appears patient is not very compliant with wearing his oxygen or taking his medications.  They did offer further imaging at his last visit but he declined.  I did reach out to pulmonology PA today and she is going to order chest x-ray given patient's low oxygen saturation and shortness of breath and she will follow-up on this.  Noted to have wheezing on examination.  Continues to smoke.  Not interested in quitting at this time  I did advise him to go to the ER if shortness of breath persists.    Essential hypertension  Dyslipidemia  Blood pressure controlled.  Recent lab work showed stable kidney function.  Continue current management.    5.  Carotid artery disease  Follows with CT surgery.  Recent carotid ultrasound in September 2023 showed moderate stenosis of the right carotid artery.  Recommended follow-up with CT surgery in December.  Continue aspirin and statin    6.  Permanent pacemaker implantation  Recent interrogation showed no acute events.  Continue with routine interrogations    This document has been @Electronically signed by LARY Kelley, 11/10/23, 9:56 AM EST.       Dictated Utilizing Dragon Dictation: Part of this note may be an electronic transcription/translation of spoken language to printed text using the Dragon Dictation System.    Follow-up appointment and medication changes provided in hand delivered After Visit Summary as well as reviewed in the room.

## 2023-11-10 NOTE — PROGRESS NOTES
Patent was agreeable to  imaging during cardiac evaluation today.   Will go ahead and start with xray as Spo2 was also 76% (before oxygen supplies were used), but improved to the low 90s with 2 L O2.     Likely related to underlying chronic disease, but APRN did note wheezing during the exam today (may also have underlying flare up).   Ordered chest xray.   Will consider medications as needed  Will consider follow up CT imaging as needed.

## 2023-11-15 ENCOUNTER — TELEPHONE (OUTPATIENT)
Dept: CARDIOLOGY | Facility: CLINIC | Age: 73
End: 2023-11-15
Payer: MEDICARE

## 2023-11-15 NOTE — TELEPHONE ENCOUNTER
Wife called stating the water pills that he had been taking is not helping at all and his feet are still swollen.

## 2023-11-17 ENCOUNTER — APPOINTMENT (OUTPATIENT)
Dept: GENERAL RADIOLOGY | Facility: HOSPITAL | Age: 73
End: 2023-11-17
Payer: MEDICARE

## 2023-11-17 ENCOUNTER — HOSPITAL ENCOUNTER (INPATIENT)
Facility: HOSPITAL | Age: 73
LOS: 6 days | Discharge: NURSING, REHAB OR LONG-TERM CARE | End: 2023-11-24
Attending: STUDENT IN AN ORGANIZED HEALTH CARE EDUCATION/TRAINING PROGRAM | Admitting: INTERNAL MEDICINE
Payer: MEDICARE

## 2023-11-17 DIAGNOSIS — J18.9 PNEUMONIA DUE TO INFECTIOUS ORGANISM, UNSPECIFIED LATERALITY, UNSPECIFIED PART OF LUNG: ICD-10-CM

## 2023-11-17 DIAGNOSIS — J96.21 ACUTE ON CHRONIC RESPIRATORY FAILURE WITH HYPOXIA AND HYPERCAPNIA: Primary | ICD-10-CM

## 2023-11-17 DIAGNOSIS — I50.32 CHRONIC DIASTOLIC HEART FAILURE: ICD-10-CM

## 2023-11-17 DIAGNOSIS — J44.9 CHRONIC OBSTRUCTIVE PULMONARY DISEASE, UNSPECIFIED COPD TYPE: ICD-10-CM

## 2023-11-17 DIAGNOSIS — Z99.81 SUPPLEMENTAL OXYGEN DEPENDENT: ICD-10-CM

## 2023-11-17 DIAGNOSIS — J96.22 ACUTE ON CHRONIC RESPIRATORY FAILURE WITH HYPOXIA AND HYPERCAPNIA: Primary | ICD-10-CM

## 2023-11-17 PROCEDURE — 85025 COMPLETE CBC W/AUTO DIFF WBC: CPT | Performed by: STUDENT IN AN ORGANIZED HEALTH CARE EDUCATION/TRAINING PROGRAM

## 2023-11-17 PROCEDURE — 99285 EMERGENCY DEPT VISIT HI MDM: CPT

## 2023-11-17 PROCEDURE — 85007 BL SMEAR W/DIFF WBC COUNT: CPT | Performed by: STUDENT IN AN ORGANIZED HEALTH CARE EDUCATION/TRAINING PROGRAM

## 2023-11-17 PROCEDURE — 83880 ASSAY OF NATRIURETIC PEPTIDE: CPT | Performed by: STUDENT IN AN ORGANIZED HEALTH CARE EDUCATION/TRAINING PROGRAM

## 2023-11-17 PROCEDURE — 71045 X-RAY EXAM CHEST 1 VIEW: CPT | Performed by: RADIOLOGY

## 2023-11-17 PROCEDURE — 93005 ELECTROCARDIOGRAM TRACING: CPT | Performed by: STUDENT IN AN ORGANIZED HEALTH CARE EDUCATION/TRAINING PROGRAM

## 2023-11-17 PROCEDURE — 84484 ASSAY OF TROPONIN QUANT: CPT | Performed by: STUDENT IN AN ORGANIZED HEALTH CARE EDUCATION/TRAINING PROGRAM

## 2023-11-17 PROCEDURE — 71045 X-RAY EXAM CHEST 1 VIEW: CPT

## 2023-11-17 PROCEDURE — 80048 BASIC METABOLIC PNL TOTAL CA: CPT | Performed by: STUDENT IN AN ORGANIZED HEALTH CARE EDUCATION/TRAINING PROGRAM

## 2023-11-17 RX ORDER — SODIUM CHLORIDE 0.9 % (FLUSH) 0.9 %
10 SYRINGE (ML) INJECTION AS NEEDED
Status: DISCONTINUED | OUTPATIENT
Start: 2023-11-17 | End: 2023-11-24 | Stop reason: HOSPADM

## 2023-11-17 RX ORDER — FUROSEMIDE 10 MG/ML
80 INJECTION INTRAMUSCULAR; INTRAVENOUS ONCE
Status: COMPLETED | OUTPATIENT
Start: 2023-11-17 | End: 2023-11-18

## 2023-11-17 RX ORDER — FUROSEMIDE 20 MG/1
20 TABLET ORAL DAILY
Qty: 7 TABLET | Refills: 0 | Status: ON HOLD | OUTPATIENT
Start: 2023-11-17 | End: 2023-11-18

## 2023-11-18 ENCOUNTER — APPOINTMENT (OUTPATIENT)
Dept: CT IMAGING | Facility: HOSPITAL | Age: 73
End: 2023-11-18
Payer: MEDICARE

## 2023-11-18 ENCOUNTER — APPOINTMENT (OUTPATIENT)
Dept: CARDIOLOGY | Facility: HOSPITAL | Age: 73
End: 2023-11-18
Payer: MEDICARE

## 2023-11-18 PROBLEM — J96.90 RESPIRATORY FAILURE: Status: ACTIVE | Noted: 2023-11-18

## 2023-11-18 LAB
A-A DO2: 54.7 MMHG (ref 0–300)
A-A DO2: 74.1 MMHG (ref 0–300)
A-A DO2: 91.6 MMHG (ref 0–300)
ALBUMIN SERPL-MCNC: 4 G/DL (ref 3.5–5.2)
ALBUMIN/GLOB SERPL: 1.3 G/DL
ALP SERPL-CCNC: 80 U/L (ref 39–117)
ALT SERPL W P-5'-P-CCNC: 13 U/L (ref 1–41)
AMPHET+METHAMPHET UR QL: NEGATIVE
AMPHETAMINES UR QL: NEGATIVE
ANION GAP SERPL CALCULATED.3IONS-SCNC: 3.6 MMOL/L (ref 5–15)
ANION GAP SERPL CALCULATED.3IONS-SCNC: 8.3 MMOL/L (ref 5–15)
ANISOCYTOSIS BLD QL: NORMAL
ARTERIAL PATENCY WRIST A: ABNORMAL
ARTERIAL PATENCY WRIST A: POSITIVE
ARTERIAL PATENCY WRIST A: POSITIVE
AST SERPL-CCNC: 18 U/L (ref 1–40)
ATMOSPHERIC PRESS: 724 MMHG
ATMOSPHERIC PRESS: 724 MMHG
ATMOSPHERIC PRESS: 725 MMHG
BARBITURATES UR QL SCN: NEGATIVE
BASE EXCESS BLDA CALC-SCNC: 15.9 MMOL/L (ref 0–2)
BASE EXCESS BLDA CALC-SCNC: 18.1 MMOL/L (ref 0–2)
BASE EXCESS BLDA CALC-SCNC: 19 MMOL/L (ref 0–2)
BASOPHILS # BLD AUTO: 0.02 10*3/MM3 (ref 0–0.2)
BASOPHILS # BLD AUTO: 0.03 10*3/MM3 (ref 0–0.2)
BASOPHILS NFR BLD AUTO: 0.3 % (ref 0–1.5)
BASOPHILS NFR BLD AUTO: 0.4 % (ref 0–1.5)
BDY SITE: ABNORMAL
BENZODIAZ UR QL SCN: NEGATIVE
BH CV ECHO MEAS - ACS: 1.5 CM
BH CV ECHO MEAS - AO MAX PG: 6 MMHG
BH CV ECHO MEAS - AO MEAN PG: 3 MMHG
BH CV ECHO MEAS - AO ROOT DIAM: 3.5 CM
BH CV ECHO MEAS - AO V2 MAX: 122 CM/SEC
BH CV ECHO MEAS - AO V2 VTI: 21.8 CM
BH CV ECHO MEAS - EDV(CUBED): 74.1 ML
BH CV ECHO MEAS - EDV(MOD-SP4): 123 ML
BH CV ECHO MEAS - EF(MOD-SP4): 73.8 %
BH CV ECHO MEAS - ESV(CUBED): 15.6 ML
BH CV ECHO MEAS - ESV(MOD-SP4): 32.2 ML
BH CV ECHO MEAS - FS: 40.5 %
BH CV ECHO MEAS - IVS/LVPW: 1 CM
BH CV ECHO MEAS - IVSD: 1.4 CM
BH CV ECHO MEAS - LA DIMENSION: 3.8 CM
BH CV ECHO MEAS - LAT PEAK E' VEL: 5.4 CM/SEC
BH CV ECHO MEAS - LV DIASTOLIC VOL/BSA (35-75): 52.4 CM2
BH CV ECHO MEAS - LV MASS(C)D: 224.3 GRAMS
BH CV ECHO MEAS - LV SYSTOLIC VOL/BSA (12-30): 13.7 CM2
BH CV ECHO MEAS - LVIDD: 4.2 CM
BH CV ECHO MEAS - LVIDS: 2.5 CM
BH CV ECHO MEAS - LVOT AREA: 3.5 CM2
BH CV ECHO MEAS - LVOT DIAM: 2.1 CM
BH CV ECHO MEAS - LVPWD: 1.4 CM
BH CV ECHO MEAS - MED PEAK E' VEL: 5.4 CM/SEC
BH CV ECHO MEAS - MV A MAX VEL: 124 CM/SEC
BH CV ECHO MEAS - MV E MAX VEL: 86.3 CM/SEC
BH CV ECHO MEAS - MV E/A: 0.7
BH CV ECHO MEAS - PA ACC TIME: 0.1 SEC
BH CV ECHO MEAS - PA V2 MAX: 134 CM/SEC
BH CV ECHO MEAS - PI END-D VEL: 133.4 CM/SEC
BH CV ECHO MEAS - RAP SYSTOLE: 10 MMHG
BH CV ECHO MEAS - RVSP: 65.1 MMHG
BH CV ECHO MEAS - SI(MOD-SP4): 38.7 ML/M2
BH CV ECHO MEAS - SV(MOD-SP4): 90.8 ML
BH CV ECHO MEAS - TR MAX PG: 55.1 MMHG
BH CV ECHO MEAS - TR MAX VEL: 371 CM/SEC
BH CV ECHO MEASUREMENTS AVERAGE E/E' RATIO: 15.98
BILIRUB SERPL-MCNC: 0.4 MG/DL (ref 0–1.2)
BUN SERPL-MCNC: 14 MG/DL (ref 8–23)
BUN SERPL-MCNC: 16 MG/DL (ref 8–23)
BUN/CREAT SERPL: 23.7 (ref 7–25)
BUN/CREAT SERPL: 23.9 (ref 7–25)
BUPRENORPHINE SERPL-MCNC: NEGATIVE NG/ML
CALCIUM SPEC-SCNC: 9.6 MG/DL (ref 8.6–10.5)
CALCIUM SPEC-SCNC: 9.7 MG/DL (ref 8.6–10.5)
CANNABINOIDS SERPL QL: NEGATIVE
CHLORIDE SERPL-SCNC: 94 MMOL/L (ref 98–107)
CHLORIDE SERPL-SCNC: 97 MMOL/L (ref 98–107)
CO2 BLDA-SCNC: 49.3 MMOL/L (ref 22–33)
CO2 BLDA-SCNC: 49.3 MMOL/L (ref 22–33)
CO2 BLDA-SCNC: 52.7 MMOL/L (ref 22–33)
CO2 SERPL-SCNC: 39.7 MMOL/L (ref 22–29)
CO2 SERPL-SCNC: 41.4 MMOL/L (ref 22–29)
COCAINE UR QL: NEGATIVE
COHGB MFR BLD: 1.7 % (ref 0–5)
COHGB MFR BLD: 1.9 % (ref 0–5)
COHGB MFR BLD: 1.9 % (ref 0–5)
CREAT SERPL-MCNC: 0.59 MG/DL (ref 0.76–1.27)
CREAT SERPL-MCNC: 0.67 MG/DL (ref 0.76–1.27)
D-LACTATE SERPL-SCNC: 0.9 MMOL/L (ref 0.5–2)
DEPRECATED RDW RBC AUTO: 49.9 FL (ref 37–54)
DEPRECATED RDW RBC AUTO: 49.9 FL (ref 37–54)
EGFRCR SERPLBLD CKD-EPI 2021: 103.7 ML/MIN/1.73
EGFRCR SERPLBLD CKD-EPI 2021: 99.8 ML/MIN/1.73
EOSINOPHIL # BLD AUTO: 0.12 10*3/MM3 (ref 0–0.4)
EOSINOPHIL # BLD AUTO: 0.13 10*3/MM3 (ref 0–0.4)
EOSINOPHIL NFR BLD AUTO: 1.5 % (ref 0.3–6.2)
EOSINOPHIL NFR BLD AUTO: 1.8 % (ref 0.3–6.2)
EPAP: 8
ERYTHROCYTE [DISTWIDTH] IN BLOOD BY AUTOMATED COUNT: 13 % (ref 12.3–15.4)
ERYTHROCYTE [DISTWIDTH] IN BLOOD BY AUTOMATED COUNT: 13.1 % (ref 12.3–15.4)
FENTANYL UR-MCNC: NEGATIVE NG/ML
GAS FLOW AIRWAY: 3 LPM
GAS FLOW AIRWAY: 4 LPM
GLOBULIN UR ELPH-MCNC: 3.1 GM/DL
GLUCOSE SERPL-MCNC: 104 MG/DL (ref 65–99)
GLUCOSE SERPL-MCNC: 107 MG/DL (ref 65–99)
HCO3 BLDA-SCNC: 46.6 MMOL/L (ref 20–26)
HCO3 BLDA-SCNC: 46.9 MMOL/L (ref 20–26)
HCO3 BLDA-SCNC: 49.9 MMOL/L (ref 20–26)
HCT VFR BLD AUTO: 42.4 % (ref 37.5–51)
HCT VFR BLD AUTO: 45.1 % (ref 37.5–51)
HCT VFR BLD CALC: 37.7 % (ref 38–51)
HCT VFR BLD CALC: 40.4 % (ref 38–51)
HCT VFR BLD CALC: 42.4 % (ref 38–51)
HGB BLD-MCNC: 12.2 G/DL (ref 13–17.7)
HGB BLD-MCNC: 12.9 G/DL (ref 13–17.7)
HGB BLDA-MCNC: 12.3 G/DL (ref 14–18)
HGB BLDA-MCNC: 13.2 G/DL (ref 14–18)
HGB BLDA-MCNC: 13.8 G/DL (ref 14–18)
HYPOCHROMIA BLD QL: NORMAL
HYPOCHROMIA BLD QL: NORMAL
IMM GRANULOCYTES # BLD AUTO: 0.01 10*3/MM3 (ref 0–0.05)
IMM GRANULOCYTES # BLD AUTO: 0.02 10*3/MM3 (ref 0–0.05)
IMM GRANULOCYTES NFR BLD AUTO: 0.1 % (ref 0–0.5)
IMM GRANULOCYTES NFR BLD AUTO: 0.2 % (ref 0–0.5)
INHALED O2 CONCENTRATION: 32 %
INHALED O2 CONCENTRATION: 36 %
INHALED O2 CONCENTRATION: 36 %
IPAP: 20
LYMPHOCYTES # BLD AUTO: 1.2 10*3/MM3 (ref 0.7–3.1)
LYMPHOCYTES # BLD AUTO: 1.41 10*3/MM3 (ref 0.7–3.1)
LYMPHOCYTES NFR BLD AUTO: 14.5 % (ref 19.6–45.3)
LYMPHOCYTES NFR BLD AUTO: 19.3 % (ref 19.6–45.3)
Lab: ABNORMAL
MACROCYTES BLD QL SMEAR: NORMAL
MCH RBC QN AUTO: 29.5 PG (ref 26.6–33)
MCH RBC QN AUTO: 29.7 PG (ref 26.6–33)
MCHC RBC AUTO-ENTMCNC: 28.6 G/DL (ref 31.5–35.7)
MCHC RBC AUTO-ENTMCNC: 28.8 G/DL (ref 31.5–35.7)
MCV RBC AUTO: 102.7 FL (ref 79–97)
MCV RBC AUTO: 103.7 FL (ref 79–97)
METHADONE UR QL SCN: NEGATIVE
METHGB BLD QL: 0.1 % (ref 0–3)
METHGB BLD QL: 0.1 % (ref 0–3)
METHGB BLD QL: <-0.1 % (ref 0–3)
MODALITY: ABNORMAL
MONOCYTES # BLD AUTO: 0.82 10*3/MM3 (ref 0.1–0.9)
MONOCYTES # BLD AUTO: 0.95 10*3/MM3 (ref 0.1–0.9)
MONOCYTES NFR BLD AUTO: 11.2 % (ref 5–12)
MONOCYTES NFR BLD AUTO: 11.5 % (ref 5–12)
NEUTROPHILS NFR BLD AUTO: 4.93 10*3/MM3 (ref 1.7–7)
NEUTROPHILS NFR BLD AUTO: 5.95 10*3/MM3 (ref 1.7–7)
NEUTROPHILS NFR BLD AUTO: 67.3 % (ref 42.7–76)
NEUTROPHILS NFR BLD AUTO: 71.9 % (ref 42.7–76)
NOTE: ABNORMAL
NOTIFIED BY: ABNORMAL
NOTIFIED WHO: ABNORMAL
NRBC BLD AUTO-RTO: 0 /100 WBC (ref 0–0.2)
NRBC BLD AUTO-RTO: 0 /100 WBC (ref 0–0.2)
NT-PROBNP SERPL-MCNC: 168.3 PG/ML (ref 0–900)
OPIATES UR QL: NEGATIVE
OXYCODONE UR QL SCN: NEGATIVE
OXYHGB MFR BLDV: 90.1 % (ref 94–99)
OXYHGB MFR BLDV: 90.6 % (ref 94–99)
OXYHGB MFR BLDV: 97.3 % (ref 94–99)
PCO2 BLDA: 77.3 MM HG (ref 35–45)
PCO2 BLDA: 89.2 MM HG (ref 35–45)
PCO2 BLDA: 93.9 MM HG (ref 35–45)
PCO2 TEMP ADJ BLD: ABNORMAL MM[HG]
PCP UR QL SCN: NEGATIVE
PH BLDA: 7.33 PH UNITS (ref 7.35–7.45)
PH BLDA: 7.33 PH UNITS (ref 7.35–7.45)
PH BLDA: 7.39 PH UNITS (ref 7.35–7.45)
PH, TEMP CORRECTED: ABNORMAL
PLAT MORPH BLD: NORMAL
PLAT MORPH BLD: NORMAL
PLATELET # BLD AUTO: 167 10*3/MM3 (ref 140–450)
PLATELET # BLD AUTO: 194 10*3/MM3 (ref 140–450)
PMV BLD AUTO: 10 FL (ref 6–12)
PMV BLD AUTO: 10.3 FL (ref 6–12)
PO2 BLDA: 63 MM HG (ref 83–108)
PO2 BLDA: 66.2 MM HG (ref 83–108)
PO2 BLDA: 66.8 MM HG (ref 83–108)
PO2 TEMP ADJ BLD: ABNORMAL MM[HG]
POTASSIUM SERPL-SCNC: 4.6 MMOL/L (ref 3.5–5.2)
POTASSIUM SERPL-SCNC: 4.9 MMOL/L (ref 3.5–5.2)
PROT SERPL-MCNC: 7.1 G/DL (ref 6–8.5)
QT INTERVAL: 390 MS
QTC INTERVAL: 458 MS
RBC # BLD AUTO: 4.13 10*6/MM3 (ref 4.14–5.8)
RBC # BLD AUTO: 4.35 10*6/MM3 (ref 4.14–5.8)
SAO2 % BLDCOA: 91.9 % (ref 94–99)
SAO2 % BLDCOA: 92.4 % (ref 94–99)
SAO2 % BLDCOA: 94.9 % (ref 94–99)
SET MECH RESP RATE: 24
SODIUM SERPL-SCNC: 142 MMOL/L (ref 136–145)
SODIUM SERPL-SCNC: 142 MMOL/L (ref 136–145)
TRICYCLICS UR QL SCN: NEGATIVE
TROPONIN T SERPL HS-MCNC: 26 NG/L
TROPONIN T SERPL HS-MCNC: 30 NG/L
VENTILATOR MODE: ABNORMAL
WBC NRBC COR # BLD AUTO: 7.32 10*3/MM3 (ref 3.4–10.8)
WBC NRBC COR # BLD AUTO: 8.27 10*3/MM3 (ref 3.4–10.8)

## 2023-11-18 PROCEDURE — 94799 UNLISTED PULMONARY SVC/PX: CPT

## 2023-11-18 PROCEDURE — 36600 WITHDRAWAL OF ARTERIAL BLOOD: CPT

## 2023-11-18 PROCEDURE — 82375 ASSAY CARBOXYHB QUANT: CPT

## 2023-11-18 PROCEDURE — 71275 CT ANGIOGRAPHY CHEST: CPT

## 2023-11-18 PROCEDURE — 94761 N-INVAS EAR/PLS OXIMETRY MLT: CPT

## 2023-11-18 PROCEDURE — 25010000002 CEFTRIAXONE PER 250 MG: Performed by: STUDENT IN AN ORGANIZED HEALTH CARE EDUCATION/TRAINING PROGRAM

## 2023-11-18 PROCEDURE — 71275 CT ANGIOGRAPHY CHEST: CPT | Performed by: RADIOLOGY

## 2023-11-18 PROCEDURE — 25810000003 SODIUM CHLORIDE 0.9 % SOLUTION 250 ML FLEX CONT: Performed by: STUDENT IN AN ORGANIZED HEALTH CARE EDUCATION/TRAINING PROGRAM

## 2023-11-18 PROCEDURE — 25010000002 HEPARIN (PORCINE) PER 1000 UNITS: Performed by: INTERNAL MEDICINE

## 2023-11-18 PROCEDURE — 80053 COMPREHEN METABOLIC PANEL: CPT | Performed by: INTERNAL MEDICINE

## 2023-11-18 PROCEDURE — 82805 BLOOD GASES W/O2 SATURATION: CPT

## 2023-11-18 PROCEDURE — 83605 ASSAY OF LACTIC ACID: CPT | Performed by: STUDENT IN AN ORGANIZED HEALTH CARE EDUCATION/TRAINING PROGRAM

## 2023-11-18 PROCEDURE — 83050 HGB METHEMOGLOBIN QUAN: CPT

## 2023-11-18 PROCEDURE — 25010000002 INFLUENZA VAC HIGH-DOSE QUAD 0.7 ML SUSPENSION PREFILLED SYRINGE: Performed by: INTERNAL MEDICINE

## 2023-11-18 PROCEDURE — 90662 IIV NO PRSV INCREASED AG IM: CPT | Performed by: INTERNAL MEDICINE

## 2023-11-18 PROCEDURE — 87040 BLOOD CULTURE FOR BACTERIA: CPT | Performed by: STUDENT IN AN ORGANIZED HEALTH CARE EDUCATION/TRAINING PROGRAM

## 2023-11-18 PROCEDURE — 85007 BL SMEAR W/DIFF WBC COUNT: CPT | Performed by: INTERNAL MEDICINE

## 2023-11-18 PROCEDURE — 94660 CPAP INITIATION&MGMT: CPT

## 2023-11-18 PROCEDURE — 36415 COLL VENOUS BLD VENIPUNCTURE: CPT

## 2023-11-18 PROCEDURE — 25010000002 AZITHROMYCIN PER 500 MG: Performed by: STUDENT IN AN ORGANIZED HEALTH CARE EDUCATION/TRAINING PROGRAM

## 2023-11-18 PROCEDURE — 80307 DRUG TEST PRSMV CHEM ANLYZR: CPT | Performed by: STUDENT IN AN ORGANIZED HEALTH CARE EDUCATION/TRAINING PROGRAM

## 2023-11-18 PROCEDURE — 99223 1ST HOSP IP/OBS HIGH 75: CPT | Performed by: INTERNAL MEDICINE

## 2023-11-18 PROCEDURE — 84484 ASSAY OF TROPONIN QUANT: CPT | Performed by: STUDENT IN AN ORGANIZED HEALTH CARE EDUCATION/TRAINING PROGRAM

## 2023-11-18 PROCEDURE — 25010000002 FUROSEMIDE PER 20 MG: Performed by: INTERNAL MEDICINE

## 2023-11-18 PROCEDURE — 25510000001 IOPAMIDOL PER 1 ML: Performed by: STUDENT IN AN ORGANIZED HEALTH CARE EDUCATION/TRAINING PROGRAM

## 2023-11-18 PROCEDURE — 93306 TTE W/DOPPLER COMPLETE: CPT

## 2023-11-18 PROCEDURE — 25010000002 FUROSEMIDE PER 20 MG: Performed by: STUDENT IN AN ORGANIZED HEALTH CARE EDUCATION/TRAINING PROGRAM

## 2023-11-18 PROCEDURE — G0008 ADMIN INFLUENZA VIRUS VAC: HCPCS | Performed by: INTERNAL MEDICINE

## 2023-11-18 PROCEDURE — 25010000002 SULFUR HEXAFLUORIDE MICROSPH 60.7-25 MG RECONSTITUTED SUSPENSION: Performed by: STUDENT IN AN ORGANIZED HEALTH CARE EDUCATION/TRAINING PROGRAM

## 2023-11-18 PROCEDURE — 85025 COMPLETE CBC W/AUTO DIFF WBC: CPT | Performed by: INTERNAL MEDICINE

## 2023-11-18 RX ORDER — HEPARIN SODIUM 5000 [USP'U]/ML
5000 INJECTION, SOLUTION INTRAVENOUS; SUBCUTANEOUS EVERY 12 HOURS SCHEDULED
Status: DISCONTINUED | OUTPATIENT
Start: 2023-11-18 | End: 2023-11-24 | Stop reason: HOSPADM

## 2023-11-18 RX ORDER — CLOPIDOGREL BISULFATE 75 MG/1
75 TABLET ORAL DAILY
Status: DISCONTINUED | OUTPATIENT
Start: 2023-11-18 | End: 2023-11-24 | Stop reason: HOSPADM

## 2023-11-18 RX ORDER — LISINOPRIL 20 MG/1
20 TABLET ORAL DAILY
COMMUNITY
End: 2023-12-01

## 2023-11-18 RX ORDER — ROSUVASTATIN CALCIUM 10 MG/1
10 TABLET, COATED ORAL DAILY
COMMUNITY
End: 2023-11-30

## 2023-11-18 RX ORDER — BISACODYL 5 MG/1
5 TABLET, DELAYED RELEASE ORAL DAILY PRN
Status: DISCONTINUED | OUTPATIENT
Start: 2023-11-18 | End: 2023-11-24 | Stop reason: HOSPADM

## 2023-11-18 RX ORDER — SODIUM CHLORIDE 9 MG/ML
40 INJECTION, SOLUTION INTRAVENOUS AS NEEDED
Status: DISCONTINUED | OUTPATIENT
Start: 2023-11-18 | End: 2023-11-24 | Stop reason: HOSPADM

## 2023-11-18 RX ORDER — AMLODIPINE BESYLATE 10 MG/1
10 TABLET ORAL NIGHTLY
Status: DISCONTINUED | OUTPATIENT
Start: 2023-11-18 | End: 2023-11-24 | Stop reason: HOSPADM

## 2023-11-18 RX ORDER — SODIUM CHLORIDE 0.9 % (FLUSH) 0.9 %
10 SYRINGE (ML) INJECTION EVERY 12 HOURS SCHEDULED
Status: DISCONTINUED | OUTPATIENT
Start: 2023-11-18 | End: 2023-11-24 | Stop reason: HOSPADM

## 2023-11-18 RX ORDER — AMOXICILLIN 250 MG
2 CAPSULE ORAL 2 TIMES DAILY
Status: DISCONTINUED | OUTPATIENT
Start: 2023-11-18 | End: 2023-11-24 | Stop reason: HOSPADM

## 2023-11-18 RX ORDER — FUROSEMIDE 10 MG/ML
80 INJECTION INTRAMUSCULAR; INTRAVENOUS ONCE
Status: COMPLETED | OUTPATIENT
Start: 2023-11-18 | End: 2023-11-18

## 2023-11-18 RX ORDER — SODIUM CHLORIDE 0.9 % (FLUSH) 0.9 %
10 SYRINGE (ML) INJECTION AS NEEDED
Status: DISCONTINUED | OUTPATIENT
Start: 2023-11-18 | End: 2023-11-24 | Stop reason: HOSPADM

## 2023-11-18 RX ORDER — LISINOPRIL 10 MG/1
20 TABLET ORAL DAILY
Status: DISCONTINUED | OUTPATIENT
Start: 2023-11-18 | End: 2023-11-24 | Stop reason: HOSPADM

## 2023-11-18 RX ORDER — ROSUVASTATIN CALCIUM 10 MG/1
10 TABLET, COATED ORAL DAILY
Status: DISCONTINUED | OUTPATIENT
Start: 2023-11-18 | End: 2023-11-24 | Stop reason: HOSPADM

## 2023-11-18 RX ORDER — BISACODYL 10 MG
10 SUPPOSITORY, RECTAL RECTAL DAILY PRN
Status: DISCONTINUED | OUTPATIENT
Start: 2023-11-18 | End: 2023-11-24 | Stop reason: HOSPADM

## 2023-11-18 RX ORDER — FUROSEMIDE 20 MG/1
20 TABLET ORAL DAILY PRN
Status: CANCELLED | OUTPATIENT
Start: 2023-11-18

## 2023-11-18 RX ORDER — POLYETHYLENE GLYCOL 3350 17 G/17G
17 POWDER, FOR SOLUTION ORAL DAILY PRN
Status: DISCONTINUED | OUTPATIENT
Start: 2023-11-18 | End: 2023-11-24 | Stop reason: HOSPADM

## 2023-11-18 RX ORDER — FUROSEMIDE 20 MG/1
20 TABLET ORAL DAILY PRN
COMMUNITY
End: 2023-11-29 | Stop reason: HOSPADM

## 2023-11-18 RX ADMIN — HEPARIN SODIUM 5000 UNITS: 5000 INJECTION INTRAVENOUS; SUBCUTANEOUS at 22:55

## 2023-11-18 RX ADMIN — Medication 10 ML: at 22:51

## 2023-11-18 RX ADMIN — FUROSEMIDE 80 MG: 10 INJECTION, SOLUTION INTRAMUSCULAR; INTRAVENOUS at 08:18

## 2023-11-18 RX ADMIN — CLOPIDOGREL BISULFATE 75 MG: 75 TABLET, FILM COATED ORAL at 13:11

## 2023-11-18 RX ADMIN — HEPARIN SODIUM 5000 UNITS: 5000 INJECTION INTRAVENOUS; SUBCUTANEOUS at 08:17

## 2023-11-18 RX ADMIN — ROSUVASTATIN CALCIUM 10 MG: 10 TABLET, FILM COATED ORAL at 13:11

## 2023-11-18 RX ADMIN — AZITHROMYCIN MONOHYDRATE 500 MG: 500 INJECTION, POWDER, LYOPHILIZED, FOR SOLUTION INTRAVENOUS at 03:43

## 2023-11-18 RX ADMIN — Medication 10 ML: at 08:19

## 2023-11-18 RX ADMIN — IOPAMIDOL 80 ML: 755 INJECTION, SOLUTION INTRAVENOUS at 04:33

## 2023-11-18 RX ADMIN — FUROSEMIDE 80 MG: 10 INJECTION, SOLUTION INTRAMUSCULAR; INTRAVENOUS at 00:01

## 2023-11-18 RX ADMIN — INFLUENZA A VIRUS A/VICTORIA/4897/2022 IVR-238 (H1N1) ANTIGEN (FORMALDEHYDE INACTIVATED), INFLUENZA A VIRUS A/DARWIN/9/2021 SAN-010 (H3N2) ANTIGEN (FORMALDEHYDE INACTIVATED), INFLUENZA B VIRUS B/PHUKET/3073/2013 ANTIGEN (FORMALDEHYDE INACTIVATED), AND INFLUENZA B VIRUS B/MICHIGAN/01/2021 ANTIGEN (FORMALDEHYDE INACTIVATED) 0.7 ML: 60; 60; 60; 60 INJECTION, SUSPENSION INTRAMUSCULAR at 09:47

## 2023-11-18 RX ADMIN — SULFUR HEXAFLUORIDE 2 ML: KIT at 18:00

## 2023-11-18 RX ADMIN — CEFTRIAXONE 2000 MG: 2 INJECTION, POWDER, FOR SOLUTION INTRAMUSCULAR; INTRAVENOUS at 03:44

## 2023-11-18 RX ADMIN — LISINOPRIL 20 MG: 10 TABLET ORAL at 13:11

## 2023-11-18 RX ADMIN — DOCUSATE SODIUM 50 MG AND SENNOSIDES 8.6 MG 2 TABLET: 8.6; 5 TABLET, FILM COATED ORAL at 08:19

## 2023-11-18 NOTE — ED NOTES
RN attempted multiple times to establish IV access on pt.  attempted to stick pt for blood draw. Unsuccessful. Lead RN made aware and stated that he would try to stick pt.

## 2023-11-18 NOTE — H&P
[facility]   HISTORY AND PHYSICAL    Patient Name: Donaldo Roberts  : 1950  MRN: [id number]  Primary Care Physician:  Roderick Patricia MD  Date of admission: 2023    Subjective   Subjective     Chief Complaint: SOA, lower extremity edema    History of Present Illness the patient is a 73-year-old male with past medical history significant for right-sided carotid artery stenosis, chronic respiratory failure, suspected COPD, PATTY, CVA and status post permanent pacemaker placement who presents to the emergency department complaining of progressive shortness of air and lower extremity edema.    Patient was seen and examined in .     Patient reports progressive shortness of air.  He denies any fever and/or significant cough.  Patient reports some intermittent chest tightness but states that it is nonradiating.  The patient reports progressive lower extremity edema.  He reports compliance with supplemental oxygen and compliance with Lasix therapy.  HPI is limited as patient is currently on BiPAP and is somewhat somnolent.    Per chart review, the patient was seen in follow-up by cardiology on 11/10/2023.  During that visit, patient's O2 saturation was 76% via his 2 L/min nasal cannula.  Staff increased his supplemental oxygen to 3 L/min nasal cannula and his O2 saturation improved to 92%.  Patient refused ER evaluation and at that time.    Review of Systems   Limited due to BIPAP. He denies any significant cough. Reports progressive SOA. He reports intermittent/occasional chest tightness but unable to elaborate. States he is compliant with his supplemental O2 and wears it continuously. Reports progressive lower extremity edema.     Personal History     Past Medical History:   Diagnosis Date    Acid reflux     Anxiety     Arthritis     Carotid artery stenosis, symptomatic, right 2021    Essential hypertension 2021    Herpes     History of lipoma     History of transfusion     Hyperlipidemia      Inguinal hernia     Peptic ulcer     Stroke        Past Surgical History:   Procedure Laterality Date    ABDOMINAL SURGERY      CARDIAC ELECTROPHYSIOLOGY PROCEDURE N/A 12/10/2021    Procedure: Pacemaker DC new;  Surgeon: Jony Monte MD;  Location: [facility] CATH INVASIVE LOCATION;  Service: Cardiology;  Laterality: N/A;    CAROTID ENDARTERECTOMY Left 2021    CHOLECYSTECTOMY WITH INTRAOPERATIVE CHOLANGIOGRAM N/A 07/25/2017    Procedure: CHOLECYSTECTOMY LAPAROSCOPIC INTRAOPERATIVE CHOLANGIOGRAM;  Surgeon: Luis Coe MD;  Location: [facility] OR;  Service:     HERNIA REPAIR      inguinal    SINUS SURGERY      TUMOR EXCISION         Family History: family history includes Diabetes in his mother and sister; Heart disease in his mother; No Known Problems in his father; Stroke in his mother.     Social History:  reports that he has been smoking cigarettes. He has a 13.75 pack-year smoking history. He has been exposed to tobacco smoke. He has never used smokeless tobacco. He reports that he does not drink alcohol and does not use drugs.    Home Medications:  albuterol sulfate HFA, amLODIPine, aspirin, clopidogrel, furosemide, lisinopril, omeprazole, and rosuvastatin    Allergies:  Allergies   Allergen Reactions    Penicillins Hives     Mother told him that, has taken amoxicillin with no problems       Objective    Objective     Vitals:   Temp:  [97.7 °F (36.5 °C)] 97.7 °F (36.5 °C)  Heart Rate:  [81-85] 81  Resp:  [20-28] 28  BP: (129-132)/(72-85) 129/72  Flow (L/min):  [3] 3    Physical Exam  Constitutional:       General: He is not in acute distress.     Appearance: He is well-developed. He is obese. He is ill-appearing.      Interventions: Face mask in place.   HENT:      Head: Normocephalic and atraumatic.   Eyes:      Conjunctiva/sclera: Conjunctivae normal.   Neck:      Trachea: No tracheal deviation.   Cardiovascular:      Rate and Rhythm: Normal rate and regular rhythm.      Pulses:           Dorsalis pedis  pulses are 2+ on the right side and 2+ on the left side.      Heart sounds: No murmur heard.     No friction rub. No gallop.   Pulmonary:      Effort: No respiratory distress.      Breath sounds: Decreased breath sounds present. No wheezing or rales.   Abdominal:      General: Abdomen is protuberant. Bowel sounds are decreased. There is no distension.      Palpations: Abdomen is soft.      Tenderness: There is no abdominal tenderness. There is no guarding.   Musculoskeletal:      Right lower leg: Edema present.      Left lower leg: Edema present.   Skin:     General: Skin is warm and dry.      Findings: No erythema or rash.      Comments: Venous stasis bilateral lower extremities   Neurological:      Mental Status: He is alert.      Cranial Nerves: No cranial nerve deficit.      Comments: Oriented to self and place; can follow commands         Result Review    Result Review:  I have personally reviewed the results from the time of this admission to 11/18/2023 03:40 EST and agree with these findings:  [x]  Laboratory list / accordion  []  Microbiology  [x]  Radiology  [x]  EKG/Telemetry   []  Cardiology/Vascular   []  Pathology  [x]  Old records  []  Other:  Most notable findings include:     EKG: pending cardiology interpretation, however, per my view atrial paced rhythm    Results from last 7 days   Lab Units 11/17/23  2357   SODIUM mmol/L 142   POTASSIUM mmol/L 4.9   CHLORIDE mmol/L 97*   CO2 mmol/L 41.4*   BUN mg/dL 16   CREATININE mg/dL 0.67*   CALCIUM mg/dL 9.6   GLUCOSE mg/dL 107*     Results from last 7 days   Lab Units 11/17/23  2357   WBC 10*3/mm3 7.32   HEMOGLOBIN g/dL 12.2*   HEMATOCRIT % 42.4   PLATELETS 10*3/mm3 194         CXR (images reviewed):    IMPRESSION:  FINDINGS/IMPRESSION:    Cardiomegaly. Multifocal infiltrates/edema. No pneumothorax or pleural  effusion. No acute fracture. Left-sided pacemaker.    Assessment / Plan     #Acute hypercapnic on chronic hypoxic respiratory failure, present on  admission  #Suspect acute heart failure/volume overload  #Acute NSTEMI, type I versus type II (suspect 2) and due to #1  #History of PATTY, noncompliant with CPAP  #Obesity per BMI 33.97 kg/m²  -Patient will be admitted to the progressive care unit with BiPAP.  I have requested a repeat ABG later this morning  -Encourage compliance with supplemental oxygen  -CT scan of the chest with PE protocol ordered and currently pending  -Patient received 80 mg IV Lasix in the ED; will monitor for response and plan to continue for now  -Daily weights and strict intake/output  -I have ordered an updated echocardiogram  -Plan to repeat patient's chemistry panel in a.m. to follow on his electrolytes and renal function  -Consider cardiology consultation pending results and clinical course    #COPD, currently do not suspect acute exacerbation  #Chronic hypoxic respiratory failure on 3 L/min nasal cannula  #Tobacco use  -Patient's clinical presentation currently appears more consistent with possible new onset acute HF rather than COPD  -Patient received IV azithromycin and rocephin in the ED; will hold on any further antibiotic therapy for now  -No wheezing on examination; no productive cough per patient report  -Monitor closely for any changes; can resume any chronic COPD medications pending pharmacy completion of his home medication list    Chronic medical conditions:  -Status post permanent pacemaker placement  -Right-sided carotid artery stenosis, followed by CT surgery in the outpatient setting  -Essential hypertension that is currently well controlled  -History of stroke  -Anxiety  -GERD    DVT prophylaxis:  [facility]    CODE STATUS:    Code Status (Patient has no pulse and is not breathing): CPR (Attempt to Resuscitate)  Medical Interventions (Patient has pulse or is breathing): Full Support    Admission Status:  I believe this patient meets inpatient status.    Patient is considered to be a high risk patient due to: Acute  hypercapnia, NSTEMI, obesity, COPD    Sarika White, DO

## 2023-11-18 NOTE — PROGRESS NOTES
Psychiatric HOSPITALIST PROGRESS NOTE     Patient Identification:  Name:  Donaldo Roberts  Age:  73 y.o.  Sex:  male  :  1950  MRN:  [id number]  Visit Number:  [id number]  ROOM: [address]     Primary Care Provider:  Roderick Patricia MD    Length of stay in inpatient status:  0    Subjective     Chief Compliant:    Chief Complaint   Patient presents with    Shortness of Breath    Leg Swelling       History of Presenting Illness: Patient seen and examined in follow-up for acute hypercapnic chronic hypoxemic respiratory failure with concern for volume overload secondary to acute heart failure with likely type II NSTEMI.  Patient seen and evaluated after being admitted earlier this morning and slightly confused and reorientation as well as discussing need to stay in the hospital as patient has been requesting to leave AMA.  Patient's 2 sons present at bedside during the visit and helping to encourage patient to stay in hospital.    Objective     Current Hospital Meds:  amLODIPine, 10 mg, Oral, Nightly  clopidogrel, 75 mg, Oral, Daily  heparin (porcine), 5,000 Units, Subcutaneous, Q12H  lisinopril, 20 mg, Oral, Daily  nicotine, 1 patch, Transdermal, Q24H  rosuvastatin, 10 mg, Oral, Daily  senna-docusate sodium, 2 tablet, Oral, BID  sodium chloride, 10 mL, Intravenous, Q12H  ziprasidone (GEODON) 20 mg in sterile water (preservative free) 1 mL injection, 20 mg, Intramuscular, Once         ----------------------------------------------------------------------------------------------------------------------  Vital Signs:  Temp:  [97.5 °F (36.4 °C)-97.7 °F (36.5 °C)] 97.5 °F (36.4 °C)  Heart Rate:  [] 84  Resp:  [10-35] 22  BP: (128-153)/(52-97) 153/52  SpO2:  [90 %-98 %] 91 %  on  Flow (L/min):  [3-5] 5;   Device (Oxygen Therapy): NPPV/NIV  Body mass index is 40.01 kg/m².      Intake/Output Summary (Last 24 hours) at 2023 1650  Last data filed at 2023 1311  Gross per 24 hour   Intake 1055  "ml   Output 2300 ml   Net -1245 ml      ----------------------------------------------------------------------------------------------------------------------  Physical exam:  Constitutional: Obese chronically ill-appearing elderly adult male.  HENT:  Head:  Normocephalic and atraumatic.  Mouth:  Moist mucous membranes.    Eyes:  Conjunctivae and EOM are normal. No scleral icterus.    Neck:  Neck supple.  No JVD present.    Cardiovascular:  Normal rate, regular rhythm and normal heart sounds with no murmur.  Pulmonary/Chest:  No respiratory distress, no wheezes, n with reduced air movement and breath sounds throughout.  Abdominal:  Soft but obese.  Bowel sounds are normal.  No distension and no tenderness.   Musculoskeletal:  No tenderness or deformity.  No red or swollen joints anywhere.     Neurological:  Alert and oriented to person but not place or time.  No cranial nerve deficit.  No tongue deviation.  No facial droop.  No slurred speech. Intact Sensation throughout  Skin:  Skin is warm and dry. No rash or lesion noted. No pallor.   Peripheral vascular:  Pulses in all 4 extremities with no clubbing, no cyanosis, moderate to severe bilateral lower extremity edema with bilateral venous stasis.  Psychiatric: Appropriate mood and affect, pleasant.   ----------------------------------------------------------------------------------------------------------------------  WBC/HGB/HCT/PLT   8.27/12.9/45.1/167 (11/18 0555)  BUN/CREAT/GLUC/ALT/AST/HANDY/LIP    14/0.59/104/13/18/--/-- (11/18 0555)  LYTES - Na/K/Cl/CO2: 142/4.6/94*/39.7* (11/18 0555)     pH/pCO2/pO2/HCO3   7.333/93.9/66.2/49.9 (11/18 1424)  No results found for: \"URINECX\"  No results found for: \"BLOODCX\"    I have personally looked at the labs and they are summarized above.  ----------------------------------------------------------------------------------------------------------------------  Detailed radiology reports for the last 24 hours:  CT Angiogram " Chest Pulmonary Embolism    Result Date: 11/18/2023   NEGATIVE FOR PULMONARY EMBOLISM.  CARDIOMEGALY.  This report was finalized on 11/18/2023 5:03 AM by Yakelin Turk MD.      XR Chest 1 View    Result Date: 11/18/2023  FINDINGS/IMPRESSION:  Cardiomegaly. Multifocal infiltrates/edema. No pneumothorax or pleural effusion. No acute fracture. Left-sided pacemaker.    This report was finalized on 11/18/2023 1:07 AM by Alex Pallas, DO.     Assessment & Plan      Acute hypercapnic on chronic hypoxemic respiratory failure  Volume overload with concern for decompensated heart failure  NSTEMI, type II suspected secondary to hypoxia from above  History of obstructive sleep apnea, noncompliant with CPAP  Obesity, suspected underlying obesity hypoventilation    -Patient with significant hypercapnia with chronic hypoxia on supplemental oxygen at home placed on BiPAP at presentation.  CO2 9 showing compensation on follow-up ABGs    -We will attempt to give patient trial of oral BiPAP as able to support oral intake.    -Agree clinically patient with evidence of decompensated heart failure, transthoracic echocardiogram ordered and pending    -Delay in administration of 80 mg of IV Lasix ordered in the emergency department and not given till upon the floor we will hold on further diuretics today and monitor urine output.    -Consider cardiology consultation pending results of echocardiogram.    COPD, not in exacerbation  Chronic hypoxemic respiratory failure on 3 L nasal cannula  Chronic smoking tobacco use    -Patient without wheezing although noted to have hypercapnia suspect more volume related and likely decompensated heart failure.    -Hold on further corticosteroid therapy at this time.    -No evidence of any infectious etiology or infiltrates on imaging we will hold on antibiotic therapy.    Chronic medical problems:  S/p PPM  Right carotid artery stenosis  Essential hypertension  History of ischemic stroke  Anxiety  GERD     -Continue home medications and supportive care as appropriate.    Copied text in portions of the note has been reviewed and is accurate as of .toay    VTE Prophylaxis:   Mechanical Order History:       None          Pharmalogical Order History:        Ordered     Dose Route Frequency Stop    11/18/23 0535  heparin (porcine) 5000 UNIT/ML injection 5,000 Units         5,000 Units SC Every 12 Hours Scheduled --                    Disposition likely home with family pending clinical course    Donaldo Stokes DO  [facility]  11/18/23  16:50 EST

## 2023-11-18 NOTE — PAYOR COMM NOTE
"[facility]  [place] DINH  PHONE  [phone]  FAX  [phone]  NPI:  [id number]    REQUEST FOR INPATIENT AUTH    Donaldo Roberts (73 y.o. Male)       Date of Birth   1950    Social Security Number       Address   [address] [address]    Home Phone   [phone]    MRN   [id number]       Yarsani   Lutheran    Marital Status                               Admission Date   23    Admission Type   Emergency    Admitting Provider   Sarika White DO    Attending Provider   Donaldo Stokes DO    Department, Room/Bed   [facility] PROGRESS CARE, P219/1P       Discharge Date       Discharge Disposition       Discharge Destination                                 Attending Provider: Donadlo Stokes DO    Allergies: Penicillins    Isolation: None   Infection: None   Code Status: CPR    Ht: 175.3 cm (69\")   Wt: 123 kg (270 lb 15.1 oz)    Admission Cmt: None   Principal Problem: Respiratory failure [J96.90]                   Active Insurance as of 2023       Primary Coverage       Payor Plan Insurance Group Employer/Plan Group    [facility] MEDICARE REPLACEMENT [employer] MEDICARE REPLACEMENT        Payor Plan Address Payor Plan Phone Number Payor Plan Fax Number Effective Dates    [address] [address] [phone]  1/10/2022 - None Entered    [facility] [id number]         Subscriber Name Subscriber Birth Date Member ID       DONALDO ROBERTS 1950 [id number]                     Emergency Contacts        (Rel.) Home Phone Work Phone Mobile Phone    Inna Roberts (Spouse) [phone] -- [phone]    Chad Roberts (Son) [phone] -- [phone]                 History & Physical        Sarika White DO at 23 0340          [facility]   HISTORY AND PHYSICAL    Patient Name: Donaldo Roberts  : 1950  MRN: [id number]  Primary Care Physician:  Roderick Patricia MD  Date of admission: 2023    Subjective  Subjective "     Chief Complaint: SOA, lower extremity edema    History of Present Illness the patient is a 73-year-old male with past medical history significant for right-sided carotid artery stenosis, chronic respiratory failure, suspected COPD, PATTY, CVA and status post permanent pacemaker placement who presents to the emergency department complaining of progressive shortness of air and lower extremity edema.    Patient was seen and examined in .     Patient reports progressive shortness of air.  He denies any fever and/or significant cough.  Patient reports some intermittent chest tightness but states that it is nonradiating.  The patient reports progressive lower extremity edema.  He reports compliance with supplemental oxygen and compliance with Lasix therapy.  HPI is limited as patient is currently on BiPAP and is somewhat somnolent.    Per chart review, the patient was seen in follow-up by cardiology on 11/10/2023.  During that visit, patient's O2 saturation was 76% via his 2 L/min nasal cannula.  Staff increased his supplemental oxygen to 3 L/min nasal cannula and his O2 saturation improved to 92%.  Patient refused ER evaluation and at that time.    Review of Systems   Limited due to BIPAP. He denies any significant cough. Reports progressive SOA. He reports intermittent/occasional chest tightness but unable to elaborate. States he is compliant with his supplemental O2 and wears it continuously. Reports progressive lower extremity edema.     Personal History     Past Medical History:   Diagnosis Date    Acid reflux     Anxiety     Arthritis     Carotid artery stenosis, symptomatic, right 11/23/2021    Essential hypertension 11/23/2021    Herpes     History of lipoma     History of transfusion     Hyperlipidemia     Inguinal hernia     Peptic ulcer     Stroke        Past Surgical History:   Procedure Laterality Date    ABDOMINAL SURGERY      CARDIAC ELECTROPHYSIOLOGY PROCEDURE N/A 12/10/2021    Procedure: Pacemaker  PATSY bell;  Surgeon: Jony Monte MD;  Location: [facility] CATH INVASIVE LOCATION;  Service: Cardiology;  Laterality: N/A;    CAROTID ENDARTERECTOMY Left 2021    CHOLECYSTECTOMY WITH INTRAOPERATIVE CHOLANGIOGRAM N/A 07/25/2017    Procedure: CHOLECYSTECTOMY LAPAROSCOPIC INTRAOPERATIVE CHOLANGIOGRAM;  Surgeon: Luis Coe MD;  Location: [facility] OR;  Service:     HERNIA REPAIR      inguinal    SINUS SURGERY      TUMOR EXCISION         Family History: family history includes Diabetes in his mother and sister; Heart disease in his mother; No Known Problems in his father; Stroke in his mother.     Social History:  reports that he has been smoking cigarettes. He has a 13.75 pack-year smoking history. He has been exposed to tobacco smoke. He has never used smokeless tobacco. He reports that he does not drink alcohol and does not use drugs.    Home Medications:  albuterol sulfate HFA, amLODIPine, aspirin, clopidogrel, furosemide, lisinopril, omeprazole, and rosuvastatin    Allergies:  Allergies   Allergen Reactions    Penicillins Hives     Mother told him that, has taken amoxicillin with no problems       Objective   Objective     Vitals:   Temp:  [97.7 °F (36.5 °C)] 97.7 °F (36.5 °C)  Heart Rate:  [81-85] 81  Resp:  [20-28] 28  BP: (129-132)/(72-85) 129/72  Flow (L/min):  [3] 3    Physical Exam  Constitutional:       General: He is not in acute distress.     Appearance: He is well-developed. He is obese. He is ill-appearing.      Interventions: Face mask in place.   HENT:      Head: Normocephalic and atraumatic.   Eyes:      Conjunctiva/sclera: Conjunctivae normal.   Neck:      Trachea: No tracheal deviation.   Cardiovascular:      Rate and Rhythm: Normal rate and regular rhythm.      Pulses:           Dorsalis pedis pulses are 2+ on the right side and 2+ on the left side.      Heart sounds: No murmur heard.     No friction rub. No gallop.   Pulmonary:      Effort: No respiratory distress.      Breath sounds: Decreased  breath sounds present. No wheezing or rales.   Abdominal:      General: Abdomen is protuberant. Bowel sounds are decreased. There is no distension.      Palpations: Abdomen is soft.      Tenderness: There is no abdominal tenderness. There is no guarding.   Musculoskeletal:      Right lower leg: Edema present.      Left lower leg: Edema present.   Skin:     General: Skin is warm and dry.      Findings: No erythema or rash.      Comments: Venous stasis bilateral lower extremities   Neurological:      Mental Status: He is alert.      Cranial Nerves: No cranial nerve deficit.      Comments: Oriented to self and place; can follow commands         Result Review   Result Review:  I have personally reviewed the results from the time of this admission to 11/18/2023 03:40 EST and agree with these findings:  [x]  Laboratory list / accordion  []  Microbiology  [x]  Radiology  [x]  EKG/Telemetry   []  Cardiology/Vascular   []  Pathology  [x]  Old records  []  Other:  Most notable findings include:     EKG: pending cardiology interpretation, however, per my view atrial paced rhythm    Results from last 7 days   Lab Units 11/17/23  2357   SODIUM mmol/L 142   POTASSIUM mmol/L 4.9   CHLORIDE mmol/L 97*   CO2 mmol/L 41.4*   BUN mg/dL 16   CREATININE mg/dL 0.67*   CALCIUM mg/dL 9.6   GLUCOSE mg/dL 107*     Results from last 7 days   Lab Units 11/17/23  2357   WBC 10*3/mm3 7.32   HEMOGLOBIN g/dL 12.2*   HEMATOCRIT % 42.4   PLATELETS 10*3/mm3 194         CXR (images reviewed):    IMPRESSION:  FINDINGS/IMPRESSION:    Cardiomegaly. Multifocal infiltrates/edema. No pneumothorax or pleural  effusion. No acute fracture. Left-sided pacemaker.    Assessment / Plan     #Acute hypercapnic on chronic hypoxic respiratory failure, present on admission  #Suspect acute heart failure/volume overload  #Acute NSTEMI, type I versus type II (suspect 2) and due to #1  #History of PATTY, noncompliant with CPAP  #Obesity per BMI 33.97 kg/m²  -Patient will be  admitted to the progressive care unit with BiPAP.  I have requested a repeat ABG later this morning  -Encourage compliance with supplemental oxygen  -CT scan of the chest with PE protocol ordered and currently pending  -Patient received 80 mg IV Lasix in the ED; will monitor for response and plan to continue for now  -Daily weights and strict intake/output  -I have ordered an updated echocardiogram  -Plan to repeat patient's chemistry panel in a.m. to follow on his electrolytes and renal function  -Consider cardiology consultation pending results and clinical course    #COPD, currently do not suspect acute exacerbation  #Chronic hypoxic respiratory failure on 3 L/min nasal cannula  #Tobacco use  -Patient's clinical presentation currently appears more consistent with possible new onset acute HF rather than COPD  -Patient received IV azithromycin and rocephin in the ED; will hold on any further antibiotic therapy for now  -No wheezing on examination; no productive cough per patient report  -Monitor closely for any changes; can resume any chronic COPD medications pending pharmacy completion of his home medication list    Chronic medical conditions:  -Status post permanent pacemaker placement  -Right-sided carotid artery stenosis, followed by CT surgery in the outpatient setting  -Essential hypertension that is currently well controlled  -History of stroke  -Anxiety  -GERD    DVT prophylaxis:  [facility]    CODE STATUS:    Code Status (Patient has no pulse and is not breathing): CPR (Attempt to Resuscitate)  Medical Interventions (Patient has pulse or is breathing): Full Support    Admission Status:  I believe this patient meets inpatient status.    Patient is considered to be a high risk patient due to: Acute hypercapnia, NSTEMI, obesity, COPD    Sarika White DO    Electronically signed by Sarika White DO at 11/18/23 7846          Emergency Department Notes        Kayleigh Fernández RN at 11/17/23 5551           RN attempted multiple times to establish IV access on pt.  attempted to stick pt for blood draw. Unsuccessful. Lead RN made aware and stated that he would try to stick pt.     Electronically signed by Kayleigh Fernández RN at 11/17/23 6992       Mary Vega DO at 11/17/23 0939          Subjective   History of Present Illness    73 year old male with PMHx of COPD on 3L NC baseline, PATTY, pacemaker for Wenckebach/severe sinus bradycardia, CVA, hypertension, hyperlipidemia, BPH, right carotid stenosis presenting for shortness of breath and bilateral lower extremity swelling.    Progressively worsening leg swelling over past several weeks to months.  States that he is been on several short bursts of low-dose Lasix.     Progressively worsening shortness of breath over months. Previously just wore oxygen as needed but is now wearing consistently, increased to 3.5L.     Denies any chest pain, fevers, sore throat, cough, congestion, nausea, vomiting, diarrhea, abdominal pain.    Patient and family also report mild confusion.    Review of Systems    Past Medical History:   Diagnosis Date    Acid reflux     Anxiety     Arthritis     Carotid artery stenosis, symptomatic, right 11/23/2021    Essential hypertension 11/23/2021    Herpes     History of lipoma     History of transfusion     Hyperlipidemia     Inguinal hernia     Peptic ulcer     Stroke        Allergies   Allergen Reactions    Penicillins Hives     Mother told him that, has taken amoxicillin with no problems       Past Surgical History:   Procedure Laterality Date    ABDOMINAL SURGERY      CARDIAC ELECTROPHYSIOLOGY PROCEDURE N/A 12/10/2021    Procedure: Pacemaker DC new;  Surgeon: Jony Monte MD;  Location: [facility] INVASIVE LOCATION;  Service: Cardiology;  Laterality: N/A;    CAROTID ENDARTERECTOMY Left 2021    CHOLECYSTECTOMY WITH INTRAOPERATIVE CHOLANGIOGRAM N/A 07/25/2017    Procedure: CHOLECYSTECTOMY LAPAROSCOPIC INTRAOPERATIVE CHOLANGIOGRAM;   Surgeon: Luis Coe MD;  Location: [facility];  Service:     HERNIA REPAIR      inguinal    SINUS SURGERY      TUMOR EXCISION         Family History   Problem Relation Age of Onset    Stroke Mother     Heart disease Mother     Diabetes Mother     No Known Problems Father     Diabetes Sister        Social History     Socioeconomic History    Marital status:     Number of children: 6   Tobacco Use    Smoking status: Some Days     Packs/day: 0.25     Years: 55.00     Additional pack years: 0.00     Total pack years: 13.75     Types: Cigarettes     Passive exposure: Current    Smokeless tobacco: Never    Tobacco comments:     2 cigs per day/ A pack per week.   Vaping Use    Vaping Use: Never used   Substance and Sexual Activity    Alcohol use: No    Drug use: No    Sexual activity: Defer           Objective   Physical Exam  Vitals and nursing note reviewed.   Constitutional:       General: He is not in acute distress.  HENT:      Head: Normocephalic.      Mouth/Throat:      Mouth: Mucous membranes are moist.   Cardiovascular:      Rate and Rhythm: Normal rate and regular rhythm.      Pulses: Normal pulses.   Pulmonary:      Effort: Pulmonary effort is normal. No respiratory distress.      Breath sounds: Decreased breath sounds and rhonchi present.   Abdominal:      General: Abdomen is flat. There is no distension.      Palpations: Abdomen is soft.   Musculoskeletal:         General: Normal range of motion.      Cervical back: Normal range of motion and neck supple.      Comments: Symmetric bilateral pitting edema   Skin:     General: Skin is warm and dry.      Capillary Refill: Capillary refill takes less than 2 seconds.   Neurological:      Mental Status: He is alert.      Comments: Confused, able to state his name but struggles with location but is ultimately able to state that he is in a hospital.  Has operative difficulty with the date but knows the year.  Slow to answer questions.          Procedures      Results for orders placed or performed during the hospital encounter of 11/17/23   Basic Metabolic Panel    Specimen: Blood   Result Value Ref Range    Glucose 107 (H) 65 - 99 mg/dL    BUN 16 8 - 23 mg/dL    Creatinine 0.67 (L) 0.76 - 1.27 mg/dL    Sodium 142 136 - 145 mmol/L    Potassium 4.9 3.5 - 5.2 mmol/L    Chloride 97 (L) 98 - 107 mmol/L    CO2 41.4 (H) 22.0 - 29.0 mmol/L    Calcium 9.6 8.6 - 10.5 mg/dL    BUN/Creatinine Ratio 23.9 7.0 - 25.0    Anion Gap 3.6 (L) 5.0 - 15.0 mmol/L    eGFR 99.8 >60.0 mL/min/1.73   BNP    Specimen: Blood   Result Value Ref Range    proBNP 168.3 0.0 - 900.0 pg/mL   Single High Sensitivity Troponin T    Specimen: Blood   Result Value Ref Range    HS Troponin T 26 (H) <22 ng/L   CBC Auto Differential    Specimen: Blood   Result Value Ref Range    WBC 7.32 3.40 - 10.80 10*3/mm3    RBC 4.13 (L) 4.14 - 5.80 10*6/mm3    Hemoglobin 12.2 (L) 13.0 - 17.7 g/dL    Hematocrit 42.4 37.5 - 51.0 %    .7 (H) 79.0 - 97.0 fL    MCH 29.5 26.6 - 33.0 pg    MCHC 28.8 (L) 31.5 - 35.7 g/dL    RDW 13.1 12.3 - 15.4 %    RDW-SD 49.9 37.0 - 54.0 fl    MPV 10.0 6.0 - 12.0 fL    Platelets 194 140 - 450 10*3/mm3    Neutrophil % 67.3 42.7 - 76.0 %    Lymphocyte % 19.3 (L) 19.6 - 45.3 %    Monocyte % 11.2 5.0 - 12.0 %    Eosinophil % 1.8 0.3 - 6.2 %    Basophil % 0.3 0.0 - 1.5 %    Immature Grans % 0.1 0.0 - 0.5 %    Neutrophils, Absolute 4.93 1.70 - 7.00 10*3/mm3    Lymphocytes, Absolute 1.41 0.70 - 3.10 10*3/mm3    Monocytes, Absolute 0.82 0.10 - 0.90 10*3/mm3    Eosinophils, Absolute 0.13 0.00 - 0.40 10*3/mm3    Basophils, Absolute 0.02 0.00 - 0.20 10*3/mm3    Immature Grans, Absolute 0.01 0.00 - 0.05 10*3/mm3    nRBC 0.0 0.0 - 0.2 /100 WBC   Scan Slide    Specimen: Blood   Result Value Ref Range    Anisocytosis Slight/1+ None Seen    Hypochromia Slight/1+ None Seen    Platelet Morphology Normal Normal   Urine Drug Screen - Urine, Clean Catch    Specimen: Urine, Clean Catch    Result Value Ref Range    THC, Screen, Urine Negative Negative    Phencyclidine (PCP), Urine Negative Negative    Cocaine Screen, Urine Negative Negative    Methamphetamine, Ur Negative Negative    Opiate Screen Negative Negative    Amphetamine Screen, Urine Negative Negative    Benzodiazepine Screen, Urine Negative Negative    Tricyclic Antidepressants Screen Negative Negative    Methadone Screen, Urine Negative Negative    Barbiturates Screen, Urine Negative Negative    Oxycodone Screen, Urine Negative Negative    Buprenorphine, Screen, Urine Negative Negative   Blood Gas, Arterial With Co-Ox    Specimen: Arterial Blood   Result Value Ref Range    Site Left Radial     Russell's Test Positive     pH, Arterial 7.326 (L) 7.350 - 7.450 pH units    pCO2, Arterial 89.2 (C) 35.0 - 45.0 mm Hg    pO2, Arterial 63.0 (L) 83.0 - 108.0 mm Hg    HCO3, Arterial 46.6 (H) 20.0 - 26.0 mmol/L    Base Excess, Arterial 15.9 (H) 0.0 - 2.0 mmol/L    O2 Saturation, Arterial 91.9 (L) 94.0 - 99.0 %    Hemoglobin, Blood Gas 13.8 (L) 14 - 18 g/dL    Hematocrit, Blood Gas 42.4 38.0 - 51.0 %    Oxyhemoglobin 90.1 (L) 94 - 99 %    Methemoglobin 0.10 0.00 - 3.00 %    Carboxyhemoglobin 1.9 0 - 5 %    A-a DO2 54.7 0.0 - 300.0 mmHg    CO2 Content 49.3 (H) 22 - 33 mmol/L    Barometric Pressure for Blood Gas 724 mmHg    Modality Nasal Cannula     FIO2 32 %    Flow Rate 3.0 lpm    Ventilator Mode NA     Notified Who ER DR AND RN     Notified By [id number]     Notified Time 11/18/2023 02:21     Collected by [id number]     pH, Temp Corrected      pCO2, Temperature Corrected      pO2, Temperature Corrected     Fentanyl, Urine - Urine, Clean Catch    Specimen: Urine, Clean Catch   Result Value Ref Range    Fentanyl, Urine Negative Negative   ECG 12 Lead Bradycardia   Result Value Ref Range    QT Interval 390 ms    QTC Interval 458 ms           ED Course  ED Course as of 11/18/23 0306   Sat Nov 18, 2023   0151   EKG ED Interpretation by: Aracelis Boo MD on  11/18/23 at 01:51  EKG: HR 83, unchanged from previous tracings, atrial sensed ventricularly paced rhythm.     Electronically signed by Aracelis Boo MD, 11/18/23, 1:51 AM EST.   [KH]   0302 Patient is being placed on BiPAP at this time.  Order CT angio to rule out pulmonary emboli versus acute cardiopulmonary process involving infection versus pulmonary edema.  Patient does have a history of diastolic heart failure with last echo in 2021.  We will mid to PCU at this time.  Patient will be placed on BiPAP for hypercapnia  Electronically signed by Mary Vega DO, 11/18/23, 3:03 AM EST.   [LK]      ED Course User Index  [KH] Aracelis Boo MD  [LK] Mary Vega DO                                   HUONG reviewed by Aracelis Boo MD, Mary Vega DO       Medical Decision Making  Problems Addressed:  Acute on chronic respiratory failure with hypoxia and hypercapnia: complicated acute illness or injury  Pneumonia due to infectious organism, unspecified laterality, unspecified part of lung: complicated acute illness or injury    Amount and/or Complexity of Data Reviewed  Labs: ordered.  Radiology: ordered.  ECG/medicine tests: ordered.    Risk  Prescription drug management.        Review of records:  9/22/23 routine remote interrogation of pacemaker, no events, 85% battery life, normal device function    Stress test, 11/1/2021: Normal  Echo, 11/1/2021: LVEF 66 to 70%, mild to moderate concentric LV hypertrophy, normal diastolic function    Per review of outpatient notes patient has history of noncompliance with wearing his oxygen or using his inhalers.  Patient to follow-up with CT surgery in December for his moderate right artery carotid stenosis.      Chest x-ray consistent with multifocal pneumonia.  Ceftriaxone and azithromycin.  Blood cultures obtained prior to antibiotics.  No history of CHF, normal echo a year ago, BNP normal  Troponin mildly elevated, normal creatinine.  No chest pain, will repeat. EKG  without ST segment changes.    Hemoglobin 12.2, baseline appears to be around 15 a year ago.  No reported bleeding.     Outpatient records confirm that patient has had increased oxygen requirement.  Previously on 2 L nasal cannula but had to be increased to 3 L last week due to hypoxia and cardiology office.    Suspect confusion likely due to toxic metabolic encephalopathy and hypercarbia.    ABG with increased CO2 consistent with COPD exacerbation.  Patient started on BiPAP.    Results reviewed with both patient and wife    Plan for admission. Patient signed out to Dr. Vega    Final diagnoses:   Acute on chronic respiratory failure with hypoxia and hypercapnia   Pneumonia due to infectious organism, unspecified laterality, unspecified part of lung   Chronic diastolic heart failure   Supplemental oxygen dependent   Chronic obstructive pulmonary disease, unspecified COPD type       ED Disposition  ED Disposition       ED Disposition   Decision to Admit    Condition   --    Comment   --               No follow-up provider specified.       Medication List      No changes were made to your prescriptions during this visit.            Mary Vega DO  11/18/23 0306      Electronically signed by Mary Vega DO at 11/18/23 0306       Current Facility-Administered Medications   Medication Dose Route Frequency Provider Last Rate Last Admin    sennosides-docusate (PERICOLACE) 8.6-50 MG per tablet 2 tablet  2 tablet Oral BID Sarika White DO        And    polyethylene glycol (MIRALAX) packet 17 g  17 g Oral Daily PRN Sarika White DO        And    bisacodyl (DULCOLAX) EC tablet 5 mg  5 mg Oral Daily PRN Sarika White DO        And    bisacodyl (DULCOLAX) suppository 10 mg  10 mg Rectal Daily PRN Sarika White DO        furosemide (LASIX) injection 80 mg  80 mg Intravenous Once Sarika White DO        heparin (porcine) 5000 UNIT/ML injection 5,000 Units  5,000 Units Subcutaneous Q12H  Sarika White DO        Influenza Vac High-Dose Quad (FLUZONE HIGH DOSE) injection 0.7 mL  0.7 mL Intramuscular Once Sarika White DO        nicotine (NICODERM CQ) 7 MG/24HR patch 1 patch  1 patch Transdermal Q24H Sarika White DO        sodium chloride 0.9 % flush 10 mL  10 mL Intravenous PRN Sarika White,         sodium chloride 0.9 % flush 10 mL  10 mL Intravenous Q12H Sarika White DO        sodium chloride 0.9 % flush 10 mL  10 mL Intravenous PRN Sarika White DO        sodium chloride 0.9 % infusion 40 mL  40 mL Intravenous PRN Sarika White DO         Orders (last 24 hrs)        Start     Ordered    11/18/23 0900  sodium chloride 0.9 % flush 10 mL  Every 12 Hours Scheduled         11/18/23 0535    11/18/23 0900  sennosides-docusate (PERICOLACE) 8.6-50 MG per tablet 2 tablet  2 Times Daily        See Hyperspace for full Linked Orders Report.    11/18/23 0535    11/18/23 0900  heparin (porcine) 5000 UNIT/ML injection 5,000 Units  Every 12 Hours Scheduled         11/18/23 0535    11/18/23 0900  nicotine (NICODERM CQ) 7 MG/24HR patch 1 patch  Every 24 Hours Scheduled         11/18/23 0535    11/18/23 0900  Influenza Vac High-Dose Quad (FLUZONE HIGH DOSE) injection 0.7 mL  Once         11/18/23 0540    11/18/23 0800  Vital Signs  Every 4 Hours      Comments: Per per hospital policy    11/18/23 0535    11/18/23 0800  Oral Care  2 Times Daily       11/18/23 0535    11/18/23 0800  furosemide (LASIX) injection 80 mg  Once         11/18/23 0535    11/18/23 0657  Blood Gas, Arterial With Co-Ox  PROCEDURE ONCE         11/18/23 0653    11/18/23 0651  Scan Slide  Once         11/18/23 0650    11/18/23 0600  Weigh patient  Daily       11/18/23 0535    11/18/23 0600  CBC & Differential  Morning Draw         11/18/23 0535    11/18/23 0600  Comprehensive Metabolic Panel  Morning Draw         11/18/23 0535    11/18/23 0600  Blood Gas, Arterial -With Co-Ox Panel: Yes   Morning Draw         11/18/23 0535    11/18/23 0600  CBC Auto Differential  PROCEDURE ONCE         11/18/23 0535    11/18/23 0541  Inpatient Case Management  Consult  Once        Provider:  (Not yet assigned)    11/18/23 0540    11/18/23 0536  Notify Physician (with Parameters)  Until Discontinued         11/18/23 0535    11/18/23 0536  Intake & Output  Every Shift       11/18/23 0535    11/18/23 0536  Insert Peripheral IV  Once         11/18/23 0535    11/18/23 0536  Saline Lock & Maintain IV Access  Continuous         11/18/23 0535    11/18/23 0536  Diet: Cardiac Diets; Healthy Heart (2-3 Na+); Texture: Regular Texture (IDDSI 7); Fluid Consistency: Thin (IDDSI 0)  Diet Effective Now         11/18/23 0535    11/18/23 0536  Adult Transthoracic Echo Complete W/ Cont if Necessary Per Protocol  Once         11/18/23 0535    11/18/23 0535  sodium chloride 0.9 % flush 10 mL  As Needed         11/18/23 0535    11/18/23 0535  sodium chloride 0.9 % infusion 40 mL  As Needed         11/18/23 0535    11/18/23 0535  polyethylene glycol (MIRALAX) packet 17 g  Daily PRN        See Hyperspace for full Linked Orders Report.    11/18/23 0535    11/18/23 0535  bisacodyl (DULCOLAX) EC tablet 5 mg  Daily PRN        See Hyperspace for full Linked Orders Report.    11/18/23 0535    11/18/23 0535  bisacodyl (DULCOLAX) suppository 10 mg  Daily PRN        See Hyperspace for full Linked Orders Report.    11/18/23 0535    11/18/23 0448  iopamidol (ISOVUE-370) 76 % injection 100 mL  Once in Imaging         11/18/23 0432    11/18/23 0340  Inpatient Admission  Once         11/18/23 0340    11/18/23 0340  Code Status and Medical Interventions:  Continuous         11/18/23 0340    11/18/23 0302  CT Angiogram Chest Pulmonary Embolism  1 Time Imaging         11/18/23 0302    11/18/23 0230  NIPPV (CPAP or BIPAP)  Until Discontinued         11/18/23 0229    11/18/23 0224  Fentanyl, Urine - Urine, Clean Catch  Once         11/18/23 0223     11/18/23 0222  NIPPV (CPAP or BIPAP)  Until Discontinued,   Status:  Canceled         11/18/23 0221    11/18/23 0220  cefTRIAXone (ROCEPHIN) 2,000 mg in sodium chloride 0.9 % 100 mL IVPB-VTB  Once         11/18/23 0204    11/18/23 0220  azithromycin (ZITHROMAX) 500 mg in sodium chloride 0.9 % 250 mL IVPB-VTB  Once         11/18/23 0204    11/18/23 0220  Blood Gas, Arterial With Co-Ox  PROCEDURE ONCE         11/18/23 0218    11/18/23 0217  Urine Drug Screen - Urine, Clean Catch  STAT         11/18/23 0216    11/18/23 0205  Lactic Acid, Plasma  Once         11/18/23 0204 11/18/23 0205  Blood Culture - Blood, Arm, Right  Once        See Hyperspace for full Linked Orders Report.    11/18/23 0204 11/18/23 0205  Blood Culture - Blood, Arm, Left  Once        See Hyperspace for full Linked Orders Report.    11/18/23 0204 11/18/23 0202  Blood Gas, Arterial -With Co-Ox Panel: Yes  Once         11/18/23 0201    11/18/23 0159  Single High Sensitivity Troponin T  Once         11/18/23 0158    11/18/23 0032  Scan Slide  Once         11/18/23 0031    11/17/23 2347  furosemide (LASIX) injection 80 mg  Once         11/17/23 2331    11/17/23 2313  CBC & Differential  STAT         11/17/23 2313 11/17/23 2313  Basic Metabolic Panel  STAT         11/17/23 2313    11/17/23 2313  BNP  Once         11/17/23 2313    11/17/23 2313  Single High Sensitivity Troponin T  Once         11/17/23 2313    11/17/23 2313  ECG 12 Lead Bradycardia  Once         11/17/23 2313 11/17/23 2313  Insert Peripheral IV  Once        See Hyperspace for full Linked Orders Report.    11/17/23 2313 11/17/23 2313  XR Chest 1 View  1 Time Imaging         11/17/23 2313 11/17/23 2313  CBC Auto Differential  PROCEDURE ONCE         11/17/23 2313 11/17/23 2312  sodium chloride 0.9 % flush 10 mL  As Needed        See Hyperspace for full Linked Orders Report.    11/17/23 1843    Unscheduled  Up With Assistance  As Needed       11/18/23 7084                   Physician Progress Notes (last 24 hours)  Notes from 11/17/23 0810 through 11/18/23 0810   No notes of this type exist for this encounter.       Consult Notes (last 24 hours)  Notes from 11/17/23 0810 through 11/18/23 0810   No notes of this type exist for this encounter.

## 2023-11-18 NOTE — ED PROVIDER NOTES
Subjective   History of Present Illness    73 year old male with PMHx of COPD on 3L NC baseline, PATTY, pacemaker for Wenckebach/severe sinus bradycardia, CVA, hypertension, hyperlipidemia, BPH, right carotid stenosis presenting for shortness of breath and bilateral lower extremity swelling.    Progressively worsening leg swelling over past several weeks to months.  States that he is been on several short bursts of low-dose Lasix.     Progressively worsening shortness of breath over months. Previously just wore oxygen as needed but is now wearing consistently, increased to 3.5L.     Denies any chest pain, fevers, sore throat, cough, congestion, nausea, vomiting, diarrhea, abdominal pain.    Patient and family also report mild confusion.    Review of Systems    Past Medical History:   Diagnosis Date    Acid reflux     Anxiety     Arthritis     Carotid artery stenosis, symptomatic, right 11/23/2021    Essential hypertension 11/23/2021    Herpes     History of lipoma     History of transfusion     Hyperlipidemia     Inguinal hernia     Peptic ulcer     Stroke        Allergies   Allergen Reactions    Penicillins Hives     Mother told him that, has taken amoxicillin with no problems       Past Surgical History:   Procedure Laterality Date    ABDOMINAL SURGERY      CARDIAC ELECTROPHYSIOLOGY PROCEDURE N/A 12/10/2021    Procedure: Pacemaker DC new;  Surgeon: Jony Monte MD;  Location: [facility] CATH INVASIVE LOCATION;  Service: Cardiology;  Laterality: N/A;    CAROTID ENDARTERECTOMY Left 2021    CHOLECYSTECTOMY WITH INTRAOPERATIVE CHOLANGIOGRAM N/A 07/25/2017    Procedure: CHOLECYSTECTOMY LAPAROSCOPIC INTRAOPERATIVE CHOLANGIOGRAM;  Surgeon: Luis Coe MD;  Location: [facility] OR;  Service:     HERNIA REPAIR      inguinal    SINUS SURGERY      TUMOR EXCISION         Family History   Problem Relation Age of Onset    Stroke Mother     Heart disease Mother     Diabetes Mother     No Known Problems Father     Diabetes  Sister        Social History     Socioeconomic History    Marital status:     Number of children: 6   Tobacco Use    Smoking status: Some Days     Packs/day: 0.25     Years: 55.00     Additional pack years: 0.00     Total pack years: 13.75     Types: Cigarettes     Passive exposure: Current    Smokeless tobacco: Never    Tobacco comments:     2 cigs per day/ A pack per week.   Vaping Use    Vaping Use: Never used   Substance and Sexual Activity    Alcohol use: No    Drug use: No    Sexual activity: Defer           Objective   Physical Exam  Vitals and nursing note reviewed.   Constitutional:       General: He is not in acute distress.  HENT:      Head: Normocephalic.      Mouth/Throat:      Mouth: Mucous membranes are moist.   Cardiovascular:      Rate and Rhythm: Normal rate and regular rhythm.      Pulses: Normal pulses.   Pulmonary:      Effort: Pulmonary effort is normal. No respiratory distress.      Breath sounds: Decreased breath sounds and rhonchi present.   Abdominal:      General: Abdomen is flat. There is no distension.      Palpations: Abdomen is soft.   Musculoskeletal:         General: Normal range of motion.      Cervical back: Normal range of motion and neck supple.      Comments: Symmetric bilateral pitting edema   Skin:     General: Skin is warm and dry.      Capillary Refill: Capillary refill takes less than 2 seconds.   Neurological:      Mental Status: He is alert.      Comments: Confused, able to state his name but struggles with location but is ultimately able to state that he is in a hospital.  Has operative difficulty with the date but knows the year.  Slow to answer questions.         Procedures       Results for orders placed or performed during the hospital encounter of 11/17/23   Basic Metabolic Panel    Specimen: Blood   Result Value Ref Range    Glucose 107 (H) 65 - 99 mg/dL    BUN 16 8 - 23 mg/dL    Creatinine 0.67 (L) 0.76 - 1.27 mg/dL    Sodium 142 136 - 145 mmol/L     Potassium 4.9 3.5 - 5.2 mmol/L    Chloride 97 (L) 98 - 107 mmol/L    CO2 41.4 (H) 22.0 - 29.0 mmol/L    Calcium 9.6 8.6 - 10.5 mg/dL    BUN/Creatinine Ratio 23.9 7.0 - 25.0    Anion Gap 3.6 (L) 5.0 - 15.0 mmol/L    eGFR 99.8 >60.0 mL/min/1.73   BNP    Specimen: Blood   Result Value Ref Range    proBNP 168.3 0.0 - 900.0 pg/mL   Single High Sensitivity Troponin T    Specimen: Blood   Result Value Ref Range    HS Troponin T 26 (H) <22 ng/L   CBC Auto Differential    Specimen: Blood   Result Value Ref Range    WBC 7.32 3.40 - 10.80 10*3/mm3    RBC 4.13 (L) 4.14 - 5.80 10*6/mm3    Hemoglobin 12.2 (L) 13.0 - 17.7 g/dL    Hematocrit 42.4 37.5 - 51.0 %    .7 (H) 79.0 - 97.0 fL    MCH 29.5 26.6 - 33.0 pg    MCHC 28.8 (L) 31.5 - 35.7 g/dL    RDW 13.1 12.3 - 15.4 %    RDW-SD 49.9 37.0 - 54.0 fl    MPV 10.0 6.0 - 12.0 fL    Platelets 194 140 - 450 10*3/mm3    Neutrophil % 67.3 42.7 - 76.0 %    Lymphocyte % 19.3 (L) 19.6 - 45.3 %    Monocyte % 11.2 5.0 - 12.0 %    Eosinophil % 1.8 0.3 - 6.2 %    Basophil % 0.3 0.0 - 1.5 %    Immature Grans % 0.1 0.0 - 0.5 %    Neutrophils, Absolute 4.93 1.70 - 7.00 10*3/mm3    Lymphocytes, Absolute 1.41 0.70 - 3.10 10*3/mm3    Monocytes, Absolute 0.82 0.10 - 0.90 10*3/mm3    Eosinophils, Absolute 0.13 0.00 - 0.40 10*3/mm3    Basophils, Absolute 0.02 0.00 - 0.20 10*3/mm3    Immature Grans, Absolute 0.01 0.00 - 0.05 10*3/mm3    nRBC 0.0 0.0 - 0.2 /100 WBC   Scan Slide    Specimen: Blood   Result Value Ref Range    Anisocytosis Slight/1+ None Seen    Hypochromia Slight/1+ None Seen    Platelet Morphology Normal Normal   Urine Drug Screen - Urine, Clean Catch    Specimen: Urine, Clean Catch   Result Value Ref Range    THC, Screen, Urine Negative Negative    Phencyclidine (PCP), Urine Negative Negative    Cocaine Screen, Urine Negative Negative    Methamphetamine, Ur Negative Negative    Opiate Screen Negative Negative    Amphetamine Screen, Urine Negative Negative    Benzodiazepine Screen,  Urine Negative Negative    Tricyclic Antidepressants Screen Negative Negative    Methadone Screen, Urine Negative Negative    Barbiturates Screen, Urine Negative Negative    Oxycodone Screen, Urine Negative Negative    Buprenorphine, Screen, Urine Negative Negative   Blood Gas, Arterial With Co-Ox    Specimen: Arterial Blood   Result Value Ref Range    Site Left Radial     Russell's Test Positive     pH, Arterial 7.326 (L) 7.350 - 7.450 pH units    pCO2, Arterial 89.2 (C) 35.0 - 45.0 mm Hg    pO2, Arterial 63.0 (L) 83.0 - 108.0 mm Hg    HCO3, Arterial 46.6 (H) 20.0 - 26.0 mmol/L    Base Excess, Arterial 15.9 (H) 0.0 - 2.0 mmol/L    O2 Saturation, Arterial 91.9 (L) 94.0 - 99.0 %    Hemoglobin, Blood Gas 13.8 (L) 14 - 18 g/dL    Hematocrit, Blood Gas 42.4 38.0 - 51.0 %    Oxyhemoglobin 90.1 (L) 94 - 99 %    Methemoglobin 0.10 0.00 - 3.00 %    Carboxyhemoglobin 1.9 0 - 5 %    A-a DO2 54.7 0.0 - 300.0 mmHg    CO2 Content 49.3 (H) 22 - 33 mmol/L    Barometric Pressure for Blood Gas 724 mmHg    Modality Nasal Cannula     FIO2 32 %    Flow Rate 3.0 lpm    Ventilator Mode NA     Notified Who ER DR AND RN     Notified By [id number]     Notified Time 11/18/2023 02:21     Collected by [id number]     pH, Temp Corrected      pCO2, Temperature Corrected      pO2, Temperature Corrected     Fentanyl, Urine - Urine, Clean Catch    Specimen: Urine, Clean Catch   Result Value Ref Range    Fentanyl, Urine Negative Negative   ECG 12 Lead Bradycardia   Result Value Ref Range    QT Interval 390 ms    QTC Interval 458 ms           ED Course  ED Course as of 11/18/23 0306   Sat Nov 18, 2023   0151   EKG ED Interpretation by: Aracelis Boo MD on 11/18/23 at 01:51  EKG: HR 83, unchanged from previous tracings, atrial sensed ventricularly paced rhythm.     Electronically signed by Aracelis Boo MD, 11/18/23, 1:51 AM EST.   [KH]   0302 Patient is being placed on BiPAP at this time.  Order CT angio to rule out pulmonary emboli versus acute cardiopulmonary  process involving infection versus pulmonary edema.  Patient does have a history of diastolic heart failure with last echo in 2021.  We will mid to PCU at this time.  Patient will be placed on BiPAP for hypercapnia  Electronically signed by Mary Vega DO, 11/18/23, 3:03 AM EST.   [LK]      ED Course User Index  [KH] Aracelis Boo MD  [LK] Mary Vega DO                                   HUONG reviewed by Aracelis Boo MD, Mary Vega DO       Medical Decision Making  Problems Addressed:  Acute on chronic respiratory failure with hypoxia and hypercapnia: complicated acute illness or injury  Pneumonia due to infectious organism, unspecified laterality, unspecified part of lung: complicated acute illness or injury    Amount and/or Complexity of Data Reviewed  Labs: ordered.  Radiology: ordered.  ECG/medicine tests: ordered.    Risk  Prescription drug management.        Review of records:  9/22/23 routine remote interrogation of pacemaker, no events, 85% battery life, normal device function    Stress test, 11/1/2021: Normal  Echo, 11/1/2021: LVEF 66 to 70%, mild to moderate concentric LV hypertrophy, normal diastolic function    Per review of outpatient notes patient has history of noncompliance with wearing his oxygen or using his inhalers.  Patient to follow-up with CT surgery in December for his moderate right artery carotid stenosis.      Chest x-ray consistent with multifocal pneumonia.  Ceftriaxone and azithromycin.  Blood cultures obtained prior to antibiotics.  No history of CHF, normal echo a year ago, BNP normal  Troponin mildly elevated, normal creatinine.  No chest pain, will repeat. EKG without ST segment changes.    Hemoglobin 12.2, baseline appears to be around 15 a year ago.  No reported bleeding.     Outpatient records confirm that patient has had increased oxygen requirement.  Previously on 2 L nasal cannula but had to be increased to 3 L last week due to hypoxia and cardiology  office.    Suspect confusion likely due to toxic metabolic encephalopathy and hypercarbia.    ABG with increased CO2 consistent with COPD exacerbation.  Patient started on BiPAP.    Results reviewed with both patient and wife    Plan for admission. Patient signed out to Dr. Vega    Final diagnoses:   Acute on chronic respiratory failure with hypoxia and hypercapnia   Pneumonia due to infectious organism, unspecified laterality, unspecified part of lung   Chronic diastolic heart failure   Supplemental oxygen dependent   Chronic obstructive pulmonary disease, unspecified COPD type       ED Disposition  ED Disposition       ED Disposition   Decision to Admit    Condition   --    Comment   --               No follow-up provider specified.       Medication List      No changes were made to your prescriptions during this visit.            Mary Vega,   11/18/23 0300

## 2023-11-18 NOTE — PLAN OF CARE
Goal Outcome Evaluation:  Plan of Care Reviewed With: patient, spouse        Progress: no change  Outcome Evaluation: Pt had an episode of confusion this shift, I assume it was due to his CO2 climbing signifcantly. Pt is currently A+Ox4, VSS, on Bipap. Pt did try to leave AMA at the beginning of the shift due to not being allowed to walk to the toilet, I explained it was because he was really weak (it took 3 of us to get him on a BSC) and his oxygen requirements wouldn't allow him to reach the toilet. Notified the provider, he went and talked to the patient and family. Pt currently resting in bed. Bed in lowest position, alarm on. Call light within reach. Pt denies any requests at this time.

## 2023-11-18 NOTE — PLAN OF CARE
Goal Outcome Evaluation:  Plan of Care Reviewed With: patient        Progress: no change  Outcome Evaluation: Pt arrived to PCU this shift from ED. Pt is on 3L 36% O2 BiPAP. VSS. Pt has male purewick in place. Pt is resting comfortably in bed at this time. No additional distress noted and no other needs requested at this time.

## 2023-11-19 ENCOUNTER — APPOINTMENT (OUTPATIENT)
Dept: GENERAL RADIOLOGY | Facility: HOSPITAL | Age: 73
End: 2023-11-19
Payer: MEDICARE

## 2023-11-19 LAB
A-A DO2: 106.5 MMHG (ref 0–300)
ALBUMIN SERPL-MCNC: 3.8 G/DL (ref 3.5–5.2)
ALBUMIN/GLOB SERPL: 1.5 G/DL
ALP SERPL-CCNC: 75 U/L (ref 39–117)
ALT SERPL W P-5'-P-CCNC: 10 U/L (ref 1–41)
ANION GAP SERPL CALCULATED.3IONS-SCNC: 5.8 MMOL/L (ref 5–15)
ARTERIAL PATENCY WRIST A: POSITIVE
AST SERPL-CCNC: 12 U/L (ref 1–40)
ATMOSPHERIC PRESS: 727 MMHG
BASE EXCESS BLDA CALC-SCNC: 17.6 MMOL/L (ref 0–2)
BASOPHILS # BLD AUTO: 0.01 10*3/MM3 (ref 0–0.2)
BASOPHILS NFR BLD AUTO: 0.1 % (ref 0–1.5)
BDY SITE: ABNORMAL
BILIRUB SERPL-MCNC: 0.5 MG/DL (ref 0–1.2)
BUN SERPL-MCNC: 16 MG/DL (ref 8–23)
BUN/CREAT SERPL: 26.2 (ref 7–25)
CALCIUM SPEC-SCNC: 9.5 MG/DL (ref 8.6–10.5)
CHLORIDE SERPL-SCNC: 92 MMOL/L (ref 98–107)
CO2 BLDA-SCNC: 46.8 MMOL/L (ref 22–33)
CO2 SERPL-SCNC: 39.2 MMOL/L (ref 22–29)
COHGB MFR BLD: 1.9 % (ref 0–5)
CREAT SERPL-MCNC: 0.61 MG/DL (ref 0.76–1.27)
DEPRECATED RDW RBC AUTO: 48.2 FL (ref 37–54)
EGFRCR SERPLBLD CKD-EPI 2021: 102.7 ML/MIN/1.73
EOSINOPHIL # BLD AUTO: 0.09 10*3/MM3 (ref 0–0.4)
EOSINOPHIL NFR BLD AUTO: 1.1 % (ref 0.3–6.2)
EPAP: 8
ERYTHROCYTE [DISTWIDTH] IN BLOOD BY AUTOMATED COUNT: 13 % (ref 12.3–15.4)
GLOBULIN UR ELPH-MCNC: 2.6 GM/DL
GLUCOSE SERPL-MCNC: 143 MG/DL (ref 65–99)
HCO3 BLDA-SCNC: 44.9 MMOL/L (ref 20–26)
HCT VFR BLD AUTO: 41.8 % (ref 37.5–51)
HCT VFR BLD CALC: 39.9 % (ref 38–51)
HGB BLD-MCNC: 12.3 G/DL (ref 13–17.7)
HGB BLDA-MCNC: 13 G/DL (ref 14–18)
IMM GRANULOCYTES # BLD AUTO: 0.03 10*3/MM3 (ref 0–0.05)
IMM GRANULOCYTES NFR BLD AUTO: 0.4 % (ref 0–0.5)
INHALED O2 CONCENTRATION: 36 %
IPAP: 20
LYMPHOCYTES # BLD AUTO: 1.36 10*3/MM3 (ref 0.7–3.1)
LYMPHOCYTES NFR BLD AUTO: 16.2 % (ref 19.6–45.3)
Lab: ABNORMAL
MCH RBC QN AUTO: 29.5 PG (ref 26.6–33)
MCHC RBC AUTO-ENTMCNC: 29.4 G/DL (ref 31.5–35.7)
MCV RBC AUTO: 100.2 FL (ref 79–97)
METHGB BLD QL: <-0.1 % (ref 0–3)
MODALITY: ABNORMAL
MONOCYTES # BLD AUTO: 0.69 10*3/MM3 (ref 0.1–0.9)
MONOCYTES NFR BLD AUTO: 8.2 % (ref 5–12)
NEUTROPHILS NFR BLD AUTO: 6.21 10*3/MM3 (ref 1.7–7)
NEUTROPHILS NFR BLD AUTO: 74 % (ref 42.7–76)
NRBC BLD AUTO-RTO: 0 /100 WBC (ref 0–0.2)
OXYHGB MFR BLDV: 93.2 % (ref 94–99)
PCO2 BLDA: 63.9 MM HG (ref 35–45)
PCO2 TEMP ADJ BLD: ABNORMAL MM[HG]
PH BLDA: 7.46 PH UNITS (ref 7.35–7.45)
PH, TEMP CORRECTED: ABNORMAL
PLATELET # BLD AUTO: 193 10*3/MM3 (ref 140–450)
PMV BLD AUTO: 9.9 FL (ref 6–12)
PO2 BLDA: 67.7 MM HG (ref 83–108)
PO2 TEMP ADJ BLD: ABNORMAL MM[HG]
POTASSIUM SERPL-SCNC: 3.8 MMOL/L (ref 3.5–5.2)
PROT SERPL-MCNC: 6.4 G/DL (ref 6–8.5)
RBC # BLD AUTO: 4.17 10*6/MM3 (ref 4.14–5.8)
SAO2 % BLDCOA: 94.8 % (ref 94–99)
SET MECH RESP RATE: 24
SODIUM SERPL-SCNC: 137 MMOL/L (ref 136–145)
VENTILATOR MODE: ABNORMAL
WBC NRBC COR # BLD AUTO: 8.39 10*3/MM3 (ref 3.4–10.8)

## 2023-11-19 PROCEDURE — 25010000002 FUROSEMIDE PER 20 MG: Performed by: STUDENT IN AN ORGANIZED HEALTH CARE EDUCATION/TRAINING PROGRAM

## 2023-11-19 PROCEDURE — 83050 HGB METHEMOGLOBIN QUAN: CPT

## 2023-11-19 PROCEDURE — 85025 COMPLETE CBC W/AUTO DIFF WBC: CPT | Performed by: STUDENT IN AN ORGANIZED HEALTH CARE EDUCATION/TRAINING PROGRAM

## 2023-11-19 PROCEDURE — 94761 N-INVAS EAR/PLS OXIMETRY MLT: CPT

## 2023-11-19 PROCEDURE — 36600 WITHDRAWAL OF ARTERIAL BLOOD: CPT

## 2023-11-19 PROCEDURE — 73610 X-RAY EXAM OF ANKLE: CPT

## 2023-11-19 PROCEDURE — 73610 X-RAY EXAM OF ANKLE: CPT | Performed by: RADIOLOGY

## 2023-11-19 PROCEDURE — 94660 CPAP INITIATION&MGMT: CPT

## 2023-11-19 PROCEDURE — 94664 DEMO&/EVAL PT USE INHALER: CPT

## 2023-11-19 PROCEDURE — 99233 SBSQ HOSP IP/OBS HIGH 50: CPT | Performed by: STUDENT IN AN ORGANIZED HEALTH CARE EDUCATION/TRAINING PROGRAM

## 2023-11-19 PROCEDURE — 25010000002 HEPARIN (PORCINE) PER 1000 UNITS: Performed by: INTERNAL MEDICINE

## 2023-11-19 PROCEDURE — 80053 COMPREHEN METABOLIC PANEL: CPT | Performed by: STUDENT IN AN ORGANIZED HEALTH CARE EDUCATION/TRAINING PROGRAM

## 2023-11-19 PROCEDURE — 82805 BLOOD GASES W/O2 SATURATION: CPT

## 2023-11-19 PROCEDURE — 94799 UNLISTED PULMONARY SVC/PX: CPT

## 2023-11-19 PROCEDURE — 82375 ASSAY CARBOXYHB QUANT: CPT

## 2023-11-19 RX ORDER — SODIUM CHLORIDE 9 MG/ML
40 INJECTION, SOLUTION INTRAVENOUS AS NEEDED
Status: DISCONTINUED | OUTPATIENT
Start: 2023-11-19 | End: 2023-11-24 | Stop reason: HOSPADM

## 2023-11-19 RX ORDER — SODIUM CHLORIDE 0.9 % (FLUSH) 0.9 %
10 SYRINGE (ML) INJECTION AS NEEDED
Status: DISCONTINUED | OUTPATIENT
Start: 2023-11-19 | End: 2023-11-24 | Stop reason: HOSPADM

## 2023-11-19 RX ORDER — FUROSEMIDE 10 MG/ML
60 INJECTION INTRAMUSCULAR; INTRAVENOUS
Status: COMPLETED | OUTPATIENT
Start: 2023-11-19 | End: 2023-11-19

## 2023-11-19 RX ORDER — SODIUM CHLORIDE 0.9 % (FLUSH) 0.9 %
10 SYRINGE (ML) INJECTION EVERY 12 HOURS SCHEDULED
Status: DISCONTINUED | OUTPATIENT
Start: 2023-11-19 | End: 2023-11-24 | Stop reason: HOSPADM

## 2023-11-19 RX ORDER — ACETAMINOPHEN 325 MG/1
650 TABLET ORAL EVERY 6 HOURS PRN
Status: DISCONTINUED | OUTPATIENT
Start: 2023-11-19 | End: 2023-11-24 | Stop reason: HOSPADM

## 2023-11-19 RX ADMIN — DOCUSATE SODIUM 50 MG AND SENNOSIDES 8.6 MG 2 TABLET: 8.6; 5 TABLET, FILM COATED ORAL at 21:04

## 2023-11-19 RX ADMIN — Medication 10 ML: at 21:03

## 2023-11-19 RX ADMIN — ACETAMINOPHEN 650 MG: 325 TABLET ORAL at 16:01

## 2023-11-19 RX ADMIN — CLOPIDOGREL BISULFATE 75 MG: 75 TABLET, FILM COATED ORAL at 08:30

## 2023-11-19 RX ADMIN — DOCUSATE SODIUM 50 MG AND SENNOSIDES 8.6 MG 2 TABLET: 8.6; 5 TABLET, FILM COATED ORAL at 08:30

## 2023-11-19 RX ADMIN — HEPARIN SODIUM 5000 UNITS: 5000 INJECTION INTRAVENOUS; SUBCUTANEOUS at 21:04

## 2023-11-19 RX ADMIN — ROSUVASTATIN CALCIUM 10 MG: 10 TABLET, FILM COATED ORAL at 08:30

## 2023-11-19 RX ADMIN — FUROSEMIDE 60 MG: 10 INJECTION, SOLUTION INTRAMUSCULAR; INTRAVENOUS at 08:30

## 2023-11-19 RX ADMIN — FUROSEMIDE 60 MG: 10 INJECTION, SOLUTION INTRAMUSCULAR; INTRAVENOUS at 17:24

## 2023-11-19 RX ADMIN — ACETAMINOPHEN 650 MG: 325 TABLET ORAL at 09:20

## 2023-11-19 RX ADMIN — ACETAMINOPHEN 650 MG: 325 TABLET ORAL at 22:17

## 2023-11-19 RX ADMIN — HEPARIN SODIUM 5000 UNITS: 5000 INJECTION INTRAVENOUS; SUBCUTANEOUS at 08:30

## 2023-11-19 RX ADMIN — LISINOPRIL 20 MG: 10 TABLET ORAL at 08:30

## 2023-11-19 RX ADMIN — AMLODIPINE BESYLATE 10 MG: 10 TABLET ORAL at 21:04

## 2023-11-19 RX ADMIN — Medication 10 ML: at 21:52

## 2023-11-19 RX ADMIN — Medication 10 ML: at 08:34

## 2023-11-19 NOTE — NURSING NOTE
HARVEY Lizarraga assisted patient to bed pan. Bed alarm was noted to be on at this time. Per unit secretary Lizzy, the bed alarm did sound. She reports patient was sitting at the bedside and asked her to turn off the alarm.     1015 Upon my rounding I walked into the patient's room to find HARVEY Lizarraga and Sarah at patient's side as he was found on the bathroom floor. Pt alert and oriented. Complaints of pain in his left ankle. States he did not hit his head. Denies any other issues. Dr. Stokes on the floor and assessed patient. See orders.

## 2023-11-19 NOTE — PLAN OF CARE
Problem: Skin Injury Risk Increased  Goal: Skin Health and Integrity  Outcome: Ongoing, Progressing     Problem: Hypertension Comorbidity  Goal: Blood Pressure in Desired Range  Outcome: Ongoing, Progressing  Intervention: Maintain Blood Pressure Management  Recent Flowsheet Documentation  Taken 11/19/2023 1500 by Aleshia Burgos RN  Medication Review/Management: medications reviewed  Taken 11/19/2023 1300 by Aleshia Burgos RN  Medication Review/Management: medications reviewed  Taken 11/19/2023 1100 by Aleshia Burgos RN  Medication Review/Management: medications reviewed  Taken 11/19/2023 1022 by Aleshia Burgos RN  Medication Review/Management: medications reviewed  Taken 11/19/2023 0900 by Aleshia Burgos RN  Medication Review/Management: medications reviewed  Taken 11/19/2023 0700 by Aleshia Burgos RN  Medication Review/Management: medications reviewed     Problem: Adult Inpatient Plan of Care  Goal: Plan of Care Review  Outcome: Ongoing, Progressing  Flowsheets  Taken 11/19/2023 1606 by Aleshia Burgos RN  Progress: no change  Outcome Evaluation: Pt is alert and oriented. VSS on 4LNC. IV lasix given this shift. Adequate UOP. Pt remains too weak to get out of bed. PRN tylenol given for complaints of headache. No distress noted. Spouse at bedside. Care ongoing.  Taken 11/19/2023 0507 by Jose Manuel Stephens RN  Plan of Care Reviewed With:   patient   family   Goal Outcome Evaluation:           Progress: no change  Outcome Evaluation: Pt is alert and oriented. VSS on 4LNC. IV lasix given this shift. Adequate UOP. Pt remains too weak to get out of bed. PRN tylenol given for complaints of headache. No distress noted. Spouse at bedside. Care ongoing.

## 2023-11-19 NOTE — PROGRESS NOTES
[facility] HOSPITALIST PROGRESS NOTE     Patient Identification:  Name:  Donaldo Roberts  Age:  73 y.o.  Sex:  male  :  1950  MRN:  [id number]  Visit Number:  [id number]  ROOM: [address]     Primary Care Provider:  Roderick Patricia MD    Length of stay in inpatient status:  1    Subjective     Chief Compliant:    Chief Complaint   Patient presents with    Shortness of Breath    Leg Swelling       History of Presenting Illness: Patient seen and examined in follow-up for acute hypercapnic chronic hypoxemic respiratory failure with concern for volume overload secondary to acute heart failure with likely type II NSTEMI.  Patient yesterday with somnolence requiring initiation of BiPAP therapy and remained on rest of day and through the night and today ABG much better with evidence of metabolic alkalosis with compensated respiratory acidosis and patient taken off BiPAP.  Later in the morning patient with fall to the floor attempting to ambulate to the bathroom despite multiple is not too.  Complains of some pain in the left ankle and patient helped back to bed and x-ray ordered.    Objective     Current Hospital Meds:  amLODIPine, 10 mg, Oral, Nightly  clopidogrel, 75 mg, Oral, Daily  furosemide, 60 mg, Intravenous, BID  heparin (porcine), 5,000 Units, Subcutaneous, Q12H  lisinopril, 20 mg, Oral, Daily  nicotine, 1 patch, Transdermal, Q24H  rosuvastatin, 10 mg, Oral, Daily  senna-docusate sodium, 2 tablet, Oral, BID  sodium chloride, 10 mL, Intravenous, Q12H  ziprasidone (GEODON) 20 mg in sterile water (preservative free) 1 mL injection, 20 mg, Intramuscular, Once         ----------------------------------------------------------------------------------------------------------------------  Vital Signs:  Temp:  [98.5 °F (36.9 °C)-99.6 °F (37.6 °C)] 99.4 °F (37.4 °C)  Heart Rate:  [] 93  Resp:  [13-36] 22  BP: (103-167)/(61-97) 126/72  SpO2:  [90 %-98 %] 94 %  on  Flow (L/min):  [4] 4;   Device  "(Oxygen Therapy): humidified;nasal cannula  Body mass index is 39.72 kg/m².      Intake/Output Summary (Last 24 hours) at 11/19/2023 1618  Last data filed at 11/19/2023 1200  Gross per 24 hour   Intake 504 ml   Output 2850 ml   Net -2346 ml      ----------------------------------------------------------------------------------------------------------------------  Physical exam:  Constitutional: Obese chronically ill-appearing elderly adult male.  HENT:  Head:  Normocephalic and atraumatic.  Mouth:  Moist mucous membranes.    Eyes:  Conjunctivae and EOM are normal. No scleral icterus.    Neck:  Neck supple.  No JVD present.    Cardiovascular:  Normal rate, regular rhythm and normal heart sounds with no murmur.  Pulmonary/Chest:  No respiratory distress, no wheezes, with reduced air movement and breath sounds throughout.  Abdominal:  Soft but obese.  Bowel sounds are normal.  No distension and no tenderness.   Musculoskeletal: Mild tenderness to palpation of the left ankle with faint swelling.  No red or swollen joints anywhere.     Neurological:  Alert and oriented to person but not place or time.  No cranial nerve deficit.  No tongue deviation.  No facial droop.  No slurred speech. Intact Sensation throughout  Skin:  Skin is warm and dry. No rash or lesion noted. No pallor.   Peripheral vascular:  Pulses in all 4 extremities with no clubbing, no cyanosis, moderate to severe bilateral lower extremity edema with bilateral venous stasis.  Psychiatric: Appropriate mood and affect, pleasant.   ----------------------------------------------------------------------------------------------------------------------  WBC/HGB/HCT/PLT   8.39/12.3/41.8/193 (11/19 0050)  BUN/CREAT/GLUC/ALT/AST/HANDY/LIP    16/0.61/143/10/12/--/-- (11/19 0050)  LYTES - Na/K/Cl/CO2: 137/3.8/92*/39.2* (11/19 0050)     pH/pCO2/pO2/HCO3   7.455/63.9/67.7/44.9 (11/19 0805)  No results found for: \"URINECX\"  No results found for: \"BLOODCX\"    I have " personally looked at the labs and they are summarized above.  ----------------------------------------------------------------------------------------------------------------------  Detailed radiology reports for the last 24 hours:  XR Ankle 3+ View Left    Result Date: 11/19/2023    Diffuse soft tissue swelling, but no acute fracture or dislocation.   This report was finalized on 11/19/2023 11:11 AM by Dr. Jeremy Jenkins MD.      CT Angiogram Chest Pulmonary Embolism    Result Date: 11/18/2023   NEGATIVE FOR PULMONARY EMBOLISM.  CARDIOMEGALY.  This report was finalized on 11/18/2023 5:03 AM by Yakelin Turk MD.      XR Chest 1 View    Result Date: 11/18/2023  FINDINGS/IMPRESSION:  Cardiomegaly. Multifocal infiltrates/edema. No pneumothorax or pleural effusion. No acute fracture. Left-sided pacemaker.    This report was finalized on 11/18/2023 1:07 AM by Alex Pallas, DO.     Assessment & Plan      Acute hypercapnic on chronic hypoxemic respiratory failure  Acute heart failure with preserved ejection fraction  NSTEMI, type II suspected secondary to hypoxia from above  History of obstructive sleep apnea, noncompliant with CPAP  Obesity, suspected underlying obesity hypoventilation    -Patient with significant hypercapnia with chronic hypoxia on supplemental oxygen at home placed on BiPAP at presentation.  Patient with declining respiratory status after being off BiPAP for short period time yesterday and more all through the afternoon, evening and through the night and taken off of this morning after ABG showed alkalosis and compensated CO2.    -Continue BiPAP therapy as needed and at night.    -Agree clinically patient with evidence of decompensated heart failure, transthoracic echocardiogram ordered and pending    -Delay in administration of 80 mg of IV Lasix ordered in the emergency department and not given till upon the floor, patient renal function tolerating previous diuresis well and will continue with twice  daily IV diuretics.    -Transthoracic echocardiogram shows EF of 66 to 70% with grade 1 impaired relaxation with mild concentric hypertrophy and no significant valvular heart disease noted with estimated RVSP markedly elevated greater than 55 mmHg consistent with severe pulmonary hypertension.    COPD, not in exacerbation  Severe pulmonary hypertension  Chronic hypoxemic respiratory failure on 3 L nasal cannula  Chronic smoking tobacco use    -Patient without wheezing although noted to have hypercapnia suspect more volume related and likely decompensated heart failure.    -Hold on further corticosteroid therapy at this time.    -No evidence of any infectious etiology or infiltrates on imaging we will hold on antibiotic therapy.    -Diuretics as above for heart failure, monitor volume status and respiratory status closely as patient with severe pulmonary hypertension    Chronic medical problems:  S/p PPM  Right carotid artery stenosis  Essential hypertension  History of ischemic stroke  Anxiety  GERD    -Continue home medications and supportive care as appropriate.    Copied text in portions of the note has been reviewed and is accurate as of .toay    VTE Prophylaxis:   Mechanical Order History:       None          Pharmalogical Order History:        Ordered     Dose Route Frequency Stop    11/18/23 0535  heparin (porcine) 5000 UNIT/ML injection 5,000 Units         5,000 Units SC Every 12 Hours Scheduled --                    Disposition likely home with family pending clinical course    Donaldo Stokes DO  [facility] Hospitalist  11/19/23  16:18 EST

## 2023-11-19 NOTE — PLAN OF CARE
Goal Outcome Evaluation:  Plan of Care Reviewed With: patient, family        Progress: no change  Outcome Evaluation: Patient remains awake and alert, but experiences some intermittent periods of consfusion, repeating questions numerous times that have been previously discussed. Patient continued to wear BiPAP throughout the night and, while he was very anxious at times, tolerated well overall. Patient unable to accept PO meds due to BiPAP use. Vitals have remained stable throughout shift with no significant changes noted. Discussed ongoing plan of care with patient and family members at bedside with no further questions or concerns verbalized.

## 2023-11-20 LAB
ANION GAP SERPL CALCULATED.3IONS-SCNC: 7.4 MMOL/L (ref 5–15)
BUN SERPL-MCNC: 18 MG/DL (ref 8–23)
BUN/CREAT SERPL: 27.7 (ref 7–25)
CALCIUM SPEC-SCNC: 9.4 MG/DL (ref 8.6–10.5)
CHLORIDE SERPL-SCNC: 91 MMOL/L (ref 98–107)
CO2 SERPL-SCNC: 38.6 MMOL/L (ref 22–29)
CREAT SERPL-MCNC: 0.65 MG/DL (ref 0.76–1.27)
EGFRCR SERPLBLD CKD-EPI 2021: 100.7 ML/MIN/1.73
GLUCOSE SERPL-MCNC: 113 MG/DL (ref 65–99)
POTASSIUM SERPL-SCNC: 3.9 MMOL/L (ref 3.5–5.2)
SODIUM SERPL-SCNC: 137 MMOL/L (ref 136–145)

## 2023-11-20 PROCEDURE — 94799 UNLISTED PULMONARY SVC/PX: CPT

## 2023-11-20 PROCEDURE — 97166 OT EVAL MOD COMPLEX 45 MIN: CPT

## 2023-11-20 PROCEDURE — 25010000002 FUROSEMIDE PER 20 MG: Performed by: STUDENT IN AN ORGANIZED HEALTH CARE EDUCATION/TRAINING PROGRAM

## 2023-11-20 PROCEDURE — 25010000002 HEPARIN (PORCINE) PER 1000 UNITS: Performed by: INTERNAL MEDICINE

## 2023-11-20 PROCEDURE — 94761 N-INVAS EAR/PLS OXIMETRY MLT: CPT

## 2023-11-20 PROCEDURE — 80048 BASIC METABOLIC PNL TOTAL CA: CPT | Performed by: STUDENT IN AN ORGANIZED HEALTH CARE EDUCATION/TRAINING PROGRAM

## 2023-11-20 PROCEDURE — 99232 SBSQ HOSP IP/OBS MODERATE 35: CPT | Performed by: STUDENT IN AN ORGANIZED HEALTH CARE EDUCATION/TRAINING PROGRAM

## 2023-11-20 PROCEDURE — 94660 CPAP INITIATION&MGMT: CPT

## 2023-11-20 PROCEDURE — 97162 PT EVAL MOD COMPLEX 30 MIN: CPT

## 2023-11-20 RX ORDER — PANTOPRAZOLE SODIUM 40 MG/1
40 TABLET, DELAYED RELEASE ORAL
Status: DISCONTINUED | OUTPATIENT
Start: 2023-11-20 | End: 2023-11-24 | Stop reason: HOSPADM

## 2023-11-20 RX ORDER — FUROSEMIDE 10 MG/ML
60 INJECTION INTRAMUSCULAR; INTRAVENOUS
Status: COMPLETED | OUTPATIENT
Start: 2023-11-20 | End: 2023-11-20

## 2023-11-20 RX ADMIN — Medication 10 ML: at 08:31

## 2023-11-20 RX ADMIN — PANTOPRAZOLE SODIUM 40 MG: 40 TABLET, DELAYED RELEASE ORAL at 11:25

## 2023-11-20 RX ADMIN — ACETAMINOPHEN 650 MG: 325 TABLET ORAL at 14:31

## 2023-11-20 RX ADMIN — HEPARIN SODIUM 5000 UNITS: 5000 INJECTION INTRAVENOUS; SUBCUTANEOUS at 08:30

## 2023-11-20 RX ADMIN — HEPARIN SODIUM 5000 UNITS: 5000 INJECTION INTRAVENOUS; SUBCUTANEOUS at 20:34

## 2023-11-20 RX ADMIN — Medication 10 ML: at 20:35

## 2023-11-20 RX ADMIN — DOCUSATE SODIUM 50 MG AND SENNOSIDES 8.6 MG 2 TABLET: 8.6; 5 TABLET, FILM COATED ORAL at 20:34

## 2023-11-20 RX ADMIN — AMLODIPINE BESYLATE 10 MG: 10 TABLET ORAL at 20:34

## 2023-11-20 RX ADMIN — FUROSEMIDE 60 MG: 10 INJECTION, SOLUTION INTRAMUSCULAR; INTRAVENOUS at 08:29

## 2023-11-20 RX ADMIN — DOCUSATE SODIUM 50 MG AND SENNOSIDES 8.6 MG 2 TABLET: 8.6; 5 TABLET, FILM COATED ORAL at 08:28

## 2023-11-20 RX ADMIN — CLOPIDOGREL BISULFATE 75 MG: 75 TABLET, FILM COATED ORAL at 08:28

## 2023-11-20 RX ADMIN — Medication 10 ML: at 20:36

## 2023-11-20 RX ADMIN — ACETAMINOPHEN 650 MG: 325 TABLET ORAL at 08:30

## 2023-11-20 RX ADMIN — ACETAMINOPHEN 650 MG: 325 TABLET ORAL at 22:32

## 2023-11-20 RX ADMIN — ROSUVASTATIN CALCIUM 10 MG: 10 TABLET, FILM COATED ORAL at 08:29

## 2023-11-20 RX ADMIN — LISINOPRIL 20 MG: 10 TABLET ORAL at 08:29

## 2023-11-20 RX ADMIN — FUROSEMIDE 60 MG: 10 INJECTION, SOLUTION INTRAMUSCULAR; INTRAVENOUS at 18:12

## 2023-11-20 NOTE — THERAPY EVALUATION
Acute Care - Physical Therapy Initial Evaluation  [facility]     Patient Name: Donaldo Roberts  : 1950  MRN: [id number]  Today's Date: 2023   Onset of Illness/Injury or Date of Surgery: 23  Visit Dx:     ICD-10-CM ICD-9-CM   1. Acute on chronic respiratory failure with hypoxia and hypercapnia  J96.21 518.84    J96.22 786.09     799.02   2. Pneumonia due to infectious organism, unspecified laterality, unspecified part of lung  J18.9 486   3. Chronic diastolic heart failure  I50.32 428.32   4. Supplemental oxygen dependent  Z99.81 V46.2   5. Chronic obstructive pulmonary disease, unspecified COPD type  J44.9 496     Patient Active Problem List   Diagnosis    Benign prostatic hyperplasia with urinary obstruction    Herpesviral infection of penis    Shortness of breath    Carotid stenosis, asymptomatic, right    Essential hypertension    Dyslipidemia    Wenckebach block    PATTY (obstructive sleep apnea)    Pre-operative cardiovascular examination    Presence of cardiac pacemaker    Severe sinus bradycardia    Chronic bronchitis    Cigarette nicotine dependence without complication    Hypoxia    Detrusor instability    Tobacco abuse    Respiratory failure     Past Medical History:   Diagnosis Date    Acid reflux     Anxiety     Arthritis     Carotid artery stenosis, symptomatic, right 2021    Essential hypertension 2021    Herpes     History of lipoma     History of transfusion     Hyperlipidemia     Inguinal hernia     Peptic ulcer     Stroke      Past Surgical History:   Procedure Laterality Date    ABDOMINAL SURGERY      CARDIAC ELECTROPHYSIOLOGY PROCEDURE N/A 12/10/2021    Procedure: Pacemaker DC new;  Surgeon: Joyn Monte MD;  Location: [facility] INVASIVE LOCATION;  Service: Cardiology;  Laterality: N/A;    CAROTID ENDARTERECTOMY Left     CHOLECYSTECTOMY WITH INTRAOPERATIVE CHOLANGIOGRAM N/A 2017    Procedure: CHOLECYSTECTOMY LAPAROSCOPIC INTRAOPERATIVE CHOLANGIOGRAM;   Surgeon: Luis Coe MD;  Location: [facility];  Service:     HERNIA REPAIR      inguinal    SINUS SURGERY      TUMOR EXCISION       PT Assessment (last 12 hours)       PT Evaluation and Treatment       Row Name 11/20/23 1241          Physical Therapy Time and Intention    Subjective Information complains of;weakness;pain  -     Document Type evaluation  -     Mode of Treatment physical therapy  -     Patient Effort good  -       Row Name 11/20/23 1241          General Information    Patient Profile Reviewed yes  -     Onset of Illness/Injury or Date of Surgery 11/17/23  -     Referring Physician Kike  -     Patient Observations alert;cooperative;agree to therapy  -     Patient/Family/Caregiver Comments/Observations Wife present during evaluation  -     Prior Level of Function independent:;all household mobility;transfer;gait;bed mobility  -     Equipment Currently Used at Home walker, rolling;cane, straight;commode, bedside;oxygen;ramp  -     Existing Precautions/Restrictions fall;oxygen therapy device and L/min  -       Row Name 11/20/23 1241          Previous Level of Function/Home Environm    Bed Mobility, Premorbid Functional Level independent  -     Transfers, Premorbid Functional Level independent;uses device or equipment  -     Household Ambulation, Premorbid Functional Level independent;uses device or equipment  -     Stairs, Premorbid Functional Level not applicable (see comment)  -       Row Name 11/20/23 1241          Living Environment    Current Living Arrangements home  -     Home Accessibility wheelchair accessible  -     People in Home spouse;child(haley), adult  -     Primary Care Provided by self;spouse/significant other  -       Row Name 11/20/23 1241          Home Use of Assistive/Adaptive Equipment    Equipment Currently Used at Home walker, standard;cane, straight;wheelchair;commode;oxygen  -       Row Name 11/20/23 1241          Pain     Additional Documentation Pain Scale: FACES Pre/Post-Treatment (Group)  -       Row Name 11/20/23 1241          Pain Scale: FACES Pre/Post-Treatment    Pain: FACES Scale, Pretreatment 2-->hurts little bit  -     Posttreatment Pain Rating 4-->hurts little more  -     Pain Location - Side/Orientation Right  -     Pain Location - knee  -     Pre/Posttreatment Pain Comment Pt reports right knee arthritis that has been worse the last couple days  -       Row Name 11/20/23 1241          Cognition    Follows Commands (Cognition) WFL  -     Personal Safety Interventions fall prevention program maintained;gait belt;muscle strengthening facilitated;nonskid shoes/slippers when out of bed;supervised activity  -[facility] Name 11/20/23 1241          Strength Comprehensive (MMT)    Comment, General Manual Muscle Testing (MMT) Assessment LE strength observed 3-/5 during mobility  -[facility] Name 11/20/23 1241          Bed Mobility    Bed Mobility supine-sit;sit-supine  -     Supine-Sit [place] (Bed Mobility) minimum assist (75% patient effort);1 person assist  -     Sit-Supine [place] (Bed Mobility) standby assist;1 person assist  -     Bed Mobility, Safety Issues decreased use of legs for bridging/pushing;decreased use of arms for pushing/pulling  -     Assistive Device (Bed Mobility) bed rails;head of bed elevated  -[facility] Name 11/20/23 1241          Transfers    Transfers sit-stand transfer;stand-sit transfer  -KP       Row Name 11/20/23 124          Sit-Stand Transfer    Sit-Stand [place] (Transfers) minimum assist (75% patient effort);1 person assist;verbal cues  [facility]     Assistive Device (Sit-Stand Transfers) walker, front-wheeled  [facility] Name 11/20/23 124          Stand-Sit Transfer    Stand-Sit [place] (Transfers) minimum assist (75% patient effort);contact guard;1 person assist;verbal cues  [facility]     Assistive Device (Stand-Sit Transfers) walker, front-wheeled  [facility]        Row Name 11/20/23 1241          Gait/Stairs (Locomotion)    Comment, (Gait/Stairs) Difficulty ambulating significant distance due to right knee pain and weakness.  Pt performed side steps towards head of bed with Johanny/modA and FWW.  -       Row Name 11/20/23 1241          Plan of Care Review    Plan of Care Reviewed With patient  -     Outcome Evaluation PT evaluation completed.  Patient was unable to ambulate any significant distance due to right knee pain and weakness.  Pt will benefit from continued skilled PT services during this admission to improve strength, standing balance, endurance and progress with gait training.  -       Row Name 11/20/23 1241          Positioning and Restraints    Pre-Treatment Position in bed  -     Post Treatment Position bed  -     In Bed notified nsg;supine;call light within reach;encouraged to call for assist;exit alarm on;side rails up x3;with family/caregiver;heels elevated;legs elevated  Lines in tact and needs in reach  -       Row Name 11/20/23 1241          Therapy Assessment/Plan (PT)    Patient/Family Therapy Goals Statement (PT) Return home  -     Functional Level at Time of Evaluation (PT) Johanny/modA  -     PT Diagnosis (PT) Right knee pain from arthritis with decreased strength  -     Rehab Potential (PT) good, to achieve stated therapy goals  -     Criteria for Skilled Interventions Met (PT) yes;meets criteria;skilled treatment is necessary  -     Therapy Frequency (PT) 2 times/wk  -     Predicted Duration of Therapy Intervention (PT) 2 wks  -     Problem List (PT) problems related to;balance;coordination;mobility;range of motion (ROM);strength;pain;postural control  -     Activity Limitations Related to Problem List (PT) unable to transfer safely;unable to ambulate safely  -       Row Name 11/20/23 1241          PT Evaluation Complexity    History, PT Evaluation Complexity 3 or more personal factors and/or comorbidities  -      Examination of Body Systems (PT Eval Complexity) total of 3 or more elements  -KP     Clinical Presentation (PT Evaluation Complexity) evolving  -KP     Clinical Decision Making (PT Evaluation Complexity) moderate complexity  -KP     Overall Complexity (PT Evaluation Complexity) moderate complexity  -       Row Name 11/20/23 124          Physical Therapy Goals    Bed Mobility Goal Selection (PT) bed mobility, PT goal 1  -KP     Transfer Goal Selection (PT) transfer, PT goal 1  -KP     Gait Training Goal Selection (PT) gait training, PT goal 1  -       Row Name 11/20/23 [facility]          Bed Mobility Goal 1 (PT)    Activity/Assistive Device (Bed Mobility Goal 1, PT) sit to supine/supine to sit  -KP     [place] Level/Cues Needed (Bed Mobility Goal 1, PT) modified independence  -KP     Time Frame (Bed Mobility Goal 1, PT) long term goal (LTG);by discharge  -       Row Name 11/20/23 [facility]          Transfer Goal 1 (PT)    Activity/Assistive Device (Transfer Goal 1, PT) sit-to-stand/stand-to-sit;walker, rolling  -KP     [place] Level/Cues Needed (Transfer Goal 1, PT) supervision required  -KP     Time Frame (Transfer Goal 1, PT) long term goal (LTG);by discharge  -       Row Name 11/20/23 [facility]          Gait Training Goal 1 (PT)    Activity/Assistive Device (Gait Training Goal 1, PT) gait (walking locomotion);assistive device use;walker, rolling  -KP     [place] Level (Gait Training Goal 1, PT) contact guard required  -KP     Distance (Gait Training Goal 1, PT) 75ft  -KP     Time Frame (Gait Training Goal 1, PT) long term goal (LTG);by discharge  -               User Key  (r) = Recorded By, (t) = Taken By, (c) = Cosigned By      Initials Name Provider Type    Yoana Carlson, PT Physical Therapist                      PT Recommendation and Plan  Planned Therapy Interventions (PT): balance training, bed mobility training, gait training, home exercise program, motor coordination training, neuromuscular  re-education, patient/family education, postural re-education, ROM (range of motion), stair training, strengthening, stretching, transfer training  Therapy Frequency (PT): 2 times/wk  Plan of Care Reviewed With: patient  Outcome Evaluation: PT evaluation completed.  Patient was unable to ambulate any significant distance due to right knee pain and weakness.  Pt will benefit from continued skilled PT services during this admission to improve strength, standing balance, endurance and progress with gait training.       Time Calculation:    PT Charges       Row Name 11/20/23 1351             Time Calculation    PT Received On 11/20/23  -      PT Goal Re-Cert Due Date 12/04/23  -                User Key  (r) = Recorded By, (t) = Taken By, (c) = Cosigned By      Initials Name Provider Type    Yoana Carlson PT Physical Therapist                  Therapy Charges for Today       Code Description Service Date Service Provider Modifiers Qty    31092518186 HC PT EVAL MOD COMPLEXITY 4 11/20/2023 Yoana Guevara, PT GP 1            PT G-Codes  AM-PAC 6 Clicks Score (PT): 8    Yoana Guevara PT  11/20/2023

## 2023-11-20 NOTE — PLAN OF CARE
Goal Outcome Evaluation:  Plan of Care Reviewed With: patient, spouse        Progress: no change  Outcome Evaluation: Patient alert to voice. Pt did become more drowsy this evening. Pt refused to wear BIPAP while napping earlier this shift, however was agreeable to wear again after dinner. Spouse at bedside. Bed alarm set. Call light within reach.

## 2023-11-20 NOTE — CASE MANAGEMENT/SOCIAL WORK
Discharge Planning Assessment  [facility]     Patient Name: Donaldo Roberts  MRN: [id number]  Today's Date: 11/20/2023    Admit Date: 11/17/2023    Plan: Pt lives with spouse, Inna and adult son and he plans to return home at discharge. Pt did not utilize home health services prior to admit. Pt has a wheelchair, rolling walker, O2 @ 3 liters, portable O2 tanks, bedside commode, and shower chair via [employer]. PCP was recently changed to Dr. Rosen at [facility]. Pt does not have a POA or living will. SS to follow and assist as needed with discharge planning.   Discharge Needs Assessment       Row Name 11/20/23 1635       Living Environment    People in Home spouse;child(haely), adult    Primary Care Provided by spouse/significant other;child(haley)    Provides Primary Care For no one    Family Caregiver if Needed child(haley), adult;spouse    Quality of Family Relationships helpful;involved;supportive    Able to Return to Prior Arrangements yes       Transition Planning    Patient/Family Anticipates Transition to home with family       Discharge Needs Assessment    Equipment Currently Used at Home wheelchair;walker, rolling;oxygen;commode;shower chair  O2 @ 3 liters, portable O2 tanks, and other DME via [employer]      Row Name 11/20/23 1542       Living Environment    People in Home spouse    Name(s) of People in Home Inna Roberts    Current Living Arrangements home    Potentially Unsafe Housing Conditions none    Primary Care Provided by spouse/significant other    Family Caregiver if Needed spouse    Family Caregiver Names Inna Roberts    Able to Return to Prior Arrangements yes       Resource/Environmental Concerns    Resource/Environmental Concerns none    Transportation Concerns none       Transition Planning    Transportation Anticipated family or friend will provide       Discharge Needs Assessment    Readmission Within the Last 30 Days no previous admission in last 30  days    Equipment Currently Used at Home walker, rolling;commode;pulse ox;wheelchair;oxygen    Equipment Needed After Discharge bipap                   Discharge Plan       Row Name 11/20/23 7154       Plan    Plan Pt lives with spouse, Inna and adult son and he plans to return home at discharge. Pt did not utilize home health services prior to admit. Pt has a wheelchair, rolling walker, O2 @ 3 liters, portable O2 tanks, bedside commode, and shower chair via [employer]. PCP was recently changed to Dr. Rosen at [facility]. Pt does not have a POA or living will. SS to follow and assist as needed with discharge planning.    Patient/Family in Agreement with Plan yes                 Expected Discharge Date and Time       Expected Discharge Date Expected Discharge Time    Nov 21, 2023            Demographic Summary       Row Name 11/20/23 6523       General Information    Referral Source nursing    Reason for Consult --  SS received consult for d/c needs. SS spoke to pt and spouse, Inna at bedside.                DELROY Roach

## 2023-11-20 NOTE — CASE MANAGEMENT/SOCIAL WORK
Discharge Planning Assessment  [facility]     Patient Name: Donaldo Roberts  MRN: [id number]  Today's Date: 11/20/2023    Admit Date: 11/17/2023    Plan: Pt lives with wife, Inna. He is not independent at home and relies on wife for help with ADLs for the past 6 months. DME via [facility] supply is: walker, potty chair, WC, pulse ox, and O2 concentrator. PCP is Dr. Rosen in [place]. Insurance is [employer] [place] Medicare. Pt utilizes [facility] in [facility]. Trqansportation is provided by son. Wife reports that the doctor said pt will probably be discharged Wednesday.   Discharge Needs Assessment       Row Name 11/20/23 1542       Living Environment    People in Home spouse    Name(s) of People in Home Inna Roberts    Current Living Arrangements home    Potentially Unsafe Housing Conditions none    Primary Care Provided by spouse/significant other    Family Caregiver if Needed spouse    Family Caregiver Names Inna Roberts    Able to Return to Prior Arrangements yes       Resource/Environmental Concerns    Resource/Environmental Concerns none    Transportation Concerns none       Transition Planning    Transportation Anticipated family or friend will provide       Discharge Needs Assessment    Readmission Within the Last 30 Days no previous admission in last 30 days    Equipment Currently Used at Home walker, rolling;commode;pulse ox;wheelchair;oxygen    Equipment Needed After Discharge bipap                   Discharge Plan       Row Name 11/20/23 1548       Plan    Plan Pt lives with wife, Inna. He is not independent at home and relies on wife for help with ADLs for the past 6 months. DME via [facility] supply is: walker, potty chair, WC, pulse ox, and O2 concentrator. PCP is Dr. Rosen in [place]. Insurance is [employer] [facility] Medicare. Pt utilizes [facility] pharmacy in [facility]. Trqansportation is provided by son. Wife reports that the doctor said pt will probably be  discharged Wednesday.                    Stephanie Munoz RN

## 2023-11-20 NOTE — PLAN OF CARE
Goal Outcome Evaluation:  Plan of Care Reviewed With: patient, family        Progress: improving  Outcome Evaluation: Patient is AOx4 with intermittent periods of forgetfulness and confusion. Patient tolerated BiPAP while sleeping; no significant changes noted. Discussed ongoing plan of care with patient and family with no further questions verbalized.

## 2023-11-20 NOTE — PROGRESS NOTES
[facility] HOSPITALIST PROGRESS NOTE     Patient Identification:  Name:  Donaldo Roberts  Age:  73 y.o.  Sex:  male  :  1950  MRN:  [id number]  Visit Number:  [id number]  ROOM: [address]     Primary Care Provider:  Duke Rosen MD    Length of stay in inpatient status:  2    Subjective     Chief Compliant:    Chief Complaint   Patient presents with    Shortness of Breath    Leg Swelling       History of Presenting Illness: Patient seen and examined in follow-up for acute hypercapnic chronic hypoxemic respiratory failure with concern for volume overload secondary to acute heart failure with likely type II NSTEMI.  Patient today with improved mentation and slightly improved oxygenation.  No acute complaints other than wanting to go home.     Objective     Current Hospital Meds:  amLODIPine, 10 mg, Oral, Nightly  clopidogrel, 75 mg, Oral, Daily  furosemide, 60 mg, Intravenous, BID  heparin (porcine), 5,000 Units, Subcutaneous, Q12H  lisinopril, 20 mg, Oral, Daily  nicotine, 1 patch, Transdermal, Q24H  pantoprazole, 40 mg, Oral, Q AM  rosuvastatin, 10 mg, Oral, Daily  senna-docusate sodium, 2 tablet, Oral, BID  sodium chloride, 10 mL, Intravenous, Q12H  sodium chloride, 10 mL, Intravenous, Q12H  ziprasidone (GEODON) 20 mg in sterile water (preservative free) 1 mL injection, 20 mg, Intramuscular, Once         ----------------------------------------------------------------------------------------------------------------------  Vital Signs:  Temp:  [98.5 °F (36.9 °C)-99 °F (37.2 °C)] 98.5 °F (36.9 °C)  Heart Rate:  [] 90  Resp:  [11-34] 34  BP: (101-140)/(49-87) 123/70  SpO2:  [91 %-97 %] 95 %  on  Flow (L/min):  [4] 4;   Device (Oxygen Therapy): NPPV/NIV  Body mass index is 38.51 kg/m².      Intake/Output Summary (Last 24 hours) at 2023 1718  Last data filed at 2023 1125  Gross per 24 hour   Intake 720 ml   Output 2200 ml   Net -1480 ml     "  ----------------------------------------------------------------------------------------------------------------------  Physical exam:  Constitutional: Obese chronically ill-appearing elderly adult male.  HENT:  Head:  Normocephalic and atraumatic.  Mouth:  Moist mucous membranes.    Eyes:  Conjunctivae and EOM are normal. No scleral icterus.    Neck:  Neck supple.  No JVD present.    Cardiovascular:  Normal rate, regular rhythm and normal heart sounds with no murmur.  Pulmonary/Chest:  No respiratory distress, no wheezes, with reduced air movement and breath sounds throughout.  Abdominal:  Soft but obese.  Bowel sounds are normal.  No distension and no tenderness.   Musculoskeletal: Mild tenderness to palpation of the left ankle with faint swelling.  No red or swollen joints anywhere.     Neurological:  Alert and oriented to person, place, but not time.  No cranial nerve deficit.  No tongue deviation.  No facial droop.  No slurred speech. Intact Sensation throughout  Skin:  Skin is warm and dry. No rash or lesion noted. No pallor.   Peripheral vascular:  Pulses in all 4 extremities with no clubbing, no cyanosis, moderate to severe bilateral lower extremity edema with bilateral venous stasis.  Psychiatric: Appropriate mood and affect, pleasant.   ----------------------------------------------------------------------------------------------------------------------     BUN/CREAT/GLUC/ALT/AST/HANDY/LIP    18/0.65/113/--/--/--/-- (11/20 0223)  VINI - Na/K/Cl/CO2: 137/3.9/91*/38.6* (11/20 0223)        No results found for: \"URINECX\"  No results found for: \"BLOODCX\"    I have personally looked at the labs and they are summarized above.  ----------------------------------------------------------------------------------------------------------------------  Detailed radiology reports for the last 24 hours:  XR Ankle 3+ View Left    Result Date: 11/19/2023    Diffuse soft tissue swelling, but no acute fracture or " dislocation.   This report was finalized on 11/19/2023 11:11 AM by Dr. Jeremy Jenkins MD.     Assessment & Plan      Acute hypercapnic on chronic hypoxemic respiratory failure  Acute heart failure with preserved ejection fraction  NSTEMI, type II suspected secondary to hypoxia from above  History of obstructive sleep apnea, noncompliant with CPAP  Obesity, suspected underlying obesity hypoventilation    -Patient with significant hypercapnia with chronic hypoxia on supplemental oxygen at home placed on BiPAP at presentation.  Patient with declining respiratory status after being off BiPAP for short period time yesterday and more all through the afternoon, evening and through the night and taken off of this morning after ABG showed alkalosis and compensated CO2.    -Continue BiPAP therapy as needed and at night.    -Agree clinically patient with evidence of decompensated heart failure, transthoracic echocardiogram ordered and pending    -Delay in administration of 80 mg of IV Lasix ordered in the emergency department and not given till upon the floor, patient renal function tolerating previous diuresis well and will continue with twice daily IV diuretics.    -Transthoracic echocardiogram shows EF of 66 to 70% with grade 1 impaired relaxation with mild concentric hypertrophy and no significant valvular heart disease noted with estimated RVSP markedly elevated greater than 55 mmHg consistent with severe pulmonary hypertension.    -Continue to titrate as able to get back to baseline 3L NC.     COPD, not in exacerbation  Severe pulmonary hypertension  Chronic hypoxemic respiratory failure on 3 L nasal cannula  Chronic smoking tobacco use    -Patient without wheezing although noted to have hypercapnia suspect more volume related and likely decompensated heart failure.    -Hold on further corticosteroid therapy at this time.    -No evidence of any infectious etiology or infiltrates on imaging we will hold on antibiotic  therapy.    -Diuretics as above for heart failure, monitor volume status and respiratory status closely as patient with severe pulmonary hypertension    Chronic medical problems:  S/p PPM  Right carotid artery stenosis  Essential hypertension  History of ischemic stroke  Anxiety  GERD    -Continue home medications and supportive care as appropriate.    Copied text in portions of the note has been reviewed and is accurate as of .toay    VTE Prophylaxis:   Mechanical Order History:       None          Pharmalogical Order History:        Ordered     Dose Route Frequency Stop    11/18/23 0535  heparin (porcine) 5000 UNIT/ML injection 5,000 Units         5,000 Units SC Every 12 Hours Scheduled --                    Disposition likely home with family pending clinical course    Donaldo Stokes DO  [facility] Hospitalist  11/20/23  17:18 EST

## 2023-11-20 NOTE — THERAPY EVALUATION
Acute Care - Occupational Therapy Initial Evaluation  [facility]     Patient Name: Donaldo Roberts  : 1950  MRN: [id number]  Today's Date: 2023  Onset of Illness/Injury or Date of Surgery: 23     Referring Physician: Kike    Admit Date: 2023       ICD-10-CM ICD-9-CM   1. Acute on chronic respiratory failure with hypoxia and hypercapnia  J96.21 518.84    J96.22 786.09     799.02   2. Pneumonia due to infectious organism, unspecified laterality, unspecified part of lung  J18.9 486   3. Chronic diastolic heart failure  I50.32 428.32   4. Supplemental oxygen dependent  Z99.81 V46.2   5. Chronic obstructive pulmonary disease, unspecified COPD type  J44.9 496     Patient Active Problem List   Diagnosis    Benign prostatic hyperplasia with urinary obstruction    Herpesviral infection of penis    Shortness of breath    Carotid stenosis, asymptomatic, right    Essential hypertension    Dyslipidemia    Wenckebach block    PATTY (obstructive sleep apnea)    Pre-operative cardiovascular examination    Presence of cardiac pacemaker    Severe sinus bradycardia    Chronic bronchitis    Cigarette nicotine dependence without complication    Hypoxia    Detrusor instability    Tobacco abuse    Respiratory failure     Past Medical History:   Diagnosis Date    Acid reflux     Anxiety     Arthritis     Carotid artery stenosis, symptomatic, right 2021    Essential hypertension 2021    Herpes     History of lipoma     History of transfusion     Hyperlipidemia     Inguinal hernia     Peptic ulcer     Stroke      Past Surgical History:   Procedure Laterality Date    ABDOMINAL SURGERY      CARDIAC ELECTROPHYSIOLOGY PROCEDURE N/A 12/10/2021    Procedure: Pacemaker DC new;  Surgeon: Jony Monte MD;  Location: [facility] INVASIVE LOCATION;  Service: Cardiology;  Laterality: N/A;    CAROTID ENDARTERECTOMY Left     CHOLECYSTECTOMY WITH INTRAOPERATIVE CHOLANGIOGRAM N/A 2017    Procedure:  CHOLECYSTECTOMY LAPAROSCOPIC INTRAOPERATIVE CHOLANGIOGRAM;  Surgeon: Luis Coe MD;  Location: [facility];  Service:     HERNIA REPAIR      inguinal    SINUS SURGERY      TUMOR EXCISION           OT ASSESSMENT FLOWSHEET (last 12 hours)       OT Evaluation and Treatment       Row Name 11/20/23 1348                   OT Time and Intention    Subjective Information complains of;weakness;fatigue  -LM        Document Type evaluation  -LM        Mode of Treatment occupational therapy  -LM        Patient Effort good  -LM        Comment recent fall  -LM           General Information    Patient Profile Reviewed yes  -LM        Prior Level of Function all household mobility;transfer;ADL's;mod assist:  -LM        Equipment Currently Used at Home commode, 3-in-1;cane, straight;walker, rolling;wheelchair  -LM        Existing Precautions/Restrictions fall;oxygen therapy device and L/min  -LM           Living Environment    People in Home spouse;child(haley), adult  -LM           Cognition    Affect/Mental Status (Cognition) WNL  -LM        Orientation Status (Cognition) oriented x 3  -LM           Range of Motion (ROM)    Range of Motion ROM is WFL  -LM           Strength (Manual Muscle Testing)    Strength (Manual Muscle Testing) strength is WFL  -LM           Sensory    Additional Documentation Sensory Assessment (Somatosensory) (Group)  -LM           Sensory Assessment (Somatosensory)    Sensory Assessment (Somatosensory) sensation intact  -LM           Activities of Daily Living    BADL Assessment/Intervention bathing;upper body dressing;lower body dressing;grooming;feeding;toileting  -LM           Bathing Assessment/Intervention    Tompkins Level (Bathing) dependent (less than 25% patient effort)  -LM           Upper Body Dressing Assessment/Training    Tompkins Level (Upper Body Dressing) maximum assist (25% patient effort)  -LM           Lower Body Dressing Assessment/Training    Tompkins Level (Lower Body  Dressing) dependent (less than 25% patient effort)  -LM           Grooming Assessment/Training    Fowler Level (Grooming) set up  -LM           Self-Feeding Assessment/Training    Fowler Level (Feeding) set up  -LM           Toileting Assessment/Training    Fowler Level (Toileting) dependent (less than 25% patient effort)  -LM           Transfer Assessment/Treatment    Transfers --  transfer not attempted, standing only with rw min/mod assist x 2  -LM           Motor Skills    Motor Skills coordination;functional endurance  -LM        Coordination WFL  -LM        Functional Endurance F-  -LM           Plan of Care Review    Plan of Care Reviewed With patient;spouse  -LM           Positioning and Restraints    Post Treatment Position bed  -LM        In Bed call light within reach;encouraged to call for assist;exit alarm on  -LM           Therapy Assessment/Plan (OT)    OT Diagnosis debility  -LM        Rehab Potential (OT) good, to achieve stated therapy goals  -LM        Criteria for Skilled Therapeutic Interventions Met (OT) yes;skilled treatment is necessary;meets criteria  -LM        Therapy Frequency (OT) other (see comments)  -LM        Problem List (OT) balance;mobility;strength  -LM        Activity Limitations Related to Problem List (OT) unable to transfer safely;BADLs not performed adequately or safely  -LM        Planned Therapy Interventions (OT) activity tolerance training;adaptive equipment training;BADL retraining;ROM/therapeutic exercise;strengthening exercise;transfer/mobility retraining;patient/caregiver education/training  -LM           OT Goals    Transfer Goal Selection (OT) transfer, OT goal 1  -LM        Activity Tolerance Goal Selection (OT) activity tolerance, OT goal 1  -LM           Transfer Goal 1 (OT)    Activity/Assistive Device (Transfer Goal 1, OT) transfers, all;toilet;commode, 3-in-1;walker, rolling  -        Fowler Level/Cues Needed (Transfer Goal 1, OT)  minimum assist (75% or more patient effort)  -LM        Time Frame (Transfer Goal 1, OT) by discharge  -LM            Activity Tolerance Goal 1 (OT)    Activity Tolerance Goal 1 (OT) increase fxl activity tolerance to F to assist with badl and fxl mobility  -LM        Activity Level (Endurance Goal 1, OT) 15 min activity  -LM        Time Frame (Activity Tolerance Goal 1, OT) by discharge  -LM                  User Key  (r) = Recorded By, (t) = Taken By, (c) = Cosigned By      Initials Name Effective Dates    LM Ewa Rocha OT 06/16/21 -                            OT Recommendation and Plan  Planned Therapy Interventions (OT): activity tolerance training, adaptive equipment training, BADL retraining, ROM/therapeutic exercise, strengthening exercise, transfer/mobility retraining, patient/caregiver education/training  Therapy Frequency (OT): other (see comments)  Plan of Care Review  Plan of Care Reviewed With: patient, spouse  Plan of Care Reviewed With: patient, spouse        Time Calculation:     Therapy Charges for Today       Code Description Service Date Service Provider Modifiers Qty    [id number]  OT EVAL MOD COMPLEXITY 4 11/20/2023 Ewa Rocha OT GO 1                 Ewa Rocha OT  11/20/2023

## 2023-11-21 LAB
A-A DO2: 86.8 MMHG (ref 0–300)
ANION GAP SERPL CALCULATED.3IONS-SCNC: 7.6 MMOL/L (ref 5–15)
ARTERIAL PATENCY WRIST A: POSITIVE
ATMOSPHERIC PRESS: 723 MMHG
BASE EXCESS BLDA CALC-SCNC: 15.6 MMOL/L (ref 0–2)
BDY SITE: ABNORMAL
BUN SERPL-MCNC: 25 MG/DL (ref 8–23)
BUN/CREAT SERPL: 31.3 (ref 7–25)
CALCIUM SPEC-SCNC: 9.6 MG/DL (ref 8.6–10.5)
CHLORIDE SERPL-SCNC: 91 MMOL/L (ref 98–107)
CO2 BLDA-SCNC: 46.7 MMOL/L (ref 22–33)
CO2 SERPL-SCNC: 40.4 MMOL/L (ref 22–29)
COHGB MFR BLD: 2 % (ref 0–5)
CREAT SERPL-MCNC: 0.8 MG/DL (ref 0.76–1.27)
DEPRECATED RDW RBC AUTO: 48.2 FL (ref 37–54)
EGFRCR SERPLBLD CKD-EPI 2021: 94.6 ML/MIN/1.73
ERYTHROCYTE [DISTWIDTH] IN BLOOD BY AUTOMATED COUNT: 13 % (ref 12.3–15.4)
GAS FLOW AIRWAY: 4 LPM
GLUCOSE SERPL-MCNC: 163 MG/DL (ref 65–99)
HCO3 BLDA-SCNC: 44.4 MMOL/L (ref 20–26)
HCT VFR BLD AUTO: 43 % (ref 37.5–51)
HCT VFR BLD CALC: 41.5 % (ref 38–51)
HGB BLD-MCNC: 12.7 G/DL (ref 13–17.7)
HGB BLDA-MCNC: 13.5 G/DL (ref 14–18)
INHALED O2 CONCENTRATION: 36 %
Lab: ABNORMAL
Lab: ABNORMAL
MCH RBC QN AUTO: 29.5 PG (ref 26.6–33)
MCHC RBC AUTO-ENTMCNC: 29.5 G/DL (ref 31.5–35.7)
MCV RBC AUTO: 99.8 FL (ref 79–97)
METHGB BLD QL: <-0.1 % (ref 0–3)
MODALITY: ABNORMAL
NOTIFIED BY: ABNORMAL
NOTIFIED WHO: ABNORMAL
OXYHGB MFR BLDV: 93.9 % (ref 94–99)
PCO2 BLDA: 73.8 MM HG (ref 35–45)
PCO2 TEMP ADJ BLD: ABNORMAL MM[HG]
PH BLDA: 7.39 PH UNITS (ref 7.35–7.45)
PH, TEMP CORRECTED: ABNORMAL
PLATELET # BLD AUTO: 210 10*3/MM3 (ref 140–450)
PMV BLD AUTO: 10.5 FL (ref 6–12)
PO2 BLDA: 75.1 MM HG (ref 83–108)
PO2 TEMP ADJ BLD: ABNORMAL MM[HG]
POTASSIUM SERPL-SCNC: 3.9 MMOL/L (ref 3.5–5.2)
RBC # BLD AUTO: 4.31 10*6/MM3 (ref 4.14–5.8)
SAO2 % BLDCOA: 95.6 % (ref 94–99)
SODIUM SERPL-SCNC: 139 MMOL/L (ref 136–145)
VENTILATOR MODE: ABNORMAL
WBC NRBC COR # BLD AUTO: 9.92 10*3/MM3 (ref 3.4–10.8)

## 2023-11-21 PROCEDURE — 25010000002 FUROSEMIDE PER 20 MG: Performed by: STUDENT IN AN ORGANIZED HEALTH CARE EDUCATION/TRAINING PROGRAM

## 2023-11-21 PROCEDURE — 82375 ASSAY CARBOXYHB QUANT: CPT

## 2023-11-21 PROCEDURE — 97530 THERAPEUTIC ACTIVITIES: CPT

## 2023-11-21 PROCEDURE — 82805 BLOOD GASES W/O2 SATURATION: CPT

## 2023-11-21 PROCEDURE — 94799 UNLISTED PULMONARY SVC/PX: CPT

## 2023-11-21 PROCEDURE — 85027 COMPLETE CBC AUTOMATED: CPT | Performed by: STUDENT IN AN ORGANIZED HEALTH CARE EDUCATION/TRAINING PROGRAM

## 2023-11-21 PROCEDURE — 36600 WITHDRAWAL OF ARTERIAL BLOOD: CPT

## 2023-11-21 PROCEDURE — 94660 CPAP INITIATION&MGMT: CPT

## 2023-11-21 PROCEDURE — 99233 SBSQ HOSP IP/OBS HIGH 50: CPT | Performed by: STUDENT IN AN ORGANIZED HEALTH CARE EDUCATION/TRAINING PROGRAM

## 2023-11-21 PROCEDURE — 83050 HGB METHEMOGLOBIN QUAN: CPT

## 2023-11-21 PROCEDURE — 97535 SELF CARE MNGMENT TRAINING: CPT

## 2023-11-21 PROCEDURE — 25010000002 HEPARIN (PORCINE) PER 1000 UNITS: Performed by: INTERNAL MEDICINE

## 2023-11-21 PROCEDURE — 80048 BASIC METABOLIC PNL TOTAL CA: CPT | Performed by: STUDENT IN AN ORGANIZED HEALTH CARE EDUCATION/TRAINING PROGRAM

## 2023-11-21 RX ORDER — FUROSEMIDE 10 MG/ML
40 INJECTION INTRAMUSCULAR; INTRAVENOUS ONCE
Status: COMPLETED | OUTPATIENT
Start: 2023-11-21 | End: 2023-11-21

## 2023-11-21 RX ADMIN — Medication 10 ML: at 09:03

## 2023-11-21 RX ADMIN — HEPARIN SODIUM 5000 UNITS: 5000 INJECTION INTRAVENOUS; SUBCUTANEOUS at 10:12

## 2023-11-21 RX ADMIN — FUROSEMIDE 40 MG: 10 INJECTION, SOLUTION INTRAMUSCULAR; INTRAVENOUS at 15:59

## 2023-11-21 RX ADMIN — ROSUVASTATIN CALCIUM 10 MG: 10 TABLET, FILM COATED ORAL at 09:00

## 2023-11-21 RX ADMIN — ACETAMINOPHEN 650 MG: 325 TABLET ORAL at 06:06

## 2023-11-21 RX ADMIN — ACETAMINOPHEN 650 MG: 325 TABLET ORAL at 16:41

## 2023-11-21 RX ADMIN — Medication 10 ML: at 20:45

## 2023-11-21 RX ADMIN — PANTOPRAZOLE SODIUM 40 MG: 40 TABLET, DELAYED RELEASE ORAL at 05:58

## 2023-11-21 RX ADMIN — LISINOPRIL 20 MG: 10 TABLET ORAL at 09:00

## 2023-11-21 RX ADMIN — AMLODIPINE BESYLATE 10 MG: 10 TABLET ORAL at 20:44

## 2023-11-21 RX ADMIN — CLOPIDOGREL BISULFATE 75 MG: 75 TABLET, FILM COATED ORAL at 09:00

## 2023-11-21 RX ADMIN — HEPARIN SODIUM 5000 UNITS: 5000 INJECTION INTRAVENOUS; SUBCUTANEOUS at 20:44

## 2023-11-21 RX ADMIN — DOCUSATE SODIUM 50 MG AND SENNOSIDES 8.6 MG 2 TABLET: 8.6; 5 TABLET, FILM COATED ORAL at 09:00

## 2023-11-21 RX ADMIN — DOCUSATE SODIUM 50 MG AND SENNOSIDES 8.6 MG 2 TABLET: 8.6; 5 TABLET, FILM COATED ORAL at 20:44

## 2023-11-21 NOTE — PROGRESS NOTES
[facility] HOSPITALIST PROGRESS NOTE     Patient Identification:  Name:  Donaldo Roberts  Age:  73 y.o.  Sex:  male  :  1950  MRN:  [id number]  Visit Number:  [id number]  ROOM: [address]     Primary Care Provider:  Duke Rosen MD     Date of Admission: 2023    Length of stay in inpatient status:  3    Subjective     Chief Compliant:    Chief Complaint   Patient presents with    Shortness of Breath    Leg Swelling       Patient refused bipap overnight with repeat abg this am reviewed and near baseline pH and pCO2. Patient on 4L NC at time of rounds and again discussed discharge plan. Patient agreed to try to transfer and see how O2 did but reported as 2 person assist by PT. Given level of debility, patient and wife agreeable to swing bed for therapy as patient has wife at home to assist but otherwise dependent for all aspects of care currently.         Objective     Current Hospital Meds:  amLODIPine, 10 mg, Oral, Nightly  clopidogrel, 75 mg, Oral, Daily  heparin (porcine), 5,000 Units, Subcutaneous, Q12H  lisinopril, 20 mg, Oral, Daily  nicotine, 1 patch, Transdermal, Q24H  pantoprazole, 40 mg, Oral, Q AM  rosuvastatin, 10 mg, Oral, Daily  senna-docusate sodium, 2 tablet, Oral, BID  sodium chloride, 10 mL, Intravenous, Q12H  sodium chloride, 10 mL, Intravenous, Q12H  ziprasidone (GEODON) 20 mg in sterile water (preservative free) 1 mL injection, 20 mg, Intramuscular, Once       Current Antimicrobial Therapy:  Anti-Infectives (From admission, onward)      Ordered     Dose/Rate Route Frequency Start Stop    23 0204  cefTRIAXone (ROCEPHIN) 2,000 mg in sodium chloride 0.9 % 100 mL IVPB-VTB        Ordering Provider: Aracelis Boo MD    2,000 mg  200 mL/hr over 30 Minutes Intravenous Once 23 0220 23 0414    23 0204  azithromycin (ZITHROMAX) 500 mg in sodium chloride 0.9 % 250 mL IVPB-VTB        Ordering Provider: Aracelis Boo MD    500 mg  over 60 Minutes  Intravenous Once 11/18/23 0220 11/18/23 0443          Current Diuretic Therapy:  Diuretics (From admission, onward)      Ordered     Dose/Rate Route Frequency Start Stop    11/21/23 1417  furosemide (LASIX) injection 40 mg        Ordering Provider: Surendra Roman MD    40 mg Intravenous Once 11/21/23 1515 11/21/23 1559    11/20/23 0732  furosemide (LASIX) injection 60 mg        Ordering Provider: Donaldo Stokes DO    60 mg Intravenous 2 Times Daily (Diuretics) 11/20/23 0900 11/20/23 1812    11/19/23 0728  furosemide (LASIX) injection 60 mg        Ordering Provider: Donaldo Stokes DO    60 mg Intravenous 2 Times Daily (Diuretics) 11/19/23 0900 11/19/23 1724    11/18/23 0535  furosemide (LASIX) injection 80 mg        Ordering Provider: Sarika White DO    80 mg Intravenous Once 11/18/23 0800 11/18/23 0818    11/17/23 2331  furosemide (LASIX) injection 80 mg        Ordering Provider: Aracelis Boo MD    80 mg Intravenous Once 11/17/23 2347 11/18/23 0001          ----------------------------------------------------------------------------------------------------------------------  Vital Signs:  Temp:  [98.4 °F (36.9 °C)-99.5 °F (37.5 °C)] 98.5 °F (36.9 °C)  Heart Rate:  [] 96  Resp:  [14-38] 24  BP: (106-165)/(53-80) 113/67  SpO2:  [90 %-98 %] 90 %  on  Flow (L/min):  [4] 4;   Device (Oxygen Therapy): humidified;nasal cannula  Body mass index is 38.12 kg/m².    Wt Readings from Last 3 Encounters:   11/21/23 117 kg (258 lb 2.5 oz)   11/10/23 109 kg (240 lb)   08/22/23 107 kg (235 lb)     Intake & Output (last 3 days)         11/18 0701 11/19 0700 11/19 0701 11/20 0700 11/20 0701 11/21 0700 11/21 0701 11/22 0700    P.O. 1079 480 720 360    I.V. (mL/kg) 0 (0) 0 (0)      Total Intake(mL/kg) 1079 (8.8) 480 (4.1) 720 (6.2) 360 (3.1)    Urine (mL/kg/hr) 4150 (1.4) 3250 (1.1) 1900 (0.7) 1100 (0.9)    Stool 0 0 0 0    Total Output 4150 3250 1900 1100    Net -3071 -2770 -1180 -740            Stool  Unmeasured Occurrence 0 x 1 x 0 x 1 x          Diet: Cardiac Diets; Healthy Heart (2-3 Na+); Texture: Regular Texture (IDDSI 7); Fluid Consistency: Thin (IDDSI 0)  ----------------------------------------------------------------------------------------------------------------------  Physical Exam  Vitals and nursing note reviewed.   Constitutional:       General: He is not in acute distress.  HENT:      Mouth/Throat:      Mouth: Mucous membranes are moist.      Pharynx: Oropharynx is clear.   Eyes:      General: No scleral icterus.     Extraocular Movements: Extraocular movements intact.   Cardiovascular:      Rate and Rhythm: Normal rate.      Pulses: Normal pulses.   Pulmonary:      Effort: Pulmonary effort is normal. No respiratory distress.      Breath sounds: No wheezing or rales.   Abdominal:      General: There is no distension.      Palpations: Abdomen is soft.   Musculoskeletal:      Right lower leg: Edema present.      Left lower leg: Edema present.   Skin:     General: Skin is warm and dry.   Neurological:      Mental Status: He is alert and oriented to person, place, and time. Mental status is at baseline.   Psychiatric:         Mood and Affect: Mood normal.         Behavior: Behavior normal.       ----------------------------------------------------------------------------------------------------------------------    ----------------------------------------------------------------------------------------------------------------------  LABS:    CBC and coagulation:  Results from last 7 days   Lab Units 11/21/23  0024 11/19/23  0050 11/18/23  0555 11/18/23  0326 11/17/23  2357   LACTATE mmol/L  --   --   --  0.9  --    WBC 10*3/mm3 9.92 8.39 8.27  --  7.32   HEMOGLOBIN g/dL 12.7* 12.3* 12.9*  --  12.2*   HEMATOCRIT % 43.0 41.8 45.1  --  42.4   MCV fL 99.8* 100.2* 103.7*  --  102.7*   MCHC g/dL 29.5* 29.4* 28.6*  --  28.8*   PLATELETS 10*3/mm3 210 193 167  --  194     Acid/base balance:  Results from last  "7 days   Lab Units 11/21/23  0416 11/19/23  0805 11/18/23  1424 11/18/23  0653 11/18/23  0218   PH, ARTERIAL pH units 7.388 7.455* 7.333* 7.392 7.326*   PO2 ART mm Hg 75.1* 67.7* 66.2* 66.8* 63.0*   PCO2, ARTERIAL mm Hg 73.8* 63.9* 93.9* 77.3* 89.2*   HCO3 ART mmol/L 44.4* 44.9* 49.9* 46.9* 46.6*     Renal and electrolytes:  Results from last 7 days   Lab Units 11/21/23  0024 11/20/23 0223 11/19/23 0050 11/18/23  0555 11/17/23  2357   SODIUM mmol/L 139 137 137 142 142   POTASSIUM mmol/L 3.9 3.9 3.8 4.6 4.9   CHLORIDE mmol/L 91* 91* 92* 94* 97*   CO2 mmol/L 40.4* 38.6* 39.2* 39.7* 41.4*   BUN mg/dL 25* 18 16 14 16   CREATININE mg/dL 0.80 0.65* 0.61* 0.59* 0.67*   CALCIUM mg/dL 9.6 9.4 9.5 9.7 9.6   GLUCOSE mg/dL 163* 113* 143* 104* 107*     Estimated Creatinine Clearance: 106.9 mL/min (by C-G formula based on SCr of 0.8 mg/dL).    Liver and pancreatic function:  Results from last 7 days   Lab Units 11/19/23 0050 11/18/23  0555   ALBUMIN g/dL 3.8 4.0   BILIRUBIN mg/dL 0.5 0.4   ALK PHOS U/L 75 80   AST (SGOT) U/L 12 18   ALT (SGPT) U/L 10 13     Endocrine function:  No results found for: \"HGBA1C\"  Point of care bedside glucose levels:      Glucose levels from the [facility]:  Results from last 7 days   Lab Units 11/21/23  0024 11/20/23 0223 11/19/23  0050 11/18/23  0555 11/17/23  2357   GLUCOSE mg/dL 163* 113* 143* 104* 107*     Lab Results   Component Value Date    TSH 0.681 12/08/2021    FREET4 1.11 12/08/2021     Cardiac:  Results from last 7 days   Lab Units 11/18/23  0326 11/17/23  2357   HSTROP T ng/L 30* 26*   PROBNP pg/mL  --  168.3       Cultures:  Lab Results   Component Value Date    COLORU Yellow 01/16/2023    CLARITYU Clear 01/16/2023    SPECGRAV 1.010 01/16/2023    PHUR 7.5 01/16/2023    GLUCOSEU Negative 12/10/2021    KETONESU Negative 01/16/2023    BLOODU Negative 12/10/2021    NITRITEU Negative 12/10/2021    LEUKOCYTESUR Negative 01/16/2023    BILIRUBINUR Negative 01/16/2023    UROBILINOGEN Normal " "01/16/2023     Microbiology Results (last 10 days)       Procedure Component Value - Date/Time    Blood Culture - Blood, Arm, Right [[id number]]  (Normal) Collected: 11/18/23 0326    Lab Status: Preliminary result Specimen: Blood from Arm, Right Updated: 11/21/23 0330     Blood Culture No growth at 3 days    Blood Culture - Blood, Arm, Left [[id number]]  (Normal) Collected: 11/18/23 0326    Lab Status: Preliminary result Specimen: Blood from Arm, Left Updated: 11/21/23 0330     Blood Culture No growth at 3 days            No results found for: \"PREGTESTUR\", \"PREGSERUM\", \"HCG\", \"HCGQUANT\"  Pain Management Panel  More data may exist         Latest Ref Rng & Units 11/18/2023 9/5/2019   Pain Management Panel   Amphetamine, Urine Qual Negative Negative  Negative    Barbiturates Screen, Urine Negative Negative  Negative    Benzodiazepine Screen, Urine Negative Negative  Negative    Buprenorphine, Screen, Urine Negative Negative  Negative    Cocaine Screen, Urine Negative Negative  Negative    Fentanyl, Urine Negative Negative  -   Methadone Screen , Urine Negative Negative  Negative    Methamphetamine, Ur Negative Negative  -       I have personally looked at the labs and they are summarized above.  ----------------------------------------------------------------------------------------------------------------------  Detailed radiology reports for the last 24 hours:    Imaging Results (Last 24 Hours)       ** No results found for the last 24 hours. **            Assessment & Plan      #Acute hypercapnic on chronic hypoxemic respiratory failure  #Acute heart failure with preserved ejection fraction  #Myocardial injury secondary to hypoxia from above  #History of obstructive sleep apnea, noncompliant with CPAP  #Obesity, suspected underlying obesity hypoventilation  #Severe PAH  #Chronic hypoxemic respiratory failure on 3 L nasal cannula  #Chronic smoking tobacco use  #COPD not in exacerbation  -Echo reviewed and patient with " severe PAH and evidence of right heart failure with preserved EF, diastolic dysfunction. As such, will cont gentle diuresis to avoid RAFI or overly aggressive fluid shifts. 40mg IV lasix x1 dose today and f/u labs and UOP in am, previously not on anamika daily diuretic dosing. Labs with evidence of contraction given rising bicarb  -On baseline 3-4L NC currently  -Bipap canceled as patient has been refusing and has no machine at home. Cont NC O2 this am and repeat vbg in am  -Patient will need close outpatient HF clinic f/u as well as pulmonology  -Anticipate transition to low daily lasix dosing given preserved renal function and discussed following with outpatient HF clinic to add additional diuretics as indicated for weight gain.    #Chronic illness associated debility  -Swing bed consult placed, two person assist per PT eval. Wife sole assist provider at home      - - Chronic - -    #S/p PPM  #Right carotid artery stenosis  #Essential hypertension  #History of ischemic stroke  #Anxiety  #GERD    VTE Prophylaxis:   Mechanical Order History:       None          Pharmalogical Order History:        Ordered     Dose Route Frequency Stop    11/18/23 0535  heparin (porcine) 5000 UNIT/ML injection 5,000 Units         5,000 Units SC Every 12 Hours Scheduled --                    Code Status and Medical Interventions:   Ordered at: 11/18/23 0340     Code Status (Patient has no pulse and is not breathing):    CPR (Attempt to Resuscitate)     Medical Interventions (Patient has pulse or is breathing):    Full Support         Disposition: Ok to downgrade to floor    I have reviewed any copied/forwarded text or data, verified its accuracy, and updated as necessary above.    Surendra Roman MD  [facility] Hospitalist  11/21/23  17:35 EST

## 2023-11-21 NOTE — THERAPY TREATMENT NOTE
Acute Care - Physical Therapy Treatment Note  [facility]     Patient Name: Donaldo Roberts  : 1950  MRN: [id number]  Today's Date: 2023   Onset of Illness/Injury or Date of Surgery: 23  Visit Dx:     ICD-10-CM ICD-9-CM   1. Acute on chronic respiratory failure with hypoxia and hypercapnia  J96.21 518.84    J96.22 786.09     799.02   2. Pneumonia due to infectious organism, unspecified laterality, unspecified part of lung  J18.9 486   3. Chronic diastolic heart failure  I50.32 428.32   4. Supplemental oxygen dependent  Z99.81 V46.2   5. Chronic obstructive pulmonary disease, unspecified COPD type  J44.9 496     Patient Active Problem List   Diagnosis    Benign prostatic hyperplasia with urinary obstruction    Herpesviral infection of penis    Shortness of breath    Carotid stenosis, asymptomatic, right    Essential hypertension    Dyslipidemia    Wenckebach block    PATTY (obstructive sleep apnea)    Pre-operative cardiovascular examination    Presence of cardiac pacemaker    Severe sinus bradycardia    Chronic bronchitis    Cigarette nicotine dependence without complication    Hypoxia    Detrusor instability    Tobacco abuse    Respiratory failure     Past Medical History:   Diagnosis Date    Acid reflux     Anxiety     Arthritis     Carotid artery stenosis, symptomatic, right 2021    Essential hypertension 2021    Herpes     History of lipoma     History of transfusion     Hyperlipidemia     Inguinal hernia     Peptic ulcer     Stroke      Past Surgical History:   Procedure Laterality Date    ABDOMINAL SURGERY      CARDIAC ELECTROPHYSIOLOGY PROCEDURE N/A 12/10/2021    Procedure: Pacemaker DC new;  Surgeon: Jony Monte MD;  Location: [facility] INVASIVE LOCATION;  Service: Cardiology;  Laterality: N/A;    CAROTID ENDARTERECTOMY Left     CHOLECYSTECTOMY WITH INTRAOPERATIVE CHOLANGIOGRAM N/A 2017    Procedure: CHOLECYSTECTOMY LAPAROSCOPIC INTRAOPERATIVE CHOLANGIOGRAM;   Surgeon: Luis Coe MD;  Location: [facility];  Service:     HERNIA REPAIR      inguinal    SINUS SURGERY      TUMOR EXCISION       PT Assessment (last 12 hours)       PT Evaluation and Treatment       Row Name 11/21/23 1531          Physical Therapy Time and Intention    Subjective Information complains of;weakness;pain  Right knee  -     Document Type therapy note (daily note)  -     Mode of Treatment physical therapy  -     Patient Effort good  -     Comment Pt seemed more lethargic today and had increased difficulty with standing and stepping.  -       Row Name 11/21/23 1531          General Information    Patient Profile Reviewed yes  -     Patient Observations agree to therapy;cooperative  -     Patient/Family/Caregiver Comments/Observations Wife present and RN assisted as needed  -     Existing Precautions/Restrictions fall;oxygen therapy device and L/min  -     Limitations/Impairments safety/cognitive  -       Row Name 11/21/23 1531          Cognition    Follows Commands (Cognition) WFL  -     Personal Safety Interventions fall prevention program maintained;gait belt;muscle strengthening facilitated;nonskid shoes/slippers when out of bed;supervised activity  -       Row Name 11/21/23 1531          Bed Mobility    Supine-Sit [place] (Bed Mobility) maximum assist (25% patient effort);moderate assist (50% patient effort);1 person assist  -     Sit-Supine [place] (Bed Mobility) moderate assist (50% patient effort);maximum assist (25% patient effort);2 person assist  -     Bed Mobility, Safety Issues decreased use of arms for pushing/pulling;decreased use of legs for bridging/pushing;impaired trunk control for bed mobility  -     Assistive Device (Bed Mobility) bed rails;draw sheet;head of bed elevated  -       Row Name 11/21/23 1531          Transfers    Transfers stand pivot/stand step transfer  -       Row Name 11/21/23 1531          Sit-Stand Transfer     Sit-Stand [place] (Transfers) maximum assist (25% patient effort);2 person assist  -     Assistive Device (Sit-Stand Transfers) walker, front-wheeled  -       Row Name 11/21/23 1531          Stand-Sit Transfer    Stand-Sit [place] (Transfers) 2 person assist;maximum assist (25% patient effort)  -     Assistive Device (Stand-Sit Transfers) walker, front-wheeled  -       Row Name 11/21/23 1531          Stand Pivot/Stand Step Transfer    Stand Pivot/Stand Step [place] (Transfers) maximum assist (25% patient effort);2 person assist  -     Assistive Device (Stand Pivot Stand Step Transfer) walker, front-wheeled  -     Comment, (Stand Pivot Transfer) Assist with movement of walker, weight shifting and stepping  -       Row Name 11/21/23 1531          Gait/Stairs (Locomotion)    Comment, (Gait/Stairs) Pt unable to ambulate any significant distance.  He needed maximum assist to step pivot to bed side chair with FWW.  -       Row Name 11/21/23 1531          Balance    Balance Assessment sitting static balance;sitting dynamic balance;standing static balance;standing dynamic balance  -     Static Sitting Balance standby assist;1-person assist  -     Dynamic Sitting Balance contact guard;1-person assist  -     Position, Sitting Balance sitting edge of bed  -     Static Standing Balance minimal assist;1-person assist  -     Dynamic Standing Balance maximum assist;2-person assist  -     Position/Device Used, Standing Balance walker, front-wheeled  -       Row Name 11/21/23 1531          Plan of Care Review    Plan of Care Reviewed With patient;spouse  -     Outcome Evaluation PT treatment completed.  Patient required maxAx2 for stand step transfer to chair with FWW.  Increased difficulty weight shifting and stepping today.  Pt will continue to benefit from skilled PT services to progress towards PLOF.  -       Row Name 11/21/23 1531          Positioning and Restraints     Pre-Treatment Position in bed  -KP     Post Treatment Position bed  -KP     In Bed notified nsg;supine;call light within reach;encouraged to call for assist;exit alarm on;with family/caregiver;side rails up x3  Lines in tact and needs in reach  -KP               User Key  (r) = Recorded By, (t) = Taken By, (c) = Cosigned By      Initials Name Provider Type    Yoana Carlson PT Physical Therapist                      PT Recommendation and Plan  Planned Therapy Interventions (PT): balance training, bed mobility training, gait training, home exercise program, motor coordination training, neuromuscular re-education, patient/family education, postural re-education, ROM (range of motion), stair training, strengthening, stretching, transfer training  Therapy Frequency (PT): 2 times/wk  Plan of Care Reviewed With: patient, spouse  Outcome Evaluation: PT treatment completed.  Patient required maxAx2 for stand step transfer to chair with FWW.  Increased difficulty weight shifting and stepping today.  Pt will continue to benefit from skilled PT services to progress towards PLOF.       Time Calculation:    PT Charges       Row Name 11/21/23 1541             Time Calculation    PT Received On 11/21/23  -      PT Goal Re-Cert Due Date 12/04/23  -         Timed Charges    63616 - PT Therapeutic Activity Minutes 25  -KP         Total Minutes    Timed Charges Total Minutes 25  -KP       Total Minutes 25  -KP                User Key  (r) = Recorded By, (t) = Taken By, (c) = Cosigned By      Initials Name Provider Type    Yoana Carlson PT Physical Therapist                  Therapy Charges for Today       Code Description Service Date Service Provider Modifiers Qty    36515299162 HC PT EVAL MOD COMPLEXITY 4 11/20/2023 Yoana Guevara, PT GP 1    58593696519 HC PT THERAPEUTIC ACT EA 15 MIN 11/21/2023 Yoana Guevara, PT GP 2            PT G-Codes  AM-PAC 6 Clicks Score (PT): 8    Yoana Guevara PT  11/21/2023

## 2023-11-21 NOTE — PLAN OF CARE
Problem: Skin Injury Risk Increased  Goal: Skin Health and Integrity  Outcome: Ongoing, Progressing  Intervention: Optimize Skin Protection  Recent Flowsheet Documentation  Taken 11/21/2023 0850 by Dylon Xavier, RN  Pressure Reduction Techniques: frequent weight shift encouraged  Pressure Reduction Devices: pressure-redistributing mattress utilized  Skin Protection: adhesive use limited     Problem: Hypertension Comorbidity  Goal: Blood Pressure in Desired Range  Outcome: Ongoing, Progressing     Problem: Adult Inpatient Plan of Care  Goal: Plan of Care Review  Outcome: Ongoing, Progressing  Flowsheets (Taken 11/21/2023 1702)  Outcome Evaluation: pt A&O X4 with confusion. vss at this time call light in reach. bed alarm set wife at bedside  Goal: Patient-Specific Goal (Individualized)  Outcome: Ongoing, Progressing  Goal: Absence of Hospital-Acquired Illness or Injury  Outcome: Ongoing, Progressing  Intervention: Identify and Manage Fall Risk  Recent Flowsheet Documentation  Taken 11/21/2023 1300 by Dylon Xavier, RN  Safety Promotion/Fall Prevention:   safety round/check completed   room organization consistent   nonskid shoes/slippers when out of bed   lighting adjusted   fall prevention program maintained   clutter free environment maintained   assistive device/personal items within reach   activity supervised  Taken 11/21/2023 1100 by Dylon Xavier, RN  Safety Promotion/Fall Prevention:   safety round/check completed   room organization consistent   nonskid shoes/slippers when out of bed   lighting adjusted   fall prevention program maintained   clutter free environment maintained   assistive device/personal items within reach   activity supervised  Taken 11/21/2023 0900 by Dylon Xavier, RN  Safety Promotion/Fall Prevention:   safety round/check completed   room organization consistent   nonskid shoes/slippers when out of bed   lighting adjusted   fall prevention program maintained   clutter free environment  maintained   assistive device/personal items within reach   activity supervised  Taken 11/21/2023 0850 by Dylon Xavier RN  Safety Promotion/Fall Prevention:   safety round/check completed   room organization consistent   nonskid shoes/slippers when out of bed   lighting adjusted   fall prevention program maintained   clutter free environment maintained   assistive device/personal items within reach   activity supervised  Taken 11/21/2023 0700 by Dylon Xavier RN  Safety Promotion/Fall Prevention:   safety round/check completed   room organization consistent   nonskid shoes/slippers when out of bed   lighting adjusted   fall prevention program maintained   clutter free environment maintained   assistive device/personal items within reach   activity supervised  Intervention: Prevent Skin Injury  Recent Flowsheet Documentation  Taken 11/21/2023 0850 by Dylon Xavier RN  Skin Protection: adhesive use limited  Intervention: Prevent and Manage VTE (Venous Thromboembolism) Risk  Recent Flowsheet Documentation  Taken 11/21/2023 0850 by Dylon Xavier RN  Activity Management: ambulated in room  VTE Prevention/Management: (heparin) other (see comments)  Range of Motion: active ROM (range of motion) encouraged  Intervention: Prevent Infection  Recent Flowsheet Documentation  Taken 11/21/2023 1300 by Dylon Xavier RN  Infection Prevention:   single patient room provided   rest/sleep promoted   hand hygiene promoted   cohorting utilized  Taken 11/21/2023 1100 by Dylon Xavier RN  Infection Prevention:   single patient room provided   rest/sleep promoted   hand hygiene promoted   environmental surveillance performed  Taken 11/21/2023 0900 by Dylon Xavier RN  Infection Prevention:   single patient room provided   rest/sleep promoted   hand hygiene promoted   environmental surveillance performed  Taken 11/21/2023 0850 by Dylon Xavier RN  Infection Prevention:   single patient room provided   rest/sleep promoted   hand hygiene  promoted   environmental surveillance performed  Taken 11/21/2023 0700 by Dylon Xavier RN  Infection Prevention:   single patient room provided   rest/sleep promoted   hand hygiene promoted   environmental surveillance performed  Goal: Optimal Comfort and Wellbeing  Outcome: Ongoing, Progressing  Intervention: Monitor Pain and Promote Comfort  Recent Flowsheet Documentation  Taken 11/21/2023 0850 by Dylon Xavier RN  Pain Management Interventions: see MAR  Intervention: Provide Person-Centered Care  Recent Flowsheet Documentation  Taken 11/21/2023 0850 by Dylon Xavier RN  Trust Relationship/Rapport:   care explained   choices provided   thoughts/feelings acknowledged  Goal: Readiness for Transition of Care  Outcome: Ongoing, Progressing     Problem: Fall Injury Risk  Goal: Absence of Fall and Fall-Related Injury  Outcome: Ongoing, Progressing  Intervention: Promote Injury-Free Environment  Recent Flowsheet Documentation  Taken 11/21/2023 1300 by Dylon Xavier RN  Safety Promotion/Fall Prevention:   safety round/check completed   room organization consistent   nonskid shoes/slippers when out of bed   lighting adjusted   fall prevention program maintained   clutter free environment maintained   assistive device/personal items within reach   activity supervised  Taken 11/21/2023 1100 by Dylon Xavier RN  Safety Promotion/Fall Prevention:   safety round/check completed   room organization consistent   nonskid shoes/slippers when out of bed   lighting adjusted   fall prevention program maintained   clutter free environment maintained   assistive device/personal items within reach   activity supervised  Taken 11/21/2023 0900 by Dylon Xavier RN  Safety Promotion/Fall Prevention:   safety round/check completed   room organization consistent   nonskid shoes/slippers when out of bed   lighting adjusted   fall prevention program maintained   clutter free environment maintained   assistive device/personal items within  reach   activity supervised  Taken 11/21/2023 0850 by Dylon Xavier RN  Safety Promotion/Fall Prevention:   safety round/check completed   room organization consistent   nonskid shoes/slippers when out of bed   lighting adjusted   fall prevention program maintained   clutter free environment maintained   assistive device/personal items within reach   activity supervised  Taken 11/21/2023 0700 by Dylon Xavier RN  Safety Promotion/Fall Prevention:   safety round/check completed   room organization consistent   nonskid shoes/slippers when out of bed   lighting adjusted   fall prevention program maintained   clutter free environment maintained   assistive device/personal items within reach   activity supervised     Problem: Dysrhythmia  Goal: Normalized Cardiac Rhythm  Outcome: Ongoing, Progressing  Intervention: Monitor and Manage Cardiac Rhythm Effect  Recent Flowsheet Documentation  Taken 11/21/2023 0850 by Dylon Xavier RN  VTE Prevention/Management: (heparin) other (see comments)     Problem: Breathing Pattern Ineffective  Goal: Effective Breathing Pattern  Outcome: Ongoing, Progressing  Intervention: Promote Improved Breathing Pattern  Recent Flowsheet Documentation  Taken 11/21/2023 0850 by Dylon Xavier RN  Supportive Measures: self-care encouraged     Problem: Fluid Volume Excess  Goal: Fluid Balance  Outcome: Ongoing, Progressing  Intervention: Monitor and Manage Hypervolemia  Recent Flowsheet Documentation  Taken 11/21/2023 0850 by Dylon Xavier RN  Skin Protection: adhesive use limited     Problem: Gas Exchange Impaired  Goal: Optimal Gas Exchange  Outcome: Ongoing, Progressing     Problem: Adjustment to Illness (Heart Failure)  Goal: Optimal Coping  Outcome: Ongoing, Progressing  Intervention: Support Psychosocial Response  Recent Flowsheet Documentation  Taken 11/21/2023 0850 by Dylon Xavier RN  Supportive Measures: self-care encouraged  Family/Support System Care: self-care encouraged     Problem:  Cardiac Output Decreased (Heart Failure)  Goal: Optimal Cardiac Output  Outcome: Ongoing, Progressing  Intervention: Optimize Cardiac Output  Recent Flowsheet Documentation  Taken 11/21/2023 0850 by Dylon Xavier RN  Environmental Support: calm environment promoted     Problem: Dysrhythmia (Heart Failure)  Goal: Stable Heart Rate and Rhythm  Outcome: Ongoing, Progressing     Problem: Fluid Imbalance (Heart Failure)  Goal: Fluid Balance  Outcome: Ongoing, Progressing     Problem: Functional Ability Impaired (Heart Failure)  Goal: Optimal Functional Ability  Outcome: Ongoing, Progressing  Intervention: Optimize Functional Ability  Recent Flowsheet Documentation  Taken 11/21/2023 0850 by Dylon Xavier RN  Activity Management: ambulated in room     Problem: Oral Intake Inadequate (Heart Failure)  Goal: Optimal Nutrition Intake  Outcome: Ongoing, Progressing     Problem: Respiratory Compromise (Heart Failure)  Goal: Effective Oxygenation and Ventilation  Outcome: Ongoing, Progressing  Intervention: Promote Airway Secretion Clearance  Recent Flowsheet Documentation  Taken 11/21/2023 0850 by Dylon Xavier RN  Cough And Deep Breathing: done independently per patient     Problem: Sleep Disordered Breathing (Heart Failure)  Goal: Effective Breathing Pattern During Sleep  Outcome: Ongoing, Progressing     Problem: Adjustment to Illness (Sepsis/Septic Shock)  Goal: Optimal Coping  Outcome: Ongoing, Progressing  Intervention: Optimize Psychosocial Adjustment to Illness  Recent Flowsheet Documentation  Taken 11/21/2023 0850 by Dylon Xavier RN  Supportive Measures: self-care encouraged  Family/Support System Care: self-care encouraged     Problem: Bleeding (Sepsis/Septic Shock)  Goal: Absence of Bleeding  Outcome: Ongoing, Progressing     Problem: Glycemic Control Impaired (Sepsis/Septic Shock)  Goal: Blood Glucose Level Within Desired Range  Outcome: Ongoing, Progressing     Problem: Infection Progression (Sepsis/Septic  Shock)  Goal: Absence of Infection Signs and Symptoms  Outcome: Ongoing, Progressing  Intervention: Initiate Sepsis Management  Recent Flowsheet Documentation  Taken 11/21/2023 1300 by Dylon Xavier RN  Infection Prevention:   single patient room provided   rest/sleep promoted   hand hygiene promoted   cohorting utilized  Taken 11/21/2023 1100 by Dylon Xavier RN  Infection Prevention:   single patient room provided   rest/sleep promoted   hand hygiene promoted   environmental surveillance performed  Taken 11/21/2023 0900 by Dylon Xavier RN  Infection Prevention:   single patient room provided   rest/sleep promoted   hand hygiene promoted   environmental surveillance performed  Taken 11/21/2023 0850 by Dylon Xavier RN  Infection Prevention:   single patient room provided   rest/sleep promoted   hand hygiene promoted   environmental surveillance performed  Taken 11/21/2023 0700 by Dylon Xavier RN  Infection Prevention:   single patient room provided   rest/sleep promoted   hand hygiene promoted   environmental surveillance performed  Intervention: Promote Recovery  Recent Flowsheet Documentation  Taken 11/21/2023 0850 by Dylon Xavier RN  Activity Management: ambulated in room     Problem: Nutrition Impaired (Sepsis/Septic Shock)  Goal: Optimal Nutrition Intake  Outcome: Ongoing, Progressing     Problem: Pain Chronic (Persistent)  Goal: Acceptable Pain Control and Functional Ability  Outcome: Ongoing, Progressing  Intervention: Develop Pain Management Plan  Recent Flowsheet Documentation  Taken 11/21/2023 0850 by Dylon Xavier RN  Pain Management Interventions: see MAR  Intervention: Optimize Psychosocial Wellbeing  Recent Flowsheet Documentation  Taken 11/21/2023 0850 by Dylon Xavier RN  Supportive Measures: self-care encouraged  Diversional Activities: television  Family/Support System Care: self-care encouraged   Goal Outcome Evaluation:              Outcome Evaluation: pt A&O X4 with confusion. vss at this  time call light in reach. bed alarm set wife at bedside

## 2023-11-21 NOTE — THERAPY TREATMENT NOTE
Acute Care - Occupational Therapy Treatment Note  [facility]     Patient Name: Donaldo Roberts  : 1950  MRN: [id number]  Today's Date: 2023  Onset of Illness/Injury or Date of Surgery: 23     Referring Physician: Kike    Admit Date: 2023       ICD-10-CM ICD-9-CM   1. Acute on chronic respiratory failure with hypoxia and hypercapnia  J96.21 518.84    J96.22 786.09     799.02   2. Pneumonia due to infectious organism, unspecified laterality, unspecified part of lung  J18.9 486   3. Chronic diastolic heart failure  I50.32 428.32   4. Supplemental oxygen dependent  Z99.81 V46.2   5. Chronic obstructive pulmonary disease, unspecified COPD type  J44.9 496     Patient Active Problem List   Diagnosis    Benign prostatic hyperplasia with urinary obstruction    Herpesviral infection of penis    Shortness of breath    Carotid stenosis, asymptomatic, right    Essential hypertension    Dyslipidemia    Wenckebach block    PATTY (obstructive sleep apnea)    Pre-operative cardiovascular examination    Presence of cardiac pacemaker    Severe sinus bradycardia    Chronic bronchitis    Cigarette nicotine dependence without complication    Hypoxia    Detrusor instability    Tobacco abuse    Respiratory failure     Past Medical History:   Diagnosis Date    Acid reflux     Anxiety     Arthritis     Carotid artery stenosis, symptomatic, right 2021    Essential hypertension 2021    Herpes     History of lipoma     History of transfusion     Hyperlipidemia     Inguinal hernia     Peptic ulcer     Stroke      Past Surgical History:   Procedure Laterality Date    ABDOMINAL SURGERY      CARDIAC ELECTROPHYSIOLOGY PROCEDURE N/A 12/10/2021    Procedure: Pacemaker DC new;  Surgeon: Jony Monte MD;  Location: [facility] INVASIVE LOCATION;  Service: Cardiology;  Laterality: N/A;    CAROTID ENDARTERECTOMY Left     CHOLECYSTECTOMY WITH INTRAOPERATIVE CHOLANGIOGRAM N/A 2017    Procedure:  CHOLECYSTECTOMY LAPAROSCOPIC INTRAOPERATIVE CHOLANGIOGRAM;  Surgeon: Luis Coe MD;  Location: [facility];  Service:     HERNIA REPAIR      inguinal    SINUS SURGERY      TUMOR EXCISION           OT ASSESSMENT FLOWSHEET (last 12 hours)       OT Evaluation and Treatment       Row Name 11/21/23 4391                   OT Time and Intention    Subjective Information complains of;weakness;fatigue  -LM        Document Type therapy note (daily note)  -LM        Mode of Treatment occupational therapy  -LM        Patient Effort good  -LM        Comment Patient seen this date for adl retraining/fxl mobility.  Patient transferred from  to recliner with max assist x 2 and rw.  RN present.  Patient demonstrates decreased activity tolerance and educated on pursed lipped breathing.  Max assist x 2 for transfer from recliner to .  Fall risk at this time.  -LM           General Information    Existing Precautions/Restrictions fall;oxygen therapy device and L/min  -LM           Cognition    Affect/Mental Status (Cognition) WNL  -LM        Orientation Status (Cognition) oriented x 3  -LM        Follows Commands (Cognition) WFL  -LM           Toileting Assessment/Training    [place] Level (Toileting) toileting skills;dependent (less than 25% patient effort)  -LM        Assistive Devices (Toileting) bedpan  -LM        Position (Toileting) supine  -LM                  User Key  (r) = Recorded By, (t) = Taken By, (c) = Cosigned By      Initials Name Effective Dates    LM Ewa Rocha, OT 06/16/21 -                            OT Recommendation and Plan  Planned Therapy Interventions (OT): activity tolerance training, adaptive equipment training, BADL retraining, ROM/therapeutic exercise, strengthening exercise, transfer/mobility retraining, patient/caregiver education/training  Therapy Frequency (OT): other (see comments)  Plan of Care Review  Plan of Care Reviewed With: patient, spouse  Plan of Care Reviewed With:  patient, spouse        Time Calculation:     Therapy Charges for Today       Code Description Service Date Service Provider Modifiers Qty    [id number]  OT EVAL MOD COMPLEXITY 4 11/20/2023 Ewa Rocha, OT GO 1    [id number]  OT SELF CARE/MGMT/TRAIN EA 15 MIN 11/21/2023 Ewa Rocha, OT GO 2                 Ewa Rocha OT  11/21/2023

## 2023-11-21 NOTE — PLAN OF CARE
Goal Outcome Evaluation:  Plan of Care Reviewed With: patient, son        Progress: no change  Outcome Evaluation: Pt alert but disoriented to situation and time. Pt repeatedly asks when he is going home and what the BiPAP is for. Pt refused to wear the BiPAP and only agreed to wear NC 4L. Pt stated the BiPAP was uncomfortable and it scares him. Pt has maintained O2 sat above 90% while wearing 4L NC. ABG was repeated this shift. Son at bedside. VSS remain stable at this time. No additional distress noted and no other needs requested.

## 2023-11-21 NOTE — PLAN OF CARE
Goal Outcome Evaluation:           Progress: no change  Outcome Evaluation: pt arrived on unit to room 346A, wife at bedside, pt alert and orientated, no acute changes, will continue plan of care.

## 2023-11-21 NOTE — CASE MANAGEMENT/SOCIAL WORK
Discharge Planning Assessment  [facility]     Patient Name: Donaldo Roberts  MRN: [id number]  Today's Date: 11/21/2023    Admit Date: 11/17/2023     Discharge Plan       Row Name 11/21/23 1556       Plan    Plan Pt was discussed in [facility] today. Pt was very weak when therapy evaluated today. Pt was unable to ambulate any significant distance and was maximum assist to step pivot to bedside chair with rolling walker per PT. SS spoke to spouse, Inna and provided options for rehab including [facility] inpatient rehab, swing bed, and SNF placement. Spouse request to see if pt qualifies for rehab at [facility]. SS notified Dr. Roman. SS to follow.               Expected Discharge Date and Time       Expected Discharge Date Expected Discharge Time    Nov 22, 2023         DELROY Roach

## 2023-11-22 LAB
ALBUMIN SERPL-MCNC: 3.6 G/DL (ref 3.5–5.2)
ANION GAP SERPL CALCULATED.3IONS-SCNC: 3.2 MMOL/L (ref 5–15)
ATMOSPHERIC PRESS: 726 MMHG
BASE EXCESS BLDV CALC-SCNC: 14.5 MMOL/L (ref 0–2)
BDY SITE: ABNORMAL
BUN SERPL-MCNC: 22 MG/DL (ref 8–23)
BUN/CREAT SERPL: 30.6 (ref 7–25)
CALCIUM SPEC-SCNC: 9.6 MG/DL (ref 8.6–10.5)
CHLORIDE SERPL-SCNC: 96 MMOL/L (ref 98–107)
CO2 BLDA-SCNC: 49 MMOL/L (ref 22–33)
CO2 SERPL-SCNC: 40.8 MMOL/L (ref 22–29)
COHGB MFR BLD: 1.9 % (ref 0–5)
CREAT SERPL-MCNC: 0.72 MG/DL (ref 0.76–1.27)
EGFRCR SERPLBLD CKD-EPI 2021: 97.7 ML/MIN/1.73
GAS FLOW AIRWAY: 4 LPM
GLUCOSE SERPL-MCNC: 167 MG/DL (ref 65–99)
HCO3 BLDV-SCNC: 46 MMOL/L (ref 22–28)
HGB BLDA-MCNC: 13.6 G/DL (ref 14–18)
INHALED O2 CONCENTRATION: 36 %
Lab: ABNORMAL
Lab: ABNORMAL
METHGB BLD QL: 0.1 % (ref 0–3)
MODALITY: ABNORMAL
NOTIFIED BY: ABNORMAL
NOTIFIED WHO: ABNORMAL
OXYHGB MFR BLDV: 81 % (ref 45–75)
PCO2 BLDV: 98.2 MM HG (ref 41–51)
PH BLDV: 7.28 PH UNITS (ref 7.32–7.42)
PHOSPHATE SERPL-MCNC: 3.5 MG/DL (ref 2.5–4.5)
PO2 BLDV: 50.7 MM HG (ref 27–53)
POTASSIUM SERPL-SCNC: 4.5 MMOL/L (ref 3.5–5.2)
SAO2 % BLDCOV: 82.7 % (ref 45–75)
SODIUM SERPL-SCNC: 140 MMOL/L (ref 136–145)
VENTILATOR MODE: ABNORMAL

## 2023-11-22 PROCEDURE — 94799 UNLISTED PULMONARY SVC/PX: CPT

## 2023-11-22 PROCEDURE — 80069 RENAL FUNCTION PANEL: CPT | Performed by: STUDENT IN AN ORGANIZED HEALTH CARE EDUCATION/TRAINING PROGRAM

## 2023-11-22 PROCEDURE — 94761 N-INVAS EAR/PLS OXIMETRY MLT: CPT

## 2023-11-22 PROCEDURE — 99222 1ST HOSP IP/OBS MODERATE 55: CPT | Performed by: INTERNAL MEDICINE

## 2023-11-22 PROCEDURE — 94664 DEMO&/EVAL PT USE INHALER: CPT

## 2023-11-22 PROCEDURE — 97530 THERAPEUTIC ACTIVITIES: CPT

## 2023-11-22 PROCEDURE — 82820 HEMOGLOBIN-OXYGEN AFFINITY: CPT

## 2023-11-22 PROCEDURE — 94660 CPAP INITIATION&MGMT: CPT

## 2023-11-22 PROCEDURE — 99232 SBSQ HOSP IP/OBS MODERATE 35: CPT | Performed by: STUDENT IN AN ORGANIZED HEALTH CARE EDUCATION/TRAINING PROGRAM

## 2023-11-22 PROCEDURE — 25010000002 HEPARIN (PORCINE) PER 1000 UNITS: Performed by: INTERNAL MEDICINE

## 2023-11-22 PROCEDURE — 94640 AIRWAY INHALATION TREATMENT: CPT

## 2023-11-22 PROCEDURE — 82805 BLOOD GASES W/O2 SATURATION: CPT

## 2023-11-22 RX ORDER — IPRATROPIUM BROMIDE AND ALBUTEROL SULFATE 2.5; .5 MG/3ML; MG/3ML
3 SOLUTION RESPIRATORY (INHALATION) EVERY 6 HOURS PRN
Status: DISCONTINUED | OUTPATIENT
Start: 2023-11-22 | End: 2023-11-24 | Stop reason: HOSPADM

## 2023-11-22 RX ORDER — FUROSEMIDE 20 MG/1
20 TABLET ORAL DAILY
Status: DISCONTINUED | OUTPATIENT
Start: 2023-11-22 | End: 2023-11-23

## 2023-11-22 RX ADMIN — HEPARIN SODIUM 5000 UNITS: 5000 INJECTION INTRAVENOUS; SUBCUTANEOUS at 08:20

## 2023-11-22 RX ADMIN — Medication 10 ML: at 20:56

## 2023-11-22 RX ADMIN — ROSUVASTATIN CALCIUM 10 MG: 10 TABLET, FILM COATED ORAL at 08:20

## 2023-11-22 RX ADMIN — CLOPIDOGREL BISULFATE 75 MG: 75 TABLET, FILM COATED ORAL at 08:20

## 2023-11-22 RX ADMIN — PANTOPRAZOLE SODIUM 40 MG: 40 TABLET, DELAYED RELEASE ORAL at 05:01

## 2023-11-22 RX ADMIN — IPRATROPIUM BROMIDE AND ALBUTEROL SULFATE 3 ML: .5; 2.5 SOLUTION RESPIRATORY (INHALATION) at 18:31

## 2023-11-22 RX ADMIN — LISINOPRIL 20 MG: 10 TABLET ORAL at 08:20

## 2023-11-22 RX ADMIN — Medication 10 ML: at 08:20

## 2023-11-22 RX ADMIN — ACETAMINOPHEN 650 MG: 325 TABLET ORAL at 08:28

## 2023-11-22 RX ADMIN — IPRATROPIUM BROMIDE AND ALBUTEROL SULFATE 3 ML: .5; 2.5 SOLUTION RESPIRATORY (INHALATION) at 14:41

## 2023-11-22 RX ADMIN — HEPARIN SODIUM 5000 UNITS: 5000 INJECTION INTRAVENOUS; SUBCUTANEOUS at 20:51

## 2023-11-22 RX ADMIN — DOCUSATE SODIUM 50 MG AND SENNOSIDES 8.6 MG 2 TABLET: 8.6; 5 TABLET, FILM COATED ORAL at 20:55

## 2023-11-22 RX ADMIN — DOCUSATE SODIUM 50 MG AND SENNOSIDES 8.6 MG 2 TABLET: 8.6; 5 TABLET, FILM COATED ORAL at 08:20

## 2023-11-22 RX ADMIN — IPRATROPIUM BROMIDE AND ALBUTEROL SULFATE 3 ML: .5; 2.5 SOLUTION RESPIRATORY (INHALATION) at 07:50

## 2023-11-22 RX ADMIN — FUROSEMIDE 20 MG: 20 TABLET ORAL at 09:21

## 2023-11-22 NOTE — PAYOR COMM NOTE
"Aaron Feliz RN  Swing Bed Nurse  [phone] Ex [phone] FAX: [phone]  [email or web address]  [facility]  NPI [id number]  Reference Number     Patient needs post acute swing bed admission for PT/OT strength, mobility and transfer training related to physical debility and  to be able to return home safely.    ICD10  J96.90  R53.81        Donaldo Roberts (73 y.o. Male)       Date of Birth   1950    Social Security Number       Address   [address] [place] [id number]    Home Phone   [phone]    MRN   [id number]       Jehovah's witness   Anglican    Marital Status                               Admission Date   11/17/23    Admission Type   Emergency    Admitting Provider   Sarika White DO    Attending Provider   Surendra Roman MD    Department, Room/Bed   [address], 3346/       Discharge Date       Discharge Disposition       Discharge Destination                                 Attending Provider: Surendra Roman MD    Allergies: Penicillins    Isolation: None   Infection: None   Code Status: CPR    Ht: 175.3 cm (69\")   Wt: 131 kg (287 lb 12.8 oz)    Admission Cmt: None   Principal Problem: Respiratory failure [J96.90]                   Active Insurance as of 11/17/2023       Primary Coverage       Payor Plan Insurance Group Employer/Plan Group    [facility] WELL[employer] MED ADV HMO        Payor Plan Address Payor Plan Phone Number Payor Plan Fax Number Effective Dates    [address] [address]   11/1/2023 - None Entered    [facility] [address]         Subscriber Name Subscriber Birth Date Member ID       DONALDO ROBERTS 1950 [id number]                     Emergency Contacts        (Rel.) Home Phone Work Phone Mobile Phone    ArmandoInna (Spouse) [phone] -- [phone]    Chad Roberts (Son) [phone] -- [phone]                 History & Physical        Sarika White DO at 11/18/23 " 0340          [facility]   HISTORY AND PHYSICAL    Patient Name: Donaldo Roberts  : 1950  MRN: [id number]  Primary Care Physician:  Roderick Patricia MD  Date of admission: 2023    Subjective  Subjective     Chief Complaint: SOA, lower extremity edema    History of Present Illness the patient is a 73-year-old male with past medical history significant for right-sided carotid artery stenosis, chronic respiratory failure, suspected COPD, PATTY, CVA and status post permanent pacemaker placement who presents to the emergency department complaining of progressive shortness of air and lower extremity edema.    Patient was seen and examined in .     Patient reports progressive shortness of air.  He denies any fever and/or significant cough.  Patient reports some intermittent chest tightness but states that it is nonradiating.  The patient reports progressive lower extremity edema.  He reports compliance with supplemental oxygen and compliance with Lasix therapy.  HPI is limited as patient is currently on BiPAP and is somewhat somnolent.    Per chart review, the patient was seen in follow-up by cardiology on 11/10/2023.  During that visit, patient's O2 saturation was 76% via his 2 L/min nasal cannula.  Staff increased his supplemental oxygen to 3 L/min nasal cannula and his O2 saturation improved to 92%.  Patient refused ER evaluation and at that time.    Review of Systems   Limited due to BIPAP. He denies any significant cough. Reports progressive SOA. He reports intermittent/occasional chest tightness but unable to elaborate. States he is compliant with his supplemental O2 and wears it continuously. Reports progressive lower extremity edema.     Personal History     Past Medical History:   Diagnosis Date    Acid reflux     Anxiety     Arthritis     Carotid artery stenosis, symptomatic, right 2021    Essential hypertension 2021    Herpes     History of lipoma     History of transfusion      Hyperlipidemia     Inguinal hernia     Peptic ulcer     Stroke        Past Surgical History:   Procedure Laterality Date    ABDOMINAL SURGERY      CARDIAC ELECTROPHYSIOLOGY PROCEDURE N/A 12/10/2021    Procedure: Pacemaker DC new;  Surgeon: Jony Monte MD;  Location: [facility] CATH INVASIVE LOCATION;  Service: Cardiology;  Laterality: N/A;    CAROTID ENDARTERECTOMY Left 2021    CHOLECYSTECTOMY WITH INTRAOPERATIVE CHOLANGIOGRAM N/A 07/25/2017    Procedure: CHOLECYSTECTOMY LAPAROSCOPIC INTRAOPERATIVE CHOLANGIOGRAM;  Surgeon: Luis Coe MD;  Location: [facility] OR;  Service:     HERNIA REPAIR      inguinal    SINUS SURGERY      TUMOR EXCISION         Family History: family history includes Diabetes in his mother and sister; Heart disease in his mother; No Known Problems in his father; Stroke in his mother.     Social History:  reports that he has been smoking cigarettes. He has a 13.75 pack-year smoking history. He has been exposed to tobacco smoke. He has never used smokeless tobacco. He reports that he does not drink alcohol and does not use drugs.    Home Medications:  albuterol sulfate HFA, amLODIPine, aspirin, clopidogrel, furosemide, lisinopril, omeprazole, and rosuvastatin    Allergies:  Allergies   Allergen Reactions    Penicillins Hives     Mother told him that, has taken amoxicillin with no problems       Objective   Objective     Vitals:   Temp:  [97.7 °F (36.5 °C)] 97.7 °F (36.5 °C)  Heart Rate:  [81-85] 81  Resp:  [20-28] 28  BP: (129-132)/(72-85) 129/72  Flow (L/min):  [3] 3    Physical Exam  Constitutional:       General: He is not in acute distress.     Appearance: He is well-developed. He is obese. He is ill-appearing.      Interventions: Face mask in place.   HENT:      Head: Normocephalic and atraumatic.   Eyes:      Conjunctiva/sclera: Conjunctivae normal.   Neck:      Trachea: No tracheal deviation.   Cardiovascular:      Rate and Rhythm: Normal rate and regular rhythm.      Pulses:            Dorsalis pedis pulses are 2+ on the right side and 2+ on the left side.      Heart sounds: No murmur heard.     No friction rub. No gallop.   Pulmonary:      Effort: No respiratory distress.      Breath sounds: Decreased breath sounds present. No wheezing or rales.   Abdominal:      General: Abdomen is protuberant. Bowel sounds are decreased. There is no distension.      Palpations: Abdomen is soft.      Tenderness: There is no abdominal tenderness. There is no guarding.   Musculoskeletal:      Right lower leg: Edema present.      Left lower leg: Edema present.   Skin:     General: Skin is warm and dry.      Findings: No erythema or rash.      Comments: Venous stasis bilateral lower extremities   Neurological:      Mental Status: He is alert.      Cranial Nerves: No cranial nerve deficit.      Comments: Oriented to self and place; can follow commands         Result Review   Result Review:  I have personally reviewed the results from the time of this admission to 11/18/2023 03:40 EST and agree with these findings:  [x]  Laboratory list / accordion  []  Microbiology  [x]  Radiology  [x]  EKG/Telemetry   []  Cardiology/Vascular   []  Pathology  [x]  Old records  []  Other:  Most notable findings include:     EKG: pending cardiology interpretation, however, per my view atrial paced rhythm    Results from last 7 days   Lab Units 11/17/23  2357   SODIUM mmol/L 142   POTASSIUM mmol/L 4.9   CHLORIDE mmol/L 97*   CO2 mmol/L 41.4*   BUN mg/dL 16   CREATININE mg/dL 0.67*   CALCIUM mg/dL 9.6   GLUCOSE mg/dL 107*     Results from last 7 days   Lab Units 11/17/23  2357   WBC 10*3/mm3 7.32   HEMOGLOBIN g/dL 12.2*   HEMATOCRIT % 42.4   PLATELETS 10*3/mm3 194         CXR (images reviewed):    IMPRESSION:  FINDINGS/IMPRESSION:    Cardiomegaly. Multifocal infiltrates/edema. No pneumothorax or pleural  effusion. No acute fracture. Left-sided pacemaker.    Assessment / Plan     #Acute hypercapnic on chronic hypoxic respiratory failure,  present on admission  #Suspect acute heart failure/volume overload  #Acute NSTEMI, type I versus type II (suspect 2) and due to #1  #History of PATTY, noncompliant with CPAP  #Obesity per BMI 33.97 kg/m²  -Patient will be admitted to the progressive care unit with BiPAP.  I have requested a repeat ABG later this morning  -Encourage compliance with supplemental oxygen  -CT scan of the chest with PE protocol ordered and currently pending  -Patient received 80 mg IV Lasix in the ED; will monitor for response and plan to continue for now  -Daily weights and strict intake/output  -I have ordered an updated echocardiogram  -Plan to repeat patient's chemistry panel in a.m. to follow on his electrolytes and renal function  -Consider cardiology consultation pending results and clinical course    #COPD, currently do not suspect acute exacerbation  #Chronic hypoxic respiratory failure on 3 L/min nasal cannula  #Tobacco use  -Patient's clinical presentation currently appears more consistent with possible new onset acute HF rather than COPD  -Patient received IV azithromycin and rocephin in the ED; will hold on any further antibiotic therapy for now  -No wheezing on examination; no productive cough per patient report  -Monitor closely for any changes; can resume any chronic COPD medications pending pharmacy completion of his home medication list    Chronic medical conditions:  -Status post permanent pacemaker placement  -Right-sided carotid artery stenosis, followed by CT surgery in the outpatient setting  -Essential hypertension that is currently well controlled  -History of stroke  -Anxiety  -GERD    DVT prophylaxis:  [facility]    CODE STATUS:    Code Status (Patient has no pulse and is not breathing): CPR (Attempt to Resuscitate)  Medical Interventions (Patient has pulse or is breathing): Full Support    Admission Status:  I believe this patient meets inpatient status.    Patient is considered to be a high risk patient due to: Acute  hypercapnia, NSTEMI, obesity, COPD    Sarika White DO    Electronically signed by Sarika White DO at 11/18/23 0435       Current Facility-Administered Medications   Medication Dose Route Frequency Provider Last Rate Last Admin    acetaminophen (TYLENOL) tablet 650 mg  650 mg Oral Q6H PRN Donaldo Stokes DO   650 mg at 11/21/23 1641    amLODIPine (NORVASC) tablet 10 mg  10 mg Oral Nightly Donaldo Stokes DO   10 mg at 11/21/23 2044    sennosides-docusate (PERICOLACE) 8.6-50 MG per tablet 2 tablet  2 tablet Oral BID Sarika White DO   2 tablet at 11/21/23 2044    And    polyethylene glycol (MIRALAX) packet 17 g  17 g Oral Daily PRN Sarika White DO        And    bisacodyl (DULCOLAX) EC tablet 5 mg  5 mg Oral Daily PRN Sarika White DO        And    bisacodyl (DULCOLAX) suppository 10 mg  10 mg Rectal Daily PRN Sarika White DO        clopidogrel (PLAVIX) tablet 75 mg  75 mg Oral Daily Donaldo Stokes DO   75 mg at 11/21/23 0900    furosemide (LASIX) tablet 20 mg  20 mg Oral Daily Surendra Roman MD        heparin (porcine) 5000 UNIT/ML injection 5,000 Units  5,000 Units Subcutaneous Q12H Sarika White DO   5,000 Units at 11/21/23 2044    ipratropium-albuterol (DUO-NEB) nebulizer solution 3 mL  3 mL Nebulization Q6H PRN Surendra Roman MD   3 mL at 11/22/23 0750    lisinopril (PRINIVIL,ZESTRIL) tablet 20 mg  20 mg Oral Daily Donaldo Stokes DO   20 mg at 11/21/23 0900    nicotine (NICODERM CQ) 7 MG/24HR patch 1 patch  1 patch Transdermal Q24H Sarika White DO        pantoprazole (PROTONIX) EC tablet 40 mg  40 mg Oral Q AM Donaldo Stokes DO   40 mg at 11/22/23 0501    rosuvastatin (CRESTOR) tablet 10 mg  10 mg Oral Daily Donaldo Stokes DO   10 mg at 11/21/23 0900    sodium chloride 0.9 % flush 10 mL  10 mL Intravenous PRN Sarika White, DO        sodium chloride 0.9 % flush 10 mL  10 mL Intravenous Q12H Sarika White, DO   10 mL at  11/21/23 2045    sodium chloride 0.9 % flush 10 mL  10 mL Intravenous PRN Sarika White,         sodium chloride 0.9 % flush 10 mL  10 mL Intravenous Q12H Donaldo Stokes DO   10 mL at 11/21/23 2045    sodium chloride 0.9 % flush 10 mL  10 mL Intravenous PRN Donaldo Stokes DO        sodium chloride 0.9 % infusion 40 mL  40 mL Intravenous PRN Sarika White DO        sodium chloride 0.9 % infusion 40 mL  40 mL Intravenous PRN Donaldo Stokes DO        ziprasidone (GEODON) 20 mg in sterile water (preservative free) 1 mL injection  20 mg Intramuscular Once Donaldo Stokes DO         Orders (active)        Start     Ordered    11/22/23 0900  furosemide (LASIX) tablet 20 mg  Daily         11/22/23 0756    11/22/23 0758  Inpatient Pulmonology Consult  Once        Specialty:  Pulmonary Disease  Provider:  Scotty Hoyt MD    11/22/23 0758    11/22/23 0735  ipratropium-albuterol (DUO-NEB) nebulizer solution 3 mL  Every 6 Hours PRN         11/22/23 0735    11/21/23 1601  Swing Bed Consult  Once        Provider:  (Not yet assigned)    11/21/23 1600    11/20/23 0945  pantoprazole (PROTONIX) EC tablet 40 mg  Every Early Morning         11/20/23 0854    11/20/23 0732  OT Consult: Eval & Treat  Once         11/20/23 0732    11/19/23 2230  sodium chloride 0.9 % flush 10 mL  Every 12 Hours Scheduled         11/19/23 2138 11/19/23 2139  Connectors / Hubs Must Be Scrubbed 15 Seconds Using 70% Alcohol Before Access - Allow to Dry Before Accessing Line  Continuous         11/19/23 2138 11/19/23 2138  sodium chloride 0.9 % flush 10 mL  As Needed         11/19/23 2138 11/19/23 2138  sodium chloride 0.9 % infusion 40 mL  As Needed         11/19/23 2138 11/19/23 1020  PT Consult: Eval & Treat As Tolerated  Once         11/19/23 1020    11/19/23 1020  Initiate Post Fall Interventions / Assessments  Once        Comments: Post Fall Interventions / Assessments are Outlined in Process Instructions (Open Order  Report to View Full Instructions)    11/19/23 1020    11/19/23 1020  Fall Precautions  Continuous         11/19/23 1020    11/19/23 0852  acetaminophen (TYLENOL) tablet 650 mg  Every 6 Hours PRN         11/19/23 0852    11/18/23 2100  amLODIPine (NORVASC) tablet 10 mg  Nightly         11/18/23 1138    11/18/23 1445  ziprasidone (GEODON) 20 mg in sterile water (preservative free) 1 mL injection  Once         11/18/23 1357    11/18/23 1230  clopidogrel (PLAVIX) tablet 75 mg  Daily         11/18/23 1138    11/18/23 1230  lisinopril (PRINIVIL,ZESTRIL) tablet 20 mg  Daily         11/18/23 1138    11/18/23 1230  rosuvastatin (CRESTOR) tablet 10 mg  Daily         11/18/23 1138    11/18/23 0900  sodium chloride 0.9 % flush 10 mL  Every 12 Hours Scheduled         11/18/23 0535    11/18/23 0900  sennosides-docusate (PERICOLACE) 8.6-50 MG per tablet 2 tablet  2 Times Daily        See Hyperspace for full Linked Orders Report.    11/18/23 0535    11/18/23 0900  heparin (porcine) 5000 UNIT/ML injection 5,000 Units  Every 12 Hours Scheduled         11/18/23 0535    11/18/23 0900  nicotine (NICODERM CQ) 7 MG/24HR patch 1 patch  Every 24 Hours Scheduled         11/18/23 0535    11/18/23 0800  Vital Signs  Every 4 Hours      Comments: Per per hospital policy    11/18/23 0535    11/18/23 0800  Oral Care  2 Times Daily       11/18/23 0535    11/18/23 0600  Weigh patient  Daily       11/18/23 0535    11/18/23 0536  Notify Physician (with Parameters)  Until Discontinued         11/18/23 0535    11/18/23 0536  Intake & Output  Every Shift       11/18/23 0535    11/18/23 0536  Insert Peripheral IV  Once         11/18/23 0535    11/18/23 0536  Saline Lock & Maintain IV Access  Continuous         11/18/23 0535    11/18/23 0536  Diet: Cardiac Diets; Healthy Heart (2-3 Na+); Texture: Regular Texture (IDDSI 7); Fluid Consistency: Thin (IDDSI 0)  Diet Effective Now         11/18/23 0535    11/18/23 0535  sodium chloride 0.9 % flush 10 mL  As  Needed         23  sodium chloride 0.9 % infusion 40 mL  As Needed         23  polyethylene glycol (MIRALAX) packet 17 g  Daily PRN        See Hyperspace for full Linked Orders Report.    23  bisacodyl (DULCOLAX) EC tablet 5 mg  Daily PRN        See Hyperspace for full Linked Orders Report.    23  bisacodyl (DULCOLAX) suppository 10 mg  Daily PRN        See Hyperspace for full Linked Orders Report.    23  Code Status and Medical Interventions:  Continuous         23  Insert Peripheral IV  Once        See Hyperspace for full Linked Orders Report.    23  sodium chloride 0.9 % flush 10 mL  As Needed        See Hyperspace for full Linked Orders Report.    23    Unscheduled  Up With Assistance  As Needed       23    Unscheduled  Bed / Chair Alarm On  As Needed      Comments: Bed Alarm On When in Bed,   Use Chair Alarm When in Chair    23 1020    Unscheduled  Change Dressing to IV Site As Needed When Damp, Loose or Soiled  As Needed       23    Unscheduled  Change Needleless Connectors  As Needed      Comments: Change Needleless Connectors When:  - Administration Set Changed  - Dressing Changed  - Removed For Any Reason  - Residual Blood or Debris Within Connector  - Prior to Drawing Blood Cultures  - Contamination of Connector  - After Administration of Blood or Blood Components    23                     Physician Progress Notes (most recent note)        Surendra Roman MD at 23 1734              [facility]     Patient Identification:  Name:  Donaldo [facility]  Age:  73 y.o.  Sex:  male  :  1950  MRN:  [id number]  Visit Number:  [id number]  ROOM: [address]     Primary Care Provider:  Duke Rosen MD     Date of Admission:  11/17/2023    Length of stay in inpatient status:  3    Subjective     Chief Compliant:    Chief Complaint   Patient presents with    Shortness of Breath    Leg Swelling       Patient refused bipap overnight with repeat abg this am reviewed and near baseline pH and pCO2. Patient on 4L NC at time of rounds and again discussed discharge plan. Patient agreed to try to transfer and see how O2 did but reported as 2 person assist by PT. Given level of debility, patient and wife agreeable to swing bed for therapy as patient has wife at home to assist but otherwise dependent for all aspects of care currently.         Objective     Current Hospital Meds:  amLODIPine, 10 mg, Oral, Nightly  clopidogrel, 75 mg, Oral, Daily  heparin (porcine), 5,000 Units, Subcutaneous, Q12H  lisinopril, 20 mg, Oral, Daily  nicotine, 1 patch, Transdermal, Q24H  pantoprazole, 40 mg, Oral, Q AM  rosuvastatin, 10 mg, Oral, Daily  senna-docusate sodium, 2 tablet, Oral, BID  sodium chloride, 10 mL, Intravenous, Q12H  sodium chloride, 10 mL, Intravenous, Q12H  ziprasidone (GEODON) 20 mg in sterile water (preservative free) 1 mL injection, 20 mg, Intramuscular, Once       Current Antimicrobial Therapy:  Anti-Infectives (From admission, onward)      Ordered     Dose/Rate Route Frequency Start Stop    11/18/23 0204  cefTRIAXone (ROCEPHIN) 2,000 mg in sodium chloride 0.9 % 100 mL IVPB-VTB        Ordering Provider: Aracelis Boo MD    2,000 mg  200 mL/hr over 30 Minutes Intravenous Once 11/18/23 0220 11/18/23 0414    11/18/23 0204  azithromycin (ZITHROMAX) 500 mg in sodium chloride 0.9 % 250 mL IVPB-VTB        Ordering Provider: Aracelis Boo MD    500 mg  over 60 Minutes Intravenous Once 11/18/23 0220 11/18/23 0443          Current Diuretic Therapy:  Diuretics (From admission, onward)      Ordered     Dose/Rate Route Frequency Start Stop    11/21/23 1417  furosemide (LASIX) injection 40 mg        Ordering Provider: Surendra Roman MD    40 mg  Intravenous Once 11/21/23 1515 11/21/23 1559    11/20/23 0732  furosemide (LASIX) injection 60 mg        Ordering Provider: Donaldo Stokes DO    60 mg Intravenous 2 Times Daily (Diuretics) 11/20/23 0900 11/20/23 1812    11/19/23 0728  furosemide (LASIX) injection 60 mg        Ordering Provider: Donaldo Stokes DO    60 mg Intravenous 2 Times Daily (Diuretics) 11/19/23 0900 11/19/23 1724    11/18/23 0535  furosemide (LASIX) injection 80 mg        Ordering Provider: Sarika White DO    80 mg Intravenous Once 11/18/23 0800 11/18/23 0818    11/17/23 2331  furosemide (LASIX) injection 80 mg        Ordering Provider: Aracelis Boo MD    80 mg Intravenous Once 11/17/23 2347 11/18/23 0001          ----------------------------------------------------------------------------------------------------------------------  Vital Signs:  Temp:  [98.4 °F (36.9 °C)-99.5 °F (37.5 °C)] 98.5 °F (36.9 °C)  Heart Rate:  [] 96  Resp:  [14-38] 24  BP: (106-165)/(53-80) 113/67  SpO2:  [90 %-98 %] 90 %  on  Flow (L/min):  [4] 4;   Device (Oxygen Therapy): humidified;nasal cannula  Body mass index is 38.12 kg/m².    Wt Readings from Last 3 Encounters:   11/21/23 117 kg (258 lb 2.5 oz)   11/10/23 109 kg (240 lb)   08/22/23 107 kg (235 lb)     Intake & Output (last 3 days)         11/18 0701 11/19 0700 11/19 0701 11/20 0700 11/20 0701  11/21 0700 11/21 0701  11/22 0700    P.O. 1079 480 720 360    I.V. (mL/kg) 0 (0) 0 (0)      Total Intake(mL/kg) 1079 (8.8) 480 (4.1) 720 (6.2) 360 (3.1)    Urine (mL/kg/hr) 4150 (1.4) 3250 (1.1) 1900 (0.7) 1100 (0.9)    Stool 0 0 0 0    Total Output 4150 3250 1900 1100    Net -3071 -2770 -1180 -740            Stool Unmeasured Occurrence 0 x 1 x 0 x 1 x          Diet: Cardiac Diets; Healthy Heart (2-3 Na+); Texture: Regular Texture (IDDSI 7); Fluid Consistency: Thin (IDDSI  0)  ----------------------------------------------------------------------------------------------------------------------  Physical Exam  Vitals and nursing note reviewed.   Constitutional:       General: He is not in acute distress.  HENT:      Mouth/Throat:      Mouth: Mucous membranes are moist.      Pharynx: Oropharynx is clear.   Eyes:      General: No scleral icterus.     Extraocular Movements: Extraocular movements intact.   Cardiovascular:      Rate and Rhythm: Normal rate.      Pulses: Normal pulses.   Pulmonary:      Effort: Pulmonary effort is normal. No respiratory distress.      Breath sounds: No wheezing or rales.   Abdominal:      General: There is no distension.      Palpations: Abdomen is soft.   Musculoskeletal:      Right lower leg: Edema present.      Left lower leg: Edema present.   Skin:     General: Skin is warm and dry.   Neurological:      Mental Status: He is alert and oriented to person, place, and time. Mental status is at baseline.   Psychiatric:         Mood and Affect: Mood normal.         Behavior: Behavior normal.       ----------------------------------------------------------------------------------------------------------------------    ----------------------------------------------------------------------------------------------------------------------  LABS:    CBC and coagulation:  Results from last 7 days   Lab Units 11/21/23  0024 11/19/23  0050 11/18/23  0555 11/18/23  0326 11/17/23  2357   LACTATE mmol/L  --   --   --  0.9  --    WBC 10*3/mm3 9.92 8.39 8.27  --  7.32   HEMOGLOBIN g/dL 12.7* 12.3* 12.9*  --  12.2*   HEMATOCRIT % 43.0 41.8 45.1  --  42.4   MCV fL 99.8* 100.2* 103.7*  --  102.7*   MCHC g/dL 29.5* 29.4* 28.6*  --  28.8*   PLATELETS 10*3/mm3 210 193 167  --  194     Acid/base balance:  Results from last 7 days   Lab Units 11/21/23  0416 11/19/23  0805 11/18/23  1424 11/18/23  0653 11/18/23  0218   PH, ARTERIAL pH units 7.388 7.455* 7.333* 7.392 7.326*   PO2 ART  "mm Hg 75.1* 67.7* 66.2* 66.8* 63.0*   PCO2, ARTERIAL mm Hg 73.8* 63.9* 93.9* 77.3* 89.2*   HCO3 ART mmol/L 44.4* 44.9* 49.9* 46.9* 46.6*     Renal and electrolytes:  Results from last 7 days   Lab Units 11/21/23  0024 11/20/23 0223 11/19/23  0050 11/18/23  0555 11/17/23  2357   SODIUM mmol/L 139 137 137 142 142   POTASSIUM mmol/L 3.9 3.9 3.8 4.6 4.9   CHLORIDE mmol/L 91* 91* 92* 94* 97*   CO2 mmol/L 40.4* 38.6* 39.2* 39.7* 41.4*   BUN mg/dL 25* 18 16 14 16   CREATININE mg/dL 0.80 0.65* 0.61* 0.59* 0.67*   CALCIUM mg/dL 9.6 9.4 9.5 9.7 9.6   GLUCOSE mg/dL 163* 113* 143* 104* 107*     Estimated Creatinine Clearance: 106.9 mL/min (by C-G formula based on SCr of 0.8 mg/dL).    Liver and pancreatic function:  Results from last 7 days   Lab Units 11/19/23 0050 11/18/23  0555   ALBUMIN g/dL 3.8 4.0   BILIRUBIN mg/dL 0.5 0.4   ALK PHOS U/L 75 80   AST (SGOT) U/L 12 18   ALT (SGPT) U/L 10 13     Endocrine function:  No results found for: \"HGBA1C\"  Point of care bedside glucose levels:      Glucose levels from the [facility]:  Results from last 7 days   Lab Units 11/21/23  0024 11/20/23 0223 11/19/23  0050 11/18/23  0555 11/17/23  2357   GLUCOSE mg/dL 163* 113* 143* 104* 107*     Lab Results   Component Value Date    TSH 0.681 12/08/2021    FREET4 1.11 12/08/2021     Cardiac:  Results from last 7 days   Lab Units 11/18/23 0326 11/17/23  2357   HSTROP T ng/L 30* 26*   PROBNP pg/mL  --  168.3       Cultures:  Lab Results   Component Value Date    COLORU Yellow 01/16/2023    CLARITYU Clear 01/16/2023    SPECGRAV 1.010 01/16/2023    PHUR 7.5 01/16/2023    GLUCOSEU Negative 12/10/2021    KETONESU Negative 01/16/2023    BLOODU Negative 12/10/2021    NITRITEU Negative 12/10/2021    LEUKOCYTESUR Negative 01/16/2023    BILIRUBINUR Negative 01/16/2023    UROBILINOGEN Normal 01/16/2023     Microbiology Results (last 10 days)       Procedure Component Value - Date/Time    Blood Culture - Blood, Arm, Right [[id number]]  (Normal) Collected: " "11/18/23 0326    Lab Status: Preliminary result Specimen: Blood from Arm, Right Updated: 11/21/23 0330     Blood Culture No growth at 3 days    Blood Culture - Blood, Arm, Left [[id number]]  (Normal) Collected: 11/18/23 0326    Lab Status: Preliminary result Specimen: Blood from Arm, Left Updated: 11/21/23 0330     Blood Culture No growth at 3 days            No results found for: \"PREGTESTUR\", \"PREGSERUM\", \"HCG\", \"HCGQUANT\"  Pain Management Panel  More data may exist         Latest Ref Rng & Units 11/18/2023 9/5/2019   Pain Management Panel   Amphetamine, Urine Qual Negative Negative  Negative    Barbiturates Screen, Urine Negative Negative  Negative    Benzodiazepine Screen, Urine Negative Negative  Negative    Buprenorphine, Screen, Urine Negative Negative  Negative    Cocaine Screen, Urine Negative Negative  Negative    Fentanyl, Urine Negative Negative  -   Methadone Screen , Urine Negative Negative  Negative    Methamphetamine, Ur Negative Negative  -       I have personally looked at the labs and they are summarized above.  ----------------------------------------------------------------------------------------------------------------------  Detailed radiology reports for the last 24 hours:    Imaging Results (Last 24 Hours)       ** No results found for the last 24 hours. **            Assessment & Plan      #Acute hypercapnic on chronic hypoxemic respiratory failure  #Acute heart failure with preserved ejection fraction  #Myocardial injury secondary to hypoxia from above  #History of obstructive sleep apnea, noncompliant with CPAP  #Obesity, suspected underlying obesity hypoventilation  #Severe PAH  #Chronic hypoxemic respiratory failure on 3 L nasal cannula  #Chronic smoking tobacco use  #COPD not in exacerbation  -Echo reviewed and patient with severe PAH and evidence of right heart failure with preserved EF, diastolic dysfunction. As such, will cont gentle diuresis to avoid RAFI or overly aggressive fluid " shifts. 40mg IV lasix x1 dose today and f/u labs and UOP in am, previously not on anamika daily diuretic dosing. Labs with evidence of contraction given rising bicarb  -On baseline 3-4L NC currently  -Bipap canceled as patient has been refusing and has no machine at home. Cont NC O2 this am and repeat vbg in am  -Patient will need close outpatient HF clinic f/u as well as pulmonology  -Anticipate transition to low daily lasix dosing given preserved renal function and discussed following with outpatient HF clinic to add additional diuretics as indicated for weight gain.    #Chronic illness associated debility  -Swing bed consult placed, two person assist per PT eval. Wife sole assist provider at home      - - Chronic - -    #S/p PPM  #Right carotid artery stenosis  #Essential hypertension  #History of ischemic stroke  #Anxiety  #GERD    VTE Prophylaxis:   Mechanical Order History:       None          Pharmalogical Order History:        Ordered     Dose Route Frequency Stop    11/18/23 0535  heparin (porcine) 5000 UNIT/ML injection 5,000 Units         5,000 Units SC Every 12 Hours Scheduled --                    Code Status and Medical Interventions:   Ordered at: 11/18/23 0340     Code Status (Patient has no pulse and is not breathing):    CPR (Attempt to Resuscitate)     Medical Interventions (Patient has pulse or is breathing):    Full Support         Disposition: Ok to downgrade to floor    I have reviewed any copied/forwarded text or data, verified its accuracy, and updated as necessary above.    Surendra Roman MD  [facility]  11/21/23  17:35 EST      Electronically signed by Surendra Roman MD at 11/21/23 5131       Consult Notes (most recent note)    No notes of this type exist for this encounter.       Nutrition Notes (most recent note)    No notes exist for this encounter.          Physical Therapy Notes (most recent note)        Yoana Guevara, PT at 11/21/23 9543  Version 1 of 1          Acute Care - Physical Therapy Treatment Note  [facility]     Patient Name: Donaldo Roberts  : 1950  MRN: [id number]  Today's Date: 2023   Onset of Illness/Injury or Date of Surgery: 23  Visit Dx:     ICD-10-CM ICD-9-CM   1. Acute on chronic respiratory failure with hypoxia and hypercapnia  J96.21 518.84    J96.22 786.09     799.02   2. Pneumonia due to infectious organism, unspecified laterality, unspecified part of lung  J18.9 486   3. Chronic diastolic heart failure  I50.32 428.32   4. Supplemental oxygen dependent  Z99.81 V46.2   5. Chronic obstructive pulmonary disease, unspecified COPD type  J44.9 496     Patient Active Problem List   Diagnosis    Benign prostatic hyperplasia with urinary obstruction    Herpesviral infection of penis    Shortness of breath    Carotid stenosis, asymptomatic, right    Essential hypertension    Dyslipidemia    Wenckebach block    PATTY (obstructive sleep apnea)    Pre-operative cardiovascular examination    Presence of cardiac pacemaker    Severe sinus bradycardia    Chronic bronchitis    Cigarette nicotine dependence without complication    Hypoxia    Detrusor instability    Tobacco abuse    Respiratory failure     Past Medical History:   Diagnosis Date    Acid reflux     Anxiety     Arthritis     Carotid artery stenosis, symptomatic, right 2021    Essential hypertension 2021    Herpes     History of lipoma     History of transfusion     Hyperlipidemia     Inguinal hernia     Peptic ulcer     Stroke      Past Surgical History:   Procedure Laterality Date    ABDOMINAL SURGERY      CARDIAC ELECTROPHYSIOLOGY PROCEDURE N/A 12/10/2021    Procedure: Pacemaker DC new;  Surgeon: Jony Monte MD;  Location: [facility] INVASIVE LOCATION;  Service: Cardiology;  Laterality: N/A;    CAROTID ENDARTERECTOMY Left     CHOLECYSTECTOMY WITH INTRAOPERATIVE CHOLANGIOGRAM N/A 2017    Procedure: CHOLECYSTECTOMY LAPAROSCOPIC INTRAOPERATIVE CHOLANGIOGRAM;   Surgeon: Luis Coe MD;  Location: [facility];  Service:     HERNIA REPAIR      inguinal    SINUS SURGERY      TUMOR EXCISION       PT Assessment (last 12 hours)       PT Evaluation and Treatment       Row Name 11/21/23 1531          Physical Therapy Time and Intention    Subjective Information complains of;weakness;pain  Right knee  -     Document Type therapy note (daily note)  -     Mode of Treatment physical therapy  -     Patient Effort good  -     Comment Pt seemed more lethargic today and had increased difficulty with standing and stepping.  -       Row Name 11/21/23 1531          General Information    Patient Profile Reviewed yes  -     Patient Observations agree to therapy;cooperative  -     Patient/Family/Caregiver Comments/Observations Wife present and RN assisted as needed  -     Existing Precautions/Restrictions fall;oxygen therapy device and L/min  -     Limitations/Impairments safety/cognitive  -       Row Name 11/21/23 1531          Cognition    Follows Commands (Cognition) WFL  -     Personal Safety Interventions fall prevention program maintained;gait belt;muscle strengthening facilitated;nonskid shoes/slippers when out of bed;supervised activity  -       Row Name 11/21/23 1531          Bed Mobility    Supine-Sit [place] (Bed Mobility) maximum assist (25% patient effort);moderate assist (50% patient effort);1 person assist  -     Sit-Supine [place] (Bed Mobility) moderate assist (50% patient effort);maximum assist (25% patient effort);2 person assist  -     Bed Mobility, Safety Issues decreased use of arms for pushing/pulling;decreased use of legs for bridging/pushing;impaired trunk control for bed mobility  -     Assistive Device (Bed Mobility) bed rails;draw sheet;head of bed elevated  -       Row Name 11/21/23 1531          Transfers    Transfers stand pivot/stand step transfer  -       Row Name 11/21/23 1531          Sit-Stand Transfer     Sit-Stand [place] (Transfers) maximum assist (25% patient effort);2 person assist  -     Assistive Device (Sit-Stand Transfers) walker, front-wheeled  -       Row Name 11/21/23 1531          Stand-Sit Transfer    Stand-Sit [place] (Transfers) 2 person assist;maximum assist (25% patient effort)  -     Assistive Device (Stand-Sit Transfers) walker, front-wheeled  -       Row Name 11/21/23 1531          Stand Pivot/Stand Step Transfer    Stand Pivot/Stand Step [place] (Transfers) maximum assist (25% patient effort);2 person assist  -     Assistive Device (Stand Pivot Stand Step Transfer) walker, front-wheeled  -     Comment, (Stand Pivot Transfer) Assist with movement of walker, weight shifting and stepping  -       Row Name 11/21/23 1531          Gait/Stairs (Locomotion)    Comment, (Gait/Stairs) Pt unable to ambulate any significant distance.  He needed maximum assist to step pivot to bed side chair with FWW.  -       Row Name 11/21/23 1531          Balance    Balance Assessment sitting static balance;sitting dynamic balance;standing static balance;standing dynamic balance  -     Static Sitting Balance standby assist;1-person assist  -     Dynamic Sitting Balance contact guard;1-person assist  -     Position, Sitting Balance sitting edge of bed  -     Static Standing Balance minimal assist;1-person assist  -     Dynamic Standing Balance maximum assist;2-person assist  -     Position/Device Used, Standing Balance walker, front-wheeled  -       Row Name 11/21/23 1531          Plan of Care Review    Plan of Care Reviewed With patient;spouse  -     Outcome Evaluation PT treatment completed.  Patient required maxAx2 for stand step transfer to chair with FWW.  Increased difficulty weight shifting and stepping today.  Pt will continue to benefit from skilled PT services to progress towards PLOF.  -       Row Name 11/21/23 1531          Positioning and Restraints     Pre-Treatment Position in bed  -KP     Post Treatment Position bed  -KP     In Bed notified nsg;supine;call light within reach;encouraged to call for assist;exit alarm on;with family/caregiver;side rails up x3  Lines in tact and needs in reach  -KP               User Key  (r) = Recorded By, (t) = Taken By, (c) = Cosigned By      Initials Name Provider Type    Yoana Carlson PT Physical Therapist                      PT Recommendation and Plan  Planned Therapy Interventions (PT): balance training, bed mobility training, gait training, home exercise program, motor coordination training, neuromuscular re-education, patient/family education, postural re-education, ROM (range of motion), stair training, strengthening, stretching, transfer training  Therapy Frequency (PT): 2 times/wk  Plan of Care Reviewed With: patient, spouse  Outcome Evaluation: PT treatment completed.  Patient required maxAx2 for stand step transfer to chair with FWW.  Increased difficulty weight shifting and stepping today.  Pt will continue to benefit from skilled PT services to progress towards PLOF.       Time Calculation:    PT Charges       Row Name 11/21/23 1541             Time Calculation    PT Received On 11/21/23  -      PT Goal Re-Cert Due Date 12/04/23  -         Timed Charges    75223 - PT Therapeutic Activity Minutes 25  -KP         Total Minutes    Timed Charges Total Minutes 25  -KP       Total Minutes 25  -KP                User Key  (r) = Recorded By, (t) = Taken By, (c) = Cosigned By      Initials Name Provider Type    Yoana Carlson PT Physical Therapist                  Therapy Charges for Today       Code Description Service Date Service Provider Modifiers Qty    80345661790 HC PT EVAL MOD COMPLEXITY 4 11/20/2023 Yoana Guevara, PT GP 1    27391066076 HC PT THERAPEUTIC ACT EA 15 MIN 11/21/2023 Yoana Guevara, PT GP 2            PT G-Codes  AM-PAC 6 Clicks Score (PT): 8    Yoana Guevara  PT  2023      Electronically signed by Yoana Guevara, PT at 23 1542          Occupational Therapy Notes (most recent note)        Ewa Rocha, OT at 23 1516          Acute Care - Occupational Therapy Treatment Note   [facility]     Patient Name: Donaldo Roberts  : 1950  MRN: [id number]  Today's Date: 2023  Onset of Illness/Injury or Date of Surgery: 23     Referring Physician: Kike    Admit Date: 2023       ICD-10-CM ICD-9-CM   1. Acute on chronic respiratory failure with hypoxia and hypercapnia  J96.21 518.84    J96.22 786.09     799.02   2. Pneumonia due to infectious organism, unspecified laterality, unspecified part of lung  J18.9 486   3. Chronic diastolic heart failure  I50.32 428.32   4. Supplemental oxygen dependent  Z99.81 V46.2   5. Chronic obstructive pulmonary disease, unspecified COPD type  J44.9 496     Patient Active Problem List   Diagnosis    Benign prostatic hyperplasia with urinary obstruction    Herpesviral infection of penis    Shortness of breath    Carotid stenosis, asymptomatic, right    Essential hypertension    Dyslipidemia    Wenckebach block    PATTY (obstructive sleep apnea)    Pre-operative cardiovascular examination    Presence of cardiac pacemaker    Severe sinus bradycardia    Chronic bronchitis    Cigarette nicotine dependence without complication    Hypoxia    Detrusor instability    Tobacco abuse    Respiratory failure     Past Medical History:   Diagnosis Date    Acid reflux     Anxiety     Arthritis     Carotid artery stenosis, symptomatic, right 2021    Essential hypertension 2021    Herpes     History of lipoma     History of transfusion     Hyperlipidemia     Inguinal hernia     Peptic ulcer     Stroke      Past Surgical History:   Procedure Laterality Date    ABDOMINAL SURGERY      CARDIAC ELECTROPHYSIOLOGY PROCEDURE N/A 12/10/2021    Procedure: Pacemaker DC new;  Surgeon: Jony Monte MD;  Location: [facility]  INVASIVE LOCATION;  Service: Cardiology;  Laterality: N/A;    CAROTID ENDARTERECTOMY Left 2021    CHOLECYSTECTOMY WITH INTRAOPERATIVE CHOLANGIOGRAM N/A 07/25/2017    Procedure: CHOLECYSTECTOMY LAPAROSCOPIC INTRAOPERATIVE CHOLANGIOGRAM;  Surgeon: Luis Coe MD;  Location: [facility] OR;  Service:     HERNIA REPAIR      inguinal    SINUS SURGERY      TUMOR EXCISION           OT ASSESSMENT FLOWSHEET (last 12 hours)       OT Evaluation and Treatment       Row Name 11/21/23 6955                   OT Time and Intention    Subjective Information complains of;weakness;fatigue  -LM        Document Type therapy note (daily note)  -LM        Mode of Treatment occupational therapy  -LM        Patient Effort good  -LM        Comment Patient seen this date for adl retraining/fxl mobility.  Patient transferred from  to recliner with max assist x 2 and rw.  RN present.  Patient demonstrates decreased activity tolerance and educated on pursed lipped breathing.  Max assist x 2 for transfer from recliner to .  Fall risk at this time.  -LM           General Information    Existing Precautions/Restrictions fall;oxygen therapy device and L/min  -LM           Cognition    Affect/Mental Status (Cognition) WNL  -LM        Orientation Status (Cognition) oriented x 3  -LM        Follows Commands (Cognition) WFL  -LM           Toileting Assessment/Training    Bibb Level (Toileting) toileting skills;dependent (less than 25% patient effort)  -LM        Assistive Devices (Toileting) bedpan  -LM        Position (Toileting) supine  -LM                  User Key  (r) = Recorded By, (t) = Taken By, (c) = Cosigned By      Initials Name Effective Dates    LM Ewa Rocha, OT 06/16/21 -                            OT Recommendation and Plan  Planned Therapy Interventions (OT): activity tolerance training, adaptive equipment training, BADL retraining, ROM/therapeutic exercise, strengthening exercise, transfer/mobility retraining,  patient/caregiver education/training  Therapy Frequency (OT): other (see comments)  Plan of Care Review  Plan of Care Reviewed With: patient, spouse  Plan of Care Reviewed With: patient, spouse        Time Calculation:     Therapy Charges for Today       Code Description Service Date Service Provider Modifiers Qty    62543709446  OT EVAL MOD COMPLEXITY 4 11/20/2023 Ewa Rocha OT GO 1    [id number]  OT SELF CARE/MGMT/TRAIN EA 15 MIN 11/21/2023 Ewa Rocha OT GO 2                 Ewa Rocha OT  11/21/2023    Electronically signed by Ewa Rocha OT at 11/21/23 1516       Speech Language Pathology Notes (most recent note)    No notes exist for this encounter.     Linda Melendez MSW     Case Management     Case Management/Social Work      Signed     Date of Service: 11/20/23 1639  Creation Time: 11/20/23 1639     Signed         Discharge Planning Assessment   [facility]     Patient Name: Donaldo Roberts             MRN: [id number]  Today's Date: 11/20/2023              Admit Date: 11/17/2023     Plan: Pt lives with spouse, Inna and adult son and he plans to return home at discharge. Pt did not utilize home health services prior to admit. Pt has a wheelchair, rolling walker, O2 @ 3 liters, portable O2 tanks, bedside commode, and shower chair via [employer]. PCP was recently changed to Dr. Rosen at [facility]. Pt does not have a POA or living will. SS to follow and assist as needed with discharge planning.    Discharge Needs Assessment         Row Name 11/20/23 1639           Living Environment     People in Home spouse;child(haley), adult     Primary Care Provided by spouse/significant other;child(haley)     Provides Primary Care For no one     Family Caregiver if Needed child(haley), adult;spouse     Quality of Family Relationships helpful;involved;supportive     Able to Return to Prior Arrangements yes           Transition Planning     Patient/Family  Anticipates Transition to home with family           Discharge Needs Assessment     Equipment Currently Used at Home wheelchair;walker, rolling;oxygen;commode;shower chair  O2 @ 3 liters, portable O2 tanks, and other DME via [employer]        Row Name 11/20/23 1542           Living Environment     People in Home spouse     Name(s) of People in Home Inna Roberts     Current Living Arrangements home     Potentially Unsafe Housing Conditions none     Primary Care Provided by spouse/significant other     Family Caregiver if Needed spouse     Family Caregiver Names Inna Roberts     Able to Return to Prior Arrangements yes           Resource/Environmental Concerns     Resource/Environmental Concerns none     Transportation Concerns none           Transition Planning     Transportation Anticipated family or friend will provide           Discharge Needs Assessment     Readmission Within the Last 30 Days no previous admission in last 30 days     Equipment Currently Used at Home walker, rolling;commode;pulse ox;wheelchair;oxygen     Equipment Needed After Discharge bipap                         Discharge Plan         Row Name 11/20/23 0513           Plan     Plan Pt lives with spouse, Inna and adult son and he plans to return home at discharge. Pt did not utilize home health services prior to admit. Pt has a wheelchair, rolling walker, O2 @ 3 liters, portable O2 tanks, bedside commode, and shower chair via [employer]. PCP was recently changed to Dr. Rosen at [facility]. Pt does not have a POA or living will. SS to follow and assist as needed with discharge planning.     Patient/Family in Agreement with Plan yes                       Expected Discharge Date and Time         Expected Discharge Date Expected Discharge Time     Nov 21, 2023                 Demographic Summary         Row Name 11/20/23 8218           General Information     Referral Source nursing     Reason for  Consult --  SS received consult for d/c needs. SS spoke to pt and spouse, Inna at bedside.                    DELROY Roach

## 2023-11-22 NOTE — PROGRESS NOTES
[facility] HOSPITALIST PROGRESS NOTE     Patient Identification:  Name:  Donaldo Roberts  Age:  73 y.o.  Sex:  male  :  1950  MRN:  [id number]  Visit Number:  [id number]  ROOM: [address] [place] [address]     Primary Care Provider:  Duke Rosen MD     Date of Admission: 2023    Length of stay in inpatient status:  4    Subjective     Chief Compliant:    Chief Complaint   Patient presents with    Shortness of Breath    Leg Swelling       Stable O2 requirement. Discussed at length need for PT with patient and son and wife present, patient remains resistant but agreeable to therapy session in am and would strongly to prefer discharge afterwards.         Objective     Current Hospital Meds:  amLODIPine, 10 mg, Oral, Nightly  clopidogrel, 75 mg, Oral, Daily  furosemide, 20 mg, Oral, Daily  heparin (porcine), 5,000 Units, Subcutaneous, Q12H  lisinopril, 20 mg, Oral, Daily  nicotine, 1 patch, Transdermal, Q24H  pantoprazole, 40 mg, Oral, Q AM  rosuvastatin, 10 mg, Oral, Daily  senna-docusate sodium, 2 tablet, Oral, BID  sodium chloride, 10 mL, Intravenous, Q12H  sodium chloride, 10 mL, Intravenous, Q12H  ziprasidone (GEODON) 20 mg in sterile water (preservative free) 1 mL injection, 20 mg, Intramuscular, Once       Current Antimicrobial Therapy:  Anti-Infectives (From admission, onward)      Ordered     Dose/Rate Route Frequency Start Stop    23 0204  cefTRIAXone (ROCEPHIN) 2,000 mg in sodium chloride 0.9 % 100 mL IVPB-VTB        Ordering Provider: Aracelis Boo MD    2,000 mg  200 mL/hr over 30 Minutes Intravenous Once 23 0220 23 0414    23 0204  azithromycin (ZITHROMAX) 500 mg in sodium chloride 0.9 % 250 mL IVPB-VTB        Ordering Provider: Aracelis Boo MD    500 mg  over 60 Minutes Intravenous Once 23 0220 23 0443          Current Diuretic Therapy:  Diuretics (From admission, onward)      Ordered     Dose/Rate Route Frequency Start Stop    23 0756  furosemide  (LASIX) tablet 20 mg        Ordering Provider: Surendra Roman MD    20 mg Oral Daily 11/22/23 0900      11/21/23 1417  furosemide (LASIX) injection 40 mg        Ordering Provider: Surendra Roman MD    40 mg Intravenous Once 11/21/23 1515 11/21/23 1559    11/20/23 0732  furosemide (LASIX) injection 60 mg        Ordering Provider: Donaldo Stokes DO    60 mg Intravenous 2 Times Daily (Diuretics) 11/20/23 0900 11/20/23 1812    11/19/23 0728  furosemide (LASIX) injection 60 mg        Ordering Provider: Donaldo Stokes DO    60 mg Intravenous 2 Times Daily (Diuretics) 11/19/23 0900 11/19/23 1724    11/18/23 0535  furosemide (LASIX) injection 80 mg        Ordering Provider: Sarika White DO    80 mg Intravenous Once 11/18/23 0800 11/18/23 0818    11/17/23 2331  furosemide (LASIX) injection 80 mg        Ordering Provider: Aracelis Boo MD    80 mg Intravenous Once 11/17/23 2347 11/18/23 0001          ----------------------------------------------------------------------------------------------------------------------  Vital Signs:  Temp:  [98.2 °F (36.8 °C)-98.9 °F (37.2 °C)] 98.2 °F (36.8 °C)  Heart Rate:  [59-90] 77  Resp:  [20-28] 27  BP: (112-131)/(58-66) 112/59  SpO2:  [89 %-94 %] 89 %  on  Flow (L/min):  [4-6] 6;   Device (Oxygen Therapy): high-flow nasal cannula  Body mass index is 42.5 kg/m².    Wt Readings from Last 3 Encounters:   11/21/23 131 kg (287 lb 12.8 oz)   11/10/23 109 kg (240 lb)   08/22/23 107 kg (235 lb)     Intake & Output (last 3 days)         11/19 0701 11/20 0700 11/20 0701 11/21 0700 11/21 0701 11/22 0700 11/22 0701 11/23 0700    P.O. 480 720 360 780    I.V. (mL/kg) 0 (0)       Total Intake(mL/kg) 480 (4.1) 720 (6.2) 360 (2.7) 780 (6)    Urine (mL/kg/hr) 3250 (1.1) 1900 (0.7) 1100 (0.3) 750 (0.5)    Stool 0 0 0     Total Output 3250 1900 1100 750    Net -2770 -1180 -740 +30            Urine Unmeasured Occurrence   1 x     Stool Unmeasured Occurrence 1 x 0 x 1 x           Diet:  Cardiac Diets; Healthy Heart (2-3 Na+); Texture: Regular Texture (IDDSI 7); Fluid Consistency: Thin (IDDSI 0)  ----------------------------------------------------------------------------------------------------------------------  Physical Exam  Vitals and nursing note reviewed.   Constitutional:       General: He is not in acute distress.  HENT:      Mouth/Throat:      Mouth: Mucous membranes are moist.      Pharynx: Oropharynx is clear.   Eyes:      General: No scleral icterus.     Extraocular Movements: Extraocular movements intact.   Cardiovascular:      Rate and Rhythm: Normal rate.      Pulses: Normal pulses.   Pulmonary:      Effort: Pulmonary effort is normal. No respiratory distress.      Breath sounds: No wheezing or rales.   Abdominal:      General: There is no distension.      Palpations: Abdomen is soft.   Musculoskeletal:      Right lower leg: Edema present.      Left lower leg: Edema present.   Skin:     General: Skin is warm and dry.   Neurological:      Mental Status: He is alert and oriented to person, place, and time. Mental status is at baseline.   Psychiatric:         Mood and Affect: Mood normal.         Behavior: Behavior normal.       ----------------------------------------------------------------------------------------------------------------------    ----------------------------------------------------------------------------------------------------------------------  LABS:    CBC and coagulation:  Results from last 7 days   Lab Units 11/21/23  0024 11/19/23  0050 11/18/23  0555 11/18/23  5406 11/17/23  2357   LACTATE mmol/L  --   --   --  0.9  --    WBC 10*3/mm3 9.92 8.39 8.27  --  7.32   HEMOGLOBIN g/dL 12.7* 12.3* 12.9*  --  12.2*   HEMATOCRIT % 43.0 41.8 45.1  --  42.4   MCV fL 99.8* 100.2* 103.7*  --  102.7*   MCHC g/dL 29.5* 29.4* 28.6*  --  28.8*   PLATELETS 10*3/mm3 210 193 167  --  194     Acid/base balance:  Results from last 7 days   Lab Units 11/21/23  0416 11/19/23  0862  "11/18/23  1424 11/18/23  0653 11/18/23  0218   PH, ARTERIAL pH units 7.388 7.455* 7.333* 7.392 7.326*   PO2 ART mm Hg 75.1* 67.7* 66.2* 66.8* 63.0*   PCO2, ARTERIAL mm Hg 73.8* 63.9* 93.9* 77.3* 89.2*   HCO3 ART mmol/L 44.4* 44.9* 49.9* 46.9* 46.6*     Renal and electrolytes:  Results from last 7 days   Lab Units 11/22/23 0239 11/21/23  0024 11/20/23  0223 11/19/23  0050 11/18/23  0555 11/17/23  2357   SODIUM mmol/L 140 139 137 137 142 142   POTASSIUM mmol/L 4.5 3.9 3.9 3.8 4.6 4.9   CHLORIDE mmol/L 96* 91* 91* 92* 94* 97*   CO2 mmol/L 40.8* 40.4* 38.6* 39.2* 39.7* 41.4*   BUN mg/dL 22 25* 18 16 14 16   CREATININE mg/dL 0.72* 0.80 0.65* 0.61* 0.59* 0.67*   CALCIUM mg/dL 9.6 9.6 9.4 9.5 9.7 9.6   PHOSPHORUS mg/dL 3.5  --   --   --   --   --    GLUCOSE mg/dL 167* 163* 113* 143* 104* 107*     Estimated Creatinine Clearance: 126.2 mL/min (A) (by C-G formula based on SCr of 0.72 mg/dL (L)).    Liver and pancreatic function:  Results from last 7 days   Lab Units 11/22/23 0239 11/19/23  0050 11/18/23  0555   ALBUMIN g/dL 3.6 3.8 4.0   BILIRUBIN mg/dL  --  0.5 0.4   ALK PHOS U/L  --  75 80   AST (SGOT) U/L  --  12 18   ALT (SGPT) U/L  --  10 13     Endocrine function:  No results found for: \"HGBA1C\"  Point of care bedside glucose levels:      Glucose levels from the [facility]:  Results from last 7 days   Lab Units 11/22/23 0239 11/21/23  0024 11/20/23  0223 11/19/23  0050 11/18/23  0555 11/17/23  2357   GLUCOSE mg/dL 167* 163* 113* 143* 104* 107*     Lab Results   Component Value Date    TSH 0.681 12/08/2021    FREET4 1.11 12/08/2021     Cardiac:  Results from last 7 days   Lab Units 11/18/23  0326 11/17/23  2357   HSTROP T ng/L 30* 26*   PROBNP pg/mL  --  168.3       Cultures:  Lab Results   Component Value Date    COLORU Yellow 01/16/2023    CLARITYU Clear 01/16/2023    SPECGRAV 1.010 01/16/2023    PHUR 7.5 01/16/2023    GLUCOSEU Negative 12/10/2021    KETONESU Negative 01/16/2023    BLOODU Negative 12/10/2021    NITRITEU " "Negative 12/10/2021    LEUKOCYTESUR Negative 01/16/2023    BILIRUBINUR Negative 01/16/2023    UROBILINOGEN Normal 01/16/2023     Microbiology Results (last 10 days)       Procedure Component Value - Date/Time    Blood Culture - Blood, Arm, Right [[id number]]  (Normal) Collected: 11/18/23 0326    Lab Status: Preliminary result Specimen: Blood from Arm, Right Updated: 11/22/23 0331     Blood Culture No growth at 4 days    Blood Culture - Blood, Arm, Left [[id number]]  (Normal) Collected: 11/18/23 0326    Lab Status: Preliminary result Specimen: Blood from Arm, Left Updated: 11/22/23 0331     Blood Culture No growth at 4 days            No results found for: \"PREGTESTUR\", \"PREGSERUM\", \"HCG\", \"HCGQUANT\"  Pain Management Panel  More data may exist         Latest Ref Rng & Units 11/18/2023 9/5/2019   Pain Management Panel   Amphetamine, Urine Qual Negative Negative  Negative    Barbiturates Screen, Urine Negative Negative  Negative    Benzodiazepine Screen, Urine Negative Negative  Negative    Buprenorphine, Screen, Urine Negative Negative  Negative    Cocaine Screen, Urine Negative Negative  Negative    Fentanyl, Urine Negative Negative  -   Methadone Screen , Urine Negative Negative  Negative    Methamphetamine, Ur Negative Negative  -       I have personally looked at the labs and they are summarized above.  ----------------------------------------------------------------------------------------------------------------------  Detailed radiology reports for the last 24 hours:    Imaging Results (Last 24 Hours)       ** No results found for the last 24 hours. **            Assessment & Plan      #Acute hypercapnic on chronic hypoxemic respiratory failure  #Acute heart failure with preserved ejection fraction  #Myocardial injury secondary to hypoxia from above  #History of obstructive sleep apnea, noncompliant with CPAP  #Obesity, suspected underlying obesity hypoventilation  #Severe PAH  #Chronic hypoxemic respiratory " failure on 3 L nasal cannula  #Chronic smoking tobacco use  #COPD not in exacerbation  -Echo reviewed and patient with severe PAH and evidence of right heart failure with preserved EF, diastolic dysfunction.  -On baseline 3-4L NC currently  -Patient will need close outpatient HF clinic f/u as well as pulmonology  -Resumed PO lasix 20mg daily given normal renal function, monitor I&Os and increase prn if pulm edema recurs  -VBG from this am reviewed with pH and pCO2 mildly worsened on NC O2 only. Would likely benefit from overnight bipap but has refused in past. Pulm consulted for home bipap Rx and can prescribe at discharge    #Chronic illness associated debility  -Swing bed consult placed, two person assist per PT eval. Wife sole assist provider at home  -Discussed at length with patient, unfortunately not willing to participate in therapy as planned and requesting discharge in am. Will attempt to ambulate again and discharge patient afterwards if patient preferences unchanged. Will not this is not the safest discharge plan as patient severely debilitated      - - Chronic - -    #S/p PPM  #Right carotid artery stenosis  #Essential hypertension  #History of ischemic stroke  #Anxiety  #GERD    VTE Prophylaxis:   Mechanical Order History:       None          Pharmalogical Order History:        Ordered     Dose Route Frequency Stop    11/18/23 0535  heparin (porcine) 5000 UNIT/ML injection 5,000 Units         5,000 Units SC Every 12 Hours Scheduled --                    Code Status and Medical Interventions:   Ordered at: 11/18/23 0340     Code Status (Patient has no pulse and is not breathing):    CPR (Attempt to Resuscitate)     Medical Interventions (Patient has pulse or is breathing):    Full Support         Disposition: Cont floor management, swing bed admit pending vs d/c home in am    I have reviewed any copied/forwarded text or data, verified its accuracy, and updated as necessary above.    Surendra Roman,  MD  [facility]  11/22/23  18:02 EST

## 2023-11-22 NOTE — PLAN OF CARE
Goal Outcome Evaluation:                      PT has rested in bed this shift. Was confused upon awakening but quickly reoriented. Has had no c/o or acute changes noted, VSS, will continue POC

## 2023-11-22 NOTE — CONSULTS
Referring Provider: hospitalist  Reason for Consultation: Need for BiPAP and respiratory failure      Chief complaint-shortness of breath      History of present illness: Patient is a 73-year-old male with past medical history positive for right-sided carotid artery stenosis, chronic respiratory failure, suspected COPD, PATTY, CVA s/p permanent pacemaker presented to the ER complaining of progressive shortness of air and legs swelling.  All the labs medications ins and outs and vital signs at time of admission reviewed.  Treatment given in the ER then on the floor reviewed.  Pulmonary is consulted to evaluate for BiPAP.    Review of Systems  History obtained from chart review and the patient  General ROS: negative for - chills, fatigue or fever  Psychological ROS: negative for - anxiety or depression  ENT ROS: negative for - headaches, visual changes or vocal changes  Respiratory ROS: positive for - cough and shortness of breath  Cardiovascular ROS: no chest pain or dyspnea on exertion  Gastrointestinal ROS: no abdominal pain, change in bowel habits, or black or bloody stools  Musculoskeletal ROS: negative for - joint pain, joint stiffness or joint swelling  Neurological ROS: no TIA or stroke symptoms  Hematological: no bleeding  Skin: no bruises, no rash        History  Past Medical History:   Diagnosis Date    Acid reflux     Anxiety     Arthritis     Carotid artery stenosis, symptomatic, right 11/23/2021    Essential hypertension 11/23/2021    Herpes     History of lipoma     History of transfusion     Hyperlipidemia     Inguinal hernia     Peptic ulcer     Stroke    ,   Past Surgical History:   Procedure Laterality Date    ABDOMINAL SURGERY      CARDIAC ELECTROPHYSIOLOGY PROCEDURE N/A 12/10/2021    Procedure: Pacemaker DC new;  Surgeon: Jony Monte MD;  Location: [facility] INVASIVE LOC[facility];  Service: Cardiology;  Laterality: N/A;    CAROTID ENDARTERECTOMY Left 2021    CHOLECYSTECTOMY WITH  INTRAOPERATIVE CHOLANGIOGRAM N/A 07/25/2017    Procedure: CHOLECYSTECTOMY LAPAROSCOPIC INTRAOPERATIVE CHOLANGIOGRAM;  Surgeon: Luis Coe MD;  Location: [facility];  Service:     HERNIA REPAIR      inguinal    SINUS SURGERY      TUMOR EXCISION     ,   Family History   Problem Relation Age of Onset    Stroke Mother     Heart disease Mother     Diabetes Mother     No Known Problems Father     Diabetes Sister    ,   Social History     Tobacco Use    Smoking status: Some Days     Packs/day: 0.25     Years: 55.00     Additional pack years: 0.00     Total pack years: 13.75     Types: Cigarettes     Passive exposure: Current    Smokeless tobacco: Never    Tobacco comments:     2 cigs per day/ A pack per week.   Vaping Use    Vaping Use: Never used   Substance Use Topics    Alcohol use: No    Drug use: No   ,   Medications Prior to Admission   Medication Sig Dispense Refill Last Dose    furosemide (LASIX) 20 MG tablet Take 1 tablet by mouth Daily As Needed (swelling).   Past Week    amLODIPine (NORVASC) 10 MG tablet Take 1 tablet by mouth Every Night. 90 tablet 1 11/16/2023    clopidogrel (PLAVIX) 75 MG tablet Take 1 tablet by mouth Daily. 30 tablet 11 11/16/2023    lisinopril (PRINIVIL,ZESTRIL) 20 MG tablet Take 1 tablet by mouth Daily.   11/16/2023    rosuvastatin (CRESTOR) 10 MG tablet Take 1 tablet by mouth Daily.   11/16/2023   , Scheduled Meds:  amLODIPine, 10 mg, Oral, Nightly  clopidogrel, 75 mg, Oral, Daily  furosemide, 20 mg, Oral, Daily  heparin (porcine), 5,000 Units, Subcutaneous, Q12H  lisinopril, 20 mg, Oral, Daily  nicotine, 1 patch, Transdermal, Q24H  pantoprazole, 40 mg, Oral, Q AM  rosuvastatin, 10 mg, Oral, Daily  senna-docusate sodium, 2 tablet, Oral, BID  sodium chloride, 10 mL, Intravenous, Q12H  sodium chloride, 10 mL, Intravenous, Q12H  ziprasidone (GEODON) 20 mg in sterile water (preservative free) 1 mL injection, 20 mg, Intramuscular, Once    , Continuous Infusions:    and Allergies:   Penicillins    Objective     Vital Signs   Temp:  [98.5 °F (36.9 °C)-99 °F (37.2 °C)] 98.7 °F (37.1 °C)  Heart Rate:  [] 88  Resp:  [17-29] 28  BP: (113-131)/(58-78) 113/58    Physical Exam:             General-obese in appearance, not in any acute distress    HEENT- pupils equally reactive to light, normal in size, no scleral icterus    Neck-supple    Respiratory-respirations normal-on auscultation no wheezing no crackles, wearing nasal cannula oxygen    Cardiovascular-  Normal S1 and S2. No S3, S4 or murmurs. No JVD, no carotid bruit and no edema, pulses normal bilaterally     GI-nontender nondistended bowel sounds positive    CNS-nonfocal    Musculoskeletal -no edema  Extremities- no obvious deformity noticed     Psychiatric-mood good, good eye contact, alert awake oriented  Skin- no visible rash                                                                   Results Review:    LABS:    Lab Results   Component Value Date    GLUCOSE 167 (H) 11/22/2023    BUN 22 11/22/2023    CREATININE 0.72 (L) 11/22/2023    EGFRIFNONA 93 12/11/2021    BCR 30.6 (H) 11/22/2023    CO2 40.8 (H) 11/22/2023    CALCIUM 9.6 11/22/2023    ALBUMIN 3.6 11/22/2023    AST 12 11/19/2023    ALT 10 11/19/2023    WBC 9.92 11/21/2023    HGB 12.7 (L) 11/21/2023    HCT 43.0 11/21/2023    MCV 99.8 (H) 11/21/2023     11/21/2023     11/22/2023    K 4.5 11/22/2023    CL 96 (L) 11/22/2023    ANIONGAP 3.2 (L) 11/22/2023       Lab Results   Component Value Date    INR 0.82 (L) 12/08/2021    PROTIME 11.7 (L) 12/08/2021                 Latest Reference Range & Units 11/18/23 02:18 11/18/23 06:53 11/18/23 14:24 11/19/23 08:05 11/21/23 04:16 11/22/23 06:03   pH, Arterial 7.350 - 7.450 pH units 7.326 (L) 7.392 7.333 (L) 7.455 (H) 7.388    pCO2, Arterial 35.0 - 45.0 mm Hg 89.2 (C) 77.3 (C) 93.9 (C) 63.9 (H) 73.8 (C)    pO2, Arterial 83.0 - 108.0 mm Hg 63.0 (L) 66.8 (L) 66.2 (L) 67.7 (L) 75.1 (L)    HCO3, Arterial 20.0 - 26.0 mmol/L 46.6 (H)  46.9 (H) 49.9 (H) 44.9 (H) 44.4 (H)    Base Excess 0.0 - 2.0 mmol/L 15.9 (H) 18.1 (H) 19.0 (H) 17.6 (H) 15.6 (H)    O2 Saturation, Arterial 94.0 - 99.0 % 91.9 (L) 94.9 92.4 (L) 94.8 95.6    CO2 Content 22 - 33 mmol/L 49.3 (H) 49.3 (H) 52.7 (H) 46.8 (H) 46.7 (H) 49.0 (H)   A-a DO2 0.0 - 300.0 mmHg 54.7 91.6 74.1 106.5 86.8    Carboxyhemoglobin 0 - 5 % 1.9 1.7 1.9 1.9 2.0    Methemoglobin 0.00 - 3.00 % 0.10 <-0.10 (L) 0.10 <-0.10 (L) <-0.10 (L)    Oxyhemoglobin 94 - 99 % 90.1 (L) 97.3 90.6 (L) 93.2 (L) 93.9 (L)    Carboxyhemoglobin Venous 0.0 - 5.0 %      1.9   Methemoglobin Venous 0.0 - 3.0 %      0.1   Oxyhemoglobin Venous 45.0 - 75.0 %      81.0 (H)   Hematocrit, Blood Gas 38.0 - 51.0 % 42.4 37.7 (L) 40.4 39.9 41.5    Hemoglobin, Blood Gas 14 - 18 g/dL 13.8 (L) 12.3 (L) 13.2 (L) 13.0 (L) 13.5 (L) 13.6 (L)   Site  Left Radial Right Brachial Left Radial Left Radial Left Radial Lab   Russell's Test  Positive N/A Positive Positive Positive    Modality  Nasal Cannula BiPap Nasal Cannula BiPap Nasal Cannula Nasal Cannula   FIO2 % 32 36 36 36 36 36   Flow Rate lpm 3.0  4.0  4.0 4.0   Ventilator Mode  NA NA NA NA NA NA   Set Mech Resp Rate   24.0  24.0     IPAP   20  20     EPAP   8  8     Barometric Pressure for Blood Gas mmHg 724 724 725 727 723 726   Notified By  [id number]  [id number]   Notified Time  11/18/2023 02:21 11/18/2023 06:57 11/18/2023 14:29  11/21/2023 04:19 11/22/2023 06:10   Notified Who  ER  AND RN DR JASWANT mercer   pH, Venous 7.320 - 7.420 pH Units      7.279 (L)   pCO2, Venous 41.0 - 51.0 mm Hg      98.2 (H)   pO2, Venous 27.0 - 53.0 mm Hg      50.7   HCO3, Venous 22.0 - 28.0 mmol/L      46.0 (H)   Base Excess 0.0 - 2.0 mmol/L      14.5 (H)   O2 Saturation, Venous 45.0 - 75.0 %      82.7 (H)   (C): Data is critically high  (L): Data is abnormally low  (H): Data is abnormally high  I reviewed the patient's new clinical results.  I reviewed the patient's new  imaging results and agree with the interpretation.      Assessment & Plan     Acute hypercapnic on chronic hypoxic respiratory failure-  Likely due to volume overload.  Latest chest x-ray reviewed.  Continue diuretics to improve lung compliance and diffusion capacity.  Received Lasix in the ER.  Ins and outs reviewed.  continue nebs   continue oxygen to maintain saturation 88 to 92%.  Educated patient about benefits of BiPAP and not CPAP overnight.    On my assessment patient agreed for the BiPAP but later on refused to the respiratory therapist.  Diuretics to improve lung compliance and diffusion capacity.  PATTY-recommended using BiPAP or CPAP.  Patient refused.  was educated about ill health effects of sleep apnea and what all effects it can have on health in long-term.  Which includes blood clots, arrhythmias, stroke, depression, hypothyroidism. was also educated about the pathophysiology of sleep apnea and how weight contributes in it.  Patient understood the conversation well and will try and do exercise and eat right kind of food to lose weight.         DVT prophylaxis-continue    Bedside rounds were done with RT and patient's nurse. All the lab and clinical findings were discussed with them and plan was also discussed in great detail.    Family member present-wife Case discussed with her and answered her questions.  Significant other        Echo-  Results for orders placed during the hospital encounter of 11/17/23    Adult Transthoracic Echo Complete W/ Cont if Necessary Per Protocol    Interpretation Summary    The quality of the study is limited with poor acoustic windows.  The contrast study was done using Lumason to enhance endocardial border to assess LV function.    Left ventricular wall thickness is consistent with mild concentric hypertrophy.    Left ventricular systolic function is normal.  Estimated LVEF =  66 - 70%.    Left ventricular diastolic function is consistent with (grade I) impaired  relaxation    No significant valvular heart disease is noted.    Estimated right ventricular systolic pressure from tricuspid regurgitation is markedly elevated (>55 mmHg)    Severe pulmonary hypertension is present.    There is no evidence of pericardial effusion. .              Respiratory failure          Scotty Hoyt MD  11/22/23  09:04 EST

## 2023-11-22 NOTE — PLAN OF CARE
Goal Outcome Evaluation:  Plan of Care Reviewed With: patient           Outcome Evaluation: patient has been in bed all day has been on bi-pap due abg's he has rested well today has been asleep family has been at bedside all day no acute changes will continue with plan of care

## 2023-11-22 NOTE — CASE MANAGEMENT/SOCIAL WORK
Discharge Planning Assessment   Demetrius     Patient Name: Donaldo Roberts  MRN: [id number]  Today's Date: 11/22/2023    Admit Date: 11/17/2023          Discharge Plan       Row Name 11/22/23 1039       Plan    Plan Pt transferred from [facility] to [address] [place] [address]. Pt has swing bed consult. SS spoke with Swing bed RN per Aaron who states pre-authroization has been submitted to insurance and remains pending. SS to follow.                  GELACIO Wong

## 2023-11-22 NOTE — THERAPY TREATMENT NOTE
Acute Care - Physical Therapy Treatment Note  [facility]     Patient Name: Donaldo Roberts  : 1950  MRN: [id number]  Today's Date: 2023   Onset of Illness/Injury or Date of Surgery: 23  Visit Dx:     ICD-10-CM ICD-9-CM   1. Acute on chronic respiratory failure with hypoxia and hypercapnia  J96.21 518.84    J96.22 786.09     799.02   2. Pneumonia due to infectious organism, unspecified laterality, unspecified part of lung  J18.9 486   3. Chronic diastolic heart failure  I50.32 428.32   4. Supplemental oxygen dependent  Z99.81 V46.2   5. Chronic obstructive pulmonary disease, unspecified COPD type  J44.9 496     Patient Active Problem List   Diagnosis    Benign prostatic hyperplasia with urinary obstruction    Herpesviral infection of penis    Shortness of breath    Carotid stenosis, asymptomatic, right    Essential hypertension    Dyslipidemia    Wenckebach block    PATTY (obstructive sleep apnea)    Pre-operative cardiovascular examination    Presence of cardiac pacemaker    Severe sinus bradycardia    Chronic bronchitis    Cigarette nicotine dependence without complication    Hypoxia    Detrusor instability    Tobacco abuse    Respiratory failure     Past Medical History:   Diagnosis Date    Acid reflux     Anxiety     Arthritis     Carotid artery stenosis, symptomatic, right 2021    Essential hypertension 2021    Herpes     History of lipoma     History of transfusion     Hyperlipidemia     Inguinal hernia     Peptic ulcer     Stroke      Past Surgical History:   Procedure Laterality Date    ABDOMINAL SURGERY      CARDIAC ELECTROPHYSIOLOGY PROCEDURE N/A 12/10/2021    Procedure: Pacemaker DC new;  Surgeon: Jony Monte MD;  Location: [facility] INVASIVE LOCATION;  Service: Cardiology;  Laterality: N/A;    CAROTID ENDARTERECTOMY Left     CHOLECYSTECTOMY WITH INTRAOPERATIVE CHOLANGIOGRAM N/A 2017    Procedure: CHOLECYSTECTOMY LAPAROSCOPIC INTRAOPERATIVE CHOLANGIOGRAM;   Surgeon: Luis Coe MD;  Location: [facility];  Service:     HERNIA REPAIR      inguinal    SINUS SURGERY      TUMOR EXCISION       PT Assessment (last 12 hours)       PT Evaluation and Treatment       Row Name 11/22/23 1400          Physical Therapy Time and Intention    Subjective Information complains of;pain  right knee  -     Document Type therapy note (daily note)  -     Mode of Treatment physical therapy  -     Patient Effort good  -       Row Name 11/22/23 1400          General Information    Patient Profile Reviewed yes  -     Patient Observations alert;cooperative;agree to therapy  -     Existing Precautions/Restrictions fall;oxygen therapy device and L/min  -       Row Name 11/22/23 1400          Cognition    Affect/Mental Status (Cognition) WFL  -     Orientation Status (Cognition) oriented x 3  -     Follows Commands (Cognition) WFL  -       Row Name 11/22/23 1400          Bed Mobility    Supine-Sit [place] (Bed Mobility) moderate assist (50% patient effort);1 person assist  -     Bed Mobility, Safety Issues decreased use of arms for pushing/pulling;decreased use of legs for bridging/pushing  -     Assistive Device (Bed Mobility) bed rails;head of bed elevated  -       Row Name 11/22/23 1400          Sit-Stand Transfer    Sit-Stand [place] (Transfers) moderate assist (50% patient effort);2 person assist;minimum assist (75% patient effort)  modAx2 first attempt, minAx2 second attempt  -     Assistive Device (Sit-Stand Transfers) walker, front-wheeled  -       Row Name 11/22/23 1400          Stand-Sit Transfer    Stand-Sit [place] (Transfers) minimum assist (75% patient effort);2 person assist  -     Assistive Device (Stand-Sit Transfers) walker, front-wheeled  -       Row Name 11/22/23 1400          Gait/Stairs (Locomotion)    Comment, (Gait/Stairs) Pt took side steps requiring Johanny for balance and maxA for navigating walker.  -       Row Name  11/22/23 1400          Balance    Static Sitting Balance standby assist;1-person assist  -     Dynamic Sitting Balance standby assist;1-person assist  -     Position, Sitting Balance sitting edge of bed  -     Static Standing Balance contact guard;1-person assist  -     Dynamic Standing Balance minimal assist;moderate assist;1-person assist  -       Row Name 11/22/23 1400          Motor Skills    Therapeutic Exercise --  Pt started seated exercises but RT came in to check his O2 and it was low.  Pt required breathing treatment and PT session was ended.  -       Row Name 11/22/23 1400          Plan of Care Review    Plan of Care Reviewed With patient  -     Outcome Evaluation PT treatment complete.  Patient demonstrated improvement today with bed mobility, transfers and standing.  He required min to moderate assist x2.  He continues to be at a high risk for falls and would benefit from continued skilled PT treatment to address strength deficits, endurance and balance.  -       Row Name 11/22/23 1400          Positioning and Restraints    Pre-Treatment Position in bed  -     Post Treatment Position bed  -     In Bed notified nsg;sitting EOB;call light within reach;encouraged to call for assist;with other staff;side rails up x2  Pt with respiratory therapist administering breathing treatment due to low O2 sats.  -               User Key  (r) = Recorded By, (t) = Taken By, (c) = Cosigned By      Initials Name Provider Type    Yoana Carlson, PT Physical Therapist                      PT Recommendation and Plan  Planned Therapy Interventions (PT): balance training, bed mobility training, gait training, home exercise program, motor coordination training, neuromuscular re-education, patient/family education, postural re-education, ROM (range of motion), stair training, strengthening, stretching, transfer training  Therapy Frequency (PT): 2 times/wk  Plan of Care Reviewed With: patient  Outcome  Evaluation: PT treatment complete.  Patient demonstrated improvement today with bed mobility, transfers and standing.  He required min to moderate assist x2.  He continues to be at a high risk for falls and would benefit from continued skilled PT treatment to address strength deficits, endurance and balance.       Time Calculation:    PT Charges       Row Name 11/22/23 1503             Time Calculation    PT Received On 11/22/23  -      PT Goal Re-Cert Due Date 12/04/23  -         Timed Charges    71332 - PT Therapeutic Activity Minutes 15  -KP         Total Minutes    Timed Charges Total Minutes 15  -KP       Total Minutes 15  -KP                User Key  (r) = Recorded By, (t) = Taken By, (c) = Cosigned By      Initials Name Provider Type    KP Yoana Guevara, PT Physical Therapist                  Therapy Charges for Today       Code Description Service Date Service Provider Modifiers Qty    79072889905  PT THERAPEUTIC ACT EA 15 MIN 11/21/2023 Yoana Guevara, PT GP 2    [id number]  PT THERAPEUTIC ACT EA 15 MIN 11/22/2023 Yoana Guevara, PT GP 1            PT G-Codes  AM-PAC 6 Clicks Score (PT): 8    Yoana Guevara PT  11/22/2023

## 2023-11-23 ENCOUNTER — APPOINTMENT (OUTPATIENT)
Dept: GENERAL RADIOLOGY | Facility: HOSPITAL | Age: 73
End: 2023-11-23
Payer: MEDICARE

## 2023-11-23 LAB
BACTERIA SPEC AEROBE CULT: NORMAL
BACTERIA SPEC AEROBE CULT: NORMAL

## 2023-11-23 PROCEDURE — 25010000002 HEPARIN (PORCINE) PER 1000 UNITS: Performed by: INTERNAL MEDICINE

## 2023-11-23 PROCEDURE — 94761 N-INVAS EAR/PLS OXIMETRY MLT: CPT

## 2023-11-23 PROCEDURE — 99232 SBSQ HOSP IP/OBS MODERATE 35: CPT | Performed by: STUDENT IN AN ORGANIZED HEALTH CARE EDUCATION/TRAINING PROGRAM

## 2023-11-23 PROCEDURE — 94799 UNLISTED PULMONARY SVC/PX: CPT

## 2023-11-23 PROCEDURE — 99232 SBSQ HOSP IP/OBS MODERATE 35: CPT | Performed by: INTERNAL MEDICINE

## 2023-11-23 PROCEDURE — 71045 X-RAY EXAM CHEST 1 VIEW: CPT

## 2023-11-23 PROCEDURE — 94660 CPAP INITIATION&MGMT: CPT

## 2023-11-23 RX ORDER — FUROSEMIDE 40 MG/1
40 TABLET ORAL DAILY
Status: DISCONTINUED | OUTPATIENT
Start: 2023-11-23 | End: 2023-11-24 | Stop reason: HOSPADM

## 2023-11-23 RX ADMIN — Medication 10 ML: at 20:32

## 2023-11-23 RX ADMIN — ACETAMINOPHEN 650 MG: 325 TABLET ORAL at 08:13

## 2023-11-23 RX ADMIN — PANTOPRAZOLE SODIUM 40 MG: 40 TABLET, DELAYED RELEASE ORAL at 06:01

## 2023-11-23 RX ADMIN — LISINOPRIL 20 MG: 10 TABLET ORAL at 08:14

## 2023-11-23 RX ADMIN — Medication 10 ML: at 08:16

## 2023-11-23 RX ADMIN — DOCUSATE SODIUM 50 MG AND SENNOSIDES 8.6 MG 2 TABLET: 8.6; 5 TABLET, FILM COATED ORAL at 20:31

## 2023-11-23 RX ADMIN — FUROSEMIDE 40 MG: 40 TABLET ORAL at 09:23

## 2023-11-23 RX ADMIN — HEPARIN SODIUM 5000 UNITS: 5000 INJECTION INTRAVENOUS; SUBCUTANEOUS at 20:32

## 2023-11-23 RX ADMIN — AMLODIPINE BESYLATE 10 MG: 10 TABLET ORAL at 20:31

## 2023-11-23 RX ADMIN — ROSUVASTATIN CALCIUM 10 MG: 10 TABLET, FILM COATED ORAL at 08:14

## 2023-11-23 RX ADMIN — CLOPIDOGREL BISULFATE 75 MG: 75 TABLET, FILM COATED ORAL at 08:14

## 2023-11-23 RX ADMIN — ACETAMINOPHEN 650 MG: 325 TABLET ORAL at 20:31

## 2023-11-23 RX ADMIN — Medication 10 ML: at 08:15

## 2023-11-23 NOTE — PLAN OF CARE
Goal Outcome Evaluation:           Progress: no change  Outcome Evaluation: Pt has been resting in bed through the night. Pt ambulated to the bedside commode with X2 assist. No complaints of pain from the pt. No acute changes or concerns at this time.

## 2023-11-23 NOTE — PROGRESS NOTES
[facility] HOSPITALIST PROGRESS NOTE     Patient Identification:  Name:  Donaldo Roberts  Age:  73 y.o.  Sex:  male  :  1950  MRN:  [id number]  Visit Number:  [id number]  ROOM: [address] [place] [address]     Primary Care Provider:  Duke Rosen MD     Date of Admission: 2023    Length of stay in inpatient status:  5    Subjective     Chief Compliant:    Chief Complaint   Patient presents with    Shortness of Breath    Leg Swelling       Patient with more confusions and lethargy this am and required increase in O2 that improved as became more alert. Refused repeat vbg but cxr performed after discussion this am with evidence of mildly worsened pulm edema. After long discussion with family and patient at bedside, he has agreed to trial of bipap and to work with pt over weekend.         Objective     Current Hospital Meds:  amLODIPine, 10 mg, Oral, Nightly  clopidogrel, 75 mg, Oral, Daily  furosemide, 40 mg, Oral, Daily  heparin (porcine), 5,000 Units, Subcutaneous, Q12H  lisinopril, 20 mg, Oral, Daily  nicotine, 1 patch, Transdermal, Q24H  pantoprazole, 40 mg, Oral, Q AM  rosuvastatin, 10 mg, Oral, Daily  senna-docusate sodium, 2 tablet, Oral, BID  sodium chloride, 10 mL, Intravenous, Q12H  sodium chloride, 10 mL, Intravenous, Q12H  ziprasidone (GEODON) 20 mg in sterile water (preservative free) 1 mL injection, 20 mg, Intramuscular, Once       Current Antimicrobial Therapy:  Anti-Infectives (From admission, onward)      Ordered     Dose/Rate Route Frequency Start Stop    234  cefTRIAXone (ROCEPHIN) 2,000 mg in sodium chloride 0.9 % 100 mL IVPB-VTB        Ordering Provider: Aracelis Boo MD    2,000 mg  200 mL/hr over 30 Minutes Intravenous Once 23 0414    23 0204  azithromycin (ZITHROMAX) 500 mg in sodium chloride 0.9 % 250 mL IVPB-VTB        Ordering Provider: Aracelis Boo MD    500 mg  over 60 Minutes Intravenous Once 23 0443          Current  Diuretic Therapy:  Diuretics (From admission, onward)      Ordered     Dose/Rate Route Frequency Start Stop    11/23/23 0754  furosemide (LASIX) tablet 40 mg        Ordering Provider: Surendra Roman MD    40 mg Oral Daily 11/23/23 0900      11/21/23 1417  furosemide (LASIX) injection 40 mg        Ordering Provider: Surendra Roman MD    40 mg Intravenous Once 11/21/23 1515 11/21/23 1559    11/20/23 0732  furosemide (LASIX) injection 60 mg        Ordering Provider: Donaldo Stokes DO    60 mg Intravenous 2 Times Daily (Diuretics) 11/20/23 0900 11/20/23 1812    11/19/23 0728  furosemide (LASIX) injection 60 mg        Ordering Provider: Donaldo Stokes DO    60 mg Intravenous 2 Times Daily (Diuretics) 11/19/23 0900 11/19/23 1724    11/18/23 0535  furosemide (LASIX) injection 80 mg        Ordering Provider: Sarika White DO    80 mg Intravenous Once 11/18/23 0800 11/18/23 0818    11/17/23 2331  furosemide (LASIX) injection 80 mg        Ordering Provider: Aracelis Boo MD    80 mg Intravenous Once 11/17/23 2347 11/18/23 0001          ----------------------------------------------------------------------------------------------------------------------  Vital Signs:  Temp:  [98.1 °F (36.7 °C)-98.9 °F (37.2 °C)] 98.1 °F (36.7 °C)  Heart Rate:  [85-98] 98  Resp:  [18-22] 18  BP: (103-141)/(54-73) 127/70  SpO2:  [90 %-93 %] 92 %  on  Flow (L/min):  [6] 6;   Device (Oxygen Therapy): NPPV/NIV  Body mass index is 42.5 kg/m².    Wt Readings from Last 3 Encounters:   11/21/23 131 kg (287 lb 12.8 oz)   11/10/23 109 kg (240 lb)   08/22/23 107 kg (235 lb)     Intake & Output (last 3 days)         11/20 0701  11/21 0700 11/21 0701  11/22 0700 11/22 0701 11/23 0700 11/23 0701 11/24 0700    P.O. 720 360 900 480    I.V. (mL/kg)        Total Intake(mL/kg) 720 (6.2) 360 (2.7) 900 (6.9) 480 (3.7)    Urine (mL/kg/hr) 1900 (0.7) 1100 (0.3) 1550 (0.5) 600 (0.4)    Stool 0 0 0     Total Output 1900 1100 1550 600    Net -1180  -600 -650 -120            Urine Unmeasured Occurrence  1 x 2 x     Stool Unmeasured Occurrence 0 x 1 x 1 x           Diet: Cardiac Diets; Healthy Heart (2-3 Na+); Texture: Regular Texture (IDDSI 7); Fluid Consistency: Thin (IDDSI 0)  ----------------------------------------------------------------------------------------------------------------------  Physical Exam  Vitals and nursing note reviewed.   Constitutional:       General: He is not in acute distress.  HENT:      Mouth/Throat:      Mouth: Mucous membranes are moist.      Pharynx: Oropharynx is clear.   Eyes:      General: No scleral icterus.     Extraocular Movements: Extraocular movements intact.   Cardiovascular:      Rate and Rhythm: Normal rate.      Pulses: Normal pulses.   Pulmonary:      Effort: Pulmonary effort is normal. No respiratory distress.      Breath sounds: No wheezing or rales.   Abdominal:      General: There is no distension.      Palpations: Abdomen is soft.   Musculoskeletal:      Right lower leg: Edema present.      Left lower leg: Edema present.   Skin:     General: Skin is warm and dry.   Neurological:      Mental Status: He is alert and oriented to person, place, and time. Mental status is at baseline.   Psychiatric:         Mood and Affect: Mood normal.         Behavior: Behavior normal.       ----------------------------------------------------------------------------------------------------------------------    ----------------------------------------------------------------------------------------------------------------------  LABS:    CBC and coagulation:  Results from last 7 days   Lab Units 11/21/23  0024 11/19/23  0050 11/18/23  0555 11/18/23  0326 11/17/23  8957   LACTATE mmol/L  --   --   --  0.9  --    WBC 10*3/mm3 9.92 8.39 8.27  --  7.32   HEMOGLOBIN g/dL 12.7* 12.3* 12.9*  --  12.2*   HEMATOCRIT % 43.0 41.8 45.1  --  42.4   MCV fL 99.8* 100.2* 103.7*  --  102.7*   MCHC g/dL 29.5* 29.4* 28.6*  --  28.8*   PLATELETS  "10*3/mm3 210 193 167  --  194     Acid/base balance:  Results from last 7 days   Lab Units 11/21/23  0416 11/19/23  0805 11/18/23  1424 11/18/23  0653 11/18/23  0218   PH, ARTERIAL pH units 7.388 7.455* 7.333* 7.392 7.326*   PO2 ART mm Hg 75.1* 67.7* 66.2* 66.8* 63.0*   PCO2, ARTERIAL mm Hg 73.8* 63.9* 93.9* 77.3* 89.2*   HCO3 ART mmol/L 44.4* 44.9* 49.9* 46.9* 46.6*     Renal and electrolytes:  Results from last 7 days   Lab Units 11/22/23  0239 11/21/23  0024 11/20/23  0223 11/19/23  0050 11/18/23  0555 11/17/23  2357   SODIUM mmol/L 140 139 137 137 142 142   POTASSIUM mmol/L 4.5 3.9 3.9 3.8 4.6 4.9   CHLORIDE mmol/L 96* 91* 91* 92* 94* 97*   CO2 mmol/L 40.8* 40.4* 38.6* 39.2* 39.7* 41.4*   BUN mg/dL 22 25* 18 16 14 16   CREATININE mg/dL 0.72* 0.80 0.65* 0.61* 0.59* 0.67*   CALCIUM mg/dL 9.6 9.6 9.4 9.5 9.7 9.6   PHOSPHORUS mg/dL 3.5  --   --   --   --   --    GLUCOSE mg/dL 167* 163* 113* 143* 104* 107*     Estimated Creatinine Clearance: 126.2 mL/min (A) (by C-G formula based on SCr of 0.72 mg/dL (L)).    Liver and pancreatic function:  Results from last 7 days   Lab Units 11/22/23  0239 11/19/23  0050 11/18/23  0555   ALBUMIN g/dL 3.6 3.8 4.0   BILIRUBIN mg/dL  --  0.5 0.4   ALK PHOS U/L  --  75 80   AST (SGOT) U/L  --  12 18   ALT (SGPT) U/L  --  10 13     Endocrine function:  No results found for: \"HGBA1C\"  Point of care bedside glucose levels:      Glucose levels from the CMP:  Results from last 7 days   Lab Units 11/22/23  0239 11/21/23  0024 11/20/23  0223 11/19/23  0050 11/18/23  0555 11/17/23  2357   GLUCOSE mg/dL 167* 163* 113* 143* 104* 107*     Lab Results   Component Value Date    TSH 0.681 12/08/2021    FREET4 1.11 12/08/2021     Cardiac:  Results from last 7 days   Lab Units 11/18/23  0326 11/17/23  2357   HSTROP T ng/L 30* 26*   PROBNP pg/mL  --  168.3       Cultures:  Lab Results   Component Value Date    COLORU Yellow 01/16/2023    CLARITYU Clear 01/16/2023    SPECGRAV 1.010 01/16/2023    PHUR " "7.5 01/16/2023    GLUCOSEU Negative 12/10/2021    KETONESU Negative 01/16/2023    BLOODU Negative 12/10/2021    NITRITEU Negative 12/10/2021    LEUKOCYTESUR Negative 01/16/2023    BILIRUBINUR Negative 01/16/2023    UROBILINOGEN Normal 01/16/2023     Microbiology Results (last 10 days)       Procedure Component Value - Date/Time    Blood Culture - Blood, Arm, Right [[id number]]  (Normal) Collected: 11/18/23 0326    Lab Status: Final result Specimen: Blood from Arm, Right Updated: 11/23/23 0331     Blood Culture No growth at 5 days    Blood Culture - Blood, Arm, Left [[id number]]  (Normal) Collected: 11/18/23 0326    Lab Status: Final result Specimen: Blood from Arm, Left Updated: 11/23/23 0331     Blood Culture No growth at 5 days            No results found for: \"PREGTESTUR\", \"PREGSERUM\", \"HCG\", \"HCGQUANT\"  Pain Management Panel  More data may exist         Latest Ref Rng & Units 11/18/2023 9/5/2019   Pain Management Panel   Amphetamine, Urine Qual Negative Negative  Negative    Barbiturates Screen, Urine Negative Negative  Negative    Benzodiazepine Screen, Urine Negative Negative  Negative    Buprenorphine, Screen, Urine Negative Negative  Negative    Cocaine Screen, Urine Negative Negative  Negative    Fentanyl, Urine Negative Negative  -   Methadone Screen , Urine Negative Negative  Negative    Methamphetamine, Ur Negative Negative  -       I have personally looked at the labs and they are summarized above.  ----------------------------------------------------------------------------------------------------------------------  Detailed radiology reports for the last 24 hours:    Imaging Results (Last 24 Hours)       Procedure Component Value Units Date/Time    XR Chest 1 View [[id number]] Collected: 11/23/23 1420     Updated: 11/23/23 1423    Narrative:      FINAL REPORT  [facility]        Patient Name: BILLIE MELISSA  Age: 73  Patient MR NO: [id number]  YOB: 1950  Referring Doctor: " Scotty Hoyt MD Accession No:  Patient Location: Inpatient           CLINICAL INDICATION: hypoxia  CONTRAST:  PROCEDURE DATE: 11/23/2023     XRay Chest AP Portable STAT  Procedure:Portable chest. 11/23/23 8:43 AM      Findings:     Lung base atetlectasis vs consolidation. Hypoventilatory changes.   Left subclavian dual lead catheter. Moderate cardiomegaly.   Pulmonary edema is possible. Possible small right pleural effusion. No  pneumothorax.       Impression:      Impression:     Lung base atetlectasis vs consolidation. Pulmonary edema is also in the  differential.   To TALK to On Call [phone]     Patient Name:BILLIE MELISSA   MR NO: [id number]  Accession No:     Melania Velez MD  Electronically Signed        Date Of Exam Request:11/23/2023 9:40:40 AM EST  Date & Time Of Report: 11/23/2023 11:11:43 AM EST     Patient Name:BILLIE MELISSA   MR NO: [id number]  Accession No:        This report was finalized on 11/23/2023 2:20 PM by Rambo Felix MD.               Assessment & Plan      #Acute hypercapnic on chronic hypoxemic respiratory failure  #Acute heart failure with preserved ejection fraction  #Myocardial injury secondary to hypoxia from above  #History of obstructive sleep apnea, noncompliant with CPAP  #Obesity, suspected underlying obesity hypoventilation  #Severe PAH  #Chronic hypoxemic respiratory failure on 3 L nasal cannula  #Chronic smoking tobacco use  #COPD not in exacerbation  -Echo reviewed and patient with severe PAH and evidence of right heart failure with preserved EF, diastolic dysfunction.  -Baseline 3-4L NC  -Patient will need close outpatient HF clinic f/u as well as pulmonology  -Lasix dose increased to 40mg PO and cont to monitor I&Os  -Bipap placed and settings titrated to appropriate TV and spO2  -Repeat vbg in am    #Chronic illness associated debility  -Swing bed consult placed, two person assist per PT eval. Wife sole assist provider at home  -Discussed at length  with patient, threatened to leave ama this am but now cooperative after discussion with family and will cont pt/ot while inpatient. Patient wants HH pt/ot at time of dc. Unwilling to consider inpatient rehab or snf for rehab      - - Chronic - -    #S/p PPM  #Right carotid artery stenosis  #Essential hypertension  #History of ischemic stroke  #Anxiety  #GERD    VTE Prophylaxis:   Mechanical Order History:       None          Pharmalogical Order History:        Ordered     Dose Route Frequency Stop    11/18/23 0535  heparin (porcine) 5000 UNIT/ML injection 5,000 Units         5,000 Units SC Every 12 Hours Scheduled --                    Code Status and Medical Interventions:   Ordered at: 11/18/23 0340     Code Status (Patient has no pulse and is not breathing):    CPR (Attempt to Resuscitate)     Medical Interventions (Patient has pulse or is breathing):    Full Support         Disposition: Swing bed approval pend and will cont therapy inpatient along with bipap trial, do not expect patient to agree to remain in hospital for very long period for therapy so will reassess daily    I have reviewed any copied/forwarded text or data, verified its accuracy, and updated as necessary above.    Surendra Roman MD  [facility] Hospitalist  11/23/23  17:26 EST

## 2023-11-23 NOTE — PLAN OF CARE
Goal Outcome Evaluation:  Plan of Care Reviewed With: patient        Progress: no change  Outcome Evaluation: Patient's VSS. Pt c/o of pain 1x during shift & PRN pain medication given. Pt sat up on the side of the bed during shift. Pt refused to ambulate during shift. Pt on 6L NC bubble high flow. Pt wife at bedside. Pt voiced no further concerns at this time.

## 2023-11-23 NOTE — SIGNIFICANT NOTE
Patient agreeable to bipap tonight and continuing pt after discussion with he and family at bedside

## 2023-11-24 ENCOUNTER — HOSPITAL ENCOUNTER (INPATIENT)
Facility: HOSPITAL | Age: 73
LOS: 5 days | Discharge: HOME HEALTH | End: 2023-11-29
Attending: STUDENT IN AN ORGANIZED HEALTH CARE EDUCATION/TRAINING PROGRAM | Admitting: STUDENT IN AN ORGANIZED HEALTH CARE EDUCATION/TRAINING PROGRAM
Payer: MEDICARE

## 2023-11-24 VITALS
RESPIRATION RATE: 18 BRPM | BODY MASS INDEX: 42.63 KG/M2 | OXYGEN SATURATION: 91 % | WEIGHT: 287.8 LBS | HEIGHT: 69 IN | TEMPERATURE: 98 F | DIASTOLIC BLOOD PRESSURE: 66 MMHG | HEART RATE: 83 BPM | SYSTOLIC BLOOD PRESSURE: 127 MMHG

## 2023-11-24 DIAGNOSIS — R53.81 PHYSICAL DEBILITY: Primary | ICD-10-CM

## 2023-11-24 DIAGNOSIS — J96.22 ACUTE ON CHRONIC RESPIRATORY FAILURE WITH HYPERCAPNIA: ICD-10-CM

## 2023-11-24 DIAGNOSIS — I50.32 CHRONIC HEART FAILURE WITH PRESERVED EJECTION FRACTION: ICD-10-CM

## 2023-11-24 DIAGNOSIS — M25.369 INSTABILITY OF KNEE JOINT, UNSPECIFIED LATERALITY: ICD-10-CM

## 2023-11-24 LAB
A-A DO2: 99.5 MMHG (ref 0–300)
ARTERIAL PATENCY WRIST A: POSITIVE
ATMOSPHERIC PRESS: 729 MMHG
ATMOSPHERIC PRESS: 730 MMHG
BASE EXCESS BLDA CALC-SCNC: 13.3 MMOL/L (ref 0–2)
BASE EXCESS BLDV CALC-SCNC: 12 MMOL/L (ref 0–2)
BDY SITE: ABNORMAL
BDY SITE: ABNORMAL
CO2 BLDA-SCNC: 45.1 MMOL/L (ref 22–33)
CO2 BLDA-SCNC: 45.3 MMOL/L (ref 22–33)
COHGB MFR BLD: 1.7 % (ref 0–5)
COHGB MFR BLD: 1.8 % (ref 0–5)
GAS FLOW AIRWAY: 4 LPM
GAS FLOW AIRWAY: 6 LPM
HCO3 BLDA-SCNC: 42.8 MMOL/L (ref 20–26)
HCO3 BLDV-SCNC: 42.4 MMOL/L (ref 22–28)
HCT VFR BLD CALC: 38.9 % (ref 38–51)
HGB BLDA-MCNC: 12.7 G/DL (ref 14–18)
HGB BLDA-MCNC: 12.8 G/DL (ref 14–18)
INHALED O2 CONCENTRATION: 36 %
INHALED O2 CONCENTRATION: 44 %
Lab: ABNORMAL
METHGB BLD QL: 0 % (ref 0–3)
METHGB BLD QL: 0.1 % (ref 0–3)
MODALITY: ABNORMAL
MODALITY: ABNORMAL
NOTE: ABNORMAL
NOTIFIED BY: ABNORMAL
NOTIFIED BY: ABNORMAL
NOTIFIED WHO: ABNORMAL
NOTIFIED WHO: ABNORMAL
OXYHGB MFR BLDV: 88.5 % (ref 94–99)
OXYHGB MFR BLDV: 90.1 % (ref 45–75)
PCO2 BLDA: 81.5 MM HG (ref 35–45)
PCO2 BLDV: 89.1 MM HG (ref 41–51)
PCO2 TEMP ADJ BLD: ABNORMAL MM[HG]
PH BLDA: 7.33 PH UNITS (ref 7.35–7.45)
PH BLDV: 7.29 PH UNITS (ref 7.32–7.42)
PH, TEMP CORRECTED: ABNORMAL
PO2 BLDA: 56.3 MM HG (ref 83–108)
PO2 BLDV: 63.2 MM HG (ref 27–53)
PO2 TEMP ADJ BLD: ABNORMAL MM[HG]
SAO2 % BLDCOA: 89.9 % (ref 94–99)
SAO2 % BLDCOV: 91.8 % (ref 45–75)
VENTILATOR MODE: ABNORMAL
VENTILATOR MODE: ABNORMAL

## 2023-11-24 PROCEDURE — 82820 HEMOGLOBIN-OXYGEN AFFINITY: CPT

## 2023-11-24 PROCEDURE — 94799 UNLISTED PULMONARY SVC/PX: CPT

## 2023-11-24 PROCEDURE — 36600 WITHDRAWAL OF ARTERIAL BLOOD: CPT

## 2023-11-24 PROCEDURE — 97162 PT EVAL MOD COMPLEX 30 MIN: CPT

## 2023-11-24 PROCEDURE — 94761 N-INVAS EAR/PLS OXIMETRY MLT: CPT

## 2023-11-24 PROCEDURE — 82805 BLOOD GASES W/O2 SATURATION: CPT

## 2023-11-24 PROCEDURE — 99239 HOSP IP/OBS DSCHRG MGMT >30: CPT | Performed by: STUDENT IN AN ORGANIZED HEALTH CARE EDUCATION/TRAINING PROGRAM

## 2023-11-24 PROCEDURE — 99306 1ST NF CARE HIGH MDM 50: CPT | Performed by: STUDENT IN AN ORGANIZED HEALTH CARE EDUCATION/TRAINING PROGRAM

## 2023-11-24 PROCEDURE — 25010000002 HEPARIN (PORCINE) PER 1000 UNITS: Performed by: INTERNAL MEDICINE

## 2023-11-24 PROCEDURE — 83050 HGB METHEMOGLOBIN QUAN: CPT

## 2023-11-24 PROCEDURE — 25010000002 HEPARIN (PORCINE) PER 1000 UNITS: Performed by: STUDENT IN AN ORGANIZED HEALTH CARE EDUCATION/TRAINING PROGRAM

## 2023-11-24 PROCEDURE — 94660 CPAP INITIATION&MGMT: CPT

## 2023-11-24 PROCEDURE — 82375 ASSAY CARBOXYHB QUANT: CPT

## 2023-11-24 RX ORDER — HEPARIN SODIUM 5000 [USP'U]/ML
5000 INJECTION, SOLUTION INTRAVENOUS; SUBCUTANEOUS EVERY 12 HOURS SCHEDULED
Status: CANCELLED | OUTPATIENT
Start: 2023-11-24

## 2023-11-24 RX ORDER — SODIUM CHLORIDE 0.9 % (FLUSH) 0.9 %
10 SYRINGE (ML) INJECTION AS NEEDED
Status: DISCONTINUED | OUTPATIENT
Start: 2023-11-24 | End: 2023-11-29 | Stop reason: HOSPADM

## 2023-11-24 RX ORDER — ROSUVASTATIN CALCIUM 10 MG/1
10 TABLET, COATED ORAL DAILY
Status: DISCONTINUED | OUTPATIENT
Start: 2023-11-25 | End: 2023-11-29 | Stop reason: HOSPADM

## 2023-11-24 RX ORDER — SODIUM CHLORIDE 0.9 % (FLUSH) 0.9 %
10 SYRINGE (ML) INJECTION AS NEEDED
Status: CANCELLED | OUTPATIENT
Start: 2023-11-24

## 2023-11-24 RX ORDER — BISACODYL 5 MG/1
5 TABLET, DELAYED RELEASE ORAL DAILY PRN
Status: CANCELLED | OUTPATIENT
Start: 2023-11-24

## 2023-11-24 RX ORDER — SODIUM CHLORIDE 9 MG/ML
40 INJECTION, SOLUTION INTRAVENOUS AS NEEDED
Status: DISCONTINUED | OUTPATIENT
Start: 2023-11-24 | End: 2023-11-29 | Stop reason: HOSPADM

## 2023-11-24 RX ORDER — SODIUM CHLORIDE 0.9 % (FLUSH) 0.9 %
10 SYRINGE (ML) INJECTION EVERY 12 HOURS SCHEDULED
Status: CANCELLED | OUTPATIENT
Start: 2023-11-24

## 2023-11-24 RX ORDER — SODIUM CHLORIDE 9 MG/ML
40 INJECTION, SOLUTION INTRAVENOUS AS NEEDED
Status: CANCELLED | OUTPATIENT
Start: 2023-11-24

## 2023-11-24 RX ORDER — ACETAMINOPHEN 325 MG/1
650 TABLET ORAL EVERY 6 HOURS PRN
Status: DISCONTINUED | OUTPATIENT
Start: 2023-11-24 | End: 2023-11-29 | Stop reason: HOSPADM

## 2023-11-24 RX ORDER — PANTOPRAZOLE SODIUM 40 MG/1
40 TABLET, DELAYED RELEASE ORAL
Status: DISCONTINUED | OUTPATIENT
Start: 2023-11-25 | End: 2023-11-29 | Stop reason: HOSPADM

## 2023-11-24 RX ORDER — SODIUM CHLORIDE 0.9 % (FLUSH) 0.9 %
10 SYRINGE (ML) INJECTION EVERY 12 HOURS SCHEDULED
Status: DISCONTINUED | OUTPATIENT
Start: 2023-11-24 | End: 2023-11-29 | Stop reason: HOSPADM

## 2023-11-24 RX ORDER — AMOXICILLIN 250 MG
2 CAPSULE ORAL 2 TIMES DAILY
Status: CANCELLED | OUTPATIENT
Start: 2023-11-24

## 2023-11-24 RX ORDER — BISACODYL 10 MG
10 SUPPOSITORY, RECTAL RECTAL DAILY PRN
Status: CANCELLED | OUTPATIENT
Start: 2023-11-24

## 2023-11-24 RX ORDER — POLYETHYLENE GLYCOL 3350 17 G/17G
17 POWDER, FOR SOLUTION ORAL DAILY PRN
Status: DISCONTINUED | OUTPATIENT
Start: 2023-11-24 | End: 2023-11-29 | Stop reason: HOSPADM

## 2023-11-24 RX ORDER — LISINOPRIL 10 MG/1
20 TABLET ORAL DAILY
Status: CANCELLED | OUTPATIENT
Start: 2023-11-25

## 2023-11-24 RX ORDER — BISACODYL 10 MG
10 SUPPOSITORY, RECTAL RECTAL DAILY PRN
Status: DISCONTINUED | OUTPATIENT
Start: 2023-11-24 | End: 2023-11-29 | Stop reason: HOSPADM

## 2023-11-24 RX ORDER — AMLODIPINE BESYLATE 10 MG/1
10 TABLET ORAL NIGHTLY
Status: CANCELLED | OUTPATIENT
Start: 2023-11-24

## 2023-11-24 RX ORDER — AMLODIPINE BESYLATE 10 MG/1
10 TABLET ORAL NIGHTLY
Status: DISCONTINUED | OUTPATIENT
Start: 2023-11-24 | End: 2023-11-29 | Stop reason: HOSPADM

## 2023-11-24 RX ORDER — CLOPIDOGREL BISULFATE 75 MG/1
75 TABLET ORAL DAILY
Status: DISCONTINUED | OUTPATIENT
Start: 2023-11-25 | End: 2023-11-29 | Stop reason: HOSPADM

## 2023-11-24 RX ORDER — FUROSEMIDE 40 MG/1
40 TABLET ORAL DAILY
Status: CANCELLED | OUTPATIENT
Start: 2023-11-25

## 2023-11-24 RX ORDER — IPRATROPIUM BROMIDE AND ALBUTEROL SULFATE 2.5; .5 MG/3ML; MG/3ML
3 SOLUTION RESPIRATORY (INHALATION) EVERY 6 HOURS PRN
Status: DISCONTINUED | OUTPATIENT
Start: 2023-11-24 | End: 2023-11-29 | Stop reason: HOSPADM

## 2023-11-24 RX ORDER — FUROSEMIDE 40 MG/1
40 TABLET ORAL DAILY
Status: DISCONTINUED | OUTPATIENT
Start: 2023-11-25 | End: 2023-11-29 | Stop reason: HOSPADM

## 2023-11-24 RX ORDER — POLYETHYLENE GLYCOL 3350 17 G/17G
17 POWDER, FOR SOLUTION ORAL DAILY PRN
Status: CANCELLED | OUTPATIENT
Start: 2023-11-24

## 2023-11-24 RX ORDER — ACETAMINOPHEN 325 MG/1
650 TABLET ORAL EVERY 6 HOURS PRN
Status: CANCELLED | OUTPATIENT
Start: 2023-11-24

## 2023-11-24 RX ORDER — IPRATROPIUM BROMIDE AND ALBUTEROL SULFATE 2.5; .5 MG/3ML; MG/3ML
3 SOLUTION RESPIRATORY (INHALATION) EVERY 6 HOURS PRN
Status: CANCELLED | OUTPATIENT
Start: 2023-11-24

## 2023-11-24 RX ORDER — PANTOPRAZOLE SODIUM 40 MG/1
40 TABLET, DELAYED RELEASE ORAL
Status: CANCELLED | OUTPATIENT
Start: 2023-11-25

## 2023-11-24 RX ORDER — LISINOPRIL 10 MG/1
20 TABLET ORAL DAILY
Status: DISCONTINUED | OUTPATIENT
Start: 2023-11-25 | End: 2023-11-29 | Stop reason: HOSPADM

## 2023-11-24 RX ORDER — CLOPIDOGREL BISULFATE 75 MG/1
75 TABLET ORAL DAILY
Status: CANCELLED | OUTPATIENT
Start: 2023-11-25

## 2023-11-24 RX ORDER — BISACODYL 5 MG/1
5 TABLET, DELAYED RELEASE ORAL DAILY PRN
Status: DISCONTINUED | OUTPATIENT
Start: 2023-11-24 | End: 2023-11-29 | Stop reason: HOSPADM

## 2023-11-24 RX ORDER — AMOXICILLIN 250 MG
2 CAPSULE ORAL 2 TIMES DAILY
Status: DISCONTINUED | OUTPATIENT
Start: 2023-11-24 | End: 2023-11-29 | Stop reason: HOSPADM

## 2023-11-24 RX ORDER — HEPARIN SODIUM 5000 [USP'U]/ML
5000 INJECTION, SOLUTION INTRAVENOUS; SUBCUTANEOUS EVERY 12 HOURS SCHEDULED
Status: DISCONTINUED | OUTPATIENT
Start: 2023-11-24 | End: 2023-11-29 | Stop reason: HOSPADM

## 2023-11-24 RX ORDER — ROSUVASTATIN CALCIUM 10 MG/1
10 TABLET, COATED ORAL DAILY
Status: CANCELLED | OUTPATIENT
Start: 2023-11-25

## 2023-11-24 RX ADMIN — PANTOPRAZOLE SODIUM 40 MG: 40 TABLET, DELAYED RELEASE ORAL at 06:13

## 2023-11-24 RX ADMIN — Medication 10 ML: at 08:30

## 2023-11-24 RX ADMIN — Medication 10 ML: at 20:13

## 2023-11-24 RX ADMIN — CLOPIDOGREL BISULFATE 75 MG: 75 TABLET, FILM COATED ORAL at 08:29

## 2023-11-24 RX ADMIN — DOCUSATE SODIUM 50 MG AND SENNOSIDES 8.6 MG 2 TABLET: 8.6; 5 TABLET, FILM COATED ORAL at 08:29

## 2023-11-24 RX ADMIN — ACETAMINOPHEN 650 MG: 325 TABLET ORAL at 08:34

## 2023-11-24 RX ADMIN — HEPARIN SODIUM 5000 UNITS: 5000 INJECTION INTRAVENOUS; SUBCUTANEOUS at 08:29

## 2023-11-24 RX ADMIN — LISINOPRIL 20 MG: 10 TABLET ORAL at 08:29

## 2023-11-24 RX ADMIN — Medication 10 ML: at 20:14

## 2023-11-24 RX ADMIN — FUROSEMIDE 40 MG: 40 TABLET ORAL at 08:29

## 2023-11-24 RX ADMIN — HEPARIN SODIUM 5000 UNITS: 5000 INJECTION INTRAVENOUS; SUBCUTANEOUS at 20:12

## 2023-11-24 RX ADMIN — DOCUSATE SODIUM 50 MG AND SENNOSIDES 8.6 MG 2 TABLET: 8.6; 5 TABLET, FILM COATED ORAL at 20:12

## 2023-11-24 RX ADMIN — AMLODIPINE BESYLATE 10 MG: 10 TABLET ORAL at 20:12

## 2023-11-24 RX ADMIN — ROSUVASTATIN CALCIUM 10 MG: 10 TABLET, FILM COATED ORAL at 08:29

## 2023-11-24 NOTE — PLAN OF CARE
Goal Outcome Evaluation:           Progress: no change  Outcome Evaluation: Patient admitted into swingbed this shift, patient has been able to transfer to the bedside commode with x2 assist. Wife at bedside. Tylenol given for a headache. No other complaints or concerns

## 2023-11-24 NOTE — THERAPY EVALUATION
Acute Care - Physical Therapy Initial Evaluation  [facility]     Patient Name: Donaldo Roberts  : 1950  MRN: [id number]  Today's Date: 2023   Onset of Illness/Injury or Date of Surgery: 23  Visit Dx:   No diagnosis found.  Patient Active Problem List   Diagnosis    Benign prostatic hyperplasia with urinary obstruction    Herpesviral infection of penis    Shortness of breath    Carotid stenosis, asymptomatic, right    Essential hypertension    Dyslipidemia    Wenckebach block    PATTY (obstructive sleep apnea)    Pre-operative cardiovascular examination    Presence of cardiac pacemaker    Severe sinus bradycardia    Chronic bronchitis    Cigarette nicotine dependence without complication    Hypoxia    Detrusor instability    Tobacco abuse    Respiratory failure    Physical debility     Past Medical History:   Diagnosis Date    Acid reflux     Anxiety     Arthritis     Carotid artery stenosis, symptomatic, right 2021    Essential hypertension 2021    Herpes     History of lipoma     History of transfusion     Hyperlipidemia     Inguinal hernia     Peptic ulcer     Stroke      Past Surgical History:   Procedure Laterality Date    ABDOMINAL SURGERY      CARDIAC ELECTROPHYSIOLOGY PROCEDURE N/A 12/10/2021    Procedure: Pacemaker DC new;  Surgeon: Jony Monte MD;  Location: [facility] INVASIVE LOCATION;  Service: Cardiology;  Laterality: N/A;    CAROTID ENDARTERECTOMY Left     CHOLECYSTECTOMY WITH INTRAOPERATIVE CHOLANGIOGRAM N/A 2017    Procedure: CHOLECYSTECTOMY LAPAROSCOPIC INTRAOPERATIVE CHOLANGIOGRAM;  Surgeon: Luis Coe MD;  Location: [facility] OR;  Service:     HERNIA REPAIR      inguinal    SINUS SURGERY      TUMOR EXCISION       PT Assessment (last 12 hours)       PT Evaluation and Treatment       Row Name 23 1425          Physical Therapy Time and Intention    Subjective Information complains of;pain;weakness  at right knee  -KP     Document Type evaluation   -     Mode of Treatment physical therapy  -     Patient Effort good  -     Symptoms Noted During/After Treatment increased pain  in right knee with standing  -       Row Name 11/24/23 1425          General Information    Patient Profile Reviewed yes  -     Onset of Illness/Injury or Date of Surgery 11/18/23  -     Referring Physician Swiney  -     Patient Observations alert;agree to therapy;cooperative  -     Patient/Family/Caregiver Comments/Observations Wife present during therapy  -     General Observations of Patient Patient appears sleepy today  -     Prior Level of Function independent:;all household mobility;gait;transfer;bed mobility  -     Equipment Currently Used at Home commode, 3-in-1;cane, straight;walker, rolling;wheelchair;oxygen;ramp  -     Existing Precautions/Restrictions fall;oxygen therapy device and L/min  -       Row Name 11/24/23 1425          Previous Level of Function/Home Environm    Bed Mobility, Premorbid Functional Level independent  -     Transfers, Premorbid Functional Level independent;uses device or equipment  -     Household Ambulation, Premorbid Functional Level independent;uses device or equipment  -     Stairs, Premorbid Functional Level not applicable (see comment)  -       Row Name 11/24/23 1425          Living Environment    Current Living Arrangements home  -     Home Accessibility wheelchair accessible  -     People in Home child(haley), adult;spouse  -     Primary Care Provided by self;spouse/significant other  -       Row Name 11/24/23 1425          Pain Scale: FACES Pre/Post-Treatment    Pain: FACES Scale, Pretreatment 2-->hurts little bit  -KP     Posttreatment Pain Rating 4-->hurts little more  -     Pain Location - Side/Orientation Right  -     Pain Location - knee  -       Row Name 11/24/23 1425          Cognition    Affect/Mental Status (Cognition) WFL  -     Orientation Status (Cognition) oriented x 3  -KP     Follows  "Commands (Cognition) [facility]     Personal Safety Interventions fall prevention program maintained;gait belt;muscle strengthening facilitated;nonskid shoes/slippers when out of bed;supervised activity  -       Row Name 11/24/23 1425          Range of Motion (ROM)    Range of Motion ROM is [facility]       Row Name 11/24/23 1425          Strength Comprehensive (MMT)    Comment, General Manual Muscle Testing (MMT) Assessment Hip strength 3-/5, right knee 3/5, left knee 3+/5  -       Row Name 11/24/23 1425          Bed Mobility    Supine-Sit [place] (Bed Mobility) moderate assist (50% patient effort);1 person assist;verbal cues  -     Bed Mobility, Safety Issues decreased use of arms for pushing/pulling;decreased use of legs for bridging/pushing  [facility]     Assistive Device (Bed Mobility) bed rails;head of bed elevated  -[facility] Name 11/24/23 1425          Sit-Stand Transfer    Sit-Stand [place] (Transfers) minimum assist (75% patient effort);1 person assist  [facility]     Assistive Device (Sit-Stand Transfers) walker, front-wheeled  [facility]       Row Name 11/24/23 1425          Stand-Sit Transfer    Stand-Sit [place] (Transfers) minimum assist (75% patient effort);1 person assist  [facility]     Assistive Device (Stand-Sit Transfers) walker, front-wheeled  [facility]       Row Name 11/24/23 1425          Gait/Stairs (Locomotion)    Comment, (Gait/Stairs) Side \"steps,\" shuffling feet towards head of bed.  Has difficulty weight shifting over right LE to step left foot  -       Row Name 11/24/23 1425          Balance    Static Sitting Balance supervision  -     Dynamic Sitting Balance standby assist  [facility]     Position, Sitting Balance sitting edge of bed  -     Static Standing Balance contact guard  -     Dynamic Standing Balance minimal assist;1-person assist  [facility]     Position/Device Used, Standing Balance walker, front-wheeled  [facility]       Row Name 11/24/23 1425          Motor Skills    Therapeutic Exercise knee;hip  " -       Row Name 11/24/23 1425          Hip (Therapeutic Exercise)    Hip (Therapeutic Exercise) strengthening exercise  -     Hip Strengthening (Therapeutic Exercise) left;right;marching while seated;sitting;10 repetitions;3 sets  -[facility] Name 11/24/23 1425          Knee (Therapeutic Exercise)    Knee (Therapeutic Exercise) strengthening exercise  -     Knee Strengthening (Therapeutic Exercise) right;LAQ (long arc quad);sitting;10 repetitions;3 sets  -[facility] Name 11/24/23 1425          Coping    Observed Emotional State quiet  -     Verbalized Emotional State acceptance  -       Row Name 11/24/23 1425          Plan of Care Review    Plan of Care Reviewed With patient  -     Outcome Evaluation PT evaluation for swing bed complete.  Patient requires min to moderate assist for transfers but is unable to ambulate any significant distance due to pain and weakness in right LE.  He will benefit from continued skilled PT services to improve strength, endurance, balance and progress towards gait training.  -       Row Name 11/24/23 1425          Positioning and Restraints    Pre-Treatment Position in bed  -     Post Treatment Position bed  -     In Bed sitting EOB;notified nsg;call light within reach;encouraged to call for assist;with family/caregiver;side rails up x2  Lines in tact and needs in reach  -       Row Name 11/24/23 1425          Therapy Assessment/Plan (PT)    Patient/Family Therapy Goals Statement (PT) Return home ambulatory  -     Functional Level at Time of Evaluation (PT) moderate assist  -     PT Diagnosis (PT) Right knee pain and respiratory failure  -     Rehab Potential (PT) good, to achieve stated therapy goals  -     Criteria for Skilled Interventions Met (PT) yes;meets criteria;skilled treatment is necessary  -     Therapy Frequency (PT) 5 times/wk  -     Predicted Duration of Therapy Intervention (PT) 2 wks  -     Problem List (PT) problems related  to;balance;mobility;range of motion (ROM);strength;pain;postural control  -     Activity Limitations Related to Problem List (PT) unable to transfer safely;unable to ambulate safely  -       Row Name 11/24/23 1425          PT Evaluation Complexity    History, PT Evaluation Complexity 3 or more personal factors and/or comorbidities  -     Examination of Body Systems (PT Eval Complexity) total of 3 or more elements  -KP     Clinical Presentation (PT Evaluation Complexity) evolving  -     Clinical Decision Making (PT Evaluation Complexity) moderate complexity  -KP     Overall Complexity (PT Evaluation Complexity) moderate complexity  -       Row Name 11/24/23 1425          Physical Therapy Goals    Bed Mobility Goal Selection (PT) bed mobility, PT goal 1  -     Transfer Goal Selection (PT) transfer, PT goal 1  -     Gait Training Goal Selection (PT) gait training, PT goal 1  -       Row Name 11/24/23 1425          Bed Mobility Goal 1 (PT)    Activity/Assistive Device (Bed Mobility Goal 1, PT) sit to supine/supine to sit  -     [place] Level/Cues Needed (Bed Mobility Goal 1, PT) modified independence  -KP     Time Frame (Bed Mobility Goal 1, PT) long term goal (LTG);by discharge  -       Row Name 11/24/23 1425          Transfer Goal 1 (PT)    Activity/Assistive Device (Transfer Goal 1, PT) bed-to-chair/chair-to-bed;walker, rolling  -KP     [place] Level/Cues Needed (Transfer Goal 1, PT) standby assist  -KP     Time Frame (Transfer Goal 1, PT) long term goal (LTG);by discharge  -       Row Name 11/24/23 1425          Gait Training Goal 1 (PT)    Activity/Assistive Device (Gait Training Goal 1, PT) gait (walking locomotion);assistive device use;walker, rolling  -     [place] Level (Gait Training Goal 1, PT) contact guard required  -KP     Distance (Gait Training Goal 1, PT) 30ft  -KP     Time Frame (Gait Training Goal 1, PT) long term goal (LTG);by discharge  -               User  Key  (r) = Recorded By, (t) = Taken By, (c) = Cosigned By      Initials Name Provider Type    Yoana Carlson PT Physical Therapist                      PT Recommendation and Plan  Planned Therapy Interventions (PT): balance training, bed mobility training, gait training, home exercise program, motor coordination training, manual therapy techniques, neuromuscular re-education, patient/family education, postural re-education, ROM (range of motion), strengthening, stretching, transfer training  Therapy Frequency (PT): 5 times/wk  Plan of Care Reviewed With: patient  Outcome Evaluation: PT evaluation for swing bed complete.  Patient requires min to moderate assist for transfers but is unable to ambulate any significant distance due to pain and weakness in right LE.  He will benefit from continued skilled PT services to improve strength, endurance, balance and progress towards gait training.       Time Calculation:    PT Charges       Row Name 11/24/23 1438             Time Calculation    PT Received On 11/24/23  -      PT Goal Re-Cert Due Date 12/08/23  -                User Key  (r) = Recorded By, (t) = Taken By, (c) = Cosigned By      Initials Name Provider Type    Yoana Carlson PT Physical Therapist                  Therapy Charges for Today       Code Description Service Date Service Provider Modifiers Qty    23703402013 HC PT EVAL MOD COMPLEXITY 4 11/24/2023 Yoana Guevara, PT GP 1            PT G-Codes  AM-PAC 6 Clicks Score (PT): 8    Yoana Guevara PT  11/24/2023

## 2023-11-24 NOTE — CASE MANAGEMENT/SOCIAL WORK
Continued Stay Note  JUST[facility] Romo     Patient Name: Donaldo Roberts  MRN: [id number]  Today's Date: 11/24/2023    Admit Date: 11/17/2023     Discharge Plan       Row Name 11/24/23 1256       Plan    Plan CM spoke with pt and his spouse at the bedside. Pt is agreeable to Swing bed admission if his insurance approves. Pt would benefit from a hospital bed at d/c and may need a Bipap.    Patient/Family in Agreement with Plan yes                  Sherry Qureshi RN

## 2023-11-24 NOTE — CASE MANAGEMENT/SOCIAL WORK
Discharge Planning Assessment  [facility]     Patient Name: Donaldo Roberts  MRN: [id number]  Today's Date: 11/24/2023    Admit Date: 11/24/2023     Discharge Needs Assessment       Row Name 11/24/23 1616       Living Environment    People in Home child(haley), adult;spouse    Name(s) of People in Home Spouse Inna and son Chad    Current Living Arrangements home    Potentially Unsafe Housing Conditions none    Primary Care Provided by self;spouse/significant other    Family Caregiver if Needed child(haley), adult;spouse    Family Caregiver Names Inna Roberts spouse and son Chad Roberts    Quality of Family Relationships helpful;involved;supportive    Able to Return to Prior Arrangements yes       Transition Planning    Patient/Family Anticipates Transition to home with family    Transportation Anticipated family or friend will provide       Discharge Needs Assessment    Equipment Currently Used at Home wheelchair;walker, rolling;oxygen;commode;shower chair    Concerns to be Addressed discharge planning    Equipment Needed After Discharge bipap;hospital bed                   Discharge Plan       Row Name 11/24/23 1619       Plan    Plan Pt was admitted to swing bed on this date. Per Swing Bed Pt has been approved for seven swing bed days. Pt lives with his spouse Inna, adult son and plans to return home at discharge. Pt does not utilize home health services. Pt has a wheelchair, rolling walker, bedside commode, shower chair and home oxygen at 3 liters via [employer]. Pt would benefit from a hospital bed. Pt has requested a bipap if a candidate for home use. Pt's PCP is Duke Rosen. Pt doees not have a POA/living will. Pt's son to provide transportation at discharge. SS to follow.                  PAWAN WongW     Dermal Autograft Text: The defect edges were debeveled with a #15 scalpel blade.  Given the location of the defect, shape of the defect and the proximity to free margins a dermal autograft was deemed most appropriate.  Using a sterile surgical marker, the primary defect shape was transferred to the donor site. The area thus outlined was incised deep to adipose tissue with a #15 scalpel blade.  The harvested graft was then trimmed of adipose and epidermal tissue until only dermis was left.  The skin graft was then placed in the primary defect and oriented appropriately.

## 2023-11-24 NOTE — CASE MANAGEMENT/SOCIAL WORK
Discharge Planning Assessment  [facility]     Patient Name: Donaldo Roberts  MRN: [id number]  Today's Date: 11/24/2023    Admit Date: 11/17/2023       Discharge Plan       Row Name 11/24/23 1124       Plan    Plan Pt's swing bed pre-authroization remains pending. SS to follow.                    GELACIO Wong

## 2023-11-24 NOTE — PLAN OF CARE
Goal Outcome Evaluation:              Outcome Evaluation: Pt is resting in bed at this time. Pt c/o of pain, PRN Tylenol given. Pt sat on edge of bed this shift. Pt refused to ambulate this shift, educated on importance. Pt has Bipap on. No concerns or complaints at this time. Will continue POC

## 2023-11-25 LAB
ALBUMIN SERPL-MCNC: 3.5 G/DL (ref 3.5–5.2)
ANION GAP SERPL CALCULATED.3IONS-SCNC: 3.5 MMOL/L (ref 5–15)
ATMOSPHERIC PRESS: 732 MMHG
BASE EXCESS BLDV CALC-SCNC: 13.2 MMOL/L (ref 0–2)
BDY SITE: ABNORMAL
BUN SERPL-MCNC: 25 MG/DL (ref 8–23)
BUN/CREAT SERPL: 37.3 (ref 7–25)
CALCIUM SPEC-SCNC: 9.8 MG/DL (ref 8.6–10.5)
CHLORIDE SERPL-SCNC: 94 MMOL/L (ref 98–107)
CO2 BLDA-SCNC: 43.6 MMOL/L (ref 22–33)
CO2 SERPL-SCNC: 40.5 MMOL/L (ref 22–29)
COHGB MFR BLD: 1.1 % (ref 0–5)
CREAT SERPL-MCNC: 0.67 MG/DL (ref 0.76–1.27)
EGFRCR SERPLBLD CKD-EPI 2021: 99.8 ML/MIN/1.73
GAS FLOW AIRWAY: 4 LPM
GLUCOSE SERPL-MCNC: 146 MG/DL (ref 65–99)
HCO3 BLDV-SCNC: 41.4 MMOL/L (ref 22–28)
HGB BLDA-MCNC: 12.4 G/DL (ref 14–18)
INHALED O2 CONCENTRATION: 36 %
Lab: ABNORMAL
Lab: ABNORMAL
METHGB BLD QL: 0 % (ref 0–3)
MODALITY: ABNORMAL
NOTIFIED BY: ABNORMAL
NOTIFIED WHO: ABNORMAL
OXYHGB MFR BLDV: 97.7 % (ref 45–75)
PCO2 BLDV: 70.5 MM HG (ref 41–51)
PH BLDV: 7.38 PH UNITS (ref 7.32–7.42)
PHOSPHATE SERPL-MCNC: 2 MG/DL (ref 2.5–4.5)
PO2 BLDV: 137 MM HG (ref 27–53)
POTASSIUM SERPL-SCNC: 4.9 MMOL/L (ref 3.5–5.2)
SAO2 % BLDCOV: 98.7 % (ref 45–75)
SODIUM SERPL-SCNC: 138 MMOL/L (ref 136–145)
VENTILATOR MODE: ABNORMAL

## 2023-11-25 PROCEDURE — 94664 DEMO&/EVAL PT USE INHALER: CPT

## 2023-11-25 PROCEDURE — 80069 RENAL FUNCTION PANEL: CPT | Performed by: STUDENT IN AN ORGANIZED HEALTH CARE EDUCATION/TRAINING PROGRAM

## 2023-11-25 PROCEDURE — 82805 BLOOD GASES W/O2 SATURATION: CPT

## 2023-11-25 PROCEDURE — 97166 OT EVAL MOD COMPLEX 45 MIN: CPT

## 2023-11-25 PROCEDURE — 25010000002 HEPARIN (PORCINE) PER 1000 UNITS: Performed by: STUDENT IN AN ORGANIZED HEALTH CARE EDUCATION/TRAINING PROGRAM

## 2023-11-25 PROCEDURE — 94799 UNLISTED PULMONARY SVC/PX: CPT

## 2023-11-25 PROCEDURE — 82820 HEMOGLOBIN-OXYGEN AFFINITY: CPT

## 2023-11-25 PROCEDURE — 94761 N-INVAS EAR/PLS OXIMETRY MLT: CPT

## 2023-11-25 PROCEDURE — 94660 CPAP INITIATION&MGMT: CPT

## 2023-11-25 PROCEDURE — 94640 AIRWAY INHALATION TREATMENT: CPT

## 2023-11-25 RX ADMIN — HEPARIN SODIUM 5000 UNITS: 5000 INJECTION INTRAVENOUS; SUBCUTANEOUS at 21:02

## 2023-11-25 RX ADMIN — HEPARIN SODIUM 5000 UNITS: 5000 INJECTION INTRAVENOUS; SUBCUTANEOUS at 08:56

## 2023-11-25 RX ADMIN — ACETAMINOPHEN 650 MG: 325 TABLET ORAL at 07:34

## 2023-11-25 RX ADMIN — CLOPIDOGREL BISULFATE 75 MG: 75 TABLET, FILM COATED ORAL at 08:56

## 2023-11-25 RX ADMIN — AMLODIPINE BESYLATE 10 MG: 10 TABLET ORAL at 21:02

## 2023-11-25 RX ADMIN — IPRATROPIUM BROMIDE AND ALBUTEROL SULFATE 3 ML: .5; 2.5 SOLUTION RESPIRATORY (INHALATION) at 18:24

## 2023-11-25 RX ADMIN — PANTOPRAZOLE SODIUM 40 MG: 40 TABLET, DELAYED RELEASE ORAL at 05:21

## 2023-11-25 RX ADMIN — ACETAMINOPHEN 650 MG: 325 TABLET ORAL at 13:46

## 2023-11-25 RX ADMIN — LISINOPRIL 20 MG: 10 TABLET ORAL at 08:56

## 2023-11-25 RX ADMIN — Medication 10 ML: at 08:57

## 2023-11-25 RX ADMIN — ROSUVASTATIN CALCIUM 10 MG: 10 TABLET, FILM COATED ORAL at 08:56

## 2023-11-25 RX ADMIN — Medication 10 ML: at 21:03

## 2023-11-25 RX ADMIN — VITAMINS A AND D OINTMENT 1 APPLICATION: 15.5; 53.4 OINTMENT TOPICAL at 10:43

## 2023-11-25 RX ADMIN — Medication 10 ML: at 21:02

## 2023-11-25 RX ADMIN — FUROSEMIDE 40 MG: 40 TABLET ORAL at 08:56

## 2023-11-25 NOTE — PLAN OF CARE
Goal Outcome Evaluation:  Plan of Care Reviewed With: patient        Progress: no change  Outcome Evaluation: Patient has been resting in bed throughout the night. pt is refusing to wear biPAP at night, RRT and myself educated him on the importance of wearing the biPAP multiple times, pt still refused. no other pt complaints. no acute changes are noted at this time. will continue with plan of care.

## 2023-11-25 NOTE — PROGRESS NOTES
Patient';s chart reviewed but not roxie today, in swing bed status for ongoing pt/ot. Refused bipap again overnight and vbg from this am reviewed. Although he has absolute indication for bipap will d/c order as patient has remained consistent in refusal to consistently wear mask here thus would be low value to prescribe bipap for home usage. UOP currently 1100/24hrs and will cont to monitor given PO diuretic increase. O2 at home level.

## 2023-11-25 NOTE — THERAPY EVALUATION
Acute Care - Occupational Therapy Initial Evaluation   [facility]     Patient Name: Donaldo Roberts  : 1950  MRN: [id number]  Today's Date: 2023  Onset of Illness/Injury or Date of Surgery: 23     Referring Physician: Jessie    Admit Date: 2023     No diagnosis found.  Patient Active Problem List   Diagnosis    Benign prostatic hyperplasia with urinary obstruction    Herpesviral infection of penis    Shortness of breath    Carotid stenosis, asymptomatic, right    Essential hypertension    Dyslipidemia    Wenckebach block    PATTY (obstructive sleep apnea)    Pre-operative cardiovascular examination    Presence of cardiac pacemaker    Severe sinus bradycardia    Chronic bronchitis    Cigarette nicotine dependence without complication    Hypoxia    Detrusor instability    Tobacco abuse    Respiratory failure    Physical debility     Past Medical History:   Diagnosis Date    Acid reflux     Anxiety     Arthritis     Carotid artery stenosis, symptomatic, right 2021    Essential hypertension 2021    Herpes     History of lipoma     History of transfusion     Hyperlipidemia     Inguinal hernia     Peptic ulcer     Stroke      Past Surgical History:   Procedure Laterality Date    ABDOMINAL SURGERY      CARDIAC ELECTROPHYSIOLOGY PROCEDURE N/A 12/10/2021    Procedure: Pacemaker DC new;  Surgeon: Jony Monte MD;  Location: [facility] INVASIVE LOCATION;  Service: Cardiology;  Laterality: N/A;    CAROTID ENDARTERECTOMY Left     CHOLECYSTECTOMY WITH INTRAOPERATIVE CHOLANGIOGRAM N/A 2017    Procedure: CHOLECYSTECTOMY LAPAROSCOPIC INTRAOPERATIVE CHOLANGIOGRAM;  Surgeon: Luis Coe MD;  Location: [facility] OR;  Service:     HERNIA REPAIR      inguinal    SINUS SURGERY      TUMOR EXCISION           OT ASSESSMENT FLOWSHEET (last 12 hours)       OT Evaluation and Treatment       Row Name 23 1413                   OT Time and Intention    Document Type evaluation  -KR         Mode of Treatment occupational therapy  -KR        Patient Effort adequate  -KR           General Information    General Observations of Patient alert/cooperative  -KR           Living Environment    Current Living Arrangements home  -KR        People in Home spouse  -KR        Primary Care Provided by self;spouse/significant other  -KR           Cognition    Affect/Mental Status (Cognition) WFL  -KR        Orientation Status (Cognition) oriented x 3  -KR        Follows Commands (Cognition) WFL  -KR           Range of Motion Comprehensive    Comment, General Range of Motion BUE WFL  -KR           Strength Comprehensive (MMT)    Comment, General Manual Muscle Testing (MMT) Assessment BUE 4/5  -KR           Bathing Assessment/Intervention    [place] Level (Bathing) bathing skills;moderate assist (50% patient effort)  -KR           Upper Body Dressing Assessment/Training    [place] Level (Upper Body Dressing) upper body dressing skills;moderate assist (50% patient effort)  -KR           Lower Body Dressing Assessment/Training    [place] Level (Lower Body Dressing) lower body dressing skills;moderate assist (50% patient effort)  -KR           Grooming Assessment/Training    [place] Level (Grooming) grooming skills;minimum assist (75% patient effort)  -KR           Self-Feeding Assessment/Training    [place] Level (Feeding) feeding skills;set up  -KR           Toileting Assessment/Training    [place] Level (Toileting) toileting skills;maximum assist (25% patient effort)  -KR           Wound 11/25/23 1058 Right gluteal MASD (Moisture associated skin damage)    Wound - Properties Group Placement Date: 11/25/23  -DA Placement Time: 1058  -DA Side: Right  -DA Location: gluteal  -DA Primary Wound Type: MASD  -DA    Retired Wound - Properties Group Placement Date: 11/25/23  -DA Placement Time: 1058  -DA Side: Right  -DA Location: gluteal  -DA Primary Wound Type: MASD  -DA    Retired Wound -  Properties Group Date first assessed: 11/25/23  -DA Time first assessed: 1058  -DA Side: Right  -DA Location: gluteal  -DA Primary Wound Type: MASD  -DA       Plan of Care Review    Plan of Care Reviewed With patient;spouse  -KR           Therapy Assessment/Plan (OT)    Planned Therapy Interventions (OT) activity tolerance training  -KR        Comment, Therapy Assessment/Plan (OT) Pt motivated to continue therapy needed to enhance fxl performance in home environment. No further recreational activity plan to be provided. Pt reports TV will suffice for entertainment and recreation while in hospital.  -KR           Therapy Plan Review/Discharge Plan (OT)    Anticipated Discharge Disposition (OT) home with assist  -KR           Transfer Goal 1 (OT)    Activity/Assistive Device (Transfer Goal 1, OT) transfers, all  -KR        [place] Level/Cues Needed (Transfer Goal 1, OT) minimum assist (75% or more patient effort)  -KR        Time Frame (Transfer Goal 1, OT) by discharge  -KR            Activity Tolerance Goal 1 (OT)    Activity Tolerance Goal 1 (OT) Increase to enhance self care/mobility performance  -KR        Activity Level (Endurance Goal 1, OT) 15 min activity  -KR        Time Frame (Activity Tolerance Goal 1, OT) by discharge  -KR                  User Key  (r) = Recorded By, (t) = Taken By, (c) = Cosigned By      Initials Name Effective Dates    KR Jasvir Blankenship OT 06/16/21 -     DA Kayleigh Watson, RICKI 09/13/23 -                            OT Recommendation and Plan  Planned Therapy Interventions (OT): activity tolerance training  Plan of Care Review  Plan of Care Reviewed With: patient, spouse  Plan of Care Reviewed With: patient, spouse        Time Calculation:     Therapy Charges for Today       Code Description Service Date Service Provider Modifiers Qty    [id number]  OT EVAL MOD COMPLEXITY 4 11/25/2023 Jasvir Blankenship OT GO 1                 Jasvir Blankenship OT  11/25/2023

## 2023-11-25 NOTE — DISCHARGE SUMMARY
[facility]    Patient Identification:  Name:  Donaldo Roberts  Age:  73 y.o.  Sex:  male  :  1950  MRN:  [id number]  Visit Number:  [id number]    Date of Admission: 2023  Date of Discharge: 23     PCP: Duke Rosen MD    DISCHARGE DIAGNOSES  #Acute hypercapnic on chronic hypoxemic respiratory failure  #Acute heart failure with preserved ejection fraction  #Myocardial injury secondary to hypoxia from above  #History of obstructive sleep apnea, noncompliant with CPAP  #Obesity, suspected underlying obesity hypoventilation  #Severe PAH  #Chronic hypoxemic respiratory failure on 3 L nasal cannula  #Chronic smoking tobacco use  #COPD not in exacerbation  #Chronic illness associated debility     - - Chronic - -     #S/p PPM  #Right carotid artery stenosis  #Essential hypertension  #History of ischemic stroke  #Anxiety  #GERD    CONSULTS   Consults       Date and Time Order Name Status Description    2023  7:58 AM Inpatient Pulmonology Consult Completed             PROCEDURES PERFORMED  -    HOSPITAL COURSE  In brief, Donaldo Roberts is a 73 y.o. male with above noted PMH that presented initially with respiratory distress found to be secondary to CHF exacerbation causing acute on chronic hypoxic and hypercarbic respiratory failure. Patient initially required Bipap with respiratory status responding well to twice daily IV diuretics and improved back to home 3-4L NC. Repeat TTE performed and again showed preserved EF with grade 1 diastolic dysfunction but noted severe PAH and RVSP >55 mmHg. Multiple abgs and VBGs were performed which consistently showed that patient has severe hypoventilation with likely cumulative effect of PATTY, obesity hypoventilation, and COPD. Pulm consulted for bipap settings however patient has been incredibly difficult to agree to using bipap consistently. I have had multiple discussions with patient with family at bedside to  encourage consistent bipap usage when sleeping and he has reluctantly agreed. Patient will remove bipap at night or refuse and when he does, he becomes hypercarbic in morning with CO2 narcosis that does improve after wakening but he will have periods of confusions and hypersomnolence each morning when not compliant with bipap making him very high risk for readmission and poor outcomes after discharge home based on behavior patterns observed here.     PT/OT evaluated patient and noted significant debility requiring max 2 person assist to transfer from bed to bedside commode. Given that patient desires to discharge back home and will not consider snf or rehab placement, he has been graciously accepted for swing bed admission and has agreed to continued physical therapy.            VITAL SIGNS:  Temp:  [98 °F (36.7 °C)-98.6 °F (37 °C)] 98.6 °F (37 °C)  Heart Rate:  [80-90] 84  Resp:  [18-31] 18  BP: ()/(59-89) 120/65  SpO2:  [90 %-95 %] 93 %  on  Flow (L/min):  [4-5] 4;   Device (Oxygen Therapy): nasal cannula;humidified    Body mass index is 42.5 kg/m².  Wt Readings from Last 3 Encounters:   11/24/23 131 kg (287 lb 12.8 oz)   11/21/23 131 kg (287 lb 12.8 oz)   11/10/23 109 kg (240 lb)       PHYSICAL EXAM:  Physical Exam  Vitals and nursing note reviewed.   Constitutional:       General: He is not in acute distress.  HENT:      Mouth/Throat:      Mouth: Mucous membranes are moist.      Pharynx: Oropharynx is clear.   Eyes:      General: No scleral icterus.     Extraocular Movements: Extraocular movements intact.   Cardiovascular:      Rate and Rhythm: Normal rate.      Pulses: Normal pulses.   Pulmonary:      Effort: Pulmonary effort is normal. No respiratory distress.      Breath sounds: No wheezing or rales.   Abdominal:      General: There is no distension.      Palpations: Abdomen is soft.   Musculoskeletal:      Right lower leg: Edema present.      Left lower leg: Edema present.   Skin:     General: Skin is  warm and dry.   Neurological:      Mental Status: He is alert and oriented to person, place, and time. Mental status is at baseline.   Psychiatric:         Mood and Affect: Mood normal.         Behavior: Behavior normal.          DISCHARGE DISPOSITION   Stable       Discharge Medications        ASK your doctor about these medications        Instructions Start Date   amLODIPine 10 MG tablet  Commonly known as: NORVASC   10 mg, Oral, Nightly      clopidogrel 75 MG tablet  Commonly known as: PLAVIX   75 mg, Oral, Daily      furosemide 20 MG tablet  Commonly known as: LASIX  Ask about: Which instructions should I use?   20 mg, Oral, Daily PRN      lisinopril 20 MG tablet  Commonly known as: PRINIVIL,ZESTRIL  Ask about: Which instructions should I use?   20 mg, Oral, Daily      rosuvastatin 10 MG tablet  Commonly known as: CRESTOR  Ask about: Which instructions should I use?   10 mg, Oral, Daily                  Follow-up Information       Duke Rosen MD .    Specialty: Internal Medicine  Contact information:  [address] [place] [id number] [phone]                              TEST  RESULTS PENDING AT DISCHARGE      I will notify patient via phone-call should above lab work result with any significant abnormal findings     CODE STATUS  Code Status and Medical Interventions:   Ordered at: 11/18/23 0340     Code Status (Patient has no pulse and is not breathing):    CPR (Attempt to Resuscitate)     Medical Interventions (Patient has pulse or is breathing):    Full Support             Surendra Roman MD  [facility]  11/24/23  19:38 EST    Please note that this discharge summary required more than 30 minutes to complete.

## 2023-11-25 NOTE — NURSING NOTE
"Patient wore BiPAP for about 3 hours, then was screaming RN name from bed to get \"this thing off\". Pt became frustrated and angry questioning why he has to have it. I gave him an explanation of his labs and correlation with his breathing. He still refused to keep the BiPAP on. Respiratory was contacted to come talk to the pt.   "

## 2023-11-25 NOTE — PAYOR COMM NOTE
"[facility]  [facility]  PHONE  [phone]  FAX  [phone]  NPI:  [id number]    PATIENT D/C 10/24/2023    Donaldo Roberts (73 y.o. Male)       Date of Birth   1950    Social Security Number       Address   [address] [place] [id number]    Home Phone   [phone]    MRN   [id number]       Hoahaoism   Hindu    Marital Status                               Admission Date   11/17/23    Admission Type   Emergency    Admitting Provider   Sarika White DO    Attending Provider       Department, Room/Bed   [address], 3346/1S       Discharge Date   11/24/2023    Discharge Disposition   Swing Bed    Discharge Destination   Other                              Attending Provider: (none)   Allergies: Penicillins    Isolation: None   Infection: None   Code Status: CPR    Ht: 175.3 cm (69\")   Wt: 131 kg (287 lb 12.8 oz)    Admission Cmt: None   Principal Problem: Respiratory failure [J96.90]                   Active Insurance as of 11/17/2023       Primary Coverage       Payor Plan Insurance Group Employer/Plan Group    [facility] WELL[employer] MED ADV HMO        Payor Plan Address Payor Plan Phone Number Payor Plan Fax Number Effective Dates    [address] [address]   11/1/2023 - None Entered    [facility] [id number]         Subscriber Name Subscriber Birth Date Member ID       DONALDO ROBERTS 1950 [id number]                     Emergency Contacts        (Rel.) Home Phone Work Phone Mobile Phone    ArmandoRussellInna (Spouse) [phone] -- [phone]    [place] Creek[place]ad onrris (Son) [phone] -- [phone]                "

## 2023-11-25 NOTE — PLAN OF CARE
Goal Outcome Evaluation:  Plan of Care Reviewed With: patient, family        Progress: improving  Outcome Evaluation: Patient is sitting in chair with chair alarm on, family at bedside. Pt has complained of a headache today, PRN medicine given per MAR. Pt insisted on using a urinal and is doing well. Applied barrier cream to coccyx to prevent break down. No other complaints or concerns this shift. Will continue with POC.

## 2023-11-25 NOTE — H&P
[facility]    Patient Identification:  Name:  Donaldo Roberts  Age:  73 y.o.  Sex:  male  :  1950  MRN:  [id number]   Visit Number:  [id number]  Admit Date: 2023   Room number:  3346/1S  Primary Care Physician:  Duke Rosen MD    Date of Admission: 2023     Subjective     Chief complaint:  Debility    History of presenting illness:  73 y.o. male who was admitted on 2023 to swing bed for ongoing PT and respiratory care.    For complete summary of hospitalization please see discharge summary dated . No further clinical updates to provide in meantime. Patient to be admitted to swing bed status for ongoing pt/ot and respiratory care via bipap monitoring.    Prior to admission I discussed patient's presentation and management with attending ER physician and verbal handoff received.  ---------------------------------------------------------------------------------------------------------------------   A thorough systems based relevant ROS was asked and was negative except as noted above.  ---------------------------------------------------------------------------------------------------------------------   Past Medical History:   Diagnosis Date    Acid reflux     Anxiety     Arthritis     Carotid artery stenosis, symptomatic, right 2021    Essential hypertension 2021    Herpes     History of lipoma     History of transfusion     Hyperlipidemia     Inguinal hernia     Peptic ulcer     Stroke      Past Surgical History:   Procedure Laterality Date    ABDOMINAL SURGERY      CARDIAC ELECTROPHYSIOLOGY PROCEDURE N/A 12/10/2021    Procedure: Pacemaker DC new;  Surgeon: Jony Monte MD;  Location: [facility] CATH INVASIVE LOC[facility];  Service: Cardiology;  Laterality: N/A;    CAROTID ENDARTERECTOMY Left     CHOLECYSTECTOMY WITH INTRAOPERATIVE CHOLANGIOGRAM N/A 2017    Procedure: CHOLECYSTECTOMY LAPAROSCOPIC INTRAOPERATIVE  CHOLANGIOGRAM;  Surgeon: Luis Coe MD;  Location: [facility];  Service:     HERNIA REPAIR      inguinal    SINUS SURGERY      TUMOR EXCISION       Family History   Problem Relation Age of Onset    Stroke Mother     Heart disease Mother     Diabetes Mother     No Known Problems Father     Diabetes Sister      Social History     Socioeconomic History    Marital status:     Number of children: 6   Tobacco Use    Smoking status: Some Days     Packs/day: 0.25     Years: 55.00     Additional pack years: 0.00     Total pack years: 13.75     Types: Cigarettes     Passive exposure: Current    Smokeless tobacco: Never    Tobacco comments:     2 cigs per day/ A pack per week.   Vaping Use    Vaping Use: Never used   Substance and Sexual Activity    Alcohol use: No    Drug use: No    Sexual activity: Defer     ---------------------------------------------------------------------------------------------------------------------   Allergies:  Penicillins  ---------------------------------------------------------------------------------------------------------------------   Medications below are reported home medications pulling from within the system; at this time, these medications have not been reconciled unless otherwise specified and are in the verification process for further verifcation as current home medications.      Prior to Admission Medications       Prescriptions Last Dose Informant Patient Reported? Taking?    amLODIPine (NORVASC) 10 MG tablet Unknown Pharmacy No No    Take 1 tablet by mouth Every Night.    clopidogrel (PLAVIX) 75 MG tablet Unknown Pharmacy No No    Take 1 tablet by mouth Daily.    furosemide (LASIX) 20 MG tablet Unknown Pharmacy Yes No    Take 1 tablet by mouth Daily As Needed (swelling).    lisinopril (PRINIVIL,ZESTRIL) 20 MG tablet Unknown Pharmacy Yes No    Take 1 tablet by mouth Daily.    rosuvastatin (CRESTOR) 10 MG tablet Unknown Pharmacy Yes No    Take 1 tablet by mouth Daily.           Objective     Vital Signs:  Temp:  [98 °F (36.7 °C)-98.6 °F (37 °C)] 98.6 °F (37 °C)  Heart Rate:  [80-90] 84  Resp:  [18-31] 18  BP: ()/(59-89) 120/65    No data found.  SpO2:  [90 %-95 %] 93 %  on  Flow (L/min):  [4-5] 4;   Device (Oxygen Therapy): nasal cannula;humidified  Body mass index is 42.5 kg/m².    Wt Readings from Last 3 Encounters:   11/24/23 131 kg (287 lb 12.8 oz)   11/21/23 131 kg (287 lb 12.8 oz)   11/10/23 109 kg (240 lb)      ----------------------------------------------------------------------------------------------------------------------  Physical Exam  Vitals and nursing note reviewed.   Constitutional:       General: He is not in acute distress.  HENT:      Mouth/Throat:      Mouth: Mucous membranes are moist.      Pharynx: Oropharynx is clear.   Eyes:      General: No scleral icterus.     Extraocular Movements: Extraocular movements intact.   Cardiovascular:      Rate and Rhythm: Normal rate.      Pulses: Normal pulses.   Pulmonary:      Effort: Pulmonary effort is normal. No respiratory distress.      Breath sounds: No wheezing or rales.   Abdominal:      General: There is no distension.      Palpations: Abdomen is soft.   Musculoskeletal:      Right lower leg: Edema present.      Left lower leg: Edema present.   Skin:     General: Skin is warm and dry.   Neurological:      Mental Status: He is alert and oriented to person, place, and time. Mental status is at baseline.   Psychiatric:         Mood and Affect: Mood normal.         Behavior: Behavior normal.       --------------------------------------------------------------------------------------------------------------------  LABS:    CBC and coagulation:  Results from last 7 days   Lab Units 11/21/23  0024 11/19/23  0050 11/18/23  0555 11/18/23  0326   LACTATE mmol/L  --   --   --  0.9   WBC 10*3/mm3 9.92 8.39 8.27  --    HEMOGLOBIN g/dL 12.7* 12.3* 12.9*  --    HEMATOCRIT % 43.0 41.8 45.1  --    MCV fL 99.8* 100.2*  "103.7*  --    MCHC g/dL 29.5* 29.4* 28.6*  --    PLATELETS 10*3/mm3 210 193 167  --      Acid/base balance:  Results from last 7 days   Lab Units 11/24/23  0739 11/21/23  0416 11/19/23  0805 11/18/23  1424 11/18/23  0653 11/18/23  0218   PH, ARTERIAL pH units 7.329* 7.388 7.455* 7.333* 7.392 7.326*   PO2 ART mm Hg 56.3* 75.1* 67.7* 66.2* 66.8* 63.0*   PCO2, ARTERIAL mm Hg 81.5* 73.8* 63.9* 93.9* 77.3* 89.2*   HCO3 ART mmol/L 42.8* 44.4* 44.9* 49.9* 46.9* 46.6*     Renal and electrolytes:  Results from last 7 days   Lab Units 11/22/23  0239 11/21/23  0024 11/20/23  0223 11/19/23  0050 11/18/23  0555 11/17/23  2357   SODIUM mmol/L 140 139 137 137 142 142   POTASSIUM mmol/L 4.5 3.9 3.9 3.8 4.6 4.9   CHLORIDE mmol/L 96* 91* 91* 92* 94* 97*   CO2 mmol/L 40.8* 40.4* 38.6* 39.2* 39.7* 41.4*   BUN mg/dL 22 25* 18 16 14 16   CREATININE mg/dL 0.72* 0.80 0.65* 0.61* 0.59* 0.67*   CALCIUM mg/dL 9.6 9.6 9.4 9.5 9.7 9.6   PHOSPHORUS mg/dL 3.5  --   --   --   --   --    GLUCOSE mg/dL 167* 163* 113* 143* 104* 107*     Estimated Creatinine Clearance: 126.2 mL/min (A) (by C-G formula based on SCr of 0.72 mg/dL (L)).    Liver and pancreatic function:  Results from last 7 days   Lab Units 11/22/23 0239 11/19/23  0050 11/18/23  0555   ALBUMIN g/dL 3.6 3.8 4.0   BILIRUBIN mg/dL  --  0.5 0.4   ALK PHOS U/L  --  75 80   AST (SGOT) U/L  --  12 18   ALT (SGPT) U/L  --  10 13     Endocrine function:  No results found for: \"HGBA1C\"  Point of care bedside glucose levels:      Lab Results   Component Value Date    TSH 0.681 12/08/2021    FREET4 1.11 12/08/2021     Cardiac:  Results from last 7 days   Lab Units 11/18/23  0326 11/17/23  2357   HSTROP T ng/L 30* 26*   PROBNP pg/mL  --  168.3       Cultures:  Lab Results   Component Value Date    COLORU Yellow 01/16/2023    CLARITYU Clear 01/16/2023    SPECGRAV 1.010 01/16/2023    PHUR 7.5 01/16/2023    GLUCOSEU Negative 12/10/2021    KETONESU Negative 01/16/2023    BLOODU Negative 12/10/2021    " "NITRITEU Negative 12/10/2021    LEUKOCYTESUR Negative 01/16/2023    BILIRUBINUR Negative 01/16/2023    UROBILINOGEN Normal 01/16/2023     Microbiology Results (last 10 days)       Procedure Component Value - Date/Time    Blood Culture - Blood, Arm, Right [[id number]]  (Normal) Collected: 11/18/23 0326    Lab Status: Final result Specimen: Blood from Arm, Right Updated: 11/23/23 0331     Blood Culture No growth at 5 days    Blood Culture - Blood, Arm, Left [[id number]]  (Normal) Collected: 11/18/23 0326    Lab Status: Final result Specimen: Blood from Arm, Left Updated: 11/23/23 0331     Blood Culture No growth at 5 days            No results found for: \"PREGTESTUR\", \"PREGSERUM\", \"HCG\", \"HCGQUANT\"  Pain Management Panel  More data may exist         Latest Ref Rng & Units 11/18/2023 9/5/2019   Pain Management Panel   Amphetamine, Urine Qual Negative Negative  Negative    Barbiturates Screen, Urine Negative Negative  Negative    Benzodiazepine Screen, Urine Negative Negative  Negative    Buprenorphine, Screen, Urine Negative Negative  Negative    Cocaine Screen, Urine Negative Negative  Negative    Fentanyl, Urine Negative Negative  -   Methadone Screen , Urine Negative Negative  Negative    Methamphetamine, Ur Negative Negative  -       I have personally looked at the labs and they are summarized above.  ----------------------------------------------------------------------------------------------------------------------  Detailed radiology reports for the last 24 hours:    Imaging Results (Last 24 Hours)       ** No results found for the last 24 hours. **          Final impressions for the last 30 days of radiology reports:    XR Chest 1 View    Result Date: 11/23/2023  Impression:  Lung base atetlectasis vs consolidation. Pulmonary edema is also in the differential. To TALK to On Call [phone]  Patient Name:BILLIE MELISSA   MR NO: [id number]  Accession No:  Melania Velez MD Electronically Signed   Date " Of Exam Request:11/23/2023 9:40:40 AM EST Date & Time Of Report: 11/23/2023 11:11:43 AM EST  Patient Name:BILLIE MELISSA   MR NO: [id number]  Accession No:   This report was finalized on 11/23/2023 2:20 PM by Rambo Felix MD.      XR Ankle 3+ View Left    Result Date: 11/19/2023    Diffuse soft tissue swelling, but no acute fracture or dislocation.   This report was finalized on 11/19/2023 11:11 AM by Dr. Jeremy Jenkins MD.      CT Angiogram Chest Pulmonary Embolism    Result Date: 11/18/2023   NEGATIVE FOR PULMONARY EMBOLISM.  CARDIOMEGALY.  This report was finalized on 11/18/2023 5:03 AM by Yakelin Turk MD.      XR Chest 1 View    Result Date: 11/18/2023  FINDINGS/IMPRESSION:  Cardiomegaly. Multifocal infiltrates/edema. No pneumothorax or pleural effusion. No acute fracture. Left-sided pacemaker.    This report was finalized on 11/18/2023 1:07 AM by Alex Pallas, DO.         Assessment & Plan       #Acute hypercapnic on chronic hypoxemic respiratory failure  #Acute heart failure with preserved ejection fraction  #Myocardial injury secondary to hypoxia from above  #History of obstructive sleep apnea, noncompliant with CPAP  #Obesity, suspected underlying obesity hypoventilation  #Severe PAH  #Chronic hypoxemic respiratory failure on 3 L nasal cannula  #Chronic smoking tobacco use  #COPD not in exacerbation  -Echo reviewed and patient with severe PAH and evidence of right heart failure with preserved EF, diastolic dysfunction.  -Baseline 3-4L NC  -Patient will need close outpatient HF clinic f/u as well as pulmonology  -Lasix dose increased to 40mg PO and cont to monitor I&Os  -Repeat vbg in am to monitor bipap settings and adjust prn, will need pulm RX to arrange home bipap. Cont bipap qhs and prn     #Chronic illness associated debility  -Admit to swing bed for ongoing pt/ot. Has agreed to short term period but ultimately planning to d/c home          - - Chronic - -     #S/p PPM  #Right carotid artery  stenosis  #Essential hypertension  #History of ischemic stroke  #Anxiety  #GERD      VTE Prophylaxis:   Mechanical Order History:       None          Pharmalogical Order History:        Ordered     Dose Route Frequency Stop    11/24/23 0407  heparin (porcine) 5000 UNIT/ML injection 5,000 Units         5,000 Units SC Every 12 Hours Scheduled --                      Code Status and Medical Interventions:   Ordered at: 11/24/23 7845     Code Status (Patient has no pulse and is not breathing):    CPR (Attempt to Resuscitate)     Medical Interventions (Patient has pulse or is breathing):    Full Support        Disposition: Admit to swing bed    Surendra Roman MD  [facility]  11/24/23  19:46 EST

## 2023-11-26 LAB
A-A DO2: 89.2 MMHG (ref 0–300)
ARTERIAL PATENCY WRIST A: ABNORMAL
ATMOSPHERIC PRESS: 726 MMHG
BASE EXCESS BLDA CALC-SCNC: 13.9 MMOL/L (ref 0–2)
BDY SITE: ABNORMAL
CO2 BLDA-SCNC: 43.5 MMOL/L (ref 22–33)
COHGB MFR BLD: 1.7 % (ref 0–5)
GAS FLOW AIRWAY: 3 LPM
HCO3 BLDA-SCNC: 41.5 MMOL/L (ref 20–26)
HCT VFR BLD CALC: 38.3 % (ref 38–51)
HGB BLDA-MCNC: 12.5 G/DL (ref 14–18)
INHALED O2 CONCENTRATION: 32 %
Lab: ABNORMAL
Lab: ABNORMAL
METHGB BLD QL: 0 % (ref 0–3)
MODALITY: ABNORMAL
NOTIFIED BY: ABNORMAL
NOTIFIED WHO: ABNORMAL
OXYHGB MFR BLDV: 88.5 % (ref 94–99)
PCO2 BLDA: 66.2 MM HG (ref 35–45)
PCO2 TEMP ADJ BLD: ABNORMAL MM[HG]
PH BLDA: 7.41 PH UNITS (ref 7.35–7.45)
PH, TEMP CORRECTED: ABNORMAL
PO2 BLDA: 54.4 MM HG (ref 83–108)
PO2 TEMP ADJ BLD: ABNORMAL MM[HG]
SAO2 % BLDCOA: 90 % (ref 94–99)
VENTILATOR MODE: ABNORMAL

## 2023-11-26 PROCEDURE — 94799 UNLISTED PULMONARY SVC/PX: CPT

## 2023-11-26 PROCEDURE — 94664 DEMO&/EVAL PT USE INHALER: CPT

## 2023-11-26 PROCEDURE — 94761 N-INVAS EAR/PLS OXIMETRY MLT: CPT

## 2023-11-26 PROCEDURE — 83050 HGB METHEMOGLOBIN QUAN: CPT

## 2023-11-26 PROCEDURE — 25010000002 HEPARIN (PORCINE) PER 1000 UNITS: Performed by: STUDENT IN AN ORGANIZED HEALTH CARE EDUCATION/TRAINING PROGRAM

## 2023-11-26 PROCEDURE — 36600 WITHDRAWAL OF ARTERIAL BLOOD: CPT

## 2023-11-26 PROCEDURE — 82805 BLOOD GASES W/O2 SATURATION: CPT

## 2023-11-26 PROCEDURE — 82375 ASSAY CARBOXYHB QUANT: CPT

## 2023-11-26 RX ORDER — HYDROXYZINE 50 MG/1
50 TABLET, FILM COATED ORAL 3 TIMES DAILY PRN
Status: DISCONTINUED | OUTPATIENT
Start: 2023-11-26 | End: 2023-11-29 | Stop reason: HOSPADM

## 2023-11-26 RX ORDER — HYDROXYZINE HYDROCHLORIDE 25 MG/1
25 TABLET, FILM COATED ORAL ONCE
Status: DISCONTINUED | OUTPATIENT
Start: 2023-11-26 | End: 2023-11-26

## 2023-11-26 RX ADMIN — ACETAMINOPHEN 650 MG: 325 TABLET ORAL at 08:18

## 2023-11-26 RX ADMIN — Medication 10 ML: at 20:27

## 2023-11-26 RX ADMIN — LISINOPRIL 20 MG: 10 TABLET ORAL at 08:18

## 2023-11-26 RX ADMIN — DOCUSATE SODIUM 50 MG AND SENNOSIDES 8.6 MG 2 TABLET: 8.6; 5 TABLET, FILM COATED ORAL at 08:18

## 2023-11-26 RX ADMIN — Medication 10 ML: at 08:20

## 2023-11-26 RX ADMIN — IPRATROPIUM BROMIDE AND ALBUTEROL SULFATE 3 ML: .5; 2.5 SOLUTION RESPIRATORY (INHALATION) at 19:55

## 2023-11-26 RX ADMIN — ROSUVASTATIN CALCIUM 10 MG: 10 TABLET, FILM COATED ORAL at 08:18

## 2023-11-26 RX ADMIN — IPRATROPIUM BROMIDE AND ALBUTEROL SULFATE 3 ML: .5; 2.5 SOLUTION RESPIRATORY (INHALATION) at 04:10

## 2023-11-26 RX ADMIN — HYDROXYZINE HYDROCHLORIDE 50 MG: 50 TABLET ORAL at 20:25

## 2023-11-26 RX ADMIN — CLOPIDOGREL BISULFATE 75 MG: 75 TABLET, FILM COATED ORAL at 08:19

## 2023-11-26 RX ADMIN — FUROSEMIDE 40 MG: 40 TABLET ORAL at 08:18

## 2023-11-26 RX ADMIN — ACETAMINOPHEN 650 MG: 325 TABLET ORAL at 20:25

## 2023-11-26 RX ADMIN — HEPARIN SODIUM 5000 UNITS: 5000 INJECTION INTRAVENOUS; SUBCUTANEOUS at 20:25

## 2023-11-26 RX ADMIN — HEPARIN SODIUM 5000 UNITS: 5000 INJECTION INTRAVENOUS; SUBCUTANEOUS at 08:18

## 2023-11-26 RX ADMIN — AMLODIPINE BESYLATE 10 MG: 10 TABLET ORAL at 20:25

## 2023-11-26 NOTE — PLAN OF CARE
Goal Outcome Evaluation:              Outcome Evaluation: Patient resting in bed at this time. VSS. Patient refusing bed alarm for most of shift. Patient refused bipap most of shift. Patient has no other complaints at this time. Will continue plan of care.

## 2023-11-26 NOTE — PLAN OF CARE
Goal Outcome Evaluation:           Progress: no change  Outcome Evaluation: Pt resting in bed at this time. No acute changes at this time. Pt refused bath and was educated on importance of bathing. Pt was still unwilling to bathe today. Pt stood up at bedside to use urinal. No complaints at this time. Will continue with plan of care. Bed alarm not used when family was in room. Pt educated on importance of using bed alarm at all times with being high falls risk. Pt still unwilling to have bed alarm on at all times. Pt A&O.

## 2023-11-26 NOTE — PROGRESS NOTES
Patient did trial of bipap this am, only a few hours and noted abg afterwards significantly improved. Patient again hesitant to consider using when sleeping consistently and added prn atarax for anxiety to premedicate. Is compliant with therapy currently and agreed to minimum 5 days swing bed status to cont PT. Improving and will reevaluate at end of course

## 2023-11-27 PROCEDURE — 94799 UNLISTED PULMONARY SVC/PX: CPT

## 2023-11-27 PROCEDURE — 94761 N-INVAS EAR/PLS OXIMETRY MLT: CPT

## 2023-11-27 PROCEDURE — 97116 GAIT TRAINING THERAPY: CPT

## 2023-11-27 PROCEDURE — 97110 THERAPEUTIC EXERCISES: CPT

## 2023-11-27 PROCEDURE — 94664 DEMO&/EVAL PT USE INHALER: CPT

## 2023-11-27 PROCEDURE — 99232 SBSQ HOSP IP/OBS MODERATE 35: CPT | Performed by: INTERNAL MEDICINE

## 2023-11-27 PROCEDURE — 25010000002 HEPARIN (PORCINE) PER 1000 UNITS: Performed by: STUDENT IN AN ORGANIZED HEALTH CARE EDUCATION/TRAINING PROGRAM

## 2023-11-27 RX ADMIN — ACETAMINOPHEN 650 MG: 325 TABLET ORAL at 16:47

## 2023-11-27 RX ADMIN — LISINOPRIL 20 MG: 10 TABLET ORAL at 08:37

## 2023-11-27 RX ADMIN — Medication 10 ML: at 08:40

## 2023-11-27 RX ADMIN — HEPARIN SODIUM 5000 UNITS: 5000 INJECTION INTRAVENOUS; SUBCUTANEOUS at 21:28

## 2023-11-27 RX ADMIN — IPRATROPIUM BROMIDE AND ALBUTEROL SULFATE 3 ML: .5; 2.5 SOLUTION RESPIRATORY (INHALATION) at 22:13

## 2023-11-27 RX ADMIN — Medication 10 ML: at 21:28

## 2023-11-27 RX ADMIN — HEPARIN SODIUM 5000 UNITS: 5000 INJECTION INTRAVENOUS; SUBCUTANEOUS at 08:37

## 2023-11-27 RX ADMIN — CLOPIDOGREL BISULFATE 75 MG: 75 TABLET, FILM COATED ORAL at 08:37

## 2023-11-27 RX ADMIN — FUROSEMIDE 40 MG: 40 TABLET ORAL at 08:37

## 2023-11-27 RX ADMIN — ROSUVASTATIN CALCIUM 10 MG: 10 TABLET, FILM COATED ORAL at 08:37

## 2023-11-27 RX ADMIN — AMLODIPINE BESYLATE 10 MG: 10 TABLET ORAL at 21:28

## 2023-11-27 RX ADMIN — PANTOPRAZOLE SODIUM 40 MG: 40 TABLET, DELAYED RELEASE ORAL at 05:22

## 2023-11-27 RX ADMIN — ACETAMINOPHEN 650 MG: 325 TABLET ORAL at 10:48

## 2023-11-27 NOTE — THERAPY TREATMENT NOTE
Acute Care - Physical Therapy Treatment Note  [facility]     Patient Name: Donaldo Roberts  : 1950  MRN: [id number]  Today's Date: 2023   Onset of Illness/Injury or Date of Surgery: 23  Visit Dx:   No diagnosis found.  Patient Active Problem List   Diagnosis    Benign prostatic hyperplasia with urinary obstruction    Herpesviral infection of penis    Shortness of breath    Carotid stenosis, asymptomatic, right    Essential hypertension    Dyslipidemia    Wenckebach block    PATTY (obstructive sleep apnea)    Pre-operative cardiovascular examination    Presence of cardiac pacemaker    Severe sinus bradycardia    Chronic bronchitis    Cigarette nicotine dependence without complication    Hypoxia    Detrusor instability    Tobacco abuse    Respiratory failure    Physical debility     Past Medical History:   Diagnosis Date    Acid reflux     Anxiety     Arthritis     Carotid artery stenosis, symptomatic, right 2021    Essential hypertension 2021    Herpes     History of lipoma     History of transfusion     Hyperlipidemia     Inguinal hernia     Peptic ulcer     Stroke      Past Surgical History:   Procedure Laterality Date    ABDOMINAL SURGERY      CARDIAC ELECTROPHYSIOLOGY PROCEDURE N/A 12/10/2021    Procedure: Pacemaker DC new;  Surgeon: Jony Monte MD;  Location: [facility] INVASIVE LOCATION;  Service: Cardiology;  Laterality: N/A;    CAROTID ENDARTERECTOMY Left     CHOLECYSTECTOMY WITH INTRAOPERATIVE CHOLANGIOGRAM N/A 2017    Procedure: CHOLECYSTECTOMY LAPAROSCOPIC INTRAOPERATIVE CHOLANGIOGRAM;  Surgeon: Luis Coe MD;  Location: [facility] OR;  Service:     HERNIA REPAIR      inguinal    SINUS SURGERY      TUMOR EXCISION       PT Assessment (last 12 hours)       PT Evaluation and Treatment       Row Name 23 1210          Physical Therapy Time and Intention    Subjective Information complains of;pain  -AG     Document Type therapy note (daily note)  -AG     Mode  of Treatment physical therapy  -AG     Patient Effort good  -AG     Symptoms Noted During/After Treatment increased pain;fatigue  -AG       Row Name 11/27/23 1210          General Information    Patient Profile Reviewed yes  -AG     Patient Observations agree to therapy;alert  -AG     Patient/Family/Caregiver Comments/Observations pt. supine in bed; agreeable to participation  -AG     Existing Precautions/Restrictions fall  -AG     Risks Reviewed patient:;increased discomfort;dizziness  -AG     Benefits Reviewed patient:;improve function;increase independence;increase strength;increase balance;increase knowledge;decrease risk of DVT  -AG     Barriers to Rehab none identified  -       Row Name 11/27/23 1210          Pain    Additional Documentation --  pt. reports chronic pain B knees/ hx of buckling while weight bearing  -       Row Name 11/27/23 1210          Cognition    [facility]     Orientation Status (Cognition) oriented x 3  -AG     Follows Commands (Cognition) verbal cues/prompting required  -     Personal Safety Interventions fall prevention program maintained;gait belt;nonskid shoes/slippers when out of bed;supervised activity  -       Row Name 11/27/23 1210          Sensory    Hearing Status WFL  -       Row Name 11/27/23 1210          Vision Assessment/Intervention    Visual Impairment/Limitations [facility]  -       Row Name 11/27/23 1210          Mobility    Extremity Weight-bearing Status --  no restrictions  -       Row Name 11/27/23 1210          Bed Mobility    Bed Mobility rolling left;rolling right;scooting/bridging;supine-sit;sit-supine  -AG     Scooting/Bridging Winn (Bed Mobility) standby assist  -AG     Supine-Sit Winn (Bed Mobility) standby assist  -AG     Assistive Device (Bed Mobility) bed rails  -       Row Name 11/27/23 1210          Transfers    Transfers sit-stand transfer;stand-sit transfer  -       Row Name 11/27/23 1210           Sit-Stand Transfer    Sit-Stand Pierce (Transfers) verbal cues;standby assist  -AG     Assistive Device (Sit-Stand Transfers) walker, front-wheeled  -AG       Row Name 11/27/23 1210          Stand-Sit Transfer    Stand-Sit Pierce (Transfers) standby assist  -AG     Assistive Device (Stand-Sit Transfers) walker, front-wheeled  -AG       Row Name 11/27/23 1210          Gait/Stairs (Locomotion)    Pierce Level (Gait) contact guard;verbal cues  -AG     Assistive Device (Gait) walker, front-wheeled  -AG     Patient was able to Ambulate yes  -AG     Deviations/Abnormal Patterns (Gait) antalgic  -AG     Comment, (Gait/Stairs) pt. took 3-4 steps forward, backward w/ RW, CGA as well as performed standing march.  -AG       Row Name 11/27/23 1210          Safety Issues, Functional Mobility    Impairments Affecting Function (Mobility) balance;strength;pain;endurance/activity tolerance  -AG       Row Name 11/27/23 1210          Balance    Balance Assessment sitting static balance;sitting dynamic balance;sit to stand dynamic balance;standing static balance;standing dynamic balance  -AG     Static Sitting Balance independent  -AG     Position, Sitting Balance unsupported;sitting edge of bed  -AG     Static Standing Balance contact guard  -AG     Dynamic Standing Balance contact guard  -AG     Position/Device Used, Standing Balance walker, front-wheeled  -AG       Row Name 11/27/23 1210          Motor Skills    Therapeutic Exercise hip;knee;ankle  -AG       Row Name 11/27/23 1210          Hip (Therapeutic Exercise)    Hip Strengthening (Therapeutic Exercise) bilateral;flexion;extension;marching while seated;marching while standing;sitting;standing  -AG       Row Name 11/27/23 1210          Knee (Therapeutic Exercise)    Knee Strengthening (Therapeutic Exercise) bilateral;flexion;extension;marching while seated;marching while standing;LAQ (long arc quad)  -AG       Row Name 11/27/23 1210          Ankle  (Therapeutic Exercise)    Ankle (Therapeutic Exercise) strengthening exercise  -AG     Ankle Strengthening (Therapeutic Exercise) bilateral;dorsiflexion;sitting  -AG       Row Name             Wound 11/25/23 1058 Right gluteal MASD (Moisture associated skin damage)    Wound - Properties Group Placement Date: 11/25/23 -DA Placement Time: 1058 -DA Side: Right  -DA Location: gluteal  -DA Primary Wound Type: MASD  -DA    Retired Wound - Properties Group Placement Date: 11/25/23 -DA Placement Time: 1058 -DA Side: Right  -DA Location: gluteal  -DA Primary Wound Type: MASD  -DA    Retired Wound - Properties Group Date first assessed: 11/25/23 -DA Time first assessed: 1058 -DA Side: Right  -DA Location: gluteal  -DA Primary Wound Type: MASD  -DA      Row Name 11/27/23 1210          Coping    Observed Emotional State calm;cooperative  -AG     Verbalized Emotional State acceptance  -AG     Trust Relationship/Rapport care explained;choices provided;thoughts/feelings acknowledged  -AG     Family/Support Persons family  -AG     Involvement in Care not present at bedside  -     Family/Support System Care support provided;self-care encouraged  -AG       Row Name 11/27/23 1210          Plan of Care Review    Plan of Care Reviewed With patient  -AG     Outcome Evaluation PT treatment complete.  Pt. CGA for STS w/ RW; able to stand for prolonged time, take steps forward, backward, perform standing march and other seated B LE TE for strengthening and endurance.  PT will continue to follow.  -AG       Row Name 11/27/23 1210          Positioning and Restraints    Pre-Treatment Position in bed  -AG     Post Treatment Position bed  -AG     In Bed sitting EOB;call light within reach;encouraged to call for assist  -AG       Row Name 11/27/23 1210          Therapy Plan Review/Discharge Plan (PT)    Therapy Plan Review (PT) evaluation/treatment results reviewed;care plan/treatment goals reviewed;risks/benefits  reviewed;current/potential barriers reviewed;participants voiced agreement with care plan;participants included;patient  -AG               User Key  (r) = Recorded By, (t) = Taken By, (c) = Cosigned By      Initials Name Provider Type    Nan Wilson, PT Physical Therapist    Kayleigh Garduno, RN Registered Nurse                    Physical Therapy Education       Title: PT OT SLP Therapies (Done)       Topic: Physical Therapy (Done)       Point: Mobility training (Done)       Learning Progress Summary             Patient Acceptance, E,D, VU,NR by  at 11/27/2023 1210                         Point: Home exercise program (Done)       Learning Progress Summary             Patient Acceptance, E,D, VU,NR by  at 11/27/2023 1210                         Point: Body mechanics (Done)       Learning Progress Summary             Patient Acceptance, E,D, VU,NR by  at 11/27/2023 1210                         Point: Precautions (Done)       Learning Progress Summary             Patient Acceptance, E,D, VU,NR by  at 11/27/2023 1210                                         User Key       Initials Effective Dates Name Provider Type [facility] 06/16/21 -  Nan Mann, PT Physical Therapist PT                  PT Recommendation and Plan  Anticipated Discharge Disposition (PT): home with assist, home with home health  Plan of Care Reviewed With: patient  Outcome Evaluation: PT treatment complete.  Pt. CGA for STS w/ RW; able to stand for prolonged time, take steps forward, backward, perform standing march and other seated B LE TE for strengthening and endurance.  PT will continue to follow.       Time Calculation:    PT Charges       Row Name 11/27/23 1210             Time Calculation    PT Received On 11/27/23  -                User Key  (r) = Recorded By, (t) = Taken By, (c) = Cosigned By      Initials Name Provider Type    Nan Wilson, PT Physical Therapist                  Therapy Charges for Today        Code Description Service Date Service Provider Modifiers Qty    29234917316 HC GAIT TRAINING EA 15 MIN 11/27/2023 Nan Mann, PT GP 1    17893866676 HC PT THER PROC EA 15 MIN 11/27/2023 Nan Mann, PT GP 1            PT G-Codes  AM-PAC 6 Clicks Score (PT): 10    Nan Mann, PT  11/27/2023

## 2023-11-27 NOTE — PROGRESS NOTES
"Progress Note Pulmonary      Subjective no new complaints.  Patient feels better    Interval History: No event        Review of Systems:    Reviewed ; unchanged       Vital Signs  Temp:  [97.6 °F (36.4 °C)-98.3 °F (36.8 °C)] 97.8 °F (36.6 °C)  Heart Rate:  [76-88] 88  Resp:  [18-48] 20  BP: (114-147)/(62-78) 132/66  Body mass index is 42.5 kg/m².    Intake/Output Summary (Last 24 hours) at 11/27/2023 1319  Last data filed at 11/27/2023 1206  Gross per 24 hour   Intake 960 ml   Output 1275 ml   Net -315 ml     I/O this shift:  In: 480 [P.O.:480]  Out: 450 [Urine:450]    Physical Exam:  General- normal in appearance, not in any acute distress    HEENT- pupils equally reactive to light, normal in size, no scleral icterus    Neck- supple    No JVD, no carotid bruit    Respiratory-bilateral air entry, basal rales  Cardiovascular-  Normal S1 and S2. No S3, S4 or murmurs.    GI-nontender nondistended bowel sounds positive    CNS-alert oriented x3, grossly nonfocal    Extremities- pulses normal bilaterally , no clubbing   1+ bipedal edema        Results Review:      Results from last 7 days   Lab Units 11/21/23  0024   WBC 10*3/mm3 9.92   HEMOGLOBIN g/dL 12.7*   PLATELETS 10*3/mm3 210     Results from last 7 days   Lab Units 11/25/23  0316 11/22/23  0239 11/21/23  0024   SODIUM mmol/L 138 140 139   POTASSIUM mmol/L 4.9 4.5 3.9   CHLORIDE mmol/L 94* 96* 91*   CO2 mmol/L 40.5* 40.8* 40.4*   BUN mg/dL 25* 22 25*   CREATININE mg/dL 0.67* 0.72* 0.80   CALCIUM mg/dL 9.8 9.6 9.6   GLUCOSE mg/dL 146* 167* 163*     Lab Results   Component Value Date    INR 0.82 (L) 12/08/2021    PROTIME 11.7 (L) 12/08/2021           Invalid input(s): \"PROT\", \"LABALBU\"  Results from last 7 days   Lab Units 11/26/23  0736   PH, ARTERIAL pH units 7.405   PO2 ART mm Hg 54.4*   PCO2, ARTERIAL mm Hg 66.2*   HCO3 ART mmol/L 41.5*     Imaging Results (Last 24 Hours)       ** No results found for the last 24 hours. **                 amLODIPine, 10 mg, Oral, " Nightly  clopidogrel, 75 mg, Oral, Daily  furosemide, 40 mg, Oral, Daily  heparin (porcine), 5,000 Units, Subcutaneous, Q12H  lisinopril, 20 mg, Oral, Daily  nicotine, 1 patch, Transdermal, Q24H  pantoprazole, 40 mg, Oral, Q AM  rosuvastatin, 10 mg, Oral, Daily  senna-docusate sodium, 2 tablet, Oral, BID  sodium chloride, 10 mL, Intravenous, Q12H  sodium chloride, 10 mL, Intravenous, Q12H           Medication Review:     Assessment & Plan     1: Admitted for acute on chronic hypercapnic on chronic hypoxic respiratory failure.    Initial impression was that it was due to volume overload and increased work of breathing which resulted in worsening hypercapnia.     2:  Patient may also have underlying sleep apnea and obesity hypoventilation syndrome.    Patient has refused the BiPAP.  Continue diuretics  #3:  Concern underlying COPD.  Continue nebs   continue oxygen to maintain saturation 88 to 92%.  Educated patient about benefits of BiPAP        DVT prophylaxis-continue             Kwaku Looney MD  11/27/23  13:19 EST

## 2023-11-27 NOTE — PLAN OF CARE
Goal Outcome Evaluation:           Progress: no change  Outcome Evaluation: Pt resting in bed at this time. Pt has been up to [facility] and used the urinal at bedside. PT worked with pt today. Pt has had complaints of pain and PRN medication has been given. No other acute changes or complaints at this time. Will continue with plan of care.

## 2023-11-27 NOTE — CASE MANAGEMENT/SOCIAL WORK
Discharge Planning Assessment  [facility]     Patient Name: Donaldo Roberts  MRN: [id number]  Today's Date: 11/27/2023    Admit Date: 11/24/2023    Plan: Pt was admitted to Swing bed on 11/24/23. SS discussed pt with [facility] Aaron ROBISON on this date. Pt has been approved through 11/30/23 with clinical updates due. Pt lives with his spouse Inna, adult son and plans to return home at discharge. Pt does not utilize home health services. Pt has a wheelchair, rolling walker, bedside commode, shower chair and home oxygen at 3 liters via [employer]. Pt would benefit from a hospital bed. Pt has requested a bipap if a candidate for home use. Pt's PCP is Duke Rosen. Pt doees not have a POA/living will. Pt's son to provide transportation at discharge. SS to follow.       Discharge Plan       Row Name 11/27/23 1002       Plan    Plan Pt was admitted to Swing bed on 11/24/23. SS discussed pt with [facility] Aaron ROBISON on this date. Pt has been approved through 11/30/23 with clinical updates due. Pt lives with his spouse Inna, adult son and plans to return home at discharge. Pt does not utilize home health services. Pt has a wheelchair, rolling walker, bedside commode, shower chair and home oxygen at 3 liters via [employer]. Pt would benefit from a hospital bed. Pt has requested a bipap if a candidate for home use. Pt's PCP is Duke Rosen. Pt doees not have a POA/living will. Pt's son to provide transportation at discharge. SS to follow.          GELACIO Luna

## 2023-11-27 NOTE — PROGRESS NOTES
Patient remains in swing bed status using bipap intermittently but not on consistently at night however remaining on baseline 3-4L NC and has been cooperative with therapy agreeing to 5 days minimum therapy period before reassessing. Patient with ultimate remaining to discharge home after completing swing bed stay

## 2023-11-27 NOTE — PLAN OF CARE
Goal Outcome Evaluation:              Outcome Evaluation: Patient resting in bed at this time. Patients respiratory rate elevated this shift, ANDERSON Lerma aware. C/o pain, PRN medications given. Patient refusing bed alarm, educated on importance. No other complaints. Will continue plan of care.

## 2023-11-27 NOTE — PROGRESS NOTES
Referring Provider: hospitalist  Reason for Consultation: Need for BiPAP and respiratory failure      Chief complaint-shortness of breath      Sub-overnight events reviewed.  Patient refused BiPAP.    Review of Systems  Positive for shortness of breath otherwise negative.        History  Past Medical History:   Diagnosis Date    Acid reflux     Anxiety     Arthritis     Carotid artery stenosis, symptomatic, right 11/23/2021    Essential hypertension 11/23/2021    Herpes     History of lipoma     History of transfusion     Hyperlipidemia     Inguinal hernia     Peptic ulcer     Stroke    ,   Past Surgical History:   Procedure Laterality Date    ABDOMINAL SURGERY      CARDIAC ELECTROPHYSIOLOGY PROCEDURE N/A 12/10/2021    Procedure: Pacemaker DC new;  Surgeon: Jony Monte MD;  Location: [facility] CATH INVASIVE LOCATION;  Service: Cardiology;  Laterality: N/A;    CAROTID ENDARTERECTOMY Left 2021    CHOLECYSTECTOMY WITH INTRAOPERATIVE CHOLANGIOGRAM N/A 07/25/2017    Procedure: CHOLECYSTECTOMY LAPAROSCOPIC INTRAOPERATIVE CHOLANGIOGRAM;  Surgeon: Luis Coe MD;  Location: [facility] OR;  Service:     HERNIA REPAIR      inguinal    SINUS SURGERY      TUMOR EXCISION     ,   Family History   Problem Relation Age of Onset    Stroke Mother     Heart disease Mother     Diabetes Mother     No Known Problems Father     Diabetes Sister    ,   Social History     Tobacco Use    Smoking status: Some Days     Packs/day: 0.25     Years: 55.00     Additional pack years: 0.00     Total pack years: 13.75     Types: Cigarettes     Passive exposure: Current    Smokeless tobacco: Never    Tobacco comments:     2 cigs per day/ A pack per week.   Vaping Use    Vaping Use: Never used   Substance Use Topics    Alcohol use: No    Drug use: No   ,   Medications Prior to Admission   Medication Sig Dispense Refill Last Dose    amLODIPine (NORVASC) 10 MG tablet Take 1 tablet by mouth Every Night. 90 tablet 1 Unknown    clopidogrel (PLAVIX)  75 MG tablet Take 1 tablet by mouth Daily. 30 tablet 11 Unknown    furosemide (LASIX) 20 MG tablet Take 1 tablet by mouth Daily As Needed (swelling).   Unknown    lisinopril (PRINIVIL,ZESTRIL) 20 MG tablet Take 1 tablet by mouth Daily.   Unknown    rosuvastatin (CRESTOR) 10 MG tablet Take 1 tablet by mouth Daily.   Unknown   , Scheduled Meds:  amLODIPine, 10 mg, Oral, Nightly  clopidogrel, 75 mg, Oral, Daily  furosemide, 40 mg, Oral, Daily  heparin (porcine), 5,000 Units, Subcutaneous, Q12H  lisinopril, 20 mg, Oral, Daily  nicotine, 1 patch, Transdermal, Q24H  pantoprazole, 40 mg, Oral, Q AM  rosuvastatin, 10 mg, Oral, Daily  senna-docusate sodium, 2 tablet, Oral, BID  sodium chloride, 10 mL, Intravenous, Q12H  sodium chloride, 10 mL, Intravenous, Q12H    , Continuous Infusions:    and Allergies:  Penicillins    Objective     Vital Signs   Temp:  [97.6 °F (36.4 °C)-98.3 °F (36.8 °C)] 97.6 °F (36.4 °C)  Heart Rate:  [76-93] 80  Resp:  [19-48] 20  BP: (118-147)/(60-78) 134/70    Physical Exam:             General-obese in appearance, not in any acute distress    HEENT-atraumatic  Neck-supple    Respiratory-respirations normal, not in any respiratory distress ,wearing nasal cannula oxygen    Cardiovascular-NSR  GI-bowel sounds grossly normal  CNS-nonfocal    Musculoskeletal -no edema  Extremities- no obvious deformity noticed     Psychiatric-mood good, good eye contact, alert awake oriented  Skin- no visible rash                                                                   Results Review:    LABS:    Lab Results   Component Value Date    GLUCOSE 146 (H) 11/25/2023    BUN 25 (H) 11/25/2023    CREATININE 0.67 (L) 11/25/2023    EGFRIFNONA 93 12/11/2021    BCR 37.3 (H) 11/25/2023    CO2 40.5 (H) 11/25/2023    CALCIUM 9.8 11/25/2023    ALBUMIN 3.5 11/25/2023    AST 12 11/19/2023    ALT 10 11/19/2023    WBC 9.92 11/21/2023    HGB 12.7 (L) 11/21/2023    HCT 43.0 11/21/2023    MCV 99.8 (H) 11/21/2023     11/21/2023      11/25/2023    K 4.9 11/25/2023    CL 94 (L) 11/25/2023    ANIONGAP 3.5 (L) 11/25/2023       Lab Results   Component Value Date    INR 0.82 (L) 12/08/2021    PROTIME 11.7 (L) 12/08/2021                 [facility] Reference Range & Units 11/18/23 02:18 11/18/23 06:53 11/18/23 14:24 11/19/23 08:05 11/21/23 04:16 11/22/23 06:03   pH, Arterial 7.350 - 7.450 pH units 7.326 (L) 7.392 7.333 (L) 7.455 (H) 7.388    pCO2, Arterial 35.0 - 45.0 mm Hg 89.2 (C) 77.3 (C) 93.9 (C) 63.9 (H) 73.8 (C)    pO2, Arterial 83.0 - 108.0 mm Hg 63.0 (L) 66.8 (L) 66.2 (L) 67.7 (L) 75.1 (L)    HCO3, Arterial 20.0 - 26.0 mmol/L 46.6 (H) 46.9 (H) 49.9 (H) 44.9 (H) 44.4 (H)    Base Excess 0.0 - 2.0 mmol/L 15.9 (H) 18.1 (H) 19.0 (H) 17.6 (H) 15.6 (H)    O2 Saturation, Arterial 94.0 - 99.0 % 91.9 (L) 94.9 92.4 (L) 94.8 95.6    CO2 Content 22 - 33 mmol/L 49.3 (H) 49.3 (H) 52.7 (H) 46.8 (H) 46.7 (H) 49.0 (H)   A-a DO2 0.0 - 300.0 mmHg 54.7 91.6 74.1 106.5 86.8    Carboxyhemoglobin 0 - 5 % 1.9 1.7 1.9 1.9 2.0    Methemoglobin 0.00 - 3.00 % 0.10 <-0.10 (L) 0.10 <-0.10 (L) <-0.10 (L)    Oxyhemoglobin 94 - 99 % 90.1 (L) 97.3 90.6 (L) 93.2 (L) 93.9 (L)    Carboxyhemoglobin Venous 0.0 - 5.0 %      1.9   Methemoglobin Venous 0.0 - 3.0 %      0.1   Oxyhemoglobin Venous 45.0 - 75.0 %      81.0 (H)   Hematocrit, Blood Gas 38.0 - 51.0 % 42.4 37.7 (L) 40.4 39.9 41.5    Hemoglobin, Blood Gas 14 - 18 g/dL 13.8 (L) 12.3 (L) 13.2 (L) 13.0 (L) 13.5 (L) 13.6 (L)   Site  Left Radial Right Brachial Left Radial Left Radial Left Radial Lab   Russell's Test  Positive N/A Positive Positive Positive    Modality  Nasal Cannula BiPap Nasal Cannula BiPap Nasal Cannula Nasal Cannula   FIO2 % 32 36 36 36 36 36   Flow Rate lpm 3.0  4.0  4.0 4.0   Ventilator Mode  NA NA NA NA NA NA   Set [facility] Resp Rate   24.0  24.0     IPAP   20  20     EPAP   8  8     Barometric Pressure for Blood Gas mmHg 724 724 725 727 723 726   Notified By  [id number]  [id number]   Notified Time   11/18/2023 02:21 11/18/2023 06:57 11/18/2023 14:29  11/21/2023 04:19 11/22/2023 06:10   Notified Who  ER  AND RN DR JASWANT merecr paged dr mercer   pH, Venous 7.320 - 7.420 pH Units      7.279 (L)   pCO2, Venous 41.0 - 51.0 mm Hg      98.2 (H)   pO2, Venous 27.0 - 53.0 mm Hg      50.7   HCO3, Venous 22.0 - 28.0 mmol/L      46.0 (H)   Base Excess 0.0 - 2.0 mmol/L      14.5 (H)   O2 Saturation, Venous 45.0 - 75.0 %      82.7 (H)   (C): Data is critically high  (L): Data is abnormally low  (H): Data is abnormally high  I reviewed the patient's new clinical results.  I reviewed the patient's new imaging results and agree with the interpretation.      Assessment & Plan     Acute hypercapnic on chronic hypoxic respiratory failure-  Likely due to volume overload.  Latest chest x-ray reviewed.      Continue diuretics to improve lung compliance and diffusion capacity.  Monitor renal function.  Increase dose of diuretics as tolerated by renal function.  Encouraged to use BiPAP but patient is claustrophobic and refusing to use it.    Continue oxygen to maintain saturation 88 to 92%.  Continue nebs    PATTY-recommended using BiPAP or CPAP.  Patient refused.  was educated about ill health effects of sleep apnea and what all effects it can have on health in long-term.  Which includes blood clots, arrhythmias, stroke, depression, hypothyroidism. was also educated about the pathophysiology of sleep apnea and how weight contributes in it.  Patient understood the conversation well and will try and do exercise and eat right kind of food to lose weight.         DVT prophylaxis-continue    Bedside rounds were done with RT and patient's nurse. All the lab and clinical findings were discussed with them and plan was also discussed in great detail.    Family member present-wife Case discussed with her and answered her questions.  Significant other        Echo-  Results for orders placed during the hospital encounter of  11/17/23    Adult Transthoracic Echo Complete W/ Cont if Necessary Per Protocol    Interpretation Summary    The quality of the study is limited with poor acoustic windows.  The contrast study was done using Lumason to enhance endocardial border to assess LV function.    Left ventricular wall thickness is consistent with mild concentric hypertrophy.    Left ventricular systolic function is normal.  Estimated LVEF =  66 - 70%.    Left ventricular diastolic function is consistent with (grade I) impaired relaxation    No significant valvular heart disease is noted.    Estimated right ventricular systolic pressure from tricuspid regurgitation is markedly elevated (>55 mmHg)    Severe pulmonary hypertension is present.    There is no evidence of pericardial effusion. .      Case d/w nurse and team   This service is provided via audio video tele medicine   I am in [place] and patient is in [facility]          Physical debility          Scotty Hoyt MD  11/27/23  07:19 EST

## 2023-11-28 LAB
ANION GAP SERPL CALCULATED.3IONS-SCNC: 10.8 MMOL/L (ref 5–15)
BUN SERPL-MCNC: 22 MG/DL (ref 8–23)
BUN/CREAT SERPL: 25.3 (ref 7–25)
CALCIUM SPEC-SCNC: 9.8 MG/DL (ref 8.6–10.5)
CHLORIDE SERPL-SCNC: 96 MMOL/L (ref 98–107)
CO2 SERPL-SCNC: 28.2 MMOL/L (ref 22–29)
CREAT SERPL-MCNC: 0.87 MG/DL (ref 0.76–1.27)
DEPRECATED RDW RBC AUTO: 47.8 FL (ref 37–54)
EGFRCR SERPLBLD CKD-EPI 2021: 92.2 ML/MIN/1.73
ERYTHROCYTE [DISTWIDTH] IN BLOOD BY AUTOMATED COUNT: 12.8 % (ref 12.3–15.4)
GLUCOSE SERPL-MCNC: 173 MG/DL (ref 65–99)
HCT VFR BLD AUTO: 45.1 % (ref 37.5–51)
HGB BLD-MCNC: 13 G/DL (ref 13–17.7)
MCH RBC QN AUTO: 29.5 PG (ref 26.6–33)
MCHC RBC AUTO-ENTMCNC: 28.8 G/DL (ref 31.5–35.7)
MCV RBC AUTO: 102.3 FL (ref 79–97)
PLATELET # BLD AUTO: 228 10*3/MM3 (ref 140–450)
PMV BLD AUTO: 10.3 FL (ref 6–12)
POTASSIUM SERPL-SCNC: 5.2 MMOL/L (ref 3.5–5.2)
RBC # BLD AUTO: 4.41 10*6/MM3 (ref 4.14–5.8)
SODIUM SERPL-SCNC: 135 MMOL/L (ref 136–145)
WBC NRBC COR # BLD AUTO: 6.89 10*3/MM3 (ref 3.4–10.8)

## 2023-11-28 PROCEDURE — 94799 UNLISTED PULMONARY SVC/PX: CPT

## 2023-11-28 PROCEDURE — 94664 DEMO&/EVAL PT USE INHALER: CPT

## 2023-11-28 PROCEDURE — 97116 GAIT TRAINING THERAPY: CPT

## 2023-11-28 PROCEDURE — 99233 SBSQ HOSP IP/OBS HIGH 50: CPT | Performed by: INTERNAL MEDICINE

## 2023-11-28 PROCEDURE — 25010000002 HEPARIN (PORCINE) PER 1000 UNITS: Performed by: STUDENT IN AN ORGANIZED HEALTH CARE EDUCATION/TRAINING PROGRAM

## 2023-11-28 PROCEDURE — 85027 COMPLETE CBC AUTOMATED: CPT | Performed by: STUDENT IN AN ORGANIZED HEALTH CARE EDUCATION/TRAINING PROGRAM

## 2023-11-28 PROCEDURE — 94660 CPAP INITIATION&MGMT: CPT

## 2023-11-28 PROCEDURE — 94761 N-INVAS EAR/PLS OXIMETRY MLT: CPT

## 2023-11-28 PROCEDURE — 97530 THERAPEUTIC ACTIVITIES: CPT

## 2023-11-28 PROCEDURE — 80048 BASIC METABOLIC PNL TOTAL CA: CPT | Performed by: STUDENT IN AN ORGANIZED HEALTH CARE EDUCATION/TRAINING PROGRAM

## 2023-11-28 RX ADMIN — IPRATROPIUM BROMIDE AND ALBUTEROL SULFATE 3 ML: .5; 2.5 SOLUTION RESPIRATORY (INHALATION) at 07:32

## 2023-11-28 RX ADMIN — CLOPIDOGREL BISULFATE 75 MG: 75 TABLET, FILM COATED ORAL at 08:07

## 2023-11-28 RX ADMIN — LISINOPRIL 20 MG: 10 TABLET ORAL at 08:07

## 2023-11-28 RX ADMIN — ROSUVASTATIN CALCIUM 10 MG: 10 TABLET, FILM COATED ORAL at 08:07

## 2023-11-28 RX ADMIN — Medication 10 ML: at 20:37

## 2023-11-28 RX ADMIN — HEPARIN SODIUM 5000 UNITS: 5000 INJECTION INTRAVENOUS; SUBCUTANEOUS at 08:08

## 2023-11-28 RX ADMIN — PANTOPRAZOLE SODIUM 40 MG: 40 TABLET, DELAYED RELEASE ORAL at 05:49

## 2023-11-28 RX ADMIN — HEPARIN SODIUM 5000 UNITS: 5000 INJECTION INTRAVENOUS; SUBCUTANEOUS at 20:38

## 2023-11-28 RX ADMIN — AMLODIPINE BESYLATE 10 MG: 10 TABLET ORAL at 20:37

## 2023-11-28 RX ADMIN — DOCUSATE SODIUM 50 MG AND SENNOSIDES 8.6 MG 2 TABLET: 8.6; 5 TABLET, FILM COATED ORAL at 08:07

## 2023-11-28 RX ADMIN — Medication 10 ML: at 08:08

## 2023-11-28 RX ADMIN — IPRATROPIUM BROMIDE AND ALBUTEROL SULFATE 3 ML: .5; 2.5 SOLUTION RESPIRATORY (INHALATION) at 18:27

## 2023-11-28 RX ADMIN — FUROSEMIDE 40 MG: 40 TABLET ORAL at 08:07

## 2023-11-28 NOTE — PLAN OF CARE
Goal Outcome Evaluation:           Progress: no change  Outcome Evaluation: Patient has done well today.  Participated with physical therapy.  O2 remains at 4L/nc,  O2 sats 91-94%.  Patient   wants to go home, MD aware.  Continue current plan of care.

## 2023-11-28 NOTE — CASE MANAGEMENT/SOCIAL WORK
Discharge Planning Assessment   [place]     Patient Name: Donaldo Roberts  MRN: [id number]  Today's Date: 11/28/2023    Admit Date: 11/24/2023       Discharge Plan       Row Name 11/28/23 1721       Plan    Plan Pt discussed with Dr. Stokes on this date. Pt's expected discharge date is 11/29/23 and plans to order home health services SS spoke with Pt and Pt's spouse who are agreeable to [facility], prefers a hospital bed via [facility] [employer] [place]. Pt lives with his spouse and son in [facility] and plans to reutrn home at discharge. Pt utilizes a wheelchair, rolling walker, bedside commode, shower chair and home oxygen at 3 liters via [facility] [place]. SS to follow.    Roadmap to Recovery Yes    Patient/Family in Agreement with Plan yes    Provided Post Acute Provider List? N/A  Prefers [facility], no preference of  in [facility].                  PAWAN WongW

## 2023-11-28 NOTE — PLAN OF CARE
Goal Outcome Evaluation:              Outcome Evaluation: Patient resting in bed at this time. Patient refusing bed alarm. VSS. No acute changes. Will continue plan of care.

## 2023-11-28 NOTE — PROGRESS NOTES
Patient briefly seen and examined at bedside today and questions answered.  He is progressing well with physical therapy and is inquiring about returning home.  Patient able to participate with physical therapy with standing guard assist only ambulate short distances with walker.  Patient wanting to return home and discuss potential discharge home tomorrow.  Notified by  regarding desire for hospital bed upon return home and family agreeable to take home tomorrow and will provide orders for this.  Patient is otherwise remained stable while in swing bed.

## 2023-11-28 NOTE — PROGRESS NOTES
"Progress Note Pulmonary      Subjective no new complaints.  Patient feels better.  However refused BiPAP last night    Interval History: No event        Review of Systems:    Reviewed ; unchanged       Vital Signs  Temp:  [97.8 °F (36.6 °C)-98.8 °F (37.1 °C)] 98.8 °F (37.1 °C)  Heart Rate:  [60-90] 60  Resp:  [18-24] 18  BP: (117-144)/(60-79) 139/61  Body mass index is 42.5 kg/m².    Intake/Output Summary (Last 24 hours) at 11/28/2023 1116  Last data filed at 11/28/2023 1100  Gross per 24 hour   Intake 120 ml   Output 1300 ml   Net -1180 ml     I/O this shift:  In: -   Out: 400 [Urine:400]    Physical Exam:  General- normal in appearance, not in any acute distress    HEENT- pupils equally reactive to light, normal in size, no scleral icterus    Neck- supple    No JVD, no carotid bruit    Respiratory-bilateral air entry, few basal rales  Cardiovascular-  Normal S1 and S2. No S3, S4 or murmurs.    GI-nontender nondistended bowel sounds positive    CNS-alert oriented x3, grossly nonfocal    Extremities- pulses normal bilaterally , no clubbing   1+ bipedal edema        Results Review:      Results from last 7 days   Lab Units 11/28/23  0111   WBC 10*3/mm3 6.89   HEMOGLOBIN g/dL 13.0   PLATELETS 10*3/mm3 228     Results from last 7 days   Lab Units 11/28/23  0111 11/25/23  0316 11/22/23  0239   SODIUM mmol/L 135* 138 140   POTASSIUM mmol/L 5.2 4.9 4.5   CHLORIDE mmol/L 96* 94* 96*   CO2 mmol/L 28.2 40.5* 40.8*   BUN mg/dL 22 25* 22   CREATININE mg/dL 0.87 0.67* 0.72*   CALCIUM mg/dL 9.8 9.8 9.6   GLUCOSE mg/dL 173* 146* 167*     Lab Results   Component Value Date    INR 0.82 (L) 12/08/2021    PROTIME 11.7 (L) 12/08/2021           Invalid input(s): \"PROT\", \"LABALBU\"  Results from last 7 days   Lab Units 11/26/23  0736   PH, ARTERIAL pH units 7.405   PO2 ART mm Hg 54.4*   PCO2, ARTERIAL mm Hg 66.2*   HCO3 ART mmol/L 41.5*     Imaging Results (Last 24 Hours)       ** No results found for the last 24 hours. **         "         amLODIPine, 10 mg, Oral, Nightly  clopidogrel, 75 mg, Oral, Daily  furosemide, 40 mg, Oral, Daily  heparin (porcine), 5,000 Units, Subcutaneous, Q12H  lisinopril, 20 mg, Oral, Daily  nicotine, 1 patch, Transdermal, Q24H  pantoprazole, 40 mg, Oral, Q AM  rosuvastatin, 10 mg, Oral, Daily  senna-docusate sodium, 2 tablet, Oral, BID  sodium chloride, 10 mL, Intravenous, Q12H  sodium chloride, 10 mL, Intravenous, Q12H           Medication Review:     Assessment & Plan     1: Admitted for acute on chronic hypercapnic on chronic hypoxic respiratory failure.    Initial impression was that it was due to volume overload and increased work of breathing which resulted in worsening hypercapnia.     2:  Patient may also have underlying sleep apnea and obesity hypoventilation syndrome.    Patient has refused the BiPAP.  Continue diuretics  #3:  Concern underlying COPD.  Continue nebs   continue oxygen to maintain saturation 88 to 92%.  Educated patient about benefits of BiPAP.  Will consider outpatient sleep study/split-night study       DVT prophylaxis-continue             Kwaku Looney MD  11/28/23  11:16 EST

## 2023-11-28 NOTE — THERAPY TREATMENT NOTE
Acute Care - Physical Therapy Treatment Note   [place]     Patient Name: Donaldo Roberts  : 1950  MRN: [id number]  Today's Date: 2023   Onset of Illness/Injury or Date of Surgery: 23  Visit Dx:   No diagnosis found.  Patient Active Problem List   Diagnosis    Benign prostatic hyperplasia with urinary obstruction    Herpesviral infection of penis    Shortness of breath    Carotid stenosis, asymptomatic, right    Essential hypertension    Dyslipidemia    Wenckebach block    PATTY (obstructive sleep apnea)    Pre-operative cardiovascular examination    Presence of cardiac pacemaker    Severe sinus bradycardia    Chronic bronchitis    Cigarette nicotine dependence without complication    Hypoxia    Detrusor instability    Tobacco abuse    Respiratory failure    Physical debility     Past Medical History:   Diagnosis Date    Acid reflux     Anxiety     Arthritis     Carotid artery stenosis, symptomatic, right 2021    Essential hypertension 2021    Herpes     History of lipoma     History of transfusion     Hyperlipidemia     Inguinal hernia     Peptic ulcer     Stroke      Past Surgical History:   Procedure Laterality Date    ABDOMINAL SURGERY      CARDIAC ELECTROPHYSIOLOGY PROCEDURE N/A 12/10/2021    Procedure: Pacemaker DC new;  Surgeon: Jony Monte MD;  Location: [facility] INVASIVE LOCATION;  Service: Cardiology;  Laterality: N/A;    CAROTID ENDARTERECTOMY Left     CHOLECYSTECTOMY WITH INTRAOPERATIVE CHOLANGIOGRAM N/A 2017    Procedure: CHOLECYSTECTOMY LAPAROSCOPIC INTRAOPERATIVE CHOLANGIOGRAM;  Surgeon: Luis Coe MD;  Location: [facility] OR;  Service:     HERNIA REPAIR      inguinal    SINUS SURGERY      TUMOR EXCISION       PT Assessment (last 12 hours)       PT Evaluation and Treatment       Row Name 23 1419          Physical Therapy Time and Intention    Subjective Information complains of;pain  B knee pain  -KM     Document Type therapy note (daily note)   -KM     Mode of Treatment individual therapy;physical therapy  -KM     Patient Effort good  -KM     Symptoms Noted During/After Treatment increased pain;fatigue  -KM       Row Name 11/28/23 1419          General Information    Patient Profile Reviewed yes  -KM     Patient Observations alert;cooperative;agree to therapy  -KM     Existing Precautions/Restrictions fall  -KM       Row Name 11/28/23 1419          Cognition    Affect/Mental Status (Cognition) WFL  -KM     Follows Commands (Cognition) WFL  -KM       Row Name 11/28/23 1419          Bed Mobility    Bed Mobility supine-sit  -KM     Supine-Sit [place] (Bed Mobility) standby assist  -KM       Row Name 11/28/23 1419          Transfers    Transfers sit-stand transfer;stand-sit transfer  -KM       Row Name 11/28/23 1419          Sit-Stand Transfer    Sit-Stand [place] (Transfers) standby assist  -KM     Assistive Device (Sit-Stand Transfers) walker, front-wheeled  -KM     Comment, (Sit-Stand Transfer) x3  -KM       Row Name 11/28/23 1419          Stand-Sit Transfer    Stand-Sit [place] (Transfers) standby assist  -KM     Assistive Device (Stand-Sit Transfers) walker, front-wheeled  -KM     Comment, (Stand-Sit Transfer) x3  -KM       Row Name 11/28/23 1419          Gait/Stairs (Locomotion)    Gait/Stairs Locomotion gait/ambulation independence;gait/ambulation assistive device;distance ambulated  -KM     [place] Level (Gait) contact guard  -KM     Assistive Device (Gait) walker, front-wheeled  -KM     Patient was able to Ambulate yes  -KM     Distance in Feet (Gait) 12, 12, 20  -KM     Pattern (Gait) step-through  -KM     Deviations/Abnormal Patterns (Gait) gait speed decreased;antalgic;ataxic  -KM       Row Name 11/28/23 1419          Safety Issues, Functional Mobility    Impairments Affecting Function (Mobility) balance;strength;pain;endurance/activity tolerance  -KM       Row Name             Wound 11/25/23 1058 Right gluteal MASD (Moisture  associated skin damage)    Wound - Properties Group Placement Date: 11/25/23 -DA Placement Time: 1058 -DA Side: Right  -DA Location: gluteal  -DA Primary Wound Type: MASD  -DA    Retired Wound - Properties Group Placement Date: 11/25/23 -DA Placement Time: 1058  -DA Side: Right  -DA Location: gluteal  -DA Primary Wound Type: MASD  -DA    Retired Wound - Properties Group Date first assessed: 11/25/23 -DA Time first assessed: 1058  -DA Side: Right  -DA Location: gluteal  -DA Primary Wound Type: MASD  -DA      Row Name 11/28/23 1419          Progress Summary (PT)    Daily Progress Summary (PT) Pt. was able to perform functional mobility skills w/ SBA. He was able to ambulate short distances w/ RW and CGA. He tolerated session well w/ complaints of knee pain. Pt. shows improvement and would continue to benefit from skilled PT services.  -KM               User Key  (r) = Recorded By, (t) = Taken By, (c) = Cosigned By      Initials Name Provider Type    Antonio Starks, PT Physical Therapist    Kayleigh Garduno, RN Registered Nurse                    Physical Therapy Education       Title: PT OT SLP Therapies (Done)       Topic: Physical Therapy (Done)       Point: Mobility training (Done)       Learning Progress Summary             Patient Acceptance, E,D, VU,NR by  at 11/27/2023 1210                         Point: Home exercise program (Done)       Learning Progress Summary             Patient Acceptance, E,D, VU,NR by  at 11/27/2023 1210                         Point: Body mechanics (Done)       Learning Progress Summary             Patient Acceptance, E,D, VU,NR by  at 11/27/2023 1210                         Point: Precautions (Done)       Learning Progress Summary             Patient Acceptance, E,D, VU,NR by  at 11/27/2023 1210                                         User Key       Initials Effective Dates Name Provider Type [facility] 06/16/21 -  Nan Mann, PT Physical Therapist PT                   PT Recommendation and Plan     Progress Summary (PT)  Daily Progress Summary (PT): Pt. was able to perform functional mobility skills w/ SBA. He was able to ambulate short distances w/ RW and CGA. He tolerated session well w/ complaints of knee pain. Pt. shows improvement and would continue to benefit from skilled PT services.       Time Calculation:    PT Charges       Row Name 11/28/23 1418             Time Calculation    PT Received On 11/28/23  -KM         Time Calculation- PT    Total Timed Code Minutes- PT 25 minute(s)  -KM                User Key  (r) = Recorded By, (t) = Taken By, (c) = Cosigned By      Initials Name Provider Type    Antonio Starks, PT Physical Therapist                  Therapy Charges for Today       Code Description Service Date Service Provider Modifiers Qty    72177101083 HC GAIT TRAINING EA 15 MIN 11/28/2023 Antonio Espino, PT GP 1    [id number]  PT THERAPEUTIC ACT EA 15 MIN 11/28/2023 Antonio Espino, PT GP 1            PT G-Codes  AM-PAC 6 Clicks Score (PT): 10    Antonio Espino PT  11/28/2023

## 2023-11-29 ENCOUNTER — READMISSION MANAGEMENT (OUTPATIENT)
Dept: CALL CENTER | Facility: HOSPITAL | Age: 73
End: 2023-11-29
Payer: MEDICARE

## 2023-11-29 VITALS
OXYGEN SATURATION: 94 % | DIASTOLIC BLOOD PRESSURE: 68 MMHG | WEIGHT: 287.8 LBS | TEMPERATURE: 98.3 F | RESPIRATION RATE: 20 BRPM | HEART RATE: 80 BPM | SYSTOLIC BLOOD PRESSURE: 140 MMHG | BODY MASS INDEX: 42.63 KG/M2 | HEIGHT: 69 IN

## 2023-11-29 PROCEDURE — 99233 SBSQ HOSP IP/OBS HIGH 50: CPT | Performed by: INTERNAL MEDICINE

## 2023-11-29 PROCEDURE — 99316 NF DSCHRG MGMT 30 MIN+: CPT | Performed by: STUDENT IN AN ORGANIZED HEALTH CARE EDUCATION/TRAINING PROGRAM

## 2023-11-29 PROCEDURE — 97530 THERAPEUTIC ACTIVITIES: CPT

## 2023-11-29 PROCEDURE — 97110 THERAPEUTIC EXERCISES: CPT

## 2023-11-29 PROCEDURE — 94664 DEMO&/EVAL PT USE INHALER: CPT

## 2023-11-29 PROCEDURE — 25010000002 HEPARIN (PORCINE) PER 1000 UNITS: Performed by: STUDENT IN AN ORGANIZED HEALTH CARE EDUCATION/TRAINING PROGRAM

## 2023-11-29 PROCEDURE — 94799 UNLISTED PULMONARY SVC/PX: CPT

## 2023-11-29 PROCEDURE — 94660 CPAP INITIATION&MGMT: CPT

## 2023-11-29 RX ORDER — FUROSEMIDE 40 MG/1
40 TABLET ORAL EVERY OTHER DAY
Qty: 15 TABLET | Refills: 0 | Status: SHIPPED | OUTPATIENT
Start: 2023-11-29 | End: 2023-12-29

## 2023-11-29 RX ORDER — PANTOPRAZOLE SODIUM 40 MG/1
40 TABLET, DELAYED RELEASE ORAL
Qty: 30 TABLET | Refills: 0 | Status: ON HOLD | OUTPATIENT
Start: 2023-11-30 | End: 2023-12-11

## 2023-11-29 RX ADMIN — FUROSEMIDE 40 MG: 40 TABLET ORAL at 08:45

## 2023-11-29 RX ADMIN — ACETAMINOPHEN 650 MG: 325 TABLET ORAL at 05:35

## 2023-11-29 RX ADMIN — ROSUVASTATIN CALCIUM 10 MG: 10 TABLET, FILM COATED ORAL at 08:45

## 2023-11-29 RX ADMIN — CLOPIDOGREL BISULFATE 75 MG: 75 TABLET, FILM COATED ORAL at 08:45

## 2023-11-29 RX ADMIN — HYDROXYZINE HYDROCHLORIDE 50 MG: 50 TABLET ORAL at 05:34

## 2023-11-29 RX ADMIN — VITAMINS A AND D OINTMENT 1 APPLICATION: 15.5; 53.4 OINTMENT TOPICAL at 08:46

## 2023-11-29 RX ADMIN — IPRATROPIUM BROMIDE AND ALBUTEROL SULFATE 3 ML: .5; 2.5 SOLUTION RESPIRATORY (INHALATION) at 07:27

## 2023-11-29 RX ADMIN — Medication 10 ML: at 08:45

## 2023-11-29 RX ADMIN — LISINOPRIL 20 MG: 10 TABLET ORAL at 08:45

## 2023-11-29 RX ADMIN — PANTOPRAZOLE SODIUM 40 MG: 40 TABLET, DELAYED RELEASE ORAL at 05:35

## 2023-11-29 RX ADMIN — HEPARIN SODIUM 5000 UNITS: 5000 INJECTION INTRAVENOUS; SUBCUTANEOUS at 08:46

## 2023-11-29 NOTE — DISCHARGE INSTR - APPOINTMENTS
You have a follow-up appointment scheduled with DR Duke Rosen on 12-5-23 at 1:00, office can be reached at [phone]

## 2023-11-29 NOTE — THERAPY DISCHARGE NOTE
Acute Care - Physical Therapy Treatment Note/Discharge   Demetrius     Patient Name: Donaldo Roberts  : 1950  MRN: [id number]  Today's Date: 2023   Onset of Illness/Injury or Date of Surgery: 23     Referring Physician: Jessie      Admit Date: 2023    Visit Dx:    ICD-10-CM ICD-9-CM   1. Physical debility  R53.81 799.3   2. Acute on chronic respiratory failure with hypercapnia  J96.22 518.84   3. Instability of knee joint, unspecified laterality  M25.369 718.86   4. Chronic heart failure with preserved ejection fraction  I50.32 428.9     Patient Active Problem List   Diagnosis    Benign prostatic hyperplasia with urinary obstruction    Herpesviral infection of penis    Shortness of breath    Carotid stenosis, asymptomatic, right    Essential hypertension    Dyslipidemia    Wenckebach block    PATTY (obstructive sleep apnea)    Pre-operative cardiovascular examination    Presence of cardiac pacemaker    Severe sinus bradycardia    Chronic bronchitis    Cigarette nicotine dependence without complication    Hypoxia    Detrusor instability    Tobacco abuse    Respiratory failure    Physical debility     Past Medical History:   Diagnosis Date    Acid reflux     Anxiety     Arthritis     Carotid artery stenosis, symptomatic, right 2021    Essential hypertension 2021    Herpes     History of lipoma     History of transfusion     Hyperlipidemia     Inguinal hernia     Peptic ulcer     Stroke      Past Surgical History:   Procedure Laterality Date    ABDOMINAL SURGERY      CARDIAC ELECTROPHYSIOLOGY PROCEDURE N/A 12/10/2021    Procedure: Pacemaker DC new;  Surgeon: Jony Monte MD;  Location: [facility] INVASIVE LOCATION;  Service: Cardiology;  Laterality: N/A;    CAROTID ENDARTERECTOMY Left     CHOLECYSTECTOMY WITH INTRAOPERATIVE CHOLANGIOGRAM N/A 2017    Procedure: CHOLECYSTECTOMY LAPAROSCOPIC INTRAOPERATIVE CHOLANGIOGRAM;  Surgeon: Lusi Coe MD;  Location: [facility]  OR;  Service:     HERNIA REPAIR      inguinal    SINUS SURGERY      TUMOR EXCISION         PT Assessment (last 12 hours)       PT Evaluation and Treatment       Row Name 11/29/23 1110          Physical Therapy Time and Intention    Subjective Information complains of;pain  knee pain  -KM     Document Type therapy note (daily note)  -KM     Mode of Treatment individual therapy;physical therapy  -KM     Patient Effort good  -KM     Symptoms Noted During/After Treatment fatigue;increased pain  -KM       Row Name 11/29/23 1110          General Information    Patient Profile Reviewed yes  -KM     Patient Observations alert;cooperative;agree to therapy  -KM     Existing Precautions/Restrictions fall  -KM       Row Name 11/29/23 1110          Cognition    Affect/Mental Status (Cognition) WFL  -KM     Follows Commands (Cognition) WFL  -KM       Row Name 11/29/23 1110          Bed Mobility    Bed Mobility supine-sit  -KM     Supine-Sit [place] (Bed Mobility) supervision  -KM       Row Name 11/29/23 1110          Transfers    Transfers sit-stand transfer;stand-sit transfer  -KM       Row Name 11/29/23 1110          Sit-Stand Transfer    Sit-Stand [place] (Transfers) standby assist  -KM     Comment, (Sit-Stand Transfer) x2  -KM       Row Name 11/29/23 1110          Stand-Sit Transfer    Stand-Sit [place] (Transfers) standby assist  -KM     Comment, (Stand-Sit Transfer) x2  -KM       Row Name 11/29/23 1110          Gait/Stairs (Locomotion)    Comment, (Gait/Stairs) pt. deferred d/t increased knee pain and wanting to wait to do ambulating at discharge  -KM       Row Name 11/29/23 1110          Safety Issues, Functional Mobility    Impairments Affecting Function (Mobility) balance;strength;pain;endurance/activity tolerance  -KM       Row Name 11/29/23 1110          Motor Skills    Comments, Therapeutic Exercise seated ther-ex  -KM     Additional Documentation Comments, Therapeutic Exercise (Row)  -KM       Row Name              Wound 11/25/23 1058 Right gluteal MASD (Moisture associated skin damage)    Wound - Properties Group Placement Date: 11/25/23 -DA Placement Time: 1058 -DA Side: Right  -DA Location: gluteal  -DA Primary Wound Type: MASD  -DA    Retired Wound - Properties Group Placement Date: 11/25/23 -DA Placement Time: 1058  -DA Side: Right  -DA Location: gluteal  -DA Primary Wound Type: MASD  -DA    Retired Wound - Properties Group Date first assessed: 11/25/23 -DA Time first assessed: 1058 -DA Side: Right  -DA Location: gluteal  -DA Primary Wound Type: MASD  -DA      Row Name 11/29/23 1110          Progress Summary (PT)    Daily Progress Summary (PT) Pt. was able to perform bed mobility w/ increased mobility. He was able to perform transfers w/ improved independence. He tolerated session well w/ complaints of knee pain. He was able to meet most goals. He demonstrated progress towards meeting other goals. Pt. would benefit from assistance at home w/ home health services at discharge.  -KM       Row Name 11/29/23 1110          Bed Mobility Goal 1 (PT)    Activity/Assistive Device (Bed Mobility Goal 1, PT) sit to supine/supine to sit  -KM     [place] Level/Cues Needed (Bed Mobility Goal 1, PT) modified independence  -KM     Time Frame (Bed Mobility Goal 1, PT) long term goal (LTG);by discharge  -KM     Progress/Outcomes (Bed Mobility Goal 1, PT) goal met  -KM       Row Name 11/29/23 1110          Transfer Goal 1 (PT)    Activity/Assistive Device (Transfer Goal 1, PT) bed-to-chair/chair-to-bed;walker, rolling  -KM     [place] Level/Cues Needed (Transfer Goal 1, PT) standby assist  -KM     Time Frame (Transfer Goal 1, PT) long term goal (LTG);by discharge  -KM     Progress/Outcome (Transfer Goal 1, PT) goal met  -KM       Row Name 11/29/23 1110          Gait Training Goal 1 (PT)    Activity/Assistive Device (Gait Training Goal 1, PT) gait (walking locomotion);assistive device use;walker, rolling  -KM      [place] Level (Gait Training Goal 1, PT) contact guard required  -KM     Distance (Gait Training Goal 1, PT) 30ft  -KM     Time Frame (Gait Training Goal 1, PT) long term goal (LTG);by discharge  -KM     Progress/Outcome (Gait Training Goal 1, PT) goal partially met  -KM       Row Name 11/29/23 1110          Discharge Summary (PT)    Additional Documentation Discharge Summary (PT) (Group)  -KM       Row Name 11/29/23 1110          Discharge Summary (PT)    Reason for Discharge (PT) patient met all goals and outcomes;patient discharged from this facility  -KM     Outcomes Achieved/Progress Made Upon Discharge (PT) goals partially achieved prior to discharge  -KM     Transfer to Another Level of Care or Facility (PT) home with assist recommended;home with home health recommended  -KM     Discharge Summary Statement (PT) Pt. was able to perform bed mobility w/ increased mobility. He was able to perform transfers w/ improved independence. He tolerated session well w/ complaints of knee pain. He was able to meet most goals. He demonstrated progress towards meeting other goals. Pt. would benefit from assistance at home w/ home health services at discharge.  -KM               User Key  (r) = Recorded By, (t) = Taken By, (c) = Cosigned By      Initials Name Provider Type    Antonio Starks, PT Physical Therapist    Kayleigh Garduno, RN Registered Nurse                      Physical Therapy Education       Title: PT OT SLP Therapies (Resolved)       Topic: Physical Therapy (Resolved)       Point: Mobility training (Resolved)       Learning Progress Summary             Patient Acceptance, E,D, VU,NR by AG at 11/27/2023 1210                         Point: Home exercise program (Resolved)       Learning Progress Summary             Patient Acceptance, E,D, VU,NR by AG at 11/27/2023 1210                         Point: Body mechanics (Resolved)       Learning Progress Summary             Patient Acceptance, E,D, VU,NR by   at 11/27/2023 1210                         Point: Precautions (Resolved)       Learning Progress Summary             Patient Acceptance, E,D, VU,NR by  at 11/27/2023 1210                                         User Key       Initials Effective Dates Name Provider Type Discipline     06/16/21 -  Nan Mann, PT Physical Therapist PT                    PT Recommendation and Plan  Anticipated Discharge Disposition (PT): home with assist, home with home health  Progress Summary (PT)  Daily Progress Summary (PT): Pt. was able to perform bed mobility w/ increased mobility. He was able to perform transfers w/ improved independence. He tolerated session well w/ complaints of knee pain. He was able to meet most goals. He demonstrated progress towards meeting other goals. Pt. would benefit from assistance at home w/ home health services at discharge.         Time Calculation:    PT Charges       Row Name 11/29/23 1110             Time Calculation    PT Received On 11/29/23  -KM         Time Calculation- PT    Total Timed Code Minutes- PT 25 minute(s)  -KM                User Key  (r) = Recorded By, (t) = Taken By, (c) = Cosigned By      Initials Name Provider Type     Antonio Espino, PT Physical Therapist                  Therapy Charges for Today       Code Description Service Date Service Provider Modifiers Qty    60781292883 HC GAIT TRAINING EA 15 MIN 11/28/2023 Antonio Espino, PT GP 1    35693136507 HC PT THERAPEUTIC ACT EA 15 MIN 11/28/2023 Antonio Espino, PT GP 1    69829539832 HC PT THER PROC EA 15 MIN 11/29/2023 Antonio Espino, PT GP 1    30157910531 HC PT THERAPEUTIC ACT EA 15 MIN 11/29/2023 Antonio Espino, PT GP 1            PT G-Codes  AM-PAC 6 Clicks Score (PT): 10    PT Discharge Summary  Anticipated Discharge Disposition (PT): home with assist, home with home health  Reason for Discharge: Discharge from facility  Outcomes Achieved: Patient able to partially acheive established goals, Able to achieve all  goals within established timeline  Discharge Destination: Home with assist    Antonio Espino, PT  11/29/2023

## 2023-11-29 NOTE — PROGRESS NOTES
"Progress Note Pulmonary      Subjective no new complaints.  Patient feels better.  Claims to be compliant with BiPAP but nursing staff reported that he usually takes a puff of within an hour    Interval History: No event        Review of Systems:    Reviewed ; unchanged       Vital Signs  Temp:  [97.5 °F (36.4 °C)-98.3 °F (36.8 °C)] 98.3 °F (36.8 °C)  Heart Rate:  [80-89] 80  Resp:  [18-20] 20  BP: (110-140)/(61-73) 140/68  Body mass index is 42.5 kg/m².    Intake/Output Summary (Last 24 hours) at 11/29/2023 1039  Last data filed at 11/29/2023 0300  Gross per 24 hour   Intake 840 ml   Output 1650 ml   Net -810 ml     No intake/output data recorded.    Physical Exam:  General- normal in appearance, not in any acute distress    HEENT- pupils equally reactive to light, normal in size, no scleral icterus    Neck- supple    No JVD, no carotid bruit    Respiratory-bilateral air entry, few basal rales no wheezing.      Cardiovascular-  Normal S1 and S2. No S3, S4 or murmurs.    GI-nontender nondistended bowel sounds positive    CNS-alert oriented x3, grossly nonfocal    Extremities- pulses normal bilaterally , no clubbing   1+ bipedal edema        Results Review:      Results from last 7 days   Lab Units 11/28/23  0111   WBC 10*3/mm3 6.89   HEMOGLOBIN g/dL 13.0   PLATELETS 10*3/mm3 228     Results from last 7 days   Lab Units 11/28/23  0111 11/25/23  0316   SODIUM mmol/L 135* 138   POTASSIUM mmol/L 5.2 4.9   CHLORIDE mmol/L 96* 94*   CO2 mmol/L 28.2 40.5*   BUN mg/dL 22 25*   CREATININE mg/dL 0.87 0.67*   CALCIUM mg/dL 9.8 9.8   GLUCOSE mg/dL 173* 146*     Lab Results   Component Value Date    INR 0.82 (L) 12/08/2021    PROTIME 11.7 (L) 12/08/2021           Invalid input(s): \"PROT\", \"LABALBU\"  Results from last 7 days   Lab Units 11/26/23  0736   PH, ARTERIAL pH units 7.405   PO2 ART mm Hg 54.4*   PCO2, ARTERIAL mm Hg 66.2*   HCO3 ART mmol/L 41.5*     Imaging Results (Last 24 Hours)       ** No results found for the last " 24 hours. **                 amLODIPine, 10 mg, Oral, Nightly  clopidogrel, 75 mg, Oral, Daily  furosemide, 40 mg, Oral, Daily  heparin (porcine), 5,000 Units, Subcutaneous, Q12H  lisinopril, 20 mg, Oral, Daily  nicotine, 1 patch, Transdermal, Q24H  pantoprazole, 40 mg, Oral, Q AM  rosuvastatin, 10 mg, Oral, Daily  senna-docusate sodium, 2 tablet, Oral, BID  sodium chloride, 10 mL, Intravenous, Q12H  sodium chloride, 10 mL, Intravenous, Q12H           Medication Review:     Assessment & Plan     1: Admitted for acute on chronic hypercapnic on chronic hypoxic respiratory failure.    Initial impression was that it was due to volume overload and increased work of breathing which resulted in worsening hypercapnia.     2:  Patient may also have underlying sleep apnea and obesity hypoventilation syndrome.    Patient has not been compliant with the BiPAP while in the hospital..      #3:  Concern underlying COPD.  Continue nebs   continue oxygen to maintain saturation 88 to 92%.  Educated patient about benefits of BiPAP.  Will consider outpatient sleep study/split-night study       DVT prophylaxis-continue             Kwaku Looney MD  11/29/23  10:39 EST

## 2023-11-29 NOTE — DISCHARGE PLACEMENT REQUEST
"Donaldo Roberts (73 y.o. Male)       Date of Birth   1950    Social Security Number       Address   [address] [place] [address]    Home Phone   [phone]    MRN   [id number]       Catholic   Religion    Marital Status                               Admission Date   23    Admission Type   Elective    Admitting Provider   Surendra Roman MD    Attending Provider   Donaldo Stokes DO    Department, Room/Bed   [address], 3346/1S       Discharge Date       Discharge Disposition       Discharge Destination                                 Attending Provider: Donaldo Stokes DO    Allergies: Penicillins    Isolation: None   Infection: None   Code Status: CPR    Ht: 175.3 cm (69\")   Wt: 131 kg (287 lb 12.8 oz)    Admission Cmt: None   Principal Problem: Physical debility [R53.81]                   Active Insurance as of 2023       Primary Coverage       Payor Plan Insurance Group Employer/Plan Group    [facility] [employer] MED ADV HMO        Payor Plan Address Payor Plan Phone Number Payor Plan Fax Number Effective Dates    [address] [address]   2023 - None Entered    [facility] [address]         Subscriber Name Subscriber Birth Date Member ID       DONALDO ROBERTS 1950 [id number]                     Emergency Contacts        (Rel.) Home Phone Work Phone Mobile Phone    RembrandtInna norris (Spouse) [phone] -- [phone]    Chad Roberts (Son) [phone] -- [phone]          [address] [address]  Dept. Phone:  [phone]  Dept. Fax:  [phone] Date Ordered: 2023         Patient:  Donaldo Roberts MRN:  [id number]   [address] [place] [address] :  1950  SSN:    Phone: [phone] Sex:  M     Weight: 131 kg (287 lb 12.8 oz)         Ht Readings from Last 1 Encounters:   23 175.3 cm (69\")         Hospital Bed  (Order ID: [id number])  "   Diagnosis:  Physical debility (R53.81 [ICD-10-CM] 799.3 [ICD-9-CM])  Acute on chronic respiratory failure with hypercapnia (J96.22 [ICD-10-CM] 518.84 [ICD-9-CM])  Instability of knee joint, unspecified laterality (M25.369 [ICD-10-CM] 718.86 [ICD-9-CM])   Quantity:  1     Semi-Electric Bed Type:  Head / Foot Adjustment, Side Rails & Mattress  Necessity: Requires Body Positioning to Alleviate Pain Not Possible in Ordinary Bed  Necessity: Requires Head of Bed Elevated More Than 30 Degrees Most of the Time  Due to: Heart Failure  Due to: Chronic Pulmonary Disease  Pressure Reducing Surface: No Pressure Reducing Surface Needed  Length of Need: 99 Months = Lifetime        Authorizing Provider's Phone: [phone]  Authorizing Provider:Donaldo Stokes DO  Authorizing Provider's NPI: [id number]  Order Entered By: Donaldo Stokes DO 2023  5:48 PM     Electronically signed by: Donaldo Stokes DO 2023  5:48 PM            History & Physical        Surendra Roman MD at 23              [facility]    Patient Identification:  Name:  Donaldo Roberts  Age:  73 y.o.  Sex:  male  :  1950  MRN:  [id number]   Visit Number:  [id number]  Admit Date: 2023   Room number:  3346/1S  Primary Care Physician:  Duke Rosen MD    Date of Admission: 2023     Subjective     Chief complaint:  Debility    History of presenting illness:  73 y.o. male who was admitted on 2023 to swing bed for ongoing PT and respiratory care.    For complete summary of hospitalization please see discharge summary dated . No further clinical updates to provide in meantime. Patient to be admitted to swing bed status for ongoing pt/ot and respiratory care via bipap monitoring.    Prior to admission I discussed patient's presentation and management with attending ER physician and verbal handoff  received.  ---------------------------------------------------------------------------------------------------------------------   A thorough systems based relevant ROS was asked and was negative except as noted above.  ---------------------------------------------------------------------------------------------------------------------   Past Medical History:   Diagnosis Date    Acid reflux     Anxiety     Arthritis     Carotid artery stenosis, symptomatic, right 11/23/2021    Essential hypertension 11/23/2021    Herpes     History of lipoma     History of transfusion     Hyperlipidemia     Inguinal hernia     Peptic ulcer     Stroke      Past Surgical History:   Procedure Laterality Date    ABDOMINAL SURGERY      CARDIAC ELECTROPHYSIOLOGY PROCEDURE N/A 12/10/2021    Procedure: Pacemaker DC new;  Surgeon: Jony Monte MD;  Location: [facility] INVASIVE LOCATION;  Service: Cardiology;  Laterality: N/A;    CAROTID ENDARTERECTOMY Left 2021    CHOLECYSTECTOMY WITH INTRAOPERATIVE CHOLANGIOGRAM N/A 07/25/2017    Procedure: CHOLECYSTECTOMY LAPAROSCOPIC INTRAOPERATIVE CHOLANGIOGRAM;  Surgeon: Luis Coe MD;  Location: [facility] OR;  Service:     HERNIA REPAIR      inguinal    SINUS SURGERY      TUMOR EXCISION       Family History   Problem Relation Age of Onset    Stroke Mother     Heart disease Mother     Diabetes Mother     No Known Problems Father     Diabetes Sister      Social History     Socioeconomic History    Marital status:     Number of children: 6   Tobacco Use    Smoking status: Some Days     Packs/day: 0.25     Years: 55.00     Additional pack years: 0.00     Total pack years: 13.75     Types: Cigarettes     Passive exposure: Current    Smokeless tobacco: Never    Tobacco comments:     2 cigs per day/ A pack per week.   Vaping Use    Vaping Use: Never used   Substance and Sexual Activity    Alcohol use: No    Drug use: No    Sexual activity: Defer      ---------------------------------------------------------------------------------------------------------------------   Allergies:  Penicillins  ---------------------------------------------------------------------------------------------------------------------   Medications below are reported home medications pulling from within the system; at this time, these medications have not been reconciled unless otherwise specified and are in the verification process for further verifcation as current home medications.      Prior to Admission Medications       Prescriptions Last Dose Informant Patient Reported? Taking?    amLODIPine (NORVASC) 10 MG tablet Unknown Pharmacy No No    Take 1 tablet by mouth Every Night.    clopidogrel (PLAVIX) 75 MG tablet Unknown Pharmacy No No    Take 1 tablet by mouth Daily.    furosemide (LASIX) 20 MG tablet Unknown Pharmacy Yes No    Take 1 tablet by mouth Daily As Needed (swelling).    lisinopril (PRINIVIL,ZESTRIL) 20 MG tablet Unknown Pharmacy Yes No    Take 1 tablet by mouth Daily.    rosuvastatin (CRESTOR) 10 MG tablet Unknown Pharmacy Yes No    Take 1 tablet by mouth Daily.          Objective     Vital Signs:  Temp:  [98 °F (36.7 °C)-98.6 °F (37 °C)] 98.6 °F (37 °C)  Heart Rate:  [80-90] 84  Resp:  [18-31] 18  BP: ()/(59-89) 120/65    No data found.  SpO2:  [90 %-95 %] 93 %  on  Flow (L/min):  [4-5] 4;   Device (Oxygen Therapy): nasal cannula;humidified  Body mass index is 42.5 kg/m².    Wt Readings from Last 3 Encounters:   11/24/23 131 kg (287 lb 12.8 oz)   11/21/23 131 kg (287 lb 12.8 oz)   11/10/23 109 kg (240 lb)      ----------------------------------------------------------------------------------------------------------------------  Physical Exam  Vitals and nursing note reviewed.   Constitutional:       General: He is not in acute distress.  HENT:      Mouth/Throat:      Mouth: Mucous membranes are moist.      Pharynx: Oropharynx is clear.   Eyes:      General: No  scleral icterus.     Extraocular Movements: Extraocular movements intact.   Cardiovascular:      Rate and Rhythm: Normal rate.      Pulses: Normal pulses.   Pulmonary:      Effort: Pulmonary effort is normal. No respiratory distress.      Breath sounds: No wheezing or rales.   Abdominal:      General: There is no distension.      Palpations: Abdomen is soft.   Musculoskeletal:      Right lower leg: Edema present.      Left lower leg: Edema present.   Skin:     General: Skin is warm and dry.   Neurological:      Mental Status: He is alert and oriented to person, place, and time. Mental status is at baseline.   Psychiatric:         Mood and Affect: Mood normal.         Behavior: Behavior normal.       --------------------------------------------------------------------------------------------------------------------  LABS:    CBC and coagulation:  Results from last 7 days   Lab Units 11/21/23  0024 11/19/23  0050 11/18/23  0555 11/18/23  0326   LACTATE mmol/L  --   --   --  0.9   WBC 10*3/mm3 9.92 8.39 8.27  --    HEMOGLOBIN g/dL 12.7* 12.3* 12.9*  --    HEMATOCRIT % 43.0 41.8 45.1  --    MCV fL 99.8* 100.2* 103.7*  --    MCHC g/dL 29.5* 29.4* 28.6*  --    PLATELETS 10*3/mm3 210 193 167  --      Acid/base balance:  Results from last 7 days   Lab Units 11/24/23  0739 11/21/23  0416 11/19/23  0805 11/18/23  1424 11/18/23  0653 11/18/23  0218   PH, ARTERIAL pH units 7.329* 7.388 7.455* 7.333* 7.392 7.326*   PO2 ART mm Hg 56.3* 75.1* 67.7* 66.2* 66.8* 63.0*   PCO2, ARTERIAL mm Hg 81.5* 73.8* 63.9* 93.9* 77.3* 89.2*   HCO3 ART mmol/L 42.8* 44.4* 44.9* 49.9* 46.9* 46.6*     Renal and electrolytes:  Results from last 7 days   Lab Units 11/22/23  0239 11/21/23  0024 11/20/23  0223 11/19/23  0050 11/18/23  0555 11/17/23  2357   SODIUM mmol/L 140 139 137 137 142 142   POTASSIUM mmol/L 4.5 3.9 3.9 3.8 4.6 4.9   CHLORIDE mmol/L 96* 91* 91* 92* 94* 97*   CO2 mmol/L 40.8* 40.4* 38.6* 39.2* 39.7* 41.4*   BUN mg/dL 22 25* 18 16 14 16  "  CREATININE mg/dL 0.72* 0.80 0.65* 0.61* 0.59* 0.67*   CALCIUM mg/dL 9.6 9.6 9.4 9.5 9.7 9.6   PHOSPHORUS mg/dL 3.5  --   --   --   --   --    GLUCOSE mg/dL 167* 163* 113* 143* 104* 107*     Estimated Creatinine Clearance: 126.2 mL/min (A) (by C-G formula based on SCr of 0.72 mg/dL (L)).    Liver and pancreatic function:  Results from last 7 days   Lab Units 11/22/23  0239 11/19/23  0050 11/18/23  0555   ALBUMIN g/dL 3.6 3.8 4.0   BILIRUBIN mg/dL  --  0.5 0.4   ALK PHOS U/L  --  75 80   AST (SGOT) U/L  --  12 18   ALT (SGPT) U/L  --  10 13     Endocrine function:  No results found for: \"HGBA1C\"  Point of care bedside glucose levels:      Lab Results   Component Value Date    TSH 0.681 12/08/2021    FREET4 1.11 12/08/2021     Cardiac:  Results from last 7 days   Lab Units 11/18/23  0326 11/17/23  2357   HSTROP T ng/L 30* 26*   PROBNP pg/mL  --  168.3       Cultures:  Lab Results   Component Value Date    COLORU Yellow 01/16/2023    CLARITYU Clear 01/16/2023    SPECGRAV 1.010 01/16/2023    PHUR 7.5 01/16/2023    GLUCOSEU Negative 12/10/2021    KETONESU Negative 01/16/2023    BLOODU Negative 12/10/2021    NITRITEU Negative 12/10/2021    LEUKOCYTESUR Negative 01/16/2023    BILIRUBINUR Negative 01/16/2023    UROBILINOGEN Normal 01/16/2023     Microbiology Results (last 10 days)       Procedure Component Value - Date/Time    Blood Culture - Blood, Arm, Right [[id number]]  (Normal) Collected: 11/18/23 0326    Lab Status: Final result Specimen: Blood from Arm, Right Updated: 11/23/23 0331     Blood Culture No growth at 5 days    Blood Culture - Blood, Arm, Left [[id number]]  (Normal) Collected: 11/18/23 0326    Lab Status: Final result Specimen: Blood from Arm, Left Updated: 11/23/23 0331     Blood Culture No growth at 5 days            No results found for: \"PREGTESTUR\", \"PREGSERUM\", \"HCG\", \"HCGQUANT\"  Pain Management Panel  More data may exist         Latest Ref Rng & Units 11/18/2023 9/5/2019   Pain Management Panel "   Amphetamine, Urine Qual Negative Negative  Negative    Barbiturates Screen, Urine Negative Negative  Negative    Benzodiazepine Screen, Urine Negative Negative  Negative    Buprenorphine, Screen, Urine Negative Negative  Negative    Cocaine Screen, Urine Negative Negative  Negative    Fentanyl, Urine Negative Negative  -   Methadone Screen , Urine Negative Negative  Negative    Methamphetamine, Ur Negative Negative  -       I have personally looked at the labs and they are summarized above.  ----------------------------------------------------------------------------------------------------------------------  Detailed radiology reports for the last 24 hours:    Imaging Results (Last 24 Hours)       ** No results found for the last 24 hours. **          Final impressions for the last 30 days of radiology reports:    XR Chest 1 View    Result Date: 11/23/2023  Impression:  Lung base atetlectasis vs consolidation. Pulmonary edema is also in the differential. To TALK to On Call [phone]  Patient Name:BILLIE MELISSA   MR NO: [id number]  Accession No:  Melania Velez MD Electronically Signed   Date Of Exam Request:11/23/2023 9:40:40 AM EST Date & Time Of Report: 11/23/2023 11:11:43 AM EST  Patient Name:BILLIE MELISSA   MR NO: [id number]  Accession No:   This report was finalized on 11/23/2023 2:20 PM by Rambo Felix MD.      XR Ankle 3+ View Left    Result Date: 11/19/2023    Diffuse soft tissue swelling, but no acute fracture or dislocation.   This report was finalized on 11/19/2023 11:11 AM by Dr. Jeremy Jenkins MD.      CT Angiogram Chest Pulmonary Embolism    Result Date: 11/18/2023   NEGATIVE FOR PULMONARY EMBOLISM.  CARDIOMEGALY.  This report was finalized on 11/18/2023 5:03 AM by Yakelin Turk MD.      XR Chest 1 View    Result Date: 11/18/2023  FINDINGS/IMPRESSION:  Cardiomegaly. Multifocal infiltrates/edema. No pneumothorax or pleural effusion. No acute fracture. Left-sided pacemaker.    This  report was finalized on 11/18/2023 1:07 AM by Alex Pallas, DO.         Assessment & Plan       #Acute hypercapnic on chronic hypoxemic respiratory failure  #Acute heart failure with preserved ejection fraction  #Myocardial injury secondary to hypoxia from above  #History of obstructive sleep apnea, noncompliant with CPAP  #Obesity, suspected underlying obesity hypoventilation  #Severe PAH  #Chronic hypoxemic respiratory failure on 3 L nasal cannula  #Chronic smoking tobacco use  #COPD not in exacerbation  -Echo reviewed and patient with severe PAH and evidence of right heart failure with preserved EF, diastolic dysfunction.  -Baseline 3-4L NC  -Patient will need close outpatient HF clinic f/u as well as pulmonology  -Lasix dose increased to 40mg PO and cont to monitor I&Os  -Repeat vbg in am to monitor bipap settings and adjust prn, will need pulm RX to arrange home bipap. Cont bipap qhs and prn     #Chronic illness associated debility  -Admit to swing bed for ongoing pt/ot. Has agreed to short term period but ultimately planning to d/c home          - - Chronic - -     #S/p PPM  #Right carotid artery stenosis  #Essential hypertension  #History of ischemic stroke  #Anxiety  #GERD      VTE Prophylaxis:   Mechanical Order History:       None          Pharmalogical Order History:        Ordered     Dose Route Frequency Stop    11/24/23 4385  heparin (porcine) 5000 UNIT/ML injection 5,000 Units         5,000 Units SC Every 12 Hours Scheduled --                      Code Status and Medical Interventions:   Ordered at: 11/24/23 9805     Code Status (Patient has no pulse and is not breathing):    CPR (Attempt to Resuscitate)     Medical Interventions (Patient has pulse or is breathing):    Full Support        Disposition: Admit to swing bed    Surendra Roman MD  [facility] Hospitalist  11/24/23  19:46 EST        Electronically signed by Surendra Roman MD at 11/24/23 9826

## 2023-11-29 NOTE — DISCHARGE INSTR - LAB
You have a follow-up scheduled with DR PIA MORALES on 12-5-23 at 1:00, office can be reached at [phone]

## 2023-11-29 NOTE — CASE MANAGEMENT/SOCIAL WORK
Discharge Planning Assessment   Demetrius     Patient Name: Donaldo Roberts  MRN: [id number]  Today's Date: 11/29/2023    Admit Date: 11/24/2023            Discharge Plan       Row Name 11/29/23 0848       Plan    Plan SS faxed hospital bed to [facility] fax [phone]. SS to follow.                  PAWAN WongW

## 2023-11-29 NOTE — DISCHARGE PLACEMENT REQUEST
"Donaldo Roberts (73 y.o. Male)       Date of Birth   1950    Social Security Number       Address   [address] [place] [address]    Home Phone   [phone]    MRN   [id number]       Worship   Judaism    Marital Status                               Admission Date   23    Admission Type   Elective    Admitting Provider   Surendra Roman MD    Attending Provider   Donaldo Stokes DO    Department, Room/Bed   [address], [address]346/       Discharge Date       Discharge Disposition   Home-Health Care [facility]    Discharge Destination                                 Attending Provider: Donaldo Stokes DO    Allergies: Penicillins    Isolation: None   Infection: None   Code Status: CPR    Ht: 175.3 cm (69\")   Wt: 131 kg (287 lb 12.8 oz)    Admission Cmt: None   Principal Problem: Physical debility [R53.81]                   Active Insurance as of 2023       Primary Coverage       Payor Plan Insurance Group Employer/Plan Group    [facility] MEDICARE REPLACEMENT WELLCARE MED ADV HMO        Payor Plan Address Payor Plan Phone Number Payor Plan Fax Number Effective Dates    [address] [address]   2023 - None Entered    [facility] [id number]         Subscriber Name Subscriber Birth Date Member ID       DONALDO ROBERTS 1950 [id number]                     Emergency Contacts        (Rel.) Home Phone Work Phone Mobile Phone    Coto LaurelInna norris (Spouse) [phone] -- [phone]    Chad Roberts (Son) [phone] -- [phone]          [address] [address]  Phone:  [phone]  Fax:  [phone] Date: 2023      Ambulatory Referral to Home Health (Hospital)     Patient:  Donaldo Roberts MRN:  [id number]   [address] [place] [address] :  1950  SSN:    Phone: [phone] Sex:  M      INSURANCE PAYOR PLAN GROUP # SUBSCRIBER ID   Primary:    [employer] [facility] MEDICARE REPLACEMENT " [id number]   [id number]      Referring Provider Information:  BILLIE STOKES Phone: [phone] Fax: [phone]       Referral Information:   # Visits:  999 Referral Type: Home Health [42]   Urgency:  Routine Referral Reason: Specialty Services Required   Start Date: Nov 29, 2023 End Date:  To be determined by Insurer   Diagnosis: Physical debility (R53.81 [ICD-10-CM] 799.3 [ICD-9-CM])  Acute on chronic respiratory failure with hypercapnia (J96.22 [ICD-10-CM] 518.84 [ICD-9-CM])  Instability of knee joint, unspecified laterality (M25.369 [ICD-10-CM] 718.86 [ICD-9-CM])  Chronic heart failure with preserved ejection fraction (I50.32 [ICD-10-CM] 428.9 [ICD-9-CM])      Refer to Dept:   Refer to Provider:   Refer to Provider Phone:   Refer to Facility:       Face to Face Visit Date: 11/29/2023  Follow-up provider for Plan of Care? I treated the patient in an acute care facility and will not continue treatment after discharge.  Follow-up provider: PIA MORALES [[id number]]  Reason/Clinical Findings: chronic resp failure, decompensated HFpEF, severe OA of knees  Describe mobility limitations that make leaving home difficult: same as above  Nursing/Therapeutic Services Requested: Physical Therapy  Nursing/Therapeutic Services Requested: Occupational Therapy  Frequency: 1 Week 1     This document serves as a request of services and does not constitute Insurance authorization or approval of services.  To determine eligibility, please contact the members Insurance carrier to verify and review coverage.     If you have medical questions regarding this request for services. Please contact [address] at [phone] during normal business hours.        Authorizing Provider:Billie Stokes DO  Authorizing Provider's NPI: [id number]  Order Entered By: Billie Stokes DO 11/29/2023  9:44 AM     Electronically signed by: Billie Stokes DO 11/29/2023  9:44 AM          History & Physical        SwineSurendra mendes MD at  23              [facility] HISTORY AND PHYSICAL    Patient Identification:  Name:  Donaldo Roberts  Age:  73 y.o.  Sex:  male  :  1950  MRN:  [id number]   Visit Number:  [id number]  Admit Date: 2023   Room number:  3346/1S  Primary Care Physician:  Duke Rosen MD    Date of Admission: 2023     Subjective     Chief complaint:  Debility    History of presenting illness:  73 y.o. male who was admitted on 2023 to swing bed for ongoing PT and respiratory care.    For complete summary of hospitalization please see discharge summary dated . No further clinical updates to provide in meantime. Patient to be admitted to swing bed status for ongoing pt/ot and respiratory care via bipap monitoring.    Prior to admission I discussed patient's presentation and management with attending ER physician and verbal handoff received.  ---------------------------------------------------------------------------------------------------------------------   A thorough systems based relevant ROS was asked and was negative except as noted above.  ---------------------------------------------------------------------------------------------------------------------   Past Medical History:   Diagnosis Date    Acid reflux     Anxiety     Arthritis     Carotid artery stenosis, symptomatic, right 2021    Essential hypertension 2021    Herpes     History of lipoma     History of transfusion     Hyperlipidemia     Inguinal hernia     Peptic ulcer     Stroke      Past Surgical History:   Procedure Laterality Date    ABDOMINAL SURGERY      CARDIAC ELECTROPHYSIOLOGY PROCEDURE N/A 12/10/2021    Procedure: Pacemaker DC new;  Surgeon: Jony Monte MD;  Location: [facility] INVASIVE LOCATION;  Service: Cardiology;  Laterality: N/A;    CAROTID ENDARTERECTOMY Left     CHOLECYSTECTOMY WITH INTRAOPERATIVE CHOLANGIOGRAM N/A 2017    Procedure: CHOLECYSTECTOMY  LAPAROSCOPIC INTRAOPERATIVE CHOLANGIOGRAM;  Surgeon: Luis Coe MD;  Location: [facility];  Service:     HERNIA REPAIR      inguinal    SINUS SURGERY      TUMOR EXCISION       Family History   Problem Relation Age of Onset    Stroke Mother     Heart disease Mother     Diabetes Mother     No Known Problems Father     Diabetes Sister      Social History     Socioeconomic History    Marital status:     Number of children: 6   Tobacco Use    Smoking status: Some Days     Packs/day: 0.25     Years: 55.00     Additional pack years: 0.00     Total pack years: 13.75     Types: Cigarettes     Passive exposure: Current    Smokeless tobacco: Never    Tobacco comments:     2 cigs per day/ A pack per week.   Vaping Use    Vaping Use: Never used   Substance and Sexual Activity    Alcohol use: No    Drug use: No    Sexual activity: Defer     ---------------------------------------------------------------------------------------------------------------------   Allergies:  Penicillins  ---------------------------------------------------------------------------------------------------------------------   Medications below are reported home medications pulling from within the system; at this time, these medications have not been reconciled unless otherwise specified and are in the verification process for further verifcation as current home medications.      Prior to Admission Medications       Prescriptions Last Dose Informant Patient Reported? Taking?    amLODIPine (NORVASC) 10 MG tablet Unknown Pharmacy No No    Take 1 tablet by mouth Every Night.    clopidogrel (PLAVIX) 75 MG tablet Unknown Pharmacy No No    Take 1 tablet by mouth Daily.    furosemide (LASIX) 20 MG tablet Unknown Pharmacy Yes No    Take 1 tablet by mouth Daily As Needed (swelling).    lisinopril (PRINIVIL,ZESTRIL) 20 MG tablet Unknown Pharmacy Yes No    Take 1 tablet by mouth Daily.    rosuvastatin (CRESTOR) 10 MG tablet Unknown Pharmacy Yes No     Take 1 tablet by mouth Daily.          Objective     Vital Signs:  Temp:  [98 °F (36.7 °C)-98.6 °F (37 °C)] 98.6 °F (37 °C)  Heart Rate:  [80-90] 84  Resp:  [18-31] 18  BP: ()/(59-89) 120/65    No data found.  SpO2:  [90 %-95 %] 93 %  on  Flow (L/min):  [4-5] 4;   Device (Oxygen Therapy): nasal cannula;humidified  Body mass index is 42.5 kg/m².    Wt Readings from Last 3 Encounters:   11/24/23 131 kg (287 lb 12.8 oz)   11/21/23 131 kg (287 lb 12.8 oz)   11/10/23 109 kg (240 lb)      ----------------------------------------------------------------------------------------------------------------------  Physical Exam  Vitals and nursing note reviewed.   Constitutional:       General: He is not in acute distress.  HENT:      Mouth/Throat:      Mouth: Mucous membranes are moist.      Pharynx: Oropharynx is clear.   Eyes:      General: No scleral icterus.     Extraocular Movements: Extraocular movements intact.   Cardiovascular:      Rate and Rhythm: Normal rate.      Pulses: Normal pulses.   Pulmonary:      Effort: Pulmonary effort is normal. No respiratory distress.      Breath sounds: No wheezing or rales.   Abdominal:      General: There is no distension.      Palpations: Abdomen is soft.   Musculoskeletal:      Right lower leg: Edema present.      Left lower leg: Edema present.   Skin:     General: Skin is warm and dry.   Neurological:      Mental Status: He is alert and oriented to person, place, and time. Mental status is at baseline.   Psychiatric:         Mood and Affect: Mood normal.         Behavior: Behavior normal.       --------------------------------------------------------------------------------------------------------------------  LABS:    CBC and coagulation:  Results from last 7 days   Lab Units 11/21/23  0024 11/19/23  0050 11/18/23  0555 11/18/23  0326   LACTATE mmol/L  --   --   --  0.9   WBC 10*3/mm3 9.92 8.39 8.27  --    HEMOGLOBIN g/dL 12.7* 12.3* 12.9*  --    HEMATOCRIT % 43.0 41.8 45.1   "--    MCV fL 99.8* 100.2* 103.7*  --    MCHC g/dL 29.5* 29.4* 28.6*  --    PLATELETS 10*3/mm3 210 193 167  --      Acid/base balance:  Results from last 7 days   Lab Units 11/24/23  0739 11/21/23  0416 11/19/23  0805 11/18/23  1424 11/18/23  0653 11/18/23  0218   PH, ARTERIAL pH units 7.329* 7.388 7.455* 7.333* 7.392 7.326*   PO2 ART mm Hg 56.3* 75.1* 67.7* 66.2* 66.8* 63.0*   PCO2, ARTERIAL mm Hg 81.5* 73.8* 63.9* 93.9* 77.3* 89.2*   HCO3 ART mmol/L 42.8* 44.4* 44.9* 49.9* 46.9* 46.6*     Renal and electrolytes:  Results from last 7 days   Lab Units 11/22/23 0239 11/21/23  0024 11/20/23  0223 11/19/23  0050 11/18/23  0555 11/17/23  2357   SODIUM mmol/L 140 139 137 137 142 142   POTASSIUM mmol/L 4.5 3.9 3.9 3.8 4.6 4.9   CHLORIDE mmol/L 96* 91* 91* 92* 94* 97*   CO2 mmol/L 40.8* 40.4* 38.6* 39.2* 39.7* 41.4*   BUN mg/dL 22 25* 18 16 14 16   CREATININE mg/dL 0.72* 0.80 0.65* 0.61* 0.59* 0.67*   CALCIUM mg/dL 9.6 9.6 9.4 9.5 9.7 9.6   PHOSPHORUS mg/dL 3.5  --   --   --   --   --    GLUCOSE mg/dL 167* 163* 113* 143* 104* 107*     Estimated Creatinine Clearance: 126.2 mL/min (A) (by C-G formula based on SCr of 0.72 mg/dL (L)).    Liver and pancreatic function:  Results from last 7 days   Lab Units 11/22/23 0239 11/19/23  0050 11/18/23  0555   ALBUMIN g/dL 3.6 3.8 4.0   BILIRUBIN mg/dL  --  0.5 0.4   ALK PHOS U/L  --  75 80   AST (SGOT) U/L  --  12 18   ALT (SGPT) U/L  --  10 13     Endocrine function:  No results found for: \"HGBA1C\"  Point of care bedside glucose levels:      Lab Results   Component Value Date    TSH 0.681 12/08/2021    FREET4 1.11 12/08/2021     Cardiac:  Results from last 7 days   Lab Units 11/18/23  0326 11/17/23  2357   HSTROP T ng/L 30* 26*   PROBNP pg/mL  --  168.3       Cultures:  Lab Results   Component Value Date    COLORU Yellow 01/16/2023    CLARITYU Clear 01/16/2023    SPECGRAV 1.010 01/16/2023    PHUR 7.5 01/16/2023    GLUCOSEU Negative 12/10/2021    KETONESU Negative 01/16/2023    BLOODU " "Negative 12/10/2021    NITRITEU Negative 12/10/2021    LEUKOCYTESUR Negative 01/16/2023    BILIRUBINUR Negative 01/16/2023    UROBILINOGEN Normal 01/16/2023     Microbiology Results (last 10 days)       Procedure Component Value - Date/Time    Blood Culture - Blood, Arm, Right [[id number]]  (Normal) Collected: 11/18/23 0326    Lab Status: Final result Specimen: Blood from Arm, Right Updated: 11/23/23 0331     Blood Culture No growth at 5 days    Blood Culture - Blood, Arm, Left [[id number]]  (Normal) Collected: 11/18/23 0326    Lab Status: Final result Specimen: Blood from Arm, Left Updated: 11/23/23 0331     Blood Culture No growth at 5 days            No results found for: \"PREGTESTUR\", \"PREGSERUM\", \"HCG\", \"HCGQUANT\"  Pain Management Panel  More data may exist         Latest Ref Rng & Units 11/18/2023 9/5/2019   Pain Management Panel   Amphetamine, Urine Qual Negative Negative  Negative    Barbiturates Screen, Urine Negative Negative  Negative    Benzodiazepine Screen, Urine Negative Negative  Negative    Buprenorphine, Screen, Urine Negative Negative  Negative    Cocaine Screen, Urine Negative Negative  Negative    Fentanyl, Urine Negative Negative  -   Methadone Screen , Urine Negative Negative  Negative    Methamphetamine, Ur Negative Negative  -       I have personally looked at the labs and they are summarized above.  ----------------------------------------------------------------------------------------------------------------------  Detailed radiology reports for the last 24 hours:    Imaging Results (Last 24 Hours)       ** No results found for the last 24 hours. **          Final impressions for the last 30 days of radiology reports:    XR Chest 1 View    Result Date: 11/23/2023  Impression:  Lung base atetlectasis vs consolidation. Pulmonary edema is also in the differential. To TALK to On Call [phone]  Patient Name:BILLIE MELISSA   MR NO: [id number]  Accession No:  Melania Velez MD " Electronically Signed   Date Of Exam Request:11/23/2023 9:40:40 AM EST Date & Time Of Report: 11/23/2023 11:11:43 AM EST  Patient Name:BILLIE MELISSA   MR NO: [id number]  Accession No:   This report was finalized on 11/23/2023 2:20 PM by Rambo Felix MD.      XR Ankle 3+ View Left    Result Date: 11/19/2023    Diffuse soft tissue swelling, but no acute fracture or dislocation.   This report was finalized on 11/19/2023 11:11 AM by Dr. Jeremy Jenkins MD.      CT Angiogram Chest Pulmonary Embolism    Result Date: 11/18/2023   NEGATIVE FOR PULMONARY EMBOLISM.  CARDIOMEGALY.  This report was finalized on 11/18/2023 5:03 AM by Yakelin Turk MD.      XR Chest 1 View    Result Date: 11/18/2023  FINDINGS/IMPRESSION:  Cardiomegaly. Multifocal infiltrates/edema. No pneumothorax or pleural effusion. No acute fracture. Left-sided pacemaker.    This report was finalized on 11/18/2023 1:07 AM by Alex Pallas, DO.         Assessment & Plan       #Acute hypercapnic on chronic hypoxemic respiratory failure  #Acute heart failure with preserved ejection fraction  #Myocardial injury secondary to hypoxia from above  #History of obstructive sleep apnea, noncompliant with CPAP  #Obesity, suspected underlying obesity hypoventilation  #Severe PAH  #Chronic hypoxemic respiratory failure on 3 L nasal cannula  #Chronic smoking tobacco use  #COPD not in exacerbation  -Echo reviewed and patient with severe PAH and evidence of right heart failure with preserved EF, diastolic dysfunction.  -Baseline 3-4L NC  -Patient will need close outpatient HF clinic f/u as well as pulmonology  -Lasix dose increased to 40mg PO and cont to monitor I&Os  -Repeat vbg in am to monitor bipap settings and adjust prn, will need pulm RX to arrange home bipap. Cont bipap qhs and prn     #Chronic illness associated debility  -Admit to swing bed for ongoing pt/ot. Has agreed to short term period but ultimately planning to d/c home          - - Chronic - -     #S/p  PPM  #Right carotid artery stenosis  #Essential hypertension  #History of ischemic stroke  #Anxiety  #GERD      VTE Prophylaxis:   Mechanical Order History:       None          Pharmalogical Order History:        Ordered     Dose Route Frequency Stop    11/24/23 1357  heparin (porcine) 5000 UNIT/ML injection 5,000 Units         5,000 Units SC Every 12 Hours Scheduled --                      Code Status and Medical Interventions:   Ordered at: 11/24/23 1357     Code Status (Patient has no pulse and is not breathing):    CPR (Attempt to Resuscitate)     Medical Interventions (Patient has pulse or is breathing):    Full Support        Disposition: Admit to swing bed    Surendra Roman MD  [facility]  11/24/23  19:46 EST        Electronically signed by Surendra Roman MD at 11/24/23 1949          Physician Progress Notes (most recent note)        Kwaku Looney MD at 11/29/23 1039          Progress Note Pulmonary      Subjective no new complaints.  Patient feels better.  Claims to be compliant with BiPAP but nursing staff reported that he usually takes a puff of within an hour    Interval History: No event        Review of Systems:    Reviewed ; unchanged       Vital Signs  Temp:  [97.5 °F (36.4 °C)-98.3 °F (36.8 °C)] 98.3 °F (36.8 °C)  Heart Rate:  [80-89] 80  Resp:  [18-20] 20  BP: (110-140)/(61-73) 140/68  Body mass index is 42.5 kg/m².    Intake/Output Summary (Last 24 hours) at 11/29/2023 1039  Last data filed at 11/29/2023 0300  Gross per 24 hour   Intake 840 ml   Output 1650 ml   Net -810 ml     No intake/output data recorded.    Physical Exam:  General- normal in appearance, not in any acute distress    HEENT- pupils equally reactive to light, normal in size, no scleral icterus    Neck- supple    No JVD, no carotid bruit    Respiratory-bilateral air entry, few basal rales no wheezing.      Cardiovascular-  Normal S1 and S2. No S3, S4 or murmurs.    GI-nontender nondistended bowel sounds  "positive    CNS-alert oriented x3, grossly nonfocal    Extremities- pulses normal bilaterally , no clubbing   1+ bipedal edema        Results Review:      Results from last 7 days   Lab Units 11/28/23  0111   WBC 10*3/mm3 6.89   HEMOGLOBIN g/dL 13.0   PLATELETS 10*3/mm3 228     Results from last 7 days   Lab Units 11/28/23  0111 11/25/23  0316   SODIUM mmol/L 135* 138   POTASSIUM mmol/L 5.2 4.9   CHLORIDE mmol/L 96* 94*   CO2 mmol/L 28.2 40.5*   BUN mg/dL 22 25*   CREATININE mg/dL 0.87 0.67*   CALCIUM mg/dL 9.8 9.8   GLUCOSE mg/dL 173* 146*     Lab Results   Component Value Date    INR 0.82 (L) 12/08/2021    PROTIME 11.7 (L) 12/08/2021           Invalid input(s): \"PROT\", \"LABALBU\"  Results from last 7 days   Lab Units 11/26/23  0736   PH, ARTERIAL pH units 7.405   PO2 ART mm Hg 54.4*   PCO2, ARTERIAL mm Hg 66.2*   HCO3 ART mmol/L 41.5*     Imaging Results (Last 24 Hours)       ** No results found for the last 24 hours. **                 amLODIPine, 10 mg, Oral, Nightly  clopidogrel, 75 mg, Oral, Daily  furosemide, 40 mg, Oral, Daily  heparin (porcine), 5,000 Units, Subcutaneous, Q12H  lisinopril, 20 mg, Oral, Daily  nicotine, 1 patch, Transdermal, Q24H  pantoprazole, 40 mg, Oral, Q AM  rosuvastatin, 10 mg, Oral, Daily  senna-docusate sodium, 2 tablet, Oral, BID  sodium chloride, 10 mL, Intravenous, Q12H  sodium chloride, 10 mL, Intravenous, Q12H           Medication Review:     Assessment & Plan     1: Admitted for acute on chronic hypercapnic on chronic hypoxic respiratory failure.    Initial impression was that it was due to volume overload and increased work of breathing which resulted in worsening hypercapnia.     2:  Patient may also have underlying sleep apnea and obesity hypoventilation syndrome.    Patient has not been compliant with the BiPAP while in the hospital..      #3:  Concern underlying COPD.  Continue nebs   continue oxygen to maintain saturation 88 to 92%.  Educated patient about benefits of " BiPAP.  Will consider outpatient sleep study/split-night study       DVT prophylaxis-continue             Kwaku Looney MD  23  10:39 EST      Electronically signed by Kwaku Looney MD at 23 1041       Consult Notes (most recent note)    No notes of this type exist for this encounter.          Physical Therapy Notes (most recent note)        Antonio Espino, PT at 23 1422  Version 1 of 1         Acute Care - Physical Therapy Treatment Note   Demetrius     Patient Name: Donaldo Robetrs  : 1950  MRN: [id number]  Today's Date: 2023   Onset of Illness/Injury or Date of Surgery: 23  Visit Dx:   No diagnosis found.  Patient Active Problem List   Diagnosis    Benign prostatic hyperplasia with urinary obstruction    Herpesviral infection of penis    Shortness of breath    Carotid stenosis, asymptomatic, right    Essential hypertension    Dyslipidemia    Wenckebach block    PATTY (obstructive sleep apnea)    Pre-operative cardiovascular examination    Presence of cardiac pacemaker    Severe sinus bradycardia    Chronic bronchitis    Cigarette nicotine dependence without complication    Hypoxia    Detrusor instability    Tobacco abuse    Respiratory failure    Physical debility     Past Medical History:   Diagnosis Date    Acid reflux     Anxiety     Arthritis     Carotid artery stenosis, symptomatic, right 2021    Essential hypertension 2021    Herpes     History of lipoma     History of transfusion     Hyperlipidemia     Inguinal hernia     Peptic ulcer     Stroke      Past Surgical History:   Procedure Laterality Date    ABDOMINAL SURGERY      CARDIAC ELECTROPHYSIOLOGY PROCEDURE N/A 12/10/2021    Procedure: Pacemaker DC new;  Surgeon: Jony Monte MD;  Location: [facility] INVASIVE LOCATION;  Service: Cardiology;  Laterality: N/A;    CAROTID ENDARTERECTOMY Left     CHOLECYSTECTOMY WITH INTRAOPERATIVE CHOLANGIOGRAM N/A 2017    Procedure: CHOLECYSTECTOMY  LAPAROSCOPIC INTRAOPERATIVE CHOLANGIOGRAM;  Surgeon: Luis Coe MD;  Location: [facility];  Service:     HERNIA REPAIR      inguinal    SINUS SURGERY      TUMOR EXCISION       PT Assessment (last 12 hours)       PT Evaluation and Treatment       Row Name 11/28/23 1419          Physical Therapy Time and Intention    Subjective Information complains of;pain  B knee pain  -KM     Document Type therapy note (daily note)  -KM     Mode of Treatment individual therapy;physical therapy  -KM     Patient Effort good  -KM     Symptoms Noted During/After Treatment increased pain;fatigue  -KM       Row Name 11/28/23 1419          General Information    Patient Profile Reviewed yes  -KM     Patient Observations alert;cooperative;agree to therapy  -KM     Existing Precautions/Restrictions fall  -KM       Row Name 11/28/23 1419          Cognition    Affect/Mental Status (Cognition) WFL  -KM     Follows Commands (Cognition) WFL  -KM       Row Name 11/28/23 1419          Bed Mobility    Bed Mobility supine-sit  -KM     Supine-Sit [place] (Bed Mobility) standby assist  -KM       Row Name 11/28/23 1419          Transfers    Transfers sit-stand transfer;stand-sit transfer  -KM       Row Name 11/28/23 1419          Sit-Stand Transfer    Sit-Stand [place] (Transfers) standby assist  -     Assistive Device (Sit-Stand Transfers) walker, front-wheeled  -KM     Comment, (Sit-Stand Transfer) x3  -KM       Row Name 11/28/23 1419          Stand-Sit Transfer    Stand-Sit [place] (Transfers) standby assist  -     Assistive Device (Stand-Sit Transfers) walker, front-wheeled  -KM     Comment, (Stand-Sit Transfer) x3  -KM       Row Name 11/28/23 1419          Gait/Stairs (Locomotion)    Gait/Stairs Locomotion gait/ambulation independence;gait/ambulation assistive device;distance ambulated  -KM     [place] Level (Gait) contact guard  -KM     Assistive Device (Gait) walker, front-wheeled  -KM     Patient was able to  Ambulate yes  -KM     Distance in Feet (Gait) 12, 12, 20  -KM     Pattern (Gait) step-through  -KM     Deviations/Abnormal Patterns (Gait) gait speed decreased;antalgic;ataxic  -KM       Row Name 11/28/23 1419          Safety Issues, Functional Mobility    Impairments Affecting Function (Mobility) balance;strength;pain;endurance/activity tolerance  -KM       Row Name             Wound 11/25/23 1058 Right gluteal MASD (Moisture associated skin damage)    Wound - Properties Group Placement Date: 11/25/23 -DA Placement Time: 1058  -DA Side: Right  -DA Location: gluteal  -DA Primary Wound Type: MASD  -DA    Retired Wound - Properties Group Placement Date: 11/25/23  -DA Placement Time: 1058  -DA Side: Right  -DA Location: gluteal  -DA Primary Wound Type: MASD  -DA    Retired Wound - Properties Group Date first assessed: 11/25/23 -DA Time first assessed: 1058  -DA Side: Right  -DA Location: gluteal  -DA Primary Wound Type: MASD  -DA      Row Name 11/28/23 1419          Progress Summary (PT)    Daily Progress Summary (PT) Pt. was able to perform functional mobility skills w/ SBA. He was able to ambulate short distances w/ RW and CGA. He tolerated session well w/ complaints of knee pain. Pt. shows improvement and would continue to benefit from skilled PT services.  -KM               User Key  (r) = Recorded By, (t) = Taken By, (c) = Cosigned By      Initials Name Provider Type    Antonio Starks, PT Physical Therapist    Kayleigh Garduno, RN Registered Nurse                    Physical Therapy Education       Title: PT OT SLP Therapies (Done)       Topic: Physical Therapy (Done)       Point: Mobility training (Done)       Learning Progress Summary             Patient Acceptance, E,D, VU,NR by  at 11/27/2023 1210                         Point: Home exercise program (Done)       Learning Progress Summary             Patient Acceptance, E,D, VU,NR by  at 11/27/2023 1210                         Point: Body mechanics  (Done)       Learning Progress Summary             Patient Acceptance, E,D, VU,NR by  at 2023 1210                         Point: Precautions (Done)       Learning Progress Summary             Patient Acceptance, E,D, VU,NR by  at 2023 1210                                         User Key       Initials Effective Dates Name Provider Type Discipline     21 -  Nan Mann, PT Physical Therapist PT                  PT Recommendation and Plan     Progress Summary (PT)  Daily Progress Summary (PT): Pt. was able to perform functional mobility skills w/ SBA. He was able to ambulate short distances w/ RW and CGA. He tolerated session well w/ complaints of knee pain. Pt. shows improvement and would continue to benefit from skilled PT services.       Time Calculation:    PT Charges       Row Name 23 1418             Time Calculation    PT Received On 23  -KM         Time Calculation- PT    Total Timed Code Minutes- PT 25 minute(s)  -KM                User Key  (r) = Recorded By, (t) = Taken By, (c) = Cosigned By      Initials Name Provider Type     Antonio Espino, PT Physical Therapist                  Therapy Charges for Today       Code Description Service Date Service Provider Modifiers Qty    42597629171 HC GAIT TRAINING EA 15 MIN 2023 Antonio Espino, PT GP 1    39378095339 HC PT THERAPEUTIC ACT EA 15 MIN 2023 Antonio Espino, PT GP 1            PT G-Codes  AM-PAC 6 Clicks Score (PT): 10    Antonio Espino PT  2023      Electronically signed by Antonio Espino, PT at 23 1422          Occupational Therapy Notes (most recent note)        Jasvir Blankenship, OT at 23 1421          Acute Care - Occupational Therapy Initial Evaluation   [facility]     Patient Name: Donaldo Roberts  : 1950  MRN: [id number]  Today's Date: 2023  Onset of Illness/Injury or Date of Surgery: 23     Referring Physician: Jessie    Admit Date: 2023     No diagnosis  found.  Patient Active Problem List   Diagnosis    Benign prostatic hyperplasia with urinary obstruction    Herpesviral infection of penis    Shortness of breath    Carotid stenosis, asymptomatic, right    Essential hypertension    Dyslipidemia    Wenckebach block    PATTY (obstructive sleep apnea)    Pre-operative cardiovascular examination    Presence of cardiac pacemaker    Severe sinus bradycardia    Chronic bronchitis    Cigarette nicotine dependence without complication    Hypoxia    Detrusor instability    Tobacco abuse    Respiratory failure    Physical debility     Past Medical History:   Diagnosis Date    Acid reflux     Anxiety     Arthritis     Carotid artery stenosis, symptomatic, right 11/23/2021    Essential hypertension 11/23/2021    Herpes     History of lipoma     History of transfusion     Hyperlipidemia     Inguinal hernia     Peptic ulcer     Stroke      Past Surgical History:   Procedure Laterality Date    ABDOMINAL SURGERY      CARDIAC ELECTROPHYSIOLOGY PROCEDURE N/A 12/10/2021    Procedure: Pacemaker DC new;  Surgeon: Jony Monte MD;  Location: [facility] CATH INVASIVE LOCATION;  Service: Cardiology;  Laterality: N/A;    CAROTID ENDARTERECTOMY Left 2021    CHOLECYSTECTOMY WITH INTRAOPERATIVE CHOLANGIOGRAM N/A 07/25/2017    Procedure: CHOLECYSTECTOMY LAPAROSCOPIC INTRAOPERATIVE CHOLANGIOGRAM;  Surgeon: Luis Coe MD;  Location: [facility] OR;  Service:     HERNIA REPAIR      inguinal    SINUS SURGERY      TUMOR EXCISION           OT ASSESSMENT FLOWSHEET (last 12 hours)       OT Evaluation and Treatment       Row Name 11/25/23 1413                   OT Time and Intention    Document Type evaluation  -KR        Mode of Treatment occupational therapy  -KR        Patient Effort adequate  -KR           General Information    General Observations of Patient alert/cooperative  -KR           Living Environment    Current Living Arrangements home  -KR        People in Home spouse  -KR        Primary  Care Provided by self;spouse/significant other  -KR           Cognition    Affect/Mental Status (Cognition) WFL  -KR        Orientation Status (Cognition) oriented x 3  -KR        Follows Commands (Cognition) WFL  -KR           Range of Motion Comprehensive    Comment, General Range of Motion BUE WFL  -KR           Strength Comprehensive (MMT)    Comment, General Manual Muscle Testing (MMT) Assessment BUE 4/5  -KR           Bathing Assessment/Intervention    Conifer Level (Bathing) bathing skills;moderate assist (50% patient effort)  -KR           Upper Body Dressing Assessment/Training    Conifer Level (Upper Body Dressing) upper body dressing skills;moderate assist (50% patient effort)  -KR           Lower Body Dressing Assessment/Training    Conifer Level (Lower Body Dressing) lower body dressing skills;moderate assist (50% patient effort)  -KR           Grooming Assessment/Training    Conifer Level (Grooming) grooming skills;minimum assist (75% patient effort)  -KR           Self-Feeding Assessment/Training    Conifer Level (Feeding) feeding skills;set up  -KR           Toileting Assessment/Training    Conifer Level (Toileting) toileting skills;maximum assist (25% patient effort)  -KR           Wound 11/25/23 1058 Right gluteal MASD (Moisture associated skin damage)    Wound - Properties Group Placement Date: 11/25/23 -DA Placement Time: 1058  -DA Side: Right  -DA Location: gluteal  -DA Primary Wound Type: MASD  -DA    Retired Wound - Properties Group Placement Date: 11/25/23  -DA Placement Time: 1058  -DA Side: Right  -DA Location: gluteal  -DA Primary Wound Type: MASD  -DA    Retired Wound - Properties Group Date first assessed: 11/25/23  -DA Time first assessed: 1058  -DA Side: Right  -DA Location: gluteal  -DA Primary Wound Type: MASD  -DA       Plan of Care Review    Plan of Care Reviewed With patient;spouse  -KR           Therapy Assessment/Plan (OT)    Planned Therapy  Interventions (OT) activity tolerance training  -KR        Comment, Therapy Assessment/Plan (OT) Pt motivated to continue therapy needed to enhance fxl performance in home environment. No further recreational activity plan to be provided. Pt reports TV will suffice for entertainment and recreation while in hospital.  -KR           Therapy Plan Review/Discharge Plan (OT)    Anticipated Discharge Disposition (OT) home with assist  -KR           Transfer Goal 1 (OT)    Activity/Assistive Device (Transfer Goal 1, OT) transfers, all  -KR        Baylis Level/Cues Needed (Transfer Goal 1, OT) minimum assist (75% or more patient effort)  -KR        Time Frame (Transfer Goal 1, OT) by discharge  -KR            Activity Tolerance Goal 1 (OT)    Activity Tolerance Goal 1 (OT) Increase to enhance self care/mobility performance  -KR        Activity Level (Endurance Goal 1, OT) 15 min activity  -KR        Time Frame (Activity Tolerance Goal 1, OT) by discharge  -KR                  User Key  (r) = Recorded By, (t) = Taken By, (c) = Cosigned By      Initials Name Effective Dates    Jasvir Recio OT 06/16/21 -     Kayleigh Garduno RN 09/13/23 -                            OT Recommendation and Plan  Planned Therapy Interventions (OT): activity tolerance training  Plan of Care Review  Plan of Care Reviewed With: patient, spouse  Plan of Care Reviewed With: patient, spouse        Time Calculation:     Therapy Charges for Today       Code Description Service Date Service Provider Modifiers Qty    [id number]  OT EVAL MOD COMPLEXITY 4 11/25/2023 Jasvir Blankenship OT GO 1                 Jasvir Blankenship OT  11/25/2023    Electronically signed by Jasvir Blankenship OT at 11/25/23 1421       Discharge Summary    No notes of this type exist for this encounter.       Discharge Order (From admission, onward)       Start     Ordered    11/29/23 0850  Discharge patient  Once        Expected Discharge Date: 11/29/23   Discharge Disposition:  Home-Health Care [facility] [place]   Physician of Record for Attribution - Please select from Treatment Team: BILLIE WILSON [[id number]]   Review needed by CMO to determine Physician of Record: No      Question Answer Comment   Physician of Record for Attribution - Please select from Treatment Team BILLIE WILSON    Review needed by CMO to determine Physician of Record No        11/29/23 0849

## 2023-11-29 NOTE — DISCHARGE SUMMARY
[facility] HOSPITALISTS DISCHARGE SUMMARY    Patient Identification:  Name:  Donaldo Roberts  Age:  73 y.o.  Sex:  male  :  1950  MRN:  [id number]  Visit Number:  [id number]    Date of Admission: 2023  Date of Discharge:  2023     PCP: Duke Rosen MD    DISCHARGE DIAGNOSIS  Acute hypercapnic on chronic hypoxemic respiratory failure  Acute heart failure with preserved ejection fraction  Myocardial injury secondary to hypoxia due to above  Hx obstructive sleep apnea, noncompliant with CPAP  Obesity, suspected obesity hypoventilation syndrome  Severe pulmonary arterial tension  Chronic hypoxic respiratory failure on 3 years nasal cannula  Chronic smoking tobacco use  COPD, not in exacerbation  Chronic illness associated debility  Status post permanent pacemaker  Right carotid artery stenosis  Essential hypertension  History of ischemic stroke  Anxiety  GERD    CONSULTS       PROCEDURES PERFORMED      HOSPITAL COURSE  Patient is a 73 y.o. male initially admitted to [facility] for respiratory failure subsequently transition to swing bed for continued physical therapy.    Patient admitted to swing bed status for planned 5 days of physical therapy the patient was agreeable to this patient has had some noncompliance with PT but remained debilitated during the course of his inpatient hospitalization.  Please review discharge summary from inpatient hospitalization.  Patient was admitted to swing bed and was compliant with PT while in there and made good progress to requiring only standby assist.  Patient did intermittently wear BiPAP but was frequently noncompliant with it.  Patient family wanted BiPAP at discharge but unfortunately patient not meeting criteria without sleep study and will have to be arranged as an outpatient after discussion with pulmonology.  Patient did well with physical therapy and on day of discharge had made progress as noted above and remained  medically stable without complication but did not want to remain for any further physical therapy in hospital but was agreeable to home health which was arranged at discharge and patient subsequently discharged with family in stable medical condition.      VITAL SIGNS:  Temp:  [97.5 °F (36.4 °C)-98.8 °F (37.1 °C)] 98.3 °F (36.8 °C)  Heart Rate:  [60-89] 80  Resp:  [18-20] 20  BP: (110-140)/(61-73) 140/68  SpO2:  [92 %-96 %] 94 %  on  Flow (L/min):  [4] 4;   Device (Oxygen Therapy): nasal cannula;humidified    Body mass index is 42.5 kg/m².  Wt Readings from Last 3 Encounters:   11/24/23 131 kg (287 lb 12.8 oz)   11/21/23 131 kg (287 lb 12.8 oz)   11/10/23 109 kg (240 lb)       PHYSICAL EXAM:  Constitutional:  Well-developed and well-nourished.  No respiratory distress.      HENT:  Head:  Normocephalic and atraumatic.  Mouth:  Moist mucous membranes.    Eyes:  Conjunctivae and EOM are normal.  No scleral icterus.    Neck:  Neck supple.  No JVD present.    Cardiovascular:  Normal rate, regular rhythm and normal heart sounds with no murmur.  Pulmonary/Chest:  No respiratory distress, no wheezes, no crackles, with normal breath sounds and good air movement.  Abdominal:  Soft.  Bowel sounds are normal.  No distension and no tenderness.   Musculoskeletal:  No edema, no tenderness, and no deformity.  No red or swollen joints anywhere.    Neurological:  Alert and oriented to person, place, and time.  No cranial nerve deficit.  No tongue deviation.  No facial droop.  No slurred speech.   Skin:  Skin is warm and dry. No rash noted. No pallor.   Peripheral vascular: Pulses in all 4 extremities with no clubbing, no cyanosis, no edema.    DISCHARGE DISPOSITION   Stable    DISCHARGE MEDICATIONS:     Discharge Medications        New Medications        Instructions Start Date   pantoprazole 40 MG EC tablet  Commonly known as: PROTONIX   40 mg, Oral, Every Early Morning   Start Date: November 30, 2023            Continue These  Medications        Instructions Start Date   amLODIPine 10 MG tablet  Commonly known as: NORVASC   10 mg, Oral, Nightly      clopidogrel 75 MG tablet  Commonly known as: PLAVIX   75 mg, Oral, Daily      lisinopril 20 MG tablet  Commonly known as: PRINIVIL,ZESTRIL   20 mg, Oral, Daily      rosuvastatin 10 MG tablet  Commonly known as: CRESTOR   10 mg, Oral, Daily             Stop These Medications      furosemide 20 MG tablet  Commonly known as: LASIX              Diet Instructions    Cardiac diet           Activity Instructions    As tolerated         Additional Instructions for the Follow-ups that You Need to Schedule       Ambulatory Referral to Home Health (Hospital)   As directed      Face to Face Visit Date: 11/29/2023   Follow-up provider for Plan of Care?: I treated the patient in an acute care facility and will not continue treatment after discharge.   Follow-up provider: PIA MORALES [[id number]]   Reason/Clinical Findings: chronic resp failure, decompensated HFpEF, severe OA of knees   Describe mobility limitations that make leaving home difficult: same as above   Nursing/Therapeutic Services Requested: Physical Therapy Occupational Therapy   Frequency: 1 Week 1        Discharge Follow-up with PCP   As directed       Currently Documented PCP:    Pia Morales MD    PCP Phone Number:    [phone]     Follow Up Details: 1 week        Discharge Follow-up with Specialty: Pulmonology; 2 Weeks   As directed      Specialty: Pulmonology   Follow Up: 2 Weeks   Follow Up Details: 2-3 week follow up for COPD, and suspected PATTY and OHS               Follow-up Information       Pia Morales MD .    Specialty: Internal Medicine  Why: 1 week  Contact information:  [address] [place] [address]  [phone]                              TEST  RESULTS PENDING AT DISCHARGE       CODE STATUS  Code Status and Medical Interventions:   Ordered at: 11/24/23 1357     Code Status (Patient has no pulse and  is not breathing):    CPR (Attempt to Resuscitate)     Medical Interventions (Patient has pulse or is breathing):    Full Support       Donaldo Stokes DO  [facility] Hospitalist  11/29/23  09:45 EST    Please note that this discharge summary required more than 30 minutes to complete.

## 2023-11-29 NOTE — CASE MANAGEMENT/SOCIAL WORK
Discharge Planning Assessment   [facility]     Patient Name: Donaldo Roberts  MRN: [id number]  Today's Date: 11/29/2023    Admit Date: 11/24/2023          Discharge Plan       Row Name 11/29/23 0929       Plan         Final Note Pt is being discharged home on this date with physician ordered hospital bed. Pt and spouse is aware and agreeable to discharge. Pt prefers hospital bed to be delivered by [facility]. SS faxed hospital bed order to  [employer] fax [phone]. Spouse prefers for hospital bed to be delivered to home prior to discharge.  to notify Lead RN once hospital bed has been delivered. Pt's spouse to provide transportation.    10:51am: SS spoke with [employer] per Tayo who states hospital bed scheduled to be delivered on this date. Pt's spouse states she will transport Pt home once hospital bed has been delivered and will bring Pt's portable O2 tank.     11:56am: Pt has Physician order for home health services. Pt does not have a preference of HH agency.  faxed HH referral to [facility] fax [phone].  to provide Lead RN with report number once HH has confirmed acceptance.     13:23pm: [facility] per Stephanie states HH will not be able accept until Pt gets established with his PCP.  to follow back up with Pt at home once appt has been completed.  updated Lead RN and Pt's spouse. Spouse states hospital bed has been delivered and Pt's son will provide transport.                    GELACIO Wong

## 2023-11-29 NOTE — PLAN OF CARE
Goal Outcome Evaluation:              Outcome Evaluation: Pt resting in bed at this time. No s/s of distress noted. 4L NC in place. Pt has been up to BSC with assistance. No complaints of pain or discomfort. Pt has refused turns throughout this shift, educated on importance. No requests at this time. Will continue POC.

## 2023-11-30 RX ORDER — ROSUVASTATIN CALCIUM 10 MG/1
10 TABLET, COATED ORAL DAILY
Qty: 90 TABLET | Refills: 1 | Status: SHIPPED | OUTPATIENT
Start: 2023-11-30

## 2023-11-30 NOTE — OUTREACH NOTE
Prep Survey      Flowsheet Row Responses   [facility] facility patient discharged from? [facility]   Is LACE score < 7 ? No   Eligibility Readm Mgmt   Discharge diagnosis PHysical debility   Does the patient have one of the following disease processes/diagnoses(primary or secondary)? Other   Does the patient have Home health ordered? No   Is there a DME ordered? No   Prep survey completed? Yes            SARAN GALLEGOS - Registered Nurse

## 2023-12-01 RX ORDER — LISINOPRIL 20 MG/1
20 TABLET ORAL DAILY
Qty: 90 TABLET | Refills: 3 | Status: SHIPPED | OUTPATIENT
Start: 2023-12-01

## 2023-12-01 NOTE — PAYOR COMM NOTE
"Aaron Feliz RN  Swing Bed Nurse  [phone] Ex [phone] FAX: [phone]  [email or web address]  [facility]  NPI [id number]  Reference Number     Patient discharged home 11-  Donaldo Roberts (73 y.o. Male)       Date of Birth   1950    Social Security Number       [place]   [address] [address]    Home Phone   [phone]    MRN   [id number]       Ep[employer]   Jain    Marital Status                               Admission Date   11/24/23    Admission Type   Elective    Admitting Provider   Surendra Roman MD    Attending Provider       Department, Room/Bed   [address], 3346/1S       Discharge Date   11/29/2023    Discharge Disposition   Home-Health Care Svc    Discharge Destination   Home                              Attending Provider: (none)   Allergies: Penicillins    Isolation: None   Infection: None   Code Status: Prior    Ht: 175.3 cm (69\")   Wt: 131 kg (287 lb 12.8 oz)    Admission Cmt: None   Principal Problem: Physical debility [R53.81]                   Active Insurance as of 11/24/2023       Primary Coverage       Payor Plan Insurance Group Employer/Plan Group    [facility]AC[facility] WELL[employer] MED ADV HMO        Payor Plan Address Payor Plan Phone Number Payor Plan Fax Number Effective Dates    [address] [address]   11/1/2023 - None Entered    [facility] [id number]         Subscriber Name Subscriber Birth Date Member ID       DONALDO ROBERTS 1950 [id number]                     Emergency Contacts        (Rel.) Home Phone Work Phone Mobile Phone    ArmandoInna norris (Spouse) [phone] -- [phone]    GlenvilChad norris (Son) [phone] -- [phone]              Discharge Order (From admission, onward)       Start     Ordered    11/29/23 0850  Discharge patient  Once        Expected Discharge Date: 11/29/23   Discharge Disposition: Home-Health Care Svc   Physician of Record for Attribution - " Please select from Treatment Team: BILLIE WILSON [[id number]]   Review needed by CMO to determine Physician of Record: No      Question Answer Comment   Physician of Record for Attribution - Please select from Treatment Team BILLIE WILSON    Review needed by CMO to determine Physician of Record No        11/29/23 0849

## 2023-12-04 ENCOUNTER — OFFICE VISIT (OUTPATIENT)
Dept: PULMONOLOGY | Facility: CLINIC | Age: 73
End: 2023-12-04
Payer: MEDICARE

## 2023-12-04 VITALS
SYSTOLIC BLOOD PRESSURE: 136 MMHG | TEMPERATURE: 97.2 F | DIASTOLIC BLOOD PRESSURE: 82 MMHG | HEIGHT: 69 IN | BODY MASS INDEX: 42.51 KG/M2 | WEIGHT: 287 LBS | OXYGEN SATURATION: 80 % | HEART RATE: 67 BPM

## 2023-12-04 DIAGNOSIS — J96.11 CHRONIC RESPIRATORY FAILURE WITH HYPOXIA AND HYPERCAPNIA: ICD-10-CM

## 2023-12-04 DIAGNOSIS — R53.81 PHYSICAL DEBILITY: ICD-10-CM

## 2023-12-04 DIAGNOSIS — J44.9 CHRONIC OBSTRUCTIVE PULMONARY DISEASE, UNSPECIFIED COPD TYPE: Primary | ICD-10-CM

## 2023-12-04 DIAGNOSIS — F17.210 CIGARETTE NICOTINE DEPENDENCE WITHOUT COMPLICATION: ICD-10-CM

## 2023-12-04 DIAGNOSIS — J96.12 CHRONIC RESPIRATORY FAILURE WITH HYPOXIA AND HYPERCAPNIA: ICD-10-CM

## 2023-12-04 PROBLEM — Z01.810 PRE-OPERATIVE CARDIOVASCULAR EXAMINATION: Status: RESOLVED | Noted: 2021-11-23 | Resolved: 2023-12-04

## 2023-12-04 PROCEDURE — 99214 OFFICE O/P EST MOD 30 MIN: CPT | Performed by: PHYSICIAN ASSISTANT

## 2023-12-04 PROCEDURE — 3079F DIAST BP 80-89 MM HG: CPT | Performed by: PHYSICIAN ASSISTANT

## 2023-12-04 PROCEDURE — 1160F RVW MEDS BY RX/DR IN RCRD: CPT | Performed by: PHYSICIAN ASSISTANT

## 2023-12-04 PROCEDURE — 1159F MED LIST DOCD IN RCRD: CPT | Performed by: PHYSICIAN ASSISTANT

## 2023-12-04 PROCEDURE — 3075F SYST BP GE 130 - 139MM HG: CPT | Performed by: PHYSICIAN ASSISTANT

## 2023-12-04 NOTE — PROGRESS NOTES
Chief Complaint  Chronic obstructive pulmonary disease, unspecified COPD type    Subjective        Donaldo Roberts presents to [facility]ULMON[facility]IT[facility] MEDICINE  History of Present Illness      Mr. Roberts presents today for follow up of COPD, chronic hypoxic and hypercapnic respiratory failure, history of PATTY but non-compliant previously, and cigarette dependence.   He was notable for recent hospitalization again in November 2023 for acute on chronic respiratory failure, acute heart failure with preserved EF, myocardial injury related to hypoxia, severe PAH, and management of other chronic conditions.   He required use of BIPAP while hospitalized and still noted some difficulty with use as he has had difficulty with it in the past.   In the past, he was not always remained compliant with supplemental oxygen use and noted for desaturation on room air into the 70s-80s.   Today, he was using his supplemental oxygen supplies as directed but the wife stated they did not realize the tank was now empty, thus he was initially noted at 80%. Once additional oxygen was supplied on 3 L in office, saturation was rechecked at 95%.   She states that since discharge he has been using the Trelegy inhaler for his COPD. Wearing 3 L baseline (or 4 L with extended hose) continuously.   He is notable for increased lower extremity edema again as compared to time of discharge. Primarily noted around the ankles. Has not noted increased shortness of breath from baseline.  They do have a prescription of Lasix 40 mg to take as needed and have taken two doses of it so far (on alternating days).   After this recent hospitalization, he is more encouraged to understand what is going on and willing to do what is recommended (such as try an NIV device, wear oxygen as directed, etc.).   Patient required physical rehabilitation after this hospitalization, but still requires significant assistance at baseline upon  "returning home. Requires at least 1-2 person assistance with ambulation/standing (ambulating in a wheelchair today), and other activities of daily care. He has his spouse and several other family members to assist with his care, but this can be difficulty on them at times as well.       Objective   Vital Signs:  /82   Pulse 67   Temp 97.2 °F (36.2 °C)   Ht 175.3 cm (69.02\")   Wt 130 kg (287 lb)   SpO2 (!) 80%   BMI 42.36 kg/m²   Estimated body mass index is 42.36 kg/m² as calculated from the following:    Height as of this encounter: 175.3 cm (69.02\").    Weight as of this encounter: 130 kg (287 lb).   Rechecked at 95% on baseline of 3 L continuous flow (tank was off initially when they thought it was running/full).       Physical Exam  Vitals reviewed.   Constitutional:       General: He is not in acute distress.     Appearance: He is well-developed. He is not diaphoretic.   HENT:      Head: Normocephalic and atraumatic.   Cardiovascular:      Rate and Rhythm: Normal rate and regular rhythm.   Pulmonary:      Effort: Pulmonary effort is normal.      Breath sounds: No wheezing, rhonchi or rales.   Musculoskeletal:      Right lower leg: Edema present.      Left lower leg: Edema present.   Neurological:      Mental Status: He is alert and oriented to person, place, and time.   Psychiatric:         Behavior: Behavior normal.        Result Review :  The following data was reviewed by: Stephy Estrada PA-C on 12/04/2023:    Reviewed recent ABGs. 11/18/2023 was taken during the recent hospitalization on 3 L NC with notable worsening of hypercapnia and resulting acidosis.   11/26/2023 was taken at a more stable state on 3 L., but remains notable for hypercapnia.         Reviewed chest xray report from 11/23/2023.     Reviewed CT angiogram imaging/report from 11/18/2023.     Reviewed previous sleep study report from 2021.       Assessment and Plan   Diagnoses and all orders for this visit:    1. Chronic " obstructive pulmonary disease, unspecified COPD type (Primary)    2. Chronic respiratory failure with hypoxia and hypercapnia  -     DME NIPPV - IPPV/BiPAP/CPAP    3. Cigarette nicotine dependence without complication    4. Physical debility    Other orders  -     Fluticasone-Umeclidin-Vilant (Trelegy Ellipta) 100-62.5-25 MCG/ACT inhaler; Inhale 1 puff Daily.  Dispense: 30 each; Refill: 8  -     albuterol sulfate  (90 Base) MCG/ACT inhaler; Inhale 2 puffs Every 4 (Four) Hours As Needed for Wheezing or Shortness of Air.  Dispense: 18 g; Refill: 8        COPD, Cigarette dependence, Severe PAH:   Continue albuterol inhaler as needed  Resumed Trelegy 100 mcg once daily   Tolerated trial of Breztri, but not able to continue at this time due to cost.  Has remained compliant with continuous supplemental oxygen use recently.   May consider repeat CT chest in the next 3-4 months for clearance of the previous consolidation/atelectasis and assess for underlying nodules/opacities.  Remains current smoker.  Severe PAH noted on recent echo, could be related to underlying COPD, PATTY.  Previously noncompliant with positive airway pressure therapy, but recently willing to retry and alternative option.    Notable for acute lower extremity edema today.   Does have PRN Lasix 40 mg to take as needed, recommended previously for alternating day use when needed.   Has taken 2 doses thus far but still notes significant swelling.   BP appropriate in office today.   Reviewed last labs.   Notable for high-normal potassium with stable creatinine.   Recommended 2-3 more doses (alternating days).   Notify PCP (seeing new provider tomorrow) and I will notify cardiology for them.   Addendum - confirmed 12/5 that he did have blood work completed with PCP, who plans to send me a copy.      Chronic hypoxic and hypercapnic  this was notrespiratory failure:   Notable for recent acute on chronic episode which led to hospitalization. At the time of  initial evaluation, pH was noted at 7.326, CO2 89.2, O2 63, and HCO3 of 46.6.  Patient has had difficulty with use of BIPAP/CPAP tolerance in the past (at home and during prior hospitalizations) as previously prescribed. Thus, patient was non-compliant which allowed for intermittent flare up.   Following the severity of the most recent hospitalization and oxygen requirements, he is willing to try and NIV device.  Auto adjustment of settings will likely result in a more comfort during use, which will increase compliance.  Maximum compliance will improve CO2 removal and reduce episodes of respiratory acidosis, which may further reduce hospitalization in overall health decline.  This would be provided by AVAPS AE ability to provide a target tidal volume and ability to change pressures quickly in order to maintain that tidal volume.  This cannot be provided by BiPAP-ST or CPAP.  NIV with AVAPS/AE would also provide an auto backup rate, which may help to prevent air trapping, breath stacking which can further negatively impact the patient's already impaired lung function.   Due to the patient's worsening condition and individual case, noninvasive ventilator therapy is recommended to provide the most benefit.  Again, goal is to reduce acute respiratory failure episodes, hospitalization, and possible death.  NIV device with AVAPS AE settings placed  Goal tidal volume of 6-8 mL/kg of ideal body weight  Auto rate with backup of 14  RT can adjust other settings as needed to due to patient comfort.        Severe physical debility:  Will search and place referral for home health services  Addendum: Confirmed that PCP/outreach service placed order today.        Follow Up   Return in about 6 weeks (around 1/15/2024), or if symptoms worsen or fail to improve, for Recheck.  Patient was given instructions and counseling regarding his condition or for health maintenance advice. Please see specific information pulled into the AVS if  appropriate.

## 2023-12-04 NOTE — PROGRESS NOTES
[facility] Cardiothoracic Surgery Office Follow Up Note     Date of Encounter: 2023     Name: Donaldo Roberts  : 1950     Referred By: No ref. provider found  PCP: Duke Rosen MD    Chief Complaint:    Chief Complaint   Patient presents with    Carotid Artery Disease     1 year follow-up with results from carotid duplex        Subjective      History of Present Illness:    Donaldo Roberts is a 73 y.o. male former smoker (10/2023) with history of oxygen dependent COPD, PATTY (noncompliant), HTN, HLD on statin therapy, CAD, Wenckebach block s/p PPM, HFpEF, CVA 2019, and carotid disease s/p remote LEFT CEA  at [facility]. Pt was initially referred to Dr Becerra 2022 for his carotid disease with recommendations for medical management and annual surveillance.   Pt had recent admission 2023 for acute hypercapnic on chronic hypoxemic respiratory failure as well as acute HFpEF. He is now oxygen dependent at 3L and followed by pulmonology LUCY BARRON and the [place] heart failure clinic.   He is essentially wheelchair bound now with significant debility and reports a recent fall at home.   He presents today for his annual carotid surveillance. Pt denies hx of TIA or CVA since last visit, has had no new focal neurologic dysfunction, specifically vision changes or amaurosis fugax. He does endorse poor memory since his initial stroke in 2019.     Review of Systems:  Review of Systems   Constitutional: Negative for chills, decreased appetite, diaphoresis, fever, malaise/fatigue, night sweats, weight gain and weight loss.   HENT:  Negative for hoarse voice.    Eyes:  Negative for blurred vision, double vision and visual disturbance.   Cardiovascular:  Positive for dyspnea on exertion and leg swelling. Negative for chest pain, claudication, irregular heartbeat, near-syncope, orthopnea, palpitations, paroxysmal nocturnal dyspnea and syncope.   Respiratory:  Positive for shortness of breath.  Negative for cough, hemoptysis, sputum production and wheezing.    Hematologic/Lymphatic: Negative for adenopathy and bleeding problem. Does not bruise/bleed easily.   Skin:  Negative for color change, nail changes, poor wound healing and rash.   Musculoskeletal:  Positive for arthritis. Negative for back pain, falls and muscle cramps.   Gastrointestinal:  Negative for abdominal pain, dysphagia and heartburn.   Genitourinary:  Negative for flank pain.   Neurological:  Negative for brief paralysis, disturbances in coordination, dizziness, focal weakness, headaches, light-headedness, loss of balance, numbness, paresthesias, sensory change, vertigo and weakness.   Psychiatric/Behavioral:  Positive for depression. Negative for suicidal ideas.    Allergic/Immunologic: Negative for persistent infections.       I have reviewed the following portions of the patient's history: problem list, current medications, allergies, past surgical history, past medical history, past social history, past family history, and ROS and confirm it's accurate.    Allergies:  Allergies   Allergen Reactions    Penicillins Hives     Mother told him that, has taken amoxicillin with no problems       Medications:      Current Outpatient Medications:     albuterol sulfate  (90 Base) MCG/ACT inhaler, Inhale 2 puffs Every 4 (Four) Hours As Needed for Wheezing or Shortness of Air., Disp: 18 g, Rfl: 8    amLODIPine (NORVASC) 10 MG tablet, Take 1 tablet by mouth Every Night., Disp: 90 tablet, Rfl: 1    clopidogrel (PLAVIX) 75 MG tablet, Take 1 tablet by mouth Daily., Disp: 30 tablet, Rfl: 11    Fluticasone-Umeclidin-Vilant (Trelegy Ellipta) 100-62.5-25 MCG/ACT inhaler, Inhale 1 puff Daily., Disp: 30 each, Rfl: 8    furosemide (LASIX) 40 MG tablet, Take 1 tablet by mouth Every Other Day for 30 days., Disp: 15 tablet, Rfl: 0    lisinopril (PRINIVIL,ZESTRIL) 20 MG tablet, TAKE 1 TABLET BY MOUTH DAILY AS DIRECTED, Disp: 90 tablet, Rfl: 3     pantoprazole (PROTONIX) 40 MG EC tablet, Take 1 tablet by mouth Every Morning for 30 days., Disp: 30 tablet, Rfl: 0    rosuvastatin (CRESTOR) 10 MG tablet, TAKE 1 TABLET BY MOUTH DAILY, Disp: 90 tablet, Rfl: 1    History:   Past Medical History:   Diagnosis Date    Acid reflux     Anxiety     Arthritis     Carotid artery stenosis, symptomatic, right 2021    Chronic obstructive pulmonary disease 2023    Essential hypertension 2021    Herpes     History of lipoma     History of transfusion     Hyperlipidemia     Inguinal hernia     Peptic ulcer     Stroke        Past Surgical History:   Procedure Laterality Date    ABDOMINAL SURGERY      CARDIAC ELECTROPHYSIOLOGY PROCEDURE N/A 12/10/2021    Procedure: Pacemaker DC new;  Surgeon: Jony Monte MD;  Location: [facility] CATH INVASIVE LOCATION;  Service: Cardiology;  Laterality: N/A;    CAROTID ENDARTERECTOMY Left     CHOLECYSTECTOMY WITH INTRAOPERATIVE CHOLANGIOGRAM N/A 2017    Procedure: CHOLECYSTECTOMY LAPAROSCOPIC INTRAOPERATIVE CHOLANGIOGRAM;  Surgeon: Luis Coe MD;  Location: [facility] OR;  Service:     HERNIA REPAIR      inguinal    SINUS SURGERY      TUMOR EXCISION         Social History     Socioeconomic History    Marital status:     Number of children: 6   Tobacco Use    Smoking status: Former     Packs/day: 0.25     Years: 55.00     Additional pack years: 0.00     Total pack years: 13.75     Types: Cigarettes     Quit date: 10/2023     Years since quittin.1     Passive exposure: Current    Smokeless tobacco: Never    Tobacco comments:     2 cigs per day/A pack per week.   Vaping Use    Vaping Use: Never used   Substance and Sexual Activity    Alcohol use: No    Drug use: No    Sexual activity: Defer        Family History   Problem Relation Age of Onset    Stroke Mother     Heart disease Mother     Diabetes Mother     No Known Problems Father     Diabetes Sister        Objective     Physical Exam:  Vitals:    23  1118 23 1121   BP: 116/68 122/75   BP Location: Left arm Right arm   Patient Position: Sitting Sitting   Pulse: 79    Temp: 97.1 °F (36.2 °C)    SpO2: 98%    Weight: 107 kg (235 lb)  Comment: patient reported       Body mass index is 34.69 kg/m².    Physical Exam  Vitals and nursing note reviewed.   Constitutional:       Appearance: Normal appearance. He is obese.      Comments: Wheelchair- chronically ill appearing on supplemental oxygen   Cardiovascular:      Rate and Rhythm: Normal rate. Rhythm irregular.   Pulmonary:      Effort: Pulmonary effort is normal.   Musculoskeletal:      Right lower le+ Edema present.      Left lower le+ Edema present.   Skin:     General: Skin is warm and dry.   Neurological:      Mental Status: He is alert and oriented to person, place, and time. Mental status is at baseline.       Imaging/Labs:  US Carotid Bilateral (2023 15:33)   Result suggestive of moderate stenosis in the right internal carotid artery. Consider CT angiogram and/or vascular follow-up   This report was finalized on 2023 3:48 PM by Dr. Jeremy Jenkins MD.    CT Angiogram Carotids (2022 12:05)   Atherosclerotic plaques are noted involving the carotid systems. On the right side there is approximately 60% diameter stenosis.  There was no significant stenosis identified on the left.   This report was finalized on 2022 1:12 PM by Dr. Donaldo Rausch II, MD.    Assessment / Plan      Assessment / Plan:  Diagnoses and all orders for this visit:    1. Carotid stenosis, asymptomatic, right (Primary)       s/p remote LEFT CEA 2019 at [facility]  initially referred to Dr Becerra 2022   Annual surveillance with no interval TIA or CVA symptoms  Pt has had significant health decline since his visit with Dr Becerra last year. His mobility is now limited and he is essentially wheelchair bound on 3L supplemental oxygen   Carotid duplex with moderate stenosis to the right ICA.    PSV BRYSON 216  cm/s.  No significant disease to the left ICA with PSV LICA 74 cm/s.  Antegrade flow to bilateral vertebrals  This warrants continued medical management and annual surveillance.   I did discuss my concerns about his surgical candidacy should he require intervention in the future  Follow-up with cardiology, heart failure clinic, and pulm as previously determined     Patient Education:  The BE FAST acronym helps you remember the signs and symptoms of a stroke: Balance loss, Eyesight loss, Facial dropping, Arm weakness, Speech difficulty, and Time to call 911      Follow Up:   Return in about 1 year (around 12/6/2024) for Imaging: Carotid duplex.   Or sooner for any further concerns or worsening sign and symptoms. If unable to reach us in the office please dial 911 or go to the nearest emergency department.      LARY Huggins  [facility]        Time Spent: I spent 27 minutes caring for Donaldo on this date of service. This time includes time spent by me in the following activities: preparing for the visit, reviewing tests, obtaining and/or reviewing a separately obtained history, performing a medically appropriate examination and/or evaluation, counseling and educating the patient/family/caregiver, ordering medications, tests, or procedures, referring and communicating with other health care professionals, documenting information in the medical record, independently interpreting results and communicating that information with the patient/family/caregiver, and care coordination.

## 2023-12-05 ENCOUNTER — READMISSION MANAGEMENT (OUTPATIENT)
Dept: CALL CENTER | Facility: HOSPITAL | Age: 73
End: 2023-12-05
Payer: MEDICARE

## 2023-12-05 ENCOUNTER — TELEPHONE (OUTPATIENT)
Dept: CARDIOLOGY | Facility: CLINIC | Age: 73
End: 2023-12-05
Payer: MEDICARE

## 2023-12-05 PROBLEM — J44.9 CHRONIC OBSTRUCTIVE PULMONARY DISEASE: Status: ACTIVE | Noted: 2023-12-05

## 2023-12-05 RX ORDER — FLUTICASONE FUROATE, UMECLIDINIUM BROMIDE AND VILANTEROL TRIFENATATE 100; 62.5; 25 UG/1; UG/1; UG/1
1 POWDER RESPIRATORY (INHALATION)
Qty: 30 EACH | Refills: 8 | Status: ON HOLD | OUTPATIENT
Start: 2023-12-05

## 2023-12-05 RX ORDER — ALBUTEROL SULFATE 90 UG/1
2 AEROSOL, METERED RESPIRATORY (INHALATION) EVERY 4 HOURS PRN
Qty: 18 G | Refills: 8 | Status: ON HOLD | OUTPATIENT
Start: 2023-12-05

## 2023-12-05 NOTE — TELEPHONE ENCOUNTER
Called patient to let them know that their insurance will be out of network for [facility] as of Jan 1, 2024 for device follow up and remote monitoring.  The last day to change their insurance plan is Dec 7th, 2023.  The patient is changing to [place] Jan 1, 2024.

## 2023-12-05 NOTE — OUTREACH NOTE
Medical Week 1 Survey      Flowsheet Row Responses   [facility] patient discharged from? [facility]   Does the patient have one of the following disease processes/diagnoses(primary or secondary)? Other   Week 1 attempt successful? Yes   Call start time [id number]   Call end time 0905   Discharge diagnosis PHysical debility   Person spoke with today (if not patient) and relationship Spouse- Inna Gamboa reviewed with patient/caregiver? Yes   Does the patient have all medications ordered at discharge? Yes   Prescription comments no concerns or questions noted.   Is the patient taking all medications as directed (includes completed medication regime)? Yes   Does the patient have a primary care provider?  Yes   Comments regarding PCP Seen pulm yesterday, and sees pcp today   Has home health visited the patient within 72 hours of discharge? N/A   Psychosocial issues? No   Did the patient receive a copy of their discharge instructions? Yes   Nursing interventions Reviewed instructions with patient   What is the patient's perception of their health status since discharge? Improving   Is the patient/caregiver able to teach back signs and symptoms related to disease process for when to call PCP? Yes   Is the patient/caregiver able to teach back signs and symptoms related to disease process for when to call 911? Yes   Is the patient/caregiver able to teach back the hierarchy of who to call/visit for symptoms/problems? PCP, Specialist, Home health nurse, Urgent Care, ED, 911 Yes   Week 1 call completed? Yes   Graduated Yes   Would this patient benefit from a Referral to Amb Social Work? Yes   Reason for Social Work Referral Caregiving/Support   Is the patient interested in additional calls from an ambulatory ? No   Wrap up additional comments Spouse reports patient is doing okay- however he really needs HH- He is seeing his pcp today and they will request orders for HH and they want assistance on making sure this gets  done she states patient really needs it. Danielle has hospital bed. No other concerns or questions noted.   Call end time 0905            Aracelis SKY - Registered Nurse

## 2023-12-06 ENCOUNTER — OFFICE VISIT (OUTPATIENT)
Dept: CARDIAC SURGERY | Facility: CLINIC | Age: 73
End: 2023-12-06
Payer: MEDICARE

## 2023-12-06 ENCOUNTER — TELEPHONE (OUTPATIENT)
Dept: CARDIOLOGY | Facility: CLINIC | Age: 73
End: 2023-12-06
Payer: MEDICARE

## 2023-12-06 VITALS
SYSTOLIC BLOOD PRESSURE: 122 MMHG | OXYGEN SATURATION: 98 % | DIASTOLIC BLOOD PRESSURE: 75 MMHG | TEMPERATURE: 97.1 F | WEIGHT: 235 LBS | BODY MASS INDEX: 34.69 KG/M2 | HEART RATE: 79 BPM

## 2023-12-06 DIAGNOSIS — I65.21 CAROTID STENOSIS, ASYMPTOMATIC, RIGHT: Primary | ICD-10-CM

## 2023-12-06 DIAGNOSIS — I50.31 ACUTE HEART FAILURE WITH PRESERVED EJECTION FRACTION (HFPEF): Primary | ICD-10-CM

## 2023-12-06 NOTE — TELEPHONE ENCOUNTER
Hub to relay.     Called pt no answer LM.     ----- Message from LARY Osorio sent at 12/6/2023  9:24 AM EST -----  Please call patient and let him know that I am referring him to the heart failure clinic so that they can adjust his water pills further and keep a close eye on him given his lower extremity edema and shortness of breath.  Please stress the importance of going to this appointment so that they can adjust his medications and help him to feel better.  Clinic is located at the hospital and they will call him with an appointment.

## 2023-12-07 ENCOUNTER — PATIENT OUTREACH (OUTPATIENT)
Dept: CASE MANAGEMENT | Facility: OTHER | Age: 73
End: 2023-12-07
Payer: MEDICARE

## 2023-12-07 NOTE — OUTREACH NOTE
AMBULATORY CASE MANAGEMENT NOTE    Name and Relationship of Patient/Support Person: Inna Roberts - Emergency Contact      Patient Outreach    Patient had an inpatient stay including swing bed 11/24/23 - 11/29/23 for physical debility. Patient has hx HF and COPD. ACM spoke with patient's wife/cg who reports patient is doing well and confirms that Home Health has made contact with them and came out yesterday for the initial admission visit; patient will receive PT/OT for continued rehab post hospital discharge. Pt has also been scheduled with the  heart failure clinic. Cg states patient has already been using his bike at home, and was doing so at time of call. Education provided, understanding voiced. Pt will have nursing services available through  however cg acknowledges availability of [facility] for case mgmt and contact number; denies any unmet needs/concerns at this time.     Adult Patient Profile  Questions/Answers      Flowsheet Row Most Recent Value   Symptoms/Conditions Managed at Home cardiovascular, musculoskeletal, respiratory   Barriers to Managing Health none   Cardiovascular Symptoms/Conditions heart failure, hypertension, coronary artery disease   Cardiovascular Management Strategies diet modification, coping strategies, exercise, routine screening, medication therapy   Cardiovascular Self-Management Outcome 4 (good)   Musculoskeletal Symptoms/Conditions mobility limited   Musculoskeletal Management Strategies adequate rest, coping strategies, medical device   Musculoskeletal Self-Management Outcome 3 (uncertain)   Musculoskeletal Comment Starting PT/OT   Respiratory Symptoms/Conditions COPD, shortness of breath   Respiratory Management Strategies adequate rest, oxygen therapy, routine screening, breathing techniques   Oxygen Therapy Times continuous   Oxygen Flow (L/min) 3   Respiratory Self-Management Outcome 4 (good)   Taking Medications Not Prescribed no   Missed Doses of Prescribed Medications  During Past Week no   Taken Prescribed Medications at Different Time or Schedule During Past Week no   Taken More or Less Medication Than Prescribed no   Barriers to Taking Medication as Prescribed none            SDOH updated and reviewed with the patient during this program:  Financial Resource Strain: Low Risk  (12/7/2023)    Overall Financial Resource Strain (CARDIA)     Difficulty of Paying Living Expenses: Not very hard      Food Insecurity: No Food Insecurity (12/7/2023)    Hunger Vital Sign     Worried About Running Out of Food in the Last Year: Never true     Ran Out of Food in the Last Year: Never true      Transportation Needs: No Transportation Needs (12/7/2023)    PRAPARE - Transportation     Lack of Transportation (Medical): No     Lack of Transportation (Non-Medical): No       Education Documentation  Unresolved/Worsening Symptoms, taught by Evi Richmond RN at 12/7/2023  3:36 PM.  Learner: Caregiver, Significant Other  Readiness: Acceptance  Method: Explanation  Response: Verbalizes Understanding    Rehabilitation Therapy, taught by Evi Richmond RN at 12/7/2023  3:36 PM.  Learner: Caregiver, Significant Other  Readiness: Acceptance  Method: Explanation  Response: Verbalizes Understanding    Provider Follow-Up, taught by Evi Richmond RN at 12/7/2023  3:36 PM.  Learner: Caregiver, Significant Other  Readiness: Acceptance  Method: Explanation  Response: Verbalizes Understanding    Medication Management, taught by Evi Richmond RN at 12/7/2023  3:36 PM.  Learner: Caregiver, Significant Other  Readiness: Acceptance  Method: Explanation  Response: Verbalizes Understanding    Physical Activity, taught by Evi Richmond RN at 12/7/2023  3:36 PM.  Learner: Caregiver, Significant Other  Readiness: Acceptance  Method: Explanation  Response: Verbalizes Understanding    Hypertension, taught by Evi Richmond RN at 12/7/2023  3:36 PM.  Learner: Caregiver, Significant Other  Readiness:  Acceptance  Method: Explanation  Response: Verbalizes Understanding    Signs/Symptoms, taught by Evi Richmond, RN at 12/7/2023  3:36 PM.  Learner: Caregiver, Significant Other  Readiness: Acceptance  Method: Explanation  Response: Verbalizes Understanding          Evi TAO  Ambulatory Case Management    12/7/2023, 15:37 EST

## 2023-12-10 ENCOUNTER — HOSPITAL ENCOUNTER (INPATIENT)
Facility: HOSPITAL | Age: 73
LOS: 2 days | Discharge: HOME | End: 2023-12-13
Attending: STUDENT IN AN ORGANIZED HEALTH CARE EDUCATION/TRAINING PROGRAM | Admitting: INTERNAL MEDICINE
Payer: MEDICARE

## 2023-12-10 ENCOUNTER — APPOINTMENT (OUTPATIENT)
Dept: CT IMAGING | Facility: HOSPITAL | Age: 73
End: 2023-12-10
Payer: MEDICARE

## 2023-12-10 ENCOUNTER — APPOINTMENT (OUTPATIENT)
Dept: GENERAL RADIOLOGY | Facility: HOSPITAL | Age: 73
End: 2023-12-10
Payer: MEDICARE

## 2023-12-10 DIAGNOSIS — R41.82 ALTERED MENTAL STATUS, UNSPECIFIED ALTERED MENTAL STATUS TYPE: ICD-10-CM

## 2023-12-10 DIAGNOSIS — J96.92 RESPIRATORY FAILURE WITH HYPERCAPNIA, UNSPECIFIED CHRONICITY: ICD-10-CM

## 2023-12-10 DIAGNOSIS — J96.22 ACUTE ON CHRONIC RESPIRATORY FAILURE WITH HYPOXIA AND HYPERCAPNIA: Primary | ICD-10-CM

## 2023-12-10 DIAGNOSIS — J96.21 ACUTE ON CHRONIC RESPIRATORY FAILURE WITH HYPOXIA AND HYPERCAPNIA: Primary | ICD-10-CM

## 2023-12-10 LAB
A-A DO2: 126.5 MMHG (ref 0–300)
ALBUMIN SERPL-MCNC: 4.4 G/DL (ref 3.5–5.2)
ALBUMIN/GLOB SERPL: 1.3 G/DL
ALP SERPL-CCNC: 94 U/L (ref 39–117)
ALT SERPL W P-5'-P-CCNC: 18 U/L (ref 1–41)
AMPHET+METHAMPHET UR QL: NEGATIVE
AMPHETAMINES UR QL: NEGATIVE
ANION GAP SERPL CALCULATED.3IONS-SCNC: 0.1 MMOL/L (ref 5–15)
ANISOCYTOSIS BLD QL: NORMAL
ARTERIAL PATENCY WRIST A: ABNORMAL
AST SERPL-CCNC: 15 U/L (ref 1–40)
ATMOSPHERIC PRESS: 727 MMHG
BACTERIA UR QL AUTO: ABNORMAL /HPF
BARBITURATES UR QL SCN: NEGATIVE
BASE EXCESS BLDA CALC-SCNC: 10.4 MMOL/L (ref 0–2)
BASOPHILS # BLD AUTO: 0.02 10*3/MM3 (ref 0–0.2)
BASOPHILS NFR BLD AUTO: 0.2 % (ref 0–1.5)
BDY SITE: ABNORMAL
BENZODIAZ UR QL SCN: NEGATIVE
BILIRUB SERPL-MCNC: 0.5 MG/DL (ref 0–1.2)
BILIRUB UR QL STRIP: ABNORMAL
BUN SERPL-MCNC: 23 MG/DL (ref 8–23)
BUN/CREAT SERPL: 24.2 (ref 7–25)
BUPRENORPHINE SERPL-MCNC: NEGATIVE NG/ML
CALCIUM SPEC-SCNC: 9.7 MG/DL (ref 8.6–10.5)
CANNABINOIDS SERPL QL: NEGATIVE
CHLORIDE SERPL-SCNC: 95 MMOL/L (ref 98–107)
CLARITY UR: CLEAR
CO2 BLDA-SCNC: 45.6 MMOL/L (ref 22–33)
CO2 SERPL-SCNC: 42.9 MMOL/L (ref 22–29)
COCAINE UR QL: NEGATIVE
COHGB MFR BLD: 2 % (ref 0–5)
COLOR UR: ABNORMAL
CREAT SERPL-MCNC: 0.95 MG/DL (ref 0.76–1.27)
D-LACTATE SERPL-SCNC: 0.8 MMOL/L (ref 0.5–2)
DEPRECATED RDW RBC AUTO: 49 FL (ref 37–54)
EGFRCR SERPLBLD CKD-EPI 2021: 85.6 ML/MIN/1.73
EOSINOPHIL # BLD AUTO: 0.01 10*3/MM3 (ref 0–0.4)
EOSINOPHIL NFR BLD AUTO: 0.1 % (ref 0.3–6.2)
ERYTHROCYTE [DISTWIDTH] IN BLOOD BY AUTOMATED COUNT: 12.5 % (ref 12.3–15.4)
FENTANYL UR-MCNC: NEGATIVE NG/ML
GAS FLOW AIRWAY: 6 LPM
GLOBULIN UR ELPH-MCNC: 3.4 GM/DL
GLUCOSE SERPL-MCNC: 137 MG/DL (ref 65–99)
GLUCOSE UR STRIP-MCNC: NEGATIVE MG/DL
HCO3 BLDA-SCNC: 42.3 MMOL/L (ref 20–26)
HCT VFR BLD AUTO: 44 % (ref 37.5–51)
HCT VFR BLD CALC: 38.6 % (ref 38–51)
HGB BLD-MCNC: 12.1 G/DL (ref 13–17.7)
HGB BLDA-MCNC: 12.6 G/DL (ref 14–18)
HGB UR QL STRIP.AUTO: ABNORMAL
HYALINE CASTS UR QL AUTO: ABNORMAL /LPF
HYPOCHROMIA BLD QL: NORMAL
IMM GRANULOCYTES # BLD AUTO: 0.11 10*3/MM3 (ref 0–0.05)
IMM GRANULOCYTES NFR BLD AUTO: 1.3 % (ref 0–0.5)
INHALED O2 CONCENTRATION: 44 %
KETONES UR QL STRIP: NEGATIVE
LEUKOCYTE ESTERASE UR QL STRIP.AUTO: NEGATIVE
LYMPHOCYTES # BLD AUTO: 1.01 10*3/MM3 (ref 0.7–3.1)
LYMPHOCYTES NFR BLD AUTO: 12 % (ref 19.6–45.3)
Lab: ABNORMAL
Lab: ABNORMAL
MCH RBC QN AUTO: 29 PG (ref 26.6–33)
MCHC RBC AUTO-ENTMCNC: 27.5 G/DL (ref 31.5–35.7)
MCV RBC AUTO: 105.5 FL (ref 79–97)
METHADONE UR QL SCN: NEGATIVE
METHGB BLD QL: <-0.1 % (ref 0–3)
MODALITY: ABNORMAL
MONOCYTES # BLD AUTO: 0.77 10*3/MM3 (ref 0.1–0.9)
MONOCYTES NFR BLD AUTO: 9.2 % (ref 5–12)
NEUTROPHILS NFR BLD AUTO: 6.49 10*3/MM3 (ref 1.7–7)
NEUTROPHILS NFR BLD AUTO: 77.2 % (ref 42.7–76)
NITRITE UR QL STRIP: NEGATIVE
NOTE: ABNORMAL
NOTIFIED BY: ABNORMAL
NOTIFIED WHO: ABNORMAL
NRBC BLD AUTO-RTO: 0 /100 WBC (ref 0–0.2)
NT-PROBNP SERPL-MCNC: 336.7 PG/ML (ref 0–900)
OPIATES UR QL: NEGATIVE
OXYCODONE UR QL SCN: NEGATIVE
OXYHGB MFR BLDV: 87.1 % (ref 94–99)
PCO2 BLDA: >104 MM HG (ref 35–45)
PCO2 TEMP ADJ BLD: ABNORMAL MM[HG]
PCP UR QL SCN: NEGATIVE
PH BLDA: 7.21 PH UNITS (ref 7.35–7.45)
PH UR STRIP.AUTO: <=5 [PH] (ref 5–8)
PH, TEMP CORRECTED: ABNORMAL
PLAT MORPH BLD: NORMAL
PLATELET # BLD AUTO: 182 10*3/MM3 (ref 140–450)
PMV BLD AUTO: 10.4 FL (ref 6–12)
PO2 BLDA: 57 MM HG (ref 83–108)
PO2 TEMP ADJ BLD: ABNORMAL MM[HG]
POTASSIUM SERPL-SCNC: 5.8 MMOL/L (ref 3.5–5.2)
PROT SERPL-MCNC: 7.8 G/DL (ref 6–8.5)
PROT UR QL STRIP: ABNORMAL
RBC # BLD AUTO: 4.17 10*6/MM3 (ref 4.14–5.8)
RBC # UR STRIP: ABNORMAL /HPF
REF LAB TEST METHOD: ABNORMAL
SAO2 % BLDCOA: 88.2 % (ref 94–99)
SODIUM SERPL-SCNC: 138 MMOL/L (ref 136–145)
SP GR UR STRIP: 1.02 (ref 1–1.03)
SQUAMOUS #/AREA URNS HPF: ABNORMAL /HPF
STOMATOCYTES BLD QL SMEAR: NORMAL
TRICYCLICS UR QL SCN: NEGATIVE
TROPONIN T SERPL HS-MCNC: 36 NG/L
UROBILINOGEN UR QL STRIP: ABNORMAL
VENTILATOR MODE: ABNORMAL
WBC # UR STRIP: ABNORMAL /HPF
WBC NRBC COR # BLD AUTO: 8.41 10*3/MM3 (ref 3.4–10.8)

## 2023-12-10 PROCEDURE — 94660 CPAP INITIATION&MGMT: CPT

## 2023-12-10 PROCEDURE — 36600 WITHDRAWAL OF ARTERIAL BLOOD: CPT

## 2023-12-10 PROCEDURE — 87150 DNA/RNA AMPLIFIED PROBE: CPT | Performed by: STUDENT IN AN ORGANIZED HEALTH CARE EDUCATION/TRAINING PROGRAM

## 2023-12-10 PROCEDURE — 94799 UNLISTED PULMONARY SVC/PX: CPT

## 2023-12-10 PROCEDURE — 80053 COMPREHEN METABOLIC PANEL: CPT | Performed by: STUDENT IN AN ORGANIZED HEALTH CARE EDUCATION/TRAINING PROGRAM

## 2023-12-10 PROCEDURE — 83605 ASSAY OF LACTIC ACID: CPT | Performed by: STUDENT IN AN ORGANIZED HEALTH CARE EDUCATION/TRAINING PROGRAM

## 2023-12-10 PROCEDURE — 87147 CULTURE TYPE IMMUNOLOGIC: CPT | Performed by: STUDENT IN AN ORGANIZED HEALTH CARE EDUCATION/TRAINING PROGRAM

## 2023-12-10 PROCEDURE — 82948 REAGENT STRIP/BLOOD GLUCOSE: CPT

## 2023-12-10 PROCEDURE — 71045 X-RAY EXAM CHEST 1 VIEW: CPT

## 2023-12-10 PROCEDURE — 82375 ASSAY CARBOXYHB QUANT: CPT

## 2023-12-10 PROCEDURE — 80307 DRUG TEST PRSMV CHEM ANLYZR: CPT | Performed by: STUDENT IN AN ORGANIZED HEALTH CARE EDUCATION/TRAINING PROGRAM

## 2023-12-10 PROCEDURE — 82805 BLOOD GASES W/O2 SATURATION: CPT

## 2023-12-10 PROCEDURE — 83880 ASSAY OF NATRIURETIC PEPTIDE: CPT | Performed by: STUDENT IN AN ORGANIZED HEALTH CARE EDUCATION/TRAINING PROGRAM

## 2023-12-10 PROCEDURE — P9612 CATHETERIZE FOR URINE SPEC: HCPCS

## 2023-12-10 PROCEDURE — 25010000002 NALOXONE HCL 2 MG/2ML SOLUTION PREFILLED SYRINGE

## 2023-12-10 PROCEDURE — 70450 CT HEAD/BRAIN W/O DYE: CPT

## 2023-12-10 PROCEDURE — 99285 EMERGENCY DEPT VISIT HI MDM: CPT

## 2023-12-10 PROCEDURE — 71045 X-RAY EXAM CHEST 1 VIEW: CPT | Performed by: RADIOLOGY

## 2023-12-10 PROCEDURE — 70450 CT HEAD/BRAIN W/O DYE: CPT | Performed by: RADIOLOGY

## 2023-12-10 PROCEDURE — 93005 ELECTROCARDIOGRAM TRACING: CPT | Performed by: STUDENT IN AN ORGANIZED HEALTH CARE EDUCATION/TRAINING PROGRAM

## 2023-12-10 PROCEDURE — 83050 HGB METHEMOGLOBIN QUAN: CPT

## 2023-12-10 PROCEDURE — 87040 BLOOD CULTURE FOR BACTERIA: CPT | Performed by: STUDENT IN AN ORGANIZED HEALTH CARE EDUCATION/TRAINING PROGRAM

## 2023-12-10 PROCEDURE — 81001 URINALYSIS AUTO W/SCOPE: CPT | Performed by: STUDENT IN AN ORGANIZED HEALTH CARE EDUCATION/TRAINING PROGRAM

## 2023-12-10 PROCEDURE — 36415 COLL VENOUS BLD VENIPUNCTURE: CPT

## 2023-12-10 PROCEDURE — 85007 BL SMEAR W/DIFF WBC COUNT: CPT | Performed by: STUDENT IN AN ORGANIZED HEALTH CARE EDUCATION/TRAINING PROGRAM

## 2023-12-10 PROCEDURE — 85025 COMPLETE CBC W/AUTO DIFF WBC: CPT | Performed by: STUDENT IN AN ORGANIZED HEALTH CARE EDUCATION/TRAINING PROGRAM

## 2023-12-10 PROCEDURE — 84484 ASSAY OF TROPONIN QUANT: CPT | Performed by: STUDENT IN AN ORGANIZED HEALTH CARE EDUCATION/TRAINING PROGRAM

## 2023-12-10 RX ORDER — NALOXONE HYDROCHLORIDE 1 MG/ML
2 INJECTION INTRAMUSCULAR; INTRAVENOUS; SUBCUTANEOUS ONCE
Status: COMPLETED | OUTPATIENT
Start: 2023-12-10 | End: 2023-12-10

## 2023-12-10 RX ORDER — SODIUM CHLORIDE 0.9 % (FLUSH) 0.9 %
10 SYRINGE (ML) INJECTION AS NEEDED
Status: DISCONTINUED | OUTPATIENT
Start: 2023-12-10 | End: 2023-12-13 | Stop reason: HOSPADM

## 2023-12-10 RX ORDER — NALOXONE HYDROCHLORIDE 1 MG/ML
INJECTION INTRAMUSCULAR; INTRAVENOUS; SUBCUTANEOUS
Status: COMPLETED
Start: 2023-12-10 | End: 2023-12-10

## 2023-12-10 RX ADMIN — NALOXONE HYDROCHLORIDE 2 MG: 1 INJECTION PARENTERAL at 22:19

## 2023-12-10 RX ADMIN — NALOXONE HYDROCHLORIDE 2 MG: 1 INJECTION INTRAMUSCULAR; INTRAVENOUS; SUBCUTANEOUS at 22:19

## 2023-12-11 PROBLEM — J96.92 HYPERCAPNIC RESPIRATORY FAILURE: Status: ACTIVE | Noted: 2023-12-11

## 2023-12-11 LAB
A-A DO2: 191.1 MMHG (ref 0–300)
A-A DO2: 200.8 MMHG (ref 0–300)
A-A DO2: 92.6 MMHG (ref 0–300)
ANION GAP SERPL CALCULATED.3IONS-SCNC: 6.9 MMOL/L (ref 5–15)
ARTERIAL PATENCY WRIST A: POSITIVE
ATMOSPHERIC PRESS: 727 MMHG
ATMOSPHERIC PRESS: 728 MMHG
ATMOSPHERIC PRESS: 731 MMHG
B PARAPERT DNA SPEC QL NAA+PROBE: NOT DETECTED
B PERT DNA SPEC QL NAA+PROBE: NOT DETECTED
BACTERIA BLD CULT: ABNORMAL
BASE EXCESS BLDA CALC-SCNC: 11.5 MMOL/L (ref 0–2)
BASE EXCESS BLDA CALC-SCNC: 14 MMOL/L (ref 0–2)
BASE EXCESS BLDA CALC-SCNC: 17.1 MMOL/L (ref 0–2)
BASOPHILS # BLD AUTO: 0.02 10*3/MM3 (ref 0–0.2)
BASOPHILS NFR BLD AUTO: 0.3 % (ref 0–1.5)
BDY SITE: ABNORMAL
BOTTLE TYPE: ABNORMAL
BUN SERPL-MCNC: 22 MG/DL (ref 8–23)
BUN/CREAT SERPL: 30.1 (ref 7–25)
C PNEUM DNA NPH QL NAA+NON-PROBE: NOT DETECTED
CALCIUM SPEC-SCNC: 10 MG/DL (ref 8.6–10.5)
CHLORIDE SERPL-SCNC: 90 MMOL/L (ref 98–107)
CO2 BLDA-SCNC: 44.3 MMOL/L (ref 22–33)
CO2 BLDA-SCNC: 44.3 MMOL/L (ref 22–33)
CO2 BLDA-SCNC: 48.5 MMOL/L (ref 22–33)
CO2 SERPL-SCNC: 37.1 MMOL/L (ref 22–29)
COHGB MFR BLD: 1.6 % (ref 0–5)
COHGB MFR BLD: 1.6 % (ref 0–5)
COHGB MFR BLD: 1.8 % (ref 0–5)
CREAT SERPL-MCNC: 0.73 MG/DL (ref 0.76–1.27)
DEPRECATED RDW RBC AUTO: 47.4 FL (ref 37–54)
EGFRCR SERPLBLD CKD-EPI 2021: 97.3 ML/MIN/1.73
EOSINOPHIL # BLD AUTO: 0.01 10*3/MM3 (ref 0–0.4)
EOSINOPHIL NFR BLD AUTO: 0.1 % (ref 0.3–6.2)
EPAP: 8
EPAP: 8
ERYTHROCYTE [DISTWIDTH] IN BLOOD BY AUTOMATED COUNT: 12.4 % (ref 12.3–15.4)
FLUAV RNA RESP QL NAA+PROBE: NOT DETECTED
FLUAV SUBTYP SPEC NAA+PROBE: NOT DETECTED
FLUBV RNA ISLT QL NAA+PROBE: NOT DETECTED
FLUBV RNA RESP QL NAA+PROBE: NOT DETECTED
GAS FLOW AIRWAY: 4 LPM
GLUCOSE BLDC GLUCOMTR-MCNC: 113 MG/DL (ref 70–130)
GLUCOSE BLDC GLUCOMTR-MCNC: 124 MG/DL (ref 70–130)
GLUCOSE SERPL-MCNC: 140 MG/DL (ref 65–99)
HADV DNA SPEC NAA+PROBE: NOT DETECTED
HBA1C MFR BLD: 6.4 % (ref 4.8–5.6)
HCO3 BLDA-SCNC: 41.7 MMOL/L (ref 20–26)
HCO3 BLDA-SCNC: 42.1 MMOL/L (ref 20–26)
HCO3 BLDA-SCNC: 46.1 MMOL/L (ref 20–26)
HCOV 229E RNA SPEC QL NAA+PROBE: NOT DETECTED
HCOV HKU1 RNA SPEC QL NAA+PROBE: NOT DETECTED
HCOV NL63 RNA SPEC QL NAA+PROBE: NOT DETECTED
HCOV OC43 RNA SPEC QL NAA+PROBE: NOT DETECTED
HCT VFR BLD AUTO: 42.9 % (ref 37.5–51)
HCT VFR BLD CALC: 36.6 % (ref 38–51)
HCT VFR BLD CALC: 36.7 % (ref 38–51)
HCT VFR BLD CALC: 38.1 % (ref 38–51)
HGB BLD-MCNC: 12.1 G/DL (ref 13–17.7)
HGB BLDA-MCNC: 11.9 G/DL (ref 14–18)
HGB BLDA-MCNC: 12 G/DL (ref 14–18)
HGB BLDA-MCNC: 12.4 G/DL (ref 14–18)
HMPV RNA NPH QL NAA+NON-PROBE: NOT DETECTED
HPIV1 RNA ISLT QL NAA+PROBE: NOT DETECTED
HPIV2 RNA SPEC QL NAA+PROBE: NOT DETECTED
HPIV3 RNA NPH QL NAA+PROBE: NOT DETECTED
HPIV4 P GENE NPH QL NAA+PROBE: NOT DETECTED
HYPOCHROMIA BLD QL: NORMAL
IMM GRANULOCYTES # BLD AUTO: 0.04 10*3/MM3 (ref 0–0.05)
IMM GRANULOCYTES NFR BLD AUTO: 0.5 % (ref 0–0.5)
INHALED O2 CONCENTRATION: 36 %
INHALED O2 CONCENTRATION: 50 %
INHALED O2 CONCENTRATION: 50 %
IPAP: 24
IPAP: 24
LYMPHOCYTES # BLD AUTO: 0.71 10*3/MM3 (ref 0.7–3.1)
LYMPHOCYTES NFR BLD AUTO: 8.9 % (ref 19.6–45.3)
Lab: ABNORMAL
M PNEUMO IGG SER IA-ACNC: NOT DETECTED
MAGNESIUM SERPL-MCNC: 2.2 MG/DL (ref 1.6–2.4)
MCH RBC QN AUTO: 29.2 PG (ref 26.6–33)
MCHC RBC AUTO-ENTMCNC: 28.2 G/DL (ref 31.5–35.7)
MCV RBC AUTO: 103.6 FL (ref 79–97)
METHGB BLD QL: 0.3 % (ref 0–3)
METHGB BLD QL: 0.4 % (ref 0–3)
METHGB BLD QL: <-0.1 % (ref 0–3)
MODALITY: ABNORMAL
MONOCYTES # BLD AUTO: 0.64 10*3/MM3 (ref 0.1–0.9)
MONOCYTES NFR BLD AUTO: 8 % (ref 5–12)
NEUTROPHILS NFR BLD AUTO: 6.54 10*3/MM3 (ref 1.7–7)
NEUTROPHILS NFR BLD AUTO: 82.2 % (ref 42.7–76)
NOTE: ABNORMAL
NOTIFIED BY: ABNORMAL
NOTIFIED WHO: ABNORMAL
NRBC BLD AUTO-RTO: 0 /100 WBC (ref 0–0.2)
OXYHGB MFR BLDV: 88.2 % (ref 94–99)
OXYHGB MFR BLDV: 91.6 % (ref 94–99)
OXYHGB MFR BLDV: 92.2 % (ref 94–99)
PCO2 BLDA: 71.5 MM HG (ref 35–45)
PCO2 BLDA: 80.1 MM HG (ref 35–45)
PCO2 BLDA: 87.4 MM HG (ref 35–45)
PCO2 TEMP ADJ BLD: ABNORMAL MM[HG]
PH BLDA: 7.29 PH UNITS (ref 7.35–7.45)
PH BLDA: 7.37 PH UNITS (ref 7.35–7.45)
PH BLDA: 7.38 PH UNITS (ref 7.35–7.45)
PH, TEMP CORRECTED: ABNORMAL
PLAT MORPH BLD: NORMAL
PLATELET # BLD AUTO: 149 10*3/MM3 (ref 140–450)
PMV BLD AUTO: 10.6 FL (ref 6–12)
PO2 BLDA: 54.4 MM HG (ref 83–108)
PO2 BLDA: 62.4 MM HG (ref 83–108)
PO2 BLDA: 65.2 MM HG (ref 83–108)
PO2 TEMP ADJ BLD: ABNORMAL MM[HG]
POTASSIUM SERPL-SCNC: 4.4 MMOL/L (ref 3.5–5.2)
QT INTERVAL: 372 MS
QTC INTERVAL: 407 MS
RBC # BLD AUTO: 4.14 10*6/MM3 (ref 4.14–5.8)
RHINOVIRUS RNA SPEC NAA+PROBE: NOT DETECTED
ROULEAUX BLD QL SMEAR: NORMAL
RSV RNA NPH QL NAA+NON-PROBE: NOT DETECTED
SAO2 % BLDCOA: 89.2 % (ref 94–99)
SAO2 % BLDCOA: 93.7 % (ref 94–99)
SAO2 % BLDCOA: 94 % (ref 94–99)
SARS-COV-2 RNA NPH QL NAA+NON-PROBE: NOT DETECTED
SARS-COV-2 RNA RESP QL NAA+PROBE: NOT DETECTED
SET MECH RESP RATE: 24
SET MECH RESP RATE: 24
SODIUM SERPL-SCNC: 134 MMOL/L (ref 136–145)
TSH SERPL DL<=0.05 MIU/L-ACNC: 0.57 UIU/ML (ref 0.27–4.2)
VENTILATOR MODE: ABNORMAL
WBC NRBC COR # BLD AUTO: 7.96 10*3/MM3 (ref 3.4–10.8)

## 2023-12-11 PROCEDURE — 82805 BLOOD GASES W/O2 SATURATION: CPT

## 2023-12-11 PROCEDURE — 85007 BL SMEAR W/DIFF WBC COUNT: CPT | Performed by: PHYSICIAN ASSISTANT

## 2023-12-11 PROCEDURE — 25010000002 METHYLPREDNISOLONE PER 40 MG: Performed by: INTERNAL MEDICINE

## 2023-12-11 PROCEDURE — 25010000002 LORAZEPAM PER 2 MG: Performed by: STUDENT IN AN ORGANIZED HEALTH CARE EDUCATION/TRAINING PROGRAM

## 2023-12-11 PROCEDURE — 99223 1ST HOSP IP/OBS HIGH 75: CPT | Performed by: PHYSICIAN ASSISTANT

## 2023-12-11 PROCEDURE — 94664 DEMO&/EVAL PT USE INHALER: CPT

## 2023-12-11 PROCEDURE — 94761 N-INVAS EAR/PLS OXIMETRY MLT: CPT

## 2023-12-11 PROCEDURE — 36600 WITHDRAWAL OF ARTERIAL BLOOD: CPT

## 2023-12-11 PROCEDURE — 85025 COMPLETE CBC W/AUTO DIFF WBC: CPT | Performed by: PHYSICIAN ASSISTANT

## 2023-12-11 PROCEDURE — 83050 HGB METHEMOGLOBIN QUAN: CPT

## 2023-12-11 PROCEDURE — 82375 ASSAY CARBOXYHB QUANT: CPT

## 2023-12-11 PROCEDURE — 25010000002 ENOXAPARIN PER 10 MG: Performed by: INTERNAL MEDICINE

## 2023-12-11 PROCEDURE — 25010000002 HALOPERIDOL LACTATE PER 5 MG: Performed by: STUDENT IN AN ORGANIZED HEALTH CARE EDUCATION/TRAINING PROGRAM

## 2023-12-11 PROCEDURE — 83735 ASSAY OF MAGNESIUM: CPT | Performed by: PHYSICIAN ASSISTANT

## 2023-12-11 PROCEDURE — 80048 BASIC METABOLIC PNL TOTAL CA: CPT | Performed by: PHYSICIAN ASSISTANT

## 2023-12-11 PROCEDURE — 94640 AIRWAY INHALATION TREATMENT: CPT

## 2023-12-11 PROCEDURE — 94799 UNLISTED PULMONARY SVC/PX: CPT

## 2023-12-11 PROCEDURE — 0202U NFCT DS 22 TRGT SARS-COV-2: CPT | Performed by: PHYSICIAN ASSISTANT

## 2023-12-11 PROCEDURE — 25810000003 SODIUM CHLORIDE 0.9 % SOLUTION: Performed by: STUDENT IN AN ORGANIZED HEALTH CARE EDUCATION/TRAINING PROGRAM

## 2023-12-11 PROCEDURE — 83036 HEMOGLOBIN GLYCOSYLATED A1C: CPT | Performed by: PHYSICIAN ASSISTANT

## 2023-12-11 PROCEDURE — 82948 REAGENT STRIP/BLOOD GLUCOSE: CPT

## 2023-12-11 PROCEDURE — 92610 EVALUATE SWALLOWING FUNCTION: CPT

## 2023-12-11 PROCEDURE — 94660 CPAP INITIATION&MGMT: CPT

## 2023-12-11 PROCEDURE — 25010000002 FUROSEMIDE PER 20 MG: Performed by: STUDENT IN AN ORGANIZED HEALTH CARE EDUCATION/TRAINING PROGRAM

## 2023-12-11 PROCEDURE — 84443 ASSAY THYROID STIM HORMONE: CPT | Performed by: PHYSICIAN ASSISTANT

## 2023-12-11 PROCEDURE — 87636 SARSCOV2 & INF A&B AMP PRB: CPT | Performed by: STUDENT IN AN ORGANIZED HEALTH CARE EDUCATION/TRAINING PROGRAM

## 2023-12-11 RX ORDER — ROSUVASTATIN CALCIUM 10 MG/1
10 TABLET, COATED ORAL DAILY
COMMUNITY

## 2023-12-11 RX ORDER — BISACODYL 10 MG
10 SUPPOSITORY, RECTAL RECTAL DAILY PRN
Status: DISCONTINUED | OUTPATIENT
Start: 2023-12-11 | End: 2023-12-13 | Stop reason: HOSPADM

## 2023-12-11 RX ORDER — FUROSEMIDE 10 MG/ML
80 INJECTION INTRAMUSCULAR; INTRAVENOUS ONCE
Status: COMPLETED | OUTPATIENT
Start: 2023-12-11 | End: 2023-12-11

## 2023-12-11 RX ORDER — ACETAMINOPHEN 500 MG
500 TABLET ORAL EVERY 6 HOURS PRN
COMMUNITY

## 2023-12-11 RX ORDER — LORAZEPAM 2 MG/ML
1 INJECTION INTRAMUSCULAR ONCE
Status: COMPLETED | OUTPATIENT
Start: 2023-12-11 | End: 2023-12-11

## 2023-12-11 RX ORDER — OMEPRAZOLE 20 MG/1
20 CAPSULE, DELAYED RELEASE ORAL DAILY
COMMUNITY
End: 2023-12-13 | Stop reason: HOSPADM

## 2023-12-11 RX ORDER — AMOXICILLIN 250 MG
2 CAPSULE ORAL 2 TIMES DAILY
Status: DISCONTINUED | OUTPATIENT
Start: 2023-12-11 | End: 2023-12-13 | Stop reason: HOSPADM

## 2023-12-11 RX ORDER — NITROGLYCERIN 0.4 MG/1
0.4 TABLET SUBLINGUAL
Status: DISCONTINUED | OUTPATIENT
Start: 2023-12-11 | End: 2023-12-13 | Stop reason: HOSPADM

## 2023-12-11 RX ORDER — BUDESONIDE AND FORMOTEROL FUMARATE DIHYDRATE 160; 4.5 UG/1; UG/1
2 AEROSOL RESPIRATORY (INHALATION)
Status: DISCONTINUED | OUTPATIENT
Start: 2023-12-11 | End: 2023-12-13 | Stop reason: HOSPADM

## 2023-12-11 RX ORDER — IPRATROPIUM BROMIDE AND ALBUTEROL SULFATE 2.5; .5 MG/3ML; MG/3ML
3 SOLUTION RESPIRATORY (INHALATION)
Status: DISCONTINUED | OUTPATIENT
Start: 2023-12-11 | End: 2023-12-13 | Stop reason: HOSPADM

## 2023-12-11 RX ORDER — SODIUM CHLORIDE 0.9 % (FLUSH) 0.9 %
10 SYRINGE (ML) INJECTION EVERY 12 HOURS SCHEDULED
Status: DISCONTINUED | OUTPATIENT
Start: 2023-12-11 | End: 2023-12-13 | Stop reason: HOSPADM

## 2023-12-11 RX ORDER — ALBUTEROL SULFATE 2.5 MG/3ML
2.5 SOLUTION RESPIRATORY (INHALATION) EVERY 4 HOURS PRN
Status: CANCELLED | OUTPATIENT
Start: 2023-12-11

## 2023-12-11 RX ORDER — ENOXAPARIN SODIUM 100 MG/ML
40 INJECTION SUBCUTANEOUS DAILY
Status: DISCONTINUED | OUTPATIENT
Start: 2023-12-11 | End: 2023-12-13 | Stop reason: HOSPADM

## 2023-12-11 RX ORDER — INSULIN LISPRO 100 [IU]/ML
2-7 INJECTION, SOLUTION INTRAVENOUS; SUBCUTANEOUS
Status: DISCONTINUED | OUTPATIENT
Start: 2023-12-11 | End: 2023-12-13 | Stop reason: HOSPADM

## 2023-12-11 RX ORDER — DEXTROSE MONOHYDRATE 25 G/50ML
25 INJECTION, SOLUTION INTRAVENOUS
Status: DISCONTINUED | OUTPATIENT
Start: 2023-12-11 | End: 2023-12-13 | Stop reason: HOSPADM

## 2023-12-11 RX ORDER — LISINOPRIL 20 MG/1
20 TABLET ORAL DAILY
COMMUNITY

## 2023-12-11 RX ORDER — NICOTINE POLACRILEX 4 MG
15 LOZENGE BUCCAL
Status: DISCONTINUED | OUTPATIENT
Start: 2023-12-11 | End: 2023-12-13 | Stop reason: HOSPADM

## 2023-12-11 RX ORDER — FUROSEMIDE 40 MG/1
40 TABLET ORAL EVERY OTHER DAY
Status: CANCELLED | OUTPATIENT
Start: 2023-12-11

## 2023-12-11 RX ORDER — BISACODYL 5 MG/1
5 TABLET, DELAYED RELEASE ORAL DAILY PRN
Status: DISCONTINUED | OUTPATIENT
Start: 2023-12-11 | End: 2023-12-13 | Stop reason: HOSPADM

## 2023-12-11 RX ORDER — ACETAMINOPHEN 500 MG
500 TABLET ORAL EVERY 6 HOURS PRN
Status: CANCELLED | OUTPATIENT
Start: 2023-12-11

## 2023-12-11 RX ORDER — ACETAMINOPHEN 325 MG/1
650 TABLET ORAL EVERY 6 HOURS PRN
Status: DISCONTINUED | OUTPATIENT
Start: 2023-12-11 | End: 2023-12-13 | Stop reason: HOSPADM

## 2023-12-11 RX ORDER — METHYLPREDNISOLONE SODIUM SUCCINATE 40 MG/ML
40 INJECTION, POWDER, LYOPHILIZED, FOR SOLUTION INTRAMUSCULAR; INTRAVENOUS EVERY 12 HOURS
Status: DISCONTINUED | OUTPATIENT
Start: 2023-12-11 | End: 2023-12-13 | Stop reason: HOSPADM

## 2023-12-11 RX ORDER — GLUCAGON 1 MG/ML
1 KIT INJECTION
Status: DISCONTINUED | OUTPATIENT
Start: 2023-12-11 | End: 2023-12-13 | Stop reason: HOSPADM

## 2023-12-11 RX ORDER — ASPIRIN 81 MG/1
81 TABLET ORAL DAILY
COMMUNITY

## 2023-12-11 RX ORDER — HYDROXYZINE HYDROCHLORIDE 25 MG/1
25 TABLET, FILM COATED ORAL 3 TIMES DAILY PRN
Status: DISCONTINUED | OUTPATIENT
Start: 2023-12-11 | End: 2023-12-13 | Stop reason: HOSPADM

## 2023-12-11 RX ORDER — OMEPRAZOLE 20 MG/1
20 CAPSULE, DELAYED RELEASE ORAL DAILY
Status: CANCELLED | OUTPATIENT
Start: 2023-12-11

## 2023-12-11 RX ORDER — SODIUM CHLORIDE 0.9 % (FLUSH) 0.9 %
10 SYRINGE (ML) INJECTION AS NEEDED
Status: DISCONTINUED | OUTPATIENT
Start: 2023-12-11 | End: 2023-12-13 | Stop reason: HOSPADM

## 2023-12-11 RX ORDER — HALOPERIDOL 5 MG/ML
5 INJECTION INTRAMUSCULAR ONCE
Status: COMPLETED | OUTPATIENT
Start: 2023-12-11 | End: 2023-12-11

## 2023-12-11 RX ORDER — SODIUM CHLORIDE 9 MG/ML
40 INJECTION, SOLUTION INTRAVENOUS AS NEEDED
Status: DISCONTINUED | OUTPATIENT
Start: 2023-12-11 | End: 2023-12-13 | Stop reason: HOSPADM

## 2023-12-11 RX ORDER — HALOPERIDOL 5 MG/ML
5 INJECTION INTRAMUSCULAR ONCE
Status: DISCONTINUED | OUTPATIENT
Start: 2023-12-11 | End: 2023-12-11

## 2023-12-11 RX ORDER — POLYETHYLENE GLYCOL 3350 17 G/17G
17 POWDER, FOR SOLUTION ORAL DAILY PRN
Status: DISCONTINUED | OUTPATIENT
Start: 2023-12-11 | End: 2023-12-13 | Stop reason: HOSPADM

## 2023-12-11 RX ADMIN — FUROSEMIDE 80 MG: 10 INJECTION, SOLUTION INTRAMUSCULAR; INTRAVENOUS at 08:00

## 2023-12-11 RX ADMIN — BUDESONIDE AND FORMOTEROL FUMARATE DIHYDRATE 2 PUFF: 160; 4.5 AEROSOL RESPIRATORY (INHALATION) at 12:11

## 2023-12-11 RX ADMIN — ENOXAPARIN SODIUM 40 MG: 40 INJECTION SUBCUTANEOUS at 11:03

## 2023-12-11 RX ADMIN — IPRATROPIUM BROMIDE AND ALBUTEROL SULFATE 3 ML: 2.5; .5 SOLUTION RESPIRATORY (INHALATION) at 12:11

## 2023-12-11 RX ADMIN — HALOPERIDOL LACTATE 5 MG: 5 INJECTION, SOLUTION INTRAMUSCULAR at 03:46

## 2023-12-11 RX ADMIN — Medication 10 ML: at 20:33

## 2023-12-11 RX ADMIN — METHYLPREDNISOLONE SODIUM SUCCINATE 40 MG: 40 INJECTION, POWDER, FOR SOLUTION INTRAMUSCULAR; INTRAVENOUS at 11:00

## 2023-12-11 RX ADMIN — METHYLPREDNISOLONE SODIUM SUCCINATE 40 MG: 40 INJECTION, POWDER, FOR SOLUTION INTRAMUSCULAR; INTRAVENOUS at 22:03

## 2023-12-11 RX ADMIN — IPRATROPIUM BROMIDE AND ALBUTEROL SULFATE 3 ML: 2.5; .5 SOLUTION RESPIRATORY (INHALATION) at 18:23

## 2023-12-11 RX ADMIN — LORAZEPAM 1 MG: 2 INJECTION INTRAMUSCULAR; INTRAVENOUS at 05:15

## 2023-12-11 RX ADMIN — BUDESONIDE AND FORMOTEROL FUMARATE DIHYDRATE 2 PUFF: 160; 4.5 AEROSOL RESPIRATORY (INHALATION) at 18:23

## 2023-12-11 RX ADMIN — HYDROXYZINE HYDROCHLORIDE 25 MG: 25 TABLET, FILM COATED ORAL at 20:33

## 2023-12-11 RX ADMIN — SODIUM CHLORIDE 1000 ML: 9 INJECTION, SOLUTION INTRAVENOUS at 00:45

## 2023-12-11 NOTE — THERAPY EVALUATION
Acute Care - Speech Language Pathology   Swallow Initial Evaluation  [place]  CLINICAL DYSPHAGIA ASSESSMENT     Patient Name: Donaldo Roberts  : 1950  MRN: [id number]  Today's Date: 2023             Admit Date: 12/10/2023    Visit Dx:     ICD-10-CM ICD-9-CM   1. Acute on chronic respiratory failure with hypoxia and hypercapnia  J96.21 518.84    J96.22 786.09     799.02   2. Altered mental status, unspecified altered mental status type  R41.82 780.97     Patient Active Problem List   Diagnosis    Benign prostatic hyperplasia with urinary obstruction    Herpesviral infection of penis    Shortness of breath    Carotid stenosis, asymptomatic, right    Essential hypertension    Dyslipidemia    Wenckebach block    PATTY (obstructive sleep apnea)    Presence of cardiac pacemaker    Severe sinus bradycardia    Chronic bronchitis    Cigarette nicotine dependence without complication    Hypoxia    Detrusor instability    Tobacco abuse    Respiratory failure    Physical debility    Chronic obstructive pulmonary disease    Hypercapnic respiratory failure     Past Medical History:   Diagnosis Date    Acid reflux     Anxiety     Arthritis     Carotid artery stenosis, symptomatic, right 2021    Chronic obstructive pulmonary disease 2023    Essential hypertension 2021    Herpes     History of lipoma     History of transfusion     Hyperlipidemia     Inguinal hernia     Peptic ulcer     Stroke      Past Surgical History:   Procedure Laterality Date    ABDOMINAL SURGERY      CARDIAC ELECTROPHYSIOLOGY PROCEDURE N/A 12/10/2021    Procedure: Pacemaker DC new;  Surgeon: Jony Monte MD;  Location: [facility] INVASIVE LOCATION;  Service: Cardiology;  Laterality: N/A;    CAROTID ENDARTERECTOMY Left     CHOLECYSTECTOMY WITH INTRAOPERATIVE CHOLANGIOGRAM N/A 2017    Procedure: CHOLECYSTECTOMY LAPAROSCOPIC INTRAOPERATIVE CHOLANGIOGRAM;  Surgeon: Luis Coe MD;  Location: [facility];  Service:      HERNIA REPAIR      inguinal    SINUS SURGERY      TUMOR EXCISION       Donaldo Roberts was seen at bedside this pm on 3N to assess safety/efficacy of swallowing fnx, determine safest/least restrictive diet tolerance.     Mr. Roberts presented to [facility] ED w/ ams and respiratory distress, was admitted w/ acute COPD exacerbation, acute on chronic hypoxic and hypercapnic respiratory failure, respiratory acidosis, PATTY noncompliant w/ CPAP, CO2 narcosis. Respiratory status noted overall improved after BiPAP. He was referred for dysphagia assessment.     Upon SLP arrival, he was resting and somewhat difficult to fully awaken. With overhead lighting turned on, mod verbal/tactile cues and elevation of hob, he did fully awaken and was able to maintain full a/a status to participate in this assessment.     Per review of EMR, Mr. Roberts has no prior SLP encounters within this system, no known h/o oropharyngeal dysphagia or aspiration. He denied any overt s/s aspiration or dysphagia.     Social History     Socioeconomic History    Marital status:     Number of children: 6   Tobacco Use    Smoking status: Former     Packs/day: 0.25     Years: 55.00     Additional pack years: 0.00     Total pack years: 13.75     Types: Cigarettes     Quit date: 10/2023     Years since quittin.1     Passive exposure: Current    Smokeless tobacco: Never    Tobacco comments:     2 cigs per day/A pack per week.   Vaping Use    Vaping Use: Never used   Substance and Sexual Activity    Alcohol use: No    Drug use: No    Sexual activity: Defer      Imaging:  Narrative & Impression   PROCEDURE: Portable chest x-ray examination performed on December 10,  2023. Single view. Portable technique. Semiupright position.     HISTORY: Shortness of breath.     COMPARISON: None.     FINDINGS:     Enlarged heart size.  Central pulmonary vascular congestion with mild edema.  Mild atelectasis at the lung bases.  Mild atelectasis in the right upper lobe  adjacent to the minor fissure.  No pleural effusion or pneumothorax.  Hypoinflated lungs.  Mild osteoarthritis at the glenohumeral joints.  No free air in the upper abdomen.     IMPRESSION:     1.  Enlarged heart size with central pulmonary vascular congestion.  2.  Mild interstitial edema.  3.  Mild atelectasis at the lung bases and right upper lobe adjacent to  the minor fissure.  4.  Hypoinflated lungs.  5.  Left-sided pacemaker with leads to the right atrium and right  ventricle.     This report was finalized on 12/10/2023 11:25 PM by Rambo Felix MD.     Labs:  Sodium  134  12/11 1009  Potassium  4.4  12/11 1009  Chloride  90  12/11 1009  CO2  37.1  12/11 1009  BUN  22  12/11 1009  Creatinine  0.73  12/11 1009  Glucose  140  12/11 1009  Hemoglobin  12.1  12/11 1009  Hematocrit  42.9  12/11 1009  WBC  7.96  12/11 1009  Platelets  149  12/11 1009     Diet Orders (active) (From admission, onward)       Start     Ordered    12/11/23 0919  Diet: Cardiac Diets, Diabetic Diets, Renal Diets; Healthy Heart (2-3 Na+); Consistent Carbohydrate; Low Sodium (2-3g), Low Potassium, Low Phosphorus; Texture: Regular Texture (IDDSI 7); Fluid Consistency: Thin (IDDSI 0)  Diet Effective Now         12/11/23 0918                  He was observed on 3L O2 via NC w/o complications.     Patient was positioned upright and centered in bed to accept multiple po presentations of ice chips, solid cracker, puree, and thin liquids via spoon, cup, and straw. He did report thirst, and accepted large continuous sips of thin liquids via straw. He did not self provide po trials during this assessment.     Facial/oral structures were symmetrical upon observation. Lingual protrusion revealed no deviation. Oral mucosa were moderately xerostomic, pink, and clean. Secretions were clear, slightly tacky, and well controlled. OROM/GORGE was wfl to imitate oral postures. He is edentulous, reported he does have dentures, however, they were not present at  this facility during this assessment. Gag was not assessed. Volitional cough was intact w/ adequate intensity, clear in quality, non-productive. Voice was adequate in intensity, clear in quality w/ intelligible speech. He was oriented to person, place and time, followed directives and participated in simple conversational exchanges. He was moderately fatigued in general.     Upon po presentations, adequate bolus anticipation and acceptance w/ good labial seal for bolus clearance via spoon bowl, cup rim stability and suction via straw. Bolus formation, manipulation and control were wfl w/ rotary mastication pattern. A-p transit was timely w/o significant oral residue appreciated. No overt s/s aspiration before the swallow.      Pharyngeal swallow was timely w/ adequate hyolaryngeal elevation per palpation. No overt s/s aspiration evidenced across this assessment. No silent aspiration suspected. Patient denied odynophagia.    Impression: Per this assessment, Mr. Roberts presented w/ wfl oropharyngeal swallow w/o s/s aspiration. No s/s indicative of silent aspiration. No odynophagia reported.      SLP Recommendation and Plan  1. Regular consistency, thin liquids.    2. Medications whole in puree/thins.   3. Upright and centered for all po intake.  4. MUKUL precautions.  5. Oral care protocol.  6. Assist w/ meals prn per fatigue effects 2/2 acute medical status.   No further formal SLP f/u warranted/recommended at this time.    D/w patient results and recommendations w/ verbal agreement.    Thank you for allowing me to participate in the care of your patient-  Naida Brennan M.A., CCC-SLP      EDUCATION  The patient has been educated in the following areas:   Dysphagia (Swallowing Impairment).     Time Calculation:     Therapy Charges for Today       Code Description Service Date Service Provider Modifiers Qty    [id number]  ST EVAL ORAL PHARYNG SWALLOW 4 12/11/2023 Naida Brennan MA,CCC-SLP GN 1           aNida Brennan MA,CCC-SLP  12/11/2023

## 2023-12-11 NOTE — CONSULTS
Consult Note Pulmonary     Referring Provider: Dr. Keen    Reason for Consultation: Hypercapnic hypoxic respiratory failure.          Chief complaint   Shortness of breath, drowsiness    History of present illness:    Mr. Donaldo Roberts is a 73-year-old male very well-known to me from previous hospitalization.  He has history of COPD, chronic hypoxic hypercapnic respiratory failure, obstructive sleep apnea, hypertension, heart failure with preserved ejection fraction, Hyperlyte lipidemia.  He comes in with complaint of worsening shortness of breath and altered sensorium.    Patient was found to be agitated in the ER and got IV sedation.    Patient was started on BiPAP at a setting of 24/8 with a backup rate of 24.  Initial blood gas showed a pH of 7.21 pCO2 more than 104 pO2 57.    Follow-up blood gases while on BiPAP showed a pH of 7.38 pCO2 71 pO2 62 on BiPAP of 24/8 with a backup rate of 24.    Patient had another blood gas while he was on room air which showed increasing pCO2 to 80 from 72.    Patient during my evaluation was drowsy but arousable, oriented x 3.  Reported chronic shortness of breath on minimal exertion.    Denies chest pain, orthopnea but reported chronic leg edema.    Denies rash, joint stiffness, photosensitivity.      Review of Systems  As described in HPI    History  Past Medical History:   Diagnosis Date    Acid reflux     Anxiety     Arthritis     Carotid artery stenosis, symptomatic, right 11/23/2021    Chronic obstructive pulmonary disease 12/5/2023    Essential hypertension 11/23/2021    Herpes     History of lipoma     History of transfusion     Hyperlipidemia     Inguinal hernia     Peptic ulcer     Stroke    ,   Past Surgical History:   Procedure Laterality Date    ABDOMINAL SURGERY      CARDIAC ELECTROPHYSIOLOGY PROCEDURE N/A 12/10/2021    Procedure: Pacemaker DC new;  Surgeon: Jony Monte MD;  Location: [facility] CATH INVASIVE LOCATION;  Service: Cardiology;  Laterality: N/A;     CAROTID ENDARTERECTOMY Left     CHOLECYSTECTOMY WITH INTRAOPERATIVE CHOLANGIOGRAM N/A 2017    Procedure: CHOLECYSTECTOMY LAPAROSCOPIC INTRAOPERATIVE CHOLANGIOGRAM;  Surgeon: Luis Coe MD;  Location: [facility];  Service:     HERNIA REPAIR      inguinal    SINUS SURGERY      TUMOR EXCISION     ,   Family History   Problem Relation Age of Onset    Stroke Mother     Heart disease Mother     Diabetes Mother     No Known Problems Father     Diabetes Sister    ,   Social History     Tobacco Use    Smoking status: Former     Packs/day: 0.25     Years: 55.00     Additional pack years: 0.00     Total pack years: 13.75     Types: Cigarettes     Quit date: 10/2023     Years since quittin.1     Passive exposure: Current    Smokeless tobacco: Never    Tobacco comments:     2 cigs per day/A pack per week.   Vaping Use    Vaping Use: Never used   Substance Use Topics    Alcohol use: No    Drug use: No   ,   Medications Prior to Admission   Medication Sig Dispense Refill Last Dose    amLODIPine (NORVASC) 10 MG tablet Take 1 tablet by mouth Every Night. 90 tablet 1 12/10/2023    aspirin 81 MG EC tablet Take 1 tablet by mouth Daily.   12/10/2023    clopidogrel (PLAVIX) 75 MG tablet Take 1 tablet by mouth Daily. 30 tablet 11 12/10/2023    furosemide (LASIX) 40 MG tablet Take 1 tablet by mouth Every Other Day for 30 days. 15 tablet 0 Past Week    lisinopril (PRINIVIL,ZESTRIL) 20 MG tablet Take 1 tablet by mouth Daily.   12/10/2023    omeprazole (priLOSEC) 20 MG capsule Take 1 capsule by mouth Daily.   12/10/2023    rosuvastatin (CRESTOR) 10 MG tablet Take 1 tablet by mouth Daily.   12/10/2023    acetaminophen (TYLENOL) 500 MG tablet Take 1 tablet by mouth Every 6 (Six) Hours As Needed for Mild Pain.   Unknown    albuterol sulfate  (90 Base) MCG/ACT inhaler Inhale 2 puffs Every 4 (Four) Hours As Needed for Wheezing or Shortness of Air. 18 g 8 Unknown    Fluticasone-Umeclidin-Vilant (Trelegy Ellipta)  100-62.5-25 MCG/ACT inhaler Inhale 1 puff Daily. 30 each 8 Unknown   , Scheduled Meds:  budesonide-formoterol, 2 puff, Inhalation, BID - RT  enoxaparin, 40 mg, Subcutaneous, Daily  insulin lispro, 2-7 Units, Subcutaneous, 4x Daily AC & at Bedtime  ipratropium-albuterol, 3 mL, Nebulization, 4x Daily - RT  methylPREDNISolone sodium succinate, 40 mg, Intravenous, Q12H  senna-docusate sodium, 2 tablet, Oral, BID  sodium chloride, 10 mL, Intravenous, Q12H    , Continuous Infusions:    and Allergies:  Penicillins    Objective     Vital Signs   Temp:  [98.5 °F (36.9 °C)-99 °F (37.2 °C)] 99 °F (37.2 °C)  Heart Rate:  [68-95] 95  Resp:  [18-24] 20  BP: (132-169)/() 156/72    Physical Exam:          General-drowsy, arousable, mild respiratory distress    HEENT- pupils equally reactive to light, normal in size, no scleral icterus    Neck-supple, No JVD, no carotid bruit    Respiratory-bilateral air entry, scattered wheezing, no egophony    Cardiovascular-  Normal S1 and S2. No S3, S4 or murmurs.      GI-nontender nondistended bowel sounds positive    CNS-  oriented x3, grossly nonfocal      Extremities-no clubbing and 1+ bipedal edema    Psychiatric-mood good, good eye contact,     Skin- no visible rash             Results Review:    LABS:    Lab Results   Component Value Date    GLUCOSE 140 (H) 12/11/2023    BUN 22 12/11/2023    CREATININE 0.73 (L) 12/11/2023    EGFRIFNONA 93 12/11/2021    BCR 30.1 (H) 12/11/2023    CO2 37.1 (H) 12/11/2023    CALCIUM 10.0 12/11/2023    ALBUMIN 4.4 12/10/2023    AST 15 12/10/2023    ALT 18 12/10/2023    WBC 7.96 12/11/2023    HGB 12.1 (L) 12/11/2023    HCT 42.9 12/11/2023    .6 (H) 12/11/2023     12/11/2023     (L) 12/11/2023    K 4.4 12/11/2023    CL 90 (L) 12/11/2023    ANIONGAP 6.9 12/11/2023       Lab Results   Component Value Date    INR 0.82 (L) 12/08/2021    PROTIME 11.7 (L) 12/08/2021                I reviewed the patient's new clinical results.  I reviewed  the patient's new imaging results and agree with the interpretation.      Assessment & Plan     #1 COPD exacerbation.    2.  Acute on chronic hypoxic respiratory failure.  #3 chronic hypercapnia.    3.  Obstructive sleep apnea, obesity hypoventilation syndrome.    4.:  Pulmonary hypertension.    5.  Macrocytic anemia.    Seems like present BiPAP setting of 24/8 with a backup rate of 24 is effective.  Will keep him on BiPAP 4-hour on, 4-hour off and continues during sleep.  In the past patient has refused to use the BiPAP.  Patient was reeducated.  I will talk to him again tomorrow when he is more awake.            GI- PPI prophylaxis      Endrocrinology- Maintain Blood sugar 140 -180        DVT prophylaxis-    Have personally reviewed x-rays, labs, medication list.  Total time spent 30 minutes.      Kwaku Looney MD     12/11/23 13:08 EST

## 2023-12-11 NOTE — PLAN OF CARE
Goal Outcome Evaluation:  Plan of Care Reviewed With: patient           Outcome Evaluation: Patient arrived to 3N from ER this shift. Patient is alert to self only, has been lethargic intermittently this shift. Refusing bipap, MD notified. Patient was able to stand at EOB but was unable to ambulate. Patient's care discussed with patient's wife. VSS on 3L NC. No complaints or acute changes. Will continue POC

## 2023-12-11 NOTE — CASE MANAGEMENT/SOCIAL WORK
Discharge Planning Assessment   [place]     Patient Name: Donaldo Roberts  MRN: [id number]  Today's Date: 12/11/2023    Admit Date: 12/10/2023     Discharge Needs Assessment       Row Name 12/11/23 1748       Living Environment    People in Home spouse;child(haley), adult    Name(s) of People in Home Spouse Inna and son Chad    Current Living Arrangements home    Potentially Unsafe Housing Conditions none    Primary Care Provided by self;child(haley)    Provides Primary Care For no one, unable/limited ability to care for self    Family Caregiver if Needed child(haley), adult;spouse    Family Caregiver Names Inna (spouse ) and Chad (son )  Armando    Quality of Family Relationships helpful;involved;supportive    Able to Return to Prior Arrangements yes    Living Arrangement Comments Pt lives at home with spouse and son. Per spouse, cpap has been ordered by [employer], but was not approved by insurance until today.       Transition Planning    Patient/Family Anticipates Transition to home with family    Transportation Anticipated family or friend will provide       Discharge Needs Assessment    Equipment Currently Used at Home oxygen;wheelchair;commode;shower chair;walker, rolling;other (see comments)  cpap ordered, supposed to be delivered to home by [employer] on 12/12/23    Concerns to be Addressed discharge planning    Discharge Facility/Level of Care Needs home with home health                   Discharge Plan       Row Name 12/11/23 1759       Plan    Plan Pt was admitted on 12/10/23. SS received Physician consult for dispo planning. SS attempted to visit Pt at bedside but was unsuccessful. SS spoke with Pt's spouse Inna on this date. Pt lives with his spouse Inna and son Chad; family plans for Pt to return home at discharge. [facility] utilizes [facility] for SN, PT/OT. [facility] will need a new HH order at discharge. Pt utilizes home oxygen at 3-4 liters, portable O2  tanks, wheelchair, walker, bedside commode, shower chair via [employer]. Per spouse, cpap was just approved by Pt's insurance and  [facility] to deliver cpap to home tomorrow. Pt's PCP is Duke Rosen. Pt does not have a POA or living will. Pt's family to provide transporation at discharge. Per spouse, Pt was able to ambulate prior to being hospitalized. SS to follow.                  GELACIO Wong

## 2023-12-11 NOTE — ED PROVIDER NOTES
Subjective   History of Present Illness    73-year-old male with past medical history of hypertension, hyperlipidemia, CVA, carotid arteries stenosis, PATTY noncompliant with BiPAP presenting for altered mental status and respiratory distress.  No reported fevers.  No reported chest pain, vomiting diarrhea, abdominal pain.    Review of Systems    Past Medical History:   Diagnosis Date    Acid reflux     Anxiety     Arthritis     Carotid artery stenosis, symptomatic, right 2021    Chronic obstructive pulmonary disease 2023    Essential hypertension 2021    Herpes     History of lipoma     History of transfusion     Hyperlipidemia     Inguinal hernia     Peptic ulcer     Stroke        Allergies   Allergen Reactions    Penicillins Hives     Mother told him that, has taken amoxicillin with no problems       Past Surgical History:   Procedure Laterality Date    ABDOMINAL SURGERY      CARDIAC ELECTROPHYSIOLOGY PROCEDURE N/A 12/10/2021    Procedure: Pacemaker DC new;  Surgeon: Jony Monte MD;  Location: [facility] CATH INVASIVE LOCATION;  Service: Cardiology;  Laterality: N/A;    CAROTID ENDARTERECTOMY Left     CHOLECYSTECTOMY WITH INTRAOPERATIVE CHOLANGIOGRAM N/A 2017    Procedure: CHOLECYSTECTOMY LAPAROSCOPIC INTRAOPERATIVE CHOLANGIOGRAM;  Surgeon: Luis Coe MD;  Location: [facility] OR;  Service:     HERNIA REPAIR      inguinal    SINUS SURGERY      TUMOR EXCISION         Family History   Problem Relation Age of Onset    Stroke Mother     Heart disease Mother     Diabetes Mother     No Known Problems Father     Diabetes Sister        Social History     Socioeconomic History    Marital status:     Number of children: 6   Tobacco Use    Smoking status: Former     Packs/day: 0.25     Years: 55.00     Additional pack years: 0.00     Total pack years: 13.75     Types: Cigarettes     Quit date: 10/2023     Years since quittin.1     Passive exposure: Current    Smokeless tobacco: Never     Tobacco comments:     2 cigs per day/A pack per week.   Vaping Use    Vaping Use: Never used   Substance and Sexual Activity    Alcohol use: No    Drug use: No    Sexual activity: Defer           Objective   Physical Exam  Vitals and nursing note reviewed.   Constitutional:       General: He is in acute distress.      Comments: Somnolent   HENT:      Head: Normocephalic.      Mouth/Throat:      Mouth: Mucous membranes are moist.   Eyes:      Extraocular Movements: Extraocular movements intact.      Pupils: Pupils are equal, round, and reactive to light.   Cardiovascular:      Rate and Rhythm: Normal rate and regular rhythm.      Pulses: Normal pulses.   Pulmonary:      Effort: Respiratory distress present.      Breath sounds: Normal breath sounds.   Abdominal:      General: Abdomen is flat. There is no distension.      Palpations: Abdomen is soft.   Musculoskeletal:         General: Normal range of motion.      Cervical back: Normal range of motion and neck supple.   Skin:     General: Skin is warm and dry.      Capillary Refill: Capillary refill takes less than 2 seconds.   Neurological:      Mental Status: He is lethargic and confused.      Comments: No focal deficits         Procedures           ED Course  ED Course as of 12/11/23 0733   [place] Dec 10, 2023   2224 ABG with hypercarbic respiratory failure. Placed on Bipap. Will monitor closely, if not improving will need intubation [KH]   2330 CT head negative for acute pathology.  Chest x-ray shows decreased lung volume and atelectasis.  No obvious pneumonia. [KH]   2330 Hemoglobin similar to priors.  Elevated potassium of 5.8 noted.  Will hydrate.  UDS negative.    Altered mental status appears to be due to hypercarbia [KH]   Mon Dec 11, 2023   0114 Patient's hypercarbia improving on BiPAP.  Patient is more alert but still confused.  Agitated and repeatedly trying to take off BiPAP.  Patient eventually was able to be successfully redirected.  I did speak to  patient's family who were updated regarding the patient and requested to come back to ED to help calm the patient as he is repeatedly asking for his wife.  Family agreeable to return to the ED. [KH]   0641 Patient's son did present to the emergency department and help with redirecting the patient.  However he continued to remain confused and agitated though significantly more alert than arrival.  Patient's son ultimately did have to return home.  Patient continuing to take off BiPAP mask and trying to get out out of bed.  Due to concern for patient's safety patient was given Haldol but continued to remove BiPAP mask and had broken several BiPAP mask.  Patient was given 1 mg of Ativan.  Currently tolerating BiPAP without issue, remains alert. [KH]   0656 Delay in admission for patient due to bed status at [facility].  Patient remained in the ED.  Patient to be admitted to hospital service after change of shift.  Patient signed out to oncoming provider, . [KH]      ED Course User Index  [KH] Aracelis Boo MD      XR Chest 1 View    Result Date: 12/10/2023   1.  Enlarged heart size with central pulmonary vascular congestion. 2.  Mild interstitial edema. 3.  Mild atelectasis at the lung bases and right upper lobe adjacent to the minor fissure. 4.  Hypoinflated lungs. 5.  Left-sided pacemaker with leads to the right atrium and right ventricle.  This report was finalized on 12/10/2023 11:25 PM by Rambo Felix MD.      CT Head Without Contrast    Result Date: 12/10/2023   1.  Mild generalized brain volume loss consistent with age. 2.  No acute intracranial hemorrhage, mass, infarct, or edema. 3.  Mild mucosal thickening in the paranasal sinuses. 4.  No fracture or foreign body.  This report was finalized on 12/10/2023 11:24 PM by Rambo Felix MD.         Results for orders placed or performed during the hospital encounter of 12/10/23   Blood Gas, Arterial With Co-Ox    Specimen: Arterial Blood   Result  Value Ref Range    Site Left Brachial     Russell's Test N/A     pH, Arterial 7.209 (C) 7.350 - 7.450 pH units    pCO2, Arterial >104.0 (C) 35.0 - 45.0 mm Hg    pO2, Arterial 57.0 (L) 83.0 - 108.0 mm Hg    HCO3, Arterial 42.3 (H) 20.0 - 26.0 mmol/L    Base Excess, Arterial 10.4 (H) 0.0 - 2.0 mmol/L    O2 Saturation, Arterial 88.2 (L) 94.0 - 99.0 %    Hemoglobin, Blood Gas 12.6 (L) 14 - 18 g/dL    Hematocrit, Blood Gas 38.6 38.0 - 51.0 %    Oxyhemoglobin 87.1 (L) 94 - 99 %    Methemoglobin <-0.10 (L) 0.00 - 3.00 %    Carboxyhemoglobin 2.0 0 - 5 %    A-a DO2 126.5 0.0 - 300.0 mmHg    CO2 Content 45.6 (H) 22 - 33 mmol/L    Barometric Pressure for Blood Gas 727 mmHg    Modality Nasal Cannula     FIO2 44 %    Flow Rate 6.0 lpm    Ventilator Mode NA     Note Read back and acknowledge     Notified Who DR BROWN // RN     Notified By [id number]     Notified Time 12/10/2023 22:10     Collected by [id number]     pH, Temp Corrected      pCO2, Temperature Corrected      pO2, Temperature Corrected     Comprehensive Metabolic Panel    Specimen: Arm, Left; Blood   Result Value Ref Range    Glucose 137 (H) 65 - 99 mg/dL    BUN 23 8 - 23 mg/dL    Creatinine 0.95 0.76 - 1.27 mg/dL    Sodium 138 136 - 145 mmol/L    Potassium 5.8 (H) 3.5 - 5.2 mmol/L    Chloride 95 (L) 98 - 107 mmol/L    CO2 42.9 (H) 22.0 - 29.0 mmol/L    Calcium 9.7 8.6 - 10.5 mg/dL    Total Protein 7.8 6.0 - 8.5 g/dL    Albumin 4.4 3.5 - 5.2 g/dL    ALT (SGPT) 18 1 - 41 U/L    AST (SGOT) 15 1 - 40 U/L    Alkaline Phosphatase 94 39 - 117 U/L    Total Bilirubin 0.5 0.0 - 1.2 mg/dL    Globulin 3.4 gm/dL    A/G Ratio 1.3 g/dL    BUN/Creatinine Ratio 24.2 7.0 - 25.0    Anion Gap 0.1 (L) 5.0 - 15.0 mmol/L    eGFR 85.6 >60.0 mL/min/1.73   Urinalysis With Culture If Indicated - Urine, Catheter    Specimen: Urine, Catheter   Result Value Ref Range    Color, UA Dark Yellow (A) Yellow, Straw    Appearance, UA Clear Clear    pH, UA <=5.0 5.0 - 8.0    Specific Gravity, UA 1.022 1.005 -  1.030    Glucose, UA Negative Negative    Ketones, UA Negative Negative    Bilirubin, UA Small (1+) (A) Negative    Blood, UA Trace (A) Negative    Protein, UA 30 mg/dL (1+) (A) Negative    Leuk Esterase, UA Negative Negative    Nitrite, UA Negative Negative    Urobilinogen, UA 1.0 E.U./dL 0.2 - 1.0 E.U./dL   Lactic Acid, Plasma    Specimen: Arm, Left; Blood   Result Value Ref Range    Lactate 0.8 0.5 - 2.0 mmol/L   Single High Sensitivity Troponin T    Specimen: Arm, Left; Blood   Result Value Ref Range    HS Troponin T 36 (H) <22 ng/L   BNP    Specimen: Arm, Left; Blood   Result Value Ref Range    proBNP 336.7 0.0 - 900.0 pg/mL   CBC Auto Differential    Specimen: Arm, Left; Blood   Result Value Ref Range    WBC 8.41 3.40 - 10.80 10*3/mm3    RBC 4.17 4.14 - 5.80 10*6/mm3    Hemoglobin 12.1 (L) 13.0 - 17.7 g/dL    Hematocrit 44.0 37.5 - 51.0 %    .5 (H) 79.0 - 97.0 fL    MCH 29.0 26.6 - 33.0 pg    MCHC 27.5 (L) 31.5 - 35.7 g/dL    RDW 12.5 12.3 - 15.4 %    RDW-SD 49.0 37.0 - 54.0 fl    MPV 10.4 6.0 - 12.0 fL    Platelets 182 140 - 450 10*3/mm3    Neutrophil % 77.2 (H) 42.7 - 76.0 %    Lymphocyte % 12.0 (L) 19.6 - 45.3 %    Monocyte % 9.2 5.0 - 12.0 %    Eosinophil % 0.1 (L) 0.3 - 6.2 %    Basophil % 0.2 0.0 - 1.5 %    Immature Grans % 1.3 (H) 0.0 - 0.5 %    Neutrophils, Absolute 6.49 1.70 - 7.00 10*3/mm3    Lymphocytes, Absolute 1.01 0.70 - 3.10 10*3/mm3    Monocytes, Absolute 0.77 0.10 - 0.90 10*3/mm3    Eosinophils, Absolute 0.01 0.00 - 0.40 10*3/mm3    Basophils, Absolute 0.02 0.00 - 0.20 10*3/mm3    Immature Grans, Absolute 0.11 (H) 0.00 - 0.05 10*3/mm3    nRBC 0.0 0.0 - 0.2 /100 WBC   Urine Drug Screen - Urine, Clean Catch    Specimen: Urine, Clean Catch   Result Value Ref Range    THC, Screen, Urine Negative Negative    Phencyclidine (PCP), Urine Negative Negative    Cocaine Screen, Urine Negative Negative    Methamphetamine, Ur Negative Negative    Opiate Screen Negative Negative    Amphetamine  Screen, Urine Negative Negative    Benzodiazepine Screen, Urine Negative Negative    Tricyclic Antidepressants Screen Negative Negative    Methadone Screen, Urine Negative Negative    Barbiturates Screen, Urine Negative Negative    Oxycodone Screen, Urine Negative Negative    Buprenorphine, Screen, Urine Negative Negative   Fentanyl, Urine - Urine, Clean Catch    Specimen: Urine, Clean Catch   Result Value Ref Range    Fentanyl, Urine Negative Negative   Urinalysis, Microscopic Only - Urine, Catheter    Specimen: Urine, Catheter   Result Value Ref Range    RBC, UA 6-10 (A) None Seen, 0-2 /HPF    WBC, UA 0-2 None Seen, 0-2 /HPF    Bacteria, UA None Seen None Seen /HPF    Squamous Epithelial Cells, UA 0-2 None Seen, 0-2 /HPF    Hyaline Casts, UA 13-20 None Seen /LPF    Methodology Automated Microscopy    Scan Slide    Specimen: Arm, Left; Blood   Result Value Ref Range    Anisocytosis Slight/1+ None Seen    Hypochromia Slight/1+ None Seen    Stomatocytes Slight/1+ None Seen    Platelet Morphology Normal Normal   Blood Gas, Arterial With Co-Ox    Specimen: Arterial Blood   Result Value Ref Range    Site Right Radial     Russell's Test Positive     pH, Arterial 7.286 (L) 7.350 - 7.450 pH units    pCO2, Arterial 87.4 (C) 35.0 - 45.0 mm Hg    pO2, Arterial 54.4 (C) 83.0 - 108.0 mm Hg    HCO3, Arterial 41.7 (H) 20.0 - 26.0 mmol/L    Base Excess, Arterial 11.5 (H) 0.0 - 2.0 mmol/L    O2 Saturation, Arterial 89.2 (L) 94.0 - 99.0 %    Hemoglobin, Blood Gas 12.4 (L) 14 - 18 g/dL    Hematocrit, Blood Gas 38.1 38.0 - 51.0 %    Oxyhemoglobin 88.2 (L) 94 - 99 %    Methemoglobin <-0.10 (L) 0.00 - 3.00 %    Carboxyhemoglobin 1.6 0 - 5 %    A-a DO2 191.1 0.0 - 300.0 mmHg    CO2 Content 44.3 (H) 22 - 33 mmol/L    Barometric Pressure for Blood Gas 727 mmHg    Modality BiPap     FIO2 50 %    Ventilator Mode NA     Set Mech Resp Rate 24.0     IPAP 24     EPAP 8     Note Read back and acknowledge     Notified Who DR BROWN // RN     Notified  By [id number]     Notified Time 12/11/2023 00:07     Collected by [id number]     pH, Temp Corrected      pCO2, Temperature Corrected      pO2, Temperature Corrected     Blood Gas, Arterial With Co-Ox    Specimen: Arterial Blood   Result Value Ref Range    Site Right Radial     Russell's Test Positive     pH, Arterial 7.379 7.350 - 7.450 pH units    pCO2, Arterial 71.5 (C) 35.0 - 45.0 mm Hg    pO2, Arterial 62.4 (L) 83.0 - 108.0 mm Hg    HCO3, Arterial 42.1 (H) 20.0 - 26.0 mmol/L    Base Excess, Arterial 14.0 (H) 0.0 - 2.0 mmol/L    O2 Saturation, Arterial 94.0 94.0 - 99.0 %    Hemoglobin, Blood Gas 12.0 (L) 14 - 18 g/dL    Hematocrit, Blood Gas 36.7 (L) 38.0 - 51.0 %    Oxyhemoglobin 92.2 (L) 94 - 99 %    Methemoglobin 0.30 0.00 - 3.00 %    Carboxyhemoglobin 1.6 0 - 5 %    A-a DO2 200.8 0.0 - 300.0 mmHg    CO2 Content 44.3 (H) 22 - 33 mmol/L    Barometric Pressure for Blood Gas 728 mmHg    Modality BiPap     FIO2 50 %    Ventilator Mode NA     [facility]     IPAP 24     EPAP 8     Note Read back and acknowledge     Notified Who DR BROWN // RN     Notified By [id number]     Notified Time 12/11/2023 03:23     Collected by [id number]     pH, Temp Corrected      pCO2, Temperature Corrected      pO2, Temperature Corrected     ECG 12 Lead Altered Mental Status   Result Value Ref Range    QT Interval 372 ms    QTC Interval 407 ms                                    [facility] reviewed by Aracelis Boo MD, Vahe Schumacher DO       Medical Decision Making  I assumed care of this patient at shift change from Dr. Boo.  Patient is currently improving on BiPAP.  Patient did require some mood control/mild sedation due to agitation from hypercapnia.  Other laboratory workup listed.  Hypercapnia has improved.  Patient's chronic hypercapnia has returned to baseline.  BiPAP will be removed.  Recommend admission for further work up and treatment.  Hospitalist team consulted and made aware of the patient.  Consults and orders placed per  hospitalist request.  Patient was agreeable to admission plan.  Vitals stable on admission.    Problems Addressed:  Acute on chronic respiratory failure with hypoxia and hypercapnia: complicated acute illness or injury  Altered mental status, unspecified altered mental status type: complicated acute illness or injury    Amount and/or Complexity of Data Reviewed  Labs: ordered. Decision-making details documented in ED Course.  Radiology: ordered. Decision-making details documented in ED Course.  ECG/medicine tests: ordered.    Risk  Prescription drug management.  Decision regarding hospitalization.    Critical Care  Total time providing critical care: 60 minutes        Final diagnoses:   Acute on chronic respiratory failure with hypoxia and hypercapnia   Altered mental status, unspecified altered mental status type       ED Disposition  ED Disposition       ED Disposition   Decision to Admit    Condition   --    Comment   --               No follow-up provider specified.       Medication List      No changes were made to your prescriptions during this visit.            Vahe Schumacher DO  12/11/23 0733

## 2023-12-11 NOTE — H&P
"     [facility]  HISTORY & PHYSICAL    Patient Identification:  Name:  Donaldo Roberts  Age:  73 y.o.  Sex:  male  :  1950  MRN:  [id number]   Visit Number:  [id number]  Admit Date: 12/10/2023   Primary Care Physician:  Duke Rosen MD     Subjective     Chief complaint:   Chief Complaint   Patient presents with    Altered Mental Status    Respiratory Distress     History of presenting illness:   Patient is a 73 y.o. male with past medical history significant for COPD, chronic hypoxic respiratory failure (3 LPM NC), HFpEF, PATTY non compliant with home CPAP, essential hypertension, hyperlipidemia, carotid artery disease s/p right CEA in past, s/p PPM insertion in the past, severe pulmonary hypertension, history of ischemic CVA in past, anxiety, GERD, obesity by BMI, and former tobacco abuse that presented to the [facility] emergency department for evaluation of altered mental status and respiratory distress. It is of note that during my evaluation, patient was lying in bed with NC in place. O2 saturation adequate. Patient received sedatives in ED for agitation. He wakes to verbal stimuli but quickly falls back asleep. He does state \"I feel awful\". He does not answer any further questions. No family at bedside. Patient unable to provide significant history of presenting illness, thus history per ED report and chart review.  Per report, patient with altered mental status starting yesterday and increased work of breathing.  EMS was called, patient noted to have pinpoint pupils.  Narcan was administered with no change in mental status.    It is of note that the patient was admitted to this facility 2023 through 2023 where he was treated for acute hypercapnic and chronic hypoxemic respiratory failure, acute heart failure with preserved ejection fraction, and myocardial injury secondary to hypoxia.  It was charted that during hospitalization patient " was difficult to agree to using BiPAP consistently, history of noncompliance with CPAP in the outpatient setting.  Patient was ultimately discharged to swing bed 11/24/2023 through 11/29/2023 for prolonged therapy and ultimately discharged home with family in stable medical condition.  It was recommended that patient continue with physical therapy, however he did not want to remain inpatient, he was agreeable for home health that was arranged at discharge.    Upon arrival to the ED, vitals were temperature 98.5, HR 77, RR 24, /73, and oxygen saturation 92% on 50% FiO2 BiPAP. ABG with pH 7.209, pCO2 >104, pO2 57, HCO3 42.3, and oxygen saturation 88.2% on 6 LPM NC. HS troponin T 36. ProBNP 336.7. CMP with potassium 5.8, chloride 95, CO2 42.9, and glucose 137. Lactate 0.8. CBC with hemoglobin 12.1 and .5, neutrophil % 77.2%. COVID-19/Influenza screening negative. UDS negative. UA with dark yellow appearance, trace blood, 1+protein, small protein, 6-10 RBC. CT head without contrast with mild generalized brain volume loss consistent with age, no acute intracranial abnormalities. Mild mucosal thickening in the paranasal sinuses noted. CXR with enlarged heart size with central pulmonary vascular congestion, mild interstitial edema, mild atelectasis at the lung bases and right upper lobe adjacent to the minor fissure, hypo inflated lungs. Known ED medication administration: 2 mg IV Narcan X 1, 80 mg IV lasix x 1, 5 mg IV haldol, 1 mg IV ativan x 1, and 1 liter bolus NS x 1.     Patient has been admitted to the medical surgical floor for further evaluation and treatment.     Present during exam: N/A  ---------------------------------------------------------------------------------------------------------------------   Review of Systems   Unable to perform ROS: Mental status change      ---------------------------------------------------------------------------------------------------------------------   Past  Medical History:   Diagnosis Date    Acid reflux     Anxiety     Arthritis     Carotid artery stenosis, symptomatic, right 2021    Chronic obstructive pulmonary disease 2023    Essential hypertension 2021    Herpes     History of lipoma     History of transfusion     Hyperlipidemia     Inguinal hernia     Peptic ulcer     Stroke      Past Surgical History:   Procedure Laterality Date    ABDOMINAL SURGERY      CARDIAC ELECTROPHYSIOLOGY PROCEDURE N/A 12/10/2021    Procedure: Pacemaker DC new;  Surgeon: Jony Monte MD;  Location: [facility] CATH INVASIVE LOCATION;  Service: Cardiology;  Laterality: N/A;    CAROTID ENDARTERECTOMY Left     CHOLECYSTECTOMY WITH INTRAOPERATIVE CHOLANGIOGRAM N/A 2017    Procedure: CHOLECYSTECTOMY LAPAROSCOPIC INTRAOPERATIVE CHOLANGIOGRAM;  Surgeon: Luis Coe MD;  Location: [facility] OR;  Service:     HERNIA REPAIR      inguinal    SINUS SURGERY      TUMOR EXCISION       Family History   Problem Relation Age of Onset    Stroke Mother     Heart disease Mother     Diabetes Mother     No Known Problems Father     Diabetes Sister      Social History     Socioeconomic History    Marital status:     Number of children: 6   Tobacco Use    Smoking status: Former     Packs/day: 0.25     Years: 55.00     Additional pack years: 0.00     Total pack years: 13.75     Types: Cigarettes     Quit date: 10/2023     Years since quittin.1     Passive exposure: Current    Smokeless tobacco: Never    Tobacco comments:     2 cigs per day/A pack per week.   Vaping Use    Vaping Use: Never used   Substance and Sexual Activity    Alcohol use: No    Drug use: No    Sexual activity: Defer     ---------------------------------------------------------------------------------------------------------------------   Allergies:  Penicillins  ---------------------------------------------------------------------------------------------------------------------   Medications below are  reported home medications pulling from within the system; at this time, these medications have not been reconciled unless otherwise specified and are in the verification process for further verifcation as current home medications.    Prior to Admission Medications       Prescriptions Last Dose Informant Patient Reported? Taking?    albuterol sulfate  (90 Base) MCG/ACT inhaler   No No    Inhale 2 puffs Every 4 (Four) Hours As Needed for Wheezing or Shortness of Air.    amLODIPine (NORVASC) 10 MG tablet  Pharmacy No No    Take 1 tablet by mouth Every Night.    clopidogrel (PLAVIX) 75 MG tablet  Pharmacy No No    Take 1 tablet by mouth Daily.    Fluticasone-Umeclidin-Vilant (Trelegy Ellipta) 100-62.5-25 MCG/ACT inhaler   No No    Inhale 1 puff Daily.    furosemide (LASIX) 40 MG tablet   No No    Take 1 tablet by mouth Every Other Day for 30 days.    lisinopril (PRINIVIL,ZESTRIL) 20 MG tablet   No No    TAKE 1 TABLET BY MOUTH DAILY AS DIRECTED    pantoprazole (PROTONIX) 40 MG EC tablet   No No    Take 1 tablet by mouth Every Morning for 30 days.    rosuvastatin (CRESTOR) 10 MG tablet   No No    TAKE 1 TABLET BY MOUTH DAILY          ---------------------------------------------------------------------------------------------------------------------    Objective     Hospital Scheduled Meds:        Current listed hospital scheduled medications may not yet reflect those currently placed in orders that are signed and held, awaiting patient's arrival to floor/unit.    ---------------------------------------------------------------------------------------------------------------------   Vital Signs:  Temp:  [98.5 °F (36.9 °C)] 98.5 °F (36.9 °C)  Heart Rate:  [68-91] 86  Resp:  [20-24] 24  BP: (133-169)/() 133/68  Mean Arterial Pressure (Non-Invasive) for the past 24 hrs (Last 3 readings):   Noninvasive MAP (mmHg)   12/10/23 2340 114   12/10/23 2320 95   12/10/23 2310 95     SpO2 Percentage    12/11/23 0239  12/11/23 0320 12/11/23 0633   SpO2: 94% 91% 93%     SpO2:  [83 %-94 %] 93 %  on  Flow (L/min):  [3-4] 4;   Device (Oxygen Therapy): nasal cannula    Body mass index is 31.87 kg/m².  Wt Readings from Last 3 Encounters:   12/10/23 107 kg (235 lb)   12/06/23 107 kg (235 lb)   12/04/23 130 kg (287 lb)       ---------------------------------------------------------------------------------------------------------------------   Physical Exam:  Physical Exam  Nursing note reviewed.   Constitutional:       General: He is awake. He is not in acute distress.     Appearance: He is well-developed. He is obese. He is ill-appearing (Chronically).      Interventions: Nasal cannula in place.   HENT:      Head: Normocephalic and atraumatic.      Mouth/Throat:      Mouth: Mucous membranes are moist.   Eyes:      Conjunctiva/sclera: Conjunctivae normal.      Pupils: Pupils are equal, round, and reactive to light.   Cardiovascular:      Rate and Rhythm: Normal rate and regular rhythm.      Pulses:           Dorsalis pedis pulses are 2+ on the right side and 2+ on the left side.      Heart sounds: Normal heart sounds. No murmur heard.     No friction rub. No gallop.   Pulmonary:      Effort: Pulmonary effort is normal. No tachypnea, accessory muscle usage or respiratory distress.      Breath sounds: Normal air entry. Examination of the right-lower field reveals decreased breath sounds. Examination of the left-lower field reveals decreased breath sounds. Decreased breath sounds present. No wheezing, rhonchi or rales.   Abdominal:      General: Bowel sounds are normal. There is no distension.      Palpations: Abdomen is soft.      Tenderness: There is no abdominal tenderness.   Genitourinary:     Comments: No andrade catheter in place.  Musculoskeletal:      Cervical back: Neck supple.      Right lower leg: Edema present.      Left lower leg: Edema present.   Skin:     General: Skin is warm and dry.      Capillary Refill: Capillary refill  takes less than 2 seconds.   Neurological:      Mental Status: He is lethargic and confused.      GCS: GCS eye subscore is 3. GCS verbal subscore is 4. GCS motor subscore is 5.      Comments: Patient wakens to verbal stimuli but quickly falls back asleep, does not participate in exam.      ---------------------------------------------------------------------------------------------------------------------  EKG:  Pending cardiology read. Per my interpretation: Paced, HR 72 BPM, QTc 407 ms. No evidence of acute ST elevation. Await final cardiology read.     Telemetry:    Patient not currently on telemetry monitoring     I have personally reviewed the EKG/Telemetry strip  ---------------------------------------------------------------------------------------------------------------------   Results from last 7 days   Lab Units 12/10/23  2226   HSTROP T ng/L 36*     Results from last 7 days   Lab Units 12/10/23  2226   PROBNP pg/mL 336.7       Results from last 7 days   Lab Units 12/11/23  0319   PH, ARTERIAL pH units 7.379   PO2 ART mm Hg 62.4*   PCO2, ARTERIAL mm Hg 71.5*   HCO3 ART mmol/L 42.1*     Results from last 7 days   Lab Units 12/10/23  2226   LACTATE mmol/L 0.8   WBC 10*3/mm3 8.41   HEMOGLOBIN g/dL 12.1*   HEMATOCRIT % 44.0   MCV fL 105.5*   MCHC g/dL 27.5*   PLATELETS 10*3/mm3 182     Results from last 7 days   Lab Units 12/10/23  2226   SODIUM mmol/L 138   POTASSIUM mmol/L 5.8*   CHLORIDE mmol/L 95*   CO2 mmol/L 42.9*   BUN mg/dL 23   CREATININE mg/dL 0.95   CALCIUM mg/dL 9.7   GLUCOSE mg/dL 137*   ALBUMIN g/dL 4.4   BILIRUBIN mg/dL 0.5   ALK PHOS U/L 94   AST (SGOT) U/L 15   ALT (SGPT) U/L 18   Estimated Creatinine Clearance: 90.2 mL/min (by C-G formula based on SCr of 0.95 mg/dL).    Lab Results   Component Value Date    TSH 0.681 12/08/2021    FREET4 1.11 12/08/2021     Microbiology Results (last 10 days)       ** No results found for the last 240 hours. **           Pain Management Panel  More data  exists         Latest Ref Rng & Units 12/10/2023 11/18/2023   Pain Management Panel   Amphetamine, Urine Qual Negative Negative  Negative    Barbiturates Screen, Urine Negative Negative  Negative    Benzodiazepine Screen, Urine Negative Negative  Negative    Buprenorphine, Screen, Urine Negative Negative  Negative    Cocaine Screen, Urine Negative Negative  Negative    Fentanyl, Urine Negative Negative  Negative    Methadone Screen , Urine Negative Negative  Negative    Methamphetamine, Ur Negative Negative  Negative      I have personally reviewed the above laboratory results.   ---------------------------------------------------------------------------------------------------------------------  Imaging Results (Last 7 Days)       Procedure Component Value Units Date/Time    XR Chest 1 View [[id number]] Collected: 12/10/23 2324     Updated: 12/10/23 2327    Narrative:      PROCEDURE: Portable chest x-ray examination performed on December 10,  2023. Single view. Portable technique. Semiupright position.     HISTORY: Shortness of breath.     COMPARISON: None.     FINDINGS:     Enlarged heart size.  Central pulmonary vascular congestion with mild edema.  Mild atelectasis at the lung bases.  Mild atelectasis in the right upper lobe adjacent to the minor fissure.  No pleural effusion or pneumothorax.  Hypoinflated lungs.  Mild osteoarthritis at the glenohumeral joints.  No free air in the upper abdomen.       Impression:         1.  Enlarged heart size with central pulmonary vascular congestion.  2.  Mild interstitial edema.  3.  Mild atelectasis at the lung bases and right upper lobe adjacent to  the minor fissure.  4.  Hypoinflated lungs.  5.  Left-sided pacemaker with leads to the right atrium and right  ventricle.     This report was finalized on 12/10/2023 11:25 PM by Rambo Felix MD.       CT Head Without Contrast [[id number]] Collected: 12/10/23 2322     Updated: 12/10/23 2326    Narrative:      PROCEDURE: CT of  the brain performed without IV contrast on December 10,  2023. The examination was performed with 3 mm axial imaging and 3 mm  sagittal and coronal reconstruction images. Total DLP = 856. The  examination was performed according to as low as reasonably achievable  dose protocol.     HISTORY: Altered mental status. Respiratory distress.     COMPARISON: None.     FINDINGS:     Mild generalized brain volume loss with mild chronic small vessel  ischemic type changes in the white matter.  No acute intracranial hemorrhage, mass, infarct, or edema.  No hydrocephalus.  No shift of midline structures.  No fracture or foreign body.  Mild mucosal thickening in the paranasal sinuses.  Dense calcified plaque along the supraclinoid ICA segments.  No lytic or blastic lesion.       Impression:         1.  Mild generalized brain volume loss consistent with age.  2.  No acute intracranial hemorrhage, mass, infarct, or edema.  3.  Mild mucosal thickening in the paranasal sinuses.  4.  No fracture or foreign body.     This report was finalized on 12/10/2023 11:24 PM by Rambo Felix MD.             I have personally reviewed the above radiology results.     Last Echocardiogram:  Results for orders placed during the hospital encounter of 11/17/23    Adult Transthoracic Echo Complete W/ Cont if Necessary Per Protocol    Interpretation Summary    The quality of the study is limited with poor acoustic windows.  The contrast study was done using Lumason to enhance endocardial border to assess LV function.    Left ventricular wall thickness is consistent with mild concentric hypertrophy.    Left ventricular systolic function is normal.  Estimated LVEF =  66 - 70%.    Left ventricular diastolic function is consistent with (grade I) impaired relaxation    No significant valvular heart disease is noted.    Estimated right ventricular systolic pressure from tricuspid regurgitation is markedly elevated (>55 mmHg)    Severe pulmonary hypertension  is present.    There is no evidence of pericardial effusion. .    ---------------------------------------------------------------------------------------------------------------------    Assessment & Plan      ACUTE HOSPITAL PROBLEMS    -Acute COPD exacerbation  -Acute on chronic hypoxic and hypercapnic respiratory failure   -Respiratory acidosis   -PATTY non compliant with CPAP   -CO2 narcosis   Imaging of chest without infiltrate/consolidation  Head CT without acute intracranial abnormalities    Initial ABG with pH 7.209 and pCO2 > 104 with pO2 57 and SPO2 88.2%   Patient was placed on BiPAP in ED, intermittently non compliant and taking himself off BiPAP. Currently on 4 LPM. ABG with improvement overall   COVID-19/Influenza screening negative. Obtain viral respiratory panel PCR.  Continue IV solumedrol   Nebulizer treatments, inhalants   Continue supplemental oxygen as necessary to titrate SpO2 > 90%. Continuous pulse oximetry monitoring.  Incentive spirometry as tolerated   Pulmonology consulted, input/assistance much appreciated   Patient did received Haldol and Ativan in ED as he was agitated and pulling off BiPAP  Closely monitor vitals/temperature curve   Repeat labs in AM     -Acute on chronic HFpEF  -Grade I diastolic dysfunction   -Severe pulmonary hypertension   Imaging of chest with pulmonary vascular congestion/interstitial edema   ProBNP elevated   IV lasix 80 mg x 1 administered in ED, await diuretic response   Previous TTE 11/2023 reviewed with preserved EF   Strict Is and Os, daily weights  Monitor volume status closely     -?Hyperkalemia   Slight hemolysis noted on labs   Obtain repeat BMP now   Telemetry monitoring     -Mild macrocytic anemia   H&H grossly stable   No active bleeding noted/reported   Obtain vitamin B12 and folate levels, supplement if patient is found to be deficient   Transfuse if necessary   Repeat CBC in AM     -Mild hyperglycemia without hx of diabetes mellitus  Obtain  HgbA1C    -Debility   PT/OT consults    CHRONIC MEDICAL PROBLEMS    -Essential hypertension  BP appears well controlled   Plan to resume home antihypertensive regimen once reconciled per pharmacy with appropriate holding parameters to prevent hypotension and/or bradycardia   Closely monitor BP per hospital protocol, titrate medications as necessary  -Hyperlipidemia: Continue statin   -History of carotid artery stenosis s/p right CEA   -S/P PPM implantation in the past: Telemetry monitoring   -History of ischemic CVA in past: Continue home medication regimen   -Anxiety: Supportive care, continue home medication regimen once reconciled per pharmacy.   -GERD: PPI   -Obesity by BMI, Body mass index is 31.87 kg/m².  Affecting all aspects of care  -Former smoker   ---------------------------------------------------  DVT Prophylaxis: Lovenox   Activity: Up with assistance as tolerated   ---------------------------------------------------  INPATIENT status due to the need for care which can only be reasonably provided in an hospital setting such as aggressive/expedited ancillary services and/or consultation services, the necessity for IV medications, close physician monitoring and/or the possible need for procedures.  In such, I feel patient’s risk for adverse outcomes and need for care warrant INPATIENT evaluation and predict the patient’s care encounter to likely last beyond 2 midnights.     Code Status: FULL CODE   ---------------------------------------------------  Disposition/Discharge planning: Plans on home once medically stable, pending clinical course   ---------------------------------------------------  I have discussed the patient's assessment and plan with the patient, nursing staff, and attending physician Dr. Paul, DO Tosha Keen PA-C  Hospitalist Service -- [facility]   Pager: [phone]    12/11/23  08:07 EST    Attending Physician: Dr. Paul,  DO      ---------------------------------------------------------------------------------------------------------------------

## 2023-12-12 LAB
A-A DO2: 97.3 MMHG (ref 0–300)
ALBUMIN SERPL-MCNC: 3.8 G/DL (ref 3.5–5.2)
ALBUMIN/GLOB SERPL: 1.2 G/DL
ALP SERPL-CCNC: 85 U/L (ref 39–117)
ALT SERPL W P-5'-P-CCNC: 15 U/L (ref 1–41)
ANION GAP SERPL CALCULATED.3IONS-SCNC: 10.3 MMOL/L (ref 5–15)
ARTERIAL PATENCY WRIST A: POSITIVE
AST SERPL-CCNC: 18 U/L (ref 1–40)
ATMOSPHERIC PRESS: 735 MMHG
BACTERIA SPEC AEROBE CULT: ABNORMAL
BASE EXCESS BLDA CALC-SCNC: 14 MMOL/L (ref 0–2)
BASOPHILS # BLD AUTO: 0 10*3/MM3 (ref 0–0.2)
BASOPHILS NFR BLD AUTO: 0 % (ref 0–1.5)
BDY SITE: ABNORMAL
BILIRUB SERPL-MCNC: 0.8 MG/DL (ref 0–1.2)
BUN SERPL-MCNC: 16 MG/DL (ref 8–23)
BUN/CREAT SERPL: 21.1 (ref 7–25)
CALCIUM SPEC-SCNC: 9.9 MG/DL (ref 8.6–10.5)
CHLORIDE SERPL-SCNC: 93 MMOL/L (ref 98–107)
CO2 BLDA-SCNC: 42.2 MMOL/L (ref 22–33)
CO2 SERPL-SCNC: 33.7 MMOL/L (ref 22–29)
COHGB MFR BLD: 1.9 % (ref 0–5)
CREAT SERPL-MCNC: 0.76 MG/DL (ref 0.76–1.27)
DEPRECATED RDW RBC AUTO: 44.9 FL (ref 37–54)
EGFRCR SERPLBLD CKD-EPI 2021: 96.1 ML/MIN/1.73
EOSINOPHIL # BLD AUTO: 0 10*3/MM3 (ref 0–0.4)
EOSINOPHIL NFR BLD AUTO: 0 % (ref 0.3–6.2)
ERYTHROCYTE [DISTWIDTH] IN BLOOD BY AUTOMATED COUNT: 12.4 % (ref 12.3–15.4)
FOLATE SERPL-MCNC: 9.58 NG/ML (ref 4.78–24.2)
GAS FLOW AIRWAY: 3 LPM
GLOBULIN UR ELPH-MCNC: 3.3 GM/DL
GLUCOSE BLDC GLUCOMTR-MCNC: 113 MG/DL (ref 70–130)
GLUCOSE BLDC GLUCOMTR-MCNC: 141 MG/DL (ref 70–130)
GLUCOSE BLDC GLUCOMTR-MCNC: 163 MG/DL (ref 70–130)
GLUCOSE BLDC GLUCOMTR-MCNC: 172 MG/DL (ref 70–130)
GLUCOSE SERPL-MCNC: 191 MG/DL (ref 65–99)
GRAM STN SPEC: ABNORMAL
HCO3 BLDA-SCNC: 40.5 MMOL/L (ref 20–26)
HCT VFR BLD AUTO: 41.4 % (ref 37.5–51)
HCT VFR BLD CALC: 39.7 % (ref 38–51)
HGB BLD-MCNC: 12.5 G/DL (ref 13–17.7)
HGB BLDA-MCNC: 12.9 G/DL (ref 14–18)
IMM GRANULOCYTES # BLD AUTO: 0.02 10*3/MM3 (ref 0–0.05)
IMM GRANULOCYTES NFR BLD AUTO: 0.3 % (ref 0–0.5)
INHALED O2 CONCENTRATION: 32 %
ISOLATED FROM: ABNORMAL
LYMPHOCYTES # BLD AUTO: 0.39 10*3/MM3 (ref 0.7–3.1)
LYMPHOCYTES NFR BLD AUTO: 5.9 % (ref 19.6–45.3)
Lab: ABNORMAL
MAGNESIUM SERPL-MCNC: 1.9 MG/DL (ref 1.6–2.4)
MCH RBC QN AUTO: 29.5 PG (ref 26.6–33)
MCHC RBC AUTO-ENTMCNC: 30.2 G/DL (ref 31.5–35.7)
MCV RBC AUTO: 97.6 FL (ref 79–97)
METHGB BLD QL: 0 % (ref 0–3)
MODALITY: ABNORMAL
MONOCYTES # BLD AUTO: 0.09 10*3/MM3 (ref 0.1–0.9)
MONOCYTES NFR BLD AUTO: 1.4 % (ref 5–12)
NEUTROPHILS NFR BLD AUTO: 6.13 10*3/MM3 (ref 1.7–7)
NEUTROPHILS NFR BLD AUTO: 92.4 % (ref 42.7–76)
NRBC BLD AUTO-RTO: 0 /100 WBC (ref 0–0.2)
OXYHGB MFR BLDV: 90.8 % (ref 94–99)
PCO2 BLDA: 57.7 MM HG (ref 35–45)
PCO2 TEMP ADJ BLD: ABNORMAL MM[HG]
PH BLDA: 7.45 PH UNITS (ref 7.35–7.45)
PH, TEMP CORRECTED: ABNORMAL
PLATELET # BLD AUTO: 168 10*3/MM3 (ref 140–450)
PMV BLD AUTO: 10.8 FL (ref 6–12)
PO2 BLDA: 58.6 MM HG (ref 83–108)
PO2 TEMP ADJ BLD: ABNORMAL MM[HG]
POTASSIUM SERPL-SCNC: 4.2 MMOL/L (ref 3.5–5.2)
PROT SERPL-MCNC: 7.1 G/DL (ref 6–8.5)
RBC # BLD AUTO: 4.24 10*6/MM3 (ref 4.14–5.8)
SAO2 % BLDCOA: 92.6 % (ref 94–99)
SODIUM SERPL-SCNC: 137 MMOL/L (ref 136–145)
VENTILATOR MODE: ABNORMAL
VIT B12 BLD-MCNC: 224 PG/ML (ref 211–946)
WBC NRBC COR # BLD AUTO: 6.63 10*3/MM3 (ref 3.4–10.8)

## 2023-12-12 PROCEDURE — 82375 ASSAY CARBOXYHB QUANT: CPT

## 2023-12-12 PROCEDURE — 99232 SBSQ HOSP IP/OBS MODERATE 35: CPT | Performed by: INTERNAL MEDICINE

## 2023-12-12 PROCEDURE — 97161 PT EVAL LOW COMPLEX 20 MIN: CPT

## 2023-12-12 PROCEDURE — 94660 CPAP INITIATION&MGMT: CPT

## 2023-12-12 PROCEDURE — 82607 VITAMIN B-12: CPT | Performed by: PHYSICIAN ASSISTANT

## 2023-12-12 PROCEDURE — 94799 UNLISTED PULMONARY SVC/PX: CPT

## 2023-12-12 PROCEDURE — 83735 ASSAY OF MAGNESIUM: CPT | Performed by: PHYSICIAN ASSISTANT

## 2023-12-12 PROCEDURE — 94664 DEMO&/EVAL PT USE INHALER: CPT

## 2023-12-12 PROCEDURE — 94761 N-INVAS EAR/PLS OXIMETRY MLT: CPT

## 2023-12-12 PROCEDURE — 97162 PT EVAL MOD COMPLEX 30 MIN: CPT

## 2023-12-12 PROCEDURE — 83050 HGB METHEMOGLOBIN QUAN: CPT

## 2023-12-12 PROCEDURE — 63710000001 INSULIN LISPRO (HUMAN) PER 5 UNITS: Performed by: PHYSICIAN ASSISTANT

## 2023-12-12 PROCEDURE — 25010000002 FUROSEMIDE PER 20 MG: Performed by: INTERNAL MEDICINE

## 2023-12-12 PROCEDURE — 85025 COMPLETE CBC W/AUTO DIFF WBC: CPT | Performed by: INTERNAL MEDICINE

## 2023-12-12 PROCEDURE — 82948 REAGENT STRIP/BLOOD GLUCOSE: CPT

## 2023-12-12 PROCEDURE — 80053 COMPREHEN METABOLIC PANEL: CPT | Performed by: INTERNAL MEDICINE

## 2023-12-12 PROCEDURE — 25010000002 ENOXAPARIN PER 10 MG: Performed by: INTERNAL MEDICINE

## 2023-12-12 PROCEDURE — 25010000002 METHYLPREDNISOLONE PER 40 MG: Performed by: INTERNAL MEDICINE

## 2023-12-12 PROCEDURE — 82746 ASSAY OF FOLIC ACID SERUM: CPT | Performed by: PHYSICIAN ASSISTANT

## 2023-12-12 PROCEDURE — 97535 SELF CARE MNGMENT TRAINING: CPT

## 2023-12-12 PROCEDURE — 97167 OT EVAL HIGH COMPLEX 60 MIN: CPT

## 2023-12-12 PROCEDURE — 82805 BLOOD GASES W/O2 SATURATION: CPT

## 2023-12-12 PROCEDURE — 36600 WITHDRAWAL OF ARTERIAL BLOOD: CPT

## 2023-12-12 RX ORDER — AMLODIPINE BESYLATE 10 MG/1
10 TABLET ORAL NIGHTLY
Status: DISCONTINUED | OUTPATIENT
Start: 2023-12-12 | End: 2023-12-13 | Stop reason: HOSPADM

## 2023-12-12 RX ORDER — ROSUVASTATIN CALCIUM 10 MG/1
10 TABLET, COATED ORAL DAILY
Status: DISCONTINUED | OUTPATIENT
Start: 2023-12-12 | End: 2023-12-13 | Stop reason: HOSPADM

## 2023-12-12 RX ORDER — CLOPIDOGREL BISULFATE 75 MG/1
75 TABLET ORAL DAILY
Status: DISCONTINUED | OUTPATIENT
Start: 2023-12-12 | End: 2023-12-13 | Stop reason: HOSPADM

## 2023-12-12 RX ORDER — LISINOPRIL 10 MG/1
20 TABLET ORAL DAILY
Status: DISCONTINUED | OUTPATIENT
Start: 2023-12-12 | End: 2023-12-13 | Stop reason: HOSPADM

## 2023-12-12 RX ORDER — FUROSEMIDE 10 MG/ML
80 INJECTION INTRAMUSCULAR; INTRAVENOUS ONCE
Status: COMPLETED | OUTPATIENT
Start: 2023-12-12 | End: 2023-12-12

## 2023-12-12 RX ORDER — ASPIRIN 81 MG/1
81 TABLET ORAL DAILY
Status: DISCONTINUED | OUTPATIENT
Start: 2023-12-12 | End: 2023-12-13 | Stop reason: HOSPADM

## 2023-12-12 RX ADMIN — Medication 10 ML: at 21:06

## 2023-12-12 RX ADMIN — IPRATROPIUM BROMIDE AND ALBUTEROL SULFATE 3 ML: 2.5; .5 SOLUTION RESPIRATORY (INHALATION) at 13:32

## 2023-12-12 RX ADMIN — ACETAMINOPHEN 650 MG: 325 TABLET ORAL at 13:08

## 2023-12-12 RX ADMIN — ROSUVASTATIN CALCIUM 10 MG: 10 TABLET, FILM COATED ORAL at 09:38

## 2023-12-12 RX ADMIN — IPRATROPIUM BROMIDE AND ALBUTEROL SULFATE 3 ML: 2.5; .5 SOLUTION RESPIRATORY (INHALATION) at 00:10

## 2023-12-12 RX ADMIN — METHYLPREDNISOLONE SODIUM SUCCINATE 40 MG: 40 INJECTION, POWDER, FOR SOLUTION INTRAMUSCULAR; INTRAVENOUS at 21:06

## 2023-12-12 RX ADMIN — AMLODIPINE BESYLATE 10 MG: 10 TABLET ORAL at 21:05

## 2023-12-12 RX ADMIN — FUROSEMIDE 80 MG: 10 INJECTION, SOLUTION INTRAMUSCULAR; INTRAVENOUS at 09:37

## 2023-12-12 RX ADMIN — METHYLPREDNISOLONE SODIUM SUCCINATE 40 MG: 40 INJECTION, POWDER, FOR SOLUTION INTRAMUSCULAR; INTRAVENOUS at 09:39

## 2023-12-12 RX ADMIN — IPRATROPIUM BROMIDE AND ALBUTEROL SULFATE 3 ML: 2.5; .5 SOLUTION RESPIRATORY (INHALATION) at 18:31

## 2023-12-12 RX ADMIN — BUDESONIDE AND FORMOTEROL FUMARATE DIHYDRATE 2 PUFF: 160; 4.5 AEROSOL RESPIRATORY (INHALATION) at 07:45

## 2023-12-12 RX ADMIN — IPRATROPIUM BROMIDE AND ALBUTEROL SULFATE 3 ML: 2.5; .5 SOLUTION RESPIRATORY (INHALATION) at 07:45

## 2023-12-12 RX ADMIN — INSULIN LISPRO 2 UNITS: 100 INJECTION, SOLUTION INTRAVENOUS; SUBCUTANEOUS at 11:49

## 2023-12-12 RX ADMIN — CLOPIDOGREL BISULFATE 75 MG: 75 TABLET, FILM COATED ORAL at 09:38

## 2023-12-12 RX ADMIN — VITAMINS A AND D OINTMENT 1 APPLICATION: 15.5; 53.4 OINTMENT TOPICAL at 21:06

## 2023-12-12 RX ADMIN — LISINOPRIL 20 MG: 10 TABLET ORAL at 09:38

## 2023-12-12 RX ADMIN — INSULIN LISPRO 2 UNITS: 100 INJECTION, SOLUTION INTRAVENOUS; SUBCUTANEOUS at 09:38

## 2023-12-12 RX ADMIN — ASPIRIN 81 MG: 81 TABLET, COATED ORAL at 09:38

## 2023-12-12 RX ADMIN — ENOXAPARIN SODIUM 40 MG: 40 INJECTION SUBCUTANEOUS at 09:39

## 2023-12-12 RX ADMIN — DOCUSATE SODIUM 50 MG AND SENNOSIDES 8.6 MG 2 TABLET: 8.6; 5 TABLET, FILM COATED ORAL at 09:38

## 2023-12-12 RX ADMIN — BUDESONIDE AND FORMOTEROL FUMARATE DIHYDRATE 2 PUFF: 160; 4.5 AEROSOL RESPIRATORY (INHALATION) at 18:31

## 2023-12-12 RX ADMIN — DOCUSATE SODIUM 50 MG AND SENNOSIDES 8.6 MG 2 TABLET: 8.6; 5 TABLET, FILM COATED ORAL at 21:05

## 2023-12-12 RX ADMIN — Medication 10 ML: at 09:40

## 2023-12-12 NOTE — NURSING NOTE
Wound consult for denuded area to the RT buttock. Area presets as a stage 2 pressure ulcer with a partial thickness loss of the dermis and a moist and fragile vitor wound skin. PT report minor pain to the area. Educated PT on the importance of weight shifting. New order for Magic Barrier cream BID and PRN

## 2023-12-12 NOTE — PROGRESS NOTES
Progress Note Pulmonary      Subjective more awake, comfortable.  Feels better and wants to go home.  Patient got his new home equipment and is willing to use it     Interval History:       As above.    Review of Systems:    Reviewed ; unchanged       Vital Signs  Temp:  [97.8 °F (36.6 °C)-98.2 °F (36.8 °C)] 98.2 °F (36.8 °C)  Heart Rate:  [] 113  Resp:  [20-28] 20  BP: (133-185)/(61-96) 133/81  Body mass index is 31.87 kg/m².    Intake/Output Summary (Last 24 hours) at 12/12/2023 1246  Last data filed at 12/12/2023 0352  Gross per 24 hour   Intake --   Output 2300 ml   Net -2300 ml     No intake/output data recorded.    Physical Exam:  General- normal in appearance, not in any acute distress    HEENT- pupils equally reactive to light, normal in size, no scleral icterus    Neck- supple    No JVD, no carotid bruit    Respiratory-bilateral air entry, scattered wheezing, few basilar rales, no egophony.    Cardiovascular-  Normal S1 and S2. No S3, S4 or murmurs.    GI-nontender nondistended bowel sounds positive    CNS-alert oriented x3, grossly nonfocal    Extremities- pulses normal bilaterally , no clubbing and edema        Results Review:      Results from last 7 days   Lab Units 12/12/23  0432 12/11/23  1009 12/10/23  2226   WBC 10*3/mm3 6.63 7.96 8.41   HEMOGLOBIN g/dL 12.5* 12.1* 12.1*   PLATELETS 10*3/mm3 168 149 182     Results from last 7 days   Lab Units 12/12/23  0432 12/11/23  1009 12/10/23  2226   SODIUM mmol/L 137 134* 138   POTASSIUM mmol/L 4.2 4.4 5.8*   CHLORIDE mmol/L 93* 90* 95*   CO2 mmol/L 33.7* 37.1* 42.9*   BUN mg/dL 16 22 23   CREATININE mg/dL 0.76 0.73* 0.95   CALCIUM mg/dL 9.9 10.0 9.7   GLUCOSE mg/dL 191* 140* 137*   MAGNESIUM mg/dL 1.9 2.2  --      Lab Results   Component Value Date    INR 0.82 (L) 12/08/2021    PROTIME 11.7 (L) 12/08/2021     Results from last 7 days   Lab Units 12/12/23  0432 12/10/23  2226   ALK PHOS U/L 85 94   BILIRUBIN mg/dL 0.8 0.5   ALT (SGPT) U/L 15 18   AST  (SGOT) U/L 18 15     Results from last 7 days   Lab Units 12/12/23  0215   PH, ARTERIAL pH units 7.454*   PO2 ART mm Hg 58.6*   PCO2, ARTERIAL mm Hg 57.7*   HCO3 ART mmol/L 40.5*     Imaging Results (Last 24 Hours)       ** No results found for the last 24 hours. **                 amLODIPine, 10 mg, Oral, Nightly  aspirin, 81 mg, Oral, Daily  budesonide-formoterol, 2 puff, Inhalation, BID - RT  clopidogrel, 75 mg, Oral, Daily  enoxaparin, 40 mg, Subcutaneous, Daily  insulin lispro, 2-7 Units, Subcutaneous, 4x Daily AC & at Bedtime  ipratropium-albuterol, 3 mL, Nebulization, 4x Daily - RT  lisinopril, 20 mg, Oral, Daily  methylPREDNISolone sodium succinate, 40 mg, Intravenous, Q12H  rosuvastatin, 10 mg, Oral, Daily  senna-docusate sodium, 2 tablet, Oral, BID  sodium chloride, 10 mL, Intravenous, Q12H           Medication Review:     Assessment & Plan     #1 COPD exacerbation.  Gradually improving     2.  Acute on chronic hypoxic respiratory failure. , chronic hypercapnia.     3.  Obstructive sleep apnea, obesity hypoventilation syndrome.     4.:  Pulmonary hypertension.     5.  Macrocytic anemia.      Patient just got his home equipment.  Will have him use home BiPAP to see if he is able to tolerate it.    Patient was reeducated on the use of BiPAP.    He is willing to use it            GI- PPI prophylaxis        Endrocrinology- Maintain Blood sugar 140 -180           DVT prophylaxis-     Have personally reviewed x-rays, labs, medication list.  Total time spent 30 minutes.             Kwaku Looney MD  12/12/23  12:46 EST

## 2023-12-12 NOTE — THERAPY EVALUATION
Acute Care - Occupational Therapy Initial Evaluation  [facility]     Patient Name: Donaldo Roberts  : 1950  MRN: [id number]  Today's Date: 2023  Onset of Illness/Injury or Date of Surgery: 12/10/23 (admission date)          Admit Date: 12/10/2023       ICD-10-CM ICD-9-CM   1. Acute on chronic respiratory failure with hypoxia and hypercapnia  J96.21 518.84    J96.22 786.09     799.02   2. Altered mental status, unspecified altered mental status type  R41.82 780.97     Patient Active Problem List   Diagnosis    Benign prostatic hyperplasia with urinary obstruction    Herpesviral infection of penis    Shortness of breath    Carotid stenosis, asymptomatic, right    Essential hypertension    Dyslipidemia    Wenckebach block    PATTY (obstructive sleep apnea)    Presence of cardiac pacemaker    Severe sinus bradycardia    Chronic bronchitis    Cigarette nicotine dependence without complication    Hypoxia    Detrusor instability    Tobacco abuse    Respiratory failure    Physical debility    Chronic obstructive pulmonary disease    Hypercapnic respiratory failure     Past Medical History:   Diagnosis Date    Acid reflux     Anxiety     Arthritis     Carotid artery stenosis, symptomatic, right 2021    Chronic obstructive pulmonary disease 2023    Essential hypertension 2021    Herpes     History of lipoma     History of transfusion     Hyperlipidemia     Inguinal hernia     Peptic ulcer     Stroke      Past Surgical History:   Procedure Laterality Date    ABDOMINAL SURGERY      CARDIAC ELECTROPHYSIOLOGY PROCEDURE N/A 12/10/2021    Procedure: Pacemaker DC new;  Surgeon: Jony Monte MD;  Location: [facility] INVASIVE LOCATION;  Service: Cardiology;  Laterality: N/A;    CAROTID ENDARTERECTOMY Left     CHOLECYSTECTOMY WITH INTRAOPERATIVE CHOLANGIOGRAM N/A 2017    Procedure: CHOLECYSTECTOMY LAPAROSCOPIC INTRAOPERATIVE CHOLANGIOGRAM;  Surgeon: Luis Coe MD;  Location: [facility] OR;   Service:     HERNIA REPAIR      inguinal    SINUS SURGERY      TUMOR EXCISION           OT ASSESSMENT FLOWSHEET (last 12 hours)       OT Evaluation and Treatment       Row Name 12/12/23 1506                   OT Time and Intention    Document Type evaluation  -KR        Mode of Treatment occupational therapy  -KR        Patient Effort adequate  -KR           General Information    General Observations of Patient alert/cooperative  -KR           Living Environment    Current Living Arrangements home  -KR        People in Home spouse;child(haley), adult  -KR        Primary Care Provided by self;spouse/significant other;child(haley)  -KR           Cognition    Affect/Mental Status (Cognition) WFL;flat/blunted affect  -KR        Orientation Status (Cognition) oriented to;person;place;situation  -KR        Follows Commands (Cognition) WFL  -KR           Range of Motion Comprehensive    Comment, General Range of Motion BUE WFL  -KR           Strength Comprehensive (MMT)    Comment, General Manual Muscle Testing (MMT) Assessment BUE 3-/5  -KR           Activities of Daily Living    BADL Assessment/Intervention bathing;upper body dressing;lower body dressing;grooming;feeding;toileting  -KR           Bathing Assessment/Intervention    [place] Level (Bathing) bathing skills;maximum assist (25% patient effort)  -KR           Upper Body Dressing Assessment/Training    [place] Level (Upper Body Dressing) upper body dressing skills;moderate assist (50% patient effort)  -KR           Lower Body Dressing Assessment/Training    [place] Level (Lower Body Dressing) lower body dressing skills;maximum assist (25% patient effort)  -KR           Grooming Assessment/Training    [place] Level (Grooming) grooming skills;minimum assist (75% patient effort)  -KR           Self-Feeding Assessment/Training    [place] Level (Feeding) feeding skills;set up  -KR           Toileting Assessment/Training    [place] Level  (Toileting) toileting skills;maximum assist (25% patient effort)  -KR           Wound 12/11/23 1900 Right gluteal Pressure Injury    Wound - Properties Group Placement Date: 12/11/23  -MS, present on assessment  Placement Time: 1900 -MS Present on Original Admission: Y  -BB Side: Right  -BB Location: gluteal  -MS Primary Wound Type: Pressure inj  -BB    Retired Wound - Properties Group Placement Date: 12/11/23  -MS, present on assessment  Placement Time: 1900 -MS Present on Original Admission: Y  -BB Side: Right  -BB Location: gluteal  -MS Primary Wound Type: Pressure inj  -BB    Retired Wound - Properties Group Date first assessed: 12/11/23  -MS, present on assessment  Time first assessed: 1900 -MS Present on Original Admission: Y  -BB Side: Right  -BB Location: gluteal  -MS Primary Wound Type: Pressure inj  -BB       Plan of Care Review    Plan of Care Reviewed With patient;family  -KR           Therapy Assessment/Plan (OT)    Planned Therapy Interventions (OT) activity tolerance training;BADL retraining;strengthening exercise  -KR           Therapy Plan Review/Discharge Plan (OT)    Anticipated Discharge Disposition (OT) home with assist  -KR           OT Goals    Strength Goal Selection (OT) strength, OT goal 1  -KR        Activity Tolerance Goal Selection (OT) activity tolerance, OT goal 1  -KR           Strength Goal 1 (OT)    Strength Goal 1 (OT) BUE increase x 1 to enhance self care/mobility performance  -KR        Time Frame (Strength Goal 1, OT) by discharge  -KR            Activity Tolerance Goal 1 (OT)    Activity Tolerance Goal 1 (OT) Increase to enhance ADL performance  -KR        Activity Level (Endurance Goal 1, OT) 15 min activity  -KR        Time Frame (Activity Tolerance Goal 1, OT) by discharge  -KR           Patient Education Goal (OT)    Activity (Patient Education Goal, OT) AE/DME training as needed to enhance self care safety and independence in home environment  -KR         [place]/Cues/Accuracy (Memory Goal 2, OT) verbalizes understanding  -KR        Time Frame (Patient Education Goal, OT) by discharge  -KR                  User Key  (r) = Recorded By, (t) = Taken By, (c) = Cosigned By      Initials Name Effective Dates    Jasvir Recio OT 06/16/21 -     Moreno Molina RN 06/16/21 -     Onelia Quiñonez RN 10/05/23 -                            OT Recommendation and Plan  Planned Therapy Interventions (OT): activity tolerance training, BADL retraining, strengthening exercise  Plan of Care Review  Plan of Care Reviewed With: patient, family  Plan of Care Reviewed With: patient, family        Time Calculation:     Therapy Charges for Today       Code Description Service Date Service Provider Modifiers Qty    25405634352 HC OT SELF CARE/MGMT/TRAIN EA 15 MIN 12/12/2023 Jasvir Blankenship OT GO 1    [id number]  OT EVAL HIGH COMPLEXITY 4 12/12/2023 Jasvir Blankenship OT GO 1                 Jasvir Blankenship OT  12/12/2023

## 2023-12-12 NOTE — THERAPY EVALUATION
Acute Care - Physical Therapy Initial Evaluation   [facility]     Patient Name: Donaldo Roberts  : 1950  MRN: [id number]  Today's Date: 2023   Onset of Illness/Injury or Date of Surgery: 12/10/23 (admission date)  Visit Dx:     ICD-10-CM ICD-9-CM   1. Acute on chronic respiratory failure with hypoxia and hypercapnia  J96.21 518.84    J96.22 786.09     799.02   2. Altered mental status, unspecified altered mental status type  R41.82 780.97     Patient Active Problem List   Diagnosis    Benign prostatic hyperplasia with urinary obstruction    Herpesviral infection of penis    Shortness of breath    Carotid stenosis, asymptomatic, right    Essential hypertension    Dyslipidemia    Wenckebach block    PATTY (obstructive sleep apnea)    Presence of cardiac pacemaker    Severe sinus bradycardia    Chronic bronchitis    Cigarette nicotine dependence without complication    Hypoxia    Detrusor instability    Tobacco abuse    Respiratory failure    Physical debility    Chronic obstructive pulmonary disease    Hypercapnic respiratory failure     Past Medical History:   Diagnosis Date    Acid reflux     Anxiety     Arthritis     Carotid artery stenosis, symptomatic, right 2021    Chronic obstructive pulmonary disease 2023    Essential hypertension 2021    Herpes     History of lipoma     History of transfusion     Hyperlipidemia     Inguinal hernia     Peptic ulcer     Stroke      Past Surgical History:   Procedure Laterality Date    ABDOMINAL SURGERY      CARDIAC ELECTROPHYSIOLOGY PROCEDURE N/A 12/10/2021    Procedure: Pacemaker DC new;  Surgeon: Jony Monte MD;  Location: [facility] INVASIVE LOCATION;  Service: Cardiology;  Laterality: N/A;    CAROTID ENDARTERECTOMY Left     CHOLECYSTECTOMY WITH INTRAOPERATIVE CHOLANGIOGRAM N/A 2017    Procedure: CHOLECYSTECTOMY LAPAROSCOPIC INTRAOPERATIVE CHOLANGIOGRAM;  Surgeon: Luis Coe MD;  Location: [facility] OR;  Service:     HERNIA  REPAIR      inguinal    SINUS SURGERY      TUMOR EXCISION       PT Assessment (last 12 hours)       PT Evaluation and Treatment       Row Name 12/12/23 1210          Physical Therapy Time and Intention    Document Type evaluation  -     Mode of Treatment physical therapy  -     Patient Effort adequate  -     Comment Pt seen for therapy evaluation this date, pleasant and cooperative. Pt states he lives at home with his wife, used RW for assist, may have rollator with potential use. Pt reports no falls at home.  -       Row Name 12/12/23 1210          General Information    Patient Profile Reviewed yes  -     Onset of Illness/Injury or Date of Surgery 12/10/23  admission date  -     Patient Observations alert;cooperative  -     General Observations of Patient Pt seen sitting EOB, assisted with RN to go to bathroom  -     Equipment Currently Used at Home walker, rolling  -     Existing Precautions/Restrictions fall  -     Equipment Issued to Patient gait belt  -       Row Name 12/12/23 1210          Living Environment    Current Living Arrangements home  -     People in Home spouse  -[facility] Name 12/12/23 1210          Range of Motion Comprehensive    Comment, General Range of Motion AROM grossly WFL  -[facility] Name 12/12/23 1210          Strength Comprehensive (MMT)    Comment, General Manual Muscle Testing (MMT) Assessment Grossly 5/5 with 3/4 hip flexion and 4/5 knee flex  -       Row Name 12/12/23 1210          Bed Mobility    Comment, (Bed Mobility) Not observed, pt sitting EOB  -       Row Name 12/12/23 1210          Transfers    Transfers sit-stand transfer;stand-sit transfer  -[facility] Name 12/12/23 1210          Sit-Stand Transfer    Sit-Stand [place] (Transfers) contact guard;standby assist  -     Assistive Device (Sit-Stand Transfers) walker, front-wheeled  -       Row Name 12/12/23 1210          Stand-Sit Transfer    Stand-Sit [place] (Transfers)  contact guard;standby assist;supervision  -     Assistive Device (Stand-Sit Transfers) walker, front-wheeled  -       Row Name 12/12/23 1210          Gait/Stairs (Locomotion)    Gait/Stairs Locomotion gait/ambulation assistive device, grossly 24' or more in room (ambulated to bathroom)  -     [place] Level (Gait) contact guard  -     Assistive Device (Gait) walker, front-wheeled  -TONI       Row Name 12/12/23 1210          Balance    Comment, Balance Pt demonstrates fair/decent static standing balance, may benefit from currently RW use and supervision at home.  -       Row Name             Wound 12/11/23 1900 gluteal    Wound - Properties Group Placement Date: 12/11/23  -MS, present on assessment  Placement Time: 1900  -MS Location: gluteal  -MS    Retired Wound - Properties Group Placement Date: 12/11/23  -MS, present on assessment  Placement Time: 1900  -MS Location: gluteal  -MS    Retired Wound - Properties Group Date first assessed: 12/11/23  -MS, present on assessment  Time first assessed: 1900  -MS Location: gluteal  -MS      Row Name 12/12/23 1210          Plan of Care Review    Outcome Evaluation Pt seen for therapy evaluation this date, pleasant and cooperative with therapy. Pt does not demonstrate need for acute therapeutic intervention. May benefit from home health consult to work on ambulating using AD safely in home, as well as check for fall risk/safety awareness.  -       Row Name 12/12/23 1210          Positioning and Restraints    Pre-Treatment Position in bed  -     Post Treatment Position chair  -     In Chair sitting;call light within reach;encouraged to call for assist;exit alarm on  -       Row Name 12/12/23 1210          Therapy Assessment/Plan (PT)    Therapy Frequency (PT) evaluation only  -               User Key  (r) = Recorded By, (t) = Taken By, (c) = Cosigned By      Initials Name Provider Type    Onelia Quiñonez, RN Registered Nurse    Rubi Rush, PT  Physical Therapist                      PT Recommendation and Plan  Anticipated Discharge Disposition (PT): home with supervision, home with assist, home with home health  Therapy Frequency (PT): evaluation only  Outcome Evaluation: Pt seen for therapy evaluation this date, pleasant and cooperative with therapy. Pt does not demonstrate need for acute therapeutic intervention. May benefit from home health consult to work on ambulating using AD safely in home, as well as check for fall risk/safety awareness.       Time Calculation:    PT Charges       Row Name 12/12/23 1259             Time Calculation    Start Time 1205  -KH      PT Received On 12/12/23  -                User Key  (r) = Recorded By, (t) = Taken By, (c) = Cosigned By      Initials Name Provider Type    Rubi Rush, PT Physical Therapist                  Therapy Charges for Today       Code Description Service Date Service Provider Modifiers Qty    67137269858 HC PT EVAL MOD COMPLEXITY 4 12/12/2023 Rubi Dumont, PT GP 1            PT G-Codes  AM-PAC 6 Clicks Score (PT): 12    Rubi Dumont PT  12/12/2023

## 2023-12-12 NOTE — PLAN OF CARE
Goal Outcome Evaluation:              Outcome Evaluation: Pt seen for therapy evaluation this date, pleasant and cooperative with therapy. Pt does not demonstrate need for acute therapeutic intervention. May benefit from home health consult to work on ambulating using AD safely in home, as well as check for fall risk/safety awareness. Recommended supervision here and at home with activity d/t fall risk.      Anticipated Discharge Disposition (PT): home with supervision, home with assist, home with home health

## 2023-12-12 NOTE — PLAN OF CARE
Goal Outcome Evaluation:           Progress: improving  Outcome Evaluation: Pt resting in bed at this time. AxOx3. Pt has kept Bipap on while sleeping this shift. No acute changes at this time. Will continue with POC.

## 2023-12-12 NOTE — PROGRESS NOTES
[facility] HOSPITALIST PROGRESS NOTE     Patient Identification:  Name:  Donaldo Roberts  Age:  73 y.o.  Sex:  male  :  1950  MRN:  [id number]  Visit Number:  [id number]  ROOM: [address] [place] [address]     Primary Care Provider:  Duke Rosen MD    Length of stay in inpatient status:  1    Subjective     Chief Compliant:    Chief Complaint   Patient presents with    Altered Mental Status    Respiratory Distress       History of Presenting Illness:    Patient reported feeling better. Was sitting in bedside chair. Denied any new complaints. No family bedside.     ROS:  Otherwise 10 point ROS negative other than documented above in HPI.     Objective     Current Hospital Meds:amLODIPine, 10 mg, Oral, Nightly  aspirin, 81 mg, Oral, Daily  budesonide-formoterol, 2 puff, Inhalation, BID - RT  clopidogrel, 75 mg, Oral, Daily  enoxaparin, 40 mg, Subcutaneous, Daily  insulin lispro, 2-7 Units, Subcutaneous, 4x Daily AC & at Bedtime  ipratropium-albuterol, 3 mL, Nebulization, 4x Daily - RT  lisinopril, 20 mg, Oral, Daily  magic barrier cream, 1 application , Topical, BID  methylPREDNISolone sodium succinate, 40 mg, Intravenous, Q12H  rosuvastatin, 10 mg, Oral, Daily  senna-docusate sodium, 2 tablet, Oral, BID  sodium chloride, 10 mL, Intravenous, Q12H         Current Antimicrobial Therapy:  Anti-Infectives (From admission, onward)      None          Current Diuretic Therapy:  Diuretics (From admission, onward)      Ordered     Dose/Rate Route Frequency Start Stop    23 0751  furosemide (LASIX) injection 80 mg        Ordering Provider: Eugenio Duarte MD    80 mg Intravenous Once 23 0845 23 0937    23 0727  furosemide (LASIX) injection 80 mg        Ordering Provider: Vahe Schumacher DO    80 mg Intravenous Once 23 0743 23 0800          ----------------------------------------------------------------------------------------------------------------------  Vital Signs:  Temp:  [97.8  °F (36.6 °C)-98.2 °F (36.8 °C)] 98.1 °F (36.7 °C)  Heart Rate:  [] 99  Resp:  [20-28] 20  BP: (103-185)/(68-96) 103/68  SpO2:  [90 %-99 %] 99 %  on  Flow (L/min):  [3] 3;   Device (Oxygen Therapy): nasal cannula;humidified  Body mass index is 31.87 kg/m².    Wt Readings from Last 3 Encounters:   12/10/23 107 kg (235 lb)   12/06/23 107 kg (235 lb)   12/04/23 130 kg (287 lb)     Intake & Output (last 3 days)         12/09 0701  12/10 0700 12/10 0701  12/11 0700 12/11 0701  12/12 0700 12/12 0701  12/13 0700    P.O.    430    IV Piggyback  1000      Total Intake(mL/kg)  1000 (9.3)  430 (4)    Urine (mL/kg/hr)   2300 (0.9) 1700 (1.8)    Total Output   2300 1700    Net  +1000 -2300 -1270            Urine Unmeasured Occurrence    3 x          Diet: Cardiac Diets, Diabetic Diets, Renal Diets; Healthy Heart (2-3 Na+); Consistent Carbohydrate; Low Sodium (2-3g), Low Potassium, Low Phosphorus; Texture: Regular Texture (IDDSI 7); Fluid Consistency: Thin (IDDSI 0)  ----------------------------------------------------------------------------------------------------------------------  Physical exam:  Constitutional:  Well-developed and well-nourished.  No respiratory distress.      HENT:  Head:  Normocephalic and atraumatic.  Mouth:  Moist mucous membranes.    Eyes:  Conjunctivae and EOM are normal. No scleral icterus.    Neck:  Neck supple.  No JVD present.    Cardiovascular:  Normal rate, regular rhythm and normal heart sounds with no murmur.  Pulmonary/Chest:  No respiratory distress, no wheezes, no crackles, with normal breath sounds and good air movement.  Abdominal:  Soft.  Bowel sounds are normal.  No distension and no tenderness.   Musculoskeletal:  No edema, no tenderness, and no deformity.  No red or swollen joints anywhere.    Neurological:  Alert and oriented to person, place, and time.  No cranial nerve deficit.  No tongue deviation.  No facial droop.  No slurred speech.   Skin:  Skin is warm and dry. No rash  "noted. No pallor.   Peripheral vascular:  Pulses in all 4 extremities with no clubbing, no cyanosis, no edema.  ----------------------------------------------------------------------------------------------------------------------  Tele:    ----------------------------------------------------------------------------------------------------------------------  Results from last 7 days   Lab Units 12/12/23  0432 12/11/23  1009 12/10/23  2226   LACTATE mmol/L  --   --  0.8   WBC 10*3/mm3 6.63 7.96 8.41   HEMOGLOBIN g/dL 12.5* 12.1* 12.1*   HEMATOCRIT % 41.4 42.9 44.0   MCV fL 97.6* 103.6* 105.5*   MCHC g/dL 30.2* 28.2* 27.5*   PLATELETS 10*3/mm3 168 149 182     Results from last 7 days   Lab Units 12/12/23  0215   PH, ARTERIAL pH units 7.454*   PO2 ART mm Hg 58.6*   PCO2, ARTERIAL mm Hg 57.7*   HCO3 ART mmol/L 40.5*     Results from last 7 days   Lab Units 12/12/23  0432 12/11/23  1009 12/10/23  2226   SODIUM mmol/L 137 134* 138   POTASSIUM mmol/L 4.2 4.4 5.8*   MAGNESIUM mg/dL 1.9 2.2  --    CHLORIDE mmol/L 93* 90* 95*   CO2 mmol/L 33.7* 37.1* 42.9*   BUN mg/dL 16 22 23   CREATININE mg/dL 0.76 0.73* 0.95   CALCIUM mg/dL 9.9 10.0 9.7   GLUCOSE mg/dL 191* 140* 137*   ALBUMIN g/dL 3.8  --  4.4   BILIRUBIN mg/dL 0.8  --  0.5   ALK PHOS U/L 85  --  94   AST (SGOT) U/L 18  --  15   ALT (SGPT) U/L 15  --  18   Estimated Creatinine Clearance: 112.7 mL/min (by C-G formula based on SCr of 0.76 mg/dL).  No results found for: \"AMMONIA\"  Results from last 7 days   Lab Units 12/10/23  2226   HSTROP T ng/L 36*     Results from last 7 days   Lab Units 12/10/23  2226   PROBNP pg/mL 336.7         Hemoglobin A1C   Date/Time Value Ref Range Status   12/11/2023 1009 6.40 (H) 4.80 - 5.60 % Final     Glucose   Date/Time Value Ref Range Status   12/12/2023 1059 172 (H) 70 - 130 mg/dL Final   12/12/2023 0657 163 (H) 70 - 130 mg/dL Final   12/11/2023 2009 124 70 - 130 mg/dL Final   12/11/2023 1629 113 70 - 130 mg/dL Final     Lab Results " "  Component Value Date    TSH 0.573 12/11/2023    FREET4 1.11 12/08/2021     No results found for: \"PREGTESTUR\", \"PREGSERUM\", \"HCG\", \"HCGQUANT\"  Pain Management Panel  More data exists         Latest Ref Rng & Units 12/10/2023 11/18/2023   Pain Management Panel   Amphetamine, Urine Qual Negative Negative  Negative    Barbiturates Screen, Urine Negative Negative  Negative    Benzodiazepine Screen, Urine Negative Negative  Negative    Buprenorphine, Screen, Urine Negative Negative  Negative    Cocaine Screen, Urine Negative Negative  Negative    Fentanyl, Urine Negative Negative  Negative    Methadone Screen , Urine Negative Negative  Negative    Methamphetamine, Ur Negative Negative  Negative      Brief Urine Lab Results  (Last result in the past 365 days)        Color   Clarity   Blood   Leuk Est   Nitrite   Protein   CREAT   Urine HCG        12/10/23 2234 Dark Yellow   Clear   Trace   Negative   Negative   30 mg/dL (1+)                 Blood Culture   Date Value Ref Range Status   12/10/2023 Staphylococcus, coagulase negative (C)  Final   12/10/2023 No growth at 24 hours  Preliminary     No results found for: \"URINECX\"  No results found for: \"WOUNDCX\"  No results found for: \"STOOLCX\"  No results found for: \"RESPCX\"  No results found for: \"AFBCX\"  Results from last 7 days   Lab Units 12/10/23  2226   LACTATE mmol/L 0.8       I have personally looked at the labs and they are summarized above.  ----------------------------------------------------------------------------------------------------------------------  Detailed radiology reports for the last 24 hours:    Imaging Results (Last 24 Hours)       ** No results found for the last 24 hours. **          Assessment & Plan    #Acute on chronic hypoxic hypercapnic respiratory failure   #COPD exacerbation   #Respiratory acidosis  #PATTY, noncompliant to NIPPV  #Acute on chronic diastolic hear failure  -  Patient again hypercapnic on this visit with initial respiratory " acidosis. PH normalized with BiPAP. Baseline PCO2 appears to be somewhere between 65-75   - Currently on home 3L NC. Goal ApO2 88-92%  - Home BiPAP provided at pulmonology recs   - Responded well to 80 IV lasix. Will redose. Monitor renal function and electroltyes.   - Continue nebs and steroids.     #Partial stage II pressure wound  - Wound care consulted     Code statu: Full     Dispo: Pending clinical improvement. Patient had new home BIPAP for use. Patient plants to return home at discharge.     VTE Prophylaxis:   Mechanical Order History:       None          Pharmalogical Order History:        Ordered     Dose Route Frequency Stop    12/11/23 0918  Enoxaparin Sodium (LOVENOX) syringe 40 mg         40 mg SC Daily --                         Eugenio Duarte MD  [facility]  12/12/23  16:04 EST

## 2023-12-12 NOTE — PLAN OF CARE
Goal Outcome Evaluation:  Plan of Care Reviewed With: patient        Progress: no change  Outcome Evaluation: Patient has done well today.  Participated with physical therapy and sat in chair for several hours. VSS.  Continue current plan of care.

## 2023-12-13 ENCOUNTER — READMISSION MANAGEMENT (OUTPATIENT)
Dept: CALL CENTER | Facility: HOSPITAL | Age: 73
End: 2023-12-13
Payer: MEDICARE

## 2023-12-13 VITALS
HEIGHT: 72 IN | BODY MASS INDEX: 31.83 KG/M2 | DIASTOLIC BLOOD PRESSURE: 75 MMHG | SYSTOLIC BLOOD PRESSURE: 111 MMHG | WEIGHT: 235 LBS | TEMPERATURE: 97.8 F | OXYGEN SATURATION: 95 % | HEART RATE: 109 BPM | RESPIRATION RATE: 18 BRPM

## 2023-12-13 LAB
ANION GAP SERPL CALCULATED.3IONS-SCNC: 13.3 MMOL/L (ref 5–15)
BASOPHILS # BLD AUTO: 0.01 10*3/MM3 (ref 0–0.2)
BASOPHILS NFR BLD AUTO: 0.1 % (ref 0–1.5)
BUN SERPL-MCNC: 18 MG/DL (ref 8–23)
BUN/CREAT SERPL: 25.4 (ref 7–25)
CALCIUM SPEC-SCNC: 10.2 MG/DL (ref 8.6–10.5)
CHLORIDE SERPL-SCNC: 95 MMOL/L (ref 98–107)
CO2 SERPL-SCNC: 29.7 MMOL/L (ref 22–29)
CREAT SERPL-MCNC: 0.71 MG/DL (ref 0.76–1.27)
DEPRECATED RDW RBC AUTO: 45.1 FL (ref 37–54)
EGFRCR SERPLBLD CKD-EPI 2021: 98.1 ML/MIN/1.73
EOSINOPHIL # BLD AUTO: 0.01 10*3/MM3 (ref 0–0.4)
EOSINOPHIL NFR BLD AUTO: 0.1 % (ref 0.3–6.2)
ERYTHROCYTE [DISTWIDTH] IN BLOOD BY AUTOMATED COUNT: 12.8 % (ref 12.3–15.4)
GLUCOSE BLDC GLUCOMTR-MCNC: 116 MG/DL (ref 70–130)
GLUCOSE BLDC GLUCOMTR-MCNC: 137 MG/DL (ref 70–130)
GLUCOSE BLDC GLUCOMTR-MCNC: 153 MG/DL (ref 70–130)
GLUCOSE BLDC GLUCOMTR-MCNC: 158 MG/DL (ref 70–130)
GLUCOSE SERPL-MCNC: 167 MG/DL (ref 65–99)
HCT VFR BLD AUTO: 47.4 % (ref 37.5–51)
HGB BLD-MCNC: 14.5 G/DL (ref 13–17.7)
IMM GRANULOCYTES # BLD AUTO: 0.05 10*3/MM3 (ref 0–0.05)
IMM GRANULOCYTES NFR BLD AUTO: 0.6 % (ref 0–0.5)
LYMPHOCYTES # BLD AUTO: 0.68 10*3/MM3 (ref 0.7–3.1)
LYMPHOCYTES NFR BLD AUTO: 8.2 % (ref 19.6–45.3)
MAGNESIUM SERPL-MCNC: 2.1 MG/DL (ref 1.6–2.4)
MCH RBC QN AUTO: 29.2 PG (ref 26.6–33)
MCHC RBC AUTO-ENTMCNC: 30.6 G/DL (ref 31.5–35.7)
MCV RBC AUTO: 95.6 FL (ref 79–97)
MONOCYTES # BLD AUTO: 0.22 10*3/MM3 (ref 0.1–0.9)
MONOCYTES NFR BLD AUTO: 2.7 % (ref 5–12)
NEUTROPHILS NFR BLD AUTO: 7.32 10*3/MM3 (ref 1.7–7)
NEUTROPHILS NFR BLD AUTO: 88.3 % (ref 42.7–76)
NRBC BLD AUTO-RTO: 0 /100 WBC (ref 0–0.2)
PLATELET # BLD AUTO: 202 10*3/MM3 (ref 140–450)
PMV BLD AUTO: 11.2 FL (ref 6–12)
POTASSIUM SERPL-SCNC: 4.2 MMOL/L (ref 3.5–5.2)
RBC # BLD AUTO: 4.96 10*6/MM3 (ref 4.14–5.8)
SODIUM SERPL-SCNC: 138 MMOL/L (ref 136–145)
WBC NRBC COR # BLD AUTO: 8.29 10*3/MM3 (ref 3.4–10.8)

## 2023-12-13 PROCEDURE — 94761 N-INVAS EAR/PLS OXIMETRY MLT: CPT

## 2023-12-13 PROCEDURE — 94664 DEMO&/EVAL PT USE INHALER: CPT

## 2023-12-13 PROCEDURE — 63710000001 INSULIN LISPRO (HUMAN) PER 5 UNITS: Performed by: PHYSICIAN ASSISTANT

## 2023-12-13 PROCEDURE — 94799 UNLISTED PULMONARY SVC/PX: CPT

## 2023-12-13 PROCEDURE — 83735 ASSAY OF MAGNESIUM: CPT | Performed by: INTERNAL MEDICINE

## 2023-12-13 PROCEDURE — 82948 REAGENT STRIP/BLOOD GLUCOSE: CPT

## 2023-12-13 PROCEDURE — 25010000002 ENOXAPARIN PER 10 MG: Performed by: INTERNAL MEDICINE

## 2023-12-13 PROCEDURE — 80048 BASIC METABOLIC PNL TOTAL CA: CPT | Performed by: INTERNAL MEDICINE

## 2023-12-13 PROCEDURE — 25010000002 METHYLPREDNISOLONE PER 40 MG: Performed by: INTERNAL MEDICINE

## 2023-12-13 PROCEDURE — 94660 CPAP INITIATION&MGMT: CPT

## 2023-12-13 PROCEDURE — 85025 COMPLETE CBC W/AUTO DIFF WBC: CPT | Performed by: INTERNAL MEDICINE

## 2023-12-13 RX ORDER — PREDNISONE 20 MG/1
40 TABLET ORAL DAILY
Qty: 6 TABLET | Refills: 0 | Status: SHIPPED | OUTPATIENT
Start: 2023-12-13 | End: 2023-12-16

## 2023-12-13 RX ORDER — PANTOPRAZOLE SODIUM 40 MG/1
40 TABLET, DELAYED RELEASE ORAL DAILY
Qty: 30 TABLET | Refills: 0 | Status: SHIPPED | OUTPATIENT
Start: 2023-12-13 | End: 2024-01-12

## 2023-12-13 RX ORDER — PANTOPRAZOLE SODIUM 40 MG/1
40 TABLET, DELAYED RELEASE ORAL DAILY
Qty: 30 TABLET | Refills: 0 | Status: SHIPPED | OUTPATIENT
Start: 2023-12-13 | End: 2023-12-13 | Stop reason: SDUPTHER

## 2023-12-13 RX ORDER — PREDNISONE 20 MG/1
40 TABLET ORAL DAILY
Qty: 6 TABLET | Refills: 0 | Status: SHIPPED | OUTPATIENT
Start: 2023-12-13 | End: 2023-12-13 | Stop reason: SDUPTHER

## 2023-12-13 RX ADMIN — DOCUSATE SODIUM 50 MG AND SENNOSIDES 8.6 MG 2 TABLET: 8.6; 5 TABLET, FILM COATED ORAL at 09:02

## 2023-12-13 RX ADMIN — Medication 10 ML: at 09:02

## 2023-12-13 RX ADMIN — ENOXAPARIN SODIUM 40 MG: 40 INJECTION SUBCUTANEOUS at 09:02

## 2023-12-13 RX ADMIN — INSULIN LISPRO 2 UNITS: 100 INJECTION, SOLUTION INTRAVENOUS; SUBCUTANEOUS at 17:32

## 2023-12-13 RX ADMIN — INSULIN LISPRO 2 UNITS: 100 INJECTION, SOLUTION INTRAVENOUS; SUBCUTANEOUS at 11:57

## 2023-12-13 RX ADMIN — CLOPIDOGREL BISULFATE 75 MG: 75 TABLET, FILM COATED ORAL at 09:02

## 2023-12-13 RX ADMIN — HYDROXYZINE HYDROCHLORIDE 25 MG: 25 TABLET, FILM COATED ORAL at 00:08

## 2023-12-13 RX ADMIN — IPRATROPIUM BROMIDE AND ALBUTEROL SULFATE 3 ML: 2.5; .5 SOLUTION RESPIRATORY (INHALATION) at 13:59

## 2023-12-13 RX ADMIN — BUDESONIDE AND FORMOTEROL FUMARATE DIHYDRATE 2 PUFF: 160; 4.5 AEROSOL RESPIRATORY (INHALATION) at 07:09

## 2023-12-13 RX ADMIN — IPRATROPIUM BROMIDE AND ALBUTEROL SULFATE 3 ML: 2.5; .5 SOLUTION RESPIRATORY (INHALATION) at 07:09

## 2023-12-13 RX ADMIN — ASPIRIN 81 MG: 81 TABLET, COATED ORAL at 09:02

## 2023-12-13 RX ADMIN — LISINOPRIL 20 MG: 10 TABLET ORAL at 09:02

## 2023-12-13 RX ADMIN — IPRATROPIUM BROMIDE AND ALBUTEROL SULFATE 3 ML: 2.5; .5 SOLUTION RESPIRATORY (INHALATION) at 00:14

## 2023-12-13 RX ADMIN — ROSUVASTATIN CALCIUM 10 MG: 10 TABLET, FILM COATED ORAL at 09:02

## 2023-12-13 RX ADMIN — METHYLPREDNISOLONE SODIUM SUCCINATE 40 MG: 40 INJECTION, POWDER, FOR SOLUTION INTRAMUSCULAR; INTRAVENOUS at 09:02

## 2023-12-13 RX ADMIN — VITAMINS A AND D OINTMENT 1 APPLICATION: 15.5; 53.4 OINTMENT TOPICAL at 11:57

## 2023-12-13 NOTE — CASE MANAGEMENT/SOCIAL WORK
Discharge Planning Assessment   [facility]     Patient Name: Donaldo Roberts  MRN: [id number]  Today's Date: 12/13/2023    Admit Date: 12/10/2023            Row Name 12/13/23 1425       Plan    Plan CM spoke with Linda from [facility] and discovered CPAP machine was from [facility] and actually a trilogy machine.                   Esperanza Villar RN

## 2023-12-13 NOTE — CONSULTS
"    NAME:___Donaldo Roberts  :_1950_  APPOINTMENT DATE/TIME:___________23___14:19 EST___________    COPD Education Evaluation            COPD Education Completed (See Note) Yes     COPD Education  encounter: 1st    Referring/consulting Provider: ONESIMO Bellamy, RRT  Hospitalist: Dr. Duarte     Reason for Consultation:  COPD Exacerbation   COPD Diagnosis Length Unkown     Current Outpatient Pulmonologist     NO           Subjective                    Dyspnea:        Do you have Dyspnea? Yes       DME Equipment Used:              Home O2? YES     Home O2 device? 3-4L NC     Nebulizer NO     NIPPV BIPAP      Objective:    Home Medications:  Medications Prior to Admission   Medication Sig Dispense Refill Last Dose    amLODIPine (NORVASC) 10 MG tablet Take 1 tablet by mouth Every Night. 90 tablet 1 12/10/2023    aspirin 81 MG EC tablet Take 1 tablet by mouth Daily.   12/10/2023    clopidogrel (PLAVIX) 75 MG tablet Take 1 tablet by mouth Daily. 30 tablet 11 12/10/2023    furosemide (LASIX) 40 MG tablet Take 1 tablet by mouth Every Other Day for 30 days. 15 tablet 0 Past Week    lisinopril (PRINIVIL,ZESTRIL) 20 MG tablet Take 1 tablet by mouth Daily.   12/10/2023    omeprazole (priLOSEC) 20 MG capsule Take 1 capsule by mouth Daily.   12/10/2023    rosuvastatin (CRESTOR) 10 MG tablet Take 1 tablet by mouth Daily.   12/10/2023    acetaminophen (TYLENOL) 500 MG tablet Take 1 tablet by mouth Every 6 (Six) Hours As Needed for Mild Pain.   Unknown    albuterol sulfate  (90 Base) MCG/ACT inhaler Inhale 2 puffs Every 4 (Four) Hours As Needed for Wheezing or Shortness of Air. 18 g 8 Unknown    Fluticasone-Umeclidin-Vilant (Trelegy Ellipta) 100-62.5-25 MCG/ACT inhaler Inhale 1 puff Daily. 30 each 8 Unknown       Barriers to Learning? No    Discussed:             COPD education given via the booklet \"A Patient's Guide to COPD\".     COPD Zones: (Green/yellow/Red): YES     Exacerbation or Flare up signs and " "symptoms: YES       COPD Medication Non-Compliance YES       Managing Medications: YES     Patient understands use of Rescue Medications: YES     Type of Rescue inhaler patient currently has: Albuterol   Patient understands use of Maintenance Medications: YES     Type of Maintenance inhaler patient currently has: ICS/LABA/LAMA    Proper MDI technique (w/wo Spacer): YES     Provided patient a Spacer: YES       Smoking Cessation information offered to patient: NO     Nicotine Replacement Therapy Discussed NO     Breathing Techniques:            Purse-lipped breathing YES          Diaphragmatic breathing NO     Oxygen therapy SAFETY:  YES           Action Plan            COPD/L[facility] Health Clinic appointment scheduled: YES and 1/4/24 at 1PM     Education Minutes with patient: YES and 30         Comments: COPD education was given to Mr. Roberts via the booklet , \"A Patient's Guide to COPD\". Discussion of the COPD zones (Green/Yellow/Red) was competed with emphasizes on what to do in the yellow and red zones. He was in his room and pleasantly interested in COPD education.      Proper Inhaler technique was illustrated with and without the given Aero Chamber spacer I provided to the patient. Instruction on rescue and maintenance medications, the function of each and the importance of using them as prescribed.      Pursed Lip breathing was instructed as well as when to utilize the breathing technique.      Mr. Roberts and I discussed the COPD Clinic. He stated he would like to participate in the clinic. He currently does not have a pulmonologist. I told him he would see a mid level from Pulmonology in the COPD Clinic, he seemed intrigued. He also is interested in getting a portable oxygen concentrator, which we will also address in the COPD Clinic. He is scheduled for 1/4/24. I ordered a Ambulatory referral to the COPD Clinic.     Thanks for allowing me to participate in Mr. Roberts amelia,      ONESIMO Bellamy, RRT  COPD " Navigator

## 2023-12-13 NOTE — DISCHARGE INSTR - APPOINTMENTS
You have a follow-up appointment scheduled with DR PIA MORALES on 12-19-23 at 11:00, office can be reached at [phone]

## 2023-12-13 NOTE — CASE MANAGEMENT/SOCIAL WORK
Discharge Planning Assessment   [facility]     Patient Name: Donaldo Roberts  MRN: [id number]  Today's Date: 12/13/2023    Admit Date: 12/10/2023            Discharge Plan       Row Name 12/13/23 1446       Plan    Plan --      Row Name 12/13/23 1431       Plan    Plan JOSEPH spoke with Edvin from [facility] and states a respiratory therapist will be to patients room in approximately  30 minutes. JOSEPH informed Lead Latoya Taylor of arrangements.                     Esperanza Villar RN

## 2023-12-13 NOTE — OUTREACH NOTE
Prep Survey      Flowsheet Row Responses   [facility] facility patient discharged from? [facility]   Is LACE score < 7 ? No   Eligibility Readm Mgmt   Discharge diagnosis A/C hypoxic resp failure, COPD exacerbation   Does the patient have one of the following disease processes/diagnoses(primary or secondary)? COPD   Does the patient have Home health ordered? Yes   What is the Home health agency?  [facility]   Is there a DME ordered? No   Prep survey completed? Yes            Shama GANT - Registered Nurse

## 2023-12-13 NOTE — PLAN OF CARE
Goal Outcome Evaluation:         Patient resting in bed. Patient requested anxiety meds, PRN meds given per orders. Wound care completed per orders. Will continue with plan of care.

## 2023-12-13 NOTE — DISCHARGE SUMMARY
[facility] HOSPITALISTS DISCHARGE SUMMARY    Patient Identification:  Name:  Donaldo Roberts  Age:  73 y.o.  Sex:  male  :  1950  MRN:  [id number]  Visit Number:  [id number]    Date of Admission: 12/10/2023  Date of Discharge:  ***    PCP: Duke Rosen MD    DISCHARGE DIAGNOSIS      CONSULTS       PROCEDURES PERFORMED      HOSPITAL COURSE  Patient is a 73 y.o. male presented on *** to [facility] complaining of ***.  Please see the admitting history and physical for further details.          Patient has been requesting discharge home since yesterday. He noted feeling much better this morning. Only tolerated our BIPAP overnight for less than 4 hours. Home health placed his new home trilogy machine and patient only tolerated for 8 minutes. Patient high risk for readmission. He as not willing to stay for further trilogy adjustments that may have made it more tolerable. He had also noted to nursing staff he did not plan on using device at home. Will have him f/u with pulmonology and PCP.     VITAL SIGNS:  Temp:  [97.6 °F (36.4 °C)-98.3 °F (36.8 °C)] 97.8 °F (36.6 °C)  Heart Rate:  [] 109  Resp:  [18-27] 18  BP: (110-174)/(68-89) 111/75  SpO2:  [94 %-98 %] 95 %  on  Flow (L/min):  [3] 3;   Device (Oxygen Therapy): nasal cannula    Body mass index is 31.87 kg/m².  Wt Readings from Last 3 Encounters:   12/10/23 107 kg (235 lb)   23 107 kg (235 lb)   23 130 kg (287 lb)       PHYSICAL EXAM:  Constitutional:  Well-developed and well-nourished.  No respiratory distress.      HENT:  Head:  Normocephalic and atraumatic.  Mouth:  Moist mucous membranes.    Eyes:  Conjunctivae and EOM are normal.  Pupils are equal, round, and reactive to light.  No scleral icterus.    Cardiovascular:  Normal rate, regular rhythm and normal heart sounds with no murmur.  Pulmonary/Chest:  No respiratory distress, no wheezes, no crackles, with normal breath sounds and good air  movement.  Abdominal:  Soft.  Bowel sounds are normal.  No distension and no tenderness.   Musculoskeletal:  No edema, no tenderness, and no deformity.  No red or swollen joints anywhere.    Neurological:  Alert and oriented to person, place, and time.  No gross neurological deficit.   Skin:  Skin is warm and dry. No rash noted. No pallor.   Peripheral vascular:  Strong pulses in all 4 extremities with no clubbing, no cyanosis, no edema.    DISCHARGE DISPOSITION   Stable    DISCHARGE MEDICATIONS:     Discharge Medications        New Medications        Instructions Start Date   pantoprazole 40 MG EC tablet  Commonly known as: PROTONIX   40 mg, Oral, Daily      predniSONE 20 MG tablet  Commonly known as: DELTASONE   40 mg, Oral, Daily             Continue These Medications        Instructions Start Date   acetaminophen 500 MG tablet  Commonly known as: TYLENOL   500 mg, Oral, Every 6 Hours PRN      albuterol sulfate  (90 Base) MCG/ACT inhaler  Commonly known as: PROVENTIL HFA;VENTOLIN HFA;PROAIR HFA   2 puffs, Inhalation, Every 4 Hours PRN      amLODIPine 10 MG tablet  Commonly known as: NORVASC   10 mg, Oral, Nightly      aspirin 81 MG EC tablet   81 mg, Oral, Daily      clopidogrel 75 MG tablet  Commonly known as: PLAVIX   75 mg, Oral, Daily      furosemide 40 MG tablet  Commonly known as: LASIX   40 mg, Oral, Every Other Day      lisinopril 20 MG tablet  Commonly known as: PRINIVIL,ZESTRIL   20 mg, Oral, Daily      rosuvastatin 10 MG tablet  Commonly known as: CRESTOR   10 mg, Oral, Daily      Trelegy Ellipta 100-62.5-25 MCG/ACT inhaler  Generic drug: Fluticasone-Umeclidin-Vilant   1 puff, Inhalation, Daily - RT             Stop These Medications      omeprazole 20 MG capsule  Commonly known as: priLOSEC              Diet Instructions    Cardiac, Renal         Activity Instructions    Activity as Tolerated         Additional Instructions for the Follow-ups that You Need to Schedule       Ambulatory Referral  to Home Health   As directed      Face to Face Visit Date: 12/13/2023   Follow-up provider for Plan of Care?: I treated the patient in an acute care facility and will not continue treatment after discharge.   Follow-up provider: PIA MORALES [[id number]]   Reason/Clinical Findings: COPD, debility   Describe mobility limitations that make leaving home difficult: COPD, debility   Nursing/Therapeutic Services Requested: Skilled Nursing Physical Therapy   Skilled nursing orders: Medication education   PT orders: Home safety assessment Strengthening   Frequency: 1 Week 1               Contact information for follow-up providers       Pia Morales MD Follow up in 1 week(s).    Specialty: Internal Medicine  Contact information:  [address] [address] [phone]               [facility] Follow up in 1 week(s).    Specialty: Pulmonology  Contact information:  [address] [facility] [address] [phone]                     Contact information for after-discharge care       Home Medical Care       [facility] .    Services: Home Nursing, Home Rehabilitation  Contact information:  [address] [facility] [address] [phone]                                    TEST  RESULTS PENDING AT DISCHARGE  Pending Labs       Order Current Status    Blood Culture - Blood, Arm, Right Preliminary result             CODE STATUS  Code Status and Medical Interventions:   Ordered at: 12/11/23 0749     Code Status (Patient has no pulse and is not breathing):    CPR (Attempt to Resuscitate)     Medical Interventions (Patient has pulse or is breathing):    Full Support       Eugenio Duarte MD  [facility] Hospitalist  12/13/23  16:24 EST    Please note that this discharge summary required more than 30 minutes to complete.     AT [facility] .    Services: Home Nursing, Home Rehabilitation  Contact information:  [address]  [place] [id number] [phone]                                    TEST  RESULTS PENDING AT DISCHARGE  Pending Labs       Order Current Status    Blood Culture - Blood, Arm, Right Preliminary result             CODE STATUS  Code Status and Medical Interventions:   Ordered at: 12/11/23 0749     Code Status (Patient has no pulse and is not breathing):    CPR (Attempt to Resuscitate)     Medical Interventions (Patient has pulse or is breathing):    Full Support       Eugenio Duarte MD  [facility]  12/13/23  16:24 EST    Please note that this discharge summary required more than 30 minutes to complete.

## 2023-12-13 NOTE — DISCHARGE PLACEMENT REQUEST
"Donaldo Roberts (73 y.o. Male)       Date of Birth   1950    Social Security Number       Address   [address] [place] [address]    Home Phone   [phone]    MRN   [id number]       Caodaism   Episcopal    Marital Status                               Admission Date   12/10/23    Admission Type   Emergency    Admitting Provider   Ward Paul DO    Attending Provider   Eugenio Duarte MD    Department, Room/Bed   [address], 3347/1S       Discharge Date       Discharge Disposition   Home or Self Care    Discharge Destination                                 Attending Provider: Eugenio Duarte MD    Allergies: Penicillins    Isolation: None   Infection: None   Code Status: CPR    Ht: 182.9 cm (72\")   Wt: 107 kg (235 lb)    Admission Cmt: None   Principal Problem: Hypercapnic respiratory failure [J96.92]                   Active Insurance as of 12/10/2023       Primary Coverage       Payor Plan Insurance Group Employer/Plan Group    [employer] [place] MED[place]ARE REPLACEMENT WELLCARE MED ADV HMO        Payor Plan Address Payor Plan Phone Number Payor Plan Fax Number Effective Dates    [address] [address]   2023 - None Entered    [facility] [address]         Subscriber Name Subscriber Birth Date Member ID       DONALDO ROBERTS 1950 [id number]                     Emergency Contacts        (Rel.) Home Phone Work Phone Mobile Phone    ArmandoInna (Spouse) [phone] -- [phone]    Harrison ValleyChad norris (Son) [phone] -- [phone]    ARMANDOVALERI NORRIS -- -- [phone]          [address] [address]  Phone:  [phone]  Fax:  [phone] Date: Dec 13, 2023      Ambulatory Referral to Home Health     Patient:  Donaldo Roberts MRN:  [id number]   [address] [address] :  1950  SSN:    Phone: [phone] Sex:  M      INSURANCE PAYOR PLAN GROUP # SUBSCRIBER ID   Primary:    WELLCARE OF " [place] MED[place]ARE [place]ACEMENT [id number]   [id number]      Referring Provider Information:  AUDRA ALONZO Phone: [phone] Fax: [phone]       Referral Information:   # Visits:  999 Referral Type: Home Health [42]   Urgency:  Routine Referral Reason: Specialty Services Required   Start Date: Dec 13, 2023 End Date:  To be determined by Insurer   Diagnosis: Respiratory failure with hypercapnia, unspecified chronicity (J96.92 [ICD-10-CM] 518.81 [ICD-9-CM])      Refer to Dept:   Refer to Provider:   Refer to Provider Phone:   Refer to Facility:       Face to Face Visit Date: 12/13/2023  Follow-up provider for Plan of Care? I treated the patient in an acute care facility and will not continue treatment after discharge.  Follow-up provider: PIA MORALES [[id number]]  Reason/Clinical Findings: COPD, debility  Describe mobility limitations that make leaving home difficult: COPD, debility  Nursing/Therapeutic Services Requested: Skilled Nursing  Nursing/Therapeutic Services Requested: Physical Therapy  Skilled nursing orders: Medication education  PT orders: Home safety assessment  PT orders: Strengthening  Frequency: 1 Week 1     This document serves as a request of services and does not constitute Insurance authorization or approval of services.  To determine eligibility, please contact the members Insurance carrier to verify and review coverage.     If you have medical questions regarding this request for services. Please contact [address] at [phone] during normal business hours.        Authorizing Provider:Audra Alonzo MD  Authorizing Provider's NPI: [id number]  Order Entered By: Audra Alonzo MD 12/13/2023 12:17 PM     Electronically signed by: Audra Alonzo MD 12/13/2023 12:17 PM          History & Physical        OTosha Manning PA at 12/11/23 0807       Attestation signed by Audra Alonzo MD at 12/11/23 1411    I have reviewed this documentation and agree.    Mr. Roberts is our  74 yo M with hx COPD, chronic hypoxic respiratory failure (3 LPM NC), HFpEF, PATTY non compliant with home CPAP, essential hypertension, hyperlipidemia, carotid artery disease s/p right CEA in past, s/p PPM insertion in the past, severe pulmonary hypertension, history of ischemic CVA in past, anxiety, GERD, obesity by BMI, and former tobacco abuse who presented with AMS and respiratory distress. Patient did not recall presenting symptoms but noted feeling better. No family bedside. Patient had been discharged from swing bed on  home in stable condition. Patient noted to be noncompliant during that visit. Patient again hypercapnic on this visit with initial respiratory acidosis. PH normalized with BiPAP. Baseline PCO2 appears to be somewhere between 65-75. PCO2 trended up after placing patient on 4L NC but PH still wnl. Patient refused to put BiPAP back on and BiO2 decreased to 3L NC with goal SpO2 88-92%. Patient with CHF and COPD excacerbation. Recommended IV Lasix to be given in ED given evidence of overload on plain film. Will monitor response. Will start on nebs and steroids. PT/OT ordered.                      [facility]  HISTORY & PHYSICAL    Patient Identification:  Name:  Donaldo Roberts  Age:  73 y.o.  Sex:  male  :  1950  MRN:  [id number]   Visit Number:  [id number]  Admit Date: 12/10/2023   Primary Care Physician:  Duke Rosen MD     Subjective     Chief complaint:   Chief Complaint   Patient presents with    Altered Mental Status    Respiratory Distress     History of presenting illness:   Patient is a 73 y.o. male with past medical history significant for COPD, chronic hypoxic respiratory failure (3 LPM NC), HFpEF, PATTY non compliant with home CPAP, essential hypertension, hyperlipidemia, carotid artery disease s/p right CEA in past, s/p PPM insertion in the past, severe pulmonary hypertension, history of ischemic CVA in past, anxiety, GERD, obesity  "by BMI, and former tobacco abuse that presented to the [facility] emergency department for evaluation of altered mental status and respiratory distress. It is of note that during my evaluation, patient was lying in bed with NC in place. O2 saturation adequate. Patient received sedatives in ED for agitation. He wakes to verbal stimuli but quickly falls back asleep. He does state \"I feel awful\". He does not answer any further questions. No family at bedside. Patient unable to provide significant history of presenting illness, thus history per ED report and chart review.  Per report, patient with altered mental status starting yesterday and increased work of breathing.  EMS was called, patient noted to have pinpoint pupils.  Narcan was administered with no change in mental status.    It is of note that the patient was admitted to this facility 11/17/2023 through 11/24/2023 where he was treated for acute hypercapnic and chronic hypoxemic respiratory failure, acute heart failure with preserved ejection fraction, and myocardial injury secondary to hypoxia.  It was charted that during hospitalization patient was difficult to agree to using BiPAP consistently, history of noncompliance with CPAP in the outpatient setting.  Patient was ultimately discharged to swing bed 11/24/2023 through 11/29/2023 for prolonged therapy and ultimately discharged home with family in stable medical condition.  It was recommended that patient continue with physical therapy, however he did not want to remain inpatient, he was agreeable for home health that was arranged at discharge.    Upon arrival to the ED, vitals were temperature 98.5, HR 77, RR 24, /73, and oxygen saturation 92% on 50% FiO2 BiPAP. ABG with pH 7.209, pCO2 >104, pO2 57, HCO3 42.3, and oxygen saturation 88.2% on 6 LPM NC. HS troponin T 36. ProBNP 336.7. CMP with potassium 5.8, chloride 95, CO2 42.9, and glucose 137. Lactate 0.8. CBC with hemoglobin 12.1 and MCV " 105.5, neutrophil % 77.2%. COVID-19/Influenza screening negative. UDS negative. UA with dark yellow appearance, trace blood, 1+protein, small protein, 6-10 RBC. CT head without contrast with mild generalized brain volume loss consistent with age, no acute intracranial abnormalities. Mild mucosal thickening in the paranasal sinuses noted. CXR with enlarged heart size with central pulmonary vascular congestion, mild interstitial edema, mild atelectasis at the lung bases and right upper lobe adjacent to the minor fissure, hypo inflated lungs. Known ED medication administration: 2 mg IV Narcan X 1, 80 mg IV lasix x 1, 5 mg IV haldol, 1 mg IV ativan x 1, and 1 liter bolus NS x 1.     Patient has been admitted to the medical surgical floor for further evaluation and treatment.     Present during exam: N/A  ---------------------------------------------------------------------------------------------------------------------   Review of Systems   Unable to perform ROS: Mental status change      ---------------------------------------------------------------------------------------------------------------------   Past Medical History:   Diagnosis Date    Acid reflux     Anxiety     Arthritis     Carotid artery stenosis, symptomatic, right 11/23/2021    Chronic obstructive pulmonary disease 12/5/2023    Essential hypertension 11/23/2021    Herpes     History of lipoma     History of transfusion     Hyperlipidemia     Inguinal hernia     Peptic ulcer     Stroke      Past Surgical History:   Procedure Laterality Date    ABDOMINAL SURGERY      CARDIAC ELECTROPHYSIOLOGY PROCEDURE N/A 12/10/2021    Procedure: Pacemaker DC new;  Surgeon: Jony Monte MD;  Location: [facility] INVASIVE LOCATION;  Service: Cardiology;  Laterality: N/A;    CAROTID ENDARTERECTOMY Left 2021    CHOLECYSTECTOMY WITH INTRAOPERATIVE CHOLANGIOGRAM N/A 07/25/2017    Procedure: CHOLECYSTECTOMY LAPAROSCOPIC INTRAOPERATIVE CHOLANGIOGRAM;  Surgeon: Luis Smith  MD Saravanan;  Location: [facility];  Service:     HERNIA REPAIR      inguinal    SINUS SURGERY      TUMOR EXCISION       Family History   Problem Relation Age of Onset    Stroke Mother     Heart disease Mother     Diabetes Mother     No Known Problems Father     Diabetes Sister      Social History     Socioeconomic History    Marital status:     Number of children: 6   Tobacco Use    Smoking status: Former     Packs/day: 0.25     Years: 55.00     Additional pack years: 0.00     Total pack years: 13.75     Types: Cigarettes     Quit date: 10/2023     Years since quittin.1     Passive exposure: Current    Smokeless tobacco: Never    Tobacco comments:     2 cigs per day/A pack per week.   Vaping Use    Vaping Use: Never used   Substance and Sexual Activity    Alcohol use: No    Drug use: No    Sexual activity: Defer     ---------------------------------------------------------------------------------------------------------------------   Allergies:  Penicillins  ---------------------------------------------------------------------------------------------------------------------   Medications below are reported home medications pulling from within the system; at this time, these medications have not been reconciled unless otherwise specified and are in the verification process for further verifcation as current home medications.    Prior to Admission Medications       Prescriptions Last Dose Informant Patient Reported? Taking?    albuterol sulfate  (90 Base) MCG/ACT inhaler   No No    Inhale 2 puffs Every 4 (Four) Hours As Needed for Wheezing or Shortness of Air.    amLODIPine (NORVASC) 10 MG tablet  Pharmacy No No    Take 1 tablet by mouth Every Night.    clopidogrel (PLAVIX) 75 MG tablet  Pharmacy No No    Take 1 tablet by mouth Daily.    Fluticasone-Umeclidin-Vilant (Trelegy Ellipta) 100-62.5-25 MCG/ACT inhaler   No No    Inhale 1 puff Daily.    furosemide (LASIX) 40 MG tablet   No No    Take 1 tablet  by mouth Every Other Day for 30 days.    lisinopril (PRINIVIL,ZESTRIL) 20 MG tablet   No No    TAKE 1 TABLET BY MOUTH DAILY AS DIRECTED    pantoprazole (PROTONIX) 40 MG EC tablet   No No    Take 1 tablet by mouth Every Morning for 30 days.    rosuvastatin (CRESTOR) 10 MG tablet   No No    TAKE 1 TABLET BY MOUTH DAILY          ---------------------------------------------------------------------------------------------------------------------    Objective     Hospital Scheduled Meds:        Current listed hospital scheduled medications may not yet reflect those currently placed in orders that are signed and held, awaiting patient's arrival to floor/unit.    ---------------------------------------------------------------------------------------------------------------------   Vital Signs:  Temp:  [98.5 °F (36.9 °C)] 98.5 °F (36.9 °C)  Heart Rate:  [68-91] 86  Resp:  [20-24] 24  BP: (133-169)/() 133/68  Mean Arterial Pressure (Non-Invasive) for the past 24 hrs (Last 3 readings):   Noninvasive MAP (mmHg)   12/10/23 2340 114   12/10/23 2320 95   12/10/23 2310 95     SpO2 Percentage    12/11/23 0239 12/11/23 0320 12/11/23 0633   SpO2: 94% 91% 93%     SpO2:  [83 %-94 %] 93 %  on  Flow (L/min):  [3-4] 4;   Device (Oxygen Therapy): nasal cannula    Body mass index is 31.87 kg/m².  Wt Readings from Last 3 Encounters:   12/10/23 107 kg (235 lb)   12/06/23 107 kg (235 lb)   12/04/23 130 kg (287 lb)       ---------------------------------------------------------------------------------------------------------------------   Physical Exam:  Physical Exam  Nursing note reviewed.   Constitutional:       General: He is awake. He is not in acute distress.     Appearance: He is well-developed. He is obese. He is ill-appearing (Chronically).      Interventions: Nasal cannula in place.   HENT:      Head: Normocephalic and atraumatic.      Mouth/Throat:      Mouth: Mucous membranes are moist.   Eyes:      Conjunctiva/sclera:  Conjunctivae normal.      Pupils: Pupils are equal, round, and reactive to light.   Cardiovascular:      Rate and Rhythm: Normal rate and regular rhythm.      Pulses:           Dorsalis pedis pulses are 2+ on the right side and 2+ on the left side.      Heart sounds: Normal heart sounds. No murmur heard.     No friction rub. No gallop.   Pulmonary:      Effort: Pulmonary effort is normal. No tachypnea, accessory muscle usage or respiratory distress.      Breath sounds: Normal air entry. Examination of the right-lower field reveals decreased breath sounds. Examination of the left-lower field reveals decreased breath sounds. Decreased breath sounds present. No wheezing, rhonchi or rales.   Abdominal:      General: Bowel sounds are normal. There is no distension.      Palpations: Abdomen is soft.      Tenderness: There is no abdominal tenderness.   Genitourinary:     Comments: No andrade catheter in place.  Musculoskeletal:      Cervical back: Neck supple.      Right lower leg: Edema present.      Left lower leg: Edema present.   Skin:     General: Skin is warm and dry.      Capillary Refill: Capillary refill takes less than 2 seconds.   Neurological:      Mental Status: He is lethargic and confused.      GCS: GCS eye subscore is 3. GCS verbal subscore is 4. GCS motor subscore is 5.      Comments: Patient wakens to verbal stimuli but quickly falls back asleep, does not participate in exam.      ---------------------------------------------------------------------------------------------------------------------  EKG:  Pending cardiology read. Per my interpretation: Paced, HR 72 BPM, QTc 407 ms. No evidence of acute ST elevation. Await final cardiology read.     Telemetry:    Patient not currently on telemetry monitoring     I have personally reviewed the EKG/Telemetry strip  ---------------------------------------------------------------------------------------------------------------------   Results from last 7 days    Lab Units 12/10/23  2226   HSTROP T ng/L 36*     Results from last 7 days   Lab Units 12/10/23  2226   PROBNP pg/mL 336.7       Results from last 7 days   Lab Units 12/11/23  0319   PH, ARTERIAL pH units 7.379   PO2 ART mm Hg 62.4*   PCO2, ARTERIAL mm Hg 71.5*   HCO3 ART mmol/L 42.1*     Results from last 7 days   Lab Units 12/10/23  2226   LACTATE mmol/L 0.8   WBC 10*3/mm3 8.41   HEMOGLOBIN g/dL 12.1*   HEMATOCRIT % 44.0   MCV fL 105.5*   MCHC g/dL 27.5*   PLATELETS 10*3/mm3 182     Results from last 7 days   Lab Units 12/10/23  2226   SODIUM mmol/L 138   POTASSIUM mmol/L 5.8*   CHLORIDE mmol/L 95*   CO2 mmol/L 42.9*   BUN mg/dL 23   CREATININE mg/dL 0.95   CALCIUM mg/dL 9.7   GLUCOSE mg/dL 137*   ALBUMIN g/dL 4.4   BILIRUBIN mg/dL 0.5   ALK PHOS U/L 94   AST (SGOT) U/L 15   ALT (SGPT) U/L 18   Estimated Creatinine Clearance: 90.2 mL/min (by C-G formula based on SCr of 0.95 mg/dL).    Lab Results   Component Value Date    TSH 0.681 12/08/2021    FREET4 1.11 12/08/2021     Microbiology Results (last 10 days)       ** No results found for the last 240 hours. **           Pain Management Panel  More data exists         Latest Ref Rng & Units 12/10/2023 11/18/2023   Pain Management Panel   Amphetamine, Urine Qual Negative Negative  Negative    Barbiturates Screen, Urine Negative Negative  Negative    Benzodiazepine Screen, Urine Negative Negative  Negative    Buprenorphine, Screen, Urine Negative Negative  Negative    Cocaine Screen, Urine Negative Negative  Negative    Fentanyl, Urine Negative Negative  Negative    Methadone Screen , Urine Negative Negative  Negative    Methamphetamine, Ur Negative Negative  Negative      I have personally reviewed the above laboratory results.   ---------------------------------------------------------------------------------------------------------------------  Imaging Results (Last 7 Days)       Procedure Component Value Units Date/Time    XR Chest 1 View [[id number]] Collected:  12/10/23 2324     Updated: 12/10/23 2327    Narrative:      PROCEDURE: Portable chest x-ray examination performed on December 10,  2023. Single view. Portable technique. Semiupright position.     HISTORY: Shortness of breath.     COMPARISON: None.     FINDINGS:     Enlarged heart size.  Central pulmonary vascular congestion with mild edema.  Mild atelectasis at the lung bases.  Mild atelectasis in the right upper lobe adjacent to the minor fissure.  No pleural effusion or pneumothorax.  Hypoinflated lungs.  Mild osteoarthritis at the glenohumeral joints.  No free air in the upper abdomen.       Impression:         1.  Enlarged heart size with central pulmonary vascular congestion.  2.  Mild interstitial edema.  3.  Mild atelectasis at the lung bases and right upper lobe adjacent to  the minor fissure.  4.  Hypoinflated lungs.  5.  Left-sided pacemaker with leads to the right atrium and right  ventricle.     This report was finalized on 12/10/2023 11:25 PM by Rambo Felix MD.       CT Head Without Contrast [[id number]] Collected: 12/10/23 2322     Updated: 12/10/23 2326    Narrative:      PROCEDURE: CT of the brain performed without IV contrast on December 10,  2023. The examination was performed with 3 mm axial imaging and 3 mm  sagittal and coronal reconstruction images. Total DLP = 856. The  examination was performed according to as low as reasonably achievable  dose protocol.     HISTORY: Altered mental status. Respiratory distress.     COMPARISON: None.     FINDINGS:     Mild generalized brain volume loss with mild chronic small vessel  ischemic type changes in the white matter.  No acute intracranial hemorrhage, mass, infarct, or edema.  No hydrocephalus.  No shift of midline structures.  No fracture or foreign body.  Mild mucosal thickening in the paranasal sinuses.  Dense calcified plaque along the supraclinoid ICA segments.  No lytic or blastic lesion.       Impression:         1.  Mild generalized brain  volume loss consistent with age.  2.  No acute intracranial hemorrhage, mass, infarct, or edema.  3.  Mild mucosal thickening in the paranasal sinuses.  4.  No fracture or foreign body.     This report was finalized on 12/10/2023 11:24 PM by Rambo Felix MD.             I have personally reviewed the above radiology results.     Last Echocardiogram:  Results for orders placed during the hospital encounter of 11/17/23    Adult Transthoracic Echo Complete W/ Cont if Necessary Per Protocol    Interpretation Summary    The quality of the study is limited with poor acoustic windows.  The contrast study was done using Lumason to enhance endocardial border to assess LV function.    Left ventricular wall thickness is consistent with mild concentric hypertrophy.    Left ventricular systolic function is normal.  Estimated LVEF =  66 - 70%.    Left ventricular diastolic function is consistent with (grade I) impaired relaxation    No significant valvular heart disease is noted.    Estimated right ventricular systolic pressure from tricuspid regurgitation is markedly elevated (>55 mmHg)    Severe pulmonary hypertension is present.    There is no evidence of pericardial effusion. .    ---------------------------------------------------------------------------------------------------------------------    Assessment & Plan      ACUTE HOSPITAL PROBLEMS    -Acute COPD exacerbation  -Acute on chronic hypoxic and hypercapnic respiratory failure   -Respiratory acidosis   -PATTY non compliant with CPAP   -CO2 narcosis   Imaging of chest without infiltrate/consolidation  Head CT without acute intracranial abnormalities    Initial ABG with pH 7.209 and pCO2 > 104 with pO2 57 and SPO2 88.2%   Patient was placed on BiPAP in ED, intermittently non compliant and taking himself off BiPAP. Currently on 4 LPM. ABG with improvement overall   COVID-19/Influenza screening negative. Obtain viral respiratory panel PCR.  Continue IV solumedrol    Nebulizer treatments, inhalants   Continue supplemental oxygen as necessary to titrate SpO2 > 90%. Continuous pulse oximetry monitoring.  Incentive spirometry as tolerated   Pulmonology consulted, input/assistance much appreciated   Patient did received Haldol and Ativan in ED as he was agitated and pulling off BiPAP  Closely monitor vitals/temperature curve   Repeat labs in AM     -Acute on chronic HFpEF  -Grade I diastolic dysfunction   -Severe pulmonary hypertension   Imaging of chest with pulmonary vascular congestion/interstitial edema   ProBNP elevated   IV lasix 80 mg x 1 administered in ED, await diuretic response   Previous TTE 11/2023 reviewed with preserved EF   Strict Is and Os, daily weights  Monitor volume status closely     -?Hyperkalemia   Slight hemolysis noted on labs   Obtain repeat BMP now   Telemetry monitoring     -Mild macrocytic anemia   H&H grossly stable   No active bleeding noted/reported   Obtain vitamin B12 and folate levels, supplement if patient is found to be deficient   Transfuse if necessary   Repeat CBC in AM     -Mild hyperglycemia without hx of diabetes mellitus  Obtain HgbA1C    -Debility   PT/OT consults    CHRONIC MEDICAL PROBLEMS    -Essential hypertension  BP appears well controlled   Plan to resume home antihypertensive regimen once reconciled per pharmacy with appropriate holding parameters to prevent hypotension and/or bradycardia   Closely monitor BP per hospital protocol, titrate medications as necessary  -Hyperlipidemia: Continue statin   -History of carotid artery stenosis s/p right CEA   -S/P PPM implantation in the past: Telemetry monitoring   -History of ischemic CVA in past: Continue home medication regimen   -Anxiety: Supportive care, continue home medication regimen once reconciled per pharmacy.   -GERD: PPI   -Obesity by BMI, Body mass index is 31.87 kg/m².  Affecting all aspects of care  -Former smoker    ---------------------------------------------------  DVT Prophylaxis: Lovenox   Activity: Up with assistance as tolerated   ---------------------------------------------------  INPATIENT status due to the need for care which can only be reasonably provided in an hospital setting such as aggressive/expedited ancillary services and/or consultation services, the necessity for IV medications, close physician monitoring and/or the possible need for procedures.  In such, I feel patient’s risk for adverse outcomes and need for care warrant INPATIENT evaluation and predict the patient’s care encounter to likely last beyond 2 midnights.     Code Status: FULL CODE   ---------------------------------------------------  Disposition/Discharge planning: Plans on home once medically stable, pending clinical course   ---------------------------------------------------  I have discussed the patient's assessment and plan with the patient, nursing staff, and attending physician DO Tosha Sotelo PA-C  Hospitalist Service -- [facility]   Pager: [phone]    12/11/23  08:07 EST    Attending Physician: Dr. Humberto DO      ---------------------------------------------------------------------------------------------------------------------        Electronically signed by Eugenio Duarte MD at 12/11/23 1411       Operative/Procedure Notes (most recent note)    No notes of this type exist for this encounter.          Physician Progress Notes (most recent note)        Kwaku Looney MD at 12/13/23 1126          Progress Note Pulmonary      Subjective more awake, comfortable.  Feels better and wants to go home.  Patient got his new home equipment and is willing to use it.  Somehow he was not placed on home BiPAP last night.  I spoke with the nurse and advised her to put him on home BiPAP to see if he is able to tolerate it    Interval History:       As above.    Review of Systems:    Reviewed ; unchanged        Vital Signs  Temp:  [97.7 °F (36.5 °C)-98.3 °F (36.8 °C)] 97.7 °F (36.5 °C)  Heart Rate:  [] 106  Resp:  [18-27] 22  BP: (103-174)/(68-89) 149/86  Body mass index is 31.87 kg/m².    Intake/Output Summary (Last 24 hours) at 12/13/2023 1126  Last data filed at 12/13/2023 0900  Gross per 24 hour   Intake 690 ml   Output 2700 ml   Net -2010 ml     I/O this shift:  In: 120 [P.O.:120]  Out: 650 [Urine:650]    Physical Exam:  General- normal in appearance, not in any acute distress    HEENT- pupils equally reactive to light, normal in size, no scleral icterus    Neck- supple    No JVD, no carotid bruit    Respiratory-bilateral air entry, occasional wheezing only on forced exhalation otherwise clear to auscultation, no egophony.    Cardiovascular-  Normal S1 and S2. No S3, S4 or murmurs.    GI-nontender nondistended bowel sounds positive    CNS-alert oriented x3, grossly nonfocal    Extremities- pulses normal bilaterally , no clubbing and edema        Results Review:      Results from last 7 days   Lab Units 12/13/23  0333 12/12/23  0432 12/11/23  1009   WBC 10*3/mm3 8.29 6.63 7.96   HEMOGLOBIN g/dL 14.5 12.5* 12.1*   PLATELETS 10*3/mm3 202 168 149     Results from last 7 days   Lab Units 12/13/23  0333 12/12/23  0432 12/11/23  1009   SODIUM mmol/L 138 137 134*   POTASSIUM mmol/L 4.2 4.2 4.4   CHLORIDE mmol/L 95* 93* 90*   CO2 mmol/L 29.7* 33.7* 37.1*   BUN mg/dL 18 16 22   CREATININE mg/dL 0.71* 0.76 0.73*   CALCIUM mg/dL 10.2 9.9 10.0   GLUCOSE mg/dL 167* 191* 140*   MAGNESIUM mg/dL 2.1 1.9 2.2     Lab Results   Component Value Date    INR 0.82 (L) 12/08/2021    PROTIME 11.7 (L) 12/08/2021     Results from last 7 days   Lab Units 12/12/23  0432 12/10/23  2226   ALK PHOS U/L 85 94   BILIRUBIN mg/dL 0.8 0.5   ALT (SGPT) U/L 15 18   AST (SGOT) U/L 18 15     Results from last 7 days   Lab Units 12/12/23  0215   PH, ARTERIAL pH units 7.454*   PO2 ART mm Hg 58.6*   PCO2, ARTERIAL mm Hg 57.7*   HCO3 ART mmol/L 40.5*      Imaging Results (Last 24 Hours)       ** No results found for the last 24 hours. **                 amLODIPine, 10 mg, Oral, Nightly  aspirin, 81 mg, Oral, Daily  budesonide-formoterol, 2 puff, Inhalation, BID - RT  clopidogrel, 75 mg, Oral, Daily  enoxaparin, 40 mg, Subcutaneous, Daily  insulin lispro, 2-7 Units, Subcutaneous, 4x Daily AC & at Bedtime  ipratropium-albuterol, 3 mL, Nebulization, 4x Daily - RT  lisinopril, 20 mg, Oral, Daily  magic barrier cream, 1 application , Topical, BID  methylPREDNISolone sodium succinate, 40 mg, Intravenous, Q12H  rosuvastatin, 10 mg, Oral, Daily  senna-docusate sodium, 2 tablet, Oral, BID  sodium chloride, 10 mL, Intravenous, Q12H           Medication Review:     Assessment & Plan     #1 COPD exacerbation.  improving     2.  Acute on chronic hypoxic respiratory failure. , chronic hypercapnia.     3.  Obstructive sleep apnea, obesity hypoventilation syndrome.     4.:  Pulmonary hypertension.     5.  Macrocytic anemia.      Patient just got his home equipment.  Will have him use home BiPAP to see if he is able to tolerate it.    Patient was reeducated on the use of BiPAP.    He is willing to use it            GI- PPI prophylaxis        Endrocrinology- Maintain Blood sugar 140 -180           DVT prophylaxis-     Have personally reviewed x-rays, labs, medication list.  Total time spent 30 minutes.             Kwaku Looney MD  12/13/23  11:26 EST      Electronically signed by Kwaku Looney MD at 12/13/23 1127          Consult Notes (most recent note)        Kwaku Looney MD at 12/11/23 1308        Consult Orders    1. Inpatient Pulmonology Consult [[id number]] ordered by Eugenio Duarte MD at 12/11/23 0603                 Consult Note Pulmonary     Referring Provider: Dr. Keen    Reason for Consultation: Hypercapnic hypoxic respiratory failure.          Chief complaint   Shortness of breath, drowsiness    History of present illness:    Mr. Donaldo Roberts is a 73-year-old  male very well-known to me from previous hospitalization.  He has history of COPD, chronic hypoxic hypercapnic respiratory failure, obstructive sleep apnea, hypertension, heart failure with preserved ejection fraction, Hyperlyte lipidemia.  He comes in with complaint of worsening shortness of breath and altered sensorium.    Patient was found to be agitated in the ER and got IV sedation.    Patient was started on BiPAP at a setting of 24/8 with a backup rate of 24.  Initial blood gas showed a pH of 7.21 pCO2 more than 104 pO2 57.    Follow-up blood gases while on BiPAP showed a pH of 7.38 pCO2 71 pO2 62 on BiPAP of 24/8 with a backup rate of 24.    Patient had another blood gas while he was on room air which showed increasing pCO2 to 80 from 72.    Patient during my evaluation was drowsy but arousable, oriented x 3.  Reported chronic shortness of breath on minimal exertion.    Denies chest pain, orthopnea but reported chronic leg edema.    Denies rash, joint stiffness, photosensitivity.      Review of Systems  As described in HPI    History  Past Medical History:   Diagnosis Date    Acid reflux     Anxiety     Arthritis     Carotid artery stenosis, symptomatic, right 11/23/2021    Chronic obstructive pulmonary disease 12/5/2023    Essential hypertension 11/23/2021    Herpes     History of lipoma     History of transfusion     Hyperlipidemia     Inguinal hernia     Peptic ulcer     Stroke    ,   Past Surgical History:   Procedure Laterality Date    ABDOMINAL SURGERY      CARDIAC ELECTROPHYSIOLOGY PROCEDURE N/A 12/10/2021    Procedure: Pacemaker DC new;  Surgeon: Jony Monte MD;  [facility];  Service: Cardiology;  Laterality: N/A;    CAROTID ENDARTERECTOMY Left 2021    CHOLECYSTECTOMY WITH INTRAOPERATIVE CHOLANGIOGRAM N/A 07/25/2017    Procedure: CHOLECYSTECTOMY LAPAROSCOPIC INTRAOPERATIVE CHOLANGIOGRAM;  Surgeon: Luis Coe MD;  [facility];  Service:     HERNIA  REPAIR      inguinal    SINUS SURGERY      TUMOR EXCISION     ,   Family History   Problem Relation Age of Onset    Stroke Mother     Heart disease Mother     Diabetes Mother     No Known Problems Father     Diabetes Sister    ,   Social History     Tobacco Use    Smoking status: Former     Packs/day: 0.25     Years: 55.00     Additional pack years: 0.00     Total pack years: 13.75     Types: Cigarettes     Quit date: 10/2023     Years since quittin.1     Passive exposure: Current    Smokeless tobacco: Never    Tobacco comments:     2 cigs per day/A pack per week.   Vaping Use    Vaping Use: Never used   Substance Use Topics    Alcohol use: No    Drug use: No   ,   Medications Prior to Admission   Medication Sig Dispense Refill Last Dose    amLODIPine (NORVASC) 10 MG tablet Take 1 tablet by mouth Every Night. 90 tablet 1 12/10/2023    aspirin 81 MG EC tablet Take 1 tablet by mouth Daily.   12/10/2023    clopidogrel (PLAVIX) 75 MG tablet Take 1 tablet by mouth Daily. 30 tablet 11 12/10/2023    furosemide (LASIX) 40 MG tablet Take 1 tablet by mouth Every Other Day for 30 days. 15 tablet 0 Past Week    lisinopril (PRINIVIL,ZESTRIL) 20 MG tablet Take 1 tablet by mouth Daily.   12/10/2023    omeprazole (priLOSEC) 20 MG capsule Take 1 capsule by mouth Daily.   12/10/2023    rosuvastatin (CRESTOR) 10 MG tablet Take 1 tablet by mouth Daily.   12/10/2023    acetaminophen (TYLENOL) 500 MG tablet Take 1 tablet by mouth Every 6 (Six) Hours As Needed for Mild Pain.   Unknown    albuterol sulfate  (90 Base) MCG/ACT inhaler Inhale 2 puffs Every 4 (Four) Hours As Needed for Wheezing or Shortness of Air. 18 g 8 Unknown    Fluticasone-Umeclidin-Vilant (Trelegy Ellipta) 100-62.5-25 MCG/ACT inhaler Inhale 1 puff Daily. 30 each 8 Unknown   , Scheduled Meds:  budesonide-formoterol, 2 puff, Inhalation, BID - RT  enoxaparin, 40 mg, Subcutaneous, Daily  insulin lispro, 2-7 Units, Subcutaneous, 4x Daily AC & at  Bedtime  ipratropium-albuterol, 3 mL, Nebulization, 4x Daily - RT  methylPREDNISolone sodium succinate, 40 mg, Intravenous, Q12H  senna-docusate sodium, 2 tablet, Oral, BID  sodium chloride, 10 mL, Intravenous, Q12H    , Continuous Infusions:    and Allergies:  Penicillins    Objective     Vital Signs   Temp:  [98.5 °F (36.9 °C)-99 °F (37.2 °C)] 99 °F (37.2 °C)  Heart Rate:  [68-95] 95  Resp:  [18-24] 20  BP: (132-169)/() 156/72    Physical Exam:          General-drowsy, arousable, mild respiratory distress    HEENT- pupils equally reactive to light, normal in size, no scleral icterus    Neck-supple, No JVD, no carotid bruit    Respiratory-bilateral air entry, scattered wheezing, no egophony    Cardiovascular-  Normal S1 and S2. No S3, S4 or murmurs.      GI-nontender nondistended bowel sounds positive    CNS-  oriented x3, grossly nonfocal      Extremities-no clubbing and 1+ bipedal edema    Psychiatric-mood good, good eye contact,     Skin- no visible rash             Results Review:    LABS:    Lab Results   Component Value Date    GLUCOSE 140 (H) 12/11/2023    BUN 22 12/11/2023    CREATININE 0.73 (L) 12/11/2023    EGFRIFNONA 93 12/11/2021    BCR 30.1 (H) 12/11/2023    CO2 37.1 (H) 12/11/2023    CALCIUM 10.0 12/11/2023    ALBUMIN 4.4 12/10/2023    AST 15 12/10/2023    ALT 18 12/10/2023    WBC 7.96 12/11/2023    HGB 12.1 (L) 12/11/2023    HCT 42.9 12/11/2023    .6 (H) 12/11/2023     12/11/2023     (L) 12/11/2023    K 4.4 12/11/2023    CL 90 (L) 12/11/2023    ANIONGAP 6.9 12/11/2023       Lab Results   Component Value Date    INR 0.82 (L) 12/08/2021    PROTIME 11.7 (L) 12/08/2021                I reviewed the patient's new clinical results.  I reviewed the patient's new imaging results and agree with the interpretation.      Assessment & Plan     #1 COPD exacerbation.    2.  Acute on chronic hypoxic respiratory failure.  #3 chronic hypercapnia.    3.  Obstructive sleep apnea, obesity  hypoventilation syndrome.    4.:  Pulmonary hypertension.    5.  Macrocytic anemia.    Seems like present BiPAP setting of 24/8 with a backup rate of 24 is effective.  Will keep him on BiPAP 4-hour on, 4-hour off and continues during sleep.  In the past patient has refused to use the BiPAP.  Patient was reeducated.  I will talk to him again tomorrow when he is more awake.            GI- PPI prophylaxis      Endrocrinology- Maintain Blood sugar 140 -180        DVT prophylaxis-    Have personally reviewed x-rays, labs, medication list.  Total time spent 30 minutes.      Kwaku Looney MD     12/11/23 13:08 EST     Electronically signed by Kwaku Looney MD at 12/11/23 1315          Physical Therapy Notes (most recent note)        Rubi Dumont, PT at 12/12/23 1301  Version 2 of 2         Goal Outcome Evaluation:              Outcome Evaluation: Pt seen for therapy evaluation this date, pleasant and cooperative with therapy. Pt does not demonstrate need for acute therapeutic intervention. May benefit from home health consult to work on ambulating using AD safely in home, as well as check for fall risk/safety awareness. Recommended supervision here and at home with activity d/t fall risk.      Anticipated Discharge Disposition (PT): home with supervision, home with assist, home with home health    Electronically signed by Rubi Dumont PT at 12/12/23 1302       Rubi Dumont, PT at 12/12/23 1301  Version 1 of 2         Goal Outcome Evaluation:              Outcome Evaluation: Pt seen for therapy evaluation this date, pleasant and cooperative with therapy. Pt does not demonstrate need for acute therapeutic intervention. May benefit from home health consult to work on ambulating using AD safely in home, as well as check for fall risk/safety awareness.      Anticipated Discharge Disposition (PT): home with supervision, home with assist, home with home health    Electronically signed by Rubi Dumont PT at  23 1301          Occupational Therapy Notes (most recent note)        Jasvir Blankenship, OT at 23 1542          Acute Care - Occupational Therapy Initial Evaluation   Demetrius     Patient Name: Donaldo Roberts  : 1950  MRN: [id number]  Today's Date: 2023  Onset of Illness/Injury or Date of Surgery: 12/10/23 (admission date)          Admit Date: 12/10/2023       ICD-10-CM ICD-9-CM   1. Acute on chronic respiratory failure with hypoxia and hypercapnia  J96.21 518.84    J96.22 786.09     799.02   2. Altered mental status, unspecified altered mental status type  R41.82 780.97     Patient Active Problem List   Diagnosis    Benign prostatic hyperplasia with urinary obstruction    Herpesviral infection of penis    Shortness of breath    Carotid stenosis, asymptomatic, right    Essential hypertension    Dyslipidemia    Wenckebach block    PATTY (obstructive sleep apnea)    Presence of cardiac pacemaker    Severe sinus bradycardia    Chronic bronchitis    Cigarette nicotine dependence without complication    Hypoxia    Detrusor instability    Tobacco abuse    Respiratory failure    Physical debility    Chronic obstructive pulmonary disease    Hypercapnic respiratory failure     Past Medical History:   Diagnosis Date    Acid reflux     Anxiety     Arthritis     Carotid artery stenosis, symptomatic, right 2021    Chronic obstructive pulmonary disease 2023    Essential hypertension 2021    Herpes     History of lipoma     History of transfusion     Hyperlipidemia     Inguinal hernia     Peptic ulcer     Stroke      Past Surgical History:   Procedure Laterality Date    ABDOMINAL SURGERY      CARDIAC ELECTROPHYSIOLOGY PROCEDURE N/A 12/10/2021    Procedure: Pacemaker DC new;  Surgeon: Jony Monte MD;  Location: [facility] INVASIVE LOCATION;  Service: Cardiology;  Laterality: N/A;    CAROTID ENDARTERECTOMY Left     CHOLECYSTECTOMY WITH INTRAOPERATIVE CHOLANGIOGRAM N/A 2017     Procedure: CHOLECYSTECTOMY LAPAROSCOPIC INTRAOPERATIVE CHOLANGIOGRAM;  Surgeon: Luis Coe MD;  Location: [facility];  Service:     HERNIA REPAIR      inguinal    SINUS SURGERY      TUMOR EXCISION           OT ASSESSMENT FLOWSHEET (last 12 hours)       OT Evaluation and Treatment       Row Name 12/12/23 1506                   OT Time and Intention    Document Type evaluation  -KR        Mode of Treatment occupational therapy  -KR        Patient Effort adequate  -KR           General Information    General Observations of Patient alert/cooperative  -KR           Living Environment    Current Living Arrangements home  -KR        People in Home spouse;child(haley), adult  -KR        Primary Care Provided by self;spouse/significant other;child(haley)  -KR           Cognition    Affect/Mental Status (Cognition) WFL;flat/blunted affect  -KR        Orientation Status (Cognition) oriented to;person;place;situation  -KR        Follows Commands (Cognition) WFL  -KR           Range of Motion Comprehensive    Comment, General Range of Motion BUE WFL  -KR           Strength Comprehensive (MMT)    Comment, General Manual Muscle Testing (MMT) Assessment BUE 3-/5  -KR           Activities of Daily Living    BADL Assessment/Intervention bathing;upper body dressing;lower body dressing;grooming;feeding;toileting  -KR           Bathing Assessment/Intervention    [place] Level (Bathing) bathing skills;maximum assist (25% patient effort)  -KR           Upper Body Dressing Assessment/Training    [place] Level (Upper Body Dressing) upper body dressing skills;moderate assist (50% patient effort)  -KR           Lower Body Dressing Assessment/Training    [place] Level (Lower Body Dressing) lower body dressing skills;maximum assist (25% patient effort)  -KR           Grooming Assessment/Training    [place] Level (Grooming) grooming skills;minimum assist (75% patient effort)  -KR           Self-Feeding Assessment/Training     [place] Level (Feeding) feeding skills;set up  -KR           Toileting Assessment/Training    [place] Level (Toileting) toileting skills;maximum assist (25% patient effort)  -KR           Wound 12/11/23 1900 Right gluteal Pressure Injury    Wound - Properties Group Placement Date: 12/11/23 -MS, present on assessment  Placement Time: 1900 -MS Present on Original Admission: Y  -BB Side: Right  -BB Location: gluteal  -MS Primary Wound Type: Pressure inj  -BB    Retired Wound - Properties Group Placement Date: 12/11/23  -MS, present on assessment  Placement Time: 1900 -MS Present on Original Admission: Y  -BB Side: Right  -BB Location: gluteal  -MS Primary Wound Type: Pressure inj  -BB    Retired Wound - Properties Group Date first assessed: 12/11/23  -MS, present on assessment  Time first assessed: 1900 -MS Present on Original Admission: Y  -BB Side: Right  -BB Location: gluteal  -MS Primary Wound Type: Pressure inj  -BB       Plan of Care Review    Plan of Care Reviewed With patient;family  -KR           Therapy Assessment/Plan (OT)    Planned Therapy Interventions (OT) activity tolerance training;BADL retraining;strengthening exercise  -KR           Therapy Plan Review/Discharge Plan (OT)    Anticipated Discharge Disposition (OT) home with assist  -KR           OT Goals    Strength Goal Selection (OT) strength, OT goal 1  -KR        Activity Tolerance Goal Selection (OT) activity tolerance, OT goal 1  -KR           Strength Goal 1 (OT)    Strength Goal 1 (OT) BUE increase x 1 to enhance self care/mobility performance  -KR        Time Frame (Strength Goal 1, OT) by discharge  -KR            Activity Tolerance Goal 1 (OT)    Activity Tolerance Goal 1 (OT) Increase to enhance ADL performance  -KR        Activity Level (Endurance Goal 1, OT) 15 min activity  -KR        Time Frame (Activity Tolerance Goal 1, OT) by discharge  -KR           Patient Education Goal (OT)    Activity (Patient Education Goal,  OT) AE/DME training as needed to enhance self care safety and independence in home environment  -KR        [place]/Cues/Accuracy (Memory Goal 2, OT) verbalizes understanding  -KR        Time Frame (Patient Education Goal, OT) by discharge  -KR                  User Key  (r) = Recorded By, (t) = Taken By, (c) = Cosigned By      Initials Name Effective Dates    Jasvir Recio OT 06/16/21 -     Moreno Molina RN 06/16/21 -     Onelia Quiñonez RN 10/05/23 -                            OT Recommendation and Plan  Planned Therapy Interventions (OT): activity tolerance training, BADL retraining, strengthening exercise  Plan of Care Review  Plan of Care Reviewed With: patient, family  Plan of Care Reviewed With: patient, family        Time Calculation:     Therapy Charges for Today       Code Description Service Date Service Provider Modifiers Qty    75097932641 HC OT SELF CARE/MGMT/TRAIN EA 15 MIN 12/12/2023 Jasvir Blankenship OT GO 1    66806844022 HC OT EVAL HIGH COMPLEXITY 4 12/12/2023 Jasvir Blankenship OT GO 1                 Jasvir Blankenship OT  12/12/2023    Electronically signed by Jasvir Blankenship OT at 12/12/23 1542       Discharge Summary    No notes of this type exist for this encounter.       Discharge Order (From admission, onward)       Start     Ordered    12/13/23 1110  Discharge patient  Once        Expected Discharge Date: 12/13/23   Expected Discharge Time: Morning   Discharge Disposition: Home or Self Care   Physician of Record for Attribution - Please select from Treatment Team: AUDRA ALONZO [[id number]]   Review needed by CMO to determine Physician of Record: No      Question Answer Comment   Physician of Record for Attribution - Please select from Treatment Team AUDRA ALONZO    Review needed by CMO to determine Physician of Record No        12/13/23 1120

## 2023-12-13 NOTE — TELEPHONE ENCOUNTER
Patient had cancel to to emergent hospital admission.  They will make see them after he is released

## 2023-12-13 NOTE — PROGRESS NOTES
Progress Note Pulmonary      Subjective more awake, comfortable.  Feels better and wants to go home.  Patient got his new home equipment and is willing to use it.  Somehow he was not placed on home BiPAP last night.  I spoke with the nurse and advised her to put him on home BiPAP to see if he is able to tolerate it    Interval History:       As above.    Review of Systems:    Reviewed ; unchanged       Vital Signs  Temp:  [97.7 °F (36.5 °C)-98.3 °F (36.8 °C)] 97.7 °F (36.5 °C)  Heart Rate:  [] 106  Resp:  [18-27] 22  BP: (103-174)/(68-89) 149/86  Body mass index is 31.87 kg/m².    Intake/Output Summary (Last 24 hours) at 12/13/2023 1126  Last data filed at 12/13/2023 0900  Gross per 24 hour   Intake 690 ml   Output 2700 ml   Net -2010 ml     I/O this shift:  In: 120 [P.O.:120]  Out: 650 [Urine:650]    Physical Exam:  General- normal in appearance, not in any acute distress    HEENT- pupils equally reactive to light, normal in size, no scleral icterus    Neck- supple    No JVD, no carotid bruit    Respiratory-bilateral air entry, occasional wheezing only on forced exhalation otherwise clear to auscultation, no egophony.    Cardiovascular-  Normal S1 and S2. No S3, S4 or murmurs.    GI-nontender nondistended bowel sounds positive    CNS-alert oriented x3, grossly nonfocal    Extremities- pulses normal bilaterally , no clubbing and edema        Results Review:      Results from last 7 days   Lab Units 12/13/23  0333 12/12/23  0432 12/11/23  1009   WBC 10*3/mm3 8.29 6.63 7.96   HEMOGLOBIN g/dL 14.5 12.5* 12.1*   PLATELETS 10*3/mm3 202 168 149     Results from last 7 days   Lab Units 12/13/23  0333 12/12/23  0432 12/11/23  1009   SODIUM mmol/L 138 137 134*   POTASSIUM mmol/L 4.2 4.2 4.4   CHLORIDE mmol/L 95* 93* 90*   CO2 mmol/L 29.7* 33.7* 37.1*   BUN mg/dL 18 16 22   CREATININE mg/dL 0.71* 0.76 0.73*   CALCIUM mg/dL 10.2 9.9 10.0   GLUCOSE mg/dL 167* 191* 140*   MAGNESIUM mg/dL 2.1 1.9 2.2     Lab Results    Component Value Date    INR 0.82 (L) 12/08/2021    PROTIME 11.7 (L) 12/08/2021     Results from last 7 days   Lab Units 12/12/23  0432 12/10/23  2226   ALK PHOS U/L 85 94   BILIRUBIN mg/dL 0.8 0.5   ALT (SGPT) U/L 15 18   AST (SGOT) U/L 18 15     Results from last 7 days   Lab Units 12/12/23  0215   PH, ARTERIAL pH units 7.454*   PO2 ART mm Hg 58.6*   PCO2, ARTERIAL mm Hg 57.7*   HCO3 ART mmol/L 40.5*     Imaging Results (Last 24 Hours)       ** No results found for the last 24 hours. **                 amLODIPine, 10 mg, Oral, Nightly  aspirin, 81 mg, Oral, Daily  budesonide-formoterol, 2 puff, Inhalation, BID - RT  clopidogrel, 75 mg, Oral, Daily  enoxaparin, 40 mg, Subcutaneous, Daily  insulin lispro, 2-7 Units, Subcutaneous, 4x Daily AC & at Bedtime  ipratropium-albuterol, 3 mL, Nebulization, 4x Daily - RT  lisinopril, 20 mg, Oral, Daily  magic barrier cream, 1 application , Topical, BID  methylPREDNISolone sodium succinate, 40 mg, Intravenous, Q12H  rosuvastatin, 10 mg, Oral, Daily  senna-docusate sodium, 2 tablet, Oral, BID  sodium chloride, 10 mL, Intravenous, Q12H           Medication Review:     Assessment & Plan     #1 COPD exacerbation.  improving     2.  Acute on chronic hypoxic respiratory failure. , chronic hypercapnia.     3.  Obstructive sleep apnea, obesity hypoventilation syndrome.     4.:  Pulmonary hypertension.     5.  Macrocytic anemia.      Patient just got his home equipment.  Will have him use home BiPAP to see if he is able to tolerate it.    Patient was reeducated on the use of BiPAP.    He is willing to use it            GI- PPI prophylaxis        Endrocrinology- Maintain Blood sugar 140 -180           DVT prophylaxis-     Have personally reviewed x-rays, labs, medication list.  Total time spent 30 minutes.             Kwaku Looney MD  12/13/23  11:26 EST

## 2023-12-13 NOTE — CASE MANAGEMENT/SOCIAL WORK
Discharge Planning Assessment   [place]     Patient Name: Donaldo Roberts  MRN: [id number]  Today's Date: 12/13/2023    Admit Date: 12/10/2023       Discharge Plan       Row Name 12/13/23 1341       Plan    Plan  spoke with Tayo at [facility] to come and teach Pt on his home CPAP machine that was delivered 12/12 before he was d/c home.  Tayo stated she would have someone to come and teach Pt.                                  Esperanza Villar RN

## 2023-12-13 NOTE — CASE MANAGEMENT/SOCIAL WORK
Discharge Planning Assessment   [facility]     Patient Name: Donaldo Roberts  MRN: [id number]  Today's Date: 12/13/2023    Admit Date: 12/10/2023          Discharge Plan       Row Name 12/13/23 1221       Plan    Final Discharge Disposition Code 06 - home with home health care    Final Note Pt is being discharged home with [facility] ordered home health services. Pt is aware and agreeable to discharge. Pt prefers [facility] for home health agency. SS faxed [facility]  referral and will provide Lead RN report number for [facility]  one documents have been received. Pt's family to provide transportation.    13:15pm: Per [facility] Stephanie Pt has been approved. SS provided Lead RN report number for [facility] [phone].                   Continued Care and Services - Admitted Since 12/10/2023       Home Medical Care       Service Provider Request Status Selected Services Address Phone Fax Patient Preferred    [facility] ,  Home Rehabilitation [address] [place] [facility] [address] [phone] --                  GELACIO Wong

## 2023-12-15 LAB — BACTERIA SPEC AEROBE CULT: NORMAL

## 2023-12-19 ENCOUNTER — READMISSION MANAGEMENT (OUTPATIENT)
Dept: CALL CENTER | Facility: HOSPITAL | Age: 73
End: 2023-12-19
Payer: MEDICARE

## 2023-12-19 NOTE — OUTREACH NOTE
COPD/PN Week 1 Survey      Flowsheet Row Responses   [facility] facility patient discharged from? [facility]   Does the patient have one of the following disease processes/diagnoses(primary or secondary)? COPD   Week 1 attempt successful? No   Unsuccessful attempts Attempt 1            Thania Foley Registered Nurse

## 2023-12-21 ENCOUNTER — READMISSION MANAGEMENT (OUTPATIENT)
Dept: CALL CENTER | Facility: HOSPITAL | Age: 73
End: 2023-12-21
Payer: MEDICARE

## 2023-12-21 DIAGNOSIS — I10 ESSENTIAL HYPERTENSION: ICD-10-CM

## 2023-12-21 RX ORDER — AMLODIPINE BESYLATE 10 MG/1
10 TABLET ORAL NIGHTLY
Qty: 90 TABLET | Refills: 0 | Status: SHIPPED | OUTPATIENT
Start: 2023-12-21

## 2023-12-21 NOTE — OUTREACH NOTE
COPD/PN Week 1 Survey      Flowsheet Row Responses   [facility] patient discharged from? [facility]   Does the patient have one of the following disease processes/diagnoses(primary or secondary)? COPD   Week 1 attempt successful? Yes   Call start time 1217   Call end time 1221   Is patient permission given to speak with other caregiver? Yes   List who call center can speak with spouse   Meds reviewed with patient/caregiver? Yes   Is the patient having any side effects they believe may be caused by any medication additions or changes? No   Does the patient have all medications ordered at discharge? Yes   Is the patient taking all medications as directed (includes completed medication regime)? Yes   Comments regarding appointments Pt has had pcp f/u.   Has the patient kept scheduled appointments due by today? Yes   Has home health visited the patient within 72 hours of discharge? Yes   Pulse Ox monitoring Intermittent   Did the patient receive a copy of their discharge instructions? Yes   Nursing interventions Reviewed instructions with patient   What is the patient's perception of their health status since discharge? Improving   Is the patient/caregiver able to teach back the hierarchy of who to call/visit for symptoms/problems? PCP, Specialist, Home health nurse, Urgent Care, ED, 911 Yes   Is the patient able to teach back COPD zones? Yes   Patient reports what zone on this call? Green Zone   Green Zone Reports doing well   Green Zone interventions: Take daily medications, Use oxygen as prescribed   Week 1 call completed? Yes   Wrap up additional comments Spouse report pt is improving. Pt's mental status improved and stable. Pt is using continuous 02 @ 3l. HH is visiting.   Call end time 1221            Aracelis HANSON - Registered Nurse

## 2024-01-11 ENCOUNTER — HOSPITAL ENCOUNTER (OUTPATIENT)
Dept: PULMONOLOGY | Facility: HOSPITAL | Age: 74
Discharge: HOME | End: 2024-01-11
Payer: MEDICARE

## 2024-01-11 VITALS
HEIGHT: 69 IN | DIASTOLIC BLOOD PRESSURE: 66 MMHG | OXYGEN SATURATION: 95 % | WEIGHT: 263 LBS | SYSTOLIC BLOOD PRESSURE: 117 MMHG | TEMPERATURE: 97.3 F | HEART RATE: 78 BPM | BODY MASS INDEX: 38.95 KG/M2

## 2024-01-11 DIAGNOSIS — F17.210 CIGARETTE NICOTINE DEPENDENCE WITHOUT COMPLICATION: ICD-10-CM

## 2024-01-11 DIAGNOSIS — J96.12 CHRONIC RESPIRATORY FAILURE WITH HYPOXIA AND HYPERCAPNIA: ICD-10-CM

## 2024-01-11 DIAGNOSIS — G47.33 OSA (OBSTRUCTIVE SLEEP APNEA): ICD-10-CM

## 2024-01-11 DIAGNOSIS — J44.9 CHRONIC OBSTRUCTIVE PULMONARY DISEASE, UNSPECIFIED COPD TYPE: ICD-10-CM

## 2024-01-11 DIAGNOSIS — J44.9 CHRONIC OBSTRUCTIVE PULMONARY DISEASE, UNSPECIFIED COPD TYPE: Primary | ICD-10-CM

## 2024-01-11 DIAGNOSIS — E66.9 OBESITY (BMI 30-39.9): ICD-10-CM

## 2024-01-11 DIAGNOSIS — I27.20 SEVERE PULMONARY HYPERTENSION: ICD-10-CM

## 2024-01-11 DIAGNOSIS — J96.11 CHRONIC RESPIRATORY FAILURE WITH HYPOXIA AND HYPERCAPNIA: ICD-10-CM

## 2024-01-11 PROCEDURE — 94010 BREATHING CAPACITY TEST: CPT

## 2024-01-11 RX ORDER — ALBUTEROL SULFATE 90 UG/1
2 AEROSOL, METERED RESPIRATORY (INHALATION) EVERY 4 HOURS PRN
Qty: 18 G | Refills: 8 | Status: SHIPPED | OUTPATIENT
Start: 2024-01-11

## 2024-01-11 RX ORDER — FAMOTIDINE 20 MG/1
20 TABLET, FILM COATED ORAL DAILY
COMMUNITY
Start: 2024-01-02

## 2024-01-11 RX ORDER — FLUTICASONE FUROATE, UMECLIDINIUM BROMIDE AND VILANTEROL TRIFENATATE 100; 62.5; 25 UG/1; UG/1; UG/1
1 POWDER RESPIRATORY (INHALATION)
Qty: 30 EACH | Refills: 8 | Status: SHIPPED | OUTPATIENT
Start: 2024-01-11

## 2024-01-11 RX ORDER — OMEPRAZOLE 20 MG/1
20 CAPSULE, DELAYED RELEASE ORAL DAILY
COMMUNITY

## 2024-01-11 NOTE — PROGRESS NOTES
[facility]  COPD Management       Donaldo Roberts is a 73 y.o. male seen in the [facility] for COPD.  Patient presents to the clinic today with his wife, Inna, who helps manage his medications. The patient's current COPD regimen includes: Trelegy and Albuterol PRN. Pt reports good adherence to maintenance regimen. Pt reports he is not a smoker and is not exposed to second hand smoke.         Past Medical History:   Diagnosis Date    Acid reflux     Anxiety     Arthritis     Carotid artery stenosis, symptomatic, right 2021    Chronic obstructive pulmonary disease 2023    Essential hypertension 2021    Herpes     History of lipoma     History of transfusion     Hyperlipidemia     Inguinal hernia     Peptic ulcer     Stroke      Social History     Socioeconomic History    Marital status:     Number of children: 6   Tobacco Use    Smoking status: Former     Packs/day: 0.25     Years: 55.00     Additional pack years: 0.00     Total pack years: 13.75     Types: Cigarettes     Quit date: 10/2023     Years since quittin.2     Passive exposure: Current    Smokeless tobacco: Never    Tobacco comments:     2 cigs per day/A pack per week.   Vaping Use    Vaping Use: Never used   Substance and Sexual Activity    Alcohol use: No    Drug use: No    Sexual activity: Defer     Bee pollen, Levaquin [levofloxacin], and Penicillins    Current Outpatient Medications:     acetaminophen (TYLENOL) 500 MG tablet, Take 1 tablet by mouth Every 6 (Six) Hours As Needed for Mild Pain., Disp: , Rfl:     albuterol sulfate  (90 Base) MCG/ACT inhaler, Inhale 2 puffs Every 4 (Four) Hours As Needed for Wheezing or Shortness of Air., Disp: 18 g, Rfl: 8    amLODIPine (NORVASC) 10 MG tablet, TAKE 1 TABLET BY MOUTH EVERY NIGHT, Disp: 90 tablet, Rfl: 0    aspirin 81 MG EC tablet, Take 1 tablet by mouth Daily., Disp: , Rfl:     clopidogrel (PLAVIX) 75 MG tablet, Take 1 tablet by mouth  Daily., Disp: 30 tablet, Rfl: 11    Fluticasone-Umeclidin-Vilant (Trelegy Ellipta) 100-62.5-25 MCG/ACT inhaler, Inhale 1 puff Daily., Disp: 30 each, Rfl: 8    furosemide (LASIX) 40 MG tablet, Take 1 tablet by mouth Every Other Day for 30 days., Disp: 15 tablet, Rfl: 0    lisinopril (PRINIVIL,ZESTRIL) 20 MG tablet, Take 1 tablet by mouth Daily., Disp: , Rfl:     pantoprazole (PROTONIX) 40 MG EC tablet, Take 1 tablet by mouth Daily for 30 days., Disp: 30 tablet, Rfl: 0    rosuvastatin (CRESTOR) 10 MG tablet, Take 1 tablet by mouth Daily., Disp: , Rfl:       Vaccination Status   COVID 19: UTD  Influenza: UTD 2023  Pneumococcal: pt reports one dose last year from [facility], recommend second dose pending information on previous dose  Zoster: recommend    Drug-Drug Interactions: no significant drug interactions noted at this time    Drug-Disease Interactions (non-cardioselective beta blockers, NSAIDs): none noted at this time    Patient Assistance: none at this time    Inhaler Technique Observed? No  If yes, notes:     Treatment Goals: Risk Reduction and Symptom Control     Medication Assessment & Plan:     Advised Pt on the importance of continuing maintenance inhaler regimen and the importance of rinsing mouth after ICS use.     Provider, Syl Miles PA-C, ordered Atrovent nebulizer solution PRN and albuterol PRN. Albuterol was filled and delivered to patient in clinic today. Atrovent currently out of stock, will mail to patient tomorrow as inventory allows.    Patient reports his Trelegy inhaler has a $90 copay at [facility]. Investigated cost and coverage of Trelegy, and per patient's insurance a 30-day supply of Trelegy will be $47. However, patient states this is still unaffordable. Provider to order Breztri through  assistance program.    Recommended vaccination: Prevnar 20, pending which pneumococcal vaccine patient previously received, as well as Shingrix. Reassess at next visit as immunization  records do not demonstrate recent pneumococcal vaccine or Shingrix. Patient denies today, stating he typically gets vaccines at [facility] and he plans to request his shot records from them prior to follow up in our clinic.        Thank you for allowing me to participate in the care of your patient,    Marilyn Case SURI  1/11/2024  13:07 EST

## 2024-01-11 NOTE — PROGRESS NOTES
Subjective      Chief Complaint  COPD (3 liters of oxygen 24hrs)    Subjective      History of Present Illness  Donaldo Roberts is a 73 y.o. male who presents today to [facility] with past medical history of *** who presents today for COPD (3 liters of oxygen 24hrs). This visit is a initial evaluation  appointment.     COPD (3 liters of oxygen 24hrs):  On Trelegy -- likes it well.   Doesn't use albuterol inhaler due to side effects, may try Xopenex if affordable. Wanted to stick with albuterol.   On continuous supplemental oxygen  3 L  December -- set up on BiPAP (at least 4 hours per night).   Quit smoking -- started when 17 years old, 2 packs per day for 48 years.   Sent in Atrovent treatments.         Current Outpatient Medications:     acetaminophen (TYLENOL) 500 MG tablet, Take 1 tablet by mouth Every 6 (Six) Hours As Needed for Mild Pain., Disp: , Rfl:     albuterol sulfate  (90 Base) MCG/ACT inhaler, Inhale 2 puffs Every 4 (Four) Hours As Needed for Wheezing or Shortness of Air., Disp: 18 g, Rfl: 8    amLODIPine (NORVASC) 10 MG tablet, TAKE 1 TABLET BY MOUTH EVERY NIGHT, Disp: 90 tablet, Rfl: 0    aspirin 81 MG EC tablet, Take 1 tablet by mouth Daily., Disp: , Rfl:     clopidogrel (PLAVIX) 75 MG tablet, Take 1 tablet by mouth Daily., Disp: 30 tablet, Rfl: 11    famotidine (PEPCID) 20 MG tablet, Take 1 tablet by mouth Daily., Disp: , Rfl:     Fluticasone-Umeclidin-Vilant (Trelegy Ellipta) 100-62.5-25 MCG/ACT inhaler, Inhale 1 puff Daily., Disp: 30 each, Rfl: 8    furosemide (LASIX) 40 MG tablet, Take 1 tablet by mouth Every Other Day for 30 days., Disp: 15 tablet, Rfl: 0    lisinopril (PRINIVIL,ZESTRIL) 20 MG tablet, Take 1 tablet by mouth Daily., Disp: , Rfl:     omeprazole (priLOSEC) 20 MG capsule, Take 1 capsule by mouth Daily., Disp: , Rfl:     rosuvastatin (CRESTOR) 10 MG tablet, Take 1 tablet by mouth Daily., Disp: , Rfl:       Allergies   Allergen Reactions    Bee  "Pollen Anaphylaxis    Levaquin [Levofloxacin] Hallucinations    Penicillins Hives     Mother told him that, has taken amoxicillin with no problems       Objective     Objective   Vital Signs:  /66   Pulse 78   Temp 97.3 °F (36.3 °C) (Temporal)   Ht 175.3 cm (69\")   Wt 119 kg (263 lb)   SpO2 95%   BMI 38.84 kg/m²   Estimated body mass index is 38.84 kg/m² as calculated from the following:    Height as of this encounter: 175.3 cm (69\").    Weight as of this encounter: 119 kg (263 lb).    {Class 2 Severe Obesity (BMI >=35 and <=39.9. (Optional):81885}    Past Medical History:   Diagnosis Date    Acid reflux     Anxiety     Arthritis     Carotid artery stenosis, symptomatic, right 2021    Chronic obstructive pulmonary disease 2023    Essential hypertension 2021    Herpes     History of lipoma     History of transfusion     Hyperlipidemia     Inguinal hernia     Peptic ulcer     Stroke      Past Surgical History:   Procedure Laterality Date    ABDOMINAL SURGERY      CARDIAC ELECTROPHYSIOLOGY PROCEDURE N/A 12/10/2021    Procedure: Pacemaker DC new;  Surgeon: Jony Monte MD;  Location: [facility] CATH INVASIVE LOCATION;  Service: Cardiology;  Laterality: N/A;    CAROTID ENDARTERECTOMY Left     CHOLECYSTECTOMY WITH INTRAOPERATIVE CHOLANGIOGRAM N/A 2017    Procedure: CHOLECYSTECTOMY LAPAROSCOPIC INTRAOPERATIVE CHOLANGIOGRAM;  Surgeon: Luis Coe MD;  Location: [facility] OR;  Service:     HERNIA REPAIR      inguinal    SINUS SURGERY      TUMOR EXCISION       Social History     Socioeconomic History    Marital status:     Number of children: 6   Tobacco Use    Smoking status: Former     Packs/day: 0.25     Years: 55.00     Additional pack years: 0.00     Total pack years: 13.75     Types: Cigarettes     Quit date: 10/2023     Years since quittin.2     Passive exposure: Current    Smokeless tobacco: Never    Tobacco comments:     2 cigs per day/A pack per week.   Vaping " "Use    Vaping Use: Never used   Substance and Sexual Activity    Alcohol use: No    Drug use: No    Sexual activity: Defer        Physical Exam     Result Review :  The following labs and radiology results have been reviewed.    Lab Review:   No results found for: \"FEV1\", \"FVC\", \"HUI0OFB\", \"TLC\", \"DLCO\"  No results displayed because visit has over 200 results.      Admission on 11/24/2023, Discharged on 11/29/2023   Component Date Value Ref Range Status    Glucose 11/25/2023 146 (H)  65 - 99 mg/dL Final    BUN 11/25/2023 25 (H)  8 - 23 mg/dL Final    Creatinine 11/25/2023 0.67 (L)  0.76 - 1.27 mg/dL Final    Sodium 11/25/2023 138  136 - 145 mmol/L Final    Potassium 11/25/2023 4.9  3.5 - 5.2 mmol/L Final    Chloride 11/25/2023 94 (L)  98 - 107 mmol/L Final    CO2 11/25/2023 40.5 (H)  22.0 - 29.0 mmol/L Final    Calcium 11/25/2023 9.8  8.6 - 10.5 mg/dL Final    Albumin 11/25/2023 3.5  3.5 - 5.2 g/dL Final    Phosphorus 11/25/2023 2.0 (L)  2.5 - 4.5 mg/dL Final    Anion Gap 11/25/2023 3.5 (L)  5.0 - 15.0 mmol/L Final    BUN/Creatinine Ratio 11/25/2023 37.3 (H)  7.0 - 25.0 Final    eGFR 11/25/2023 99.8  >60.0 mL/min/1.73 Final    Site 11/25/2023 Lab   Final    pH, Venous 11/25/2023 7.377  7.320 - 7.420 pH Units Final    pCO2, Venous 11/25/2023 70.5 (H)  41.0 - 51.0 mm Hg Final    86 Value above critical limit    pO2, Venous 11/25/2023 137.0 (H)  27.0 - 53.0 mm Hg Final    83 Value above reference range    HCO3, Venous 11/25/2023 41.4 (H)  22.0 - 28.0 mmol/L Final    83 Value above reference range    Base Excess, Venous 11/25/2023 13.2 (H)  0.0 - 2.0 mmol/L Final    O2 Saturation, Venous 11/25/2023 98.7 (H)  45.0 - 75.0 % Final    83 Value above reference range    Hemoglobin, Blood Gas 11/25/2023 12.4 (L)  14 - 18 g/dL Final    84 Value below reference range    CO2 Content 11/25/2023 43.6 (H)  22 - 33 mmol/L Final    Barometric Pressure for Blood Gas 11/25/2023 732  mmHg Final    Modality 11/25/2023 Nasal Cannula   " Final    FIO2 11/25/2023 36  % Final    Flow Rate 11/25/2023 4.0  lpm Final    Ventilator Mode 11/25/2023 NA   Final    Notified Who 11/25/2023 DR. MANN   Final    Notified By 11/25/2023 [id number]   Final    Notified Time 11/25/2023 11/25/2023 04:37   Final    Collected by 11/25/2023 [id number]   Final    Meter: [id number]     :  [id number]    Oxyhemoglobin Venous 11/25/2023 97.7 (H)  45.0 - 75.0 % Final    83 Value above reference range    Carboxyhemoglobin Venous 11/25/2023 1.1  0.0 - 5.0 % Final    Methemoglobin Venous 11/25/2023 0.0  0.0 - 3.0 % Final    84 Value below reference range    Site 11/26/2023 Right Brachial   Final    Russell's Test 11/26/2023 N/A   Final    pH, Arterial 11/26/2023 7.405  7.350 - 7.450 pH units Final    pCO2, Arterial 11/26/2023 66.2 (H)  35.0 - 45.0 mm Hg Final    83 Value above reference range    pO2, Arterial 11/26/2023 54.4 (C)  83.0 - 108.0 mm Hg Final    85 Value below critical limit    HCO3, Arterial 11/26/2023 41.5 (H)  20.0 - 26.0 mmol/L Final    83 Value above reference range    Base Excess, Arterial 11/26/2023 13.9 (H)  0.0 - 2.0 mmol/L Final    O2 Saturation, Arterial 11/26/2023 90.0 (L)  94.0 - 99.0 % Final    84 Value below reference range    Hemoglobin, Blood Gas 11/26/2023 12.5 (L)  14 - 18 g/dL Final    84 Value below reference range    Hematocrit, Blood Gas 11/26/2023 38.3  38.0 - 51.0 % Final    Oxyhemoglobin 11/26/2023 88.5 (L)  94 - 99 % Final    84 Value below reference range    Methemoglobin 11/26/2023 0.00  0.00 - 3.00 % Final    Carboxyhemoglobin 11/26/2023 1.7  0 - 5 % Final    A-a DO2 11/26/2023 89.2  0.0 - 300.0 mmHg Final    CO2 Content 11/26/2023 43.5 (H)  22 - 33 mmol/L Final    Barometric Pressure for Blood Gas 11/26/2023 726  mmHg Final    Modality 11/26/2023 Nasal Cannula   Final    FIO2 11/26/2023 32  % Final    Flow Rate 11/26/2023 3.0  lpm Final    Ventilator Mode 11/26/2023 NA   Final    Notified Who 11/26/2023 DR HARVEY   Final    Notified  By 11/26/2023 [id number]   Final    Notified Time 11/26/2023 11/26/2023 07:45   Final    Collected by 11/26/2023 [id number]   Final    Meter: [id number]     :  [id number]    Glucose 11/28/2023 173 (H)  65 - 99 mg/dL Final    BUN 11/28/2023 22  8 - 23 mg/dL Final    Creatinine 11/28/2023 0.87  0.76 - 1.27 mg/dL Final    Sodium 11/28/2023 135 (L)  136 - 145 mmol/L Final    Potassium 11/28/2023 5.2  3.5 - 5.2 mmol/L Final    1+ Hemolysis    Specimen hemolyzed.  Result may be falsely elevated.    Chloride 11/28/2023 96 (L)  98 - 107 mmol/L Final    CO2 11/28/2023 28.2  22.0 - 29.0 mmol/L Final    Calcium 11/28/2023 9.8  8.6 - 10.5 mg/dL Final    BUN/Creatinine Ratio 11/28/2023 25.3 (H)  7.0 - 25.0 Final    Anion Gap 11/28/2023 10.8  5.0 - 15.0 mmol/L Final    eGFR 11/28/2023 92.2  >60.0 mL/min/1.73 Final    WBC 11/28/2023 6.89  3.40 - 10.80 10*3/mm3 Final    RBC 11/28/2023 4.41  4.14 - 5.80 10*6/mm3 Final    Hemoglobin 11/28/2023 13.0  13.0 - 17.7 g/dL Final    Hematocrit 11/28/2023 45.1  37.5 - 51.0 % Final    MCV 11/28/2023 102.3 (H)  79.0 - 97.0 fL Final    MCH 11/28/2023 29.5  26.6 - 33.0 pg Final    MCHC 11/28/2023 28.8 (L)  31.5 - 35.7 g/dL Final    RDW 11/28/2023 12.8  12.3 - 15.4 % Final    RDW-SD 11/28/2023 47.8  37.0 - 54.0 fl Final    MPV 11/28/2023 10.3  6.0 - 12.0 fL Final    Platelets 11/28/2023 228  140 - 450 10*3/mm3 Final   No results displayed because visit has over 200 results.      Lab on 11/06/2023   Component Date Value Ref Range Status    Glucose 11/06/2023 236 (H)  65 - 99 mg/dL Final    BUN 11/06/2023 12  8 - 23 mg/dL Final    Creatinine 11/06/2023 0.71 (L)  0.76 - 1.27 mg/dL Final    Sodium 11/06/2023 138  136 - 145 mmol/L Final    Potassium 11/06/2023 4.0  3.5 - 5.2 mmol/L Final    Chloride 11/06/2023 95 (L)  98 - 107 mmol/L Final    CO2 11/06/2023 37.0 (H)  22.0 - 29.0 mmol/L Final    Calcium 11/06/2023 9.6  8.6 - 10.5 mg/dL Final    BUN/Creatinine Ratio 11/06/2023 16.9  7.0 - 25.0 Final  "   Anion Gap 11/06/2023 6.0  5.0 - 15.0 mmol/L Final    eGFR 11/06/2023 98.1  >60.0 mL/min/1.73 Final    proBNP 11/06/2023 185.0  0.0 - 900.0 pg/mL Final        Imaging Results (Most Recent)       None            Radiology Review:   Imaging Results (Last 72 Hours)       ** No results found for the last 72 hours. **                  Assessment / Plan         Assessment   There are no diagnoses linked to this encounter.       Donaldo Roberts  reports that he quit smoking about 3 months ago. His smoking use included cigarettes. He has a 13.75 pack-year smoking history. He has been exposed to tobacco smoke. He has never used smokeless tobacco.. I have educated him on the risk of diseases from using tobacco products such as {Tobacco Cessation Diseases:69469::\"cancer\",\"COPD\",\"heart disease\"}.     I advised him to quit and he is {Willing/Not Willing to Quit Tobacco Products:12973}.    I spent {Time Spent Tobacco :39895} minutes counseling the patient.            Medications Discontinued During This Encounter   Medication Reason    pantoprazole (PROTONIX) 40 MG EC tablet *Therapy completed        No orders of the defined types were placed in this encounter.      {Time Spent (Optional):42484}    Follow Up   No follow-ups on file.    Patient was given instructions and counseling regarding his condition or for health maintenance advice. Please see specific information pulled into the AVS if appropriate.       This document has been electronically signed by Marnie Miles PA-C   January 11, 2024 13:22 EST    Dictated Utilizing Dragon Dictation: Part of this note may be an electronic transcription/translation of spoken language to printed text using the Dragon Dictation System.   " 29.5  26.6 - 33.0 pg Final    MCHC 11/28/2023 28.8 (L)  31.5 - 35.7 g/dL Final    RDW 11/28/2023 12.8  12.3 - 15.4 % Final    RDW-SD 11/28/2023 47.8  37.0 - 54.0 fl Final    MPV 11/28/2023 10.3  6.0 - 12.0 fL Final    Platelets 11/28/2023 228  140 - 450 10*3/mm3 Final   No results displayed because visit has over 200 results.      Lab on 11/06/2023   Component Date Value Ref Range Status    Glucose 11/06/2023 236 (H)  65 - 99 mg/dL Final    BUN 11/06/2023 12  8 - 23 mg/dL Final    Creatinine 11/06/2023 0.71 (L)  0.76 - 1.27 mg/dL Final    Sodium 11/06/2023 138  136 - 145 mmol/L Final    Potassium 11/06/2023 4.0  3.5 - 5.2 mmol/L Final    Chloride 11/06/2023 95 (L)  98 - 107 mmol/L Final    CO2 11/06/2023 37.0 (H)  22.0 - 29.0 mmol/L Final    Calcium 11/06/2023 9.6  8.6 - 10.5 mg/dL Final    BUN/Creatinine Ratio 11/06/2023 16.9  7.0 - 25.0 Final    Anion Gap 11/06/2023 6.0  5.0 - 15.0 mmol/L Final    eGFR 11/06/2023 98.1  >60.0 mL/min/1.73 Final    proBNP 11/06/2023 185.0  0.0 - 900.0 pg/mL Final      Reviewed chest XR from 12/10/2023    Reviewed spirometry performed during visit today    Assessment / Plan         Assessment   Diagnoses and all orders for this visit:    1. Chronic obstructive pulmonary disease, unspecified COPD type (Primary)  Spirometry performed during visit which didn't reveal any true obstruction (FEV1/FVC 83% and FEV1 34%) but was notable for possible restriction (FVC 31%) which could be due to body habitus and volume overload from underlying CHF. Would benefit from full PFT in the future to assess lung volumes but patient may have difficulty tolerating due to debility.   Advised to rescheduled missed appointment with heart failure clinic for management of CHF.   Screened for alpha 1 antitrypsin deficiency during visit.  Currently on Trelegy inhaler 1 puff daily but difficulty affording copay ($90 or $47 for 90 day supply). Previously did trial of Breztri and tolerated well. Will switch  maintenance inhaler to Breztri and enroll in [employer] for assistance with medication cost.  Continue albuterol inhaler as needed. Offered to switch to Xopenex due to side effects of tremors but patient declined  Will switch DuoNeb treatments to Atrovent to avoid side effects from  albuterol.     -     Spirometry; Future  -     ipratropium (ATROVENT) 0.02 % nebulizer solution; Take 2.5 mL by nebulization 4 (Four) Times a Day As Needed for Wheezing or Shortness of Air.  Dispense: 360 mL; Refill: 3  -     albuterol sulfate  (90 Base) MCG/ACT inhaler; Inhale 2 puffs by mouth Every 4 (Four) Hours As Needed for Wheezing or Shortness of Air.  Dispense: 18 g; Refill: 8  -     Fluticasone-Umeclidin-Vilant (Trelegy Ellipta) 100-62.5-25 MCG/ACT inhaler; Inhale 1 puff by mouth Daily.  Dispense: 30 each; Refill: 8  -     Budeson-Glycopyrrol-Formoterol (Breztri Aerosphere) 160-9-4.8 MCG/ACT aerosol inhaler; Inhale 2 puffs 2 (Two) Times a Day. Indications: Chronic Obstructive Lung Disease  Dispense: 10.7 g; Refill: 5    2. Chronic respiratory failure with hypoxia and hypercapnia  3. PATTY (obstructive sleep apnea)  4. Obesity (BMI 30-39.9)  Compliant with using continuous supplemental oxygen with baseline of 3 L.   Noted to have noncompliance with BiPAP/Trilogy device during recent hospitalization. Reports that since he has been home he has been wearing at least 4 hours per night and benefits from use.     5. Severe pulmonary hypertension  Previous echocardiogram from 11/2023 notable for severe pulmonary hypertension with RVSP >55 mmHg.   Likely multifactorial due to underlying PATTY/OHS and CHF.   Educated on importance of remaining compliant with Trilogy device.   Scheduled for upcoming appointment with CHF clinic for management of CHF.     6. Cigarette nicotine dependence without complication  Donaldo Roberts  reports that he quit smoking about 3 months ago. His smoking use included cigarettes. He started smoking about 57 years  ago. He has a 110.00 pack-year smoking history. He has been exposed to tobacco smoke. He has never used smokeless tobacco.  Would qualify for annual LDCT imaging, will discuss at next visit.     New Medications Ordered This Visit   Medications    ipratropium (ATROVENT) 0.02 % nebulizer solution     Sig: Take 2.5 mL by nebulization 4 (Four) Times a Day As Needed for Wheezing or Shortness of Air.     Dispense:  360 mL     Refill:  3    albuterol sulfate  (90 Base) MCG/ACT inhaler     Sig: Inhale 2 puffs by mouth Every 4 (Four) Hours As Needed for Wheezing or Shortness of Air.     Dispense:  18 g     Refill:  8    Fluticasone-Umeclidin-Vilant (Trelegy Ellipta) 100-62.5-25 MCG/ACT inhaler     Sig: Inhale 1 puff by mouth Daily.     Dispense:  30 each     Refill:  8     Can place on hold until patient needs refill.    Budeson-Glycopyrrol-Formoterol (Breztri Aerosphere) 160-9-4.8 MCG/ACT aerosol inhaler     Sig: Inhale 2 puffs 2 (Two) Times a Day. Indications: Chronic Obstructive Lung Disease     Dispense:  10.7 g     Refill:  5     I spent >/= 40 minutes caring for Donaldo on this date of service. This time includes time spent by me in the following activities:preparing for the visit, reviewing tests, obtaining and/or reviewing a separately obtained history, performing a medically appropriate examination and/or evaluation , counseling and educating the patient/family/caregiver, ordering medications, tests, or procedures, referring and communicating with other health care professionals , documenting information in the medical record, independently interpreting results and communicating that information with the patient/family/caregiver, and care coordination    Follow Up   Return in about 25 days (around 2/5/2024), or if symptoms worsen or fail to improve, for Next scheduled follow up.    Patient was given instructions and counseling regarding his condition or for health maintenance advice. Please see specific  information pulled into the AVS if appropriate.       This document has been electronically signed by Marnie Miles PA-C   January 16, 2024 12:35 EST    Dictated Utilizing Dragon Dictation: Part of this note may be an electronic transcription/translation of spoken language to printed text using the Dragon Dictation System.

## 2024-01-11 NOTE — PROGRESS NOTES
"    NAME:___Donaldo Roberts  :_1950_  APPOINTMENT DATE/TIME:___________24___14:28 EST___________    COPD Education Evaluation            COPD Education Completed (See Note) Yes     COPD Clinic encounter: 1st    Referring/consulting Provider: Dr. Duarte     Reason for Consultation:  COPD History   COPD Diagnosis Length 7 years     Current Outpatient Pulmonologist     YES and ANDERSON [facility], [facility]           Subjective            PFT History      Spirometry Completed YES     Fev1 34     Fev1/FVC 83     GOLD Stage FEV1/FVC greater than 70%.      FVC 31% Restrictive disease    Classification of Airflow Limitation Severity in COPD (Based on Post-Bronchodilator FEV1)           Gold 1: Mild FEV1 ? 80% predicted      Gold 2:  Moderate 50% ? FEV1 < 80% predicted   Gold 3: Severe 30% ? FEV1 < 50% predicted   Gold 4: Very Severe FEV1 < 30% predicted            Dyspnea:        Do you have Dyspnea? Yes and With Exertion.       DME Equipment Used:              Home O2? YES     Home O2 device? Nasal cannula, tank, home concentrator     Nebulizer YES     NIPPV BIPAP      Objective:    Home Medications:  (Not in a hospital admission)      Barriers to Learning? No    Discussed:             COPD education given via the booklet \"A Patient's Guide to COPD\".     COPD Zones: (Green/yellow/Red): YES     Exacerbation or Flare up signs and symptoms: YES       COPD Medication Non-Compliance YES       Managing Medications: YES     Patient understands use of Rescue Medications: YES     Type of Rescue inhaler patient currently has: Albuterol   Patient understands use of Maintenance Medications: YES     Type of Maintenance inhaler patient currently has: ICS/LABA/LAMA    Proper MDI technique (w/wo Spacer): YES     Provided patient a Spacer: YES       Breathing Techniques:            Purse-lipped breathing YES          Diaphragmatic breathing NO     Oxygen therapy SAFETY:  YES           Action Plan            Rescue " Inhaler ordered: Nebulizer medication     COPD Maintenance Inhaler ordered: YES and Breztri     COPD/Lung Health Clinic follow up scheduled: YES and 2/5/24 at 10 am     Education Minutes with patient: YES and 35 minutes         Goals Discussed with patient: Keep home smoke free., Take medications as ordered., GO to Dr. appointments., Increase activity., Eat healthier., Increase use of pursed lip breathing., Decrease flare-ups., and Increase COPD Knowledge.    Today patient completed Spirometry, Alpha 1 Genotype swab collected and placed in the mail, and we discussed the COPD Topics listed above.  When her returns, we will evaluate his adherence of Breztri and continue our COPD Discussion.     Thank you for allowing me to participate in his care,    ONESIMO Bellamy, RRT  COPD Navigator

## 2024-01-11 NOTE — PROGRESS NOTES
COPD CLINIC Questionnaire      APPOINTMENT DATE/TIME:____24___12:55 EST___________  NAME:___Donaldo Roberts  :_1950_  DX_7y       LENGTH OF DX:7  PULMONOLOGIST:pari mane  LAST OUTPATIENT PULMONARY VISIT:_Dec. 2023   DO YOU USE NIPPV:_No_  Device:__BIPAP  RECENT WEIGHT LOSS:   _No __  # OF PILLOWS DURING SLEEP:___1__  FLAT OR INCLINED BED:__Inclined bed_  DO YOU HAVE DYSPNEA:__Yes_  DYSPNEA DURING:_Rest_  SMOKE HX:former smoker, quit 6 months ago  #_OF HOSPITAL STAYS IN THE LAST YEAR DUE TO LUNG DISEASE:_2_  # OF ER VISITS IN THE LAST YEAR DUE TO SOB:__2          mMRC Dyspnea Scale        mMRC SCORE: 4         STOP BANG Screening Questionnaire       Snoring?   Do you snore loudly (loud enough to be heard through closed doors or that your bed partner elbows you for snoring at night)?     no  Tired?   Do you often feel tired, fatigued, or sleepy during the daytime (such as falling asleep during driving or talking to someone)?     yes  Observed?   Has anyone observed you stop breathing or choking/gasping during your sleep?    yes  Pressure?   Do you have or are you being treated for high blood pressure?           yes  Body mass index (BMI) more than 35 kg/m2?       yes  Age older than 50?      yes  Neck size large (measured around Gamal's apple)? Is your shirt collar 16 inches (40 cm) or larger?   yes  Gender (biologic sex): male?      yes  STOP BANG Score:____7_____          STOP BANG Interpretation     Risk category Risk factors    Low risk             Yes to 0 to 2 of the questions    Intermediate risk Yes to 3 to 4 questions    High risk  Yes to 5 to 8 questions    High risk  Yes to 2 or more of 4 STOP questions and BMI >35 kg/m2    High risk  Yes to 2 or more of 4 STOP questions and neck circumference ?16 inches (?40 cm)    High risk  Yes to 2 or more of 4 STOP questions and male gender (biologic sex)               Grade: E    Comments:

## 2024-01-15 RX ORDER — BUDESONIDE, GLYCOPYRROLATE, AND FORMOTEROL FUMARATE 160; 9; 4.8 UG/1; UG/1; UG/1
2 AEROSOL, METERED RESPIRATORY (INHALATION) 2 TIMES DAILY
Qty: 10.7 G | Refills: 5 | Status: SHIPPED | OUTPATIENT
Start: 2024-01-15

## 2024-01-18 ENCOUNTER — DOCUMENTATION (OUTPATIENT)
Dept: PULMONOLOGY | Facility: CLINIC | Age: 74
End: 2024-01-18
Payer: MEDICARE

## 2024-01-18 NOTE — PROGRESS NOTES
Dr. Hoyt reviewed the chest xray addendum (completed on 11/23, addendum made 1/4 with no change).     No acute actions needed per addendum as this is now old. Already following with cardiology, on diuretics.     Will follow up with him as scheduled.

## 2024-01-25 ENCOUNTER — HOSPITAL ENCOUNTER (OUTPATIENT)
Dept: CARDIOLOGY | Facility: HOSPITAL | Age: 74
Discharge: HOME | End: 2024-01-25
Payer: MEDICARE

## 2024-01-25 VITALS
HEART RATE: 66 BPM | WEIGHT: 268 LBS | BODY MASS INDEX: 39.69 KG/M2 | SYSTOLIC BLOOD PRESSURE: 128 MMHG | HEIGHT: 69 IN | OXYGEN SATURATION: 95 % | DIASTOLIC BLOOD PRESSURE: 61 MMHG

## 2024-01-25 DIAGNOSIS — I50.31 ACUTE HEART FAILURE WITH PRESERVED EJECTION FRACTION (HFPEF): Primary | ICD-10-CM

## 2024-01-25 DIAGNOSIS — G47.33 OSA (OBSTRUCTIVE SLEEP APNEA): ICD-10-CM

## 2024-01-25 DIAGNOSIS — J96.11 CHRONIC HYPOXEMIC RESPIRATORY FAILURE: ICD-10-CM

## 2024-01-25 DIAGNOSIS — Z95.0 PRESENCE OF CARDIAC PACEMAKER: ICD-10-CM

## 2024-01-25 LAB
ABSOLUTE LUNG FLUID CONTENT: 42 % (ref 20–35)
ANION GAP SERPL CALCULATED.3IONS-SCNC: 11.3 MMOL/L (ref 5–15)
BUN SERPL-MCNC: 11 MG/DL (ref 8–23)
BUN/CREAT SERPL: 16.4 (ref 7–25)
CALCIUM SPEC-SCNC: 10.1 MG/DL (ref 8.6–10.5)
CHLORIDE SERPL-SCNC: 96 MMOL/L (ref 98–107)
CO2 SERPL-SCNC: 31.7 MMOL/L (ref 22–29)
CREAT SERPL-MCNC: 0.67 MG/DL (ref 0.76–1.27)
EGFRCR SERPLBLD CKD-EPI 2021: 99.8 ML/MIN/1.73
GLUCOSE SERPL-MCNC: 151 MG/DL (ref 65–99)
MAGNESIUM SERPL-MCNC: 1.8 MG/DL (ref 1.6–2.4)
NT-PROBNP SERPL-MCNC: 125.9 PG/ML (ref 0–900)
POTASSIUM SERPL-SCNC: 4.1 MMOL/L (ref 3.5–5.2)
SODIUM SERPL-SCNC: 139 MMOL/L (ref 136–145)

## 2024-01-25 PROCEDURE — 80048 BASIC METABOLIC PNL TOTAL CA: CPT | Performed by: PHYSICIAN ASSISTANT

## 2024-01-25 PROCEDURE — 83880 ASSAY OF NATRIURETIC PEPTIDE: CPT | Performed by: PHYSICIAN ASSISTANT

## 2024-01-25 PROCEDURE — 94726 PLETHYSMOGRAPHY LUNG VOLUMES: CPT | Performed by: PHYSICIAN ASSISTANT

## 2024-01-25 PROCEDURE — 83735 ASSAY OF MAGNESIUM: CPT | Performed by: PHYSICIAN ASSISTANT

## 2024-01-25 RX ORDER — BUMETANIDE 1 MG/1
1 TABLET ORAL DAILY
Qty: 45 TABLET | Refills: 0 | Status: SHIPPED | OUTPATIENT
Start: 2024-01-25 | End: 2024-01-25

## 2024-01-25 RX ORDER — BUMETANIDE 1 MG/1
TABLET ORAL
Qty: 135 TABLET | Refills: 0 | Status: SHIPPED | OUTPATIENT
Start: 2024-01-25

## 2024-01-25 NOTE — PROGRESS NOTES
[facility]  Pharmacist Note     Donaldo Roberts is a 73 y.o. male seen in the [facility] Failure Clinic for HFpEF with an EF of 66-70% on 11/18/23. Patient was recently hospitalized from 12/10/23-12/13/23 for acute on chronic respiratory failure and CHF, as well as a COPD exacerbation. He was discharged home on Prednisone and Protonix. He is currently on Lisinopril and Lasix for HF.      Donaldo Roberts reports a poor understanding of medications.  He is accompanied by his wife today, who helps to manage his medications. She reports that they take his weights and BP daily. BP's have been running normal around 120/60's. She reports that his weights have been increasing and they have noticed an increase in swelling. He reports swelling in his arms and legs and SOB, especially with exertion. He has been taking Lasix 20mg daily since 1/3/24. He reports adherence to his medications. They would like some assistance with new prescriptions that are going to be added.       Medication Use:   Hx of med intolerances:  None related to HF  Retail Rx Management: [facility] [place]    Past Medical History:   Diagnosis Date    Acid reflux     Anxiety     Arthritis     Carotid artery stenosis, symptomatic, right 11/23/2021    Chronic obstructive pulmonary disease 12/5/2023    Essential hypertension 11/23/2021    Herpes     History of lipoma     History of transfusion     Hyperlipidemia     Inguinal hernia     Peptic ulcer     Stroke      ALLERGIES: Bee pollen, Levaquin [levofloxacin], and Penicillins  Current Outpatient Medications   Medication Sig Dispense Refill    acetaminophen (TYLENOL) 500 MG tablet Take 1 tablet by mouth Every 6 (Six) Hours As Needed for Mild Pain.      albuterol sulfate  (90 Base) MCG/ACT inhaler Inhale 2 puffs by mouth Every 4 (Four) Hours As Needed for Wheezing or Shortness of Air. 18 g 8    amLODIPine (NORVASC) 10 MG tablet TAKE 1 TABLET BY MOUTH EVERY NIGHT 90 tablet 0    aspirin 81 MG EC  "tablet Take 1 tablet by mouth Daily.      Budeson-Glycopyrrol-Formoterol (Breztri Aerosphere) 160-9-4.8 MCG/ACT aerosol inhaler Inhale 2 puffs 2 (Two) Times a Day. Indications: Chronic Obstructive Lung Disease 10.7 g 5    clopidogrel (PLAVIX) 75 MG tablet Take 1 tablet by mouth Daily. 30 tablet 11    famotidine (PEPCID) 20 MG tablet Take 1 tablet by mouth Daily.      ipratropium (ATROVENT) 0.02 % nebulizer solution Take 2.5 mL by nebulization 4 (Four) Times a Day As Needed for Wheezing or Shortness of Air. 360 mL 3    lisinopril (PRINIVIL,ZESTRIL) 20 MG tablet Take 1 tablet by mouth Daily.      omeprazole (priLOSEC) 20 MG capsule Take 1 capsule by mouth Daily.      rosuvastatin (CRESTOR) 10 MG tablet Take 1 tablet by mouth Daily.      bumetanide (Bumex) 1 MG tablet Take 1 tablet by mouth Daily for 30 days. Extra Bumex as needed for 2-3 lb weight gain 24-48 hours. 45 tablet 0    dapagliflozin Propanediol 10 MG tablet Take 10 mg by mouth Daily for 30 days. 30 tablet 0     No current facility-administered medications for this encounter.       Vaccination History:   Pneumonia:  has not record of a pneumonia shot given since 2010  Annual Influenza: UTD 23-24  Shingles: Never had- interested in getting in the future.     Objective  Vitals:    01/25/24 1101   BP: 128/61   BP Location: Left arm   Patient Position: Sitting   Cuff Size: Adult   Pulse: 66   SpO2: 95%   Weight: 122 kg (268 lb)   Height: 175.3 cm (69\")     Wt Readings from Last 3 Encounters:   01/25/24 122 kg (268 lb)   01/11/24 119 kg (263 lb)   12/10/23 107 kg (235 lb)         01/25/24  1101   Weight: 122 kg (268 lb)     Lab Results   Component Value Date    GLUCOSE 151 (H) 01/25/2024    BUN 11 01/25/2024    CREATININE 0.67 (L) 01/25/2024    EGFRIFNONA 93 12/11/2021    BCR 16.4 01/25/2024    K 4.1 01/25/2024    CO2 31.7 (H) 01/25/2024    CALCIUM 10.1 01/25/2024    ALBUMIN 3.8 12/12/2023    AST 18 12/12/2023    ALT 15 12/12/2023     Lab Results   Component " Value Date    WBC 8.29 12/13/2023    HGB 14.5 12/13/2023    HCT 47.4 12/13/2023    MCV 95.6 12/13/2023     12/13/2023     Lab Results   Component Value Date    TROPONINT 36 (H) 12/10/2023     Lab Results   Component Value Date    PROBNP 125.9 01/25/2024     Results for orders placed during the hospital encounter of 11/17/23    Adult Transthoracic Echo Complete W/ Cont if Necessary Per Protocol    Interpretation Summary    The quality of the study is limited with poor acoustic windows.  The contrast study was done using Lumason to enhance endocardial border to assess LV function.    Left ventricular wall thickness is consistent with mild concentric hypertrophy.    Left ventricular systolic function is normal.  Estimated LVEF =  66 - 70%.    Left ventricular diastolic function is consistent with (grade I) impaired relaxation    No significant valvular heart disease is noted.    Estimated right ventricular systolic pressure from tricuspid regurgitation is markedly elevated (>55 mmHg)    Severe pulmonary hypertension is present.    There is no evidence of pericardial effusion. .         GDMT    Drug Class   Drug   Dose Last Dose Adjustment Additional Titration   Notes   ACEi/ARB/ARNI Lisinopril 20mg >3m     Beta Blocker        MRA        SGLT2i Farxiga 10mg 1/25/24         Drug Therapy Problems    1. Drug Interactions Screening  2. Drug-Disease Interactions: NSAIDS  3. IMZ- unsure of which Pneumonia shot he got a few years back at [facility]  4. Edema  5. GDMT    Recommendations:     No significant drug interactions  2. His wife reports that he does not use any NSAIDS and only uses APAP PRN.   3. Called [facility] and they have no record of a pneumonia shot given since 2010. Make patient aware at next visit. Patient is interested in getting his shingles/pneumonia vaccines at future appointments, since it is hard for him to go into a pharmacy and get vaccines at this time. Follow-up on.   4. Yakelin to transition  Lasix to Bumex 1mg daily. He denied IV diuresis.   5. Recommend starting Farxiga/Jardiance (copay $47) for additional HF benefit. Patient should be eligible for the lisa- will have technician apply for it for him. If approved, he would like for it to be mailed to him. Yakelin gave him 2 weeks worth of samples in the meantime. Future plan is to discontinue Amlodipine to make room to transition Lisinopril to Entresto (also a $47 copay).     Discharge medications have been reviewed and reconciled.    Patient was educated on heart failure medications and the importance of medication adherence. All questions were addressed and patient expressed understanding. Used teach-back method to assess understanding.     Thank you for allowing me to participate in the care of your patient,    Comfort oMdi [facility]  01/25/24  11:51 EST

## 2024-01-25 NOTE — PROGRESS NOTES
[facility]  JUNIE Perdomo Bridgette, PA-C  [address] [place] [address] WAY  [facility] [address]  [place] [address]    Thank you for asking me to see Donaldo Roberts for congestive heart failure.    HPI:     This is a 73 y.o. male with known past medical history of :    Chronic HFpEF  TTE 11/18/2023 with EF 66-70% with mild concentric hypertrophy and grade I diastolic dysfunction.  Severe pulmonary HTN present and RVSP >55mmHg.    PATTY, noncompliant with NIPPV  COPD  Chronic hypoxemic and hypercarbic respiratory failure (3LPM)  PPM 2/2 first degree AV block with intermittent high-grade block and 7 beats Vtach on holter monitor prior to admission  Hx Ischemic CVA  Carotid stenosis  left carotid endarterectomy  BRYSON followed by Dr. Becerra  GERD  Obesity  Tobacco abuse    Donaldo Roberts presents for today for Heart Failure clinic evaluation.  The patient is typically seen by Duke Rosen MD.  Patient's primary cardiologist is Dr. Melgar & Ruth BOSCH.     Last known EF 66-70%.   Last known hospitalization and/or ED visit: Patient hospitalized from December 10 through December 13, 2023 with acute on chronic hypoxic and hypercapnic respiratory failure secondary to COPD exacerbation with noted acute on chronic diastolic heart failure also impacting admission.  He received referral to clinic via Ruth Aguayo prior to his admission.  Accompanied by: Wife and sons          01/25/24 visit data/details regarding:   Dyspnea: Ongoing dyspnea on exertion  Lower extremity swelling: Bilateral upper and lower extremity swelling  Abdominal swelling: Ongoing abdominal distension  Home weight: Weight monitoring booklet provided during initial visit; Has scale.   Home BP: BP monitoring booklet provided during initial visit; Has BP cuff.   Home heart rate: HR monitoring booklet provided during initial visit; Has monitoring device  Daily activities of living: Performing on her own  Pillows/lying flat: Hospital  bed   HF zone: Yellow zone.   Mr. Roberts is chest pain free and doing well.  Over the past one month's duration he has had ongoing weight gain and swelling.  He reports he was started on 20mg of Lasix daily a few weeks ago but has not had significant swelling and feels his weight and edema have increased.    He reports he is not having significant abdominal distension.    He denies frequent UTIs or known  infections.        Specialists:   Cardiology: Dr. Melgar & Ruth Aguayo NP         Review of Systems - Review of Systems   Constitutional: Positive for malaise/fatigue. Negative for decreased appetite, fever and night sweats.   HENT:  Negative for congestion and ear pain.    Eyes:  Negative for blurred vision and double vision.   Cardiovascular:  Positive for dyspnea on exertion and leg swelling.   Respiratory:  Positive for shortness of breath. Negative for cough and hemoptysis.    Endocrine: Negative for cold intolerance and heat intolerance.   Hematologic/Lymphatic: Negative for adenopathy and bleeding problem.   Skin:  Negative for color change, dry skin and nail changes.   Musculoskeletal:  Negative for arthritis, back pain and falls.   Gastrointestinal:  Negative for bloating and anorexia.   Genitourinary:  Negative for bladder incontinence, decreased libido and dysuria.   Neurological:  Negative for aphonia and difficulty with concentration.   Psychiatric/Behavioral:  Negative for altered mental status and hallucinations.          All other systems were reviewed and were negative.    Patient Active Problem List   Diagnosis    Benign prostatic hyperplasia with urinary obstruction    Herpesviral infection of penis    Shortness of breath    Carotid stenosis, asymptomatic, right    Essential hypertension    Dyslipidemia    Wenckebach block    PATTY (obstructive sleep apnea)    Presence of cardiac pacemaker    Severe sinus bradycardia    Chronic bronchitis    Cigarette nicotine dependence without complication     Hypoxia    Detrusor instability    Tobacco abuse    Respiratory failure    Physical debility    Chronic obstructive pulmonary disease    Hypercapnic respiratory failure       family history includes Diabetes in his mother and sister; Heart disease in his mother; No Known Problems in his father; Stroke in his mother.     reports that he quit smoking about 3 months ago. His smoking use included cigarettes. He started smoking about 57 years ago. He has a 110.00 pack-year smoking history. He has been exposed to tobacco smoke. He has never used smokeless tobacco. He reports that he does not drink alcohol and does not use drugs.    Allergies   Allergen Reactions    Bee Pollen Anaphylaxis    Levaquin [Levofloxacin] Hallucinations    Penicillins Hives     Mother told him that, has taken amoxicillin with no problems         Current Outpatient Medications:     acetaminophen (TYLENOL) 500 MG tablet, Take 1 tablet by mouth Every 6 (Six) Hours As Needed for Mild Pain., Disp: , Rfl:     albuterol sulfate  (90 Base) MCG/ACT inhaler, Inhale 2 puffs by mouth Every 4 (Four) Hours As Needed for Wheezing or Shortness of Air., Disp: 18 g, Rfl: 8    amLODIPine (NORVASC) 10 MG tablet, TAKE 1 TABLET BY MOUTH EVERY NIGHT, Disp: 90 tablet, Rfl: 0    aspirin 81 MG EC tablet, Take 1 tablet by mouth Daily., Disp: , Rfl:     Budeson-Glycopyrrol-Formoterol (Breztri Aerosphere) 160-9-4.8 MCG/ACT aerosol inhaler, Inhale 2 puffs 2 (Two) Times a Day. Indications: Chronic Obstructive Lung Disease, Disp: 10.7 g, Rfl: 5    clopidogrel (PLAVIX) 75 MG tablet, Take 1 tablet by mouth Daily., Disp: 30 tablet, Rfl: 11    famotidine (PEPCID) 20 MG tablet, Take 1 tablet by mouth Daily., Disp: , Rfl:     ipratropium (ATROVENT) 0.02 % nebulizer solution, Take 2.5 mL by nebulization 4 (Four) Times a Day As Needed for Wheezing or Shortness of Air., Disp: 360 mL, Rfl: 3    lisinopril (PRINIVIL,ZESTRIL) 20 MG tablet, Take 1 tablet by mouth Daily., Disp: ,  Rfl:     omeprazole (priLOSEC) 20 MG capsule, Take 1 capsule by mouth Daily., Disp: , Rfl:     rosuvastatin (CRESTOR) 10 MG tablet, Take 1 tablet by mouth Daily., Disp: , Rfl:     bumetanide (Bumex) 1 MG tablet, Take 1 tablet by mouth Daily for 30 days. Extra Bumex as needed for 2-3 lb weight gain 24-48 hours., Disp: 45 tablet, Rfl: 0    dapagliflozin Propanediol 10 MG tablet, Take 1 tablet by mouth Daily for 30 days., Disp: 30 tablet, Rfl: 0      Physical Exam:  I have reviewed the patient's current vital signs as documented in the patient's EMR.   Vitals:    01/25/24 1101   BP: 128/61   Pulse: 66   SpO2: 95%     Body mass index is 39.58 kg/m².       01/25/24  1101   Weight: 122 kg (268 lb)      Physical Exam  Vitals and nursing note reviewed.   Constitutional:       General: He is awake.      Appearance: He is morbidly obese.   HENT:      Head: Normocephalic and atraumatic.   Eyes:      General: Lids are normal.      Conjunctiva/sclera:      Right eye: Right conjunctiva is not injected.      Left eye: Left conjunctiva is not injected.   Cardiovascular:      Rate and Rhythm: Normal rate and regular rhythm.   Pulmonary:      Effort: No tachypnea.      Breath sounds: Examination of the right-lower field reveals decreased breath sounds. Examination of the left-lower field reveals decreased breath sounds. No wheezing, rhonchi or rales.   Abdominal:      General: Bowel sounds are normal.      Palpations: Abdomen is soft.      Tenderness: There is no abdominal tenderness.   Musculoskeletal:      Right lower leg: Edema present.      Left lower leg: Edema present.   Skin:     General: Skin is warm and dry.   Neurological:      Mental Status: He is alert and oriented to person, place, and time.   Psychiatric:         Attention and Perception: Attention normal.         Mood and Affect: Mood normal.         Behavior: Behavior is cooperative.         JVP: Volume/Pulsation: Normal.        DATA REVIEWED:     EKG. I personally  reviewed and interpreted the EKG.      ---------------------------------------------------  TTE/SIXTO:  Results for orders placed during the hospital encounter of 11/17/23    Adult Transthoracic Echo Complete W/ Cont if Necessary Per Protocol    Interpretation Summary    The quality of the study is limited with poor acoustic windows.  The contrast study was done using Lumason to enhance endocardial border to assess LV function.    Left ventricular wall thickness is consistent with mild concentric hypertrophy.    Left ventricular systolic function is normal.  Estimated LVEF =  66 - 70%.    Left ventricular diastolic function is consistent with (grade I) impaired relaxation    No significant valvular heart disease is noted.    Estimated right ventricular systolic pressure from tricuspid regurgitation is markedly elevated (>55 mmHg)    Severe pulmonary hypertension is present.    There is no evidence of pericardial effusion. .        LAST HEART CATH/IF AVAILABLE:     No results found for this or any previous visit.      -----------------------------------------------------  CXR/Imaging:   Imaging Results (Most Recent)       None            I personally reviewed and interpreted the CXR.      -----------------------------------------------------  CT:   No radiology results for the last 30 days.  I personally reviewed the images of the CT scan.  My personal interpretation is below.      ----------------------------------------------------    PFTs:        --------------------------------------------------------------------------------------------------    Laboratory evaluations:    Lab Results   Component Value Date    GLUCOSE 151 (H) 01/25/2024    BUN 11 01/25/2024    CREATININE 0.67 (L) 01/25/2024    EGFRIFNONA 93 12/11/2021    BCR 16.4 01/25/2024    K 4.1 01/25/2024    CO2 31.7 (H) 01/25/2024    CALCIUM 10.1 01/25/2024    ALBUMIN 3.8 12/12/2023    AST 18 12/12/2023    ALT 15 12/12/2023     Lab Results   Component Value  "Date    WBC 8.29 12/13/2023    HGB 14.5 12/13/2023    HCT 47.4 12/13/2023    MCV 95.6 12/13/2023     12/13/2023     Lab Results   Component Value Date    CHOL 112 03/27/2023    TRIG 175 (H) 03/27/2023    HDL 37 (L) 03/27/2023    LDL 46 03/27/2023     Lab Results   Component Value Date    TSH 0.573 12/11/2023     Lab Results   Component Value Date    HGBA1C 6.40 (H) 12/11/2023     Lab Results   Component Value Date    ALT 15 12/12/2023     Lab Results   Component Value Date    HGBA1C 6.40 (H) 12/11/2023     Lab Results   Component Value Date    CREATININE 0.67 (L) 01/25/2024     No results found for: \"IRON\", \"TIBC\", \"FERRITIN\"  Lab Results   Component Value Date    INR 0.82 (L) 12/08/2021    PROTIME 11.7 (L) 12/08/2021        Lab Results   Component Value Date    ABSOLUTELUNG 42 (A) 01/25/2024       PAH RISK ASSESSMENT:      1. Acute heart failure with preserved ejection fraction (HFpEF)    2. Chronic hypoxemic respiratory failure    3. Presence of cardiac pacemaker    4. PATTY (obstructive sleep apnea)          ORDERS PLACED TODAY:  Orders Placed This Encounter   Procedures    ReDs Vest    Basic Metabolic Panel    Magnesium    proBNP    Basic Metabolic Panel    Magnesium    proBNP        Diagnoses and all orders for this visit:    1. Acute heart failure with preserved ejection fraction (HFpEF) (Primary)  -     Basic Metabolic Panel; Future  -     Magnesium; Future  -     proBNP; Future  -     Basic Metabolic Panel; Standing  -     Basic Metabolic Panel  -     Magnesium; Standing  -     Magnesium  -     proBNP; Standing  -     proBNP  -     ReDs Vest    2. Chronic hypoxemic respiratory failure    3. Presence of cardiac pacemaker    4. PATTY (obstructive sleep apnea)    Other orders  -     bumetanide (Bumex) 1 MG tablet; Take 1 tablet by mouth Daily for 30 days. Extra Bumex as needed for 2-3 lb weight gain 24-48 hours.  Dispense: 45 tablet; Refill: 0  -     dapagliflozin Propanediol 10 MG tablet; Take 1 tablet by " mouth Daily for 30 days.  Dispense: 30 tablet; Refill: 0             MEDS ORDERED TODAY:    New Medications Ordered This Visit   Medications    bumetanide (Bumex) 1 MG tablet     Sig: Take 1 tablet by mouth Daily for 30 days. Extra Bumex as needed for 2-3 lb weight gain 24-48 hours.     Dispense:  45 tablet     Refill:  0    dapagliflozin Propanediol 10 MG tablet     Sig: Take 1 tablet by mouth Daily for 30 days.     Dispense:  30 tablet     Refill:  0        ---------------------------------------------------------------------------------------------------------------------------          ASSESSMENT/PLAN:      Diagnosis Plan   1. Acute heart failure with preserved ejection fraction (HFpEF)  Basic Metabolic Panel    Magnesium    proBNP    Basic Metabolic Panel    Basic Metabolic Panel    Magnesium    Magnesium    proBNP    proBNP    ReDs Vest      2. Chronic hypoxemic respiratory failure        3. Presence of cardiac pacemaker        4. PATTY (obstructive sleep apnea)            acute exacerbation of previously diagnosed diastolic heart failure.     NYHA stage Stage C: Structural heart disease is present AND symptoms have occurredFC-Class III: Marked limitation of physical activity. Comfortable at rest. Less than ordinary activity causes fatigue, palpitation, or dyspnea.     Today, Patient is currently volume overloaded.and with  Moderate perfusion. The patient's hemodynamics are currently acceptable. HR is: normal and is at goal. BP/MAP was reviewed and there isroom for medication up-titration.  Clinical trajectory was assessed and hasworsened.     CHF GOAL DIRECTED MEDICAL THERAPY FOR PATIENT ADDRESSED/ADJUSTED:     GDMT: HFpEF    Drug Class   Drug   Dose Last Dose Adjustment Notes   ACEi/ARB/ARNI Lisinopril 20mg     Beta Blocker No BB on board      MRA       SGLT2i Farxiga 10mg To start today.  N/A   Secondaries if applicable:          -CHF Specific BB:    Patient is HFpEF. Hold on BB for now.    We discussed  processes/benefits of HF clinic including nursing, pharmacist, and provider evaluation during each visit with ability for in office ReDS vest, labs, and ability to provide IV diuresis in the clinic with close outpatient monitoring.  Additionally, patient was educated about the availability of delivery of medications to patient's clinic room prior to leaving the building which assists with medication compliance and insures medications are in hands when changes are made (if patient opts for apothecary usage) with thorough guidance regarding changes and medication schedule provided.          -ACE/ARB/ARNi:   Continue Lisinopril 20mg for now.  Consider Entresto transition on future visit; however, we did not want to overwhelm him with adjustments today.     -MRA:   The patient is FC-NYHA Class III and MRA is indicated.   Consider in future visit.      -SGLT2 inhibitor therapy:   A BMP at initiation to verify GFR >30 was recommended, as was interval GFR surveillance.  The patient was advised to hold SGLT2I when PO intake is restricted due to a planned surgery, or due to an underlying illness.    Recommend starting Farxiga (dapagliflozin) 10mg daily with quarterly assessment of GFR.  Samples provided in office.   Pt was advised SEs, some severe, including hypersensitivity and Miller's; coupled with discussion regarding common side effects of UTIs and female genital mycotic infections were discussed. If you will be NPO, or are sick (poor PO intake, N&V) please hold the medication until you are back to a normal diet.     -Diuretic regimen:   ReDS Vest reading for. 01/25/24 is  42; ReDs Vest reading reviewed with patient.    IV diuresis declined today. Lasix 20mg qd stopped.  Start Bumex 1mg daily.   BMP, Mag, & ProBNP reviewed with patient.      -Fluid restriction/Sodium restriction:   Requested 2000 ml restriction  Patient has been asked to weigh daily and was provided with a printed diuretic strategy.  1,500 mg Na  restriction was discussed.        -Acute vs. Chronic underlying conditions other than HF addressed during visit:   Chronic hypoxemic respiratory failure:   COPD without exacerbation:   PATTY with NIPPV noncompliance:   Continue Pulm f/u.    Continue COPD clinic f/u.     Carotid stenosis s/p prior L CEA  R carotid stenosis  Continue f/u with Dr. Becerra & marcela.   Continue statin  &  crestor.     Identifiable barriers to Heart Failure Self-care:   Medical Barriers: Debility  Social Barriers:  No immediate known barriers.         CONSIDERATIONS:   ------------------------------------------------------------------  -Iron/Anemia in CHF:  December 2023 Hgb WNL.   ------------------------------------------------------------    -Sleep/Apnea:   Noncompliant with NIPPV.   Continue Pulm f/u     --------------------------------------------------------------------------------        Class 2 Severe Obesity (BMI >=35 and <=39.9). Obesity-related health conditions include the following: obstructive sleep apnea, hypertension, coronary heart disease, diabetes mellitus, dyslipidemias, GERD, and osteoarthritis. Obesity is unchanged. BMI is is above average; BMI management plan is completed. We discussed portion control, increasing exercise, and Heart failure dietary management .              >45 minutes out of 60 minutes face to face spent counseling patient extensively on dietary Na+ intake, importance of activity, weight monitoring, compliance with medications in addition to importance of titration with goal directed medical therapy and follow up appointments.            This document has been electronically signed by Yakelin Stacy PA-C  January 25, 2024 14:30 EST      Dictated Utilizing Dragon Dictation: Part of this note may be an electronic transcription/translation of spoken language to printed text using the Dragon Dictation System.    Follow-up appointment and medication changes provided in hand delivered After Visit  Summary as well as reviewed in the room.    -+

## 2024-01-25 NOTE — PROGRESS NOTES
Heart Failure Clinic    Date: 01/25/24     Vitals:    01/25/24 1101   BP: 128/61   Pulse: 66   SpO2: 95%    Weight 268    Method of arrival: Other wheelchair    Weighing self daily: Yes    Monitoring Heart Failure Zones: No    Today's HF Zone: Reviewed Zones    Taking medications as prescribed: Yes    Edema Yes    Shortness of Air: Yes    Number of pillows used at night:hospital bed elevated 1 pillow    Educational Materials given:  AVS, Heart Success patient booklet                                                                         ReDS Value: 42  Over 41 Hypervolemic Status      Maria Luisa Rich RN 01/25/24 11:03 EST

## 2024-01-29 NOTE — ADDENDUM NOTE
Encounter addended by: Comfort Modi RPH on: 1/29/2024 11:38 AM   Actions taken: Specialty Pharmacy task modified

## 2024-01-30 ENCOUNTER — OFFICE VISIT (OUTPATIENT)
Dept: PULMONOLOGY | Facility: CLINIC | Age: 74
End: 2024-01-30
Payer: MEDICARE

## 2024-01-30 VITALS
OXYGEN SATURATION: 96 % | TEMPERATURE: 96.9 F | HEART RATE: 88 BPM | WEIGHT: 268 LBS | SYSTOLIC BLOOD PRESSURE: 128 MMHG | DIASTOLIC BLOOD PRESSURE: 86 MMHG | HEIGHT: 69 IN | BODY MASS INDEX: 39.69 KG/M2

## 2024-01-30 DIAGNOSIS — J44.9 CHRONIC OBSTRUCTIVE PULMONARY DISEASE, UNSPECIFIED COPD TYPE: Primary | ICD-10-CM

## 2024-01-30 DIAGNOSIS — I27.20 PULMONARY HYPERTENSION: ICD-10-CM

## 2024-01-30 DIAGNOSIS — F17.210 CIGARETTE NICOTINE DEPENDENCE WITHOUT COMPLICATION: ICD-10-CM

## 2024-01-30 DIAGNOSIS — J96.11 CHRONIC RESPIRATORY FAILURE WITH HYPOXIA AND HYPERCAPNIA: ICD-10-CM

## 2024-01-30 DIAGNOSIS — J96.12 CHRONIC RESPIRATORY FAILURE WITH HYPOXIA AND HYPERCAPNIA: ICD-10-CM

## 2024-01-30 PROCEDURE — 3079F DIAST BP 80-89 MM HG: CPT | Performed by: PHYSICIAN ASSISTANT

## 2024-01-30 PROCEDURE — 3074F SYST BP LT 130 MM HG: CPT | Performed by: PHYSICIAN ASSISTANT

## 2024-01-30 PROCEDURE — 99214 OFFICE O/P EST MOD 30 MIN: CPT | Performed by: PHYSICIAN ASSISTANT

## 2024-01-30 NOTE — PROGRESS NOTES
"Chief Complaint  Chronic obstructive pulmonary disease, unspecified COPD type    Subjective        Donaldo Roberts presents to [facility]  History of Present Illness    Mr. Roberts presents today for follow up. Accompanied by spouse and other family member. She assists him with providing medical history today.   Continuing maintenance medication of Breztri (was able to acquire for a very low price via patient assistance program). Continues other as needed medications.   Remains compliant with continuous supplement oxygen use of 3 L continuous flow. Reports using NIV device during the evenings around 4 hours per night (tolerating some better than while in the hospital, slowly adjusting to use over time). Notes chronic lower extremity edema. Now following more closely with COPD/CHF clinics. No recent hospitalizations.       Objective   Vital Signs:  /86   Pulse 88   Temp 96.9 °F (36.1 °C)   Ht 175.3 cm (69.02\")   Wt 122 kg (268 lb)   SpO2 96%   BMI 39.56 kg/m²   Estimated body mass index is 39.56 kg/m² as calculated from the following:    Height as of this encounter: 175.3 cm (69.02\").    Weight as of this encounter: 122 kg (268 lb).         Physical Exam  Vitals reviewed.   Constitutional:       General: He is not in acute distress.     Appearance: He is well-developed. He is not diaphoretic.      Comments: Elderly male. Chronically ill appearance.   Using wheelchair for ambulatory assistance.    HENT:      Head: Normocephalic and atraumatic.   Cardiovascular:      Rate and Rhythm: Normal rate and regular rhythm.   Pulmonary:      Effort: Pulmonary effort is normal.      Breath sounds: No wheezing, rhonchi or rales.   Neurological:      Mental Status: He is alert and oriented to person, place, and time.   Psychiatric:         Behavior: Behavior normal.        Result Review :    The following data was reviewed by: Stephy sEtrada PA-C on " 01/30/2024:  Reviewed spirometry report from January 2024.   Reviewed chest xray imaging/report from December 2023. .  Reviewed CT chest angiogram imaging/report from November 2023.   Reviewed echo results from November 2023        Assessment and Plan     Diagnoses and all orders for this visit:    1. Chronic obstructive pulmonary disease, unspecified COPD type (Primary)    2. Chronic respiratory failure with hypoxia and hypercapnia    3. Pulmonary hypertension    4. Cigarette nicotine dependence without complication        COPD, Cigarette dependence, Severe PAH:   Continues albuterol inhaler as needed  Continue atrovent nebs as needed  Continue Breztri inhaler as scheduled.   Now receives at low price through patient assistance program.   PAH on last echo could be related to underlying COPD/PATTY.   Is starting to tolerate more use of the positive airway pressure device over time.   Reports compliant with COPD management.  Due to cardiac history and possible side effects - will hold off on daliresp at this time.   Following with cardiology due to underlying CHF      Chronic hypoxic and hypercapnic respiratory failure:   Compliant with continuous 3 L.   Notes benefit from NIV.   Typically only able to tolerate 4 hours per night, but this is improved as compared to tolerance/usage when hospitalized    DME order placed to see if wheelchair tank longoria is available.         Follow Up     Return in about 2 months (around 3/30/2024).  Patient was given instructions and counseling regarding his condition or for health maintenance advice. Please see specific information pulled into the AVS if appropriate.

## 2024-02-01 PROBLEM — I27.20 PULMONARY HYPERTENSION: Status: ACTIVE | Noted: 2024-02-01

## 2024-02-05 ENCOUNTER — APPOINTMENT (OUTPATIENT)
Dept: CARDIOLOGY | Facility: HOSPITAL | Age: 74
End: 2024-02-05
Payer: MEDICARE

## 2024-02-05 ENCOUNTER — HOSPITAL ENCOUNTER (OUTPATIENT)
Dept: CARDIOLOGY | Facility: HOSPITAL | Age: 74
Discharge: HOME | End: 2024-02-05
Payer: MEDICARE

## 2024-02-05 ENCOUNTER — HOSPITAL ENCOUNTER (OUTPATIENT)
Dept: PULMONOLOGY | Facility: HOSPITAL | Age: 74
Discharge: HOME | End: 2024-02-05
Admitting: PHYSICIAN ASSISTANT
Payer: MEDICARE

## 2024-02-05 VITALS
SYSTOLIC BLOOD PRESSURE: 130 MMHG | DIASTOLIC BLOOD PRESSURE: 72 MMHG | HEIGHT: 69 IN | HEART RATE: 80 BPM | OXYGEN SATURATION: 96 % | BODY MASS INDEX: 39.1 KG/M2 | TEMPERATURE: 97.1 F | WEIGHT: 264 LBS

## 2024-02-05 VITALS
HEART RATE: 73 BPM | HEIGHT: 69 IN | DIASTOLIC BLOOD PRESSURE: 74 MMHG | SYSTOLIC BLOOD PRESSURE: 121 MMHG | OXYGEN SATURATION: 100 % | BODY MASS INDEX: 39.1 KG/M2 | WEIGHT: 264 LBS

## 2024-02-05 DIAGNOSIS — J44.9 CHRONIC OBSTRUCTIVE PULMONARY DISEASE, UNSPECIFIED COPD TYPE: Primary | ICD-10-CM

## 2024-02-05 DIAGNOSIS — I50.32 CHRONIC HEART FAILURE WITH PRESERVED EJECTION FRACTION (HFPEF): ICD-10-CM

## 2024-02-05 DIAGNOSIS — I27.20 PULMONARY HYPERTENSION: ICD-10-CM

## 2024-02-05 DIAGNOSIS — J96.11 CHRONIC RESPIRATORY FAILURE WITH HYPOXIA AND HYPERCAPNIA: ICD-10-CM

## 2024-02-05 DIAGNOSIS — R06.02 SHORTNESS OF BREATH: Primary | ICD-10-CM

## 2024-02-05 DIAGNOSIS — I50.32 CHRONIC HEART FAILURE WITH PRESERVED EJECTION FRACTION (HFPEF): Primary | ICD-10-CM

## 2024-02-05 DIAGNOSIS — G47.33 OSA (OBSTRUCTIVE SLEEP APNEA): ICD-10-CM

## 2024-02-05 DIAGNOSIS — I10 ESSENTIAL HYPERTENSION: ICD-10-CM

## 2024-02-05 DIAGNOSIS — R53.81 DEBILITY: ICD-10-CM

## 2024-02-05 DIAGNOSIS — J96.12 CHRONIC RESPIRATORY FAILURE WITH HYPOXIA AND HYPERCAPNIA: ICD-10-CM

## 2024-02-05 DIAGNOSIS — F17.210 CIGARETTE NICOTINE DEPENDENCE WITHOUT COMPLICATION: ICD-10-CM

## 2024-02-05 DIAGNOSIS — E66.9 OBESITY (BMI 30-39.9): ICD-10-CM

## 2024-02-05 PROCEDURE — 94726 PLETHYSMOGRAPHY LUNG VOLUMES: CPT | Performed by: PHYSICIAN ASSISTANT

## 2024-02-05 PROCEDURE — 99215 OFFICE O/P EST HI 40 MIN: CPT | Performed by: PHYSICIAN ASSISTANT

## 2024-02-05 PROCEDURE — G0463 HOSPITAL OUTPT CLINIC VISIT: HCPCS | Performed by: PHYSICIAN ASSISTANT

## 2024-02-05 NOTE — PROGRESS NOTES
"COPD/Pulmonary Clinic Follow Up Encounter    NAME:___Donaldo Roberts  :_1950_  APPOINTMENT DATE/TIME:___________24___10:00 EST___________    COPD Education Evaluation            COPD Education Completed (See Note) Yes     COPD Clinic encounter: 2nd    Referring/consulting Provider: Dr. Duarte     Reason for Consultation:  COPD History     Objective:    Home Medications:  (Not in a hospital admission)    Barriers to Learning? No    COPD education given via the booklet \"A Patient's Guide to COPD\".     COPD Zones: (Green/yellow/Red): YES     Exacerbation or Flare up signs and symptoms: YES     Causes of COPD Exacerbation:      Lung Infection YES     Indoor and Outdoor Irratants YES     COPD Medication Non-Compliance YES     Healthy eating and drinking habbits: YES     Exercise and Activity: YES     Managing Medications: YES     Patient understands use of Rescue Medications: YES, Albuterol MDI       Patient understands use of Maintenance Medications: YES, Breztri       Proper MDI technique (w/wo Spacer): YES, Yes. He stated he uses his SPACER sometimes. I did encourage him to use it everytime he does his MDI's.       How to use a nebulizer: YES     How to clean a nebulizer: YES     Breathing Techniques:       Purse-lipped breathing YES     Oxygen therapy SAFETY:  YES       Action Plan            COPD/Lung Health Clinic follow up scheduled: YES and 24     Education Minutes with patient: YES and 25 minutes       Goals Discussed with patient: Keep home smoke free., Take medications as ordered., GO to DrIndy higginbotham., Increase activity., Eat healthier., Increase use of pursed lip breathing., Decrease flare-ups., and Increase COPD Knowledge.      Comments: Today, Mr. Roberts along with his wife and son, presented to the COPD Clinic for a follow up visit. He states he has been using his Breztri everyday and Albuterol MDI PRN. I did instruct him to use his SPACER with every MDI use to optimize medication " delivery.     Today, We had a great conversation of the above COPD Topics.   He has Home oxygen via portable O2 tanks and a home concentrator. JUNIE Dye, to order a portable Oxygen concentrator.   We reviewed his Alpha 1 Genotype result, (M/S)-abnormal. A Alpha 1 fingerstick was completed and placed in the mail.    He will follow up in the COPD Clinic in about 3 months, or 5/6/24.     Thanks for allowing me to participate in his care,       ONESIMO Bellamy, RRT  COPD Navigator

## 2024-02-05 NOTE — PROGRESS NOTES
Heart Failure Clinic  Pharmacist Note     Donaldo Roberts is a 73 y.o. male seen in the Heart Failure Clinic for HFpEF with an EF of 66-70% on 11/18/23. Patient was recently hospitalized from 12/10/23-12/13/23 for acute on chronic respiratory failure and CHF, as well as a COPD exacerbation.      Donaldo Roberts reports a poor understanding of medications.  He is accompanied by his wife today, who helps to manage his medications. They brought in his daily logs and BP's are great running in the 120's/60-80's and HR in the 70-80's. He reports that his swelling is about the same- swollen in his legs and reports SOB with activity. He requested a new scale and BP monitor as home-health is taking their away. He is currently taking Bumex 1mg daily and denies taking any additional doses. He was also just seen at the COPD clinic and received a covid vaccine and Prevnar 20. He denies any side effects from the newly started Farxiga.       Medication Use:   Hx of med intolerances:  None related to HF  Retail Rx Management: [facility] [place]; [place] for Farxiga and Entresto- ship to patient    Past Medical History:   Diagnosis Date    Acid reflux     Anxiety     Arthritis     Carotid artery stenosis, symptomatic, right 11/23/2021    Chronic obstructive pulmonary disease 12/5/2023    Essential hypertension 11/23/2021    Herpes     History of lipoma     History of transfusion     Hyperlipidemia     Inguinal hernia     Peptic ulcer     Stroke      ALLERGIES: Bee pollen, Levaquin [levofloxacin], and Penicillins  Current Outpatient Medications   Medication Sig Dispense Refill    acetaminophen (TYLENOL) 500 MG tablet Take 1 tablet by mouth Every 6 (Six) Hours As Needed for Mild Pain.      albuterol sulfate  (90 Base) MCG/ACT inhaler Inhale 2 puffs by mouth Every 4 (Four) Hours As Needed for Wheezing or Shortness of Air. 18 g 8    aspirin 81 MG EC tablet Take 1 tablet by mouth Daily.      Budeson-Glycopyrrol-Formoterol  "(Breztri Aerosphere) 160-9-4.8 MCG/ACT aerosol inhaler Inhale 2 puffs 2 (Two) Times a Day. Indications: Chronic Obstructive Lung Disease 10.7 g 5    bumetanide (BUMEX) 1 MG tablet TAKE 1 TABLET BY MOUTH EVERY DAY. TAKE EXTRA TABLET AS NEEDED FOR 2-3 LB WEIGHT GAIN IN 24-28 HOURS 135 tablet 0    clopidogrel (PLAVIX) 75 MG tablet Take 1 tablet by mouth Daily. 30 tablet 11    COVID-19 mRNA Vac-Michaela,Pfizer, (Comirnaty) 30 MCG/0.3ML suspension prefilled syringe prefilled syringe Inject 0.3 mL into the appropriate muscle as directed by prescriber 1 (One) Time for 1 dose. 0.3 mL 0    dapagliflozin Propanediol 10 MG tablet Take 1 tablet by mouth Daily for 30 days. 30 tablet 3    famotidine (PEPCID) 20 MG tablet Take 1 tablet by mouth Daily.      ipratropium (ATROVENT) 0.02 % nebulizer solution Take 2.5 mL by nebulization 4 (Four) Times a Day As Needed for Wheezing or Shortness of Air. 360 mL 3    omeprazole (priLOSEC) 20 MG capsule Take 1 capsule by mouth Daily.      Pneumococcal 20-Daly Conj Vacc (Prevnar 20) 0.5 ML suspension prefilled syringe vaccine Inject 0.5 mL into the appropriate muscle as directed by prescriber 1 (One) Time for 1 dose. 0.5 mL 0    rosuvastatin (CRESTOR) 10 MG tablet Take 1 tablet by mouth Daily.      [START ON 2/7/2024] sacubitril-valsartan (ENTRESTO) 49-51 MG tablet Take 1 tablet by mouth 2 (Two) Times a Day. 60 tablet 2     No current facility-administered medications for this encounter.       Vaccination History:   Pneumonia: UTD 2/5/24  Annual Influenza: UTD 23-24  Shingles: Never had- interested in getting in the future.   Covid: UTD 2/5/24    Objective  Vitals:    02/05/24 1140   BP: 121/74   BP Location: Left arm   Patient Position: Sitting   Cuff Size: Adult   Pulse: 73   SpO2: 100%   Weight: 120 kg (264 lb)   Height: 175.3 cm (69\")     Wt Readings from Last 3 Encounters:   02/05/24 120 kg (264 lb)   02/05/24 120 kg (264 lb)   01/30/24 122 kg (268 lb)         02/05/24  1140   Weight: 120 kg " (264 lb)     Lab Results   Component Value Date    GLUCOSE 151 (H) 01/25/2024    BUN 11 01/25/2024    CREATININE 0.67 (L) 01/25/2024    EGFRIFNONA 93 12/11/2021    BCR 16.4 01/25/2024    K 4.1 01/25/2024    CO2 31.7 (H) 01/25/2024    CALCIUM 10.1 01/25/2024    ALBUMIN 3.8 12/12/2023    AST 18 12/12/2023    ALT 15 12/12/2023     Lab Results   Component Value Date    WBC 8.29 12/13/2023    HGB 14.5 12/13/2023    HCT 47.4 12/13/2023    MCV 95.6 12/13/2023     12/13/2023     Lab Results   Component Value Date    TROPONINT 36 (H) 12/10/2023     Lab Results   Component Value Date    PROBNP 125.9 01/25/2024     Results for orders placed during the hospital encounter of 11/17/23    Adult Transthoracic Echo Complete W/ Cont if Necessary Per Protocol    Interpretation Summary    The quality of the study is limited with poor acoustic windows.  The contrast study was done using Lumason to enhance endocardial border to assess LV function.    Left ventricular wall thickness is consistent with mild concentric hypertrophy.    Left ventricular systolic function is normal.  Estimated LVEF =  66 - 70%.    Left ventricular diastolic function is consistent with (grade I) impaired relaxation    No significant valvular heart disease is noted.    Estimated right ventricular systolic pressure from tricuspid regurgitation is markedly elevated (>55 mmHg)    Severe pulmonary hypertension is present.    There is no evidence of pericardial effusion. .         GDMT    Drug Class   Drug   Dose Last Dose Adjustment Additional Titration   Notes   ACEi/ARB/ARNI Entresto 49/51mg 2/5/24     Beta Blocker        MRA        SGLT2i Farxiga 10mg 1/25/24         Drug Therapy Problems    1. GDMT  2. Need new scale and BP cuff      Recommendations:     Recommend switching Lisinopril to Entresto and stopping his Amlodipine. Instructed patient to stop Lisinopril at this time, last dose taken this morning. Patient takes Amlodipine at night. I instructed  him to take his Amlodipine tonight and tomorrow night, then discontinue. Patient is to start taking Entresto Wednesday morning, due to wife's concerns about starting a new medication at night, and take bid thereafter. Entresto is covered on the lisa and will be delivered to the patient on Tuesday. Wife felt comfortable with the instructions to get him switched over. I will call and cancel amlodipine and lisinpril at [facility].   Gave to patient this office visit.     Patient was educated on heart failure medications and the importance of medication adherence. All questions were addressed and patient expressed understanding. Used teach-back method to assess understanding.     Thank you for allowing me to participate in the care of your patient,    Comfort Modi [facility]  02/05/24  12:21 EST

## 2024-02-05 NOTE — PROGRESS NOTES
[facility]  COPD Management     Donaldo Roberts is a 73 y.o. male seen in the [facility] for COPD.  Patient presents to the clinic today with his wife, Inna, who helps manage his medications and his son. The patient's current COPD regimen includes: Breztri and Albuterol PRN. Pt reports good adherence to maintenance regimen. His Breztri comes through the manufacturer assistance program. Pt reports he is not a smoker and is not exposed to second hand smoke.      He does report to me that he has tried other PPIs for heartburn (to potential DDI with plavix) and he tells me he has to stay on prilosec as it is the only thing that works for him.        Past Medical History:   Diagnosis Date    Acid reflux     Anxiety     Arthritis     Carotid artery stenosis, symptomatic, right 2021    Chronic obstructive pulmonary disease 2023    Essential hypertension 2021    Herpes     History of lipoma     History of transfusion     Hyperlipidemia     Inguinal hernia     Peptic ulcer     Stroke      Social History     Socioeconomic History    Marital status:     Number of children: 6   Tobacco Use    Smoking status: Former     Packs/day: 2.00     Years: 55.00     Additional pack years: 0.00     Total pack years: 110.00     Types: Cigarettes     Start date:      Quit date: 10/2023     Years since quittin.3     Passive exposure: Current    Smokeless tobacco: Never    Tobacco comments:     2 cigs per day/A pack per week.   Vaping Use    Vaping Use: Never used   Substance and Sexual Activity    Alcohol use: No    Drug use: No    Sexual activity: Defer     Bee pollen, Levaquin [levofloxacin], and Penicillins    Current Outpatient Medications:     acetaminophen (TYLENOL) 500 MG tablet, Take 1 tablet by mouth Every 6 (Six) Hours As Needed for Mild Pain., Disp: , Rfl:     albuterol sulfate  (90 Base) MCG/ACT inhaler, Inhale 2 puffs by mouth Every 4 (Four) Hours As  Needed for Wheezing or Shortness of Air., Disp: 18 g, Rfl: 8    amLODIPine (NORVASC) 10 MG tablet, TAKE 1 TABLET BY MOUTH EVERY NIGHT, Disp: 90 tablet, Rfl: 0    aspirin 81 MG EC tablet, Take 1 tablet by mouth Daily., Disp: , Rfl:     Budeson-Glycopyrrol-Formoterol (Breztri Aerosphere) 160-9-4.8 MCG/ACT aerosol inhaler, Inhale 2 puffs 2 (Two) Times a Day. Indications: Chronic Obstructive Lung Disease, Disp: 10.7 g, Rfl: 5    bumetanide (BUMEX) 1 MG tablet, TAKE 1 TABLET BY MOUTH EVERY DAY. TAKE EXTRA TABLET AS NEEDED FOR 2-3 LB WEIGHT GAIN IN 24-28 HOURS, Disp: 135 tablet, Rfl: 0    clopidogrel (PLAVIX) 75 MG tablet, Take 1 tablet by mouth Daily., Disp: 30 tablet, Rfl: 11    dapagliflozin Propanediol 10 MG tablet, Take 1 tablet by mouth Daily for 30 days., Disp: 30 tablet, Rfl: 0    famotidine (PEPCID) 20 MG tablet, Take 1 tablet by mouth Daily., Disp: , Rfl:     ipratropium (ATROVENT) 0.02 % nebulizer solution, Take 2.5 mL by nebulization 4 (Four) Times a Day As Needed for Wheezing or Shortness of Air., Disp: 360 mL, Rfl: 3    lisinopril (PRINIVIL,ZESTRIL) 20 MG tablet, Take 1 tablet by mouth Daily., Disp: , Rfl:     omeprazole (priLOSEC) 20 MG capsule, Take 1 capsule by mouth Daily., Disp: , Rfl:     rosuvastatin (CRESTOR) 10 MG tablet, Take 1 tablet by mouth Daily., Disp: , Rfl:     COVID-19 mRNA Vac-Michaela,Pfizer, (Comirnaty) 30 MCG/0.3ML suspension prefilled syringe prefilled syringe, Inject 0.3 mL into the appropriate muscle as directed by prescriber 1 (One) Time for 1 dose., Disp: 0.3 mL, Rfl: 0    Pneumococcal 20-Daly Conj Vacc (Prevnar 20) 0.5 ML suspension prefilled syringe vaccine, Inject 0.5 mL into the appropriate muscle as directed by prescriber 1 (One) Time for 1 dose., Disp: 0.5 mL, Rfl: 0      Vaccination Status   COVID 19: Needs 2023-34, given 2/5/24 now UTD  Influenza: UTD 2023  Pneumococcal: Reports to me he has not had, given 2/5/24 now UTD  Zoster: recommend    Drug-Drug Interactions: no  significant drug interactions noted at this time    Drug-Disease Interactions (non-cardioselective beta blockers, NSAIDs): none noted at this time    Patient Assistance: Receives his Breztri from [place]    Inhaler Technique Observed? No  If yes, notes:     Treatment Goals: Risk Reduction and Symptom Control     Medication Assessment & Plan:     Pt continues Breztri 2 puffs BID.  Advised Pt on the importance of continuing maintenance inhaler regimen and the importance of rinsing mouth after ICS use.     Recommended vaccination: Prevnar 20 and COVID 19 23-24 formula given today.  Consider shingles vaccination in the future.     Thank you for allowing me to participate in the care of your patient,    Lily Muñiz, PharmD  2/5/2024  11:20 EST

## 2024-02-05 NOTE — PROGRESS NOTES
Heart Failure Clinic    Date: 02/05/24     Vitals:    02/05/24 1140   BP: 121/74   Pulse: 73   SpO2: 100%    Weight 264    Method of arrival: Other wheelchair    Weighing self daily: No    Monitoring Heart Failure Zones: Most days    Today's HF Zone: Green    Taking medications as prescribed: Yes    Edema Yes, same as always    Shortness of Air: Yes with activity    Number of pillows used at night: hospital bed hob elevated slightly    Educational Materials given:  guevara                                                                         ReDS Value: lqx3          Maria Luisa Rich RN 02/05/24 11:43 EST

## 2024-02-05 NOTE — PROGRESS NOTES
Subjective      Chief Complaint  COPD (Pt states he is on 3 liters of oxygen daily)    Subjective      History of Present Illness  Donaldo Roberts is a 73 y.o. male who presents today to [facility] with past medical history of chronic hypoxic and hypercapnic respiratory failure on baseline 3 L supplemental oxygen and BiPAP at night and as needed, COPD, PATTY, history of ischemic CVA, severe pulmonary hypertension, essential hypertension, hyperlipidemia, chronic diastolic CHF, s/p PPM insertion, carotid artery stenosis s/p endarterectomy, GERD, and anxiety/depression who presents today for COPD (Pt states he is on 3 liters of oxygen daily). This visit is a follow up appointment at the COPD clinic.     COPD (Pt states he is on 3 liters of oxygen daily):  Patient was previously hospitalized from 12/10/2023-12/13/2023 for acute on chronic hypoixic hypercapnic respiratory failure, acute COPD exacerbation, and decompensated diastolic CHF. Patient was started on nebulizer treatments, steroids, and noted to have quick improvement.     Interval history:  Patient reports that he is currently doing well. He reports that he still gets short of breath with ambulation. He reports that he has received his Breztri inhalers in the mail from [facility] and has been using the inhaler 2 puffs twice daily. He has an albuterol inhaler to use as needed but doesn't require frequent use. He also has nebulizer treatments that he uses as needed.     He is compliant with using his supplemental oxygen continuously with baseline 3 L. He reports that he would like to get a POC device that would be easier to transport as he currently only has large tanks and stationary oxygen concentrator. He has been compliant with using his NIV device and tries to use it at least 4 hours per night.     He would like to see about getting a power wheelchair. He has a standard wheelchair but reports that his wife usually brings him to his  appointments and it is difficult for her to push. He has difficulty ambulating through his home as he has chronic hypoxic respiratory failure and becomes short of breath quickly and notes desaturations even while using supplemental oxygen. He has a previous history of a stroke and has weakness on his right side as well as arthritis in bilateral knees which makes ambulating extremely difficult. He would greatly benefit from a power wheelchair to improve his quality of life and assist with performing his ADL's.      Current Outpatient Medications:     acetaminophen (TYLENOL) 500 MG tablet, Take 1 tablet by mouth Every 6 (Six) Hours As Needed for Mild Pain., Disp: , Rfl:     albuterol sulfate  (90 Base) MCG/ACT inhaler, Inhale 2 puffs by mouth Every 4 (Four) Hours As Needed for Wheezing or Shortness of Air., Disp: 18 g, Rfl: 8    aspirin 81 MG EC tablet, Take 1 tablet by mouth Daily., Disp: , Rfl:     Budeson-Glycopyrrol-Formoterol (Breztri Aerosphere) 160-9-4.8 MCG/ACT aerosol inhaler, Inhale 2 puffs 2 (Two) Times a Day. Indications: Chronic Obstructive Lung Disease, Disp: 10.7 g, Rfl: 5    bumetanide (BUMEX) 1 MG tablet, TAKE 1 TABLET BY MOUTH EVERY DAY. TAKE EXTRA TABLET AS NEEDED FOR 2-3 LB WEIGHT GAIN IN 24-28 HOURS, Disp: 135 tablet, Rfl: 0    clopidogrel (PLAVIX) 75 MG tablet, Take 1 tablet by mouth Daily., Disp: 30 tablet, Rfl: 11    famotidine (PEPCID) 20 MG tablet, Take 1 tablet by mouth Daily., Disp: , Rfl:     ipratropium (ATROVENT) 0.02 % nebulizer solution, Take 2.5 mL by nebulization 4 (Four) Times a Day As Needed for Wheezing or Shortness of Air., Disp: 360 mL, Rfl: 3    omeprazole (priLOSEC) 20 MG capsule, Take 1 capsule by mouth Daily., Disp: , Rfl:     rosuvastatin (CRESTOR) 10 MG tablet, Take 1 tablet by mouth Daily., Disp: , Rfl:     COVID-19 mRNA Vac-Michaela,Pfizer, (Comirnaty) 30 MCG/0.3ML suspension prefilled syringe prefilled syringe, Inject 0.3 mL into the appropriate muscle as directed by  "prescriber 1 (One) Time for 1 dose., Disp: 0.3 mL, Rfl: 0    dapagliflozin Propanediol 10 MG tablet, Take 1 tablet by mouth Daily., Disp: 30 tablet, Rfl: 3    Pneumococcal 20-Daly Conj Vacc (Prevnar 20) 0.5 ML suspension prefilled syringe vaccine, Inject 0.5 mL into the appropriate muscle as directed by prescriber 1 (One) Time for 1 dose., Disp: 0.5 mL, Rfl: 0    [START ON 2/7/2024] sacubitril-valsartan (ENTRESTO) 49-51 MG tablet, Take 1 tablet by mouth 2 (Two) Times a Day., Disp: 60 tablet, Rfl: 2      Allergies   Allergen Reactions    Bee Pollen Anaphylaxis    Levaquin [Levofloxacin] Hallucinations    Penicillins Hives     Mother told him that, has taken amoxicillin with no problems       Objective     Objective   Vital Signs:  /72   Pulse 80   Temp 97.1 °F (36.2 °C) (Temporal)   Ht 175.3 cm (69\")   Wt 120 kg (264 lb)   SpO2 96%   BMI 38.99 kg/m²   Estimated body mass index is 38.99 kg/m² as calculated from the following:    Height as of this encounter: 175.3 cm (69\").    Weight as of this encounter: 120 kg (264 lb).    Past Medical History:   Diagnosis Date    Acid reflux     Anxiety     Arthritis     Carotid artery stenosis, symptomatic, right 11/23/2021    Chronic obstructive pulmonary disease 12/5/2023    Essential hypertension 11/23/2021    Herpes     History of lipoma     History of transfusion     Hyperlipidemia     Inguinal hernia     Peptic ulcer     Stroke      Past Surgical History:   Procedure Laterality Date    ABDOMINAL SURGERY      CARDIAC ELECTROPHYSIOLOGY PROCEDURE N/A 12/10/2021    Procedure: Pacemaker DC new;  Surgeon: Jony Monte MD;  Location: [facility] CATH INVASIVE LOCATION;  Service: Cardiology;  Laterality: N/A;    CAROTID ENDARTERECTOMY Left 2021    CHOLECYSTECTOMY WITH INTRAOPERATIVE CHOLANGIOGRAM N/A 07/25/2017    Procedure: CHOLECYSTECTOMY LAPAROSCOPIC INTRAOPERATIVE CHOLANGIOGRAM;  Surgeon: Luis Coe MD;  Location: [facility] OR;  Service:     HERNIA REPAIR      " inguinal    SINUS SURGERY      TUMOR EXCISION       Social History     Socioeconomic History    Marital status:     Number of children: 6   Tobacco Use    Smoking status: Former     Packs/day: 2.00     Years: 55.00     Additional pack years: 0.00     Total pack years: 110.00     Types: Cigarettes     Start date:      Quit date: 10/2023     Years since quittin.3     Passive exposure: Current    Smokeless tobacco: Never    Tobacco comments:     2 cigs per day/A pack per week.   Vaping Use    Vaping Use: Never used   Substance and Sexual Activity    Alcohol use: No    Drug use: No    Sexual activity: Defer        Physical Exam  Constitutional:       General: He is awake.      Appearance: Normal appearance. He is obese.      Interventions: Nasal cannula in place.   HENT:      Head: Normocephalic and atraumatic.      Nose: Nose normal.      Mouth/Throat:      Mouth: Mucous membranes are moist.      Pharynx: Oropharynx is clear.   Eyes:      Conjunctiva/sclera: Conjunctivae normal.      Pupils: Pupils are equal, round, and reactive to light.   Cardiovascular:      Rate and Rhythm: Normal rate and regular rhythm.      Pulses: Normal pulses.      Heart sounds: Normal heart sounds. No murmur heard.     No friction rub. No gallop.   Pulmonary:      Effort: Pulmonary effort is normal. No tachypnea, accessory muscle usage or respiratory distress.      Breath sounds: Decreased breath sounds present. No wheezing, rhonchi or rales.      Comments: Utilizing 3 L supplemental oxygen during visit  Musculoskeletal:         General: Normal range of motion.      Cervical back: Full passive range of motion without pain and normal range of motion.      Right lower le+ Pitting Edema present.      Left lower le+ Pitting Edema present.      Comments: Utilizing a standard wheelchair to ambulate during exam.    Skin:     General: Skin is warm and dry.   Neurological:      General: No focal deficit present.      Mental  "Status: He is alert. Mental status is at baseline.   Psychiatric:         Mood and Affect: Mood normal.         Behavior: Behavior normal. Behavior is cooperative.         Thought Content: Thought content normal.          Result Review :  The following labs and radiology results have been reviewed.    Lab Review:   No results found for: \"FEV1\", \"FVC\", \"KSR0QCS\", \"TLC\", \"DLCO\"  Hospital Outpatient Visit on 01/25/2024   Component Date Value Ref Range Status    Glucose 01/25/2024 151 (H)  65 - 99 mg/dL Final    BUN 01/25/2024 11  8 - 23 mg/dL Final    Creatinine 01/25/2024 0.67 (L)  0.76 - 1.27 mg/dL Final    Sodium 01/25/2024 139  136 - 145 mmol/L Final    Potassium 01/25/2024 4.1  3.5 - 5.2 mmol/L Final    Slight hemolysis detected by analyzer. Result may be falsely elevated.    Chloride 01/25/2024 96 (L)  98 - 107 mmol/L Final    CO2 01/25/2024 31.7 (H)  22.0 - 29.0 mmol/L Final    Calcium 01/25/2024 10.1  8.6 - 10.5 mg/dL Final    BUN/Creatinine Ratio 01/25/2024 16.4  7.0 - 25.0 Final    Anion Gap 01/25/2024 11.3  5.0 - 15.0 mmol/L Final    eGFR 01/25/2024 99.8  >60.0 mL/min/1.73 Final    Magnesium 01/25/2024 1.8  1.6 - 2.4 mg/dL Final    proBNP 01/25/2024 125.9  0.0 - 900.0 pg/mL Final    Absolute Lung Fluid Content 01/25/2024 42 (A)  20 - 35 % Final   No results displayed because visit has over 200 results.      Admission on 11/24/2023, Discharged on 11/29/2023   Component Date Value Ref Range Status    Glucose 11/25/2023 146 (H)  65 - 99 mg/dL Final    BUN 11/25/2023 25 (H)  8 - 23 mg/dL Final    Creatinine 11/25/2023 0.67 (L)  0.76 - 1.27 mg/dL Final    Sodium 11/25/2023 138  136 - 145 mmol/L Final    Potassium 11/25/2023 4.9  3.5 - 5.2 mmol/L Final    Chloride 11/25/2023 94 (L)  98 - 107 mmol/L Final    CO2 11/25/2023 40.5 (H)  22.0 - 29.0 mmol/L Final    Calcium 11/25/2023 9.8  8.6 - 10.5 mg/dL Final    Albumin 11/25/2023 3.5  3.5 - 5.2 g/dL Final    Phosphorus 11/25/2023 2.0 (L)  2.5 - 4.5 mg/dL Final    " Anion Gap 11/25/2023 3.5 (L)  5.0 - 15.0 mmol/L Final    BUN/Creatinine Ratio 11/25/2023 37.3 (H)  7.0 - 25.0 Final    eGFR 11/25/2023 99.8  >60.0 mL/min/1.73 Final    Site 11/25/2023 Lab   Final    pH, Venous 11/25/2023 7.377  7.320 - 7.420 pH Units Final    pCO2, Venous 11/25/2023 70.5 (H)  41.0 - 51.0 mm Hg Final    86 Value above critical limit    pO2, Venous 11/25/2023 137.0 (H)  27.0 - 53.0 mm Hg Final    83 Value above reference range    HCO3, Venous 11/25/2023 41.4 (H)  22.0 - 28.0 mmol/L Final    83 Value above reference range    Base Excess, Venous 11/25/2023 13.2 (H)  0.0 - 2.0 mmol/L Final    O2 Saturation, Venous 11/25/2023 98.7 (H)  45.0 - 75.0 % Final    83 Value above reference range    Hemoglobin, Blood Gas 11/25/2023 12.4 (L)  14 - 18 g/dL Final    84 Value below reference range    CO2 Content 11/25/2023 43.6 (H)  22 - 33 mmol/L Final    Barometric Pressure for Blood Gas 11/25/2023 732  mmHg Final    Modality 11/25/2023 Nasal Cannula   Final    FIO2 11/25/2023 36  % Final    Flow Rate 11/25/2023 4.0  lpm Final    Ventilator Mode 11/25/2023 NA   Final    Notified Who 11/25/2023 DR. MANN   Final    Notified By 11/25/2023 [id number]   Final    Notified Time 11/25/2023 11/25/2023 04:37   Final    Collected by 11/25/2023 [id number]   Final    Meter: [id number]     :  [id number]    Oxyhemoglobin Venous 11/25/2023 97.7 (H)  45.0 - 75.0 % Final    83 Value above reference range    Carboxyhemoglobin Venous 11/25/2023 1.1  0.0 - 5.0 % Final    Methemoglobin Venous 11/25/2023 0.0  0.0 - 3.0 % Final    84 Value below reference range    Site 11/26/2023 Right Brachial   Final    Russell's Test 11/26/2023 N/A   Final    pH, Arterial 11/26/2023 7.405  7.350 - 7.450 pH units Final    pCO2, Arterial 11/26/2023 66.2 (H)  35.0 - 45.0 mm Hg Final    83 Value above reference range    pO2, Arterial 11/26/2023 54.4 (C)  83.0 - 108.0 mm Hg Final    85 Value below critical limit    HCO3, Arterial 11/26/2023 41.5 (H)   20.0 - 26.0 mmol/L Final    83 Value above reference range    Base Excess, Arterial 11/26/2023 13.9 (H)  0.0 - 2.0 mmol/L Final    O2 Saturation, Arterial 11/26/2023 90.0 (L)  94.0 - 99.0 % Final    84 Value below reference range    Hemoglobin, Blood Gas 11/26/2023 12.5 (L)  14 - 18 g/dL Final    84 Value below reference range    Hematocrit, Blood Gas 11/26/2023 38.3  38.0 - 51.0 % Final    Oxyhemoglobin 11/26/2023 88.5 (L)  94 - 99 % Final    84 Value below reference range    Methemoglobin 11/26/2023 0.00  0.00 - 3.00 % Final    Carboxyhemoglobin 11/26/2023 1.7  0 - 5 % Final    A-a DO2 11/26/2023 89.2  0.0 - 300.0 mmHg Final    CO2 Content 11/26/2023 43.5 (H)  22 - 33 mmol/L Final    Barometric Pressure for Blood Gas 11/26/2023 726  mmHg Final    Modality 11/26/2023 Nasal Cannula   Final    FIO2 11/26/2023 32  % Final    Flow Rate 11/26/2023 3.0  lpm Final    Ventilator Mode 11/26/2023 NA   Final    Notified Who 11/26/2023 DR HARVEY   Final    Notified By 11/26/2023 [id number]   Final    Notified Time 11/26/2023 11/26/2023 07:45   Final    Collected by 11/26/2023 [id number]   Final    Meter: [id number]     :  [id number]    Glucose 11/28/2023 173 (H)  65 - 99 mg/dL Final    BUN 11/28/2023 22  8 - 23 mg/dL Final    Creatinine 11/28/2023 0.87  0.76 - 1.27 mg/dL Final    Sodium 11/28/2023 135 (L)  136 - 145 mmol/L Final    Potassium 11/28/2023 5.2  3.5 - 5.2 mmol/L Final    1+ Hemolysis    Specimen hemolyzed.  Result may be falsely elevated.    Chloride 11/28/2023 96 (L)  98 - 107 mmol/L Final    CO2 11/28/2023 28.2  22.0 - 29.0 mmol/L Final    Calcium 11/28/2023 9.8  8.6 - 10.5 mg/dL Final    BUN/Creatinine Ratio 11/28/2023 25.3 (H)  7.0 - 25.0 Final    Anion Gap 11/28/2023 10.8  5.0 - 15.0 mmol/L Final    eGFR 11/28/2023 92.2  >60.0 mL/min/1.73 Final    WBC 11/28/2023 6.89  3.40 - 10.80 10*3/mm3 Final    RBC 11/28/2023 4.41  4.14 - 5.80 10*6/mm3 Final    Hemoglobin 11/28/2023 13.0  13.0 - 17.7 g/dL Final     Hematocrit 11/28/2023 45.1  37.5 - 51.0 % Final    MCV 11/28/2023 102.3 (H)  79.0 - 97.0 fL Final    MCH 11/28/2023 29.5  26.6 - 33.0 pg Final    MCHC 11/28/2023 28.8 (L)  31.5 - 35.7 g/dL Final    RDW 11/28/2023 12.8  12.3 - 15.4 % Final    RDW-SD 11/28/2023 47.8  37.0 - 54.0 fl Final    MPV 11/28/2023 10.3  6.0 - 12.0 fL Final    Platelets 11/28/2023 228  140 - 450 10*3/mm3 Final   No results displayed because visit has over 200 results.      Lab on 11/06/2023   Component Date Value Ref Range Status    Glucose 11/06/2023 236 (H)  65 - 99 mg/dL Final    BUN 11/06/2023 12  8 - 23 mg/dL Final    Creatinine 11/06/2023 0.71 (L)  0.76 - 1.27 mg/dL Final    Sodium 11/06/2023 138  136 - 145 mmol/L Final    Potassium 11/06/2023 4.0  3.5 - 5.2 mmol/L Final    Chloride 11/06/2023 95 (L)  98 - 107 mmol/L Final    CO2 11/06/2023 37.0 (H)  22.0 - 29.0 mmol/L Final    Calcium 11/06/2023 9.6  8.6 - 10.5 mg/dL Final    BUN/Creatinine Ratio 11/06/2023 16.9  7.0 - 25.0 Final    Anion Gap 11/06/2023 6.0  5.0 - 15.0 mmol/L Final    eGFR 11/06/2023 98.1  >60.0 mL/min/1.73 Final    proBNP 11/06/2023 185.0  0.0 - 900.0 pg/mL Final      Reviewed chest XR from 12/10/2023    Reviewed spirometry performed 01/11/2024    Assessment / Plan         Assessment   Diagnoses and all orders for this visit:    1. Chronic obstructive pulmonary disease, unspecified COPD type (Primary)  Spirometry performed during visit which didn't reveal any true obstruction (FEV1/FVC 83% and FEV1 34%) but was notable for possible restriction (FVC 31%) which could be due to body habitus and volume overload from underlying CHF. Patient would not be able to have full PFT performed due to debility.   Alpha 1 genotype is M/S. Collected alpha 1 serum level during visit.   Continue Breztri 2 puffs twice daily (receiving through mail from [facility]).   Continue albuterol inhaler as needed.  Continue Atrovent nebulizer treatments as needed.    2. Chronic respiratory failure with  hypoxia and hypercapnia  3. PATTY (obstructive sleep apnea)  4. Obesity (BMI 30-39.9)  Compliant with using supplemental oxygen continuously with baseline 3 L.   Requesting POC device, sent order to [facility].  Compliant with using NIV device at least 4 hours per night and benefits from use.     -     Oxygen Therapy    Management obstructive sleep apnea also includes lifestyle modifications including weight loss, avoidance of alcohol, sedated, caffeine especially before bedtime, allowing adequate sleep time, and body position during sleep such as side versus back. 10% weight loss can result in a 50% improvement of the apnea-hypopnea index. Untreated sleep apnea can lead to cardiovascular/metabolic disorder, neurocognitive deficit, daytime sleepiness, motor vehicle accidents, depression, mood disorders and reduced quality of life.  Patient understands the risk of untreated obstructive sleep apnea and benefit benefits of the treatment. Recommended at least 4 hours of use per night for 70% of every month (approximately 21 out of 30 days) per CMS guidelines.      5. Severe pulmonary hypertension  Previous echocardiogram from 11/2023 notable for severe pulmonary hypertension with RVSP >55 mmHg.   Likely multifactorial due to underlying PATTY/OHS and CHF.   Educated on importance of remaining compliant with Trilogy device and compliance with inhaler regimen for treatment of COPD.   Follows with heart failure clinic for management of CHF.     6. Cigarette nicotine dependence without complication  Donaldo Armando  reports that he quit smoking about 4 months ago. His smoking use included cigarettes. He started smoking about 57 years ago. He has a 110.00 pack-year smoking history. He has been exposed to tobacco smoke. He has never used smokeless tobacco.  Will consider ordering LDCT screening in November 2024.     7. Debility  Sent orders for power wheelchair to [employer]. He has a standard  wheelchair but reports that his wife usually brings him to his appointments and it is difficult for her to push. He has difficulty ambulating through his home as he has chronic hypoxic respiratory failure and becomes short of breath quickly and notes desaturations even while using supplemental oxygen. He has a previous history of a stroke and has weakness on his right side as well as arthritis in bilateral knees which makes ambulating extremely difficult. He would greatly benefit from a power wheelchair to improve his quality of life and assist with performing his ADL's.    -     Miscellaneous DME    I spent >/=40 minutes caring for Donaldo on this date of service. This time includes time spent by me in the following activities:preparing for the visit, reviewing tests, obtaining and/or reviewing a separately obtained history, performing a medically appropriate examination and/or evaluation , counseling and educating the patient/family/caregiver, ordering medications, tests, or procedures, referring and communicating with other health care professionals , documenting information in the medical record, independently interpreting results and communicating that information with the patient/family/caregiver, and care coordination    Follow Up   Return in about 13 weeks (around 5/6/2024), or if symptoms worsen or fail to improve, for Next scheduled follow up.    Patient was given instructions and counseling regarding his condition or for health maintenance advice. Please see specific information pulled into the AVS if appropriate.       This document has been electronically signed by Marnie Miles PA-C   February 5, 2024 18:57 EST    Dictated Utilizing Dragon Dictation: Part of this note may be an electronic transcription/translation of spoken language to printed text using the Dragon Dictation System.

## 2024-02-05 NOTE — PROGRESS NOTES
[facility]  JUNIE Perdomo Bridgette, PA-C  [address]  [address]  [place] [address]    Thank you for asking me to see Donaldo Roberts for congestive heart failure.    HPI:     This is a 73 y.o. male with known past medical history of :    Chronic HFpEF  TTE 11/18/2023 with EF 66-70% with mild concentric hypertrophy and grade I diastolic dysfunction.  Severe pulmonary HTN present and RVSP >55mmHg.    PATTY, noncompliant with NIPPV  COPD  Chronic hypoxemic and hypercarbic respiratory failure (3LPM)  PPM 2/2 first degree AV block with intermittent high-grade block and 7 beats Vtach on holter monitor prior to admission  Hx Ischemic CVA  Carotid stenosis  left carotid endarterectomy  BRYSON followed by Dr. Becerra  GERD  Obesity  Tobacco abuse    Donaldo Roberts presents for today for Heart Failure clinic evaluation.  The patient is typically seen by Duke Rosen MD.  Patient's primary cardiologist is Dr. Melgar & Ruth BOSCH.     Last known EF 66-70%.   Last known hospitalization and/or ED visit: Patient hospitalized from December 10 through December 13, 2023 with acute on chronic hypoxic and hypercapnic respiratory failure secondary to COPD exacerbation with noted acute on chronic diastolic heart failure also impacting admission.  He received referral to clinic via uRth Aguayo prior to his admission.  Accompanied by: Wife and sons          02/05/24 visit data/details regarding:   Dyspnea: Ongoing dyspnea on exertion  Lower extremity swelling: Mildly improved  Abdominal swelling: Ongoing abdominal distension  Home weight: Weight monitoring booklet provided during initial visit; Has scale.   Home BP: BP monitoring booklet provided during initial visit; Has BP cuff.   Home heart rate: HR monitoring booklet provided during initial visit; Has monitoring device  Daily activities of living: Performing on her own  Pillows/lying flat: Hospital bed   HF zone: Yellow zone.    Mr. Roberts is doing well with diuretic transition from last visit. We discuss today transitioning his ACEi & CBB to ARNi for optimized BP control in addition to hoping to help with some natriuresis.    Mr. Roberts is chest pain free today.  He appears to be breathing better than in prior evaluations.        Specialists:   Cardiology: Dr. Melgar & Ruth Aguayo NP         Review of Systems - Review of Systems   Constitutional: Negative for decreased appetite, fever, malaise/fatigue and night sweats.   HENT:  Negative for congestion and ear pain.    Eyes:  Negative for blurred vision and double vision.   Cardiovascular:  Positive for dyspnea on exertion and leg swelling.   Respiratory:  Positive for shortness of breath. Negative for cough and hemoptysis.    Endocrine: Negative for cold intolerance and heat intolerance.   Hematologic/Lymphatic: Negative for adenopathy and bleeding problem.   Skin:  Negative for color change, dry skin and nail changes.   Musculoskeletal:  Negative for arthritis, back pain and falls.   Gastrointestinal:  Negative for bloating and anorexia.   Genitourinary:  Negative for bladder incontinence, decreased libido and dysuria.   Neurological:  Negative for aphonia and difficulty with concentration.   Psychiatric/Behavioral:  Negative for altered mental status and hallucinations.          All other systems were reviewed and were negative.    Patient Active Problem List   Diagnosis    Benign prostatic hyperplasia with urinary obstruction    Herpesviral infection of penis    Shortness of breath    Carotid stenosis, asymptomatic, right    Essential hypertension    Dyslipidemia    Wenckebach block    PATTY (obstructive sleep apnea)    Presence of cardiac pacemaker    Severe sinus bradycardia    Chronic bronchitis    Cigarette nicotine dependence without complication    Hypoxia    Detrusor instability    Tobacco abuse    Respiratory failure    Physical debility    Chronic obstructive pulmonary  disease    Hypercapnic respiratory failure    Pulmonary hypertension       family history includes Diabetes in his mother and sister; Heart disease in his mother; No Known Problems in his father; Stroke in his mother.     reports that he quit smoking about 4 months ago. His smoking use included cigarettes. He started smoking about 57 years ago. He has a 110.00 pack-year smoking history. He has been exposed to tobacco smoke. He has never used smokeless tobacco. He reports that he does not drink alcohol and does not use drugs.    Allergies   Allergen Reactions    Bee Pollen Anaphylaxis    Levaquin [Levofloxacin] Hallucinations    Penicillins Hives     Mother told him that, has taken amoxicillin with no problems         Current Outpatient Medications:     acetaminophen (TYLENOL) 500 MG tablet, Take 1 tablet by mouth Every 6 (Six) Hours As Needed for Mild Pain., Disp: , Rfl:     albuterol sulfate  (90 Base) MCG/ACT inhaler, Inhale 2 puffs by mouth Every 4 (Four) Hours As Needed for Wheezing or Shortness of Air., Disp: 18 g, Rfl: 8    aspirin 81 MG EC tablet, Take 1 tablet by mouth Daily., Disp: , Rfl:     Budeson-Glycopyrrol-Formoterol (Breztri Aerosphere) 160-9-4.8 MCG/ACT aerosol inhaler, Inhale 2 puffs 2 (Two) Times a Day. Indications: Chronic Obstructive Lung Disease, Disp: 10.7 g, Rfl: 5    bumetanide (BUMEX) 1 MG tablet, TAKE 1 TABLET BY MOUTH EVERY DAY. TAKE EXTRA TABLET AS NEEDED FOR 2-3 LB WEIGHT GAIN IN 24-28 HOURS, Disp: 135 tablet, Rfl: 0    clopidogrel (PLAVIX) 75 MG tablet, Take 1 tablet by mouth Daily., Disp: 30 tablet, Rfl: 11    COVID-19 mRNA Vac-Michaela,Pfizer, (Comirnaty) 30 MCG/0.3ML suspension prefilled syringe prefilled syringe, Inject 0.3 mL into the appropriate muscle as directed by prescriber 1 (One) Time for 1 dose., Disp: 0.3 mL, Rfl: 0    dapagliflozin Propanediol 10 MG tablet, Take 1 tablet by mouth Daily., Disp: 30 tablet, Rfl: 3    famotidine (PEPCID) 20 MG tablet, Take 1 tablet by  mouth Daily., Disp: , Rfl:     ipratropium (ATROVENT) 0.02 % nebulizer solution, Take 2.5 mL by nebulization 4 (Four) Times a Day As Needed for Wheezing or Shortness of Air., Disp: 360 mL, Rfl: 3    omeprazole (priLOSEC) 20 MG capsule, Take 1 capsule by mouth Daily., Disp: , Rfl:     Pneumococcal 20-Daly Conj Vacc (Prevnar 20) 0.5 ML suspension prefilled syringe vaccine, Inject 0.5 mL into the appropriate muscle as directed by prescriber 1 (One) Time for 1 dose., Disp: 0.5 mL, Rfl: 0    rosuvastatin (CRESTOR) 10 MG tablet, Take 1 tablet by mouth Daily., Disp: , Rfl:     [START ON 2/7/2024] sacubitril-valsartan (ENTRESTO) 49-51 MG tablet, Take 1 tablet by mouth 2 (Two) Times a Day., Disp: 60 tablet, Rfl: 2      Physical Exam:  I have reviewed the patient's current vital signs as documented in the patient's EMR.   Vitals:    02/05/24 1140   BP: 121/74   Pulse: 73   SpO2: 100%     Body mass index is 38.99 kg/m².       02/05/24  1140   Weight: 120 kg (264 lb)      Physical Exam  Vitals and nursing note reviewed.   Constitutional:       General: He is awake.      Appearance: He is morbidly obese.      Interventions: Nasal cannula in place.   HENT:      Head: Normocephalic and atraumatic.   Eyes:      General: Lids are normal.      Conjunctiva/sclera:      Right eye: Right conjunctiva is not injected.      Left eye: Left conjunctiva is not injected.   Cardiovascular:      Rate and Rhythm: Normal rate and regular rhythm.   Pulmonary:      Effort: No tachypnea.      Breath sounds: No wheezing, rhonchi or rales.   Abdominal:      General: Bowel sounds are normal.      Palpations: Abdomen is soft.      Tenderness: There is no abdominal tenderness.   Musculoskeletal:      Right lower leg: Edema present.      Left lower leg: Edema present.   Skin:     General: Skin is warm and dry.   Neurological:      Mental Status: He is alert and oriented to person, place, and time.   Psychiatric:         Attention and Perception: Attention  normal.         Mood and Affect: Mood normal.         Behavior: Behavior is cooperative.         JVP: Volume/Pulsation: Normal.        DATA REVIEWED:     EKG. I personally reviewed and interpreted the EKG.      ---------------------------------------------------  TTE/SIXTO:  Results for orders placed during the hospital encounter of 11/17/23    Adult Transthoracic Echo Complete W/ Cont if Necessary Per Protocol    Interpretation Summary    The quality of the study is limited with poor acoustic windows.  The contrast study was done using Lumason to enhance endocardial border to assess LV function.    Left ventricular wall thickness is consistent with mild concentric hypertrophy.    Left ventricular systolic function is normal.  Estimated LVEF =  66 - 70%.    Left ventricular diastolic function is consistent with (grade I) impaired relaxation    No significant valvular heart disease is noted.    Estimated right ventricular systolic pressure from tricuspid regurgitation is markedly elevated (>55 mmHg)    Severe pulmonary hypertension is present.    There is no evidence of pericardial effusion. .        LAST HEART CATH/IF AVAILABLE:     No results found for this or any previous visit.      -----------------------------------------------------  CXR/Imaging:   Imaging Results (Most Recent)       None            I personally reviewed and interpreted the CXR.      -----------------------------------------------------  CT:   No radiology results for the last 30 days.  I personally reviewed the images of the CT scan.  My personal interpretation is below.      ----------------------------------------------------    PFTs:        --------------------------------------------------------------------------------------------------    Laboratory evaluations:    Lab Results   Component Value Date    GLUCOSE 151 (H) 01/25/2024    BUN 11 01/25/2024    CREATININE 0.67 (L) 01/25/2024    EGFRIFNONA 93 12/11/2021    BCR 16.4 01/25/2024    K  "4.1 01/25/2024    CO2 31.7 (H) 01/25/2024    CALCIUM 10.1 01/25/2024    ALBUMIN 3.8 12/12/2023    AST 18 12/12/2023    ALT 15 12/12/2023     Lab Results   Component Value Date    WBC 8.29 12/13/2023    HGB 14.5 12/13/2023    HCT 47.4 12/13/2023    MCV 95.6 12/13/2023     12/13/2023     Lab Results   Component Value Date    CHOL 112 03/27/2023    TRIG 175 (H) 03/27/2023    HDL 37 (L) 03/27/2023    LDL 46 03/27/2023     Lab Results   Component Value Date    TSH 0.573 12/11/2023     Lab Results   Component Value Date    HGBA1C 6.40 (H) 12/11/2023     Lab Results   Component Value Date    ALT 15 12/12/2023     Lab Results   Component Value Date    HGBA1C 6.40 (H) 12/11/2023     Lab Results   Component Value Date    CREATININE 0.67 (L) 01/25/2024     No results found for: \"IRON\", \"TIBC\", \"FERRITIN\"  Lab Results   Component Value Date    INR 0.82 (L) 12/08/2021    PROTIME 11.7 (L) 12/08/2021        Lab Results   Component Value Date    ABSOLUTELUNG 42 (A) 01/25/2024       PAH RISK ASSESSMENT:      1. Shortness of breath    2. Chronic heart failure with preserved ejection fraction (HFpEF)    3. Pulmonary hypertension    4. PATTY (obstructive sleep apnea)    5. Essential hypertension          ORDERS PLACED TODAY:  Orders Placed This Encounter   Procedures    ReDs Vest        Diagnoses and all orders for this visit:    1. Shortness of breath (Primary)  -     ReDs Vest    2. Chronic heart failure with preserved ejection fraction (HFpEF)    3. Pulmonary hypertension    4. PATTY (obstructive sleep apnea)    5. Essential hypertension    Other orders  -     sacubitril-valsartan (ENTRESTO) 49-51 MG tablet; Take 1 tablet by mouth 2 (Two) Times a Day.  Dispense: 60 tablet; Refill: 2  -     dapagliflozin Propanediol 10 MG tablet; Take 1 tablet by mouth Daily.  Dispense: 30 tablet; Refill: 3             MEDS ORDERED TODAY:    New Medications Ordered This Visit   Medications    sacubitril-valsartan (ENTRESTO) 49-51 MG tablet     " Sig: Take 1 tablet by mouth 2 (Two) Times a Day.     Dispense:  60 tablet     Refill:  2    dapagliflozin Propanediol 10 MG tablet     Sig: Take 1 tablet by mouth Daily.     Dispense:  30 tablet     Refill:  3        ---------------------------------------------------------------------------------------------------------------------------          ASSESSMENT/PLAN:      Diagnosis Plan   1. Shortness of breath  ReDs Vest      2. Chronic heart failure with preserved ejection fraction (HFpEF)        3. Pulmonary hypertension        4. PATTY (obstructive sleep apnea)        5. Essential hypertension            not acutely decompensated chronic diastolic heart failure.     NYHA stage Stage C: Structural heart disease is present AND symptoms have occurredFC-Class III: Marked limitation of physical activity. Comfortable at rest. Less than ordinary activity causes fatigue, palpitation, or dyspnea.     Today, Patient is currently volume overloaded.and with  Moderate perfusion. The patient's hemodynamics are currently acceptable. HR is: normal and is at goal. BP/MAP was reviewed and there isroom for medication up-titration.  Clinical trajectory was assessed and hasworsened.     CHF GOAL DIRECTED MEDICAL THERAPY FOR PATIENT ADDRESSED/ADJUSTED:     GDMT: HFpEF    Drug Class   Drug   Dose Last Dose Adjustment Notes   ACEi/ARB/ARNI Start Entresto on 02/07 with wash out    Beta Blocker No BB on board      MRA       SGLT2i Farxiga 10mg To start today.  N/A   Secondaries if applicable:          -CHF Specific BB:    Patient is HFpEF. Hold on BB for now.    We discussed processes/benefits of HF clinic including nursing, pharmacist, and provider evaluation during each visit with ability for in office ReDS vest, labs, and ability to provide IV diuresis in the clinic with close outpatient monitoring.  Additionally, patient was educated about the availability of delivery of medications to patient's clinic room prior to leaving the building  which assists with medication compliance and insures medications are in hands when changes are made (if patient opts for apothecary usage) with thorough guidance regarding changes and medication schedule provided.          -ACE/ARB/ARNi:   Stop Lisinopril 20mg today.  He had this morning's dose.  Washout period with ARNi transition discussed with ARNi to start 02/07/24 AM.   See below regarding stopping CCB.      -MRA:   The patient is FC-NYHA Class III and MRA is indicated.   Consider in future visit.      -SGLT2 inhibitor therapy:   A BMP at initiation to verify GFR >30 was recommended, as was interval GFR surveillance.  The patient was advised to hold SGLT2I when PO intake is restricted due to a planned surgery, or due to an underlying illness.    Recommend starting Farxiga (dapagliflozin) 10mg daily with quarterly assessment of GFR.  Samples provided in office.   Pt was advised SEs, some severe, including hypersensitivity and Miller's; coupled with discussion regarding common side effects of UTIs and female genital mycotic infections were discussed. If you will be NPO, or are sick (poor PO intake, N&V) please hold the medication until you are back to a normal diet.     -Diuretic regimen:   ReDS Vest reading for. 02/05/24  could not be obtained due to low quality; ReDs Vest reading reviewed with patient.    Continue Bumex 1mg qd.  Start Entresto 49-51mg BID as previously outlined.   Hoping natriuresis with ARNi use impacts swelling.    BMP, Mag, & ProBNP reviewed with patient.      -Fluid restriction/Sodium restriction:   Requested 2000 ml restriction  Patient has been asked to weigh daily and was provided with a printed diuretic strategy.  1,500 mg Na restriction was discussed.        -Acute vs. Chronic underlying conditions other than HF addressed during visit:   Chronic hypoxemic respiratory failure:   COPD without exacerbation:   PATTY with NIPPV noncompliance:   Continue Pulm f/u.    Continue COPD clinic  f/u.     Essential Hypertension:   Stop Norvasc on 02/06/24  Stop Lisinopril on 02/05/24.   Start ARNi on 02/07/24.  Plan discussed at length with patient & his family (wife assists with his medications).       Carotid stenosis s/p prior L CEA  R carotid stenosis  Continue f/u with Dr. Becerra & marcela.   Continue statin  &  crestor.     Identifiable barriers to Heart Failure Self-care:   Medical Barriers: Debility  Social Barriers:  No immediate known barriers.         CONSIDERATIONS:   ------------------------------------------------------------------  -Iron/Anemia in CHF:  December 2023 Hgb WNL.   ------------------------------------------------------------    -Sleep/Apnea:   Noncompliant with NIPPV.   Continue Pulm f/u     --------------------------------------------------------------------------------        Class 2 Severe Obesity (BMI >=35 and <=39.9). Obesity-related health conditions include the following: obstructive sleep apnea, hypertension, coronary heart disease, diabetes mellitus, dyslipidemias, GERD, and osteoarthritis. Obesity is unchanged. BMI is is above average; BMI management plan is completed. We discussed portion control, increasing exercise, and Heart failure dietary management .              >45 minutes out of 60 minutes face to face spent counseling patient extensively on dietary Na+ intake, importance of activity, weight monitoring, compliance with medications in addition to importance of titration with goal directed medical therapy and follow up appointments.            This document has been electronically signed by Yakelin Stacy PA-C  February 5, 2024 13:58 EST      Dictated Utilizing Dragon Dictation: Part of this note may be an electronic transcription/translation of spoken language to printed text using the Dragon Dictation System.    Follow-up appointment and medication changes provided in hand delivered After Visit Summary as well as reviewed in the room.    -+

## 2024-02-09 ENCOUNTER — OFFICE VISIT (OUTPATIENT)
Dept: CARDIOLOGY | Facility: CLINIC | Age: 74
End: 2024-02-09
Payer: MEDICARE

## 2024-02-09 VITALS
RESPIRATION RATE: 18 BRPM | HEART RATE: 86 BPM | DIASTOLIC BLOOD PRESSURE: 67 MMHG | HEIGHT: 69 IN | SYSTOLIC BLOOD PRESSURE: 111 MMHG | BODY MASS INDEX: 38.99 KG/M2 | OXYGEN SATURATION: 96 %

## 2024-02-09 DIAGNOSIS — I10 ESSENTIAL HYPERTENSION: ICD-10-CM

## 2024-02-09 DIAGNOSIS — I65.21 CAROTID STENOSIS, ASYMPTOMATIC, RIGHT: Primary | ICD-10-CM

## 2024-02-09 DIAGNOSIS — Z72.0 TOBACCO ABUSE: ICD-10-CM

## 2024-02-09 DIAGNOSIS — E78.5 DYSLIPIDEMIA: ICD-10-CM

## 2024-02-09 DIAGNOSIS — Z95.0 PRESENCE OF CARDIAC PACEMAKER: ICD-10-CM

## 2024-02-09 PROCEDURE — 1160F RVW MEDS BY RX/DR IN RCRD: CPT | Performed by: NURSE PRACTITIONER

## 2024-02-09 PROCEDURE — 3078F DIAST BP <80 MM HG: CPT | Performed by: NURSE PRACTITIONER

## 2024-02-09 PROCEDURE — 3074F SYST BP LT 130 MM HG: CPT | Performed by: NURSE PRACTITIONER

## 2024-02-09 PROCEDURE — 99214 OFFICE O/P EST MOD 30 MIN: CPT | Performed by: NURSE PRACTITIONER

## 2024-02-09 PROCEDURE — 1159F MED LIST DOCD IN RCRD: CPT | Performed by: NURSE PRACTITIONER

## 2024-02-09 NOTE — PROGRESS NOTES
[facility]             LARY Kelley James T, MD  Donaldo Roberts  1950 02/09/2024    Patient Active Problem List   Diagnosis    Benign prostatic hyperplasia with urinary obstruction    Herpesviral infection of penis    Shortness of breath    Carotid stenosis, asymptomatic, right    Essential hypertension    Dyslipidemia    Wenckebach block    PATTY (obstructive sleep apnea)    Presence of cardiac pacemaker    Severe sinus bradycardia    Chronic bronchitis    Cigarette nicotine dependence without complication    Hypoxia    Detrusor instability    Tobacco abuse    Respiratory failure    Physical debility    Chronic obstructive pulmonary disease    Hypercapnic respiratory failure    Pulmonary hypertension       Dear Duke Rosen MD:    Subjective     Chief Complaint   Patient presents with    Follow-up     Routine       HPI:     This is a 73 y.o. male with known past medical history of hypertension, hyperlipidemia, permanent pacemaker implantation 2021, coronary artery disease, history of stroke, carotid artery disease and tobacco abuse.      Donaldo Roberts presents today for routine cardiology follow up.  Patient presented to the ED with complaints of shortness of breath and acute mental status change.  He was noted to have COPD exacerbation in the setting of respiratory failure and decompensated diastolic heart failure.  He was given nebulizer treatment, steroids and diuretics with improvement of his breathing.  He was discharged home.  He then followed up with heart failure clinic.  Recently his Bumex was continued at 1 mg a day and he was started on Entresto 49-51 twice daily.  Patient states he has been doing overall well since his hospitalization.  Continues to be fatigued with baseline shortness of breath which is chronic.  Does have some worsening swelling with his lower extremities.     Diagnost admitted to [facility] in December  2023ic Testing  Echocardiogram 11/2021: EF 66 to 70%  Carotid ultrasound 11/2021: High-grade stenosis in the internal carotid artery on the right  Nuclear stress test 11/2021: No evidence of ischemia  24-hour Holter monitor 12/2021: Predominant normal sinus rhythm with episodes of sinus tachycardia and sinus bradycardia with bradycardic episodes about 12.4% with first-degree AV block and intermittent episodes of high-grade block, 7 beat run of ventricular tachycardia  Implantation of permanent dual-chamber pacemaker 12/2021  Cardiac event monitor 7/2022: Normal sinus rhythm with underlying atrial fibrillation  Echocardiogram 11/2023: EF 66 to 70% with severe pulmonary hypertension     All other systems were reviewed and were negative.    Patient Active Problem List   Diagnosis    Benign prostatic hyperplasia with urinary obstruction    Herpesviral infection of penis    Shortness of breath    Carotid stenosis, asymptomatic, right    Essential hypertension    Dyslipidemia    Wenckebach block    PATTY (obstructive sleep apnea)    Presence of cardiac pacemaker    Severe sinus bradycardia    Chronic bronchitis    Cigarette nicotine dependence without complication    Hypoxia    Detrusor instability    Tobacco abuse    Respiratory failure    Physical debility    Chronic obstructive pulmonary disease    Hypercapnic respiratory failure    Pulmonary hypertension       family history includes Diabetes in his mother and sister; Heart disease in his mother; No Known Problems in his father; Stroke in his mother.     reports that he quit smoking about 4 months ago. His smoking use included cigarettes. He started smoking about 57 years ago. He has a 110.00 pack-year smoking history. He has been exposed to tobacco smoke. He has never used smokeless tobacco. He reports that he does not drink alcohol and does not use drugs.    Allergies   Allergen Reactions    Bee Pollen Anaphylaxis    Levaquin [Levofloxacin] Hallucinations     Penicillins Hives     Mother told him that, has taken amoxicillin with no problems         Current Outpatient Medications:     acetaminophen (TYLENOL) 500 MG tablet, Take 1 tablet by mouth Every 6 (Six) Hours As Needed for Mild Pain., Disp: , Rfl:     albuterol sulfate  (90 Base) MCG/ACT inhaler, Inhale 2 puffs by mouth Every 4 (Four) Hours As Needed for Wheezing or Shortness of Air., Disp: 18 g, Rfl: 8    aspirin 81 MG EC tablet, Take 1 tablet by mouth Daily., Disp: , Rfl:     Budeson-Glycopyrrol-Formoterol (Breztri Aerosphere) 160-9-4.8 MCG/ACT aerosol inhaler, Inhale 2 puffs 2 (Two) Times a Day. Indications: Chronic Obstructive Lung Disease, Disp: 10.7 g, Rfl: 5    bumetanide (BUMEX) 1 MG tablet, TAKE 1 TABLET BY MOUTH EVERY DAY. TAKE EXTRA TABLET AS NEEDED FOR 2-3 LB WEIGHT GAIN IN 24-28 HOURS, Disp: 135 tablet, Rfl: 0    clopidogrel (PLAVIX) 75 MG tablet, Take 1 tablet by mouth Daily., Disp: 30 tablet, Rfl: 11    dapagliflozin Propanediol 10 MG tablet, Take 1 tablet by mouth Daily., Disp: 30 tablet, Rfl: 3    famotidine (PEPCID) 20 MG tablet, Take 1 tablet by mouth Daily., Disp: , Rfl:     ipratropium (ATROVENT) 0.02 % nebulizer solution, Take 2.5 mL by nebulization 4 (Four) Times a Day As Needed for Wheezing or Shortness of Air., Disp: 360 mL, Rfl: 3    omeprazole (priLOSEC) 20 MG capsule, Take 1 capsule by mouth Daily., Disp: , Rfl:     rosuvastatin (CRESTOR) 10 MG tablet, Take 1 tablet by mouth Daily., Disp: , Rfl:     sacubitril-valsartan (ENTRESTO) 49-51 MG tablet, Take 1 tablet by mouth 2 (Two) Times a Day., Disp: 60 tablet, Rfl: 2      Physical Exam:  I have reviewed the patient's current vital signs as documented in the patient's EMR.   Vitals:    02/09/24 1126   BP: 111/67   Pulse: 86   Resp: 18   SpO2: 96%     Body mass index is 38.99 kg/m².       02/09/24  1126   Weight: (wheelchair)      Physical Exam  Constitutional:       General: He is not in acute distress.     Appearance: Normal  appearance. He is well-developed.   HENT:      Head: Normocephalic and atraumatic.      Mouth/Throat:      Mouth: Mucous membranes are moist.   Eyes:      Extraocular Movements: Extraocular movements intact.      Pupils: Pupils are equal, round, and reactive to light.   Neck:      Vascular: No JVD.   Cardiovascular:      Rate and Rhythm: Normal rate and regular rhythm.      Heart sounds: Normal heart sounds. No murmur heard.     No S3 or S4 sounds.   Pulmonary:      Effort: Pulmonary effort is normal. No respiratory distress.      Breath sounds: Normal breath sounds. No wheezing.   Abdominal:      General: Bowel sounds are normal. There is no distension.      Palpations: Abdomen is soft. There is no hepatomegaly.      Tenderness: There is no abdominal tenderness.   Musculoskeletal:         General: Normal range of motion.      Cervical back: Normal range of motion and neck supple.   Skin:     General: Skin is warm and dry.      Coloration: Skin is not jaundiced or pale.   Neurological:      General: No focal deficit present.      Mental Status: He is alert and oriented to person, place, and time. Mental status is at baseline.   Psychiatric:         Mood and Affect: Mood normal.         Behavior: Behavior normal.         Thought Content: Thought content normal.         Judgment: Judgment normal.          DATA REVIEWED:     TTE/SIXTO:  Results for orders placed during the hospital encounter of 11/17/23    Adult Transthoracic Echo Complete W/ Cont if Necessary Per Protocol    Interpretation Summary    The quality of the study is limited with poor acoustic windows.  The contrast study was done using Lumason to enhance endocardial border to assess LV function.    Left ventricular wall thickness is consistent with mild concentric hypertrophy.    Left ventricular systolic function is normal.  Estimated LVEF =  66 - 70%.    Left ventricular diastolic function is consistent with (grade I) impaired relaxation    No significant  "valvular heart disease is noted.    Estimated right ventricular systolic pressure from tricuspid regurgitation is markedly elevated (>55 mmHg)    Severe pulmonary hypertension is present.    There is no evidence of pericardial effusion. .      Laboratory evaluations:    Lab Results   Component Value Date    GLUCOSE 151 (H) 01/25/2024    BUN 11 01/25/2024    CREATININE 0.67 (L) 01/25/2024    EGFRIFNONA 93 12/11/2021    BCR 16.4 01/25/2024    K 4.1 01/25/2024    CO2 31.7 (H) 01/25/2024    CALCIUM 10.1 01/25/2024    ALBUMIN 3.8 12/12/2023    AST 18 12/12/2023    ALT 15 12/12/2023     Lab Results   Component Value Date    WBC 8.29 12/13/2023    HGB 14.5 12/13/2023    HCT 47.4 12/13/2023    MCV 95.6 12/13/2023     12/13/2023     Lab Results   Component Value Date    CHOL 112 03/27/2023    TRIG 175 (H) 03/27/2023    HDL 37 (L) 03/27/2023    LDL 46 03/27/2023     Lab Results   Component Value Date    TSH 0.573 12/11/2023     Lab Results   Component Value Date    HGBA1C 6.40 (H) 12/11/2023     Lab Results   Component Value Date    ALT 15 12/12/2023     Lab Results   Component Value Date    HGBA1C 6.40 (H) 12/11/2023     Lab Results   Component Value Date    CREATININE 0.67 (L) 01/25/2024     No results found for: \"IRON\", \"TIBC\", \"FERRITIN\"  Lab Results   Component Value Date    INR 0.82 (L) 12/08/2021    PROTIME 11.7 (L) 12/08/2021        Lab Results   Component Value Date    ABSOLUTELUNG 42 (A) 01/25/2024       --------------------------------------------------------------------------------------------------------------------------    ASSESSMENT/PLAN:      Diagnosis Plan   1. Carotid stenosis, asymptomatic, right        2. Essential hypertension        3. Dyslipidemia        4. Presence of cardiac pacemaker        5. Tobacco abuse            Shortness of breath  HFpEF  Appears overall compensated today.  Has been following with heart failure clinic.  Does have some mildly worsening lower extremity edema however " patient states he has been taking extra Bumex to help with this.  I did instruct him that if it does not improve to contact heart failure clinic.  They verbalized understanding.  Recent lab work stable.  Breathing is at baseline.  Continue Bumex and Entresto.    Pacemaker implantation  No recent pacemaker interrogation on file.  He is scheduled for next pacemaker clinic.    4.  Essential hypertension  Overall controlled.  Recent lab work stable.  Continue current management.    5.  Carotid artery disease  Follows with CT surgery who recommended yearly follow-up.  Continue aspirin and statin      Th shortness of breathis document has been @Electronically signed by LARY Kelley, 02/09/24, 10:21 AM EST.       Dictated Utilizing Dragon Dictation: Part of this note may be an electronic transcription/translation of spoken language to printed text using the Dragon Dictation System.    Follow-up appointment and medication changes provided in hand delivered After Visit Summary as well as reviewed in the room.

## 2024-02-12 NOTE — ADDENDUM NOTE
Encounter addended by: Comfort Modi [facility] on: 2/12/2024 10:21 AM   Actions taken: Social Care Target section edited

## 2024-02-28 ENCOUNTER — SPECIALTY PHARMACY (OUTPATIENT)
Dept: PHARMACY | Facility: HOSPITAL | Age: 74
End: 2024-02-28
Payer: MEDICARE

## 2024-03-04 ENCOUNTER — HOSPITAL ENCOUNTER (OUTPATIENT)
Dept: CARDIOLOGY | Facility: HOSPITAL | Age: 74
Discharge: HOME | End: 2024-03-04
Admitting: PHYSICIAN ASSISTANT
Payer: MEDICARE

## 2024-03-04 VITALS
BODY MASS INDEX: 39.04 KG/M2 | OXYGEN SATURATION: 96 % | DIASTOLIC BLOOD PRESSURE: 65 MMHG | HEART RATE: 90 BPM | WEIGHT: 263.6 LBS | HEIGHT: 69 IN | SYSTOLIC BLOOD PRESSURE: 139 MMHG

## 2024-03-04 DIAGNOSIS — I50.32 CHRONIC HEART FAILURE WITH PRESERVED EJECTION FRACTION (HFPEF): Primary | ICD-10-CM

## 2024-03-04 DIAGNOSIS — J96.11 CHRONIC HYPOXEMIC RESPIRATORY FAILURE: ICD-10-CM

## 2024-03-04 DIAGNOSIS — G47.33 OSA (OBSTRUCTIVE SLEEP APNEA): ICD-10-CM

## 2024-03-04 LAB
ANION GAP SERPL CALCULATED.3IONS-SCNC: 9.2 MMOL/L (ref 5–15)
BUN SERPL-MCNC: 13 MG/DL (ref 8–23)
BUN/CREAT SERPL: 20.3 (ref 7–25)
CALCIUM SPEC-SCNC: 10.1 MG/DL (ref 8.6–10.5)
CHLORIDE SERPL-SCNC: 99 MMOL/L (ref 98–107)
CO2 SERPL-SCNC: 33.8 MMOL/L (ref 22–29)
CREAT SERPL-MCNC: 0.64 MG/DL (ref 0.76–1.27)
EGFRCR SERPLBLD CKD-EPI 2021: 101.2 ML/MIN/1.73
GLUCOSE SERPL-MCNC: 118 MG/DL (ref 65–99)
MAGNESIUM SERPL-MCNC: 2.1 MG/DL (ref 1.6–2.4)
NT-PROBNP SERPL-MCNC: 90.9 PG/ML (ref 0–900)
POTASSIUM SERPL-SCNC: 4.2 MMOL/L (ref 3.5–5.2)
SODIUM SERPL-SCNC: 142 MMOL/L (ref 136–145)

## 2024-03-04 PROCEDURE — 94726 PLETHYSMOGRAPHY LUNG VOLUMES: CPT | Performed by: PHYSICIAN ASSISTANT

## 2024-03-04 PROCEDURE — 99215 OFFICE O/P EST HI 40 MIN: CPT | Performed by: PHYSICIAN ASSISTANT

## 2024-03-04 PROCEDURE — 83735 ASSAY OF MAGNESIUM: CPT | Performed by: PHYSICIAN ASSISTANT

## 2024-03-04 PROCEDURE — 80048 BASIC METABOLIC PNL TOTAL CA: CPT | Performed by: PHYSICIAN ASSISTANT

## 2024-03-04 PROCEDURE — 83880 ASSAY OF NATRIURETIC PEPTIDE: CPT | Performed by: PHYSICIAN ASSISTANT

## 2024-03-04 NOTE — PROGRESS NOTES
Heart Failure Clinic    Date: 03/04/24     Vitals:    03/04/24 1255   BP: 139/65   Pulse: 90   SpO2: 96%    Weight 263.6    Method of arrival: Other wheelchair    Weighing self daily: Yes    Monitoring Heart Failure Zones: Yes    Today's HF Zone: Green    Taking medications as prescribed: Yes    Edema Yes    Shortness of Air: Yes with activity    Number of pillows used at night: hospital bed    Educational Materials given:  guevara Slater Value: [id number]        Maria Luisa Rich RN 03/04/24 12:57 EST

## 2024-03-04 NOTE — PROGRESS NOTES
[facility]  JUNIE Perdomo James T, MD  [address] [place] [address]    Thank you for asking me to see Donaldo Roberts for congestive heart failure.    HPI:     This is a 73 y.o. male with known past medical history of :    Chronic HFpEF  TTE 11/18/2023 with EF 66-70% with mild concentric hypertrophy and grade I diastolic dysfunction.  Severe pulmonary HTN present and RVSP >55mmHg.    PATTY, noncompliant with NIPPV  COPD  Chronic hypoxemic and hypercarbic respiratory failure (3LPM)  PPM 2/2 first degree AV block with intermittent high-grade block and 7 beats Vtach on holter monitor prior to admission  Hx Ischemic CVA  Carotid stenosis  left carotid endarterectomy  BRYSON followed by Dr. Becerra  GERD  Obesity  Tobacco abuse    Donaldo Roberts presents for today for Heart Failure clinic evaluation.  The patient is typically seen by Duke Rosen MD.  Patient's primary cardiologist is Dr. Melgar & Ruth BOSCH.     Last known EF 66-70%.   Last known hospitalization and/or ED visit: Patient hospitalized from December 10 through December 13, 2023 with acute on chronic hypoxic and hypercapnic respiratory failure secondary to COPD exacerbation with noted acute on chronic diastolic heart failure also impacting admission.  He received referral to clinic via Ruth Aguayo prior to his admission.  Accompanied by: Wife and sons          03/04/24 visit data/details regarding:   Dyspnea: Dyspnea with significant exertion  Lower extremity swelling: Bilateral lower extremity swelling present  Abdominal swelling: Ongoing abdominal distension  Home weight: Weight monitoring booklet provided during initial visit; Has scale.   Home BP: BP monitoring booklet provided during initial visit; Has BP cuff.   Home heart rate: HR monitoring booklet provided during initial visit; Has monitoring device  Daily activities of living: Performing on her own  Pillows/lying flat:  Hospital bed   HF zone: Yellow zone.   Mr. Roberts is chest pain free and doing well.  He reports no ED or ER visits since his last appointments.   RedSvest could not be obtained secondary to low quality reading.     Mr. Roberts reports ongoing swelling in his lower extremities but reports it is close to his baseline.  It is improved with recent changes.   Of note, he does not often elevate his lower extremities.        Specialists:   Cardiology: Dr. Melgar & Ruth Aguayo NP         Review of Systems - Review of Systems   Constitutional: Negative for decreased appetite, fever, malaise/fatigue and night sweats.   HENT:  Negative for congestion and ear pain.    Eyes:  Negative for blurred vision and double vision.   Cardiovascular:  Positive for dyspnea on exertion and leg swelling.   Respiratory:  Positive for shortness of breath. Negative for cough and hemoptysis.    Endocrine: Negative for cold intolerance and heat intolerance.   Hematologic/Lymphatic: Negative for adenopathy and bleeding problem.   Skin:  Negative for color change, dry skin and nail changes.   Musculoskeletal:  Negative for arthritis, back pain and falls.   Gastrointestinal:  Negative for bloating and anorexia.   Genitourinary:  Negative for bladder incontinence, decreased libido and dysuria.   Neurological:  Negative for aphonia and difficulty with concentration.   Psychiatric/Behavioral:  Negative for altered mental status and hallucinations.         All other systems were reviewed and were negative.    Patient Active Problem List   Diagnosis    Benign prostatic hyperplasia with urinary obstruction    Herpesviral infection of penis    Shortness of breath    Carotid stenosis, asymptomatic, right    Essential hypertension    Dyslipidemia    Wenckebach block    PATTY (obstructive sleep apnea)    Presence of cardiac pacemaker    Severe sinus bradycardia    Chronic bronchitis    Cigarette nicotine dependence without complication    Hypoxia     Detrusor instability    Tobacco abuse    Respiratory failure    Physical debility    Chronic obstructive pulmonary disease    Hypercapnic respiratory failure    Pulmonary hypertension       family history includes Diabetes in his mother and sister; Heart disease in his mother; No Known Problems in his father; Stroke in his mother.     reports that he quit smoking about 5 months ago. His smoking use included cigarettes. He started smoking about 57 years ago. He has a 113.5 pack-year smoking history. He has been exposed to tobacco smoke. He has never used smokeless tobacco. He reports that he does not drink alcohol and does not use drugs.    Allergies   Allergen Reactions    Bee Pollen Anaphylaxis    Levaquin [Levofloxacin] Hallucinations    Penicillins Hives     Mother told him that, has taken amoxicillin with no problems         Current Outpatient Medications:     acetaminophen (TYLENOL) 500 MG tablet, Take 1 tablet by mouth Every 6 (Six) Hours As Needed for Mild Pain., Disp: , Rfl:     albuterol sulfate  (90 Base) MCG/ACT inhaler, Inhale 2 puffs by mouth Every 4 (Four) Hours As Needed for Wheezing or Shortness of Air., Disp: 18 g, Rfl: 8    aspirin 81 MG EC tablet, Take 1 tablet by mouth Daily., Disp: , Rfl:     Budeson-Glycopyrrol-Formoterol (Breztri Aerosphere) 160-9-4.8 MCG/ACT aerosol inhaler, Inhale 2 puffs 2 (Two) Times a Day. Indications: Chronic Obstructive Lung Disease, Disp: 10.7 g, Rfl: 5    bumetanide (BUMEX) 1 MG tablet, TAKE 1 TABLET BY MOUTH EVERY DAY. TAKE EXTRA TABLET AS NEEDED FOR 2-3 LB WEIGHT GAIN IN 24-28 HOURS, Disp: 135 tablet, Rfl: 0    clopidogrel (PLAVIX) 75 MG tablet, Take 1 tablet by mouth Daily., Disp: 30 tablet, Rfl: 11    dapagliflozin Propanediol 10 MG tablet, Take 1 tablet by mouth Daily., Disp: 30 tablet, Rfl: 3    famotidine (PEPCID) 20 MG tablet, Take 1 tablet by mouth Daily., Disp: , Rfl:     ipratropium (ATROVENT) 0.02 % nebulizer solution, Take 2.5 mL by nebulization 4  (Four) Times a Day As Needed for Wheezing or Shortness of Air., Disp: 360 mL, Rfl: 3    omeprazole (priLOSEC) 20 MG capsule, Take 1 capsule by mouth Daily., Disp: , Rfl:     rosuvastatin (CRESTOR) 10 MG tablet, Take 1 tablet by mouth Daily., Disp: , Rfl:     sacubitril-valsartan (ENTRESTO) 49-51 MG tablet, Take 1 tablet by mouth 2 (Two) Times a Day., Disp: 60 tablet, Rfl: 2      Physical Exam:  I have reviewed the patient's current vital signs as documented in the patient's EMR.   Vitals:    03/04/24 1255   BP: 139/65   Pulse: 90   SpO2: 96%       Body mass index is 38.93 kg/m².       03/04/24  1255   Weight: 120 kg (263 lb 9.6 oz)        Physical Exam  Vitals and nursing note reviewed.   Constitutional:       General: He is awake.      Appearance: He is morbidly obese.      Interventions: Nasal cannula in place.   HENT:      Head: Normocephalic and atraumatic.   Eyes:      General: Lids are normal.      Conjunctiva/sclera:      Right eye: Right conjunctiva is not injected.      Left eye: Left conjunctiva is not injected.   Cardiovascular:      Rate and Rhythm: Normal rate and regular rhythm.   Pulmonary:      Effort: No tachypnea.      Breath sounds: No wheezing, rhonchi or rales.   Abdominal:      General: Bowel sounds are normal.      Palpations: Abdomen is soft.      Tenderness: There is no abdominal tenderness.   Musculoskeletal:      Right lower leg: Edema present.      Left lower leg: Edema present.      Comments: Baseline edema   Skin:     General: Skin is warm and dry.   Neurological:      Mental Status: He is alert and oriented to person, place, and time.   Psychiatric:         Attention and Perception: Attention normal.         Mood and Affect: Mood normal.         Behavior: Behavior is cooperative.         JVP: Volume/Pulsation: Normal.        DATA REVIEWED:     EKG. I personally reviewed and interpreted the EKG.      ---------------------------------------------------  TTE/SIXTO:  Results for orders  placed during the hospital encounter of 11/17/23    Adult Transthoracic Echo Complete W/ Cont if Necessary Per Protocol    Interpretation Summary    The quality of the study is limited with poor acoustic windows.  The contrast study was done using Lumason to enhance endocardial border to assess LV function.    Left ventricular wall thickness is consistent with mild concentric hypertrophy.    Left ventricular systolic function is normal.  Estimated LVEF =  66 - 70%.    Left ventricular diastolic function is consistent with (grade I) impaired relaxation    No significant valvular heart disease is noted.    Estimated right ventricular systolic pressure from tricuspid regurgitation is markedly elevated (>55 mmHg)    Severe pulmonary hypertension is present.    There is no evidence of pericardial effusion. .        LAST HEART CATH/IF AVAILABLE:     No results found for this or any previous visit.      -----------------------------------------------------  CXR/Imaging:   Imaging Results (Most Recent)       None            I personally reviewed and interpreted the CXR.      -----------------------------------------------------  CT:   No radiology results for the last 30 days.  I personally reviewed the images of the CT scan.  My personal interpretation is below.      ----------------------------------------------------    PFTs:        --------------------------------------------------------------------------------------------------    Laboratory evaluations:    Lab Results   Component Value Date    GLUCOSE 151 (H) 01/25/2024    BUN 11 01/25/2024    CREATININE 0.67 (L) 01/25/2024    EGFRIFNONA 93 12/11/2021    BCR 16.4 01/25/2024    K 4.1 01/25/2024    CO2 31.7 (H) 01/25/2024    CALCIUM 10.1 01/25/2024    ALBUMIN 3.8 12/12/2023    AST 18 12/12/2023    ALT 15 12/12/2023     Lab Results   Component Value Date    WBC 8.29 12/13/2023    HGB 14.5 12/13/2023    HCT 47.4 12/13/2023    MCV 95.6 12/13/2023     12/13/2023  "    Lab Results   Component Value Date    CHOL 112 03/27/2023    TRIG 175 (H) 03/27/2023    HDL 37 (L) 03/27/2023    LDL 46 03/27/2023     Lab Results   Component Value Date    TSH 0.573 12/11/2023     Lab Results   Component Value Date    HGBA1C 6.40 (H) 12/11/2023     Lab Results   Component Value Date    ALT 15 12/12/2023     Lab Results   Component Value Date    HGBA1C 6.40 (H) 12/11/2023     Lab Results   Component Value Date    CREATININE 0.67 (L) 01/25/2024     No results found for: \"IRON\", \"TIBC\", \"FERRITIN\"  Lab Results   Component Value Date    INR 0.82 (L) 12/08/2021    PROTIME 11.7 (L) 12/08/2021        Lab Results   Component Value Date    ABSOLUTELUNG 42 (A) 01/25/2024       PAH RISK ASSESSMENT:      1. Chronic heart failure with preserved ejection fraction (HFpEF)          ORDERS PLACED TODAY:  Orders Placed This Encounter   Procedures    ReDs Vest    proBNP    Basic Metabolic Panel    Magnesium    proBNP    Basic Metabolic Panel    Magnesium        Diagnoses and all orders for this visit:    1. Chronic heart failure with preserved ejection fraction (HFpEF) (Primary)  -     proBNP; Future  -     Basic Metabolic Panel; Future  -     Magnesium; Future  -     proBNP; Standing  -     proBNP  -     Basic Metabolic Panel; Standing  -     Basic Metabolic Panel  -     Magnesium; Standing  -     Magnesium  -     ReDs Vest             MEDS ORDERED TODAY:    No orders of the defined types were placed in this encounter.       ---------------------------------------------------------------------------------------------------------------------------          ASSESSMENT/PLAN:      Diagnosis Plan   1. Chronic heart failure with preserved ejection fraction (HFpEF)  proBNP    Basic Metabolic Panel    Magnesium    proBNP    proBNP    Basic Metabolic Panel    Basic Metabolic Panel    Magnesium    Magnesium    ReDs Vest          not acutely decompensated chronic diastolic heart failure.     NYHA stage Stage C: Structural " heart disease is present AND symptoms have occurredFC-Class III: Marked limitation of physical activity. Comfortable at rest. Less than ordinary activity causes fatigue, palpitation, or dyspnea.     Today, Patient is currently volume overloaded.and with  Moderate perfusion. The patient's hemodynamics are currently acceptable. HR is: normal and is at goal. BP/MAP was reviewed and there isroom for medication up-titration.  Clinical trajectory was assessed and hasworsened.     CHF GOAL DIRECTED MEDICAL THERAPY FOR PATIENT ADDRESSED/ADJUSTED:     GDMT: HFpEF    Drug Class   Drug   Dose Last Dose Adjustment Notes   ACEi/ARB/ARNI Entresto  49-51 mg BID     Beta Blocker No BB on board      MRA       SGLT2i Farxiga 10mg   N/A   Secondaries if applicable:          -CHF Specific BB:    Patient is HFpEF. Hold on BB for now.    We discussed processes/benefits of HF clinic including nursing, pharmacist, and provider evaluation during each visit with ability for in office ReDS vest, labs, and ability to provide IV diuresis in the clinic with close outpatient monitoring.  Additionally, patient was educated about the availability of delivery of medications to patient's clinic room prior to leaving the building which assists with medication compliance and insures medications are in hands when changes are made (if patient opts for apothecary usage) with thorough guidance regarding changes and medication schedule provided.          -ACE/ARB/ARNi:   Continue Entresto as outlined above.   See below regarding stopping CCB.      -MRA:   The patient is FC-NYHA Class III and MRA is indicated.   Consider in future visit.      -SGLT2 inhibitor therapy:   A BMP at initiation to verify GFR >30 was recommended, as was interval GFR surveillance.  The patient was advised to hold SGLT2I when PO intake is restricted due to a planned surgery, or due to an underlying illness.    Recommend starting Farxiga (dapagliflozin) 10mg daily with quarterly  assessment of GFR.  Samples provided in office.   Pt was advised SEs, some severe, including hypersensitivity and Miller's; coupled with discussion regarding common side effects of UTIs and female genital mycotic infections were discussed. If you will be NPO, or are sick (poor PO intake, N&V) please hold the medication until you are back to a normal diet.     -Diuretic regimen:   ReDS Vest reading for. 03/04/24  could not be obtained due to low quality; ReDs Vest reading reviewed with patient.    Continue Bumex 1mg qd.  Continue  Entresto 49-51mg BID as previously outlined.   Hoping natriuresis with ARNi use impacts swelling.    BMP, Mag, & ProBNP reviewed with patient.      -Fluid restriction/Sodium restriction:   Requested 2000 ml restriction  Patient has been asked to weigh daily and was provided with a printed diuretic strategy.  1,500 mg Na restriction was discussed.        -Acute vs. Chronic underlying conditions other than HF addressed during visit:   Chronic hypoxemic respiratory failure:   COPD without exacerbation:   PATTY with NIPPV noncompliance:   Continue Pulm f/u.    Continue COPD clinic f/u.     Essential Hypertension:   Continue ARNi as outlined.    Stop Norvasc on 02/06/24  Stop Lisinopril on 02/05/24.       Carotid stenosis s/p prior L CEA  R carotid stenosis  Continue f/u with Dr. Becerra & crew.   Continue statin  &  crestor.     Identifiable barriers to Heart Failure Self-care:   Medical Barriers: Debility  Social Barriers:  No immediate known barriers.         CONSIDERATIONS:   ------------------------------------------------------------------  -Iron/Anemia in CHF:  December 2023 Hgb WNL.   ------------------------------------------------------------    -Sleep/Apnea:   Noncompliant with NIPPV.   Continue Pulm f/u     --------------------------------------------------------------------------------        Class 2 Severe Obesity (BMI >=35 and <=39.9). Obesity-related health conditions include the  following: obstructive sleep apnea, hypertension, coronary heart disease, diabetes mellitus, dyslipidemias, GERD, and osteoarthritis. Obesity is unchanged. BMI is is above average; BMI management plan is completed. We discussed portion control, increasing exercise, and Heart failure dietary management .              >45  minutes out of 60 minutes face to face spent counseling patient extensively on dietary Na+ intake, importance of activity, weight monitoring, compliance with medications in addition to importance of titration with goal directed medical therapy and follow up appointments.            This document has been electronically signed by Yakelin Stacy PA-C  March 4, 2024 13:08 EST      Dictated Utilizing Dragon Dictation: Part of this note may be an electronic transcription/translation of spoken language to printed text using the Dragon Dictation System.    Follow-up appointment and medication changes provided in hand delivered After Visit Summary as well as reviewed in the room.    -+

## 2024-03-04 NOTE — PROGRESS NOTES
Heart Failure Clinic  Pharmacist Note     Donaldo Roberts is a 73 y.o. male seen in the Heart Failure Clinic for HFpEF with an EF of 66-70% on 11/18/23. Patient was recently hospitalized from 12/10/23-12/13/23 for acute on chronic respiratory failure and CHF, as well as a COPD exacerbation.      Donaldo Roberts reports a poor understanding of medications.  He is accompanied by his wife today, who helps to manage his medications. They brought in his daily logs and BP's are great running in the 120-130's/60-70's and HR in the 70-80's. During the past week his BP's have been slightly higher in the mornings around 140's, due to not feeling as well. He reports that his swelling is getting better, but is still present. He is currently taking Bumex 1mg daily and has taken 3 additional doses since his last appointment. His weights per log fluctuate up and down. He again denies any side effects from the newly started Farxiga and reports adherence to his medications.       Medication Use:   Hx of med intolerances:  None related to HF  Retail Rx Management: [facility]; [place] for Farxiga and Entresto- ship to patient    Past Medical History:   Diagnosis Date    Acid reflux     Anxiety     Arthritis     Carotid artery stenosis, symptomatic, right 11/23/2021    Chronic obstructive pulmonary disease 12/5/2023    Essential hypertension 11/23/2021    Herpes     History of lipoma     History of transfusion     Hyperlipidemia     Inguinal hernia     Peptic ulcer     Stroke      ALLERGIES: Bee pollen, Levaquin [levofloxacin], and Penicillins  Current Outpatient Medications   Medication Sig Dispense Refill    acetaminophen (TYLENOL) 500 MG tablet Take 1 tablet by mouth Every 6 (Six) Hours As Needed for Mild Pain.      albuterol sulfate  (90 Base) MCG/ACT inhaler Inhale 2 puffs by mouth Every 4 (Four) Hours As Needed for Wheezing or Shortness of Air. 18 g 8    aspirin 81 MG EC tablet Take 1 tablet by mouth Daily.       "Budeson-Glycopyrrol-Formoterol (Breztri Aerosphere) 160-9-4.8 MCG/ACT aerosol inhaler Inhale 2 puffs 2 (Two) Times a Day. Indications: Chronic Obstructive Lung Disease 10.7 g 5    bumetanide (BUMEX) 1 MG tablet TAKE 1 TABLET BY MOUTH EVERY DAY. TAKE EXTRA TABLET AS NEEDED FOR 2-3 LB WEIGHT GAIN IN 24-28 HOURS 135 tablet 0    clopidogrel (PLAVIX) 75 MG tablet Take 1 tablet by mouth Daily. 30 tablet 11    dapagliflozin Propanediol 10 MG tablet Take 1 tablet by mouth Daily. 30 tablet 3    famotidine (PEPCID) 20 MG tablet Take 1 tablet by mouth Daily.      ipratropium (ATROVENT) 0.02 % nebulizer solution Take 2.5 mL by nebulization 4 (Four) Times a Day As Needed for Wheezing or Shortness of Air. 360 mL 3    omeprazole (priLOSEC) 20 MG capsule Take 1 capsule by mouth Daily.      rosuvastatin (CRESTOR) 10 MG tablet Take 1 tablet by mouth Daily.      sacubitril-valsartan (ENTRESTO) 49-51 MG tablet Take 1 tablet by mouth 2 (Two) Times a Day. 60 tablet 5     No current facility-administered medications for this encounter.       Vaccination History:   Pneumonia: UTD 2/5/24  Annual Influenza: UTD 23-24  Shingles: Never had- interested in getting in the future.   Covid: UTD 2/5/24    Objective  Vitals:    03/04/24 1255   BP: 139/65   BP Location: Left arm   Patient Position: Sitting   Cuff Size: Adult   Pulse: 90   SpO2: 96%   Weight: 120 kg (263 lb 9.6 oz)   Height: 175.3 cm (69\")       Wt Readings from Last 3 Encounters:   03/04/24 120 kg (263 lb 9.6 oz)   02/05/24 120 kg (264 lb)   02/05/24 120 kg (264 lb)         03/04/24  1255   Weight: 120 kg (263 lb 9.6 oz)       Lab Results   Component Value Date    GLUCOSE 118 (H) 03/04/2024    BUN 13 03/04/2024    CREATININE 0.64 (L) 03/04/2024    EGFRIFNONA 93 12/11/2021    BCR 20.3 03/04/2024    K 4.2 03/04/2024    CO2 33.8 (H) 03/04/2024    CALCIUM 10.1 03/04/2024    ALBUMIN 3.8 12/12/2023    AST 18 12/12/2023    ALT 15 12/12/2023     Lab Results   Component Value Date    WBC 8.29 " 12/13/2023    HGB 14.5 12/13/2023    HCT 47.4 12/13/2023    MCV 95.6 12/13/2023     12/13/2023     Lab Results   Component Value Date    TROPONINT 36 (H) 12/10/2023     Lab Results   Component Value Date    PROBNP 90.9 03/04/2024     Results for orders placed during the hospital encounter of 11/17/23    Adult Transthoracic Echo Complete W/ Cont if Necessary Per Protocol    Interpretation Summary    The quality of the study is limited with poor acoustic windows.  The contrast study was done using Lumason to enhance endocardial border to assess LV function.    Left ventricular wall thickness is consistent with mild concentric hypertrophy.    Left ventricular systolic function is normal.  Estimated LVEF =  66 - 70%.    Left ventricular diastolic function is consistent with (grade I) impaired relaxation    No significant valvular heart disease is noted.    Estimated right ventricular systolic pressure from tricuspid regurgitation is markedly elevated (>55 mmHg)    Severe pulmonary hypertension is present.    There is no evidence of pericardial effusion. .         GDMT    Drug Class   Drug   Dose Last Dose Adjustment Additional Titration   Notes   ACEi/ARB/ARNI Entresto 49/51mg 2/5/24     Beta Blocker        MRA        SGLT2i Farxiga 10mg 1/25/24         Drug Therapy Problems    1. GDMT        Recommendations:     No recommendations at this time. Continue current regimen.     Patient was educated on heart failure medications and the importance of medication adherence. All questions were addressed and patient expressed understanding. Used teach-back method to assess understanding.     Thank you for allowing me to participate in the care of your patient,    Comfort Modi, [facility]  03/04/24  14:27 EST

## 2024-03-06 ENCOUNTER — SPECIALTY PHARMACY (OUTPATIENT)
Dept: PHARMACY | Facility: HOSPITAL | Age: 74
End: 2024-03-06
Payer: MEDICARE

## 2024-03-25 ENCOUNTER — SPECIALTY PHARMACY (OUTPATIENT)
Dept: PHARMACY | Facility: HOSPITAL | Age: 74
End: 2024-03-25
Payer: MEDICARE

## 2024-03-26 ENCOUNTER — APPOINTMENT (OUTPATIENT)
Dept: CT IMAGING | Facility: HOSPITAL | Age: 74
DRG: 388 | End: 2024-03-26
Payer: MEDICARE

## 2024-03-26 ENCOUNTER — APPOINTMENT (OUTPATIENT)
Dept: GENERAL RADIOLOGY | Facility: HOSPITAL | Age: 74
DRG: 388 | End: 2024-03-26
Payer: MEDICARE

## 2024-03-26 ENCOUNTER — HOSPITAL ENCOUNTER (INPATIENT)
Facility: HOSPITAL | Age: 74
LOS: 3 days | Discharge: HOME | DRG: 388 | End: 2024-03-30
Attending: STUDENT IN AN ORGANIZED HEALTH CARE EDUCATION/TRAINING PROGRAM | Admitting: HOSPITALIST
Payer: MEDICARE

## 2024-03-26 DIAGNOSIS — K56.609 SMALL BOWEL OBSTRUCTION: Primary | ICD-10-CM

## 2024-03-26 LAB
A-A DO2: 104.4 MMHG (ref 0–300)
ALBUMIN SERPL-MCNC: 4.6 G/DL (ref 3.5–5.2)
ALBUMIN/GLOB SERPL: 1.2 G/DL
ALP SERPL-CCNC: 95 U/L (ref 39–117)
ALT SERPL W P-5'-P-CCNC: 23 U/L (ref 1–41)
ANION GAP SERPL CALCULATED.3IONS-SCNC: 16.3 MMOL/L (ref 5–15)
ARTERIAL PATENCY WRIST A: ABNORMAL
AST SERPL-CCNC: 18 U/L (ref 1–40)
ATMOSPHERIC PRESS: 723 MMHG
BASE EXCESS BLDA CALC-SCNC: 9.7 MMOL/L (ref 0–2)
BDY SITE: ABNORMAL
BILIRUB SERPL-MCNC: 0.7 MG/DL (ref 0–1.2)
BUN SERPL-MCNC: 24 MG/DL (ref 8–23)
BUN/CREAT SERPL: 26.1 (ref 7–25)
CALCIUM SPEC-SCNC: 10.6 MG/DL (ref 8.6–10.5)
CHLORIDE SERPL-SCNC: 92 MMOL/L (ref 98–107)
CO2 BLDA-SCNC: 38.3 MMOL/L (ref 22–33)
CO2 SERPL-SCNC: 32.7 MMOL/L (ref 22–29)
COHGB MFR BLD: 1.1 % (ref 0–5)
CREAT SERPL-MCNC: 0.92 MG/DL (ref 0.76–1.27)
CRP SERPL-MCNC: 12.21 MG/DL (ref 0–0.5)
D-LACTATE SERPL-SCNC: 2.4 MMOL/L (ref 0.5–2)
DEPRECATED RDW RBC AUTO: 46.6 FL (ref 37–54)
EGFRCR SERPLBLD CKD-EPI 2021: 88.9 ML/MIN/1.73
ERYTHROCYTE [DISTWIDTH] IN BLOOD BY AUTOMATED COUNT: 13.1 % (ref 12.3–15.4)
FLUAV RNA RESP QL NAA+PROBE: NOT DETECTED
FLUBV RNA RESP QL NAA+PROBE: NOT DETECTED
GAS FLOW AIRWAY: 4 LPM
GLOBULIN UR ELPH-MCNC: 4 GM/DL
GLUCOSE SERPL-MCNC: 182 MG/DL (ref 65–99)
HCO3 BLDA-SCNC: 36.5 MMOL/L (ref 20–26)
HCT VFR BLD AUTO: 45.8 % (ref 37.5–51)
HCT VFR BLD CALC: 45.5 % (ref 38–51)
HGB BLD-MCNC: 14.3 G/DL (ref 13–17.7)
HGB BLDA-MCNC: 14.8 G/DL (ref 14–18)
HOLD SPECIMEN: NORMAL
HOLD SPECIMEN: NORMAL
HYPOCHROMIA BLD QL: ABNORMAL
INHALED O2 CONCENTRATION: 36 %
LYMPHOCYTES # BLD MANUAL: 1.05 10*3/MM3 (ref 0.7–3.1)
LYMPHOCYTES NFR BLD MANUAL: 9 % (ref 5–12)
Lab: ABNORMAL
MCH RBC QN AUTO: 30 PG (ref 26.6–33)
MCHC RBC AUTO-ENTMCNC: 31.2 G/DL (ref 31.5–35.7)
MCV RBC AUTO: 96.2 FL (ref 79–97)
METHGB BLD QL: <-0.1 % (ref 0–3)
MODALITY: ABNORMAL
MONOCYTES # BLD: 0.47 10*3/MM3 (ref 0.1–0.9)
NEUTROPHILS # BLD AUTO: 3.74 10*3/MM3 (ref 1.7–7)
NEUTROPHILS NFR BLD MANUAL: 62 % (ref 42.7–76)
NEUTS BAND NFR BLD MANUAL: 9 % (ref 0–5)
NOTE: ABNORMAL
NOTIFIED BY: ABNORMAL
NOTIFIED WHO: ABNORMAL
NT-PROBNP SERPL-MCNC: 1999 PG/ML (ref 0–900)
OXYHGB MFR BLDV: 94.3 % (ref 94–99)
PCO2 BLDA: 56.4 MM HG (ref 35–45)
PCO2 TEMP ADJ BLD: ABNORMAL MM[HG]
PH BLDA: 7.42 PH UNITS (ref 7.35–7.45)
PH, TEMP CORRECTED: ABNORMAL
PLAT MORPH BLD: NORMAL
PLATELET # BLD AUTO: 241 10*3/MM3 (ref 140–450)
PMV BLD AUTO: 10.6 FL (ref 6–12)
PO2 BLDA: 76.4 MM HG (ref 83–108)
PO2 TEMP ADJ BLD: ABNORMAL MM[HG]
POTASSIUM SERPL-SCNC: 4.1 MMOL/L (ref 3.5–5.2)
PROCALCITONIN SERPL-MCNC: 0.43 NG/ML (ref 0–0.25)
PROT SERPL-MCNC: 8.6 G/DL (ref 6–8.5)
RBC # BLD AUTO: 4.76 10*6/MM3 (ref 4.14–5.8)
SAO2 % BLDCOA: 95.1 % (ref 94–99)
SARS-COV-2 RNA RESP QL NAA+PROBE: NOT DETECTED
SODIUM SERPL-SCNC: 141 MMOL/L (ref 136–145)
TROPONIN T SERPL HS-MCNC: 34 NG/L
VARIANT LYMPHS NFR BLD MANUAL: 20 % (ref 19.6–45.3)
VENTILATOR MODE: ABNORMAL
WBC NRBC COR # BLD AUTO: 5.27 10*3/MM3 (ref 3.4–10.8)
WHOLE BLOOD HOLD COAG: NORMAL
WHOLE BLOOD HOLD SPECIMEN: NORMAL

## 2024-03-26 PROCEDURE — 83880 ASSAY OF NATRIURETIC PEPTIDE: CPT | Performed by: PHYSICIAN ASSISTANT

## 2024-03-26 PROCEDURE — 25510000001 IOPAMIDOL PER 1 ML: Performed by: STUDENT IN AN ORGANIZED HEALTH CARE EDUCATION/TRAINING PROGRAM

## 2024-03-26 PROCEDURE — 74177 CT ABD & PELVIS W/CONTRAST: CPT

## 2024-03-26 PROCEDURE — 82805 BLOOD GASES W/O2 SATURATION: CPT

## 2024-03-26 PROCEDURE — 36600 WITHDRAWAL OF ARTERIAL BLOOD: CPT

## 2024-03-26 PROCEDURE — 71045 X-RAY EXAM CHEST 1 VIEW: CPT

## 2024-03-26 PROCEDURE — 25810000003 LACTATED RINGERS SOLUTION: Performed by: PHYSICIAN ASSISTANT

## 2024-03-26 PROCEDURE — 87040 BLOOD CULTURE FOR BACTERIA: CPT | Performed by: STUDENT IN AN ORGANIZED HEALTH CARE EDUCATION/TRAINING PROGRAM

## 2024-03-26 PROCEDURE — 85025 COMPLETE CBC W/AUTO DIFF WBC: CPT | Performed by: STUDENT IN AN ORGANIZED HEALTH CARE EDUCATION/TRAINING PROGRAM

## 2024-03-26 PROCEDURE — 86140 C-REACTIVE PROTEIN: CPT | Performed by: PHYSICIAN ASSISTANT

## 2024-03-26 PROCEDURE — 80053 COMPREHEN METABOLIC PANEL: CPT | Performed by: STUDENT IN AN ORGANIZED HEALTH CARE EDUCATION/TRAINING PROGRAM

## 2024-03-26 PROCEDURE — 84484 ASSAY OF TROPONIN QUANT: CPT | Performed by: STUDENT IN AN ORGANIZED HEALTH CARE EDUCATION/TRAINING PROGRAM

## 2024-03-26 PROCEDURE — 25810000003 SODIUM CHLORIDE 0.9 % SOLUTION: Performed by: PHYSICIAN ASSISTANT

## 2024-03-26 PROCEDURE — 25010000002 VANCOMYCIN 5 G RECONSTITUTED SOLUTION: Performed by: PHYSICIAN ASSISTANT

## 2024-03-26 PROCEDURE — 25010000002 AZTREONAM PER 500 MG: Performed by: PHYSICIAN ASSISTANT

## 2024-03-26 PROCEDURE — 71275 CT ANGIOGRAPHY CHEST: CPT

## 2024-03-26 PROCEDURE — 36415 COLL VENOUS BLD VENIPUNCTURE: CPT

## 2024-03-26 PROCEDURE — 82375 ASSAY CARBOXYHB QUANT: CPT

## 2024-03-26 PROCEDURE — 93005 ELECTROCARDIOGRAM TRACING: CPT | Performed by: STUDENT IN AN ORGANIZED HEALTH CARE EDUCATION/TRAINING PROGRAM

## 2024-03-26 PROCEDURE — 93010 ELECTROCARDIOGRAM REPORT: CPT | Performed by: INTERNAL MEDICINE

## 2024-03-26 PROCEDURE — 85007 BL SMEAR W/DIFF WBC COUNT: CPT | Performed by: STUDENT IN AN ORGANIZED HEALTH CARE EDUCATION/TRAINING PROGRAM

## 2024-03-26 PROCEDURE — 87636 SARSCOV2 & INF A&B AMP PRB: CPT | Performed by: PHYSICIAN ASSISTANT

## 2024-03-26 PROCEDURE — 99285 EMERGENCY DEPT VISIT HI MDM: CPT

## 2024-03-26 PROCEDURE — 84145 PROCALCITONIN (PCT): CPT | Performed by: PHYSICIAN ASSISTANT

## 2024-03-26 PROCEDURE — 83050 HGB METHEMOGLOBIN QUAN: CPT

## 2024-03-26 PROCEDURE — 83605 ASSAY OF LACTIC ACID: CPT | Performed by: STUDENT IN AN ORGANIZED HEALTH CARE EDUCATION/TRAINING PROGRAM

## 2024-03-26 PROCEDURE — 71045 X-RAY EXAM CHEST 1 VIEW: CPT | Performed by: RADIOLOGY

## 2024-03-26 PROCEDURE — 25010000002 METOCLOPRAMIDE PER 10 MG: Performed by: PHYSICIAN ASSISTANT

## 2024-03-26 RX ORDER — METOPROLOL TARTRATE 1 MG/ML
2.5 INJECTION, SOLUTION INTRAVENOUS ONCE
Status: COMPLETED | OUTPATIENT
Start: 2024-03-26 | End: 2024-03-26

## 2024-03-26 RX ORDER — METOPROLOL TARTRATE 1 MG/ML
2.5 INJECTION, SOLUTION INTRAVENOUS ONCE
Status: COMPLETED | OUTPATIENT
Start: 2024-03-27 | End: 2024-03-26

## 2024-03-26 RX ORDER — METOCLOPRAMIDE HYDROCHLORIDE 5 MG/ML
10 INJECTION INTRAMUSCULAR; INTRAVENOUS ONCE
Status: COMPLETED | OUTPATIENT
Start: 2024-03-26 | End: 2024-03-26

## 2024-03-26 RX ORDER — VANCOMYCIN 2 GRAM/500 ML IN 0.9 % SODIUM CHLORIDE INTRAVENOUS
2000 ONCE
Status: COMPLETED | OUTPATIENT
Start: 2024-03-26 | End: 2024-03-27

## 2024-03-26 RX ORDER — SODIUM CHLORIDE 0.9 % (FLUSH) 0.9 %
10 SYRINGE (ML) INJECTION AS NEEDED
Status: DISCONTINUED | OUTPATIENT
Start: 2024-03-26 | End: 2024-03-30 | Stop reason: HOSPADM

## 2024-03-26 RX ADMIN — SODIUM CHLORIDE 2 G: 9 INJECTION, SOLUTION INTRAVENOUS at 22:47

## 2024-03-26 RX ADMIN — SODIUM CHLORIDE, POTASSIUM CHLORIDE, SODIUM LACTATE AND CALCIUM CHLORIDE 500 ML: 600; 310; 30; 20 INJECTION, SOLUTION INTRAVENOUS at 23:10

## 2024-03-26 RX ADMIN — VANCOMYCIN 2 GRAM/500 ML IN 0.9 % SODIUM CHLORIDE INTRAVENOUS 2000 MG: at 23:44

## 2024-03-26 RX ADMIN — METOCLOPRAMIDE 10 MG: 5 INJECTION, SOLUTION INTRAMUSCULAR; INTRAVENOUS at 22:48

## 2024-03-26 RX ADMIN — METOPROLOL TARTRATE 2.5 MG: 1 INJECTION, SOLUTION INTRAVENOUS at 23:58

## 2024-03-26 RX ADMIN — METOPROLOL TARTRATE 2.5 MG: 1 INJECTION, SOLUTION INTRAVENOUS at 22:48

## 2024-03-26 RX ADMIN — IOPAMIDOL 88 ML: 755 INJECTION, SOLUTION INTRAVENOUS at 23:01

## 2024-03-27 ENCOUNTER — APPOINTMENT (OUTPATIENT)
Dept: CARDIOLOGY | Facility: HOSPITAL | Age: 74
DRG: 388 | End: 2024-03-27
Payer: MEDICARE

## 2024-03-27 ENCOUNTER — APPOINTMENT (OUTPATIENT)
Dept: GENERAL RADIOLOGY | Facility: HOSPITAL | Age: 74
DRG: 388 | End: 2024-03-27
Payer: MEDICARE

## 2024-03-27 PROBLEM — K56.609 SBO (SMALL BOWEL OBSTRUCTION): Status: ACTIVE | Noted: 2024-03-27

## 2024-03-27 LAB
ALBUMIN SERPL-MCNC: 4.3 G/DL (ref 3.5–5.2)
ALBUMIN/GLOB SERPL: 1.2 G/DL
ALP SERPL-CCNC: 86 U/L (ref 39–117)
ALT SERPL W P-5'-P-CCNC: 21 U/L (ref 1–41)
ANION GAP SERPL CALCULATED.3IONS-SCNC: 13 MMOL/L (ref 5–15)
AST SERPL-CCNC: 17 U/L (ref 1–40)
BACTERIA UR QL AUTO: NORMAL /HPF
BH CV ECHO MEAS - EDV(CUBED): 54.9 ML
BH CV ECHO MEAS - EDV(MOD-SP4): 60 ML
BH CV ECHO MEAS - EF(MOD-BP): 49 %
BH CV ECHO MEAS - EF(MOD-SP4): 44.7 %
BH CV ECHO MEAS - ESV(CUBED): 27 ML
BH CV ECHO MEAS - ESV(MOD-SP4): 33.2 ML
BH CV ECHO MEAS - FS: 21.1 %
BH CV ECHO MEAS - IVS/LVPW: 1 CM
BH CV ECHO MEAS - IVSD: 1 CM
BH CV ECHO MEAS - LV DIASTOLIC VOL/BSA (35-75): 26 CM2
BH CV ECHO MEAS - LV MASS(C)D: 117.3 GRAMS
BH CV ECHO MEAS - LV SYSTOLIC VOL/BSA (12-30): 14.4 CM2
BH CV ECHO MEAS - LVIDD: 3.8 CM
BH CV ECHO MEAS - LVIDS: 3 CM
BH CV ECHO MEAS - LVPWD: 1 CM
BH CV ECHO MEAS - SI(MOD-SP4): 11.6 ML/M2
BH CV ECHO MEAS - SV(MOD-SP4): 26.8 ML
BILIRUB SERPL-MCNC: 0.4 MG/DL (ref 0–1.2)
BILIRUB UR QL STRIP: NEGATIVE
BUN SERPL-MCNC: 28 MG/DL (ref 8–23)
BUN/CREAT SERPL: 32.9 (ref 7–25)
CALCIUM SPEC-SCNC: 9.7 MG/DL (ref 8.6–10.5)
CHLORIDE SERPL-SCNC: 95 MMOL/L (ref 98–107)
CLARITY UR: CLEAR
CO2 SERPL-SCNC: 32 MMOL/L (ref 22–29)
COLOR UR: YELLOW
CREAT SERPL-MCNC: 0.85 MG/DL (ref 0.76–1.27)
CRP SERPL-MCNC: 13.35 MG/DL (ref 0–0.5)
D-LACTATE SERPL-SCNC: 1.7 MMOL/L (ref 0.5–2)
D-LACTATE SERPL-SCNC: 2.3 MMOL/L (ref 0.5–2)
DEPRECATED RDW RBC AUTO: 47.7 FL (ref 37–54)
EGFRCR SERPLBLD CKD-EPI 2021: 92.9 ML/MIN/1.73
ERYTHROCYTE [DISTWIDTH] IN BLOOD BY AUTOMATED COUNT: 13.2 % (ref 12.3–15.4)
GEN 5 2HR TROPONIN T REFLEX: 33 NG/L
GLOBULIN UR ELPH-MCNC: 3.7 GM/DL
GLUCOSE SERPL-MCNC: 150 MG/DL (ref 65–99)
GLUCOSE UR STRIP-MCNC: ABNORMAL MG/DL
HCT VFR BLD AUTO: 44.7 % (ref 37.5–51)
HGB BLD-MCNC: 13.7 G/DL (ref 13–17.7)
HGB UR QL STRIP.AUTO: NEGATIVE
HYALINE CASTS UR QL AUTO: NORMAL /LPF
HYPOCHROMIA BLD QL: ABNORMAL
KETONES UR QL STRIP: NEGATIVE
LEUKOCYTE ESTERASE UR QL STRIP.AUTO: NEGATIVE
LYMPHOCYTES # BLD MANUAL: 1.4 10*3/MM3 (ref 0.7–3.1)
LYMPHOCYTES NFR BLD MANUAL: 12 % (ref 5–12)
MACROCYTES BLD QL SMEAR: ABNORMAL
MCH RBC QN AUTO: 30.1 PG (ref 26.6–33)
MCHC RBC AUTO-ENTMCNC: 30.6 G/DL (ref 31.5–35.7)
MCV RBC AUTO: 98.2 FL (ref 79–97)
METAMYELOCYTES NFR BLD MANUAL: 2 % (ref 0–0)
MONOCYTES # BLD: 0.64 10*3/MM3 (ref 0.1–0.9)
NEUTROPHILS # BLD AUTO: 3.22 10*3/MM3 (ref 1.7–7)
NEUTROPHILS NFR BLD MANUAL: 31 % (ref 42.7–76)
NEUTS BAND NFR BLD MANUAL: 29 % (ref 0–5)
NITRITE UR QL STRIP: NEGATIVE
PH UR STRIP.AUTO: 5.5 [PH] (ref 5–8)
PLAT MORPH BLD: NORMAL
PLATELET # BLD AUTO: 226 10*3/MM3 (ref 140–450)
PMV BLD AUTO: 10.4 FL (ref 6–12)
POTASSIUM SERPL-SCNC: 4.1 MMOL/L (ref 3.5–5.2)
PROT SERPL-MCNC: 8 G/DL (ref 6–8.5)
PROT UR QL STRIP: ABNORMAL
QT INTERVAL: 384 MS
QTC INTERVAL: 547 MS
RBC # BLD AUTO: 4.55 10*6/MM3 (ref 4.14–5.8)
RBC # UR STRIP: NORMAL /HPF
REF LAB TEST METHOD: NORMAL
SODIUM SERPL-SCNC: 140 MMOL/L (ref 136–145)
SP GR UR STRIP: >1.03 (ref 1–1.03)
SQUAMOUS #/AREA URNS HPF: NORMAL /HPF
TROPONIN T DELTA: -1 NG/L
UROBILINOGEN UR QL STRIP: ABNORMAL
VARIANT LYMPHS NFR BLD MANUAL: 26 % (ref 19.6–45.3)
WBC # UR STRIP: NORMAL /HPF
WBC NRBC COR # BLD AUTO: 5.37 10*3/MM3 (ref 3.4–10.8)

## 2024-03-27 PROCEDURE — 93321 DOPPLER ECHO F-UP/LMTD STD: CPT | Performed by: INTERNAL MEDICINE

## 2024-03-27 PROCEDURE — 99222 1ST HOSP IP/OBS MODERATE 55: CPT | Performed by: NURSE PRACTITIONER

## 2024-03-27 PROCEDURE — 25010000002 LORAZEPAM PER 2 MG: Performed by: STUDENT IN AN ORGANIZED HEALTH CARE EDUCATION/TRAINING PROGRAM

## 2024-03-27 PROCEDURE — 85025 COMPLETE CBC W/AUTO DIFF WBC: CPT | Performed by: HOSPITALIST

## 2024-03-27 PROCEDURE — 84484 ASSAY OF TROPONIN QUANT: CPT | Performed by: STUDENT IN AN ORGANIZED HEALTH CARE EDUCATION/TRAINING PROGRAM

## 2024-03-27 PROCEDURE — 86140 C-REACTIVE PROTEIN: CPT | Performed by: HOSPITALIST

## 2024-03-27 PROCEDURE — 81001 URINALYSIS AUTO W/SCOPE: CPT | Performed by: PHYSICIAN ASSISTANT

## 2024-03-27 PROCEDURE — 74177 CT ABD & PELVIS W/CONTRAST: CPT | Performed by: RADIOLOGY

## 2024-03-27 PROCEDURE — 93308 TTE F-UP OR LMTD: CPT

## 2024-03-27 PROCEDURE — 83605 ASSAY OF LACTIC ACID: CPT | Performed by: STUDENT IN AN ORGANIZED HEALTH CARE EDUCATION/TRAINING PROGRAM

## 2024-03-27 PROCEDURE — 80053 COMPREHEN METABOLIC PANEL: CPT | Performed by: HOSPITALIST

## 2024-03-27 PROCEDURE — 99223 1ST HOSP IP/OBS HIGH 75: CPT

## 2024-03-27 PROCEDURE — 71275 CT ANGIOGRAPHY CHEST: CPT | Performed by: RADIOLOGY

## 2024-03-27 PROCEDURE — 85007 BL SMEAR W/DIFF WBC COUNT: CPT | Performed by: HOSPITALIST

## 2024-03-27 PROCEDURE — 25010000002 METRONIDAZOLE 500 MG/100ML SOLUTION: Performed by: HOSPITALIST

## 2024-03-27 PROCEDURE — 93321 DOPPLER ECHO F-UP/LMTD STD: CPT

## 2024-03-27 PROCEDURE — 93308 TTE F-UP OR LMTD: CPT | Performed by: INTERNAL MEDICINE

## 2024-03-27 PROCEDURE — 25010000002 CEFEPIME PER 500 MG: Performed by: HOSPITALIST

## 2024-03-27 PROCEDURE — 99221 1ST HOSP IP/OBS SF/LOW 40: CPT | Performed by: SURGERY

## 2024-03-27 PROCEDURE — 71045 X-RAY EXAM CHEST 1 VIEW: CPT | Performed by: RADIOLOGY

## 2024-03-27 PROCEDURE — 71045 X-RAY EXAM CHEST 1 VIEW: CPT

## 2024-03-27 RX ORDER — SODIUM CHLORIDE 0.9 % (FLUSH) 0.9 %
10 SYRINGE (ML) INJECTION EVERY 12 HOURS SCHEDULED
Status: DISCONTINUED | OUTPATIENT
Start: 2024-03-27 | End: 2024-03-30 | Stop reason: HOSPADM

## 2024-03-27 RX ORDER — SODIUM CHLORIDE 9 MG/ML
40 INJECTION, SOLUTION INTRAVENOUS AS NEEDED
Status: DISCONTINUED | OUTPATIENT
Start: 2024-03-27 | End: 2024-03-30 | Stop reason: HOSPADM

## 2024-03-27 RX ORDER — PANTOPRAZOLE SODIUM 40 MG/1
40 TABLET, DELAYED RELEASE ORAL
Status: CANCELLED | OUTPATIENT
Start: 2024-03-28

## 2024-03-27 RX ORDER — SODIUM CHLORIDE 0.9 % (FLUSH) 0.9 %
10 SYRINGE (ML) INJECTION AS NEEDED
Status: DISCONTINUED | OUTPATIENT
Start: 2024-03-27 | End: 2024-03-30 | Stop reason: HOSPADM

## 2024-03-27 RX ORDER — BUMETANIDE 1 MG/1
1 TABLET ORAL DAILY
COMMUNITY

## 2024-03-27 RX ORDER — LORAZEPAM 2 MG/ML
1 INJECTION INTRAMUSCULAR ONCE
Status: COMPLETED | OUTPATIENT
Start: 2024-03-27 | End: 2024-03-27

## 2024-03-27 RX ORDER — NITROGLYCERIN 0.4 MG/1
0.4 TABLET SUBLINGUAL
Status: DISCONTINUED | OUTPATIENT
Start: 2024-03-27 | End: 2024-03-30 | Stop reason: HOSPADM

## 2024-03-27 RX ORDER — CLOPIDOGREL BISULFATE 75 MG/1
75 TABLET ORAL DAILY
Status: CANCELLED | OUTPATIENT
Start: 2024-03-27

## 2024-03-27 RX ORDER — FAMOTIDINE 20 MG/1
20 TABLET, FILM COATED ORAL DAILY
Status: CANCELLED | OUTPATIENT
Start: 2024-03-27

## 2024-03-27 RX ORDER — BUMETANIDE 1 MG/1
1 TABLET ORAL DAILY
Status: CANCELLED | OUTPATIENT
Start: 2024-03-27

## 2024-03-27 RX ORDER — METRONIDAZOLE 500 MG/100ML
500 INJECTION, SOLUTION INTRAVENOUS EVERY 8 HOURS
Qty: 1500 ML | Refills: 0 | Status: DISCONTINUED | OUTPATIENT
Start: 2024-03-27 | End: 2024-03-30 | Stop reason: HOSPADM

## 2024-03-27 RX ORDER — ASPIRIN 81 MG/1
81 TABLET ORAL DAILY
Status: CANCELLED | OUTPATIENT
Start: 2024-03-27

## 2024-03-27 RX ORDER — ROSUVASTATIN CALCIUM 10 MG/1
10 TABLET, COATED ORAL DAILY
Status: CANCELLED | OUTPATIENT
Start: 2024-03-27

## 2024-03-27 RX ADMIN — CEFEPIME 2000 MG: 2 INJECTION, POWDER, FOR SOLUTION INTRAVENOUS at 10:53

## 2024-03-27 RX ADMIN — METRONIDAZOLE 500 MG: 500 INJECTION, SOLUTION INTRAVENOUS at 04:32

## 2024-03-27 RX ADMIN — LORAZEPAM 1 MG: 2 INJECTION INTRAMUSCULAR; INTRAVENOUS at 02:06

## 2024-03-27 RX ADMIN — Medication 10 ML: at 20:21

## 2024-03-27 RX ADMIN — CEFEPIME 2000 MG: 2 INJECTION, POWDER, FOR SOLUTION INTRAVENOUS at 17:32

## 2024-03-27 RX ADMIN — METRONIDAZOLE 500 MG: 500 INJECTION, SOLUTION INTRAVENOUS at 17:32

## 2024-03-27 RX ADMIN — CEFEPIME 2000 MG: 2 INJECTION, POWDER, FOR SOLUTION INTRAVENOUS at 02:54

## 2024-03-27 RX ADMIN — METRONIDAZOLE 500 MG: 500 INJECTION, SOLUTION INTRAVENOUS at 08:45

## 2024-03-27 RX ADMIN — Medication 10 ML: at 08:35

## 2024-03-27 NOTE — PAYOR COMM NOTE
"CONTACT: LUIS ALONSO RN  UTILIZATION MANAGEMENT DEPT.  [facility]  [address] [place] [address]  PHONE: [phone]  FAX: [phone]      INPATIENT AUTH REQUEST    Donaldo Roberts (73 y.o. Male)       Date of Birth   1950    Social Security Number       Address   [address]    Home Phone   [phone]    MRN   [id number]       Sabianist   Tenriism    Marital Status                               Admission Date   3/27/24    Admission Type   Emergency    Admitting Provider   Jhon Birch MD    Attending Provider   Charlene Swift MD    Department, Room/Bed   [facility] PROGRESS CARE, P218/1P       Discharge Date       Discharge Disposition       Discharge Destination                                 Attending Provider: Charlene Swift MD    Allergies: Bee Pollen, Levaquin [Levofloxacin], Penicillins    Isolation: None   Infection: None   Code Status: CPR    Ht: 175.3 cm (69\")   Wt: 118 kg (260 lb 5.8 oz)    Admission Cmt: None   Principal Problem: SBO (small bowel obstruction) [K56.609]                   Active Insurance as of 3/26/2024       Primary Coverage       Payor Plan Insurance Group Employer/Plan Group    ANTHEM MEDICARE REPLACEMENT ANTHEM MEDICARE ADVANTAGE [id number]       Payor Plan Address Payor Plan Phone Number Payor Plan Fax Number Effective Dates    [address] [phone]  1/1/2024 - None Entered    [facility] [id number]         Subscriber Name Subscriber Birth Date Member ID       DONALDO ROBERTS 1950 [id number]                     Emergency Contacts        (Rel.) Home Phone Work Phone Mobile Phone    ArmandoInna (Spouse) [phone] -- [phone]    ArmandoFernandoChad (Son) [phone] -- [phone]    VALERI ROBERTS -- -- [phone]                 History & Physical        Cisco Das PA-C at 03/27/24 0828              [facility] Medicine " Services  History & Physical    Patient Identification:  Name:  Donaldo Roberts  Age:  73 y.o.  Sex:  male  :  1950  MRN:  [id number]   Visit Number:  [id number]  Admit Date: 3/26/2024   Primary Care Physician:  Duke Rosen MD    Subjective     Chief complaint: Shortness of breath    History of presenting illness:      Donaldo Roberts is a 73 y.o. male who presented for further evaluation of shortness of breath. He was seen and examined in the PCU with no family present at the bedside. He is a very poor historian and initially stated he could not recall why he presented to the ED. He answered orientation questions correctly. With prompting he did admit to shortness of breath and generalized weakness, progressing over the previous 2 weeks. On further discussion he did admit to abdominal pain and vomiting, he thinks present and worsening for the past week. He states he moved his bowels just before presentation to the ED. Has not passed and gas today. He reported his shortness of breath seems to be worse with exertion. He has been compliant with home diuretics. He is not sure if he currently has increased lower extremity edema. He was unsure if he has ever had any abdominal surgeries.     Past medical history is significant for BPH, carotid stenosis, HTN, HLD, PATTY, sinus bradycardia s/p PPM, tobacco abuse, COPD with chronic respiratory failure (3 L baseline), chronic HFpEF    Upon arrival to the ED, vital signs were temp 98.4, heart rate 125, respirations 30, /82, SpO2 94% on 4 L per nasal cannula.  HS troponin is elevated but around baseline.  proBNP is elevated at 1999.  BUN is up at 24, anion gap is 16.3, bicarb is 32.7.  Lactate was elevated on arrival at 2.4 with downtrend to 1.7, CRP is 12.21 with repeat at 13.35 and Pro-Rakan is 0.43.  There is no leukocytosis or left shift.  Chest x-ray showed cardiac enlargement without definite edema.  CT of the abdomen and pelvis noted small bowel  obstruction with transition point in the mid to lower abdomen.  CT PE protocol negative for PE though did note areas of chronic consolidation and chronic scarring involving the lingula, right middle lobe, both lower lobes and unchanged from previous.    Known Emergency Department medications received prior to my evaluation included Azactam, cefepime, 500 mL LR bolus, Ativan, Reglan, Lopressor x 2, Flagyl, vancomycin.   Room location at the time of my evaluation was 218.     ---------------------------------------------------------------------------------------------------------------------   Review of Systems   Constitutional:  Positive for activity change and fatigue.   HENT:  Negative for congestion and rhinorrhea.    Respiratory:  Positive for shortness of breath. Negative for cough.    Cardiovascular:  Negative for chest pain and leg swelling.   Gastrointestinal:  Positive for abdominal distention, abdominal pain, nausea and vomiting.   Genitourinary:  Negative for difficulty urinating and dysuria.   Musculoskeletal:  Negative for arthralgias and myalgias.   Skin:  Negative for rash and wound.   Neurological:  Negative for dizziness and light-headedness.        ---------------------------------------------------------------------------------------------------------------------   Past Medical History:   Diagnosis Date    Acid reflux     Anxiety     Arthritis     Carotid artery stenosis, symptomatic, right 11/23/2021    Chronic obstructive pulmonary disease 12/5/2023    Essential hypertension 11/23/2021    Herpes     History of lipoma     History of transfusion     Hyperlipidemia     Inguinal hernia     Peptic ulcer     Stroke      Past Surgical History:   Procedure Laterality Date    ABDOMINAL SURGERY      CARDIAC ELECTROPHYSIOLOGY PROCEDURE N/A 12/10/2021    Procedure: Pacemaker DC new;  Surgeon: Jony Monte MD;  Location:  [facility] CATH INVASIVE LOCATION;  Service: Cardiology;  Laterality: N/A;    CAROTID  ENDARTERECTOMY Left     CHOLECYSTECTOMY WITH INTRAOPERATIVE CHOLANGIOGRAM N/A 2017    Procedure: CHOLECYSTECTOMY LAPAROSCOPIC INTRAOPERATIVE CHOLANGIOGRAM;  Surgeon: Luis Coe MD;  Location: [facility];  Service:     HERNIA REPAIR      inguinal    SINUS SURGERY      TUMOR EXCISION       Family History   Problem Relation Age of Onset    Stroke Mother     Heart disease Mother     Diabetes Mother     No Known Problems Father     Diabetes Sister      Social History     Socioeconomic History    Marital status:     Number of children: 6   Tobacco Use    Smoking status: Former     Current packs/day: 0.00     Average packs/day: 2.0 packs/day for 56.7 years (113.5 ttl pk-yrs)     Types: Cigarettes     Start date:      Quit date: 10/2023     Years since quittin.4     Passive exposure: Current    Smokeless tobacco: Never    Tobacco comments:     2 cigs per day/A pack per week.   Vaping Use    Vaping status: Never Used   Substance and Sexual Activity    Alcohol use: No    Drug use: No    Sexual activity: Defer     ---------------------------------------------------------------------------------------------------------------------   Allergies:  Bee pollen, Levaquin [levofloxacin], and Penicillins  ---------------------------------------------------------------------------------------------------------------------   Home medications:    Medications below are reported home medications pulling from within the system; at this time, these medications have not been reconciled unless otherwise specified and are in the verification process for further verifcation as current home medications.  Medications Prior to Admission   Medication Sig Dispense Refill Last Dose    acetaminophen (TYLENOL) 500 MG tablet Take 1 tablet by mouth Every 6 (Six) Hours As Needed for Mild Pain.       albuterol sulfate  (90 Base) MCG/ACT inhaler Inhale 2 puffs by mouth Every 4 (Four) Hours As Needed for Wheezing or  Shortness of Air. 18 g 8     aspirin 81 MG EC tablet Take 1 tablet by mouth Daily.       Budeson-Glycopyrrol-Formoterol (Breztri Aerosphere) 160-9-4.8 MCG/ACT aerosol inhaler Inhale 2 puffs 2 (Two) Times a Day. Indications: Chronic Obstructive Lung Disease 10.7 g 5     bumetanide (BUMEX) 1 MG tablet TAKE 1 TABLET BY MOUTH EVERY DAY. TAKE EXTRA TABLET AS NEEDED FOR 2-3 LB WEIGHT GAIN IN 24-28 HOURS 135 tablet 0     clopidogrel (PLAVIX) 75 MG tablet Take 1 tablet by mouth Daily. 30 tablet 11     dapagliflozin Propanediol 10 MG tablet Take 1 tablet by mouth Daily. 30 tablet 3     famotidine (PEPCID) 20 MG tablet Take 1 tablet by mouth Daily.       ipratropium (ATROVENT) 0.02 % nebulizer solution Take 2.5 mL by nebulization 4 (Four) Times a Day As Needed for Wheezing or Shortness of Air. 360 mL 3     omeprazole (priLOSEC) 20 MG capsule Take 1 capsule by mouth Daily.       rosuvastatin (CRESTOR) 10 MG tablet Take 1 tablet by mouth Daily.       sacubitril-valsartan (ENTRESTO) 49-51 MG tablet Take 1 tablet by mouth 2 (Two) Times a Day. 60 tablet 5        Hospital Scheduled Meds:  cefepime, 2,000 mg, Intravenous, Q8H  metroNIDAZOLE, 500 mg, Intravenous, Q8H  sodium chloride, 10 mL, Intravenous, Q12H      Pharmacy Consult - Pharmacy to dose,         Current listed hospital scheduled medications may not yet reflect those currently placed in orders that are signed and held awaiting patient's arrival to floor.   ---------------------------------------------------------------------------------------------------------------------     Objective     Vital Signs:  Temp:  [98.4 °F (36.9 °C)-99 °F (37.2 °C)] 99 °F (37.2 °C)  Heart Rate:  [102-125] 112  Resp:  [14-30] 14  BP: (107-160)/(69-96) 111/74      03/26/24  2140 03/27/24  0616   Weight: 118 kg (260 lb) 118 kg (260 lb 5.8 oz)     Body mass index is 38.45 kg/m².  ---------------------------------------------------------------------------------------------------------------------        Physical Exam  Vitals and nursing note reviewed.   Constitutional:       General: He is not in acute distress.     Appearance: He is obese. He is ill-appearing.   HENT:      Head: Normocephalic and atraumatic.   Eyes:      Extraocular Movements: Extraocular movements intact.   Cardiovascular:      Rate and Rhythm: Regular rhythm. Tachycardia present.   Pulmonary:      Effort: Pulmonary effort is normal.      Breath sounds: Normal breath sounds. No wheezing.   Abdominal:      General: There is distension.      Comments: Hyperactive bowel sounds  Firm    Musculoskeletal:      Right lower leg: Edema present.      Left lower leg: Edema present.      Comments: 2+   Skin:     General: Skin is warm and dry.   Neurological:      Mental Status: He is alert and oriented to person, place, and time. Mental status is at baseline.   Psychiatric:         Mood and Affect: Mood normal.             ---------------------------------------------------------------------------------------------------------------------  EKG:        I have personally looked at the EKG.  ---------------------------------------------------------------------------------------------------------------------   Results from last 7 days   Lab Units 03/27/24  0653 03/27/24  0344 03/27/24  0054 03/26/24 2207   CRP mg/dL 13.35*  --   --  12.21*   LACTATE mmol/L  --  1.7 2.3* 2.4*   WBC 10*3/mm3 5.37  --   --  5.27   HEMOGLOBIN g/dL 13.7  --   --  14.3   HEMATOCRIT % 44.7  --   --  45.8   MCV fL 98.2*  --   --  96.2   MCHC g/dL 30.6*  --   --  31.2*   PLATELETS 10*3/mm3 226  --   --  241     Results from last 7 days   Lab Units 03/26/24  2215   PH, ARTERIAL pH units 7.419   PO2 ART mm Hg 76.4*   PCO2, ARTERIAL mm Hg 56.4*   HCO3 ART mmol/L 36.5*     Results from last 7 days   Lab Units 03/27/24  0653 03/26/24  2207   SODIUM mmol/L 140 141   POTASSIUM mmol/L 4.1 4.1   CHLORIDE mmol/L 95* 92*   CO2 mmol/L 32.0* 32.7*   BUN mg/dL 28* 24*   CREATININE mg/dL 0.85  "0.92   CALCIUM mg/dL 9.7 10.6*   GLUCOSE mg/dL 150* 182*   ALBUMIN g/dL 4.3 4.6   BILIRUBIN mg/dL 0.4 0.7   ALK PHOS U/L 86 95   AST (SGOT) U/L 17 18   ALT (SGPT) U/L 21 23   Estimated Creatinine Clearance: 101 mL/min (by C-G formula based on SCr of 0.85 mg/dL).  No results found for: \"AMMONIA\"  Results from last 7 days   Lab Units 03/27/24  0003 03/26/24 2207   HSTROP T ng/L 33* 34*     Results from last 7 days   Lab Units 03/26/24 2207   PROBNP pg/mL 1,999.0*     Lab Results   Component Value Date    HGBA1C 6.40 (H) 12/11/2023     Lab Results   Component Value Date    TSH 0.573 12/11/2023    FREET4 1.11 12/08/2021     No results found for: \"PREGTESTUR\", \"PREGSERUM\", \"HCG\", \"HCGQUANT\"  Pain Management Panel  More data exists         Latest Ref Rng & Units 12/10/2023 11/18/2023   Pain Management Panel   Amphetamine, Urine Qual Negative Negative  Negative    Barbiturates Screen, Urine Negative Negative  Negative    Benzodiazepine Screen, Urine Negative Negative  Negative    Buprenorphine, Screen, Urine Negative Negative  Negative    Cocaine Screen, Urine Negative Negative  Negative    Fentanyl, Urine Negative Negative  Negative    Methadone Screen , Urine Negative Negative  Negative    Methamphetamine, Ur Negative Negative  Negative      No results found for: \"BLOODCX\"  No results found for: \"URINECX\"  No results found for: \"WOUNDCX\"  No results found for: \"STOOLCX\"      ---------------------------------------------------------------------------------------------------------------------  Imaging Results (Last 7 Days)       Procedure Component Value Units Date/Time    XR Chest 1 View [[id number]] Collected: 03/27/24 0345     Updated: 03/27/24 0348    Narrative:      EXAMINATION: AP portable chest     HISTORY: NG tube placement     COMPARISON: 3/26/2024     FINDINGS: A nasogastric tube is noted. The tip is located within the  stomach.       Impression:      1. Nasogastric tube which is coiled on itself with the tip in " the  stomach.     This report was finalized on 3/27/2024 3:46 AM by Chad Serrano MD.       CT Abdomen Pelvis With Contrast [[id number]] Collected: 03/27/24 0020     Updated: 03/27/24 0022    Narrative:      Contrast-enhanced CT abdomen pelvis     Indications: Abdominal pain     Technique: Contrast-enhanced CT images of the abdomen and pelvis were  obtained.  Limited exposure control, adjustment of the MA and/or KV  according to patient size or use of an iterative reconstruction  technique was utilized.     Findings CT abdomen pelvis:     No prior studies available.     The liver is normal in size and morphology.  There is no biliary  dilation.  The gallbladder has been removed.     The spleen is normal.     The pancreas is normal.     The adrenal glands and kidneys are normal.     There is no abdominal or retroperitoneal lymphadenopathy.     There are dilated loops of small bowel throughout the abdomen with  relative decompression of the colon compatible with small bowel  obstruction.  Transition point is seen in the midabdomen on image 88.     There is no pelvic mass or free fluid or lymphadenopathy.     Degenerative changes involve the lower lumbar spine.       Impression:      Impression:     Findings compatible with small bowel obstruction with transition point  in the mid to lower abdomen is seen on image 88.     This report was finalized on 3/27/2024 12:20 AM by Hussain Crouch MD.       CT Angiogram Chest Pulmonary Embolism [[id number]] Collected: 03/27/24 0015     Updated: 03/27/24 0017    Narrative:      CTA chest     Indications: Shortness of breath     Technique: Contrast-enhanced CTA images of the chest were obtained.   Limited exposure control, adjustment of the MA and/or kv according to  patient size or use of an iterative reconstruction technique was  utilized.  3D reformatted images were obtained.     Findings:     Comparison is made to prior study 11/18/2023.     There is no evidence of filling  "defect within the main lobar or  segmental pulmonary arteries to suggest pulmonary embolism.     The heart is enlarged.  There is no pericardial effusion.  Thoracic  aorta is normal in course and caliber.  There is no hilar or mediastinal  lymphadenopathy.     There is consolidation/atelectasis of the lingula, unchanged.  Patchy  airspace opacities of both lower lobes related to chronic scarring and  atelectasis, unchanged.  Airspace opacity at the right middle lobe is  unchanged, compatible with chronic atelectasis and scarring.     The osseous structures are normal.       Impression:      Impression:  1.  No CTA evidence of pulmonary embolism.  2.  Areas of chronic consolidation and chronic scarring involving the  lingula, right middle lobe, both lower lobes is unchanged.  No  superimposed acute infiltrates or effusions.     This report was finalized on 3/27/2024 12:15 AM by Hussain Crouch MD.       XR Chest 1 View [[id number]] Collected: 03/26/24 2317     Updated: 03/26/24 2320    Narrative:      EXAMINATION: AP portable chest     Clinical history: Short of breath     COMPARISON: 12/10/2023     FINDINGS: A pacing device enters through a left subclavian approach. The  cardiac silhouette is enlarged. No definite edema. Linear scarring or  atelectasis is noted within the right mid and lower lung as well as  within the left lower lung. No pneumothorax is appreciated. No definite  pleural effusion.       Impression:      1. Cardiac enlargement without definite edema.  2. Linear likely scarring or atelectasis within both lower lungs.     This report was finalized on 3/26/2024 11:18 PM by Chad Serrano MD.               Cultures:  No results found for: \"BLOODCX\", \"URINECX\", \"WOUNDCX\", \"MRSACX\", \"RESPCX\", \"STOOLCX\"    Last echocardiogram:  Results for orders placed during the hospital encounter of 11/17/23    Adult Transthoracic Echo Complete W/ Cont if Necessary Per Protocol    Interpretation Summary    The quality of " the study is limited with poor acoustic windows.  The contrast study was done using Lumason to enhance endocardial border to assess LV function.    Left ventricular wall thickness is consistent with mild concentric hypertrophy.    Left ventricular systolic function is normal.  Estimated LVEF =  66 - 70%.    Left ventricular diastolic function is consistent with (grade I) impaired relaxation    No significant valvular heart disease is noted.    Estimated right ventricular systolic pressure from tricuspid regurgitation is markedly elevated (>55 mmHg)    Severe pulmonary hypertension is present.    There is no evidence of pericardial effusion. .          I have personally reviewed the above radiology images and read the final radiology report on 03/27/24  ---------------------------------------------------------------------------------------------------------------------  Assessment / Plan     Active Hospital Problems    Diagnosis  POA    **SBO (small bowel obstruction) [K56.609]  Yes       ASSESSMENT/PLAN:    Small bowel obstruction, POA  SIRS, possibly 2/2 above  Patient presented to the ED secondary to shortness of breath.  Found to have distended, firm abdomen on initial exam.  CT consistent with SBO with cut off in the mid to lower abdomen.  He did meet SIRS criteria on arrival with tachycardia and tachypnea.  Lactate was elevated but has normalized.  Pro-Rakan and CRP are elevated.  No other definitive source of infection identified per workup thus far.  Admit to the PCU  Consult general surgery for further assistance and recommendations, appreciated.  Continue n.p.o. diet and NG decompression for now  Continue to cover empirically with Flagyl, cefepime-pharmacy to dose, appreciated.  Repeat labs in the a.m.  Follow-up further surgery recommendations    Chronic HFpEF with concern for acute decompensation  In the ED patient found with 2-3+ pitting edema of the lower extremities.  proBNP was elevated from baseline at  1999.  TTE is ordered and pending for further evaluation.  Continue to monitor strict I's and O's, daily weights, clinical volume status  Holding diuresis for now  Will consult cardiology for further assistance and recommendations as well as preoperative clearance should patient's SBO need intervention.    Chronic:  COPD with chronic respiratory failure (3 L baseline)  BPH  Carotid stenosis  HTN  HLD  PATTY  Bradycardia s/p PPM  Plan resume home meds as indicated once med rec is complete  Monitor vital signs closely  COPD not felt to be currently in an acute exacerbation, likely increased dyspnea secondary to significant abdominal distention which was present on arrival vs possible HF exacerbation.  Titrate supplemental O2 as needed.  Continue other home nebs/inhalers as indicated  ----------  -DVT prophylaxis: SCDs  -Activity: up with assistance   -Expected length of stay: INPATIENT status due to the need for care which can only be reasonably provided in an hospital setting such as aggressive/expedited ancillary services and/or consultation services, the necessity for IV medications, close physician monitoring and/or the possible need for procedures.  In such, I feel patient’s risk for adverse outcomes and need for care warrant INPATIENT evaluation and predict the patient’s care encounter to likely last beyond 2 midnights.   -Disposition pending course and further work up    High risk secondary to SBO, concern for acute on chronic CHF    Code Status and Medical Interventions:   Ordered at: 03/27/24 0126     Code Status (Patient has no pulse and is not breathing):    CPR (Attempt to Resuscitate)     Medical Interventions (Patient has pulse or is breathing):    Full Support       Cisco Das PA-C   03/27/24  08:28 EDT     Electronically signed by Cisco Das PA-C at 03/27/24 0906          Emergency Department Notes        Adriana Vazquez, RN at 03/27/24 0046          Pt keeps requesting to  get out of bed and set in chair, I told him I would talk to PA when he is available. Pt has now gotten out of bed himself and pulled IV out.     Electronically signed by Adriana Vazquez RN at 03/27/24 0047       Adriana Vazquez RN at 03/27/24 0000          Wife is going home, left number.  Inna [phone]    Electronically signed by Adriana Vazquez RN at 03/27/24 0000       Adriana Vazquez RN at 03/26/24 2226          Pt comes via ems for shortness of breath x 2 days. Pt is pale, dusky, diaphoretic with beads of sweat, abdomen is hard to touch and distended. PA at bedside. Pt is voiding, states multiple times but little at a time. Bladder scan attempt but no success. Pt noted to have 2+ pitting edema bilateral legs      Electronically signed by Adriana Vazquez RN at 03/26/24 2232       Aries Oliva II, PA at 03/26/24 2211          Subjective   History of Present Illness  73-year-old male presents to the ER via EMS with chief complaint of shortness of breath weakness.  Patient does have known history of COPD CHF.  Pacemaker with interrogation within the last 6 months.  Family member state that the patient has been vomiting today that his abdomen has become increasingly distended.  Family was stated he has been sweating profusely at home.  History of coronary artery disease.        Review of Systems   Constitutional:  Positive for diaphoresis and fatigue. Negative for fever.   HENT: Negative.     Respiratory:  Positive for shortness of breath.    Cardiovascular:  Positive for chest pain and leg swelling.   Gastrointestinal:  Positive for abdominal distention, nausea and vomiting. Negative for abdominal pain.   Endocrine: Negative.    Genitourinary: Negative.  Negative for dysuria.   Skin: Negative.    Neurological:  Positive for weakness.   Psychiatric/Behavioral: Negative.     All other systems reviewed and are negative.      Past Medical History:   Diagnosis Date     Acid reflux     Anxiety     Arthritis     Carotid artery stenosis, symptomatic, right 2021    Chronic obstructive pulmonary disease 2023    Essential hypertension 2021    Herpes     History of lipoma     History of transfusion     Hyperlipidemia     Inguinal hernia     Peptic ulcer     Stroke        Allergies   Allergen Reactions    Bee Pollen Anaphylaxis    Levaquin [Levofloxacin] Hallucinations    Penicillins Hives     Mother told him that, has taken amoxicillin with no problems       Past Surgical History:   Procedure Laterality Date    ABDOMINAL SURGERY      CARDIAC ELECTROPHYSIOLOGY PROCEDURE N/A 12/10/2021    Procedure: Pacemaker DC new;  Surgeon: Jony Monte MD;  Location: [facility] CATH INVASIVE LOC[facility];  Service: Cardiology;  Laterality: N/A;    CAROTID ENDARTERECTOMY Left     CHOLECYSTECTOMY WITH INTRAOPERATIVE CHOLANGIOGRAM N/A 2017    Procedure: CHOLECYSTECTOMY LAPAROSCOPIC INTRAOPERATIVE CHOLANGIOGRAM;  Surgeon: Luis Coe MD;  Location: [facility] OR;  Service:     HERNIA REPAIR      inguinal    SINUS SURGERY      TUMOR EXCISION         Family History   Problem Relation Age of Onset    Stroke Mother     Heart disease Mother     Diabetes Mother     No Known Problems Father     Diabetes Sister        Social History     Socioeconomic History    Marital status:     Number of children: 6   Tobacco Use    Smoking status: Former     Current packs/day: 0.00     Average packs/day: 2.0 packs/day for 56.7 years (113.5 ttl pk-yrs)     Types: Cigarettes     Start date:      Quit date: 10/2023     Years since quittin.4     Passive exposure: Current    Smokeless tobacco: Never    Tobacco comments:     2 cigs per day/A pack per week.   Vaping Use    Vaping status: Never Used   Substance and Sexual Activity    Alcohol use: No    Drug use: No    Sexual activity: Defer           Objective   Physical Exam  Vitals and nursing note reviewed.   Constitutional:        General: He is not in acute distress.     Appearance: He is well-developed. He is obese. He is ill-appearing and diaphoretic.   HENT:      Head: Normocephalic and atraumatic.      Right Ear: External ear normal.      Left Ear: External ear normal.      Nose: Nose normal.   Eyes:      Conjunctiva/sclera: Conjunctivae normal.   Neck:      Vascular: No JVD.      Trachea: No tracheal deviation.   Cardiovascular:      Rate and Rhythm: Normal rate and regular rhythm.      Heart sounds: Normal heart sounds. No murmur heard.  Pulmonary:      Effort: Pulmonary effort is normal. No respiratory distress.      Breath sounds: Examination of the right-upper field reveals decreased breath sounds. Examination of the left-upper field reveals decreased breath sounds. Decreased breath sounds present. No wheezing.   Abdominal:      General: Bowel sounds are normal.      Tenderness: There is abdominal tenderness.      Comments: Abdominal distention on a firm abdomen.   Musculoskeletal:         General: No deformity. Normal range of motion.      Cervical back: Normal range of motion and neck supple.   Skin:     General: Skin is warm and dry.      Coloration: Skin is pale.      Findings: No erythema or rash.   Neurological:      Mental Status: He is alert and oriented to person, place, and time.      Cranial Nerves: No cranial nerve deficit.   Psychiatric:         Behavior: Behavior normal.         Thought Content: Thought content normal.         Procedures      Patient did not receive the recommended 30ml/kg fluid bolus for sepsis because it would be harmful or detrimental to the patient. The patient has concern for fluid overload and Heart Failure.   The patient was ordered 500 ml of fluids.     ED Course  ED Course as of 03/27/24 0125   Tue Mar 26, 2024   2232 EKG notes ventricular paced rhythm.  Tachycardia noted.  122 bpm.  No acute ST elevation.  QTc 547.  Electronically signed by Vahe Schumacher DO, 03/26/24, 10:33 PM EDT.   [SF]    2321 CXR rad interpreted:  1. Cardiac enlargement without definite edema.  2. Linear likely scarring or atelectasis within both lower lungs.      [RB]   Wed Mar 27, 2024   0058 CT abd pelvis rad interpreted:  Findings compatible with small bowel obstruction with transition point  in the mid to lower abdomen is seen on image 88.   [RB]   0059 CTA chest rad interpreted:  1.  No CTA evidence of pulmonary embolism.  2.  Areas of chronic consolidation and chronic scarring involving the  lingula, right middle lobe, both lower lobes is unchanged.  No  superimposed acute infiltrates or effusions.   [RB]   0101 Discussed with Dr. Vega who is agreeable to consult on this patient. [RB]   0120 Discussed with Dr. Birch who is agreeable to admit patient. [RB]      ED Course User Index  [RB] Aries Oliva II, PA  [SF] Vahe Schumacher, DO                                             Medical Decision Making  73-year-old male presents via EMS chief complaint of shortness of breath.    Problems Addressed:  Small bowel obstruction: acute illness or injury     Details: Patient did have considerable abdominal distention on physical exam.  Small bowel obstruction or ascites was on the differential.  No scleral icterus transaminase levels are normal ruled out any kind of concerns as far as an acute liver abnormality.  CT does show small bowel obstruction with transition point contacted general surgery is agreeable to consult on this patient.  Patient is chronically ill with COPD CHF.  Discussed with hospitalist Dr. Birch who is agreeable to admit patient.    Amount and/or Complexity of Data Reviewed  External Data Reviewed: labs, radiology, ECG and notes.  Labs: ordered.  Radiology: ordered. Decision-making details documented in ED Course.  ECG/medicine tests: ordered.    Risk  Prescription drug management.  Decision regarding hospitalization.        Final diagnoses:   Small bowel obstruction       ED Disposition  ED Disposition        ED Disposition   Decision to Admit    Condition   --    Comment   --               No follow-up provider specified.       Medication List      No changes were made to your prescriptions during this visit.            Aries Oliva II, PA  03/27/24 0125       Aries Oliva II, PA  03/27/24 0140      Electronically signed by Aries Oliva II, PA at 03/27/24 0140       Lou Harris PCT at 03/26/24 2204          EKG complete @ 15897 and given to Dr Schumacher @ 2201    Electronically signed by Lou Harris PCT at 03/26/24 2205       Facility-Administered Medications as of 3/27/2024   Medication Dose Route Frequency Provider Last Rate Last Admin    [COMPLETED] aztreonam (AZACTAM) 2 g in sodium chloride 0.9 % 100 mL IVPB-VTB  2 g Intravenous Once Aries Oliva II, PA   Stopped at 03/26/24 2342    [COMPLETED] cefepime 2000 mg IVPB in 100 mL NS (VTB)  2,000 mg Intravenous Once Jhon Birch MD   Stopped at 03/27/24 0346    cefepime 2000 mg IVPB in 100 mL NS (VTB)  2,000 mg Intravenous Q8H Jhon Birch MD        [COMPLETED] iopamidol (ISOVUE-370) 76 % injection 100 mL  100 mL Intravenous Once in imaging Vahe Schumacher DO   88 mL at 03/26/24 2301    [COMPLETED] lactated ringers bolus 500 mL  500 mL Intravenous Once Aries Oliva II, PA   Stopped at 03/26/24 2342    [COMPLETED] LORazepam (ATIVAN) injection 1 mg  1 mg Intravenous Once Vahe Schumacher DO   1 mg at 03/27/24 0206    [COMPLETED] metoclopramide (REGLAN) injection 10 mg  10 mg Intravenous Once Aries Oliva II, PA   10 mg at 03/26/24 2248    [COMPLETED] metoprolol tartrate (LOPRESSOR) injection 2.5 mg  2.5 mg Intravenous Once Aries Oliva II, PA   2.5 mg at 03/26/24 2248    [COMPLETED] metoprolol tartrate (LOPRESSOR) injection 2.5 mg  2.5 mg Intravenous Once Aries Oliva II, PA   2.5 mg at 03/26/24 2358    metroNIDAZOLE (FLAGYL) IVPB 500 mg  500 mg Intravenous Q8H Jhon Birch  mL/hr at 03/27/24 0845 500 mg at 03/27/24  0845    nitroglycerin (NITROSTAT) SL tablet 0.4 mg  0.4 mg Sublingual Q5 Min PRN Jhon Birch MD        Pharmacy Consult - Pharmacy to dose   Does not apply Continuous PRN Jhon Birch MD        sodium chloride 0.9 % flush 10 mL  10 mL Intravenous PRN Jhon Birch MD        sodium chloride 0.9 % flush 10 mL  10 mL Intravenous Q12H Jhon Birch MD   10 mL at 03/27/24 0835    sodium chloride 0.9 % flush 10 mL  10 mL Intravenous PRN Jhon Bicrh MD        sodium chloride 0.9 % infusion 40 mL  40 mL Intravenous PRN Jhon Birch MD        [COMPLETED] vancomycin IVPB 2000 mg in 0.9% Sodium Chloride 500 mL  2,000 mg Intravenous Once Aries Oliva II, PA   Stopped at 03/27/24 0254     Orders (all)        Start     Ordered    03/27/24 1200  Strict Intake & Output  Every 6 Hours         03/27/24 0615    03/27/24 1000  cefepime 2000 mg IVPB in 100 mL NS (VTB)  Every 8 Hours         03/27/24 0127    03/27/24 0900  sodium chloride 0.9 % flush 10 mL  Every 12 Hours Scheduled         03/27/24 0615    03/27/24 0800  Vital Signs  Every 8 Hours        Comments: Per per hospital policy    03/27/24 0615    03/27/24 0800  Oral Care  2 Times Daily       03/27/24 0615    03/27/24 0756  Inpatient Cardiology Consult  Once        Specialty:  Cardiology  Provider:  Young Portillo MD    03/27/24 0756    03/27/24 0732  Manual Differential  Once         03/27/24 0731    03/27/24 0723  Scan Slide  Once,   Status:  Canceled         03/27/24 0722    03/27/24 0700  Adult Transthoracic Echo Limited W/ Cont if Necessary Per Protocol  Once         03/27/24 0125    03/27/24 0616  Notify Physician (with Parameters)  Until Discontinued         03/27/24 0615    03/27/24 0616  Insert Peripheral IV  Once         03/27/24 0615    03/27/24 0616  Saline Lock & Maintain IV Access  Continuous,   Status:  Canceled         03/27/24 0615    03/27/24 0616  Place Sequential Compression Device  Once          03/27/24 0615    03/27/24 0616  Maintain Sequential Compression Device  Continuous         03/27/24 0615    03/27/24 0616  Continuous Cardiac Monitoring  Continuous        Comments: Follow Standing Orders As Outlined in Process Instructions (Open Order Report to View Full Instructions)    03/27/24 0615    03/27/24 0616  Maintain IV Access  Continuous         03/27/24 0615    03/27/24 0616  Telemetry - Place Orders & Notify Provider of Results When Patient Experiences Acute Chest Pain, Dysrhythmia or Respiratory Distress  Continuous        Comments: Open Order Report to View Parameters Requiring Provider Notification    03/27/24 0615    03/27/24 0616  May Be Off Telemetry for Tests  Continuous         03/27/24 0615    03/27/24 0616  Continuous Pulse Oximetry  Continuous         03/27/24 0615    03/27/24 0616  Daily Weights  Daily       03/27/24 0615    03/27/24 0616  NPO Diet NPO Type: Strict NPO  Diet Effective Now         03/27/24 0615    03/27/24 0616  Inpatient General Surgery Consult  Once        Specialty:  General Surgery  Provider:  Neli Vega MD    03/27/24 0615    03/27/24 0616  CBC Auto Differential  Morning Draw         03/27/24 0615    03/27/24 0616  Comprehensive Metabolic Panel  Morning Draw         03/27/24 0615    03/27/24 0616  C-reactive Protein  Morning Draw         03/27/24 0615    03/27/24 0616  Bowel Regimen Not Indicated  Once         03/27/24 0615    03/27/24 0615  sodium chloride 0.9 % flush 10 mL  As Needed         03/27/24 0615    03/27/24 0615  sodium chloride 0.9 % infusion 40 mL  As Needed         03/27/24 0615    03/27/24 0615  nitroglycerin (NITROSTAT) SL tablet 0.4 mg  Every 5 Minutes PRN         03/27/24 0615    03/27/24 0354  STAT Lactic Acid, Reflex  PROCEDURE ONCE         03/27/24 0139    03/27/24 0210  XR Chest 1 View  1 Time Imaging         03/27/24 0154    03/27/24 0200  cefepime 2000 mg IVPB in 100 mL NS (VTB)  Once         03/27/24 0127    03/27/24 0154  XR Chest 2  View  1 Time Imaging,   Status:  Canceled         03/27/24 0154    03/27/24 0145  metroNIDAZOLE (FLAGYL) IVPB 500 mg  Every 8 Hours         03/27/24 0120    03/27/24 0124  Hospitalist (on-call MD unless specified)  Once        Specialty:  Hospitalist  Provider:  Jhon Birch MD    03/27/24 0124    03/27/24 0124  Inpatient Admission  Once         03/27/24 0126    03/27/24 0124  Code Status and Medical Interventions:  Continuous         03/27/24 0126    03/27/24 0120  Pharmacy Consult - Pharmacy to dose  Continuous PRN         03/27/24 0120    03/27/24 0118  LORazepam (ATIVAN) injection 1 mg  Once         03/27/24 0102    03/27/24 0112  Urinalysis, Microscopic Only - Urine, Clean Catch  Once         03/27/24 0111    03/27/24 0107  STAT Lactic Acid, Reflex  PROCEDURE ONCE         03/26/24 2240 03/27/24 0103  Surgery (on-call MD unless specified)  Once        Specialty:  General Surgery  Provider:  Neli Vega MD    03/27/24 0103    03/27/24 0100  Nasogastric tube insertion  Once         03/27/24 0059    03/27/24 0007  High Sensitivity Troponin T 2Hr  PROCEDURE ONCE         03/26/24 2235 03/27/24 0006  metoprolol tartrate (LOPRESSOR) injection 2.5 mg  Once         03/26/24 2350    03/26/24 2317  iopamidol (ISOVUE-370) 76 % injection 100 mL  Once in Imaging         03/26/24 2301    03/26/24 2258  lactated ringers bolus 500 mL  Once         03/26/24 2242 03/26/24 2258  metoclopramide (REGLAN) injection 10 mg  Once         03/26/24 2242 03/26/24 2257  aztreonam (AZACTAM) 2 g in sodium chloride 0.9 % 100 mL IVPB-VTB  Once         03/26/24 2241 03/26/24 2257  vancomycin IVPB 2000 mg in 0.9% Sodium Chloride 500 mL  Once         03/26/24 2241 03/26/24 2254  metoprolol tartrate (LOPRESSOR) injection 2.5 mg  Once         03/26/24 2238 03/26/24 2241  COVID-19 and FLU A/B PCR, 1 HR TAT - Swab, Nasopharynx  Once         03/26/24 2240 03/26/24 2239  Urinalysis With Culture If Indicated -  Urine, Clean Catch  Once         03/26/24 2238 03/26/24 2239  CT Angiogram Chest Pulmonary Embolism  1 Time Imaging         03/26/24 2239 03/26/24 2239  CT Abdomen Pelvis With Contrast  1 Time Imaging         03/26/24 2239 03/26/24 2233  Manual Differential  Once         03/26/24 2232 03/26/24 2217  Blood Gas, Arterial With Co-Ox  PROCEDURE ONCE         03/26/24 2215 03/26/24 2215  Scan Slide  Once,   Status:  Canceled         03/26/24 2214 03/26/24 2212  XR Chest 1 View  1 Time Imaging         03/26/24 2211    03/26/24 2206  C-reactive Protein  Once         03/26/24 2205    03/26/24 2206  Procalcitonin  Once         03/26/24 2205 03/26/24 2206  Blood Gas, Arterial -With Co-Ox Panel: Yes  Once         03/26/24 2205    03/26/24 2206  BNP  Once         03/26/24 2205    03/26/24 2206  Monitor Blood Pressure  Per Hospital Policy         03/26/24 2205 03/26/24 2204  Undress & Gown  Once         03/26/24 2203 03/26/24 2204  Continuous Pulse Oximetry  Continuous,   Status:  Canceled         03/26/24 2203 03/26/24 2204  Vital Signs  Every 30 Minutes         03/26/24 2203 03/26/24 2204  Insert Peripheral IV  Once         03/26/24 2203 03/26/24 2204  CBC & Differential  Once         03/26/24 2203 03/26/24 2204  Comprehensive Metabolic Panel  Once         03/26/24 2203 03/26/24 2204  Lactic Acid, Plasma  Once         03/26/24 2203 03/26/24 2204  [place] Draw  Once         03/26/24 2203 03/26/24 2204  Blood Culture - Blood, Arm, Left  Once         03/26/24 2203 03/26/24 2204  Blood Culture - Blood, Arm, Right  Once         03/26/24 2203 03/26/24 2204  CBC Auto Differential  PROCEDURE ONCE         03/26/24 2203 03/26/24 2204  Green Top (Gel)  PROCEDURE ONCE         03/26/24 2203 03/26/24 2204  Lavender Top  PROCEDURE ONCE         03/26/24 2203 03/26/24 2204  Gold Top - SST  PROCEDURE ONCE         03/26/24 2203 03/26/24 2204  Light Blue Top  PROCEDURE ONCE          03/26/24 2203 03/26/24 2203  sodium chloride 0.9 % flush 10 mL  As Needed         03/26/24 2203 03/26/24 2140  ECG 12 Lead Dyspnea  Once         03/26/24 2139 03/26/24 2140  High Sensitivity Troponin T  Once         03/26/24 2139 03/26/24 0000  Telemetry Scan  Once         03/26/24 0000    Unscheduled  Oxygen Therapy- Nasal Cannula; Titrate 1-6 LPM Per SpO2; 90 - 95%  Continuous PRN       03/26/24 2203    Unscheduled  Up With Assistance  As Needed       03/27/24 0615                  Physician Progress Notes (all)    No notes of this type exist for this encounter.       Consult Notes (all)    No notes of this type exist for this encounter.

## 2024-03-27 NOTE — ED NOTES
Pt keeps requesting to get out of bed and set in chair, I told him I would talk to PA when he is available. Pt has now gotten out of bed himself and pulled IV out.

## 2024-03-27 NOTE — ED PROVIDER NOTES
Subjective   History of Present Illness  73-year-old male presents to the ER via EMS with chief complaint of shortness of breath weakness.  Patient does have known history of COPD CHF.  Pacemaker with interrogation within the last 6 months.  Family member state that the patient has been vomiting today that his abdomen has become increasingly distended.  Family was stated he has been sweating profusely at home.  History of coronary artery disease.        Review of Systems   Constitutional:  Positive for diaphoresis and fatigue. Negative for fever.   HENT: Negative.     Respiratory:  Positive for shortness of breath.    Cardiovascular:  Positive for chest pain and leg swelling.   Gastrointestinal:  Positive for abdominal distention, nausea and vomiting. Negative for abdominal pain.   Endocrine: Negative.    Genitourinary: Negative.  Negative for dysuria.   Skin: Negative.    Neurological:  Positive for weakness.   Psychiatric/Behavioral: Negative.     All other systems reviewed and are negative.      Past Medical History:   Diagnosis Date    Acid reflux     Anxiety     Arthritis     Carotid artery stenosis, symptomatic, right 11/23/2021    Chronic obstructive pulmonary disease 12/5/2023    Essential hypertension 11/23/2021    Herpes     History of lipoma     History of transfusion     Hyperlipidemia     Inguinal hernia     Peptic ulcer     Stroke        Allergies   Allergen Reactions    Bee Pollen Anaphylaxis    Levaquin [Levofloxacin] Hallucinations    Penicillins Hives     Mother told him that, has taken amoxicillin with no problems       Past Surgical History:   Procedure Laterality Date    ABDOMINAL SURGERY      CARDIAC ELECTROPHYSIOLOGY PROCEDURE N/A 12/10/2021    Procedure: Pacemaker DC new;  Surgeon: Jony Monte MD;  Location: [facility] INVASIVE LOCATION;  Service: Cardiology;  Laterality: N/A;    CAROTID ENDARTERECTOMY Left 2021    CHOLECYSTECTOMY WITH INTRAOPERATIVE CHOLANGIOGRAM N/A 07/25/2017     Procedure: CHOLECYSTECTOMY LAPAROSCOPIC INTRAOPERATIVE CHOLANGIOGRAM;  Surgeon: Luis Coe MD;  Location: [facility];  Service:     HERNIA REPAIR      inguinal    SINUS SURGERY      TUMOR EXCISION         Family History   Problem Relation Age of Onset    Stroke Mother     Heart disease Mother     Diabetes Mother     No Known Problems Father     Diabetes Sister        Social History     Socioeconomic History    Marital status:     Number of children: 6   Tobacco Use    Smoking status: Former     Current packs/day: 0.00     Average packs/day: 2.0 packs/day for 56.7 years (113.5 ttl pk-yrs)     Types: Cigarettes     Start date:      Quit date: 10/2023     Years since quittin.4     Passive exposure: Current    Smokeless tobacco: Never    Tobacco comments:     2 cigs per day/A pack per week.   Vaping Use    Vaping status: Never Used   Substance and Sexual Activity    Alcohol use: No    Drug use: No    Sexual activity: Defer           Objective   Physical Exam  Vitals and nursing note reviewed.   Constitutional:       General: He is not in acute distress.     Appearance: He is well-developed. He is obese. He is ill-appearing and diaphoretic.   HENT:      Head: Normocephalic and atraumatic.      Right Ear: External ear normal.      Left Ear: External ear normal.      Nose: Nose normal.   Eyes:      Conjunctiva/sclera: Conjunctivae normal.   Neck:      Vascular: No JVD.      Trachea: No tracheal deviation.   Cardiovascular:      Rate and Rhythm: Normal rate and regular rhythm.      Heart sounds: Normal heart sounds. No murmur heard.  Pulmonary:      Effort: Pulmonary effort is normal. No respiratory distress.      Breath sounds: Examination of the right-upper field reveals decreased breath sounds. Examination of the left-upper field reveals decreased breath sounds. Decreased breath sounds present. No wheezing.   Abdominal:      General: Bowel sounds are normal.      Tenderness: There is abdominal  tenderness.      Comments: Abdominal distention on a firm abdomen.   Musculoskeletal:         General: No deformity. Normal range of motion.      Cervical back: Normal range of motion and neck supple.   Skin:     General: Skin is warm and dry.      Coloration: Skin is pale.      Findings: No erythema or rash.   Neurological:      Mental Status: He is alert and oriented to person, place, and time.      Cranial Nerves: No cranial nerve deficit.   Psychiatric:         Behavior: Behavior normal.         Thought Content: Thought content normal.         Procedures       Patient did not receive the recommended 30ml/kg fluid bolus for sepsis because it would be harmful or detrimental to the patient. The patient has concern for fluid overload and Heart Failure.   The patient was ordered 500 ml of fluids.     ED Course  ED Course as of 03/27/24 0125   Tue Mar 26, 2024   2232 EKG notes ventricular paced rhythm.  Tachycardia noted.  122 bpm.  No acute ST elevation.  QTc 547.  Electronically signed by Vahe Schumacher DO, 03/26/24, 10:33 PM EDT.   [SF]   2321 CXR rad interpreted:  1. Cardiac enlargement without definite edema.  2. Linear likely scarring or atelectasis within both lower lungs.      [RB]   Wed Mar 27, 2024   0058 CT abd pelvis rad interpreted:  Findings compatible with small bowel obstruction with transition point  in the mid to lower abdomen is seen on image 88.   [RB]   0059 CTA chest rad interpreted:  1.  No CTA evidence of pulmonary embolism.  2.  Areas of chronic consolidation and chronic scarring involving the  lingula, right middle lobe, both lower lobes is unchanged.  No  superimposed acute infiltrates or effusions.   [RB]   0101 Discussed with Dr. Vega who is agreeable to consult on this patient. [RB]   0120 Discussed with Dr. Birch who is agreeable to admit patient. [RB]      ED Course User Index  [RB] Aries Oliva II PA  [SF] Vahe Schumacher DO                                             Medical  Decision Making  73-year-old male presents via EMS chief complaint of shortness of breath.    Problems Addressed:  Small bowel obstruction: acute illness or injury     Details: Patient did have considerable abdominal distention on physical exam.  Small bowel obstruction or ascites was on the differential.  No scleral icterus transaminase levels are normal ruled out any kind of concerns as far as an acute liver abnormality.  CT does show small bowel obstruction with transition point contacted general surgery is agreeable to consult on this patient.  Patient is chronically ill with COPD CHF.  Discussed with hospitalist Dr. Birch who is agreeable to admit patient.    Amount and/or Complexity of Data Reviewed  External Data Reviewed: labs, radiology, ECG and notes.  Labs: ordered.  Radiology: ordered. Decision-making details documented in ED Course.  ECG/medicine tests: ordered.    Risk  Prescription drug management.  Decision regarding hospitalization.        Final diagnoses:   Small bowel obstruction       ED Disposition  ED Disposition       ED Disposition   Decision to Admit    Condition   --    Comment   --               No follow-up provider specified.       Medication List      No changes were made to your prescriptions during this visit.            Aries Oliva II, PA  03/27/24 0125       Aries Oliva II, PA  03/27/24 0140

## 2024-03-27 NOTE — PLAN OF CARE
Goal Outcome Evaluation:              Outcome Evaluation: Pt A+Ox4 with some episodes of intermittent confusion regarding remembering things we've told him. VSS, afebrile, on 3L via humidified NC. NG to continuous suction. 1700mL out from the NG. Pt remains NPO. Bed in lowest position, alarm on. Call light within reach. Pt denies any requests at this time.

## 2024-03-27 NOTE — H&P
[facility] Medicine Services  History & Physical    Patient Identification:  Name:  Donaldo Roberts  Age:  73 y.o.  Sex:  male  :  1950  MRN:  [id number]   Visit Number:  [id number]  Admit Date: 3/26/2024   Primary Care Physician:  Duke Rosen MD    Subjective     Chief complaint: Shortness of breath    History of presenting illness:      Donaldo Roberts is a 73 y.o. male who presented for further evaluation of shortness of breath. He was seen and examined in the PCU with no family present at the bedside. He is a very poor historian and initially stated he could not recall why he presented to the ED. He answered orientation questions correctly. With prompting he did admit to shortness of breath and generalized weakness, progressing over the previous 2 weeks. On further discussion he did admit to abdominal pain and vomiting, he thinks present and worsening for the past week. He states he moved his bowels just before presentation to the ED. Has not passed and gas today. He reported his shortness of breath seems to be worse with exertion. He has been compliant with home diuretics. He is not sure if he currently has increased lower extremity edema. He was unsure if he has ever had any abdominal surgeries.     Past medical history is significant for BPH, carotid stenosis, HTN, HLD, PATTY, sinus bradycardia s/p PPM, tobacco abuse, COPD with chronic respiratory failure (3 L baseline), chronic HFpEF    Upon arrival to the ED, vital signs were temp 98.4, heart rate 125, respirations 30, /82, SpO2 94% on 4 L per nasal cannula.  HS troponin is elevated but around baseline.  proBNP is elevated at .  BUN is up at 24, anion gap is 16.3, bicarb is 32.7.  Lactate was elevated on arrival at 2.4 with downtrend to 1.7, CRP is 12.21 with repeat at 13.35 and Pro-Rakan is 0.43.  There is no leukocytosis or left shift.  Chest x-ray showed cardiac enlargement without definite edema.  CT of the  abdomen and pelvis noted small bowel obstruction with transition point in the mid to lower abdomen.  CT PE protocol negative for PE though did note areas of chronic consolidation and chronic scarring involving the lingula, right middle lobe, both lower lobes and unchanged from previous.    Known Emergency Department medications received prior to my evaluation included Azactam, cefepime, 500 mL LR bolus, Ativan, Reglan, Lopressor x 2, Flagyl, vancomycin.   Room location at the time of my evaluation was 218.     ---------------------------------------------------------------------------------------------------------------------   Review of Systems   Constitutional:  Positive for activity change and fatigue.   HENT:  Negative for congestion and rhinorrhea.    Respiratory:  Positive for shortness of breath. Negative for cough.    Cardiovascular:  Negative for chest pain and leg swelling.   Gastrointestinal:  Positive for abdominal distention, abdominal pain, nausea and vomiting.   Genitourinary:  Negative for difficulty urinating and dysuria.   Musculoskeletal:  Negative for arthralgias and myalgias.   Skin:  Negative for rash and wound.   Neurological:  Negative for dizziness and light-headedness.        ---------------------------------------------------------------------------------------------------------------------   Past Medical History:   Diagnosis Date    Acid reflux     Anxiety     Arthritis     Carotid artery stenosis, symptomatic, right 11/23/2021    Chronic obstructive pulmonary disease 12/5/2023    Essential hypertension 11/23/2021    Herpes     History of lipoma     History of transfusion     Hyperlipidemia     Inguinal hernia     Peptic ulcer     Stroke      Past Surgical History:   Procedure Laterality Date    ABDOMINAL SURGERY      CARDIAC ELECTROPHYSIOLOGY PROCEDURE N/A 12/10/2021    Procedure: Pacemaker DC new;  Surgeon: Jony Monte MD;  Location: [facility] CATH INVASIVE LOCATION;  Service:  Cardiology;  Laterality: N/A;    CAROTID ENDARTERECTOMY Left     CHOLECYSTECTOMY WITH INTRAOPERATIVE CHOLANGIOGRAM N/A 2017    Procedure: CHOLECYSTECTOMY LAPAROSCOPIC INTRAOPERATIVE CHOLANGIOGRAM;  Surgeon: Luis Coe MD;  Location: [facility] OR;  Service:     HERNIA REPAIR      inguinal    SINUS SURGERY      TUMOR EXCISION       Family History   Problem Relation Age of Onset    Stroke Mother     Heart disease Mother     Diabetes Mother     No Known Problems Father     Diabetes Sister      Social History     Socioeconomic History    Marital status:     Number of children: 6   Tobacco Use    Smoking status: Former     Current packs/day: 0.00     Average packs/day: 2.0 packs/day for 56.7 years (113.5 ttl pk-yrs)     Types: Cigarettes     Start date:      Quit date: 10/2023     Years since quittin.4     Passive exposure: Current    Smokeless tobacco: Never    Tobacco comments:     2 cigs per day/A pack per week.   Vaping Use    Vaping status: Never Used   Substance and Sexual Activity    Alcohol use: No    Drug use: No    Sexual activity: Defer     ---------------------------------------------------------------------------------------------------------------------   Allergies:  Bee pollen, Levaquin [levofloxacin], and Penicillins  ---------------------------------------------------------------------------------------------------------------------   Home medications:    Medications below are reported home medications pulling from within the system; at this time, these medications have not been reconciled unless otherwise specified and are in the verification process for further verifcation as current home medications.  Medications Prior to Admission   Medication Sig Dispense Refill Last Dose    acetaminophen (TYLENOL) 500 MG tablet Take 1 tablet by mouth Every 6 (Six) Hours As Needed for Mild Pain.       albuterol sulfate  (90 Base) MCG/ACT inhaler Inhale 2 puffs by mouth Every 4  (Four) Hours As Needed for Wheezing or Shortness of Air. 18 g 8     aspirin 81 MG EC tablet Take 1 tablet by mouth Daily.       Budeson-Glycopyrrol-Formoterol (Breztri Aerosphere) 160-9-4.8 MCG/ACT aerosol inhaler Inhale 2 puffs 2 (Two) Times a Day. Indications: Chronic Obstructive Lung Disease 10.7 g 5     bumetanide (BUMEX) 1 MG tablet TAKE 1 TABLET BY MOUTH EVERY DAY. TAKE EXTRA TABLET AS NEEDED FOR 2-3 LB WEIGHT GAIN IN 24-28 HOURS 135 tablet 0     clopidogrel (PLAVIX) 75 MG tablet Take 1 tablet by mouth Daily. 30 tablet 11     dapagliflozin Propanediol 10 MG tablet Take 1 tablet by mouth Daily. 30 tablet 3     famotidine (PEPCID) 20 MG tablet Take 1 tablet by mouth Daily.       ipratropium (ATROVENT) 0.02 % nebulizer solution Take 2.5 mL by nebulization 4 (Four) Times a Day As Needed for Wheezing or Shortness of Air. 360 mL 3     omeprazole (priLOSEC) 20 MG capsule Take 1 capsule by mouth Daily.       rosuvastatin (CRESTOR) 10 MG tablet Take 1 tablet by mouth Daily.       sacubitril-valsartan (ENTRESTO) 49-51 MG tablet Take 1 tablet by mouth 2 (Two) Times a Day. 60 tablet 5        Hospital Scheduled Meds:  cefepime, 2,000 mg, Intravenous, Q8H  metroNIDAZOLE, 500 mg, Intravenous, Q8H  sodium chloride, 10 mL, Intravenous, Q12H      Pharmacy Consult - Pharmacy to dose,         Current listed hospital scheduled medications may not yet reflect those currently placed in orders that are signed and held awaiting patient's arrival to floor.   ---------------------------------------------------------------------------------------------------------------------     Objective     Vital Signs:  Temp:  [98.4 °F (36.9 °C)-99 °F (37.2 °C)] 99 °F (37.2 °C)  Heart Rate:  [102-125] 112  Resp:  [14-30] 14  BP: (107-160)/(69-96) 111/74      03/26/24  2140 03/27/24  0616   Weight: 118 kg (260 lb) 118 kg (260 lb 5.8 oz)     Body mass index is 38.45  kg/m².  ---------------------------------------------------------------------------------------------------------------------       Physical Exam  Vitals and nursing note reviewed.   Constitutional:       General: He is not in acute distress.     Appearance: He is obese. He is ill-appearing.   HENT:      Head: Normocephalic and atraumatic.   Eyes:      Extraocular Movements: Extraocular movements intact.   Cardiovascular:      Rate and Rhythm: Regular rhythm. Tachycardia present.   Pulmonary:      Effort: Pulmonary effort is normal.      Breath sounds: Normal breath sounds. No wheezing.   Abdominal:      General: There is distension.      Comments: Hyperactive bowel sounds  Firm    Musculoskeletal:      Right lower leg: Edema present.      Left lower leg: Edema present.      Comments: 2+   Skin:     General: Skin is warm and dry.   Neurological:      Mental Status: He is alert and oriented to person, place, and time. Mental status is at baseline.   Psychiatric:         Mood and Affect: Mood normal.             ---------------------------------------------------------------------------------------------------------------------  EKG:        I have personally looked at the EKG.  ---------------------------------------------------------------------------------------------------------------------   Results from last 7 days   Lab Units 03/27/24  0653 03/27/24  0344 03/27/24  0054 03/26/24 2207   CRP mg/dL 13.35*  --   --  12.21*   LACTATE mmol/L  --  1.7 2.3* 2.4*   WBC 10*3/mm3 5.37  --   --  5.27   HEMOGLOBIN g/dL 13.7  --   --  14.3   HEMATOCRIT % 44.7  --   --  45.8   MCV fL 98.2*  --   --  96.2   MCHC g/dL 30.6*  --   --  31.2*   PLATELETS 10*3/mm3 226  --   --  241     Results from last 7 days   Lab Units 03/26/24  2215   PH, ARTERIAL pH units 7.419   PO2 ART mm Hg 76.4*   PCO2, ARTERIAL mm Hg 56.4*   HCO3 ART mmol/L 36.5*     Results from last 7 days   Lab Units 03/27/24  0653 03/26/24 2207   SODIUM mmol/L 140  "141   POTASSIUM mmol/L 4.1 4.1   CHLORIDE mmol/L 95* 92*   CO2 mmol/L 32.0* 32.7*   BUN mg/dL 28* 24*   CREATININE mg/dL 0.85 0.92   CALCIUM mg/dL 9.7 10.6*   GLUCOSE mg/dL 150* 182*   ALBUMIN g/dL 4.3 4.6   BILIRUBIN mg/dL 0.4 0.7   ALK PHOS U/L 86 95   AST (SGOT) U/L 17 18   ALT (SGPT) U/L 21 23   Estimated Creatinine Clearance: 101 mL/min (by C-G formula based on SCr of 0.85 mg/dL).  No results found for: \"AMMONIA\"  Results from last 7 days   Lab Units 03/27/24  0003 03/26/24 2207   HSTROP T ng/L 33* 34*     Results from last 7 days   Lab Units 03/26/24 2207   PROBNP pg/mL 1,999.0*     Lab Results   Component Value Date    HGBA1C 6.40 (H) 12/11/2023     Lab Results   Component Value Date    TSH 0.573 12/11/2023    FREET4 1.11 12/08/2021     No results found for: \"PREGTESTUR\", \"PREGSERUM\", \"HCG\", \"HCGQUANT\"  Pain Management Panel  More data exists         Latest Ref Rng & Units 12/10/2023 11/18/2023   Pain Management Panel   Amphetamine, Urine Qual Negative Negative  Negative    Barbiturates Screen, Urine Negative Negative  Negative    Benzodiazepine Screen, Urine Negative Negative  Negative    Buprenorphine, Screen, Urine Negative Negative  Negative    Cocaine Screen, Urine Negative Negative  Negative    Fentanyl, Urine Negative Negative  Negative    Methadone Screen , Urine Negative Negative  Negative    Methamphetamine, Ur Negative Negative  Negative      No results found for: \"BLOODCX\"  No results found for: \"URINECX\"  No results found for: \"WOUNDCX\"  No results found for: \"STOOLCX\"      ---------------------------------------------------------------------------------------------------------------------  Imaging Results (Last 7 Days)       Procedure Component Value Units Date/Time    XR Chest 1 View [[id number]] Collected: 03/27/24 0345     Updated: 03/27/24 0348    Narrative:      EXAMINATION: AP portable chest     HISTORY: NG tube placement     COMPARISON: 3/26/2024     FINDINGS: A nasogastric tube is " noted. The tip is located within the  stomach.       Impression:      1. Nasogastric tube which is coiled on itself with the tip in the  stomach.     This report was finalized on 3/27/2024 3:46 AM by Chad Serrano MD.       CT Abdomen Pelvis With Contrast [[id number]] Collected: 03/27/24 0020     Updated: 03/27/24 0022    Narrative:      Contrast-enhanced CT abdomen pelvis     Indications: Abdominal pain     Technique: Contrast-enhanced CT images of the abdomen and pelvis were  obtained.  Limited exposure control, adjustment of the MA and/or KV  according to patient size or use of an iterative reconstruction  technique was utilized.     Findings CT abdomen pelvis:     No prior studies available.     The liver is normal in size and morphology.  There is no biliary  dilation.  The gallbladder has been removed.     The spleen is normal.     The pancreas is normal.     The adrenal glands and kidneys are normal.     There is no abdominal or retroperitoneal lymphadenopathy.     There are dilated loops of small bowel throughout the abdomen with  relative decompression of the colon compatible with small bowel  obstruction.  Transition point is seen in the midabdomen on image 88.     There is no pelvic mass or free fluid or lymphadenopathy.     Degenerative changes involve the lower lumbar spine.       Impression:      Impression:     Findings compatible with small bowel obstruction with transition point  in the mid to lower abdomen is seen on image 88.     This report was finalized on 3/27/2024 12:20 AM by Hussain Crouch MD.       CT Angiogram Chest Pulmonary Embolism [[id number]] Collected: 03/27/24 0015     Updated: 03/27/24 0017    Narrative:      CTA chest     Indications: Shortness of breath     Technique: Contrast-enhanced CTA images of the chest were obtained.   Limited exposure control, adjustment of the MA and/or kv according to  patient size or use of an iterative reconstruction technique was  utilized.  3D  "reformatted images were obtained.     Findings:     Comparison is made to prior study 11/18/2023.     There is no evidence of filling defect within the main lobar or  segmental pulmonary arteries to suggest pulmonary embolism.     The heart is enlarged.  There is no pericardial effusion.  Thoracic  aorta is normal in course and caliber.  There is no hilar or mediastinal  lymphadenopathy.     There is consolidation/atelectasis of the lingula, unchanged.  Patchy  airspace opacities of both lower lobes related to chronic scarring and  atelectasis, unchanged.  Airspace opacity at the right middle lobe is  unchanged, compatible with chronic atelectasis and scarring.     The osseous structures are normal.       Impression:      Impression:  1.  No CTA evidence of pulmonary embolism.  2.  Areas of chronic consolidation and chronic scarring involving the  lingula, right middle lobe, both lower lobes is unchanged.  No  superimposed acute infiltrates or effusions.     This report was finalized on 3/27/2024 12:15 AM by Hussain Crouch MD.       XR Chest 1 View [[id number]] Collected: 03/26/24 2317     Updated: 03/26/24 2320    Narrative:      EXAMINATION: AP portable chest     Clinical history: Short of breath     COMPARISON: 12/10/2023     FINDINGS: A pacing device enters through a left subclavian approach. The  cardiac silhouette is enlarged. No definite edema. Linear scarring or  atelectasis is noted within the right mid and lower lung as well as  within the left lower lung. No pneumothorax is appreciated. No definite  pleural effusion.       Impression:      1. Cardiac enlargement without definite edema.  2. Linear likely scarring or atelectasis within both lower lungs.     This report was finalized on 3/26/2024 11:18 PM by Chad Serrano MD.               Cultures:  No results found for: \"BLOODCX\", \"URINECX\", \"WOUNDCX\", \"MRSACX\", \"RESPCX\", \"STOOLCX\"    Last echocardiogram:  Results for orders placed during the hospital " encounter of 11/17/23    Adult Transthoracic Echo Complete W/ Cont if Necessary Per Protocol    Interpretation Summary    The quality of the study is limited with poor acoustic windows.  The contrast study was done using Lumason to enhance endocardial border to assess LV function.    Left ventricular wall thickness is consistent with mild concentric hypertrophy.    Left ventricular systolic function is normal.  Estimated LVEF =  66 - 70%.    Left ventricular diastolic function is consistent with (grade I) impaired relaxation    No significant valvular heart disease is noted.    Estimated right ventricular systolic pressure from tricuspid regurgitation is markedly elevated (>55 mmHg)    Severe pulmonary hypertension is present.    There is no evidence of pericardial effusion. .          I have personally reviewed the above radiology images and read the final radiology report on 03/27/24  ---------------------------------------------------------------------------------------------------------------------  Assessment / Plan     Active Hospital Problems    Diagnosis  POA    **SBO (small bowel obstruction) [K56.505]  Yes       ASSESSMENT/PLAN:    Small bowel obstruction, POA  SIRS, possibly 2/2 above  Patient presented to the ED secondary to shortness of breath.  Found to have distended, firm abdomen on initial exam.  CT consistent with SBO with cut off in the mid to lower abdomen.  He did meet SIRS criteria on arrival with tachycardia and tachypnea.  Lactate was elevated but has normalized.  Pro-Rakan and CRP are elevated.  No other definitive source of infection identified per workup thus far.  Admit to the PCU  Consult general surgery for further assistance and recommendations, appreciated.  Continue n.p.o. diet and NG decompression for now  Continue to cover empirically with Flagyl, cefepime-pharmacy to dose, appreciated.  Repeat labs in the a.m.  Follow-up further surgery recommendations    Chronic HFpEF with concern  for acute decompensation  In the ED patient found with 2-3+ pitting edema of the lower extremities.  proBNP was elevated from baseline at 1999.  TTE is ordered and pending for further evaluation.  Continue to monitor strict I's and O's, daily weights, clinical volume status  Holding diuresis for now  Will consult cardiology for further assistance and recommendations as well as preoperative clearance should patient's SBO need intervention.    Chronic:  COPD with chronic respiratory failure (3 L baseline)  BPH  Carotid stenosis  HTN  HLD  PATTY  Bradycardia s/p PPM  Plan resume home meds as indicated once med rec is complete  Monitor vital signs closely  COPD not felt to be currently in an acute exacerbation, likely increased dyspnea secondary to significant abdominal distention which was present on arrival vs possible HF exacerbation.  Titrate supplemental O2 as needed.  Continue other home nebs/inhalers as indicated  ----------  -DVT prophylaxis: SCDs  -Activity: up with assistance   -Expected length of stay: INPATIENT status due to the need for care which can only be reasonably provided in an hospital setting such as aggressive/expedited ancillary services and/or consultation services, the necessity for IV medications, close physician monitoring and/or the possible need for procedures.  In such, I feel patient’s risk for adverse outcomes and need for care warrant INPATIENT evaluation and predict the patient’s care encounter to likely last beyond 2 midnights.   -Disposition pending course and further work up    High risk secondary to SBO, concern for acute on chronic CHF    Code Status and Medical Interventions:   Ordered at: 03/27/24 0126     Code Status (Patient has no pulse and is not breathing):    CPR (Attempt to Resuscitate)     Medical Interventions (Patient has pulse or is breathing):    Full Support       Cisco Das PA-C   03/27/24  08:28 EDT

## 2024-03-27 NOTE — CONSULTS
Consults  Date of Admit: 3/26/2024  Date of Consult: 03/27/24  Provider, No Known  Donaldo Roberts  1950    Consulting Physician: Young Portillo MD    Cardiology consultation    Reason for consultation: Acute on chronic HFpEF, preoperative clearance      Shortness of Breath  Associated symptoms include leg swelling.       Subjective     Chief Complaint   Patient presents with    Shortness of Breath       Donaldo Roberts is a 73 y.o. male with chronic HFpEF, severe pulmonary hypertension, sinus bradycardia status post permanent pacemaker, carotid stenosis status post left carotid endarterectomy, hypertension, hyperlipidemia, PATTY not compliant with CPAP, COPD with chronic respiratory failure (3 L at baseline), BPH, history of CVA, obesity, and smoking with reported quit date of October 2023 after a 113-1/2 pack year history.    He presented to our emergency room 03/26/2024 with complaints of shortness of air and weakness.  Most of the history is obtained from the chart as the patient appears fatigued and only minimally answers my questions.    In addition to his shortness of air and weakness, he endorsed abdominal pain and vomiting.    Vitals upon presentation significant for a heart rate of 125, respirations 30, blood pressure 133/82, SpO2 94% on 4 L nasal cannula.    Admission labs and studies significant for high-sensitivity troponin 34-33 which appears to be his baseline for at least the last 5 months.  proBNP 1999 which is a significant increase from the 90.9 that was recorded 03/04/2024.  CRP 12.21.  Lactate 2.4.  CBC unremarkable.  CMP significant for chloride 92, carbon dioxide 32.7, BUN 24, creatinine 0.92, calcium 10.6, glucose 182.  ABG revealed pH of 7.419, pO2 76.4, pCO2 56.4, bicarb 36.5.    Today he tells me that he does still feel short of breath and that his legs are chronically swollen although he cannot tell me if they are better or worse than baseline.    He follows with the heart failure clinic  with his last visit being 03/04/2024 as well as with LARY Kelley with his last visit with her being 02/09/2024.    Cardiac risk factors:cerebral vascular disease, hypercholesterolemia, hypertension, Sedentary life style, and Obesity    Last Echo:  Results for orders placed during the hospital encounter of 11/17/23    Adult Transthoracic Echo Complete W/ Cont if Necessary Per Protocol    Interpretation Summary    The quality of the study is limited with poor acoustic windows.  The contrast study was done using Lumason to enhance endocardial border to assess LV function.    Left ventricular wall thickness is consistent with mild concentric hypertrophy.    Left ventricular systolic function is normal.  Estimated LVEF =  66 - 70%.    Left ventricular diastolic function is consistent with (grade I) impaired relaxation    No significant valvular heart disease is noted.    Estimated right ventricular systolic pressure from tricuspid regurgitation is markedly elevated (>55 mmHg)    Severe pulmonary hypertension is present.    There is no evidence of pericardial effusion. .      Last Stress:   Results for orders placed during the hospital encounter of 11/01/21    Stress Test With Myocardial Perfusion One Day    Interpretation Summary  · Left ventricular ejection fraction is borderline normal. (Calculated EF = 53%).  · Myocardial perfusion imaging indicates a normal myocardial perfusion study with no evidence of ischemia.  · Impressions are consistent with a low risk study.  · There is no prior study available for comparison.  · Findings consistent with a normal ECG stress test.      Last Cath:      Past Medical History:   Diagnosis Date    Acid reflux     Anxiety     Arthritis     Carotid artery stenosis, symptomatic, right 11/23/2021    Chronic obstructive pulmonary disease 12/5/2023    Essential hypertension 11/23/2021    Herpes     History of lipoma     History of transfusion     Hyperlipidemia     Inguinal  hernia     Peptic ulcer     Stroke      Past Surgical History:   Procedure Laterality Date    ABDOMINAL SURGERY      CARDIAC ELECTROPHYSIOLOGY PROCEDURE N/A 12/10/2021    Procedure: Pacemaker DC new;  Surgeon: Jony Monte MD;  Location: [facility] CATH INVASIVE LOCATION;  Service: Cardiology;  Laterality: N/A;    CAROTID ENDARTERECTOMY Left     CHOLECYSTECTOMY WITH INTRAOPERATIVE CHOLANGIOGRAM N/A 2017    Procedure: CHOLECYSTECTOMY LAPAROSCOPIC INTRAOPERATIVE CHOLANGIOGRAM;  Surgeon: Luis Coe MD;  Location: [facility] OR;  Service:     HERNIA REPAIR      inguinal    SINUS SURGERY      TUMOR EXCISION       Family History   Problem Relation Age of Onset    Stroke Mother     Heart disease Mother     Diabetes Mother     No Known Problems Father     Diabetes Sister      Social History     Tobacco Use    Smoking status: Former     Current packs/day: 0.00     Average packs/day: 2.0 packs/day for 56.7 years (113.5 ttl pk-yrs)     Types: Cigarettes     Start date:      Quit date: 10/2023     Years since quittin.4     Passive exposure: Current    Smokeless tobacco: Never    Tobacco comments:     2 cigs per day/A pack per week.   Vaping Use    Vaping status: Never Used   Substance Use Topics    Alcohol use: No    Drug use: No     Medications Prior to Admission   Medication Sig Dispense Refill Last Dose    aspirin 81 MG EC tablet Take 1 tablet by mouth Daily.   3/26/2024    Budeson-Glycopyrrol-Formoterol (Breztri Aerosphere) 160-9-4.8 MCG/ACT aerosol inhaler Inhale 2 puffs 2 (Two) Times a Day. Indications: Chronic Obstructive Lung Disease 10.7 g 5 3/26/2024    bumetanide (BUMEX) 1 MG tablet Take 1 tablet by mouth Daily. Take an extra tablet as needed for weight gain 2-3 pounds in 24-48 hours.   3/26/2024    clopidogrel (PLAVIX) 75 MG tablet Take 1 tablet by mouth Daily. 30 tablet 11 3/26/2024    dapagliflozin Propanediol 10 MG tablet Take 1 tablet by mouth Daily. 30 tablet 3 3/26/2024    famotidine  (PEPCID) 20 MG tablet Take 1 tablet by mouth Daily.   3/26/2024    omeprazole (priLOSEC) 20 MG capsule Take 1 capsule by mouth Daily.   3/26/2024    rosuvastatin (CRESTOR) 10 MG tablet Take 1 tablet by mouth Daily.   3/26/2024    sacubitril-valsartan (ENTRESTO) 49-51 MG tablet Take 1 tablet by mouth 2 (Two) Times a Day. 60 tablet 5 3/26/2024    albuterol sulfate  (90 Base) MCG/ACT inhaler Inhale 2 puffs by mouth Every 4 (Four) Hours As Needed for Wheezing or Shortness of Air. 18 g 8 Unknown    ipratropium (ATROVENT) 0.02 % nebulizer solution Take 2.5 mL by nebulization 4 (Four) Times a Day As Needed for Wheezing or Shortness of Air. 360 mL 3 Unknown     Allergies:  Bee pollen, Levaquin [levofloxacin], and Penicillins    Review of Systems   Reason unable to perform ROS: Fatigue/questionable altered mental status.   Respiratory:  Positive for shortness of breath.    Cardiovascular:  Positive for leg swelling.   Neurological:  Positive for weakness.          Objective      Vital Signs  Temp:  [98.4 °F (36.9 °C)-99.8 °F (37.7 °C)] 99.8 °F (37.7 °C)  Heart Rate:  [102-125] 116  Resp:  [14-30] 24  BP: (104-160)/() 137/79  Body mass index is 38.45 kg/m².    Intake/Output Summary (Last 24 hours) at 3/27/2024 1720  Last data filed at 3/27/2024 1700  Gross per 24 hour   Intake 50 ml   Output 4300 ml   Net -4250 ml       Physical Exam  Vitals and nursing note reviewed.   Constitutional:       Appearance: He is obese. He is ill-appearing (Acutely and chronically).   HENT:      Head: Normocephalic and atraumatic.        Comments: NG in place with bilious drainage  Cardiovascular:      Rate and Rhythm: Regular rhythm. Tachycardia present.      Heart sounds: No murmur heard.  Abdominal:      General: Abdomen is protuberant. Bowel sounds are increased. There is distension.   Musculoskeletal:      Right lower leg: 3+ Edema present.      Left lower leg: 3+ Edema present.   Skin:     General: Skin is warm and dry.       Comments: Skin changes associated with chronic venous stasis   Neurological:      Mental Status: He is lethargic.         Telemetry:      Results Review:   I reviewed the patient's new clinical results.  Results from last 7 days   Lab Units 03/27/24  0003 03/26/24 2207   HSTROP T ng/L 33* 34*     Results from last 7 days   Lab Units 03/27/24  0653 03/26/24 2207   WBC 10*3/mm3 5.37 5.27   HEMOGLOBIN g/dL 13.7 14.3   PLATELETS 10*3/mm3 226 241     Results from last 7 days   Lab Units 03/27/24  0653 03/26/24 2207   SODIUM mmol/L 140 141   POTASSIUM mmol/L 4.1 4.1   CHLORIDE mmol/L 95* 92*   CO2 mmol/L 32.0* 32.7*   BUN mg/dL 28* 24*   CREATININE mg/dL 0.85 0.92   CALCIUM mg/dL 9.7 10.6*   GLUCOSE mg/dL 150* 182*   ALT (SGPT) U/L 21 23   AST (SGOT) U/L 17 18     Lab Results   Component Value Date    INR 0.82 (L) 12/08/2021     Lab Results   Component Value Date    MG 2.1 03/04/2024    MG 1.8 01/25/2024    MG 2.1 12/13/2023     Lab Results   Component Value Date    TSH 0.573 12/11/2023    PSA 1.570 01/16/2023    TRIG 175 (H) 03/27/2023    HDL 37 (L) 03/27/2023    LDL 46 03/27/2023      Lab Results   Component Value Date    PROBNP 1,999.0 (H) 03/26/2024    PROBNP 90.9 03/04/2024    PROBNP 125.9 01/25/2024        EKG:     Imaging Results (Last 72 Hours)       Procedure Component Value Units Date/Time    XR Chest 1 View [[id number]] Collected: 03/27/24 0345     Updated: 03/27/24 0348    Narrative:      EXAMINATION: AP portable chest     HISTORY: NG tube placement     COMPARISON: 3/26/2024     FINDINGS: A nasogastric tube is noted. The tip is located within the  stomach.       Impression:      1. Nasogastric tube which is coiled on itself with the tip in the  stomach.     This report was finalized on 3/27/2024 3:46 AM by Chad Serrano MD.       CT Abdomen Pelvis With Contrast [[id number]] Collected: 03/27/24 0020     Updated: 03/27/24 0022    Narrative:      Contrast-enhanced CT abdomen pelvis     Indications: Abdominal  pain     Technique: Contrast-enhanced CT images of the abdomen and pelvis were  obtained.  Limited exposure control, adjustment of the MA and/or KV  according to patient size or use of an iterative reconstruction  technique was utilized.     Findings CT abdomen pelvis:     No prior studies available.     The liver is normal in size and morphology.  There is no biliary  dilation.  The gallbladder has been removed.     The spleen is normal.     The pancreas is normal.     The adrenal glands and kidneys are normal.     There is no abdominal or retroperitoneal lymphadenopathy.     There are dilated loops of small bowel throughout the abdomen with  relative decompression of the colon compatible with small bowel  obstruction.  Transition point is seen in the midabdomen on image 88.     There is no pelvic mass or free fluid or lymphadenopathy.     Degenerative changes involve the lower lumbar spine.       Impression:      Impression:     Findings compatible with small bowel obstruction with transition point  in the mid to lower abdomen is seen on image 88.     This report was finalized on 3/27/2024 12:20 AM by Hussain Crouch MD.       CT Angiogram Chest Pulmonary Embolism [[id number]] Collected: 03/27/24 0015     Updated: 03/27/24 0017    Narrative:      CTA chest     Indications: Shortness of breath     Technique: Contrast-enhanced CTA images of the chest were obtained.   Limited exposure control, adjustment of the MA and/or kv according to  patient size or use of an iterative reconstruction technique was  utilized.  3D reformatted images were obtained.     Findings:     Comparison is made to prior study 11/18/2023.     There is no evidence of filling defect within the main lobar or  segmental pulmonary arteries to suggest pulmonary embolism.     The heart is enlarged.  There is no pericardial effusion.  Thoracic  aorta is normal in course and caliber.  There is no hilar or mediastinal  lymphadenopathy.     There is  consolidation/atelectasis of the lingula, unchanged.  Patchy  airspace opacities of both lower lobes related to chronic scarring and  atelectasis, unchanged.  Airspace opacity at the right middle lobe is  unchanged, compatible with chronic atelectasis and scarring.     The osseous structures are normal.       Impression:      Impression:  1.  No CTA evidence of pulmonary embolism.  2.  Areas of chronic consolidation and chronic scarring involving the  lingula, right middle lobe, both lower lobes is unchanged.  No  superimposed acute infiltrates or effusions.     This report was finalized on 3/27/2024 12:15 AM by Hussain Crouch MD.       XR Chest 1 View [[id number]] Collected: 03/26/24 2317     Updated: 03/26/24 2320    Narrative:      EXAMINATION: AP portable chest     Clinical history: Short of breath     COMPARISON: 12/10/2023     FINDINGS: A pacing device enters through a left subclavian approach. The  cardiac silhouette is enlarged. No definite edema. Linear scarring or  atelectasis is noted within the right mid and lower lung as well as  within the left lower lung. No pneumothorax is appreciated. No definite  pleural effusion.       Impression:      1. Cardiac enlargement without definite edema.  2. Linear likely scarring or atelectasis within both lower lungs.     This report was finalized on 3/26/2024 11:18 PM by Chad Serrano MD.                Assessment:  1.  Acute on chronic HFpEF  2.  Carotid stenosis  3.  Hypertension  4. Hyperlipidemia  5.  Bradycardia status post permanent pacemaker      Plan:  1.  Repeat echocardiogram ordered  2.  No evidence of decompensation  3.  At goal on current medications  4. LDL at goal, but triglycerides elevated defer change in medications due to current SBO  5.  Now tachycardic    Thank you very much for asking us to be involved in this patient's care.  We will transfer care to his primary cardiologist tomorrow.    Further decision making based on Dr. Portillo's assessment  and recommendations.    Electronically signed by LARY Smith, 03/27/24, 5:40 PM EDT.    .Electronically signed by Young Portillo MD, 03/27/24, 7:24 PM EDT.

## 2024-03-27 NOTE — CONSULTS
Consulting physician:  Dr. Vega    Referring physician: hospitalist    Date of consultation: 03/27/24     Chief complaint SBO    Subjective     Patient is a 73 y.o. male who presented for further evaluation of shortness of breath.  He reports abdominal pain for a week with nausea and vomiting for the last 4 days.  He does not recall when he last had a bowel movement or passed gas.  The patient was sedated during the examination and had to be awakened to answer every question.  The chart reports inguinal hernia repair, laparoscopic cholecystectomy and abdominal surgery.  Patient was unable to offer any information about what the other abdominal surgery was.  CT scan of the abdomen and pelvis demonstrated small bowel obstruction.  NG tube was placed with 2300 cc output in the first 30 minutes.      Review of Systems  Patient is unable to provide any significant review of systems due to to his sedation, presumed from pain meds.  He did state he is not currently having abdominal pain.    History  Past Medical History:   Diagnosis Date    Acid reflux     Anxiety     Arthritis     Carotid artery stenosis, symptomatic, right 11/23/2021    Chronic obstructive pulmonary disease 12/5/2023    Essential hypertension 11/23/2021    Herpes     History of lipoma     History of transfusion     Hyperlipidemia     Inguinal hernia     Peptic ulcer     Stroke      Past Surgical History:   Procedure Laterality Date    ABDOMINAL SURGERY      CARDIAC ELECTROPHYSIOLOGY PROCEDURE N/A 12/10/2021    Procedure: Pacemaker DC new;  Surgeon: Jony Monte MD;  Location: [facility] CATH INVASIVE LOCATION;  Service: Cardiology;  Laterality: N/A;    CAROTID ENDARTERECTOMY Left 2021    CHOLECYSTECTOMY WITH INTRAOPERATIVE CHOLANGIOGRAM N/A 07/25/2017    Procedure: CHOLECYSTECTOMY LAPAROSCOPIC INTRAOPERATIVE CHOLANGIOGRAM;  Surgeon: Luis Coe MD;  Location: [facility] OR;  Service:     HERNIA REPAIR      inguinal    SINUS SURGERY      TUMOR  EXCISION       Family History   Problem Relation Age of Onset    Stroke Mother     Heart disease Mother     Diabetes Mother     No Known Problems Father     Diabetes Sister      Social History     Tobacco Use    Smoking status: Former     Current packs/day: 0.00     Average packs/day: 2.0 packs/day for 56.7 years (113.5 ttl pk-yrs)     Types: Cigarettes     Start date:      Quit date: 10/2023     Years since quittin.4     Passive exposure: Current    Smokeless tobacco: Never    Tobacco comments:     2 cigs per day/A pack per week.   Vaping Use    Vaping status: Never Used   Substance Use Topics    Alcohol use: No    Drug use: No     Medications Prior to Admission   Medication Sig Dispense Refill Last Dose    acetaminophen (TYLENOL) 500 MG tablet Take 1 tablet by mouth Every 6 (Six) Hours As Needed for Mild Pain.       albuterol sulfate  (90 Base) MCG/ACT inhaler Inhale 2 puffs by mouth Every 4 (Four) Hours As Needed for Wheezing or Shortness of Air. 18 g 8     aspirin 81 MG EC tablet Take 1 tablet by mouth Daily.       Budeson-Glycopyrrol-Formoterol (Breztri Aerosphere) 160-9-4.8 MCG/ACT aerosol inhaler Inhale 2 puffs 2 (Two) Times a Day. Indications: Chronic Obstructive Lung Disease 10.7 g 5     bumetanide (BUMEX) 1 MG tablet TAKE 1 TABLET BY MOUTH EVERY DAY. TAKE EXTRA TABLET AS NEEDED FOR 2-3 LB WEIGHT GAIN IN 24-28 HOURS 135 tablet 0     clopidogrel (PLAVIX) 75 MG tablet Take 1 tablet by mouth Daily. 30 tablet 11     dapagliflozin Propanediol 10 MG tablet Take 1 tablet by mouth Daily. 30 tablet 3     famotidine (PEPCID) 20 MG tablet Take 1 tablet by mouth Daily.       ipratropium (ATROVENT) 0.02 % nebulizer solution Take 2.5 mL by nebulization 4 (Four) Times a Day As Needed for Wheezing or Shortness of Air. 360 mL 3     omeprazole (priLOSEC) 20 MG capsule Take 1 capsule by mouth Daily.       rosuvastatin (CRESTOR) 10 MG tablet Take 1 tablet by mouth Daily.       sacubitril-valsartan (ENTRESTO)  49-51 MG tablet Take 1 tablet by mouth 2 (Two) Times a Day. 60 tablet 5      Allergies:  Bee pollen, Levaquin [levofloxacin], and Penicillins    Objective     Vital Signs  Temp:  [98.4 °F (36.9 °C)-99 °F (37.2 °C)] 99 °F (37.2 °C)  Heart Rate:  [102-125] 112  Resp:  [14-30] 14  BP: (107-160)/(69-96) 111/74    Physical Exam:  General:  This is a WD WN overweight male in no acute distress  Vital signs: Stable, afebrile  HEENT exam:  WNL. Sclerae are anicteric.  EOMI  Neck:  Supple, FROM.  No JVD.  Trachea midline  Lungs:  Respiratory effort normal. Auscultation: Clear, without wheezes, rhonchi, rales  Heart:  Regular rate and rhythm, without murmur, gallop, rub.  No pedal edema  Abdomen: The abdomen is softly distended.  Bowel sounds are present, some high-pitched.  The abdomen is nontender to palpation no palpable mass  Musculoskeletal:  Muscle strength/tone is normal.    Psych: Unable to assess due to patient's level of sedation skin:  Warm with good turgor.  Without rash or lesion  Extremities: Bilateral lower extremity edema  Results Review:   Results from last 7 days   Lab Units 03/27/24  0003 03/26/24 2207   HSTROP T ng/L 33* 34*     Results from last 7 days   Lab Units 03/27/24  0653 03/27/24  0344 03/27/24  0054 03/26/24 2207   CRP mg/dL 13.35*  --   --  12.21*   LACTATE mmol/L  --  1.7 2.3* 2.4*   WBC 10*3/mm3 5.37  --   --  5.27   HEMOGLOBIN g/dL 13.7  --   --  14.3   HEMATOCRIT % 44.7  --   --  45.8   PLATELETS 10*3/mm3 226  --   --  241     Results from last 7 days   Lab Units 03/26/24  2215   PH, ARTERIAL pH units 7.419   PO2 ART mm Hg 76.4*   PCO2, ARTERIAL mm Hg 56.4*   HCO3 ART mmol/L 36.5*     Results from last 7 days   Lab Units 03/27/24  0653 03/26/24  2207   SODIUM mmol/L 140 141   POTASSIUM mmol/L 4.1 4.1   CHLORIDE mmol/L 95* 92*   CO2 mmol/L 32.0* 32.7*   BUN mg/dL 28* 24*   CREATININE mg/dL 0.85 0.92   CALCIUM mg/dL 9.7 10.6*   GLUCOSE mg/dL 150* 182*   ALBUMIN g/dL 4.3 4.6   BILIRUBIN mg/dL  "0.4 0.7   ALK PHOS U/L 86 95   AST (SGOT) U/L 17 18   ALT (SGPT) U/L 21 23   Estimated Creatinine Clearance: 101 mL/min (by C-G formula based on SCr of 0.85 mg/dL).  No results found for: \"AMMONIA\"      No results found for: \"BLOODCX\"  No results found for: \"URINECX\"  No results found for: \"WOUNDCX\"  No results found for: \"STOOLCX\"    Imaging:  Imaging Results (Last 24 Hours)       Procedure Component Value Units Date/Time    XR Chest 1 View [[id number]] Collected: 03/27/24 0345     Updated: 03/27/24 0348    Narrative:      EXAMINATION: AP portable chest     HISTORY: NG tube placement     COMPARISON: 3/26/2024     FINDINGS: A nasogastric tube is noted. The tip is located within the  stomach.       Impression:      1. Nasogastric tube which is coiled on itself with the tip in the  stomach.     This report was finalized on 3/27/2024 3:46 AM by Chad Serrano MD.       CT Abdomen Pelvis With Contrast [[id number]] Collected: 03/27/24 0020     Updated: 03/27/24 0022    Narrative:      Contrast-enhanced CT abdomen pelvis     Indications: Abdominal pain     Technique: Contrast-enhanced CT images of the abdomen and pelvis were  obtained.  Limited exposure control, adjustment of the MA and/or KV  according to patient size or use of an iterative reconstruction  technique was utilized.     Findings CT abdomen pelvis:     No prior studies available.     The liver is normal in size and morphology.  There is no biliary  dilation.  The gallbladder has been removed.     The spleen is normal.     The pancreas is normal.     The adrenal glands and kidneys are normal.     There is no abdominal or retroperitoneal lymphadenopathy.     There are dilated loops of small bowel throughout the abdomen with  relative decompression of the colon compatible with small bowel  obstruction.  Transition point is seen in the midabdomen on image 88.     There is no pelvic mass or free fluid or lymphadenopathy.     Degenerative changes involve the lower " lumbar spine.       Impression:      Impression:     Findings compatible with small bowel obstruction with transition point  in the mid to lower abdomen is seen on image 88.     This report was finalized on 3/27/2024 12:20 AM by Hussain Crouch MD.       CT Angiogram Chest Pulmonary Embolism [[id number]] Collected: 03/27/24 0015     Updated: 03/27/24 0017    Narrative:      CTA chest     Indications: Shortness of breath     Technique: Contrast-enhanced CTA images of the chest were obtained.   Limited exposure control, adjustment of the MA and/or kv according to  patient size or use of an iterative reconstruction technique was  utilized.  3D reformatted images were obtained.     Findings:     Comparison is made to prior study 11/18/2023.     There is no evidence of filling defect within the main lobar or  segmental pulmonary arteries to suggest pulmonary embolism.     The heart is enlarged.  There is no pericardial effusion.  Thoracic  aorta is normal in course and caliber.  There is no hilar or mediastinal  lymphadenopathy.     There is consolidation/atelectasis of the lingula, unchanged.  Patchy  airspace opacities of both lower lobes related to chronic scarring and  atelectasis, unchanged.  Airspace opacity at the right middle lobe is  unchanged, compatible with chronic atelectasis and scarring.     The osseous structures are normal.       Impression:      Impression:  1.  No CTA evidence of pulmonary embolism.  2.  Areas of chronic consolidation and chronic scarring involving the  lingula, right middle lobe, both lower lobes is unchanged.  No  superimposed acute infiltrates or effusions.     This report was finalized on 3/27/2024 12:15 AM by Hussain Crouch MD.       XR Chest 1 View [[id number]] Collected: 03/26/24 2317     Updated: 03/26/24 2320    Narrative:      EXAMINATION: AP portable chest     Clinical history: Short of breath     COMPARISON: 12/10/2023     FINDINGS: A pacing device enters through a left  subclavian approach. The  cardiac silhouette is enlarged. No definite edema. Linear scarring or  atelectasis is noted within the right mid and lower lung as well as  within the left lower lung. No pneumothorax is appreciated. No definite  pleural effusion.       Impression:      1. Cardiac enlargement without definite edema.  2. Linear likely scarring or atelectasis within both lower lungs.     This report was finalized on 3/26/2024 11:18 PM by Chad Serrano MD.               CT report and images were reviewed.  I agree with the assessment.  Believe transition point in the lower abdomen can be identified.  Trial of NG decompression,      Impression:    SBO (small bowel obstruction)       Plan:  Trial of NG decompression, particularly as patient is a poor surgical risk with his underlying cardiac and pulmonary disease.      Discussion:      Neli Vega MD  03/27/24  07:43 EDT    Time: Time spent:    Please note that portions of this note were completed with a voice recognition program.

## 2024-03-27 NOTE — PROGRESS NOTES
Pharmacy to dose Cefepime for sepsis/Intra-abdominal inf, CrCl = 93.3- dosed as follows: Cefepime 2000mg iv ext infusion q8h.  Pharmacy will monitor and adjust dosing per renal function as appropriate.     Ceasar Gibson RPH  01:28 EDT  03/27/24

## 2024-03-27 NOTE — ED NOTES
Pt comes via ems for shortness of breath x 2 days. Pt is pale, dusky, diaphoretic with beads of sweat, abdomen is hard to touch and distended. PA at bedside. Pt is voiding, states multiple times but little at a time. Bladder scan attempt but no success. Pt noted to have 2+ pitting edema bilateral legs

## 2024-03-28 ENCOUNTER — APPOINTMENT (OUTPATIENT)
Dept: GENERAL RADIOLOGY | Facility: HOSPITAL | Age: 74
DRG: 388 | End: 2024-03-28
Payer: MEDICARE

## 2024-03-28 ENCOUNTER — APPOINTMENT (OUTPATIENT)
Dept: CT IMAGING | Facility: HOSPITAL | Age: 74
DRG: 388 | End: 2024-03-28
Payer: MEDICARE

## 2024-03-28 LAB
ALBUMIN SERPL-MCNC: 4.3 G/DL (ref 3.5–5.2)
ALBUMIN/GLOB SERPL: 1.2 G/DL
ALP SERPL-CCNC: 80 U/L (ref 39–117)
ALT SERPL W P-5'-P-CCNC: 19 U/L (ref 1–41)
ANION GAP SERPL CALCULATED.3IONS-SCNC: 13.3 MMOL/L (ref 5–15)
AST SERPL-CCNC: 16 U/L (ref 1–40)
BASOPHILS # BLD AUTO: 0.02 10*3/MM3 (ref 0–0.2)
BASOPHILS NFR BLD AUTO: 0.3 % (ref 0–1.5)
BILIRUB SERPL-MCNC: 0.4 MG/DL (ref 0–1.2)
BUN SERPL-MCNC: 39 MG/DL (ref 8–23)
BUN/CREAT SERPL: 41.9 (ref 7–25)
CALCIUM SPEC-SCNC: 9.9 MG/DL (ref 8.6–10.5)
CHLORIDE SERPL-SCNC: 94 MMOL/L (ref 98–107)
CO2 SERPL-SCNC: 36.7 MMOL/L (ref 22–29)
CREAT SERPL-MCNC: 0.93 MG/DL (ref 0.76–1.27)
DEPRECATED RDW RBC AUTO: 47.6 FL (ref 37–54)
EGFRCR SERPLBLD CKD-EPI 2021: 87.8 ML/MIN/1.73
EOSINOPHIL # BLD AUTO: 0.04 10*3/MM3 (ref 0–0.4)
EOSINOPHIL NFR BLD AUTO: 0.7 % (ref 0.3–6.2)
ERYTHROCYTE [DISTWIDTH] IN BLOOD BY AUTOMATED COUNT: 13.2 % (ref 12.3–15.4)
GLOBULIN UR ELPH-MCNC: 3.5 GM/DL
GLUCOSE SERPL-MCNC: 126 MG/DL (ref 65–99)
HCT VFR BLD AUTO: 43.5 % (ref 37.5–51)
HGB BLD-MCNC: 13.3 G/DL (ref 13–17.7)
IMM GRANULOCYTES # BLD AUTO: 0.02 10*3/MM3 (ref 0–0.05)
IMM GRANULOCYTES NFR BLD AUTO: 0.3 % (ref 0–0.5)
LYMPHOCYTES # BLD AUTO: 1.4 10*3/MM3 (ref 0.7–3.1)
LYMPHOCYTES NFR BLD AUTO: 23.8 % (ref 19.6–45.3)
MCH RBC QN AUTO: 30.1 PG (ref 26.6–33)
MCHC RBC AUTO-ENTMCNC: 30.6 G/DL (ref 31.5–35.7)
MCV RBC AUTO: 98.4 FL (ref 79–97)
MONOCYTES # BLD AUTO: 1.09 10*3/MM3 (ref 0.1–0.9)
MONOCYTES NFR BLD AUTO: 18.5 % (ref 5–12)
NEUTROPHILS NFR BLD AUTO: 3.31 10*3/MM3 (ref 1.7–7)
NEUTROPHILS NFR BLD AUTO: 56.4 % (ref 42.7–76)
NRBC BLD AUTO-RTO: 0 /100 WBC (ref 0–0.2)
PLATELET # BLD AUTO: 233 10*3/MM3 (ref 140–450)
PMV BLD AUTO: 10.5 FL (ref 6–12)
POTASSIUM SERPL-SCNC: 3.9 MMOL/L (ref 3.5–5.2)
PROT SERPL-MCNC: 7.8 G/DL (ref 6–8.5)
RBC # BLD AUTO: 4.42 10*6/MM3 (ref 4.14–5.8)
SODIUM SERPL-SCNC: 144 MMOL/L (ref 136–145)
WBC NRBC COR # BLD AUTO: 5.88 10*3/MM3 (ref 3.4–10.8)

## 2024-03-28 PROCEDURE — 80053 COMPREHEN METABOLIC PANEL: CPT | Performed by: HOSPITALIST

## 2024-03-28 PROCEDURE — 85025 COMPLETE CBC W/AUTO DIFF WBC: CPT | Performed by: HOSPITALIST

## 2024-03-28 PROCEDURE — 99232 SBSQ HOSP IP/OBS MODERATE 35: CPT | Performed by: SURGERY

## 2024-03-28 PROCEDURE — 71045 X-RAY EXAM CHEST 1 VIEW: CPT | Performed by: RADIOLOGY

## 2024-03-28 PROCEDURE — 94664 DEMO&/EVAL PT USE INHALER: CPT

## 2024-03-28 PROCEDURE — 25010000002 METRONIDAZOLE 500 MG/100ML SOLUTION: Performed by: HOSPITALIST

## 2024-03-28 PROCEDURE — 74176 CT ABD & PELVIS W/O CONTRAST: CPT

## 2024-03-28 PROCEDURE — 94799 UNLISTED PULMONARY SVC/PX: CPT

## 2024-03-28 PROCEDURE — 74018 RADEX ABDOMEN 1 VIEW: CPT

## 2024-03-28 PROCEDURE — 94640 AIRWAY INHALATION TREATMENT: CPT

## 2024-03-28 PROCEDURE — 74176 CT ABD & PELVIS W/O CONTRAST: CPT | Performed by: RADIOLOGY

## 2024-03-28 PROCEDURE — 74018 RADEX ABDOMEN 1 VIEW: CPT | Performed by: RADIOLOGY

## 2024-03-28 PROCEDURE — 71045 X-RAY EXAM CHEST 1 VIEW: CPT

## 2024-03-28 PROCEDURE — 94761 N-INVAS EAR/PLS OXIMETRY MLT: CPT

## 2024-03-28 PROCEDURE — 99232 SBSQ HOSP IP/OBS MODERATE 35: CPT | Performed by: HOSPITALIST

## 2024-03-28 PROCEDURE — 99232 SBSQ HOSP IP/OBS MODERATE 35: CPT | Performed by: INTERNAL MEDICINE

## 2024-03-28 PROCEDURE — 25010000002 CEFEPIME PER 500 MG: Performed by: HOSPITALIST

## 2024-03-28 RX ORDER — IPRATROPIUM BROMIDE AND ALBUTEROL SULFATE 2.5; .5 MG/3ML; MG/3ML
3 SOLUTION RESPIRATORY (INHALATION) EVERY 4 HOURS PRN
Status: DISCONTINUED | OUTPATIENT
Start: 2024-03-28 | End: 2024-03-28 | Stop reason: SDUPTHER

## 2024-03-28 RX ORDER — FAMOTIDINE 20 MG/1
20 TABLET, FILM COATED ORAL DAILY
Status: CANCELLED | OUTPATIENT
Start: 2024-03-28

## 2024-03-28 RX ORDER — PANTOPRAZOLE SODIUM 40 MG/1
40 TABLET, DELAYED RELEASE ORAL
Status: CANCELLED | OUTPATIENT
Start: 2024-03-28

## 2024-03-28 RX ORDER — BUDESONIDE AND FORMOTEROL FUMARATE DIHYDRATE 160; 4.5 UG/1; UG/1
2 AEROSOL RESPIRATORY (INHALATION)
Status: DISCONTINUED | OUTPATIENT
Start: 2024-03-28 | End: 2024-03-30 | Stop reason: HOSPADM

## 2024-03-28 RX ORDER — IPRATROPIUM BROMIDE AND ALBUTEROL SULFATE 2.5; .5 MG/3ML; MG/3ML
3 SOLUTION RESPIRATORY (INHALATION) EVERY 6 HOURS PRN
Status: DISCONTINUED | OUTPATIENT
Start: 2024-03-28 | End: 2024-03-30 | Stop reason: HOSPADM

## 2024-03-28 RX ORDER — ASPIRIN 81 MG/1
81 TABLET ORAL DAILY
Status: CANCELLED | OUTPATIENT
Start: 2024-03-28

## 2024-03-28 RX ORDER — BUMETANIDE 1 MG/1
1 TABLET ORAL DAILY
Status: CANCELLED | OUTPATIENT
Start: 2024-03-28

## 2024-03-28 RX ORDER — CARVEDILOL 3.12 MG/1
3.12 TABLET ORAL 2 TIMES DAILY WITH MEALS
Status: DISCONTINUED | OUTPATIENT
Start: 2024-03-28 | End: 2024-03-30 | Stop reason: HOSPADM

## 2024-03-28 RX ORDER — ALBUTEROL SULFATE 2.5 MG/3ML
2.5 SOLUTION RESPIRATORY (INHALATION) EVERY 4 HOURS PRN
Status: CANCELLED | OUTPATIENT
Start: 2024-03-28

## 2024-03-28 RX ORDER — PANTOPRAZOLE SODIUM 40 MG/1
40 TABLET, DELAYED RELEASE ORAL
Status: CANCELLED | OUTPATIENT
Start: 2024-03-29

## 2024-03-28 RX ORDER — BUDESONIDE AND FORMOTEROL FUMARATE DIHYDRATE 160; 4.5 UG/1; UG/1
2 AEROSOL RESPIRATORY (INHALATION)
Status: CANCELLED | OUTPATIENT
Start: 2024-03-28

## 2024-03-28 RX ORDER — ROSUVASTATIN CALCIUM 10 MG/1
10 TABLET, COATED ORAL DAILY
Status: CANCELLED | OUTPATIENT
Start: 2024-03-28

## 2024-03-28 RX ORDER — CLOPIDOGREL BISULFATE 75 MG/1
75 TABLET ORAL DAILY
Status: CANCELLED | OUTPATIENT
Start: 2024-03-28

## 2024-03-28 RX ORDER — SODIUM CHLORIDE 450 MG/100ML
75 INJECTION, SOLUTION INTRAVENOUS CONTINUOUS
Status: DISCONTINUED | OUTPATIENT
Start: 2024-03-28 | End: 2024-03-30

## 2024-03-28 RX ADMIN — IPRATROPIUM BROMIDE 0.5 MG: 0.5 SOLUTION RESPIRATORY (INHALATION) at 18:52

## 2024-03-28 RX ADMIN — CEFEPIME 2000 MG: 2 INJECTION, POWDER, FOR SOLUTION INTRAVENOUS at 01:45

## 2024-03-28 RX ADMIN — SODIUM CHLORIDE 75 ML/HR: 4.5 INJECTION, SOLUTION INTRAVENOUS at 11:23

## 2024-03-28 RX ADMIN — IPRATROPIUM BROMIDE 0.5 MG: 0.5 SOLUTION RESPIRATORY (INHALATION) at 13:42

## 2024-03-28 RX ADMIN — CARVEDILOL 3.12 MG: 3.12 TABLET, FILM COATED ORAL at 20:35

## 2024-03-28 RX ADMIN — METRONIDAZOLE 500 MG: 500 INJECTION, SOLUTION INTRAVENOUS at 08:50

## 2024-03-28 RX ADMIN — CEFEPIME 2000 MG: 2 INJECTION, POWDER, FOR SOLUTION INTRAVENOUS at 18:20

## 2024-03-28 RX ADMIN — METRONIDAZOLE 500 MG: 500 INJECTION, SOLUTION INTRAVENOUS at 01:45

## 2024-03-28 RX ADMIN — Medication 10 ML: at 20:36

## 2024-03-28 RX ADMIN — BUDESONIDE AND FORMOTEROL FUMARATE DIHYDRATE 2 PUFF: 160; 4.5 AEROSOL RESPIRATORY (INHALATION) at 18:52

## 2024-03-28 RX ADMIN — METRONIDAZOLE 500 MG: 500 INJECTION, SOLUTION INTRAVENOUS at 17:15

## 2024-03-28 RX ADMIN — CEFEPIME 2000 MG: 2 INJECTION, POWDER, FOR SOLUTION INTRAVENOUS at 09:56

## 2024-03-28 RX ADMIN — Medication 10 ML: at 08:50

## 2024-03-28 NOTE — PLAN OF CARE
Goal Outcome Evaluation:   VSS at this time. Patient remains A&Ox4 on 3L NC, seems to have int. confusion. Patient repeated requests for ice chips, spoke with Dr. Vega, pt. can have small medicine cup of ice chips every couple hours if NG working properly. 1000mL output from NG over shift as of 0530. Provided a medicine cup of ice chips at 2100, 2300, 0100, 0300, and 0500. Education provided throughout shift. Call light in reach, bed alarm on, bed in lowest position. Plan of care ongoing 7p-7a.

## 2024-03-28 NOTE — PROGRESS NOTES
[facility]  PROGRESS NOTE    Patient information:  Name: Donaldo Roberts  Age/Sex: 73 y.o. male  :  1950        PCP: Duke Rosen MD  Attending: Jhon Birch MD  MRN:  [id number]  Visit Number:  [id number]    LOS:  LOS: 1 day     CODE STATUS:    Code Status and Medical Interventions:   Ordered at: 24 0126     Code Status (Patient has no pulse and is not breathing):    CPR (Attempt to Resuscitate)     Medical Interventions (Patient has pulse or is breathing):    Full Support       PROBLEM LIST:Principal Problem:    SBO (small bowel obstruction)      Reason for Cardiology follow-up: Acute on chronic HFpEF, preoperative clearance     Subjective   ADMISSION INFORMATION:  Chief Complaint   Patient presents with    Shortness of Breath       HPI:  Donaldo Roberts is a 73 y.o. male with chronic HFpEF, severe pulmonary hypertension, sinus bradycardia status post [employer] permanent pacemaker, carotid stenosis status post left carotid endarterectomy, hypertension, hyperlipidemia, PATTY not compliant with CPAP, COPD with chronic respiratory failure (3 L at baseline), BPH, history of CVA, obesity, and smoking with reported quit date of 2023 after a 113-1/2 pack year history.     He presented to our emergency room 2024 with complaints of shortness of air and weakness.  Most of the history is obtained from the chart as the patient appears fatigued and only minimally answers my questions.     In addition to his shortness of air and weakness, he endorsed abdominal pain and vomiting.     He follows with the heart failure clinic with his last visit being 2024 as well as with LARY Kelley with his last visit with her being 2024.     Interval History:   Patient was in room  when he was seen and examined.    Patient is sitting in chair at bedside.  No acute distress noted at this time.  Patient currently denies any chest  pain, shortness of breath, or palpitations.  Patient reports he did have a bowel movement approximately 36 hours ago.  NG tube is in place.  X 1 adult female visitor is at bedside.    Vital Signs  Temp:  [98.3 °F (36.8 °C)-98.6 °F (37 °C)] 98.6 °F (37 °C)  Heart Rate:  [] 98  Resp:  [15-28] 22  BP: ()/() 106/72  Flow (L/min):  [3] 3  Device (Oxygen Therapy): humidified;nasal cannula  Vital Signs (last 72 hrs)         03/25 0700  03/26 0659 03/26 0700 03/27 0659 03/27 0700 03/28 0659 03/28 0700  03/28 0901   Most Recent      Temp (°F)   98.4 -  99    98.3 -  99.8       98.6 (37) 03/28 0400    Heart Rate   102 -  125    96 -  123      98     98 03/28 0700    Resp   14 -  30 14 - 28      22     22 03/28 0700    BP   107/69 -  160/89    99/72 -  146/99      106/72     106/72 03/28 0700    SpO2 (%)   91 -  98    94 -  100      97     97 03/28 0700    Flow (L/min)     4    3 -  4       3 03/28 0600          BMI:Body mass index is 37.6 kg/m².    WEIGHT:      03/26/24  2140 03/27/24  0616 03/28/24  0400   Weight: 118 kg (260 lb) 118 kg (260 lb 5.8 oz) 115 kg (254 lb 10.1 oz)       DIET:NPO Diet NPO Type: Strict NPO    I&O:  Intake & Output (last 3 days)         03/25 0701  03/26 0700 03/26 0701  03/27 0700 03/27 0701  03/28 0700 03/28 0701  03/29 0700    P.O.   50     I.V. (mL/kg)   699 (6.1)     Total Intake(mL/kg)   749 (6.5)     Urine (mL/kg/hr)   850 (0.3)     Emesis/NG output  2300 2700     Total Output  2300 3550     Net  -2300 -2801                      Objective   I have seen and examined Mr. Roberts today  Physical Exam:    General Appearance:    Alert, cooperative, in no acute distress.   Head:    Normocephalic, without obvious abnormality, atraumatic.   Eyes:                          Conjunctivae and sclerae normal, no icterus, no pallor, corneas clear.   Neck:   No adenopathy, supple, trachea midline, no thyromegaly, no carotid bruit, no JVD.   Lungs:     Clear to auscultation,  respirations regular, even and             unlabored.    Heart:    Regular rhythm and normal rate, normal S1 and S2, no          murmur, no gallop, no rub, no click   Chest Wall:    No abnormalities observed.   Abdomen:   Distended with decreased bowel sounds.  No guarding, no rebound tenderness.  NG tube in nare.   Extremities:   Moves all extremities well, no edema, no cyanosis, no           redness.   Pulses:   Pulses palpable and equal bilaterally.   Skin:   No bleeding, bruising or rash.   Neurologic:   Alert and Oriented x 3, Speech Clear & comprehensive.       Results review   Results Review:   Results from last 7 days   Lab Units 03/27/24  0003 03/26/24  2207   HSTROP T ng/L 33* 34*     Lab Results   Component Value Date    PROBNP 1,999.0 (H) 03/26/2024    PROBNP 90.9 03/04/2024    PROBNP 125.9 01/25/2024     Results from last 7 days   Lab Units 03/28/24  0134 03/27/24  0653 03/26/24  2207   WBC 10*3/mm3 5.88 5.37 5.27   HEMOGLOBIN g/dL 13.3 13.7 14.3   PLATELETS 10*3/mm3 233 226 241     Results from last 7 days   Lab Units 03/28/24  0134 03/27/24  0653 03/26/24  2207   SODIUM mmol/L 144 140 141   POTASSIUM mmol/L 3.9 4.1 4.1   CHLORIDE mmol/L 94* 95* 92*   CO2 mmol/L 36.7* 32.0* 32.7*   BUN mg/dL 39* 28* 24*   CREATININE mg/dL 0.93 0.85 0.92   CALCIUM mg/dL 9.9 9.7 10.6*   GLUCOSE mg/dL 126* 150* 182*   ALT (SGPT) U/L 19 21 23   AST (SGOT) U/L 16 17 18     Lab Results   Component Value Date    MG 2.1 03/04/2024    MG 1.8 01/25/2024    MG 2.1 12/13/2023     Estimated Creatinine Clearance: 91.5 mL/min (by C-G formula based on SCr of 0.93 mg/dL).    Lab Results   Component Value Date    HGBA1C 6.40 (H) 12/11/2023     Lab Results   Component Value Date    CHOL 112 03/27/2023    TRIG 175 (H) 03/27/2023    LDL 46 03/27/2023    HDL 37 (L) 03/27/2023     Lab Results   Component Value Date    ABSOLUTELUNG 42 (A) 01/25/2024     Lab Results   Component Value Date    INR 0.82 (L) 12/08/2021     No results found for:  "\"LABHEPA\"    Lab Results   Component Value Date    TSH 0.573 12/11/2023    PSA 1.570 01/16/2023      Pain Management Panel  More data exists         Latest Ref Rng & Units 12/10/2023 11/18/2023   Pain Management Panel   Amphetamine, Urine Qual Negative Negative  Negative    Barbiturates Screen, Urine Negative Negative  Negative    Benzodiazepine Screen, Urine Negative Negative  Negative    Buprenorphine, Screen, Urine Negative Negative  Negative    Cocaine Screen, Urine Negative Negative  Negative    Fentanyl, Urine Negative Negative  Negative    Methadone Screen , Urine Negative Negative  Negative    Methamphetamine, Ur Negative Negative  Negative      Microbiology Results (last 10 days)       Procedure Component Value - Date/Time    COVID-19 and FLU A/B PCR, 1 HR TAT - Swab, Nasopharynx [[id number]]  (Normal) Collected: 03/26/24 2248    Lab Status: Final result Specimen: Swab from Nasopharynx Updated: 03/26/24 2310     COVID19 Not Detected     Influenza A PCR Not Detected     Influenza B PCR Not Detected    Narrative:      Fact sheet for providers: https://www.fda.gov/media/195029/download    Fact sheet for patients: https://www.fda.gov/media/145554/download    Test performed by PCR.    Blood Culture - Blood, Arm, Left [[id number]]  (Normal) Collected: 03/26/24 2220    Lab Status: Preliminary result Specimen: Blood from Arm, Left Updated: 03/27/24 2232     Blood Culture No growth at 24 hours    Blood Culture - Blood, Arm, Right [[id number]]  (Normal) Collected: 03/26/24 2220    Lab Status: Preliminary result Specimen: Blood from Arm, Right Updated: 03/27/24 2232     Blood Culture No growth at 24 hours           Imaging Results (Last 24 Hours)       ** No results found for the last 24 hours. **          ECHO:  Results for orders placed during the hospital encounter of 03/26/24    Adult Transthoracic Echo Limited W/ Cont if Necessary Per Protocol    Interpretation Summary    Left ventricular systolic function is low " normal. Calculated left ventricular EF = 49% Left ventricular ejection fraction appears to be 46 - 50%.    Left ventricular diastolic function was not assessed.      STRESS TEST:  Results for orders placed during the hospital encounter of 11/01/21    Stress Test With Myocardial Perfusion One Day    Interpretation Summary  · Left ventricular ejection fraction is borderline normal. (Calculated EF = 53%).  · Myocardial perfusion imaging indicates a normal myocardial perfusion study with no evidence of ischemia.  · Impressions are consistent with a low risk study.  · There is no prior study available for comparison.  · Findings consistent with a normal ECG stress test.       HEART CATH:  No results found for this or any previous visit.      TELEMETRY:         I reviewed the patient's new clinical results.    ALLERGIES: Bee pollen, Levaquin [levofloxacin], and Penicillins    Medication Review:   Current list of medications may not reflect those currently placed in orders that are not signed or are being held.     cefepime, 2,000 mg, Intravenous, Q8H  metroNIDAZOLE, 500 mg, Intravenous, Q8H  sodium chloride, 10 mL, Intravenous, Q12H           nitroglycerin    sodium chloride    sodium chloride    sodium chloride    Assessment      Mild elevated troponin with a flat trend and delta of 1 which is of questionable significance.    Chronic HFpEF, appears compensated.  Mild cardiomyopathy with LV ejection fraction of 45 to 50% on recent echo Doppler study  Symptomatic bradycardia/sick sinus syndrome, status post permanent pacemaker implantation (remote)  Suspected small bowel obstruction, being managed by the surgery service and may need surgery      Plan     Add GDMT for his cardiomyopathy with addition of a beta-blocker and an ARB as tolerated by his blood pressure.  Patient otherwise appears relatively stable at this time for abdominal surgery as needed with an acceptable cardiac risk.        I have discussed the patients  findings and recommendations with patient.    Thank you very much for asking us to be involved in this patient's care.  We will follow along with you.      Electronically signed by LARY Moreno, 03/28/24, 12:49 PM EDT.     Electronically signed by Jony Monte MD, 03/28/24, 6:02 PM EDT.            Please note that portions of this note were completed with a voice recognition program.    Please note that portions of this note were copied and has been reviewed and is accurate as of 3/28/2024 .

## 2024-03-28 NOTE — PLAN OF CARE
Problem: Skin Injury Risk Increased  Goal: Skin Health and Integrity  Outcome: Ongoing, Progressing     Problem: Adult Inpatient Plan of Care  Goal: Plan of Care Review  Outcome: Ongoing, Progressing  Flowsheets (Taken 3/28/2024 1659)  Progress: no change  Plan of Care Reviewed With: patient  Outcome Evaluation: Pt is alert and oriented with intermittent confusion. VSS on 3LNC. Paced rhythm. Remains NPO. NG tube to suction. Denies any pain. Ambulated to bedside chair with assist x1. Care ongoing.     Problem: Adult Inpatient Plan of Care  Goal: Patient-Specific Goal (Individualized)  Outcome: Ongoing, Progressing   Goal Outcome Evaluation:  Plan of Care Reviewed With: patient        Progress: no change  Outcome Evaluation: Pt is alert and oriented with intermittent confusion. VSS on 3LNC. Paced rhythm. Remains NPO. NG tube to suction. Denies any pain. Ambulated to bedside chair with assist x1. Care ongoing.

## 2024-03-28 NOTE — PROGRESS NOTES
LOS: 1 day   Patient Care Team:  Duke Rosen MD as PCP - General (Internal Medicine)  Stephy Estrada PA-C as Physician Assistant (Pulmonary Disease)    Surgery follow up for small bowel obstruction    Subjective     Interval History:  Patient threatened last night to leave AMA unless he got ice chips.  Today he is still threatening to leave.  Son present at bedside.  Patient has not passed gas or had a bowel movement since admission.    History taken from patient.      Review of Systems:   Negative except for chronic shortness of breath and history of present illness    Objective     Vital Signs  Temp:  [98.3 °F (36.8 °C)-98.6 °F (37 °C)] 98.6 °F (37 °C)  Heart Rate:  [] 102  Resp:  [14-25] 22  BP: ()/() 129/76    Physical Exam:  General:  This is a WD WN overweight male in no acute distress  Lungs:  Respiratory effort normal.  Breath sounds decreased bilaterally auscultation: Clear, without wheezes, rhonchi, rales  Heart:  Regular rate and rhythm, without murmur, gallop, rub.  Positive pedal edema  Abdomen: Abdomen is distended.  Rare bowel sounds are present.  No rebound or guarding.       Results Review:    Results from last 7 days   Lab Units 03/27/24  0003 03/26/24  2207   HSTROP T ng/L 33* 34*     Results from last 7 days   Lab Units 03/28/24  0134 03/27/24  0653 03/27/24  0344 03/27/24  0054 03/26/24  2207   CRP mg/dL  --  13.35*  --   --  12.21*   LACTATE mmol/L  --   --  1.7 2.3* 2.4*   WBC 10*3/mm3 5.88 5.37  --   --  5.27   HEMOGLOBIN g/dL 13.3 13.7  --   --  14.3   HEMATOCRIT % 43.5 44.7  --   --  45.8   PLATELETS 10*3/mm3 233 226  --   --  241     Results from last 7 days   Lab Units 03/26/24  2215   PH, ARTERIAL pH units 7.419   PO2 ART mm Hg 76.4*   PCO2, ARTERIAL mm Hg 56.4*   HCO3 ART mmol/L 36.5*     Results from last 7 days   Lab Units 03/28/24  0134 03/27/24  0653 03/26/24  2207   SODIUM mmol/L 144 140 141   POTASSIUM mmol/L 3.9 4.1 4.1   CHLORIDE mmol/L 94* 95*  "92*   CO2 mmol/L 36.7* 32.0* 32.7*   BUN mg/dL 39* 28* 24*   CREATININE mg/dL 0.93 0.85 0.92   CALCIUM mg/dL 9.9 9.7 10.6*   GLUCOSE mg/dL 126* 150* 182*   ALBUMIN g/dL 4.3 4.3 4.6   BILIRUBIN mg/dL 0.4 0.4 0.7   ALK PHOS U/L 80 86 95   AST (SGOT) U/L 16 17 18   ALT (SGPT) U/L 19 21 23   Estimated Creatinine Clearance: 91.5 mL/min (by C-G formula based on SCr of 0.93 mg/dL).  No results found for: \"AMMONIA\"      Blood Culture   Date Value Ref Range Status   03/26/2024 No growth at 24 hours  Preliminary   03/26/2024 No growth at 24 hours  Preliminary     No results found for: \"URINECX\"  No results found for: \"WOUNDCX\"  No results found for: \"STOOLCX\"    Imaging:  Imaging Results (Last 24 Hours)       Procedure Component Value Units Date/Time    CT Abdomen Pelvis Without Contrast [[id number]] Collected: 03/28/24 1231     Updated: 03/28/24 1237    Narrative:      EXAM:    CT Abdomen and Pelvis Without Intravenous Contrast     EXAM DATE:    3/28/2024 11:51 AM     CLINICAL HISTORY:    Bowel obstruction suspected; K56.609-Unspecified intestinal  obstruction, unspecified as to partial versus complete obstruction     TECHNIQUE:    Axial computed tomography images of the abdomen and pelvis without  intravenous contrast.  Sagittal and coronal reformatted images were  created and reviewed.  This CT exam was performed using one or more of  the following dose reduction techniques:  automated exposure control,  adjustment of the mA and/or kV according to patient size, and/or use of  iterative reconstruction technique.     COMPARISON:    3/26/2024     FINDINGS:    Lung bases:  Bibasilar atelectasis.    Heart:  Stable cardiomegaly and coronary artery calcifications.      ABDOMEN:    Liver:  Fatty infiltration of liver, stable.    Gallbladder and bile ducts:  Cholecystectomy.  No ductal dilation.    Pancreas:  Unremarkable as visualized.  No ductal dilation.    Spleen:  Unremarkable as visualized.  No splenomegaly.    Adrenals:  " Unremarkable as visualized.  No mass.    Kidneys and ureters:  Cyst right kidney stable.  No renal or ureteral  stones. No obstructive uropathy.    Stomach and bowel:  Significant interval improvement in small bowel  obstruction changes with contrast extending to the level of the mid  ileum..  Sigmoid diverticulosis without diverticulitis.      PELVIS:    Appendix:  Normal appendix.    Bladder:  Unremarkable as visualized.  No stones.    Reproductive:  Prostate enlargement.      ABDOMEN and PELVIS:    Intraperitoneal space:  Unremarkable as visualized.  No  pneumoperitoneum identified.  No ascites or abscess.    Bones/joints:  The bony structures appear stable. Degenerative changes  lumbar spine again noted.  No acute fracture.  No dislocation.    Soft tissues:  Unremarkable as visualized.    Vasculature:  Unremarkable as visualized.  No abdominal aortic  aneurysm.    Lymph nodes:  Unremarkable as visualized.  No enlarged lymph nodes.    Tubes, lines and devices:  NG tube extends into the mid stomach with  tip at the level of the fundus.       Impression:      1.  Improvement in obstructive bowel gas pattern with decreased caliber  and distention of small bowel loops. Contrast extends to the level of  the mid ileum with gradual transition noted.  2.  No pneumoperitoneum. No free fluid or abscess.  3.  Basal atelectasis.  4.  Other nonacute findings above stable from the previous exam.        This report was finalized on 3/28/2024 12:34 PM by Dr. Jasvir Sheridan MD.       XR Chest 1 View [[id number]] Collected: 03/28/24 1028     Updated: 03/28/24 1031    Narrative:      EXAM:    XR Chest, 1 View     EXAM DATE:    3/28/2024 9:55 AM     CLINICAL HISTORY:    NG placement; K56.609-Unspecified intestinal obstruction, unspecified  as to partial versus complete obstruction     TECHNIQUE:    Frontal view of the chest.     COMPARISON:    3/27/2024     FINDINGS:    Lungs and pleural spaces:  Bibasilar airspace disease.   No  consolidation.  No pneumothorax.    Heart:  Cardiomegaly.    Mediastinum:  Unremarkable as visualized.  Normal mediastinal contour.    Bones/joints:  Unremarkable as visualized.  No acute fracture.    Tubes, lines and devices:  NG tube extends into the stomach.  Left  cardiac pacer device.       Impression:      1.  NG tube which appears situated within the stomach.  2.  Cardiomegaly and bibasilar airspace disease.        This report was finalized on 3/28/2024 10:29 AM by Dr. Jasvir Sheridan MD.                    Impression:  Findings compatible with small bowel obstruction    Plan:  Will repeat CT abdomen today with oral contrast.  If no contrast within the colon within 24 hours patient will need surgical intervention.    Discussion: Son was present for the discussion.  Explained that the surgery would be high risk due to the patient's underlying respiratory issues and likely need for postoperative ventilation.  Also try to explain to the patient that until the small bowel obstruction was resolved he would not be able to eat.  If he left the hospital AGAINST MEDICAL ADVICE he would be back again in short order and likely significantly worse off that he is currently.  Son reinforced this.    Neli Vega MD  03/28/24  14:46 EDT      Please note that portions of this note were completed with a voice recognition program.

## 2024-03-28 NOTE — PROGRESS NOTES
[facility] HOSPITALIST PROGRESS NOTE     Patient Identification:  Name:  Donaldo Roberts  Age:  73 y.o.  Sex:  male  :  1950  MRN:  [id number]  Visit Number:  [id number]  Primary Care Provider:  Duke Rosen MD    Length of stay:  1    Subjective: Patient seen and examined, sitting up edge of bed, he appears to be very irritated, complaining that he is not allowed to have some ice or food, explained at length the patient the reason and the findings of SBO.  He is calling his son to bring him home.  Fortunately his son will not take him home.  Discussed with surgery repeat CT of the abdomen will be performed today with oral contrast.    Chief Complaint: Small bowel obstruction  ----------------------------------------------------------------------------------------------------------------------  [facility] Meds:  cefepime, 2,000 mg, Intravenous, Q8H  metroNIDAZOLE, 500 mg, Intravenous, Q8H  sodium chloride, 10 mL, Intravenous, Q12H         ----------------------------------------------------------------------------------------------------------------------  Vital Signs:  Temp:  [98.3 °F (36.8 °C)-98.6 °F (37 °C)] 98.6 °F (37 °C)  Heart Rate:  [] 102  Resp:  [14-28] 14  BP: ()/() 156/80       Tele: Paced rhythm 96 bpm      24  2140 24  0616 24  0400   Weight: 118 kg (260 lb) 118 kg (260 lb 5.8 oz) 115 kg (254 lb 10.1 oz)     Body mass index is 37.6 kg/m².    Intake/Output Summary (Last 24 hours) at 3/28/2024 1010  Last data filed at 3/28/2024 0531  Gross per 24 hour   Intake 723.96 ml   Output 2950 ml   Net -2226.04 ml     NPO Diet NPO Type: Strict NPO  ----------------------------------------------------------------------------------------------------------------------  Physical exam:  General: Sitting at the edge of the bed comfortable, awake, alert, oriented to self, place, and time,   No respiratory distress.    Skin:  Skin is warm and dry. No  rash noted. No pallor.    HENT:  Head:  Normocephalic and atraumatic.  Mouth:  Moist mucous membranes.  NG tube in place  Eyes:  Conjunctivae and EOM are normal.  Pupils are equal, round, and reactive to light.  No scleral icterus.    Neck:  Neck supple.  No JVD present.    Pulmonary/Chest:  No respiratory distress, no wheezes, no crackles, with normal breath sounds and good air movement.  Cardiovascular:  Normal rate, regular rhythm and normal heart sounds with no murmur.  Abdominal: Still protuberant,  no tenderness.  Bowel sounds present  Extremities: Wrinkling of skin from previous edema no cyanosis or clubbing.  Pedal pulses palpable bilateral.   Neurological:  Motor strength equal no obvious deficit, sensory grossly intact.   No cranial nerve deficit.  No tongue deviation.  No facial droop.  No slurred speech.    Genitourinary: No Lees catheter  Back:  ----------------------------------------------------------------------------------------------------------------------  ----------------------------------------------------------------------------------------------------------------------  Results from last 7 days   Lab Units 03/27/24  0003 03/26/24 2207   HSTROP T ng/L 33* 34*     Results from last 7 days   Lab Units 03/28/24  0134 03/27/24  0653 03/27/24  0344 03/27/24 0054 03/26/24 2207   CRP mg/dL  --  13.35*  --   --  12.21*   LACTATE mmol/L  --   --  1.7 2.3* 2.4*   WBC 10*3/mm3 5.88 5.37  --   --  5.27   HEMOGLOBIN g/dL 13.3 13.7  --   --  14.3   HEMATOCRIT % 43.5 44.7  --   --  45.8   MCV fL 98.4* 98.2*  --   --  96.2   MCHC g/dL 30.6* 30.6*  --   --  31.2*   PLATELETS 10*3/mm3 233 226  --   --  241     Results from last 7 days   Lab Units 03/26/24  2215   PH, ARTERIAL pH units 7.419   PO2 ART mm Hg 76.4*   PCO2, ARTERIAL mm Hg 56.4*   HCO3 ART mmol/L 36.5*     Results from last 7 days   Lab Units 03/28/24  0134 03/27/24  0653 03/26/24  2207   SODIUM mmol/L 144 140 141   POTASSIUM mmol/L 3.9 4.1 4.1  "  CHLORIDE mmol/L 94* 95* 92*   CO2 mmol/L 36.7* 32.0* 32.7*   BUN mg/dL 39* 28* 24*   CREATININE mg/dL 0.93 0.85 0.92   CALCIUM mg/dL 9.9 9.7 10.6*   GLUCOSE mg/dL 126* 150* 182*   ALBUMIN g/dL 4.3 4.3 4.6   BILIRUBIN mg/dL 0.4 0.4 0.7   ALK PHOS U/L 80 86 95   AST (SGOT) U/L 16 17 18   ALT (SGPT) U/L 19 21 23   Estimated Creatinine Clearance: 91.5 mL/min (by C-G formula based on SCr of 0.93 mg/dL).    No results found for: \"AMMONIA\"      Blood Culture   Date Value Ref Range Status   03/26/2024 No growth at 24 hours  Preliminary   03/26/2024 No growth at 24 hours  Preliminary     No results found for: \"URINECX\"  No results found for: \"WOUNDCX\"  No results found for: \"STOOLCX\"    I have personally looked at the labs and they are summarized above.  ----------------------------------------------------------------------------------------------------------------------  Imaging Results (Last 24 Hours)       Procedure Component Value Units Date/Time    XR Chest 1 View [[id number]] Resulted: 03/28/24 0955     Updated: 03/28/24 0955          ----------------------------------------------------------------------------------------------------------------------  Assessment and Plan:  -Acute small bowel obstruction  -COPD without exacerbation  -Chronic hypoxic respiratory failure  -Obesity with a BMI of 37.6  -History of sick sinus syndrome status post pacemaker placement  -History of carotid artery disease  -History of essential hypertension  -History of BPH-history of hyperlipidemia  -History of pulmonary hypertension severe    Gentle hydration, surgery plan to repeat CT of the abdomen pelvis with oral contrast.  Patient was consulted the need to stay and continue treatment, he intends to sign out AMA fortunately will not bring the patient home.  Surgery help appreciated.    Disposition pending      Charlene Swift MD  03/28/24  10:10 EDT  "

## 2024-03-29 LAB
ANION GAP SERPL CALCULATED.3IONS-SCNC: 11.4 MMOL/L (ref 5–15)
BASOPHILS # BLD AUTO: 0.02 10*3/MM3 (ref 0–0.2)
BASOPHILS NFR BLD AUTO: 0.3 % (ref 0–1.5)
BUN SERPL-MCNC: 30 MG/DL (ref 8–23)
BUN/CREAT SERPL: 37.5 (ref 7–25)
CALCIUM SPEC-SCNC: 9.9 MG/DL (ref 8.6–10.5)
CHLORIDE SERPL-SCNC: 94 MMOL/L (ref 98–107)
CO2 SERPL-SCNC: 35.6 MMOL/L (ref 22–29)
CREAT SERPL-MCNC: 0.8 MG/DL (ref 0.76–1.27)
DEPRECATED RDW RBC AUTO: 47.7 FL (ref 37–54)
EGFRCR SERPLBLD CKD-EPI 2021: 94.6 ML/MIN/1.73
EOSINOPHIL # BLD AUTO: 0.06 10*3/MM3 (ref 0–0.4)
EOSINOPHIL NFR BLD AUTO: 0.8 % (ref 0.3–6.2)
ERYTHROCYTE [DISTWIDTH] IN BLOOD BY AUTOMATED COUNT: 12.9 % (ref 12.3–15.4)
GLUCOSE SERPL-MCNC: 106 MG/DL (ref 65–99)
HCT VFR BLD AUTO: 43.1 % (ref 37.5–51)
HGB BLD-MCNC: 13.2 G/DL (ref 13–17.7)
IMM GRANULOCYTES # BLD AUTO: 0.04 10*3/MM3 (ref 0–0.05)
IMM GRANULOCYTES NFR BLD AUTO: 0.6 % (ref 0–0.5)
LYMPHOCYTES # BLD AUTO: 1.83 10*3/MM3 (ref 0.7–3.1)
LYMPHOCYTES NFR BLD AUTO: 25.8 % (ref 19.6–45.3)
MAGNESIUM SERPL-MCNC: 2.2 MG/DL (ref 1.6–2.4)
MCH RBC QN AUTO: 30.4 PG (ref 26.6–33)
MCHC RBC AUTO-ENTMCNC: 30.6 G/DL (ref 31.5–35.7)
MCV RBC AUTO: 99.3 FL (ref 79–97)
MONOCYTES # BLD AUTO: 0.94 10*3/MM3 (ref 0.1–0.9)
MONOCYTES NFR BLD AUTO: 13.2 % (ref 5–12)
NEUTROPHILS NFR BLD AUTO: 4.21 10*3/MM3 (ref 1.7–7)
NEUTROPHILS NFR BLD AUTO: 59.3 % (ref 42.7–76)
NRBC BLD AUTO-RTO: 0 /100 WBC (ref 0–0.2)
PLATELET # BLD AUTO: 229 10*3/MM3 (ref 140–450)
PMV BLD AUTO: 10.5 FL (ref 6–12)
POTASSIUM SERPL-SCNC: 3.6 MMOL/L (ref 3.5–5.2)
RBC # BLD AUTO: 4.34 10*6/MM3 (ref 4.14–5.8)
SODIUM SERPL-SCNC: 141 MMOL/L (ref 136–145)
WBC NRBC COR # BLD AUTO: 7.1 10*3/MM3 (ref 3.4–10.8)

## 2024-03-29 PROCEDURE — 99232 SBSQ HOSP IP/OBS MODERATE 35: CPT | Performed by: HOSPITALIST

## 2024-03-29 PROCEDURE — 25010000002 CEFEPIME PER 500 MG: Performed by: HOSPITALIST

## 2024-03-29 PROCEDURE — 94761 N-INVAS EAR/PLS OXIMETRY MLT: CPT

## 2024-03-29 PROCEDURE — 94799 UNLISTED PULMONARY SVC/PX: CPT

## 2024-03-29 PROCEDURE — 25010000002 METRONIDAZOLE 500 MG/100ML SOLUTION: Performed by: HOSPITALIST

## 2024-03-29 PROCEDURE — 99232 SBSQ HOSP IP/OBS MODERATE 35: CPT | Performed by: SURGERY

## 2024-03-29 PROCEDURE — 94664 DEMO&/EVAL PT USE INHALER: CPT

## 2024-03-29 PROCEDURE — 99232 SBSQ HOSP IP/OBS MODERATE 35: CPT | Performed by: SPECIALIST

## 2024-03-29 PROCEDURE — 80048 BASIC METABOLIC PNL TOTAL CA: CPT | Performed by: HOSPITALIST

## 2024-03-29 PROCEDURE — 85025 COMPLETE CBC W/AUTO DIFF WBC: CPT | Performed by: HOSPITALIST

## 2024-03-29 PROCEDURE — 83735 ASSAY OF MAGNESIUM: CPT | Performed by: HOSPITALIST

## 2024-03-29 RX ORDER — ACETAMINOPHEN 325 MG/1
650 TABLET ORAL EVERY 6 HOURS PRN
Status: DISCONTINUED | OUTPATIENT
Start: 2024-03-29 | End: 2024-03-30 | Stop reason: HOSPADM

## 2024-03-29 RX ORDER — ROSUVASTATIN CALCIUM 10 MG/1
10 TABLET, COATED ORAL DAILY
Status: CANCELLED | OUTPATIENT
Start: 2024-03-29

## 2024-03-29 RX ORDER — BUMETANIDE 1 MG/1
1 TABLET ORAL DAILY
Status: DISCONTINUED | OUTPATIENT
Start: 2024-03-29 | End: 2024-03-30 | Stop reason: HOSPADM

## 2024-03-29 RX ORDER — FAMOTIDINE 20 MG/1
20 TABLET, FILM COATED ORAL DAILY
Status: CANCELLED | OUTPATIENT
Start: 2024-03-29

## 2024-03-29 RX ORDER — ASPIRIN 81 MG/1
81 TABLET ORAL DAILY
Status: CANCELLED | OUTPATIENT
Start: 2024-03-29

## 2024-03-29 RX ORDER — PANTOPRAZOLE SODIUM 40 MG/1
40 TABLET, DELAYED RELEASE ORAL
Status: CANCELLED | OUTPATIENT
Start: 2024-03-29

## 2024-03-29 RX ADMIN — CEFEPIME 2000 MG: 2 INJECTION, POWDER, FOR SOLUTION INTRAVENOUS at 17:32

## 2024-03-29 RX ADMIN — Medication 10 ML: at 20:31

## 2024-03-29 RX ADMIN — IPRATROPIUM BROMIDE 0.5 MG: 0.5 SOLUTION RESPIRATORY (INHALATION) at 00:17

## 2024-03-29 RX ADMIN — METRONIDAZOLE 500 MG: 500 INJECTION, SOLUTION INTRAVENOUS at 01:33

## 2024-03-29 RX ADMIN — CEFEPIME 2000 MG: 2 INJECTION, POWDER, FOR SOLUTION INTRAVENOUS at 09:21

## 2024-03-29 RX ADMIN — ACETAMINOPHEN 650 MG: 325 TABLET ORAL at 13:22

## 2024-03-29 RX ADMIN — SODIUM CHLORIDE 75 ML/HR: 4.5 INJECTION, SOLUTION INTRAVENOUS at 01:30

## 2024-03-29 RX ADMIN — CARVEDILOL 3.12 MG: 3.12 TABLET, FILM COATED ORAL at 08:47

## 2024-03-29 RX ADMIN — METRONIDAZOLE 500 MG: 500 INJECTION, SOLUTION INTRAVENOUS at 08:47

## 2024-03-29 RX ADMIN — SACUBITRIL AND VALSARTAN 1 TABLET: 49; 51 TABLET, FILM COATED ORAL at 20:31

## 2024-03-29 RX ADMIN — BUMETANIDE 1 MG: 1 TABLET ORAL at 16:56

## 2024-03-29 RX ADMIN — SACUBITRIL AND VALSARTAN 1 TABLET: 49; 51 TABLET, FILM COATED ORAL at 14:15

## 2024-03-29 RX ADMIN — IPRATROPIUM BROMIDE 0.5 MG: 0.5 SOLUTION RESPIRATORY (INHALATION) at 19:03

## 2024-03-29 RX ADMIN — CEFEPIME 2000 MG: 2 INJECTION, POWDER, FOR SOLUTION INTRAVENOUS at 01:33

## 2024-03-29 RX ADMIN — SODIUM CHLORIDE 75 ML/HR: 4.5 INJECTION, SOLUTION INTRAVENOUS at 14:15

## 2024-03-29 RX ADMIN — Medication 10 ML: at 08:47

## 2024-03-29 RX ADMIN — CARVEDILOL 3.12 MG: 3.12 TABLET, FILM COATED ORAL at 17:32

## 2024-03-29 RX ADMIN — METRONIDAZOLE 500 MG: 500 INJECTION, SOLUTION INTRAVENOUS at 16:56

## 2024-03-29 NOTE — PROGRESS NOTES
[facility]  PROGRESS NOTE    Patient information:  Name: Donaldo Roberts  Age/Sex: 73 y.o. male  :  1950        PCP: Duke Rosen MD  Attending: Jhon Birch MD  MRN:  [id number]  Visit Number:  [id number]    LOS:  LOS: 2 days     CODE STATUS:    Code Status and Medical Interventions:   Ordered at: 24 0126     Code Status (Patient has no pulse and is not breathing):    CPR (Attempt to Resuscitate)     Medical Interventions (Patient has pulse or is breathing):    Full Support       PROBLEM LIST:Principal Problem:    SBO (small bowel obstruction)      Reason for Cardiology follow-up: Acute on chronic HFpEF, preoperative clearance     Subjective   ADMISSION INFORMATION:  Chief Complaint   Patient presents with    Shortness of Breath       HPI:  Donaldo Roberts is a 73 y.o. male with chronic HFpEF, severe pulmonary hypertension, sinus bradycardia status post [employer] permanent pacemaker 12/10/2021, carotid stenosis status post left carotid endarterectomy, hypertension, hyperlipidemia, PATTY not compliant with CPAP, COPD with chronic respiratory failure (3 L at baseline), BPH, history of CVA, obesity, and smoking with reported quit date of 2023 after a 113-1/2 pack year history.     He presented to our emergency room 2024 with complaints of shortness of air and weakness.  Most of the history is obtained from the chart as the patient appears fatigued and only minimally answers my questions.     In addition to his shortness of air and weakness, he endorsed abdominal pain and vomiting.     He follows with the heart failure clinic with his last visit being 2024 as well as with LARY Kelley with his last visit with her being 2024.     EVENT TIMELINE:   2024: Limited ECHO w EF of 46-50%.  Please see full report attached below.    Interval History:   Telemetry reveals paced rhythm 80s with PVCs. AM labs reveals  potassium 3.6, BUN of 30, and a creatinine 0.80.  Hemoglobin is 13.2 and platelet count is 229.  According to I & O flow chart patient did have x 1 stool occurrence overnight.  According to nursing notes he is tolerating clear liquids and NG tube has been removed around 2045 03/28/2024.    Patient was in room  when he was seen and examined.  Patient is sitting in chair at bedside resting quietly with eyes closed. He was easily awaken with verbal stimuli.  No acute distress noted at this time.  Patient reports his abdomen is feeling better and he denies any chest pain, shortness of breath, or palpitations.    Vital Signs  Temp:  [97.4 °F (36.3 °C)-99.2 °F (37.3 °C)] 97.4 °F (36.3 °C)  Heart Rate:  [] 77  Resp:  [12-25] 12  BP: ()/(52-99) 125/63  Flow (L/min):  [3] 3  Device (Oxygen Therapy): nasal cannula;humidified  Vital Signs (last 72 hrs)         03/26 0700  03/27 0659 03/27 0700  03/28 0659 03/28 0700  03/29 0659 03/29 0700  03/29 0850   Most Recent      Temp (°F) 98.4 -  99    98.3 -  99.8    96.9 -  99.2       98.4 (36.9) 03/29 0400    Heart Rate 102 -  125    96 -  123    75 -  107    79 -  83     79 03/29 0800    Resp 14 - 30 14 - 28 14 - 25      23     23 03/29 0800    /69 -  160/89    99/72 -  146/99    98/65 -  156/80    119/52 -  123/71     119/52 03/29 0800    SpO2 (%) 91 -  98    94 -  100    90 -  100    91 -  99     99 03/29 0800    Flow (L/min)   4    3 -  4      3      3     3 03/29 0700          BMI:Body mass index is 38.68 kg/m².    WEIGHT:      03/27/24  0616 03/28/24  0400 03/29/24  0400   Weight: 118 kg (260 lb 5.8 oz) 115 kg (254 lb 10.1 oz) 119 kg (261 lb 14.5 oz)       DIET:Diet: Liquid; Clear Liquid; Fluid Consistency: Thin (IDDSI 0)    I&O:  Intake & Output (last 3 days)         03/26 0701 03/27 0700 03/27 0701 03/28 0700 03/28 0701 03/29 0700 03/29 0701 03/30 0700    P.O.  50 400     I.V. (mL/kg)  699 (6.1) 1887.6 (15.9)     Total Intake(mL/kg)  749  (6.5) 2287.6 (19.2)     Urine (mL/kg/hr)  850 (0.3) 1400 (0.5)     Emesis/NG output 2300 2700 550     Stool   0     Total Output 2300 3550 1950     Net -2300 -2801 +337.6             Stool Unmeasured Occurrence   1 x              Objective     Physical Exam:      General Appearance:    Alert, cooperative, in no acute distress.   Head:    Normocephalic, without obvious abnormality, atraumatic.   Eyes:                          Conjunctivae and sclerae normal, no icterus, no pallor, corneas clear.   Neck:   No adenopathy, supple, trachea midline, no thyromegaly, no carotid bruit, no JVD.   Lungs:     Clear to auscultation, respirations regular, even and             unlabored.    Heart:    Regular rhythm and normal rate, normal S1 and S2, no          murmur, no gallop, no rub, no click   Chest Wall:    No abnormalities observed.   Abdomen:     Distention noted with decreased bowel sounds , no guarding, no rebound tenderness.   Extremities:   Moves all extremities well, no edema, no cyanosis, no           redness.   Pulses:   Pulses palpable and equal bilaterally.   Skin:   No bleeding, bruising or rash.   Neurologic:   Alert and Oriented x 3, Speech Clear & comprehensive.       Results review   Results Review:   Results from last 7 days   Lab Units 03/27/24  0003 03/26/24 2207   HSTROP T ng/L 33* 34*     Lab Results   Component Value Date    PROBNP 1,999.0 (H) 03/26/2024    PROBNP 90.9 03/04/2024    PROBNP 125.9 01/25/2024     Results from last 7 days   Lab Units 03/29/24  0047 03/28/24  0134 03/27/24  0653 03/26/24 2207   WBC 10*3/mm3 7.10 5.88 5.37 5.27   HEMOGLOBIN g/dL 13.2 13.3 13.7 14.3   PLATELETS 10*3/mm3 229 233 226 241     Results from last 7 days   Lab Units 03/29/24  0047 03/28/24  0134 03/27/24  0653 03/26/24 2207   SODIUM mmol/L 141 144 140 141   POTASSIUM mmol/L 3.6 3.9 4.1 4.1   CHLORIDE mmol/L 94* 94* 95* 92*   CO2 mmol/L 35.6* 36.7* 32.0* 32.7*   BUN mg/dL 30* 39* 28* 24*   CREATININE mg/dL 0.80  "0.93 0.85 0.92   CALCIUM mg/dL 9.9 9.9 9.7 10.6*   GLUCOSE mg/dL 106* 126* 150* 182*   ALT (SGPT) U/L  --  19 21 23   AST (SGOT) U/L  --  16 17 18     Lab Results   Component Value Date    MG 2.2 03/29/2024    MG 2.1 03/04/2024    MG 1.8 01/25/2024     Estimated Creatinine Clearance: 107.8 mL/min (by C-G formula based on SCr of 0.8 mg/dL).    Lab Results   Component Value Date    HGBA1C 6.40 (H) 12/11/2023     Lab Results   Component Value Date    CHOL 112 03/27/2023    TRIG 175 (H) 03/27/2023    LDL 46 03/27/2023    HDL 37 (L) 03/27/2023     Lab Results   Component Value Date    ABSOLUTELUNG 42 (A) 01/25/2024     Lab Results   Component Value Date    INR 0.82 (L) 12/08/2021     No results found for: \"LABHEPA\"    Lab Results   Component Value Date    TSH 0.573 12/11/2023    PSA 1.570 01/16/2023      Pain Management Panel  More data exists         Latest Ref Rng & Units 12/10/2023 11/18/2023   Pain Management Panel   Amphetamine, Urine Qual Negative Negative  Negative    Barbiturates Screen, Urine Negative Negative  Negative    Benzodiazepine Screen, Urine Negative Negative  Negative    Buprenorphine, Screen, Urine Negative Negative  Negative    Cocaine Screen, Urine Negative Negative  Negative    Fentanyl, Urine Negative Negative  Negative    Methadone Screen , Urine Negative Negative  Negative    Methamphetamine, Ur Negative Negative  Negative      Microbiology Results (last 10 days)       Procedure Component Value - Date/Time    COVID-19 and FLU A/B PCR, 1 HR TAT - Swab, Nasopharynx [[id number]]  (Normal) Collected: 03/26/24 2248    Lab Status: Final result Specimen: Swab from Nasopharynx Updated: 03/26/24 2310     COVID19 Not Detected     Influenza A PCR Not Detected     Influenza B PCR Not Detected    Narrative:      Fact sheet for providers: https://www.fda.gov/media/061681/download    Fact sheet for patients: https://www.fda.gov/media/511904/download    Test performed by PCR.    Blood Culture - Blood, Arm, Left " [[id number]]  (Normal) Collected: 03/26/24 2220    Lab Status: Preliminary result Specimen: Blood from Arm, Left Updated: 03/28/24 2232     Blood Culture No growth at 2 days    Blood Culture - Blood, Arm, Right [[id number]]  (Normal) Collected: 03/26/24 2220    Lab Status: Preliminary result Specimen: Blood from Arm, Right Updated: 03/28/24 2232     Blood Culture No growth at 2 days           Imaging Results (Last 24 Hours)       Procedure Component Value Units Date/Time    XR Abdomen KUB [[id number]] Collected: 03/28/24 1654     Updated: 03/28/24 1657    Narrative:      EXAM:    XR Abdomen, 1 View     EXAM DATE:    3/28/2024 3:36 PM     CLINICAL HISTORY:    Follow-up SBO, evaluate for progression of contrast into the colon;  K56.609-Unspecified intestinal obstruction, unspecified as to partial  versus complete obstruction     TECHNIQUE:    Frontal supine view of the abdomen/pelvis.     COMPARISON:    No relevant prior studies available.     FINDINGS:    Gastrointestinal tract:  Barium contrast is now noted within the  colon.  Mildly prominent gas-filled loops of small bowel compatible with  improving small bowel obstruction or ileus.    Bones/joints:  Unremarkable as visualized.       Impression:      1.  Barium contrast is now noted within the colon.  2.  Mildly prominent gas-filled loops of small bowel compatible with  improving small bowel obstruction or ileus.        This report was finalized on 3/28/2024 4:55 PM by Dr. Jasvir Sheridan MD.       CT Abdomen Pelvis Without Contrast [[id number]] Collected: 03/28/24 1231     Updated: 03/28/24 1237    Narrative:      EXAM:    CT Abdomen and Pelvis Without Intravenous Contrast     EXAM DATE:    3/28/2024 11:51 AM     CLINICAL HISTORY:    Bowel obstruction suspected; K56.609-Unspecified intestinal  obstruction, unspecified as to partial versus complete obstruction     TECHNIQUE:    Axial computed tomography images of the abdomen and pelvis without  intravenous contrast.   Sagittal and coronal reformatted images were  created and reviewed.  This CT exam was performed using one or more of  the following dose reduction techniques:  automated exposure control,  adjustment of the mA and/or kV according to patient size, and/or use of  iterative reconstruction technique.     COMPARISON:    3/26/2024     FINDINGS:    Lung bases:  Bibasilar atelectasis.    Heart:  Stable cardiomegaly and coronary artery calcifications.      ABDOMEN:    Liver:  Fatty infiltration of liver, stable.    Gallbladder and bile ducts:  Cholecystectomy.  No ductal dilation.    Pancreas:  Unremarkable as visualized.  No ductal dilation.    Spleen:  Unremarkable as visualized.  No splenomegaly.    Adrenals:  Unremarkable as visualized.  No mass.    Kidneys and ureters:  Cyst right kidney stable.  No renal or ureteral  stones. No obstructive uropathy.    Stomach and bowel:  Significant interval improvement in small bowel  obstruction changes with contrast extending to the level of the mid  ileum..  Sigmoid diverticulosis without diverticulitis.      PELVIS:    Appendix:  Normal appendix.    Bladder:  Unremarkable as visualized.  No stones.    Reproductive:  Prostate enlargement.      ABDOMEN and PELVIS:    Intraperitoneal space:  Unremarkable as visualized.  No  pneumoperitoneum identified.  No ascites or abscess.    Bones/joints:  The bony structures appear stable. Degenerative changes  lumbar spine again noted.  No acute fracture.  No dislocation.    Soft tissues:  Unremarkable as visualized.    Vasculature:  Unremarkable as visualized.  No abdominal aortic  aneurysm.    Lymph nodes:  Unremarkable as visualized.  No enlarged lymph nodes.    Tubes, lines and devices:  NG tube extends into the mid stomach with  tip at the level of the fundus.       Impression:      1.  Improvement in obstructive bowel gas pattern with decreased caliber  and distention of small bowel loops. Contrast extends to the level of  the mid ileum  with gradual transition noted.  2.  No pneumoperitoneum. No free fluid or abscess.  3.  Basal atelectasis.  4.  Other nonacute findings above stable from the previous exam.        This report was finalized on 3/28/2024 12:34 PM by Dr. Jasvir Sheridan MD.             ECHO:  Results for orders placed during the hospital encounter of 03/26/24    Adult Transthoracic Echo Limited W/ Cont if Necessary Per Protocol    Interpretation Summary    Left ventricular systolic function is low normal. Calculated left ventricular EF = 49% Left ventricular ejection fraction appears to be 46 - 50%.    Left ventricular diastolic function was not assessed.      STRESS TEST:  Results for orders placed during the hospital encounter of 11/01/21    Stress Test With Myocardial Perfusion One Day    Interpretation Summary  · Left ventricular ejection fraction is borderline normal. (Calculated EF = 53%).  · Myocardial perfusion imaging indicates a normal myocardial perfusion study with no evidence of ischemia.  · Impressions are consistent with a low risk study.  · There is no prior study available for comparison.  · Findings consistent with a normal ECG stress test.       HEART CATH:  No results found for this or any previous visit.      TELEMETRY:     Paced rhythm 80s with PVCs        I reviewed the patient's new clinical results.    ALLERGIES: Bee pollen, Levaquin [levofloxacin], and Penicillins    Medication Review:   Current list of medications may not reflect those currently placed in orders that are not signed or are being held.     budesonide-formoterol, 2 puff, Inhalation, BID - RT  carvedilol, 3.125 mg, Oral, BID With Meals  cefepime, 2,000 mg, Intravenous, Q8H  ipratropium, 0.5 mg, Nebulization, 4x Daily - RT  metroNIDAZOLE, 500 mg, Intravenous, Q8H  sodium chloride, 10 mL, Intravenous, Q12H      sodium chloride, 75 mL/hr, Last Rate: 75 mL/hr (03/29/24 0130)        ipratropium-albuterol    nitroglycerin    sodium chloride    sodium  chloride    sodium chloride    Assessment    1.  Mildly evaded high-sensitivity troponin with a flat trend on admission with questionable significance  2.  Chronic HFpEF currently compensated  3.  Mild cardiomyopathy EF 45-50% on recent echocardiogram [by my reading ejection fraction seems to be within normal limits  4.  History of sick sinus syndrome status post permanent pacemaker placement  5.  Possible small bowel obstruction surgery is following  6.  Obstructive sleep apnea patient is not using CPAP            Plan   1.  No clinical CHF we will continue monitoring  2.  Will restart home medications including Entresto and he can restart Farxiga when he goes home which she has been taking at home          I have discussed the patients findings and recommendations with patient.    Thank you very much for asking us to be involved in this patient's care.  We will follow along with you.    Electronically signed by LARY Moreno, 03/29/24, 11:58 AM EDT.   Electronically signed by Sonali Melgar MD, 03/29/24, 12:22 PM EDT.                      Please note that portions of this note were completed with a voice recognition program.    Please note that portions of this note were copied and has been reviewed and is accurate as of 3/29/2024 .

## 2024-03-29 NOTE — PROGRESS NOTES
LOS: 2 days   Patient Care Team:  Duke Rosen MD as PCP - General (Internal Medicine)  Stephy Estrada PA-C as Physician Assistant (Pulmonary Disease)    Surgery follow up for small bowel obstruction    Subjective     Interval History:  KUB taken 4 hours after CT yesterday showed contrast within the colon.  He was started on a clear liquid diet with the NG clamped which he tolerated.  Today he has eaten 100% of his trays.  He did have 2 moderate-sized bowel movements.  He denies nausea or vomiting.  History taken from patient.      Review of Systems:   Negative except for chronic shortness of breath and history of present illness    Objective     Vital Signs  Temp:  [97.4 °F (36.3 °C)-99.2 °F (37.3 °C)] 97.4 °F (36.3 °C)  Heart Rate:  [] 79  Resp:  [12-25] 25  BP: ()/(52-99) 116/70    Physical Exam:  General:  This is a WD WN overweight male in no acute distress sitting up in the chair but distress  Lungs:  Respiratory effort normal.  Breath sounds decreased bilaterally auscultation: Clear, without wheezes, rhonchi, rales  Heart:  Regular rate and rhythm, without murmur, gallop, rub.  Positive pedal edema  Abdomen: Abdomen is distended.  Active bowel sounds are present.  No rebound or guarding.       Results Review:    Results from last 7 days   Lab Units 03/27/24  0003 03/26/24 2207   HSTROP T ng/L 33* 34*     Results from last 7 days   Lab Units 03/29/24  0047 03/28/24  0134 03/27/24  0653 03/27/24  0344 03/27/24  0054 03/26/24  2207   CRP mg/dL  --   --  13.35*  --   --  12.21*   LACTATE mmol/L  --   --   --  1.7 2.3* 2.4*   WBC 10*3/mm3 7.10 5.88 5.37  --   --  5.27   HEMOGLOBIN g/dL 13.2 13.3 13.7  --   --  14.3   HEMATOCRIT % 43.1 43.5 44.7  --   --  45.8   PLATELETS 10*3/mm3 229 233 226  --   --  241     Results from last 7 days   Lab Units 03/26/24  2881   PH, ARTERIAL pH units 7.419   PO2 ART mm Hg 76.4*   PCO2, ARTERIAL mm Hg 56.4*   HCO3 ART mmol/L 36.5*     Results from last  "7 days   Lab Units 03/29/24  0047 03/28/24  0134 03/27/24  0653 03/26/24  2207   SODIUM mmol/L 141 144 140 141   POTASSIUM mmol/L 3.6 3.9 4.1 4.1   MAGNESIUM mg/dL 2.2  --   --   --    CHLORIDE mmol/L 94* 94* 95* 92*   CO2 mmol/L 35.6* 36.7* 32.0* 32.7*   BUN mg/dL 30* 39* 28* 24*   CREATININE mg/dL 0.80 0.93 0.85 0.92   CALCIUM mg/dL 9.9 9.9 9.7 10.6*   GLUCOSE mg/dL 106* 126* 150* 182*   ALBUMIN g/dL  --  4.3 4.3 4.6   BILIRUBIN mg/dL  --  0.4 0.4 0.7   ALK PHOS U/L  --  80 86 95   AST (SGOT) U/L  --  16 17 18   ALT (SGPT) U/L  --  19 21 23   Estimated Creatinine Clearance: 107.8 mL/min (by C-G formula based on SCr of 0.8 mg/dL).  No results found for: \"AMMONIA\"      Blood Culture   Date Value Ref Range Status   03/26/2024 No growth at 24 hours  Preliminary   03/26/2024 No growth at 24 hours  Preliminary     No results found for: \"URINECX\"  No results found for: \"WOUNDCX\"  No results found for: \"STOOLCX\"    Imaging:  Imaging Results (Last 24 Hours)       Procedure Component Value Units Date/Time    XR Abdomen KUB [[id number]] Collected: 03/28/24 1654     Updated: 03/28/24 1657    Narrative:      EXAM:    XR Abdomen, 1 View     EXAM DATE:    3/28/2024 3:36 PM     CLINICAL HISTORY:    Follow-up SBO, evaluate for progression of contrast into the colon;  K56.609-Unspecified intestinal obstruction, unspecified as to partial  versus complete obstruction     TECHNIQUE:    Frontal supine view of the abdomen/pelvis.     COMPARISON:    No relevant prior studies available.     FINDINGS:    Gastrointestinal tract:  Barium contrast is now noted within the  colon.  Mildly prominent gas-filled loops of small bowel compatible with  improving small bowel obstruction or ileus.    Bones/joints:  Unremarkable as visualized.       Impression:      1.  Barium contrast is now noted within the colon.  2.  Mildly prominent gas-filled loops of small bowel compatible with  improving small bowel obstruction or ileus.        This report was " finalized on 3/28/2024 4:55 PM by Dr. Jasvir Sheridan MD.                    Impression:  Small bowel obstruction, improved    Plan:  Advance diet as tolerated  Neli Vega MD  03/29/24  13:12 EDT      Please note that portions of this note were completed with a voice recognition program.

## 2024-03-29 NOTE — PLAN OF CARE
Problem: Skin Injury Risk Increased  Goal: Skin Health and Integrity  Outcome: Ongoing, Not Progressing     Problem: Adult Inpatient Plan of Care  Goal: Plan of Care Review  Outcome: Ongoing, Not Progressing  Flowsheets  Taken 3/29/2024 1649  Progress: improving  Outcome Evaluation: Pt is alert and oriented, forgetful at times. VSS on 3LNC. Up in chair throughout shift. PRN tylenol given for complaints of headache. Paced rhythm. Diet advanced to full liquid. 1 large BM this shift. Care ongoing.  Taken 3/28/2024 1639  Plan of Care Reviewed With: patient   Goal Outcome Evaluation:  Plan of Care Reviewed With: patient        Progress: improving  Outcome Evaluation: Pt is alert and oriented, forgetful at times. VSS on 3LNC. Up in chair throughout shift. PRN tylenol given for complaints of headache. Paced rhythm. Diet advanced to full liquid. 1 large BM this shift. Care ongoing.

## 2024-03-29 NOTE — PROGRESS NOTES
[facility]     Patient Identification:  Name:  Donaldo Roberts  Age:  73 y.o.  Sex:  male  :  1950  MRN:  [id number]  Visit Number:  [id number]  Primary Care Provider:  Duke Rosen MD    Length of stay:  2    Subjective: Patient seen and examined out of bed to chair he is eating full liquid diet, he had large bowel movement last night, he is passing gas, feels much better with his abdominal distention.    Chief Complaint: Small bowel obstruction  ----------------------------------------------------------------------------------------------------------------------  Current Hospital Meds:  budesonide-formoterol, 2 puff, Inhalation, BID - RT  carvedilol, 3.125 mg, Oral, BID With Meals  cefepime, 2,000 mg, Intravenous, Q8H  ipratropium, 0.5 mg, Nebulization, 4x Daily - RT  metroNIDAZOLE, 500 mg, Intravenous, Q8H  sacubitril-valsartan, 1 tablet, Oral, Q12H  sodium chloride, 10 mL, Intravenous, Q12H      sodium chloride, 75 mL/hr, Last Rate: 75 mL/hr (24 1415)      ----------------------------------------------------------------------------------------------------------------------  Vital Signs:  Temp:  [97.4 °F (36.3 °C)-99.2 °F (37.3 °C)] 97.4 °F (36.3 °C)  Heart Rate:  [] 73  Resp:  [12] 16  BP: ()/(52-96) 133/73       Tele: Sinus arrhythmia 67 bpm      24  0616 24  0400 24  0400   Weight: 118 kg (260 lb 5.8 oz) 115 kg (254 lb 10.1 oz) 119 kg (261 lb 14.5 oz)     Body mass index is 38.68 kg/m².    Intake/Output Summary (Last 24 hours) at 3/29/2024 1500  Last data filed at 3/29/2024 0539  Gross per 24 hour   Intake 2287.64 ml   Output 1750 ml   Net 537.64 ml     Diet: Liquid; Full Liquid; Fluid Consistency: Thin (IDDSI 0)  ----------------------------------------------------------------------------------------------------------------------  Physical exam:  General: Obese out of bed to chair, comfortable answers questions  appropriately not confused .  No respiratory distress.    Skin:  Skin is warm and dry. No rash noted. No pallor.    HENT:  Head:  Normocephalic and atraumatic.  Mouth:  Moist mucous membranes.    Eyes:  Conjunctivae and EOM are normal.  Pupils are equal, round, and reactive to light.  No scleral icterus.    Neck:  Neck supple.  No JVD present.    Pulmonary/Chest:  No respiratory distress, no wheezes, no crackles, with normal breath sounds and good air movement.  Cardiovascular:  Normal rate, regular rhythm and normal heart sounds with no murmur.  Abdominal: 3.  No guarding no rigidity bowel sounds present  Extremities: Chronic stasis discoloration of legs with trace edema bilateral, no cyanosis or clubbing.    Neurological:  Motor strength equal no obvious deficit, sensory grossly intact.   No cranial nerve deficit.  No tongue deviation.  No facial droop.  No slurred speech.    Genitourinary: No Lees catheter  Back:  ----------------------------------------------------------------------------------------------------------------------  ----------------------------------------------------------------------------------------------------------------------  Results from last 7 days   Lab Units 03/27/24  0003 03/26/24 2207   HSTROP T ng/L 33* 34*     Results from last 7 days   Lab Units 03/29/24  0047 03/28/24  0134 03/27/24  0653 03/27/24  0344 03/27/24  0054 03/26/24  2207   CRP mg/dL  --   --  13.35*  --   --  12.21*   LACTATE mmol/L  --   --   --  1.7 2.3* 2.4*   WBC 10*3/mm3 7.10 5.88 5.37  --   --  5.27   HEMOGLOBIN g/dL 13.2 13.3 13.7  --   --  14.3   HEMATOCRIT % 43.1 43.5 44.7  --   --  45.8   MCV fL 99.3* 98.4* 98.2*  --   --  96.2   MCHC g/dL 30.6* 30.6* 30.6*  --   --  31.2*   PLATELETS 10*3/mm3 229 233 226  --   --  241     Results from last 7 days   Lab Units 03/26/24  2215   PH, ARTERIAL pH units 7.419   PO2 ART mm Hg 76.4*   PCO2, ARTERIAL mm Hg 56.4*   HCO3 ART mmol/L 36.5*     Results from last 7 days  "  Lab Units 03/29/24  0047 03/28/24  0134 03/27/24  0653 03/26/24  2207   SODIUM mmol/L 141 144 140 141   POTASSIUM mmol/L 3.6 3.9 4.1 4.1   MAGNESIUM mg/dL 2.2  --   --   --    CHLORIDE mmol/L 94* 94* 95* 92*   CO2 mmol/L 35.6* 36.7* 32.0* 32.7*   BUN mg/dL 30* 39* 28* 24*   CREATININE mg/dL 0.80 0.93 0.85 0.92   CALCIUM mg/dL 9.9 9.9 9.7 10.6*   GLUCOSE mg/dL 106* 126* 150* 182*   ALBUMIN g/dL  --  4.3 4.3 4.6   BILIRUBIN mg/dL  --  0.4 0.4 0.7   ALK PHOS U/L  --  80 86 95   AST (SGOT) U/L  --  16 17 18   ALT (SGPT) U/L  --  19 21 23   Estimated Creatinine Clearance: 107.8 mL/min (by C-G formula based on SCr of 0.8 mg/dL).    No results found for: \"AMMONIA\"      Blood Culture   Date Value Ref Range Status   03/26/2024 No growth at 2 days  Preliminary   03/26/2024 No growth at 2 days  Preliminary     No results found for: \"URINECX\"  No results found for: \"WOUNDCX\"  No results found for: \"STOOLCX\"    I have personally looked at the labs and they are summarized above.  ----------------------------------------------------------------------------------------------------------------------  Imaging Results (Last 24 Hours)       Procedure Component Value Units Date/Time    XR Abdomen KUB [[id number]] Collected: 03/28/24 1654     Updated: 03/28/24 1657    Narrative:      EXAM:    XR Abdomen, 1 View     EXAM DATE:    3/28/2024 3:36 PM     CLINICAL HISTORY:    Follow-up SBO, evaluate for progression of contrast into the colon;  K56.609-Unspecified intestinal obstruction, unspecified as to partial  versus complete obstruction     TECHNIQUE:    Frontal supine view of the abdomen/pelvis.     COMPARISON:    No relevant prior studies available.     FINDINGS:    Gastrointestinal tract:  Barium contrast is now noted within the  colon.  Mildly prominent gas-filled loops of small bowel compatible with  improving small bowel obstruction or ileus.    Bones/joints:  Unremarkable as visualized.       Impression:      1.  Barium contrast " is now noted within the colon.  2.  Mildly prominent gas-filled loops of small bowel compatible with  improving small bowel obstruction or ileus.        This report was finalized on 3/28/2024 4:55 PM by Dr. Jasvir Sheridan MD.             ----------------------------------------------------------------------------------------------------------------------  Assessment and Plan:  -Small bowel obstruction improved with conservative management  -History of COPD without exacerbation  -Obesity with a BMI of 37.6  -History of sick sinus syndrome status post permanent pacemaker placement  -History of BPH  -History of essential hypertension  -History of hyperlipidemia  -History of severe pulmonary hypertension    Challenged with diet, restart home medication, ambulate, PT OT.    Disposition pending      Charlene Swift MD  03/29/24  15:00 EDT

## 2024-03-29 NOTE — NURSING NOTE
Pt. tolerated clear liquids, no vomiting. Per order, NG may be removed 3 hours after clear liquids started. NG removed at 2045.

## 2024-03-29 NOTE — PLAN OF CARE
Goal Outcome Evaluation:   VSS at this time. Patient remains A&Ox4 on 3L humidified NC. NG out at 2045. Patient had no vomiting 3 hours after clear liquid meal given. Patient had BM this shift. Fluids infusing per order, IV antibiotics infusing. Patient resting at this time. Call light in reach, bed alarm on, bed in lowest position. Plan of care ongoing 7p-7a.

## 2024-03-30 ENCOUNTER — READMISSION MANAGEMENT (OUTPATIENT)
Dept: CALL CENTER | Facility: HOSPITAL | Age: 74
End: 2024-03-30
Payer: MEDICARE

## 2024-03-30 VITALS
SYSTOLIC BLOOD PRESSURE: 102 MMHG | DIASTOLIC BLOOD PRESSURE: 69 MMHG | RESPIRATION RATE: 19 BRPM | HEIGHT: 69 IN | TEMPERATURE: 98.2 F | BODY MASS INDEX: 39.64 KG/M2 | WEIGHT: 267.64 LBS | OXYGEN SATURATION: 99 % | HEART RATE: 70 BPM

## 2024-03-30 PROCEDURE — 99239 HOSP IP/OBS DSCHRG MGMT >30: CPT | Performed by: HOSPITALIST

## 2024-03-30 PROCEDURE — 25010000002 CEFEPIME PER 500 MG: Performed by: HOSPITALIST

## 2024-03-30 PROCEDURE — 25010000002 METRONIDAZOLE 500 MG/100ML SOLUTION: Performed by: HOSPITALIST

## 2024-03-30 PROCEDURE — 99231 SBSQ HOSP IP/OBS SF/LOW 25: CPT | Performed by: NURSE PRACTITIONER

## 2024-03-30 PROCEDURE — 99231 SBSQ HOSP IP/OBS SF/LOW 25: CPT | Performed by: SURGERY

## 2024-03-30 RX ORDER — CARVEDILOL 3.12 MG/1
3.12 TABLET ORAL 2 TIMES DAILY WITH MEALS
Qty: 60 TABLET | Refills: 3 | Status: SHIPPED | OUTPATIENT
Start: 2024-03-30

## 2024-03-30 RX ORDER — POLYETHYLENE GLYCOL 3350 17 G/17G
17 POWDER, FOR SOLUTION ORAL DAILY
Qty: 30 PACKET | Refills: 3 | Status: SHIPPED | OUTPATIENT
Start: 2024-03-30

## 2024-03-30 RX ADMIN — SACUBITRIL AND VALSARTAN 1 TABLET: 49; 51 TABLET, FILM COATED ORAL at 08:47

## 2024-03-30 RX ADMIN — BUMETANIDE 1 MG: 1 TABLET ORAL at 08:47

## 2024-03-30 RX ADMIN — METRONIDAZOLE 500 MG: 500 INJECTION, SOLUTION INTRAVENOUS at 08:47

## 2024-03-30 RX ADMIN — METRONIDAZOLE 500 MG: 500 INJECTION, SOLUTION INTRAVENOUS at 01:26

## 2024-03-30 RX ADMIN — Medication 10 ML: at 08:47

## 2024-03-30 RX ADMIN — ACETAMINOPHEN 650 MG: 325 TABLET ORAL at 10:20

## 2024-03-30 RX ADMIN — CARVEDILOL 3.12 MG: 3.12 TABLET, FILM COATED ORAL at 08:47

## 2024-03-30 RX ADMIN — CEFEPIME 2000 MG: 2 INJECTION, POWDER, FOR SOLUTION INTRAVENOUS at 09:33

## 2024-03-30 RX ADMIN — CEFEPIME 2000 MG: 2 INJECTION, POWDER, FOR SOLUTION INTRAVENOUS at 03:22

## 2024-03-30 RX ADMIN — SODIUM CHLORIDE 75 ML/HR: 4.5 INJECTION, SOLUTION INTRAVENOUS at 04:51

## 2024-03-30 NOTE — PROGRESS NOTES
Diagnosis: Ileus, resolved    Patient doing well on examination today. Tolerating a diet without issue and advanced to a regular diet.Having bowel function (3 bowel movements overnight)    Exam: Abdomen distended, nontender.     Will plan for discharge today.     Nazanin Anderson MD       General Surgeon  [facility]

## 2024-03-30 NOTE — PROGRESS NOTES
[facility]  PROGRESS NOTE    Patient information:  Name: Donaldo Roberts  Age/Sex: 73 y.o. male  :  1950        PCP: Duke Rosen MD  Attending: Jhon Birch MD  MRN:  [id number]  Visit Number:  [id number]    LOS:  LOS: 3 days     CODE STATUS:    Code Status and Medical Interventions:   Ordered at: 24 0126     Code Status (Patient has no pulse and is not breathing):    CPR (Attempt to Resuscitate)     Medical Interventions (Patient has pulse or is breathing):    Full Support       PROBLEM LIST:Principal Problem:    SBO (small bowel obstruction)      Reason for Cardiology follow-up: Acute on chronic HFpEF, preoperative clearance     Subjective   ADMISSION INFORMATION:  Chief Complaint   Patient presents with    Shortness of Breath       HPI:  Donaldo Roberts is a 73 y.o. male with chronic HFpEF, severe pulmonary hypertension, sinus bradycardia status post [employer] permanent pacemaker 12/10/2021, carotid stenosis status post left carotid endarterectomy, hypertension, hyperlipidemia, PATTY not compliant with CPAP, COPD with chronic respiratory failure (3 L at baseline), BPH, history of CVA, obesity, and smoking with reported quit date of 2023 after a 113-1/2 pack year history.     He presented to our emergency room 2024 with complaints of shortness of air and weakness.  Most of the history is obtained from the chart as the patient appears fatigued and only minimally answers my questions.     In addition to his shortness of air and weakness, he endorsed abdominal pain and vomiting.     He follows with the heart failure clinic with his last visit being 2024 as well as with LARY Kelley with his last visit with her being 2024.      EVENT TIMELINE:   2024: Limited ECHO w EF of 46-50%.  Please see full report attached below.    Interval History:   Patient was in room  when he was seen and examined.   Patient sitting up in chair at bedside.  No acute distress noted at this time.  Patient currently denies any chest pain, shortness of breath, abdominal pain, or palpitations.  Patient reports he has been able to tolerate p.o. intake without difficulty.  Dr. Swift is at bedside as well.  Patient is potentially being discharged today awaiting surgery evaluation.     Vital Signs  Temp:  [98 °F (36.7 °C)-98.3 °F (36.8 °C)] 98.3 °F (36.8 °C)  Heart Rate:  [62-84] 79  Resp:  [12-31] 22  BP: ()/(56-80) 107/56  Flow (L/min):  [3] 3  Device (Oxygen Therapy): humidified;nasal cannula  Vital Signs (last 72 hrs)         03/27 0700 03/28 0659 03/28 0700 03/29 0659 03/29 0700 03/30 0659 03/30 0700 03/30 0819   Most Recent      Temp (°F) 98.3 -  99.8    96.9 -  99.2    97.4 -  98.3       98.3 (36.8) 03/30 0400    Heart Rate 96 -  123    75 -  107    62 -  83    79 -  84     79 03/30 0800    Resp 14 -  28    14 -  25    12 -  31      22     22 03/30 0700    BP 99/72 -  146/99    98/65 -  156/80    90/66 -  162/76    107/56 -  139/72     107/56 03/30 0800    SpO2 (%) 94 -  100    90 -  100    91 -  100    93 -  97     93 03/30 0800    Flow (L/min) 3 -  4      3      3       3 03/30 0022          BMI:Body mass index is 39.52 kg/m².    WEIGHT:      03/28/24  0400 03/29/24  0400 03/30/24  0400   Weight: 115 kg (254 lb 10.1 oz) 119 kg (261 lb 14.5 oz) 121 kg (267 lb 10.2 oz)       DIET:Diet: Regular/House; Fluid Consistency: Thin (IDDSI 0)    I&O:  Intake & Output (last 3 days)         03/27 0701  03/28 0700 03/28 0701 03/29 0700 03/29 0701 03/30 0700 03/30 0701 03/31 0700    P.O. 50 400 360     I.V. (mL/kg) 699 (6.1) 1887.6 (15.9) 1111 (9.2)     Total Intake(mL/kg) 749 (6.5) 2287.6 (19.2) 1471 (12.2)     Urine (mL/kg/hr) 850 (0.3) 1400 (0.5) 1950 (0.7)     Emesis/NG output 2700 550      Stool  0 0     Total Output 3550 1950 1950     Net -2801 +337.6 -479             Stool Unmeasured Occurrence  1 x 2 x               Objective     Physical Exam:      General Appearance:    Alert, cooperative, in no acute distress.   Head:    Normocephalic, without obvious abnormality, atraumatic.   Eyes:                          Conjunctivae and sclerae normal, no icterus, no pallor, corneas clear.   Neck:   No adenopathy, supple, trachea midline, no thyromegaly, no carotid bruit, no JVD.   Lungs:     Clear to auscultation, respirations regular, even and             unlabored.    Heart:    Regular rhythm and normal rate, normal S1 and S2, no          murmur, no gallop, no rub, no click   Chest Wall:    No abnormalities observed.   Abdomen:     Normal bowel sounds, no masses, no organomegaly, soft nontender, + distended, no guarding, no rebound tenderness.   Extremities:   Moves all extremities well, no edema, no cyanosis, no           redness.   Pulses:   Pulses palpable and equal bilaterally.   Skin:   No bleeding, bruising or rash.   Neurologic:   Alert and Oriented x 3, Speech Clear & comprehensive.       Results review   Results Review:   Results from last 7 days   Lab Units 03/27/24  0003 03/26/24 2207   HSTROP T ng/L 33* 34*     Lab Results   Component Value Date    PROBNP 1,999.0 (H) 03/26/2024    PROBNP 90.9 03/04/2024    PROBNP 125.9 01/25/2024     Results from last 7 days   Lab Units 03/29/24  0047 03/28/24  0134 03/27/24  0653 03/26/24 2207   WBC 10*3/mm3 7.10 5.88 5.37 5.27   HEMOGLOBIN g/dL 13.2 13.3 13.7 14.3   PLATELETS 10*3/mm3 229 233 226 241     Results from last 7 days   Lab Units 03/29/24  0047 03/28/24  0134 03/27/24  0653 03/26/24 2207   SODIUM mmol/L 141 144 140 141   POTASSIUM mmol/L 3.6 3.9 4.1 4.1   CHLORIDE mmol/L 94* 94* 95* 92*   CO2 mmol/L 35.6* 36.7* 32.0* 32.7*   BUN mg/dL 30* 39* 28* 24*   CREATININE mg/dL 0.80 0.93 0.85 0.92   CALCIUM mg/dL 9.9 9.9 9.7 10.6*   GLUCOSE mg/dL 106* 126* 150* 182*   ALT (SGPT) U/L  --  19 21 23   AST (SGOT) U/L  --  16 17 18     Lab Results   Component Value Date    MG 2.2  "03/29/2024    MG 2.1 03/04/2024    MG 1.8 01/25/2024     Estimated Creatinine Clearance: 108.8 mL/min (by C-G formula based on SCr of 0.8 mg/dL).    Lab Results   Component Value Date    HGBA1C 6.40 (H) 12/11/2023     Lab Results   Component Value Date    CHOL 112 03/27/2023    TRIG 175 (H) 03/27/2023    LDL 46 03/27/2023    HDL 37 (L) 03/27/2023     Lab Results   Component Value Date    ABSOLUTELUNG 42 (A) 01/25/2024     Lab Results   Component Value Date    INR 0.82 (L) 12/08/2021     No results found for: \"LABHEPA\"    Lab Results   Component Value Date    TSH 0.573 12/11/2023    PSA 1.570 01/16/2023      Pain Management Panel  More data exists         Latest Ref Rng & Units 12/10/2023 11/18/2023   Pain Management Panel   Amphetamine, Urine Qual Negative Negative  Negative    Barbiturates Screen, Urine Negative Negative  Negative    Benzodiazepine Screen, Urine Negative Negative  Negative    Buprenorphine, Screen, Urine Negative Negative  Negative    Cocaine Screen, Urine Negative Negative  Negative    Fentanyl, Urine Negative Negative  Negative    Methadone Screen , Urine Negative Negative  Negative    Methamphetamine, Ur Negative Negative  Negative      Microbiology Results (last 10 days)       Procedure Component Value - Date/Time    COVID-19 and FLU A/B PCR, 1 HR TAT - Swab, Nasopharynx [[id number]]  (Normal) Collected: 03/26/24 2248    Lab Status: Final result Specimen: Swab from Nasopharynx Updated: 03/26/24 2310     COVID19 Not Detected     Influenza A PCR Not Detected     Influenza B PCR Not Detected    Narrative:      Fact sheet for providers: https://www.fda.gov/media/431096/download    Fact sheet for patients: https://www.fda.gov/media/948855/download    Test performed by PCR.    Blood Culture - Blood, Arm, Left [[id number]]  (Normal) Collected: 03/26/24 2220    Lab Status: Preliminary result Specimen: Blood from Arm, Left Updated: 03/29/24 2231     Blood Culture No growth at 3 days    Blood Culture - " Blood, Arm, Right [[id number]]  (Normal) Collected: 03/26/24 2220    Lab Status: Preliminary result Specimen: Blood from Arm, Right Updated: 03/29/24 2231     Blood Culture No growth at 3 days           Imaging Results (Last 24 Hours)       ** No results found for the last 24 hours. **          ECHO:  Results for orders placed during the hospital encounter of 03/26/24    Adult Transthoracic Echo Limited W/ Cont if Necessary Per Protocol    Interpretation Summary    Left ventricular systolic function is low normal. Calculated left ventricular EF = 49% Left ventricular ejection fraction appears to be 46 - 50%.    Left ventricular diastolic function was not assessed.      STRESS TEST:  Results for orders placed during the hospital encounter of 11/01/21    Stress Test With Myocardial Perfusion One Day    Interpretation Summary  · Left ventricular ejection fraction is borderline normal. (Calculated EF = 53%).  · Myocardial perfusion imaging indicates a normal myocardial perfusion study with no evidence of ischemia.  · Impressions are consistent with a low risk study.  · There is no prior study available for comparison.  · Findings consistent with a normal ECG stress test.       HEART CATH:  No results found for this or any previous visit.      TELEMETRY:  Paced 70s with BBB            I reviewed the patient's new clinical results.    ALLERGIES: Bee pollen, Levaquin [levofloxacin], and Penicillins    Medication Review:   Current list of medications may not reflect those currently placed in orders that are not signed or are being held.     budesonide-formoterol, 2 puff, Inhalation, BID - RT  bumetanide, 1 mg, Oral, Daily  carvedilol, 3.125 mg, Oral, BID With Meals  cefepime, 2,000 mg, Intravenous, Q8H  ipratropium, 0.5 mg, Nebulization, 4x Daily - RT  metroNIDAZOLE, 500 mg, Intravenous, Q8H  sacubitril-valsartan, 1 tablet, Oral, Q12H  sodium chloride, 10 mL, Intravenous, Q12H      sodium chloride, 75 mL/hr, Last Rate: 75  mL/hr (03/30/24 5941)        acetaminophen    ipratropium-albuterol    nitroglycerin    sodium chloride    sodium chloride    sodium chloride    Assessment      1.  Mildly evaded high-sensitivity troponin with a flat trend on admission with questionable significance  2.  Chronic HFpEF currently compensated  3.  Mild cardiomyopathy EF 45-50% on recent echocardiogram [by my reading ejection fraction seems to be within normal limits  4.  History of sick sinus syndrome status post permanent pacemaker placement  5.  Possible small bowel obstruction surgery is following  6.  Obstructive sleep apnea patient is not using CPAP        Plan   Continue home medications as currently prescribed   F/U with Primary Cardiologist as scheduled 06/10/2024 and Heart Failure Clinic on 05/06/2024.   Stable from Cardiac Standpoint for DC today.         I have discussed the patients findings and recommendations with patient.    Thank you very much for asking us to be involved in this patient's care.  We will follow along with you.      I have discussed this patient with Dr. Portillo and together, we have formed a plan of care for this patient as stated above in the recommendations.      Electronically signed by LARY Moreno, 03/30/24, 11:55 AM EDT.                 Please note that portions of this note were completed with a voice recognition program.    Please note that portions of this note were copied and has been reviewed and is accurate as of 3/30/2024 .      .Electronically signed by Young Portillo MD, 03/30/24, 1:56 PM EDT.

## 2024-03-30 NOTE — DISCHARGE SUMMARY
"    [facility] HOSPITALIST MEDICINE DISCHARGE SUMMARY    Patient Identification:  Name:  Donaldo Roberts  Age:  73 y.o.  Sex:  male  :  1950  MRN:  [id number]  Visit Number:  [id number]    Date of Admission: 3/26/2024  Date of Discharge: 2024  DISCHARGE DISPOSITION   Stable  PCP: Duke Rosen MD    DISCHARGE DIAGNOSIS : Small bowel obstruction resolved with conservative management, shortness of breath secondary to abdominal distention resolved, history of carotid artery stenosis, history of BPH, history of severe bradycardia status post permanent pacemaker placement, history of tobacco use, COPD without exacerbation, chronic respite failure 3 L nasal cannula baseline, history of heart failure with preserved ejection fraction, SIRS secondary to small bowel obstruction, obesity with BMI of 39, history of severe pulmonary hypertension, acute on chronic heart failure with preserved ejection fraction now compensated.    HOSPITAL COURSE  Patient is a 73 y.o. male presented to [facility] complaining of 1 to 2 weeks of progressive weakness abdominal distention and shortness of breath.  He also reported nausea and vomiting.  Last bowel movement was the day of admission described as \"normal \".  On evaluation patient's abdomen is was significantly distended, CT of the abdomen pelvis revealed small bowel obstruction.  Surgery was consulted and recommended NG tube placement and admit the patient.  On initial admission patient was also noted to have acute on chronic preserved ejection fraction heart failure which improved with a dose of diuretics.  Cardiology was consulted in anticipation for possible surgery and was cleared.  During his stay conservative management did resolve his small bowel obstruction, he was progressed with diet without any difficulties.  He has multiple large bowel movement without nausea or vomiting and eating well.  Surgery has cleared the patient for discharge, " plan is to discharge home today and follow-up with his primary care provider.    VITAL SIGNS:      03/28/24  0400 03/29/24  0400 03/30/24  0400   Weight: 115 kg (254 lb 10.1 oz) 119 kg (261 lb 14.5 oz) 121 kg (267 lb 10.2 oz)     Body mass index is 39.52 kg/m².  Vitals:    03/30/24 1000   BP: 93/62   Pulse: 73   Resp: 21   Temp:    SpO2: 100%     PHYSICAL EXAM:  General: He is out of bed to chair conversant, not in respiratory distress.   Skin:  Skin is warm and dry. No rash noted. No pallor.    HENT:  Head:  Normocephalic and atraumatic.  Mouth:  Moist mucous membranes.    Eyes:  Conjunctivae and EOM are normal.  Pupils are equal, round, and reactive to light.  No scleral icterus.    Neck:  Neck supple.  No JVD present.    Pulmonary/Chest:  No respiratory distress, no wheezes, no crackles, with normal breath sounds and good air movement.  Cardiovascular:  Normal rate, regular rhythm and normal heart sounds with no murmur.  Abdominal: Turbulence of soft.  Bowel sounds are normal.  No distension and no tenderness.   Extremities: +1 edema distal third lower extremity with chronic discoloration from stasis.   Neurological:  Motor strength equal no obvious deficit, sensory grossly intact.   No cranial nerve deficit.  No tongue deviation.  No facial droop.  No slurred speech.    Genitourinary: No Lees catheter  Back:  ----------    DISCHARGE MEDICATIONS:     Discharge Medications        New Medications        Instructions Start Date   carvedilol 3.125 MG tablet  Commonly known as: COREG   3.125 mg, Oral, 2 Times Daily With Meals      polyethylene glycol 17 g packet  Commonly known as: MIRALAX   17 g, Oral, Daily             Continue These Medications        Instructions Start Date   albuterol sulfate  (90 Base) MCG/ACT inhaler  Commonly known as: PROVENTIL HFA;VENTOLIN HFA;PROAIR HFA   Inhale 2 puffs by mouth Every 4 (Four) Hours As Needed for Wheezing or Shortness of Air.      aspirin 81 MG EC tablet   81 mg,  Oral, Daily      Breztri Aerosphere 160-9-4.8 MCG/ACT aerosol inhaler  Generic drug: Budeson-Glycopyrrol-Formoterol   2 puffs, Inhalation, 2 Times Daily      bumetanide 1 MG tablet  Commonly known as: BUMEX   1 mg, Oral, Daily, Take an extra tablet as needed for weight gain 2-3 pounds in 24-48 hours.      clopidogrel 75 MG tablet  Commonly known as: PLAVIX   75 mg, Oral, Daily      Entresto 49-51 MG tablet  Generic drug: sacubitril-valsartan   1 tablet, Oral, 2 Times Daily      famotidine 20 MG tablet  Commonly known as: PEPCID   20 mg, Oral, Daily      Farxiga 10 MG tablet  Generic drug: dapagliflozin Propanediol   Take 1 tablet by mouth Daily.      ipratropium 0.02 % nebulizer solution  Commonly known as: ATROVENT   0.5 mg, Nebulization, 4 Times Daily PRN      omeprazole 20 MG capsule  Commonly known as: priLOSEC   20 mg, Oral, Daily      rosuvastatin 10 MG tablet  Commonly known as: CRESTOR   10 mg, Oral, Daily                 Your Scheduled Appointments      Apr 02, 2024 11:00 AM  Follow Up with Stephy Estrada PA-C  [facility] PULMONARY & CRITICAL CARE MEDICINE ([place]) [address]  [facility] [id number] [phone]   Established: Please bring outside images or reports.         May 06, 2024  1:00 PM  Follow Up with  [facility] PULMONARY [facility] PULMONARY CLINIC (--) [address] [facility] [facility] [address] [phone]   Arrive 15 minutes prior to appointment.         May 06, 2024  2:30 PM  Follow Up with COR HEART FAIL [facility] HEART FAILURE CLINIC ([place]) [address] [facility] [address] [phone]   Arrive 15 minutes prior to appointment.         Benjamin 10, 2024 10:45 AM  Follow Up with LARY Osorio  [facility] CARDIOLOGY ([place]) [address]  [facility] [id number] [phone]   -Bring photo ID, insurance card, and list of medications to appointment  -If testing was completed outside of [facility]  [facility] then patient must bring images on a disc  -Copay will be collected at time of appointment  -Established patients should arrive 10 minutes prior to appointment         Dec 04, 2024 11:30 AM  Follow Up with LARY Moore  [facility] CARDIOTHORACIC SURGERY ([place]) [address] [place] [address]  [facility] [address] [phone]   -Bring photo ID, insurance card, and list of medications to appointment  -If testing was completed outside of [facility] then patient must bring images on a disc  -Copay will be collected at time of appointment  -Established patients should arrive 15 minutes prior to appointment                Additional Instructions for the Follow-ups that You Need to Schedule       Discharge Follow-up with PCP   As directed       Currently Documented PCP:    Duke Rosen MD    PCP Phone Number:    [phone]     Follow Up Details: Duke Rosen MD (posthospitalization follow-up)               Follow-up Information       Duke Rosen MD .    Specialty: Internal Medicine  Why: Duke Rosen MD (posthospitalization follow-up)  Contact information:  [address] [facility] [address]  [facility] [address] [phone]                             Pending Labs       Order Current Status    Blood Culture - Blood, Arm, Left Preliminary result    Blood Culture - Blood, Arm, Right Preliminary result             Charlene Swift MD  03/30/24  10:23 EDT    Please note that this discharge summary required more than 30 minutes to complete.

## 2024-03-30 NOTE — PLAN OF CARE
Problem: Skin Injury Risk Increased  Goal: Skin Health and Integrity  Outcome: Ongoing, Progressing  Intervention: Optimize Skin Protection  Recent Flowsheet Documentation  Taken 3/29/2024 2030 by Tunde Obando RN  Pressure Reduction Techniques:   frequent weight shift encouraged   heels elevated off bed  Pressure Reduction Devices: pressure-redistributing mattress utilized  Skin Protection:   adhesive use limited   drying agents applied     Problem: Adult Inpatient Plan of Care  Goal: Plan of Care Review  Outcome: Ongoing, Progressing  Goal: Patient-Specific Goal (Individualized)  Outcome: Ongoing, Progressing  Goal: Absence of Hospital-Acquired Illness or Injury  Outcome: Ongoing, Progressing  Intervention: Identify and Manage Fall Risk  Recent Flowsheet Documentation  Taken 3/30/2024 0500 by Tunde Obando RN  Safety Promotion/Fall Prevention:   activity supervised   assistive device/personal items within reach   clutter free environment maintained   fall prevention program maintained   nonskid shoes/slippers when out of bed   room organization consistent   safety round/check completed  Taken 3/30/2024 0100 by Tunde Obando RN  Safety Promotion/Fall Prevention:   activity supervised   assistive device/personal items within reach   clutter free environment maintained   fall prevention program maintained   nonskid shoes/slippers when out of bed   room organization consistent   safety round/check completed  Taken 3/29/2024 2300 by Tunde Obando RN  Safety Promotion/Fall Prevention:   activity supervised   assistive device/personal items within reach   clutter free environment maintained   fall prevention program maintained   nonskid shoes/slippers when out of bed   room organization consistent   safety round/check completed  Taken 3/29/2024 2100 by Tunde Obando RN  Safety Promotion/Fall Prevention:   activity supervised   assistive device/personal items within reach   clutter free environment maintained    fall prevention program maintained   nonskid shoes/slippers when out of bed   room organization consistent   safety round/check completed  Taken 3/29/2024 2030 by Tunde Obando RN  Safety Promotion/Fall Prevention:   activity supervised   assistive device/personal items within reach   clutter free environment maintained   fall prevention program maintained   nonskid shoes/slippers when out of bed   room organization consistent   safety round/check completed  Taken 3/29/2024 1920 by Tunde Obando RN  Safety Promotion/Fall Prevention:   activity supervised   assistive device/personal items within reach   clutter free environment maintained  Intervention: Prevent Skin Injury  Recent Flowsheet Documentation  Taken 3/29/2024 2030 by Tunde Obando RN  Skin Protection:   adhesive use limited   drying agents applied  Intervention: Prevent and Manage VTE (Venous Thromboembolism) Risk  Recent Flowsheet Documentation  Taken 3/29/2024 2300 by Tunde Obando RN  Activity Management: activity encouraged  Taken 3/29/2024 2030 by Tunde Obando RN  Activity Management: activity encouraged  VTE Prevention/Management: (see mar) other (see comments)  Range of Motion: active ROM (range of motion) encouraged  Intervention: Prevent Infection  Recent Flowsheet Documentation  Taken 3/30/2024 0500 by Tunde Obando RN  Infection Prevention:   rest/sleep promoted   single patient room provided  Taken 3/30/2024 0300 by Tunde Obando RN  Infection Prevention:   rest/sleep promoted   single patient room provided  Taken 3/30/2024 0100 by Tunde Obando RN  Infection Prevention:   rest/sleep promoted   single patient room provided  Taken 3/29/2024 2300 by Tunde Obando RN  Infection Prevention:   rest/sleep promoted   single patient room provided  Taken 3/29/2024 2100 by Tunde Obando RN  Infection Prevention:   rest/sleep promoted   single patient room provided  Taken 3/29/2024 2030 by Tunde Obando RN  Infection Prevention:   rest/sleep  promoted   single patient room provided  Taken 3/29/2024 1920 by Tunde Obando RN  Infection Prevention: rest/sleep promoted  Goal: Optimal Comfort and Wellbeing  Outcome: Ongoing, Progressing  Intervention: Provide Person-Centered Care  Recent Flowsheet Documentation  Taken 3/29/2024 2030 by Tunde Obando RN  Trust Relationship/Rapport:   care explained   choices provided   emotional support provided   empathic listening provided   questions answered  Goal: Readiness for Transition of Care  Outcome: Ongoing, Progressing     Problem: Adjustment to Illness (Sepsis/Septic Shock)  Goal: Optimal Coping  Outcome: Ongoing, Progressing  Intervention: Optimize Psychosocial Adjustment to Illness  Recent Flowsheet Documentation  Taken 3/29/2024 2030 by Tunde Obando RN  Family/Support System Care: support provided     Problem: Bleeding (Sepsis/Septic Shock)  Goal: Absence of Bleeding  Outcome: Ongoing, Progressing     Problem: Glycemic Control Impaired (Sepsis/Septic Shock)  Goal: Blood Glucose Level Within Desired Range  Outcome: Ongoing, Progressing     Problem: Infection Progression (Sepsis/Septic Shock)  Goal: Absence of Infection Signs and Symptoms  Outcome: Ongoing, Progressing  Intervention: Initiate Sepsis Management  Recent Flowsheet Documentation  Taken 3/30/2024 0500 by Tunde Obando RN  Infection Prevention:   rest/sleep promoted   single patient room provided  Taken 3/30/2024 0300 by Tunde Obando RN  Infection Prevention:   rest/sleep promoted   single patient room provided  Taken 3/30/2024 0100 by Tunde Obando RN  Infection Prevention:   rest/sleep promoted   single patient room provided  Taken 3/29/2024 2300 by Tunde Obando RN  Infection Prevention:   rest/sleep promoted   single patient room provided  Taken 3/29/2024 2100 by Tunde Obando RN  Infection Prevention:   rest/sleep promoted   single patient room provided  Taken 3/29/2024 2030 by Tunde Obando RN  Infection Prevention:   rest/sleep  promoted   single patient room provided  Taken 3/29/2024 1920 by Tunde Obando RN  Infection Prevention: rest/sleep promoted  Intervention: Promote Recovery  Recent Flowsheet Documentation  Taken 3/29/2024 2300 by Tunde Obando RN  Activity Management: activity encouraged  Taken 3/29/2024 2030 by Tunde Obando RN  Activity Management: activity encouraged  Sleep/Rest Enhancement: consistent schedule promoted     Problem: Nutrition Impaired (Sepsis/Septic Shock)  Goal: Optimal Nutrition Intake  Outcome: Ongoing, Progressing     Problem: Fall Injury Risk  Goal: Absence of Fall and Fall-Related Injury  Outcome: Ongoing, Progressing  Intervention: Identify and Manage Contributors  Recent Flowsheet Documentation  Taken 3/30/2024 0500 by Tunde Obando RN  Medication Review/Management: medications reviewed  Taken 3/30/2024 0300 by Tunde Obando RN  Medication Review/Management: medications reviewed  Taken 3/30/2024 0100 by Tunde Obando RN  Medication Review/Management: medications reviewed  Taken 3/29/2024 2300 by Tunde Obando RN  Medication Review/Management: medications reviewed  Taken 3/29/2024 2100 by Tunde Obando RN  Medication Review/Management: medications reviewed  Taken 3/29/2024 2030 by Tunde Obando RN  Medication Review/Management: medications reviewed  Taken 3/29/2024 1920 by Tunde Obando RN  Medication Review/Management: medications reviewed  Intervention: Promote Injury-Free Environment  Recent Flowsheet Documentation  Taken 3/30/2024 0500 by Tunde Obando RN  Safety Promotion/Fall Prevention:   activity supervised   assistive device/personal items within reach   clutter free environment maintained   fall prevention program maintained   nonskid shoes/slippers when out of bed   room organization consistent   safety round/check completed  Taken 3/30/2024 0100 by Tunde Obando RN  Safety Promotion/Fall Prevention:   activity supervised   assistive device/personal items within reach   clutter free  environment maintained   fall prevention program maintained   nonskid shoes/slippers when out of bed   room organization consistent   safety round/check completed  Taken 3/29/2024 2300 by Tunde Obando, RICKI  Safety Promotion/Fall Prevention:   activity supervised   assistive device/personal items within reach   clutter free environment maintained   fall prevention program maintained   nonskid shoes/slippers when out of bed   room organization consistent   safety round/check completed  Taken 3/29/2024 2100 by Tunde Obando, RICKI  Safety Promotion/Fall Prevention:   activity supervised   assistive device/personal items within reach   clutter free environment maintained   fall prevention program maintained   nonskid shoes/slippers when out of bed   room organization consistent   safety round/check completed  Taken 3/29/2024 2030 by Tunde Obando, RICKI  Safety Promotion/Fall Prevention:   activity supervised   assistive device/personal items within reach   clutter free environment maintained   fall prevention program maintained   nonskid shoes/slippers when out of bed   room organization consistent   safety round/check completed  Taken 3/29/2024 1920 by Tunde Obando, RICKI  Safety Promotion/Fall Prevention:   activity supervised   assistive device/personal items within reach   clutter free environment maintained   Goal Outcome Evaluation:                   Pt lying in bed resting comfortably on 3 L NC, VSS, PT Afebrile, denies any issues at this time, no distress noted.

## 2024-03-30 NOTE — OUTREACH NOTE
Prep Survey      Flowsheet Row Responses   [facility] facility patient discharged from? [facility]   Is LACE score < 7 ? No   Eligibility Readm Mgmt   Discharge diagnosis SBO (small bowel obstruction)   Does the patient have one of the following disease processes/diagnoses(primary or secondary)? Other   Does the patient have Home health ordered? No   Is there a DME ordered? No   Prep survey completed? Yes            Nita GANT - Registered Nurse

## 2024-03-31 LAB
BACTERIA SPEC AEROBE CULT: NORMAL
BACTERIA SPEC AEROBE CULT: NORMAL

## 2024-04-01 ENCOUNTER — SPECIALTY PHARMACY (OUTPATIENT)
Dept: PHARMACY | Facility: HOSPITAL | Age: 74
End: 2024-04-01
Payer: MEDICARE

## 2024-04-01 ENCOUNTER — TELEPHONE (OUTPATIENT)
Dept: TELEMETRY | Facility: HOSPITAL | Age: 74
End: 2024-04-01
Payer: MEDICARE

## 2024-04-01 NOTE — PROGRESS NOTES
Specialty Pharmacy Refill Coordination Note     Donaldo is a 73 y.o. male contacted today regarding refills of  Ipratropium and Albuterol specialty medication(s).    Reviewed and verified with patient:       Specialty medication(s) and dose(s) confirmed: yes    Refill Questions      Flowsheet Row Most Recent Value   Changes to allergies? No   Changes to medications? No   New conditions or infections since last clinic visit No   Unplanned office visit, urgent care, ED, or hospital admission in the last 4 weeks  No   How does patient/caregiver feel medication is working? Very good   Financial problems or insurance changes  No   Since the previous refill, were any specialty medication doses or scheduled injections missed or delayed?  No   Does this patient require a clinical escalation to a pharmacist? No            Delivery Questions      Flowsheet Row Most Recent Value   Copay verified? No   Copay amount na   Copay form of payment No copayment ($0)                   Follow-up: 30 day(s)     Amy Ramos Pharmacy Technician  Specialty Pharmacy Technician

## 2024-04-05 ENCOUNTER — READMISSION MANAGEMENT (OUTPATIENT)
Dept: CALL CENTER | Facility: HOSPITAL | Age: 74
End: 2024-04-05
Payer: MEDICARE

## 2024-04-05 NOTE — OUTREACH NOTE
Medical Week 1 Survey      Flowsheet Row Responses   [facility] patient discharged from? [facility]   Does the patient have one of the following disease processes/diagnoses(primary or secondary)? Other   Week 1 attempt successful? Yes   Call start time 1046   Call end time 1048   Discharge diagnosis SBO (small bowel obstruction)   Person spoke with today (if not patient) and relationship Inna, wife   Meds reviewed with patient/caregiver? Yes   Is the patient having any side effects they believe may be caused by any medication additions or changes? No   Does the patient have all medications ordered at discharge? Yes   Is the patient taking all medications as directed (includes completed medication regime)? Yes   Does the patient have a primary care provider?  Yes   Does the patient have an appointment with their PCP within 7 days of discharge? Yes   Has the patient kept scheduled appointments due by today? N/A   Has home health visited the patient within 72 hours of discharge? N/A   Psychosocial issues? No   Did the patient receive a copy of their discharge instructions? Yes   Nursing interventions Reviewed instructions with patient   What is the patient's perception of their health status since discharge? Improving   Is the patient/caregiver able to teach back signs and symptoms related to disease process for when to call PCP? Yes   Is the patient/caregiver able to teach back signs and symptoms related to disease process for when to call 911? Yes   Is the patient/caregiver able to teach back the hierarchy of who to call/visit for symptoms/problems? PCP, Specialist, Home health nurse, Urgent Care, ED, 911 Yes   Additional teach back comments states having normal bowel function with use of Miralax   Week 1 call completed? Yes   Graduated Yes   Would this patient benefit from a Referral to Amb Social Work? No   Is the patient interested in additional calls from an ambulatory ? No   Graduated/Revoked  comments pt's bowel function returned to normal, no more calls needed   Call end time [id number]            Nita GANT - Registered Nurse

## 2024-04-11 ENCOUNTER — CLINICAL SUPPORT NO REQUIREMENTS (OUTPATIENT)
Dept: CARDIOLOGY | Facility: CLINIC | Age: 74
End: 2024-04-11
Payer: MEDICARE

## 2024-04-11 DIAGNOSIS — Z95.0 CARDIAC PACEMAKER IN SITU: Primary | ICD-10-CM

## 2024-04-16 ENCOUNTER — OFFICE VISIT (OUTPATIENT)
Dept: PULMONOLOGY | Facility: CLINIC | Age: 74
End: 2024-04-16
Payer: MEDICARE

## 2024-04-16 VITALS
BODY MASS INDEX: 39.55 KG/M2 | TEMPERATURE: 98 F | SYSTOLIC BLOOD PRESSURE: 104 MMHG | HEART RATE: 81 BPM | DIASTOLIC BLOOD PRESSURE: 66 MMHG | WEIGHT: 267 LBS | HEIGHT: 69 IN | OXYGEN SATURATION: 96 %

## 2024-04-16 DIAGNOSIS — R53.81 PHYSICAL DEBILITY: Primary | ICD-10-CM

## 2024-04-16 DIAGNOSIS — J96.11 CHRONIC RESPIRATORY FAILURE WITH HYPOXIA AND HYPERCAPNIA: ICD-10-CM

## 2024-04-16 DIAGNOSIS — J96.12 CHRONIC RESPIRATORY FAILURE WITH HYPOXIA AND HYPERCAPNIA: ICD-10-CM

## 2024-04-16 DIAGNOSIS — F17.210 CIGARETTE NICOTINE DEPENDENCE WITHOUT COMPLICATION: ICD-10-CM

## 2024-04-16 DIAGNOSIS — I27.20 SEVERE PULMONARY HYPERTENSION: ICD-10-CM

## 2024-04-16 DIAGNOSIS — J44.9 CHRONIC OBSTRUCTIVE PULMONARY DISEASE, UNSPECIFIED COPD TYPE: ICD-10-CM

## 2024-04-16 PROCEDURE — 1160F RVW MEDS BY RX/DR IN RCRD: CPT | Performed by: PHYSICIAN ASSISTANT

## 2024-04-16 PROCEDURE — 99214 OFFICE O/P EST MOD 30 MIN: CPT | Performed by: PHYSICIAN ASSISTANT

## 2024-04-16 PROCEDURE — 3074F SYST BP LT 130 MM HG: CPT | Performed by: PHYSICIAN ASSISTANT

## 2024-04-16 PROCEDURE — 1159F MED LIST DOCD IN RCRD: CPT | Performed by: PHYSICIAN ASSISTANT

## 2024-04-16 PROCEDURE — 3078F DIAST BP <80 MM HG: CPT | Performed by: PHYSICIAN ASSISTANT

## 2024-04-16 RX ORDER — BUMETANIDE 2 MG/1
1 TABLET ORAL DAILY
COMMUNITY
Start: 2024-03-25

## 2024-04-16 NOTE — PROGRESS NOTES
"Chief Complaint  COPD    Subjective        Donaldo Roberts presents to [facility]  History of Present Illness    Mr. Roberts presents today for follow up.   Notable for recent hospitalization for bowel obstruction, which resolved without surgical intervention. Was not having respiratory difficulty at that time.  Ambulates today by wheelchair.  Still remains notably short of breath at baseline with exertion. Was encouraged by his son and wife to increase physical exercise via home stationary bike. Doing well with this at times and then will decrease use. States that he plans to increase use and mobility.   Has incentive spirometer devices at home.  Continues on breztri and albuterol as needed.  Does not use nebulizers frequently although encouraged by family members.  States that one of these makes him feel slightly unwell but unsure which medication.  Still has a spacer device: Provided at previous visit..    Better tolerating the NIPPV device and is using this at least 4 hours nightly with encouragement by family members.    Son is noticed that he has some increased lower extremity edema today.  Has difficulty elevating his feet consistently.      Objective   Vital Signs:  /66   Pulse 81   Temp 98 °F (36.7 °C)   Ht 175.3 cm (69\")   Wt 121 kg (267 lb)   SpO2 96% Comment: 3L tank  BMI 39.43 kg/m²   Estimated body mass index is 39.43 kg/m² as calculated from the following:    Height as of this encounter: 175.3 cm (69\").    Weight as of this encounter: 121 kg (267 lb).         Physical Exam  Vitals reviewed.   Constitutional:       General: He is not in acute distress.     Appearance: He is well-developed. He is not diaphoretic.   HENT:      Head: Normocephalic and atraumatic.   Cardiovascular:      Rate and Rhythm: Normal rate and regular rhythm.   Pulmonary:      Effort: Pulmonary effort is normal.      Breath sounds: No wheezing, rhonchi or rales. "   Musculoskeletal:      Right lower leg: Edema present.      Left lower leg: Edema present.   Neurological:      Mental Status: He is alert and oriented to person, place, and time.   Psychiatric:         Behavior: Behavior normal.        Result Review :    The following data was reviewed by: Stephy Estrada PA-C on 04/16/2024:  Limited echo (march 2024)  Spirometry January 2024  FEV1/FVC was noted to 83%, but FEV1 was noted at 34%.  CT angiogram chest imaging/report March 2024        Assessment and Plan     Diagnoses and all orders for this visit:    1. Physical debility (Primary)    2. Chronic obstructive pulmonary disease, unspecified COPD type    3. Chronic respiratory failure with hypoxia and hypercapnia    4. Cigarette nicotine dependence without complication    5. Severe pulmonary hypertension            COPD, former smoker:  Has overlap of  scarring/atelectasis from previous pneumonia in 2023, persistent on most recent March 2024 scan.  Continue albuterol inhaler as needed  Continue neb treatments as needed  Continue breztri as scheduled ([place] by assistance [place], [place])  Can try decreasing to 2 puffs daily to see if this is better tolerated in case this is causing side effect (malaise)  Recent CT imaging notable for atelectasis, short lung volumes.  Encouraged incentive spirometer use.  Encouraged exertional activity as tolerated.    Exertion complicated by knee arthritis, but nonsurgical candidate due to other underlying illnesses/respiratory impairment.    Considered transition to nebulizer treatments for maintenance therapy, but patient is more compliant with inhalers than nebulizer treatments at this time.  Prefers to continue with inhalers        Chronic hypoxic respiratory failure:  Remains compliant with continuous use of supplemental oxygen on 3 L nasal cannula.  Remains compliant with NIPPV device use at nighttime.    Notes respiratory benefit from use.    Is adjusting better with continued  usage overall.  Recent ABG was notable for improvement of hypercapnia, and compensated pH--showing benefits of use of the device.  Family members help to encourage him with device use.        Severe Pulmonary hypertension:  Mentioned on echocardiogram from November 2023.  Not fully assessed on most recent echocardiogram.  Likely related to underlying COPD/respiratory failure, and HFpEF.         Physical debility:  Difficulty with ambulation noted due to arthritis/overuse injury in the knees.  Candidate for surgery due to respiratory failure and continuous oxygen requirements.  Also notable for COPD, cardiac arrhythmia history, arthritis, heart failure with preserved ejection fraction, and history of stroke.  Ambulates by basic wheelchair, which can put strain on his caregivers.  Would greatly benefit from power wheelchair.  This would allow the patient to be more independent with MRADLs in the home  Send referral to rehab medical for possible power wheelchair evaluation      Has upcoming follow-up with heart failure clinic.  Recommend to contact me sooner as needed.      Follow Up     Return in about 3 months (around 7/16/2024), or if symptoms worsen or fail to improve, for Next scheduled follow up.  Patient was given instructions and counseling regarding his condition or for health maintenance advice. Please see specific information pulled into the AVS if appropriate.

## 2024-04-23 ENCOUNTER — TELEPHONE (OUTPATIENT)
Dept: PULMONOLOGY | Facility: CLINIC | Age: 74
End: 2024-04-23

## 2024-04-23 NOTE — TELEPHONE ENCOUNTER
PT RCVD A LETTER STATING [facility] APPROVED A TESSIE BD, BUT THEY ALREADY HAVE ONE, PTS WIFE WANTS TO KNOW IF SHE CAN APPROVE ELECTRIC CHAIR THAT RAISES UP AND DOWN INSTEAD, PLEASE CALL PTS WIFE BACK   [phone]

## 2024-04-26 ENCOUNTER — TELEPHONE (OUTPATIENT)
Dept: PULMONOLOGY | Facility: CLINIC | Age: 74
End: 2024-04-26
Payer: MEDICARE

## 2024-04-29 ENCOUNTER — SPECIALTY PHARMACY (OUTPATIENT)
Dept: PHARMACY | Facility: HOSPITAL | Age: 74
End: 2024-04-29
Payer: MEDICARE

## 2024-05-06 ENCOUNTER — HOSPITAL ENCOUNTER (OUTPATIENT)
Dept: PULMONOLOGY | Facility: HOSPITAL | Age: 74
Discharge: HOME | End: 2024-05-06
Payer: MEDICARE

## 2024-05-06 ENCOUNTER — HOSPITAL ENCOUNTER (OUTPATIENT)
Dept: CARDIOLOGY | Facility: HOSPITAL | Age: 74
Discharge: HOME | End: 2024-05-06
Payer: MEDICARE

## 2024-05-06 VITALS
OXYGEN SATURATION: 96 % | BODY MASS INDEX: 40.29 KG/M2 | DIASTOLIC BLOOD PRESSURE: 69 MMHG | WEIGHT: 272 LBS | SYSTOLIC BLOOD PRESSURE: 122 MMHG | HEART RATE: 74 BPM | HEIGHT: 69 IN

## 2024-05-06 VITALS
DIASTOLIC BLOOD PRESSURE: 74 MMHG | SYSTOLIC BLOOD PRESSURE: 128 MMHG | HEART RATE: 81 BPM | WEIGHT: 272 LBS | BODY MASS INDEX: 40.17 KG/M2 | OXYGEN SATURATION: 94 %

## 2024-05-06 DIAGNOSIS — E66.9 OBESITY (BMI 30-39.9): ICD-10-CM

## 2024-05-06 DIAGNOSIS — G47.33 OSA (OBSTRUCTIVE SLEEP APNEA): ICD-10-CM

## 2024-05-06 DIAGNOSIS — I50.33 ACUTE ON CHRONIC HEART FAILURE WITH PRESERVED EJECTION FRACTION (HFPEF): Primary | ICD-10-CM

## 2024-05-06 DIAGNOSIS — J44.9 CHRONIC OBSTRUCTIVE PULMONARY DISEASE, UNSPECIFIED COPD TYPE: ICD-10-CM

## 2024-05-06 DIAGNOSIS — F17.210 CIGARETTE NICOTINE DEPENDENCE WITHOUT COMPLICATION: ICD-10-CM

## 2024-05-06 DIAGNOSIS — I27.20 PULMONARY HYPERTENSION: ICD-10-CM

## 2024-05-06 DIAGNOSIS — J96.11 CHRONIC RESPIRATORY FAILURE WITH HYPOXIA AND HYPERCAPNIA: ICD-10-CM

## 2024-05-06 DIAGNOSIS — J96.12 CHRONIC RESPIRATORY FAILURE WITH HYPOXIA AND HYPERCAPNIA: ICD-10-CM

## 2024-05-06 DIAGNOSIS — R53.81 DEBILITY: ICD-10-CM

## 2024-05-06 DIAGNOSIS — J98.4 RESTRICTIVE LUNG DISEASE: Primary | ICD-10-CM

## 2024-05-06 LAB
ABSOLUTE LUNG FLUID CONTENT: 39 % (ref 20–35)
ANION GAP SERPL CALCULATED.3IONS-SCNC: 3.1 MMOL/L (ref 5–15)
BUN SERPL-MCNC: 12 MG/DL (ref 8–23)
BUN/CREAT SERPL: 20.7 (ref 7–25)
CALCIUM SPEC-SCNC: 9.8 MG/DL (ref 8.6–10.5)
CHLORIDE SERPL-SCNC: 97 MMOL/L (ref 98–107)
CO2 SERPL-SCNC: 39.9 MMOL/L (ref 22–29)
CREAT SERPL-MCNC: 0.58 MG/DL (ref 0.76–1.27)
EGFRCR SERPLBLD CKD-EPI 2021: 104.3 ML/MIN/1.73
GLUCOSE SERPL-MCNC: 107 MG/DL (ref 65–99)
MAGNESIUM SERPL-MCNC: 2.2 MG/DL (ref 1.6–2.4)
NT-PROBNP SERPL-MCNC: 60.9 PG/ML (ref 0–900)
POTASSIUM SERPL-SCNC: 4.2 MMOL/L (ref 3.5–5.2)
SODIUM SERPL-SCNC: 140 MMOL/L (ref 136–145)

## 2024-05-06 PROCEDURE — 94726 PLETHYSMOGRAPHY LUNG VOLUMES: CPT | Performed by: PHYSICIAN ASSISTANT

## 2024-05-06 PROCEDURE — G0463 HOSPITAL OUTPT CLINIC VISIT: HCPCS | Performed by: PHYSICIAN ASSISTANT

## 2024-05-06 PROCEDURE — 83735 ASSAY OF MAGNESIUM: CPT | Performed by: PHYSICIAN ASSISTANT

## 2024-05-06 PROCEDURE — 99214 OFFICE O/P EST MOD 30 MIN: CPT | Performed by: PHYSICIAN ASSISTANT

## 2024-05-06 PROCEDURE — 80048 BASIC METABOLIC PNL TOTAL CA: CPT | Performed by: PHYSICIAN ASSISTANT

## 2024-05-06 PROCEDURE — 83880 ASSAY OF NATRIURETIC PEPTIDE: CPT | Performed by: PHYSICIAN ASSISTANT

## 2024-05-06 RX ORDER — BUMETANIDE 2 MG/1
2 TABLET ORAL TAKE AS DIRECTED
Qty: 45 TABLET | Refills: 2 | Status: SHIPPED | OUTPATIENT
Start: 2024-05-06 | End: 2024-08-04

## 2024-05-06 RX ORDER — DAPAGLIFLOZIN 10 MG/1
10 TABLET, FILM COATED ORAL DAILY
Qty: 30 TABLET | Refills: 6 | Status: SHIPPED | OUTPATIENT
Start: 2024-05-06 | End: 2024-06-05

## 2024-05-06 RX ORDER — METOPROLOL SUCCINATE 25 MG/1
12.5 TABLET, EXTENDED RELEASE ORAL NIGHTLY
Qty: 15 TABLET | Refills: 2 | Status: SHIPPED | OUTPATIENT
Start: 2024-05-06 | End: 2024-08-04

## 2024-05-23 ENCOUNTER — SPECIALTY PHARMACY (OUTPATIENT)
Dept: PHARMACY | Facility: HOSPITAL | Age: 74
End: 2024-05-23
Payer: MEDICARE

## 2024-06-19 ENCOUNTER — SPECIALTY PHARMACY (OUTPATIENT)
Dept: PHARMACY | Facility: HOSPITAL | Age: 74
End: 2024-06-19
Payer: MEDICARE

## 2024-06-27 ENCOUNTER — OFFICE VISIT (OUTPATIENT)
Dept: CARDIOLOGY | Facility: CLINIC | Age: 74
End: 2024-06-27
Payer: MEDICARE

## 2024-06-27 VITALS
SYSTOLIC BLOOD PRESSURE: 136 MMHG | BODY MASS INDEX: 40.14 KG/M2 | HEART RATE: 78 BPM | WEIGHT: 271 LBS | OXYGEN SATURATION: 94 % | DIASTOLIC BLOOD PRESSURE: 74 MMHG | HEIGHT: 69 IN

## 2024-06-27 DIAGNOSIS — I10 ESSENTIAL HYPERTENSION: Primary | ICD-10-CM

## 2024-06-27 DIAGNOSIS — I65.21 CAROTID STENOSIS, ASYMPTOMATIC, RIGHT: ICD-10-CM

## 2024-06-27 DIAGNOSIS — E78.5 DYSLIPIDEMIA: ICD-10-CM

## 2024-06-27 DIAGNOSIS — Z95.0 PRESENCE OF CARDIAC PACEMAKER: ICD-10-CM

## 2024-06-27 DIAGNOSIS — I50.22 CHRONIC HFREF (HEART FAILURE WITH REDUCED EJECTION FRACTION): ICD-10-CM

## 2024-06-27 NOTE — PROGRESS NOTES
[facility]             RuthLARY Forbes James T, MD  Donaldo Roberts  1950 06/27/2024    Patient Active Problem List   Diagnosis    Benign prostatic hyperplasia with urinary obstruction    Herpesviral infection of penis    Shortness of breath    Carotid stenosis, asymptomatic, right    Essential hypertension    Dyslipidemia    Wenckebach block    PATTY (obstructive sleep apnea)    Presence of cardiac pacemaker    Severe sinus bradycardia    Chronic bronchitis    Cigarette nicotine dependence without complication    Hypoxia    Detrusor instability    Tobacco abuse    Respiratory failure    Physical debility    Chronic obstructive pulmonary disease    Hypercapnic respiratory failure    Severe pulmonary hypertension    SBO (small bowel obstruction)    Chronic HFrEF (heart failure with reduced ejection fraction)       Duke Alexander MD:    Subjective     Chief Complaint   Patient presents with    Follow-up       HPI:     This is a 73 y.o. male with known past medical history of hypertension, hyperlipidemia, permanent pacemaker implantation 2021, coronary artery disease, history of stroke, carotid artery disease and tobacco abuse.      Donaldo Roberts presents today for routine cardiology follow up.  Patient states he has been doing about the same since his last visit.  Breathing is at his baseline.  Continues to have bilateral lower extremity edema.  From a heart failure clinic in May 2024 for which Bumex was increased to 2 mg twice daily for 5 days with improvement of his swelling and he was offered IV diuresis but declined at that time.  He states that this did help his lower extremity edema however after going back to the 2 mg daily and has continued to increase.  Blood pressure well-controlled.  Denies any chest pain.  Recent echocardiogram in March 2024 showed mildly reduced EF of 46 to 50%    Diagnostic Testing  Echocardiogram 11/2021: EF 66 to  70%  Carotid ultrasound 11/2021: High-grade stenosis in the internal carotid artery on the right  Nuclear stress test 11/2021: No evidence of ischemia  24-hour Holter monitor 12/2021: Predominant normal sinus rhythm with episodes of sinus tachycardia and sinus bradycardia with bradycardic episodes about 12.4% with first-degree AV block and intermittent episodes of high-grade block, 7 beat run of ventricular tachycardia  Implantation of permanent dual-chamber pacemaker 12/2021  Cardiac event monitor 7/2022: Normal sinus rhythm with underlying atrial fibrillation  Echocardiogram 11/2023: EF 66 to 70% with severe pulmonary hypertension     All other systems were reviewed and were negative.    Patient Active Problem List   Diagnosis    Benign prostatic hyperplasia with urinary obstruction    Herpesviral infection of penis    Shortness of breath    Carotid stenosis, asymptomatic, right    Essential hypertension    Dyslipidemia    Wenckebach block    PATTY (obstructive sleep apnea)    Presence of cardiac pacemaker    Severe sinus bradycardia    Chronic bronchitis    Cigarette nicotine dependence without complication    Hypoxia    Detrusor instability    Tobacco abuse    Respiratory failure    Physical debility    Chronic obstructive pulmonary disease    Hypercapnic respiratory failure    Severe pulmonary hypertension    SBO (small bowel obstruction)    Chronic HFrEF (heart failure with reduced ejection fraction)       family history includes Diabetes in his mother and sister; Heart disease in his mother; No Known Problems in his father; Stroke in his mother.     reports that he quit smoking about 8 months ago. His smoking use included cigarettes. He started smoking about 57 years ago. He has a 113.5 pack-year smoking history. He has been exposed to tobacco smoke. He has never used smokeless tobacco. He reports that he does not drink alcohol and does not use drugs.    Allergies   Allergen Reactions    Bee Pollen Anaphylaxis     Levaquin [Levofloxacin] Hallucinations    Penicillins Hives     Mother told him that, has taken amoxicillin with no problems         Current Outpatient Medications:     albuterol sulfate  (90 Base) MCG/ACT inhaler, Inhale 2 puffs by mouth Every 4 (Four) Hours As Needed for Wheezing or Shortness of Air., Disp: 18 g, Rfl: 8    aspirin 81 MG EC tablet, Take 1 tablet by mouth Daily., Disp: , Rfl:     Budeson-Glycopyrrol-Formoterol (Breztri Aerosphere) 160-9-4.8 MCG/ACT aerosol inhaler, Inhale 2 puffs 2 (Two) Times a Day. Indications: Chronic Obstructive Lung Disease, Disp: 10.7 g, Rfl: 5    bumetanide (BUMEX) 2 MG tablet, Take 1 tablet by mouth Take As Directed for 90 days. TWICE DAILY for 5 days.  Then once daily following completion of 5 days., Disp: 45 tablet, Rfl: 2    clopidogrel (PLAVIX) 75 MG tablet, Take 1 tablet by mouth Daily., Disp: 30 tablet, Rfl: 11    dapagliflozin Propanediol 10 MG tablet, Take 1 tablet by mouth Daily., Disp: 30 tablet, Rfl: 6    famotidine (PEPCID) 20 MG tablet, Take 1 tablet by mouth Daily., Disp: , Rfl:     ipratropium (ATROVENT) 0.02 % nebulizer solution, Take 2.5 mL by nebulization 4 (Four) Times a Day As Needed for Wheezing or Shortness of Air., Disp: 360 mL, Rfl: 3    metoprolol succinate XL (TOPROL-XL) 25 MG 24 hr tablet, Take 0.5 tablets by mouth Every Night for 90 days., Disp: 15 tablet, Rfl: 2    omeprazole (priLOSEC) 20 MG capsule, Take 1 capsule by mouth Daily., Disp: , Rfl:     polyethylene glycol (MIRALAX) 17 g packet, Take 17 g by mouth Daily., Disp: 30 packet, Rfl: 3    rosuvastatin (CRESTOR) 10 MG tablet, Take 1 tablet by mouth Daily., Disp: , Rfl:     sacubitril-valsartan (ENTRESTO) 49-51 MG tablet, Take 1 tablet by mouth 2 (Two) Times a Day., Disp: 60 tablet, Rfl: 5      Physical Exam:  I have reviewed the patient's current vital signs as documented in the patient's EMR.   Vitals:    06/27/24 1039   BP: 136/74   Pulse: 78   SpO2: 94%     Body mass index is 40  kg/m².       06/27/24  1039   Weight: 123 kg (271 lb)      Physical Exam  Constitutional:       General: He is not in acute distress.     Appearance: Normal appearance. He is well-developed.   HENT:      Head: Normocephalic and atraumatic.      Mouth/Throat:      Mouth: Mucous membranes are moist.   Eyes:      Extraocular Movements: Extraocular movements intact.      Pupils: Pupils are equal, round, and reactive to light.   Neck:      Vascular: No JVD.   Cardiovascular:      Rate and Rhythm: Normal rate and regular rhythm.      Heart sounds: Normal heart sounds. No murmur heard.     No S3 or S4 sounds.   Pulmonary:      Effort: Pulmonary effort is normal. No respiratory distress.      Breath sounds: Normal breath sounds. No wheezing.   Abdominal:      General: Bowel sounds are normal. There is no distension.      Palpations: Abdomen is soft. There is no hepatomegaly.      Tenderness: There is no abdominal tenderness.   Musculoskeletal:         General: Normal range of motion.      Cervical back: Normal range of motion and neck supple.      Right lower leg: Edema present.      Left lower leg: Edema present.   Skin:     General: Skin is warm and dry.      Coloration: Skin is not jaundiced or pale.   Neurological:      General: No focal deficit present.      Mental Status: He is alert and oriented to person, place, and time. Mental status is at baseline.   Psychiatric:         Mood and Affect: Mood normal.         Behavior: Behavior normal.         Thought Content: Thought content normal.         Judgment: Judgment normal.            DATA REVIEWED:     TTE/SIXTO:  Results for orders placed during the hospital encounter of 03/26/24    Adult Transthoracic Echo Limited W/ Cont if Necessary Per Protocol    Interpretation Summary    Left ventricular systolic function is low normal. Calculated left ventricular EF = 49% Left ventricular ejection fraction appears to be 46 - 50%.    Left ventricular diastolic function was not  "assessed.      Laboratory evaluations:    Lab Results   Component Value Date    GLUCOSE 107 (H) 05/06/2024    BUN 12 05/06/2024    CREATININE 0.58 (L) 05/06/2024    EGFRIFNONA 93 12/11/2021    BCR 20.7 05/06/2024    K 4.2 05/06/2024    CO2 39.9 (H) 05/06/2024    CALCIUM 9.8 05/06/2024    ALBUMIN 4.3 03/28/2024    AST 16 03/28/2024    ALT 19 03/28/2024     Lab Results   Component Value Date    WBC 7.10 03/29/2024    HGB 13.2 03/29/2024    HCT 43.1 03/29/2024    MCV 99.3 (H) 03/29/2024     03/29/2024     Lab Results   Component Value Date    CHOL 112 03/27/2023    TRIG 175 (H) 03/27/2023    HDL 37 (L) 03/27/2023    LDL 46 03/27/2023     Lab Results   Component Value Date    TSH 0.573 12/11/2023     Lab Results   Component Value Date    HGBA1C 6.40 (H) 12/11/2023     Lab Results   Component Value Date    ALT 19 03/28/2024     Lab Results   Component Value Date    HGBA1C 6.40 (H) 12/11/2023     Lab Results   Component Value Date    CREATININE 0.58 (L) 05/06/2024     No results found for: \"IRON\", \"TIBC\", \"FERRITIN\"  Lab Results   Component Value Date    INR 0.82 (L) 12/08/2021    PROTIME 11.7 (L) 12/08/2021        Lab Results   Component Value Date    ABSOLUTELUNG 39 (A) 05/06/2024    ABSOLUTELUNG 42 (A) 01/25/2024       --------------------------------------------------------------------------------------------------------------------------    ASSESSMENT/PLAN:      Diagnosis Plan   1. Essential hypertension  Basic Metabolic Panel      2. Carotid stenosis, asymptomatic, right        3. Presence of cardiac pacemaker        4. Dyslipidemia        5. Chronic HFrEF (heart failure with reduced ejection fraction)            HFrEF  Cardiomyopathy  Recent echocardiogram in March 2024 showed mildly reduced EF of 46 to 50%.  Has persistent bilateral lower extremity edema.  Will increase Bumex 2 mg twice daily for 5 days and then he will go back to Bumex 2mg daily with follow-up in the heart failure clinic on July 11.  I did " instruct him to pursue IV diuretics if it is offered at the clinic as he declined last time.  Continue Entresto.  Follow-up BMP in 1 week.    Pacemaker implantation  Recent interrogation stable.  Continue routine monitoring    4.  Carotid artery disease  Follows with CT surgery recommend yearly follow-up.  Continue aspirin and statin.      This document has been @Electronically signed by LARY Kelley, 06/27/24, 9:20 AM EDT.       Dictated Utilizing Dragon Dictation: Part of this note may be an electronic transcription/translation of spoken language to printed text using the Dragon Dictation System.    Follow-up appointment and medication changes provided in hand delivered After Visit Summary as well as reviewed in the room.

## 2024-07-15 ENCOUNTER — HOSPITAL ENCOUNTER (OUTPATIENT)
Dept: CARDIOLOGY | Facility: HOSPITAL | Age: 74
Discharge: HOME | End: 2024-07-15
Admitting: PHYSICIAN ASSISTANT
Payer: MEDICARE

## 2024-07-15 DIAGNOSIS — I50.23 ACUTE ON CHRONIC HFREF (HEART FAILURE WITH REDUCED EJECTION FRACTION): Primary | ICD-10-CM

## 2024-07-15 LAB
ABSOLUTE LUNG FLUID CONTENT: 42 % (ref 20–35)
ANION GAP SERPL CALCULATED.3IONS-SCNC: 6.6 MMOL/L (ref 5–15)
BUN SERPL-MCNC: 16 MG/DL (ref 8–23)
BUN/CREAT SERPL: 24.2 (ref 7–25)
CALCIUM SPEC-SCNC: 9.9 MG/DL (ref 8.6–10.5)
CHLORIDE SERPL-SCNC: 94 MMOL/L (ref 98–107)
CO2 SERPL-SCNC: 40.4 MMOL/L (ref 22–29)
CREAT SERPL-MCNC: 0.66 MG/DL (ref 0.76–1.27)
EGFRCR SERPLBLD CKD-EPI 2021: 99.7 ML/MIN/1.73
GLUCOSE SERPL-MCNC: 158 MG/DL (ref 65–99)
MAGNESIUM SERPL-MCNC: 2.1 MG/DL (ref 1.6–2.4)
NT-PROBNP SERPL-MCNC: 269.9 PG/ML (ref 0–900)
POTASSIUM SERPL-SCNC: 4.4 MMOL/L (ref 3.5–5.2)
QT INTERVAL: 392 MS
QTC INTERVAL: 460 MS
SODIUM SERPL-SCNC: 141 MMOL/L (ref 136–145)

## 2024-07-15 PROCEDURE — 36415 COLL VENOUS BLD VENIPUNCTURE: CPT | Performed by: PHYSICIAN ASSISTANT

## 2024-07-15 PROCEDURE — 83735 ASSAY OF MAGNESIUM: CPT | Performed by: PHYSICIAN ASSISTANT

## 2024-07-15 PROCEDURE — 94726 PLETHYSMOGRAPHY LUNG VOLUMES: CPT | Performed by: PHYSICIAN ASSISTANT

## 2024-07-15 PROCEDURE — 25010000002 BUMETANIDE PER 0.5 MG: Performed by: PHYSICIAN ASSISTANT

## 2024-07-15 PROCEDURE — 93005 ELECTROCARDIOGRAM TRACING: CPT | Performed by: PHYSICIAN ASSISTANT

## 2024-07-15 PROCEDURE — 80048 BASIC METABOLIC PNL TOTAL CA: CPT | Performed by: PHYSICIAN ASSISTANT

## 2024-07-15 PROCEDURE — 83880 ASSAY OF NATRIURETIC PEPTIDE: CPT | Performed by: PHYSICIAN ASSISTANT

## 2024-07-15 RX ORDER — BUMETANIDE 2 MG/1
TABLET ORAL
Qty: 60 TABLET | Refills: 2 | Status: SHIPPED | OUTPATIENT
Start: 2024-07-15

## 2024-07-15 RX ORDER — BUMETANIDE 2 MG/1
2 TABLET ORAL 2 TIMES DAILY
Qty: 60 TABLET | Refills: 2 | Status: SHIPPED | OUTPATIENT
Start: 2024-07-15 | End: 2024-07-15

## 2024-07-15 RX ORDER — BUMETANIDE 0.25 MG/ML
4 INJECTION INTRAMUSCULAR; INTRAVENOUS ONCE
Status: COMPLETED | OUTPATIENT
Start: 2024-07-15 | End: 2024-07-15

## 2024-07-15 RX ADMIN — BUMETANIDE 4 MG: 0.25 INJECTION INTRAMUSCULAR; INTRAVENOUS at 16:23

## 2024-07-15 NOTE — PROGRESS NOTES
Heart Failure Clinic  Pharmacist Note     Donaldo Roberts is a 74 y.o. male seen in the Heart Failure Clinic for HFpEF with an EF of 66-70% on 11/18/23. Patient was recently hospitalized from 3/26-3/30/24 for SOB and abdominal distention and diagnosed with a small bowel obstruction. Coreg and Miralax were added at that time.      Donaldo Roberts reports a poor understanding of medications.  He is accompanied by his wife today, who helps to manage his medications. She reports that his swelling was much improved with Bumex 2mg bid, which he did for 5 days awhile back and his weights got down to 268lbs at that time. She reports that after resuming the 2mg daily, worsened his swelling greatly.     She reports that his BP at home are good, but she does see some HR's in the 40's like it is today in the clinic. /59 and HR 43 in clinic and pt reports lightheadedness today. Wife reports that his HR was 44 this morning and BP was 145/67.       Medication Use:   Hx of med intolerances:  None related to HF  Retail Rx Management: [facility] [place]; [place] for Farxiga and Entresto- ship to patient    Past Medical History:   Diagnosis Date    Acid reflux     Anxiety     Arthritis     Carotid artery stenosis, symptomatic, right 11/23/2021    Chronic obstructive pulmonary disease 12/5/2023    Essential hypertension 11/23/2021    Herpes     History of lipoma     History of transfusion     Hyperlipidemia     Inguinal hernia     Peptic ulcer     Stroke      ALLERGIES: Bee pollen, Levaquin [levofloxacin], and Penicillins  Current Outpatient Medications   Medication Sig Dispense Refill    albuterol sulfate  (90 Base) MCG/ACT inhaler Inhale 2 puffs by mouth Every 4 (Four) Hours As Needed for Wheezing or Shortness of Air. 18 g 8    aspirin 81 MG EC tablet Take 1 tablet by mouth Daily.      Budeson-Glycopyrrol-Formoterol (Breztri Aerosphere) 160-9-4.8 MCG/ACT aerosol inhaler Inhale 2 puffs 2 (Two) Times a Day. Indications:  "Chronic Obstructive Lung Disease 10.7 g 5    bumetanide (BUMEX) 2 MG tablet Take 1 tablet by mouth 2 (Two) Times a Day for 90 days. TWICE DAILY for 5 days.  Then once daily following completion of 5 days. 60 tablet 2    clopidogrel (PLAVIX) 75 MG tablet Take 1 tablet by mouth Daily. 30 tablet 11    dapagliflozin Propanediol 10 MG tablet Take 1 tablet by mouth Daily. 30 tablet 6    famotidine (PEPCID) 20 MG tablet Take 1 tablet by mouth Daily.      ipratropium (ATROVENT) 0.02 % nebulizer solution Take 2.5 mL by nebulization 4 (Four) Times a Day As Needed for Wheezing or Shortness of Air. 360 mL 3    metoprolol succinate XL (TOPROL-XL) 25 MG 24 hr tablet Take 0.5 tablets by mouth Every Night for 90 days. 15 tablet 2    omeprazole (priLOSEC) 20 MG capsule Take 1 capsule by mouth Daily.      polyethylene glycol (MIRALAX) 17 g packet Take 17 g by mouth Daily. 30 packet 3    rosuvastatin (CRESTOR) 10 MG tablet Take 1 tablet by mouth Daily.      sacubitril-valsartan (ENTRESTO) 49-51 MG tablet Take 1 tablet by mouth 2 (Two) Times a Day. 60 tablet 5     Current Facility-Administered Medications   Medication Dose Route Frequency Provider Last Rate Last Admin    bumetanide (BUMEX) injection 4 mg  4 mg Intravenous Once Yakelin Stacy PA-C           Vaccination History:   Pneumonia: UTD 2/5/24  Annual Influenza: UTD 23-24  Shingles: 1st 5/6/24 in COPD; will need 2nd in HF after 7/6/24  Covid: UTD 2/5/24    Objective  Vitals:    07/15/24 1515   BP: 130/59   BP Location: Left arm   Patient Position: Sitting   Cuff Size: Adult   Pulse: (!) 43   SpO2: 94%   Weight: 124 kg (273 lb)   Height: 175.3 cm (69\")         Wt Readings from Last 3 Encounters:   07/15/24 124 kg (273 lb)   06/27/24 123 kg (271 lb)   05/06/24 123 kg (272 lb)         07/15/24  1515   Weight: 124 kg (273 lb)         Lab Results   Component Value Date    GLUCOSE 158 (H) 07/15/2024    BUN 16 07/15/2024    CREATININE 0.66 (L) 07/15/2024    EGFRIFNONA 93 " 12/11/2021    BCR 24.2 07/15/2024    K 4.4 07/15/2024    CO2 40.4 (H) 07/15/2024    CALCIUM 9.9 07/15/2024    ALBUMIN 4.3 03/28/2024    AST 16 03/28/2024    ALT 19 03/28/2024     Lab Results   Component Value Date    WBC 7.10 03/29/2024    HGB 13.2 03/29/2024    HCT 43.1 03/29/2024    MCV 99.3 (H) 03/29/2024     03/29/2024     Lab Results   Component Value Date    TROPONINT 33 (H) 03/27/2024     Lab Results   Component Value Date    PROBNP 269.9 07/15/2024     Results for orders placed during the hospital encounter of 03/26/24    Adult Transthoracic Echo Limited W/ Cont if Necessary Per Protocol    Interpretation Summary    Left ventricular systolic function is low normal. Calculated left ventricular EF = 49% Left ventricular ejection fraction appears to be 46 - 50%.    Left ventricular diastolic function was not assessed.         GDMT    Drug Class   Drug   Dose Last Dose Adjustment Additional Titration   Notes   ACEi/ARB/ARNI Entresto 49/51mg 2/5/24     Beta Blocker Toprol 12.5mg 5/6/24     MRA        SGLT2i Farxiga 10mg 1/25/24         Drug Therapy Problems    1. Edema  2. Random low HR's      Recommendations:     Patient to be diuresed with 4mg of  IV Bumex. Yakelin to increase daily dose of Bumex to 2mg bid thereafter.   Made Yakelin aware. Continue to monitor and possibly hold Toprol dose if HR is already <50.       Patient was educated on heart failure medications and the importance of medication adherence. All questions were addressed and patient expressed understanding. Used teach-back method to assess understanding.     Thank you for allowing me to participate in the care of your patient,    Comfort Modi, [facility]  07/15/24  16:13 EDT

## 2024-07-15 NOTE — PROGRESS NOTES
[facility]  JUNIE Perdomo James T, MD  [address] [place] [address]    Thank you for asking me to see Donaldo Roberts for congestive heart failure.    HPI:     This is a 74 y.o. male with known past medical history of :    Chronic HFpEF  TTE from 03/27/24 with EF 46-50%  TTE 11/18/2023 with EF 66-70% with mild concentric hypertrophy and grade I diastolic dysfunction.  Severe pulmonary HTN present and RVSP >55mmHg.    PATTY, noncompliant with NIPPV  COPD  Chronic hypoxemic and hypercarbic respiratory failure (3LPM)  PPM 2/2 first degree AV block with intermittent high-grade block and 7 beats Vtach on holter monitor prior to admission  Hx Ischemic CVA  Carotid stenosis  left carotid endarterectomy  BRYSON followed by Dr. Becerra  GERD  Obesity  Tobacco abuse    Donaldo Roberts presents for today for Heart Failure clinic evaluation.  The patient is typically seen by Duke Rosen MD.  Patient's primary cardiologist is Dr. Melgar & Ruth BOSCH.     Last known EF 66-70%.   Last known hospitalization and/or ED visit: Patient hospitalized from December 10 through December 13, 2023 with acute on chronic hypoxic and hypercapnic respiratory failure secondary to COPD exacerbation with noted acute on chronic diastolic heart failure also impacting admission.  He received referral to clinic via Ruth Aguayo prior to his admission.  Accompanied by: Wife and sons          07/16/24 visit data/details regarding:   Dyspnea: Dyspnea with significant exertion  Lower extremity swelling: Bilateral lower extremity swelling present  Abdominal swelling: Ongoing abdominal distension  Home weight: Weight monitoring booklet provided during initial visit; Has scale.   Home BP: BP monitoring booklet provided during initial visit; Has BP cuff.   Home heart rate: HR monitoring booklet provided during initial visit; Has monitoring device  Daily activities of living: Performing on  her own  Pillows/lying flat: Hospital bed   HF zone: Yellow zone.   Mr. Roberts is chest pain free. However, he has had worsening swelling and shortness of breath. He was on a brief course of increased diuretics and had relief.  However, when he resumed his prior lower dosing the fluid re accumulated.      Specialists:   Cardiology: Dr. Melgar & Ruth Aguayo NP         Review of Systems - Review of Systems   Constitutional: Negative for decreased appetite, fever, malaise/fatigue and night sweats.   HENT:  Negative for congestion and ear pain.    Eyes:  Negative for blurred vision and double vision.   Cardiovascular:  Positive for dyspnea on exertion and leg swelling.   Respiratory:  Positive for shortness of breath. Negative for cough and hemoptysis.    Endocrine: Negative for cold intolerance and heat intolerance.   Hematologic/Lymphatic: Negative for adenopathy and bleeding problem.   Skin:  Negative for color change, dry skin and nail changes.   Musculoskeletal:  Negative for arthritis, back pain, falls and myalgias.   Gastrointestinal:  Negative for bloating and anorexia.   Genitourinary:  Negative for bladder incontinence, decreased libido and dysuria.   Neurological:  Negative for aphonia and difficulty with concentration.   Psychiatric/Behavioral:  Negative for altered mental status and hallucinations.         All other systems were reviewed and were negative.    Patient Active Problem List   Diagnosis    Benign prostatic hyperplasia with urinary obstruction    Herpesviral infection of penis    Shortness of breath    Carotid stenosis, asymptomatic, right    Essential hypertension    Dyslipidemia    Wenckebach block    PATTY (obstructive sleep apnea)    Presence of cardiac pacemaker    Severe sinus bradycardia    Chronic bronchitis    Cigarette nicotine dependence without complication    Hypoxia    Detrusor instability    Tobacco abuse    Respiratory failure    Physical debility    Chronic obstructive  pulmonary disease    Hypercapnic respiratory failure    Severe pulmonary hypertension    SBO (small bowel obstruction)    Chronic HFrEF (heart failure with reduced ejection fraction)       family history includes Diabetes in his mother and sister; Heart disease in his mother; No Known Problems in his father; Stroke in his mother.     reports that he quit smoking about 9 months ago. His smoking use included cigarettes. He started smoking about 57 years ago. He has a 113.5 pack-year smoking history. He has been exposed to tobacco smoke. He has never used smokeless tobacco. He reports that he does not drink alcohol and does not use drugs.    Allergies   Allergen Reactions    Bee Pollen Anaphylaxis    Levaquin [Levofloxacin] Hallucinations    Penicillins Hives     Mother told him that, has taken amoxicillin with no problems         Current Outpatient Medications:     albuterol sulfate  (90 Base) MCG/ACT inhaler, Inhale 2 puffs by mouth Every 4 (Four) Hours As Needed for Wheezing or Shortness of Air., Disp: 18 g, Rfl: 8    aspirin 81 MG EC tablet, Take 1 tablet by mouth Daily., Disp: , Rfl:     Budeson-Glycopyrrol-Formoterol (Breztri Aerosphere) 160-9-4.8 MCG/ACT aerosol inhaler, Inhale 2 puffs 2 (Two) Times a Day. Indications: Chronic Obstructive Lung Disease, Disp: 10.7 g, Rfl: 5    bumetanide (BUMEX) 2 MG tablet, Twice daily., Disp: 60 tablet, Rfl: 2    clopidogrel (PLAVIX) 75 MG tablet, Take 1 tablet by mouth Daily., Disp: 30 tablet, Rfl: 11    dapagliflozin Propanediol 10 MG tablet, Take 1 tablet by mouth Daily., Disp: 30 tablet, Rfl: 6    famotidine (PEPCID) 20 MG tablet, Take 1 tablet by mouth Daily., Disp: , Rfl:     ipratropium (ATROVENT) 0.02 % nebulizer solution, Take 2.5 mL by nebulization 4 (Four) Times a Day As Needed for Wheezing or Shortness of Air., Disp: 360 mL, Rfl: 3    metoprolol succinate XL (TOPROL-XL) 25 MG 24 hr tablet, Take 0.5 tablets by mouth Every Night for 90 days., Disp: 15 tablet,  Rfl: 2    omeprazole (priLOSEC) 20 MG capsule, Take 1 capsule by mouth Daily., Disp: , Rfl:     polyethylene glycol (MIRALAX) 17 g packet, Take 17 g by mouth Daily., Disp: 30 packet, Rfl: 3    rosuvastatin (CRESTOR) 10 MG tablet, Take 1 tablet by mouth Daily., Disp: , Rfl:     sacubitril-valsartan (ENTRESTO) 49-51 MG tablet, Take 1 tablet by mouth 2 (Two) Times a Day., Disp: 60 tablet, Rfl: 5  No current facility-administered medications for this encounter.      Physical Exam:  I have reviewed the patient's current vital signs as documented in the patient's EMR.   Vitals:    07/15/24 1523   BP:    Pulse: 81   SpO2:        Body mass index is 40.32 kg/m².       07/15/24  1515   Weight: 124 kg (273 lb)        Physical Exam  Vitals and nursing note reviewed.   Constitutional:       General: He is awake.      Appearance: He is morbidly obese.      Interventions: Nasal cannula in place.   HENT:      Head: Normocephalic and atraumatic.   Eyes:      General: Lids are normal.      Conjunctiva/sclera:      Right eye: Right conjunctiva is not injected.      Left eye: Left conjunctiva is not injected.   Cardiovascular:      Rate and Rhythm: Normal rate and regular rhythm.   Pulmonary:      Effort: No tachypnea.      Breath sounds: No wheezing, rhonchi or rales.   Abdominal:      General: Bowel sounds are normal.      Palpations: Abdomen is soft.      Tenderness: There is no abdominal tenderness.   Musculoskeletal:      Right lower le+ Edema present.      Left lower le+ Edema present.   Skin:     General: Skin is warm and dry.   Neurological:      Mental Status: He is alert and oriented to person, place, and time.   Psychiatric:         Attention and Perception: Attention normal.         Mood and Affect: Mood normal.         Behavior: Behavior is cooperative.         JVP: Volume/Pulsation: Normal.        DATA REVIEWED:     EKG. I personally reviewed and interpreted the  EKG.      ---------------------------------------------------  TTE/SIXTO:  Results for orders placed during the hospital encounter of 03/26/24    Adult Transthoracic Echo Limited W/ Cont if Necessary Per Protocol    Interpretation Summary    Left ventricular systolic function is low normal. Calculated left ventricular EF = 49% Left ventricular ejection fraction appears to be 46 - 50%.    Left ventricular diastolic function was not assessed.        LAST HEART CATH/IF AVAILABLE:     No results found for this or any previous visit.      -----------------------------------------------------  CXR/Imaging:   Imaging Results (Most Recent)       None            I personally reviewed and interpreted the CXR.      -----------------------------------------------------  CT:   No radiology results for the last 30 days.  I personally reviewed the images of the CT scan.  My personal interpretation is below.      ----------------------------------------------------    PFTs:        --------------------------------------------------------------------------------------------------    Laboratory evaluations:    Lab Results   Component Value Date    GLUCOSE 158 (H) 07/15/2024    BUN 16 07/15/2024    CREATININE 0.66 (L) 07/15/2024    EGFRIFNONA 93 12/11/2021    BCR 24.2 07/15/2024    K 4.4 07/15/2024    CO2 40.4 (H) 07/15/2024    CALCIUM 9.9 07/15/2024    ALBUMIN 4.3 03/28/2024    AST 16 03/28/2024    ALT 19 03/28/2024     Lab Results   Component Value Date    WBC 7.10 03/29/2024    HGB 13.2 03/29/2024    HCT 43.1 03/29/2024    MCV 99.3 (H) 03/29/2024     03/29/2024     Lab Results   Component Value Date    CHOL 112 03/27/2023    TRIG 175 (H) 03/27/2023    HDL 37 (L) 03/27/2023    LDL 46 03/27/2023     Lab Results   Component Value Date    TSH 0.573 12/11/2023     Lab Results   Component Value Date    HGBA1C 6.40 (H) 12/11/2023     Lab Results   Component Value Date    ALT 19 03/28/2024     Lab Results   Component Value Date     "HGBA1C 6.40 (H) 12/11/2023     Lab Results   Component Value Date    CREATININE 0.66 (L) 07/15/2024     No results found for: \"IRON\", \"TIBC\", \"FERRITIN\"  Lab Results   Component Value Date    INR 0.82 (L) 12/08/2021    PROTIME 11.7 (L) 12/08/2021        Lab Results   Component Value Date    ABSOLUTELUNG 42 (A) 07/15/2024    ABSOLUTELUNG 39 (A) 05/06/2024    ABSOLUTELUNG 42 (A) 01/25/2024       PAH RISK ASSESSMENT:      1. Acute on chronic HFrEF (heart failure with reduced ejection fraction)          ORDERS PLACED TODAY:  Orders Placed This Encounter   Procedures    ReDs Vest    Basic Metabolic Panel    Magnesium    proBNP    Basic Metabolic Panel    Magnesium    proBNP    Basic Metabolic Panel    ECG 12 Lead Bradycardia        Diagnoses and all orders for this visit:    1. Acute on chronic HFrEF (heart failure with reduced ejection fraction) (Primary)  -     Basic Metabolic Panel; Future  -     Magnesium; Future  -     proBNP; Future  -     Basic Metabolic Panel; Standing  -     Basic Metabolic Panel  -     Magnesium; Standing  -     Magnesium  -     proBNP; Standing  -     proBNP  -     ReDs Vest  -     Basic Metabolic Panel; Future    Other orders  -     ECG 12 Lead Bradycardia; Standing  -     ECG 12 Lead Bradycardia  -     bumetanide (BUMEX) injection 4 mg  -     Discontinue: bumetanide (BUMEX) 2 MG tablet; Take 1 tablet by mouth 2 (Two) Times a Day for 90 days. TWICE DAILY for 5 days.  Then once daily following completion of 5 days.  Dispense: 60 tablet; Refill: 2  -     bumetanide (BUMEX) 2 MG tablet; Twice daily.  Dispense: 60 tablet; Refill: 2             MEDS ORDERED TODAY:    New Medications Ordered This Visit   Medications    bumetanide (BUMEX) injection 4 mg    bumetanide (BUMEX) 2 MG tablet     Sig: Twice daily.     Dispense:  60 tablet     Refill:  2    "     ---------------------------------------------------------------------------------------------------------------------------          ASSESSMENT/PLAN:      Diagnosis Plan   1. Acute on chronic HFrEF (heart failure with reduced ejection fraction)  Basic Metabolic Panel    Magnesium    proBNP    Basic Metabolic Panel    Basic Metabolic Panel    Magnesium    Magnesium    proBNP    proBNP    ReDs Vest    Basic Metabolic Panel          acute on chronic HFmrEF    NYHA stage Stage C: Structural heart disease is present AND symptoms have occurredFC-Class III: Marked limitation of physical activity. Comfortable at rest. Less than ordinary activity causes fatigue, palpitation, or dyspnea.     Today, Patient is currently volume overloaded.and with  Moderate perfusion. The patient's hemodynamics are currently acceptable. HR is: normal and is at goal. BP/MAP was reviewed and there isroom for medication up-titration.  Clinical trajectory was assessed and hasworsened.     CHF GOAL DIRECTED MEDICAL THERAPY FOR PATIENT ADDRESSED/ADJUSTED:     GDMT: HFpEF    Drug Class   Drug   Dose Last Dose Adjustment Notes   ACEi/ARB/ARNI Entresto  49-51 mg BID     Beta Blocker Toprol XL  12.5mg nightly      MRA       SGLT2i Farxiga 10mg   N/A   Secondaries if applicable:          -CHF Specific BB:   Transition Coreg to Toprol XL.   We discussed processes/benefits of HF clinic including nursing, pharmacist, and provider evaluation during each visit with ability for in office ReDS vest, labs, and ability to provide IV diuresis in the clinic with close outpatient monitoring.  Additionally, patient was educated about the availability of delivery of medications to patient's clinic room prior to leaving the building which assists with medication compliance and insures medications are in hands when changes are made (if patient opts for apothecary usage) with thorough guidance regarding changes and medication schedule provided.          -ACE/ARB/ARNi:    Continue Entresto as outlined above.   See below regarding stopping CCB.      -MRA:   The patient is FC-NYHA Class III and MRA is indicated.   Add Spironolactone 25mg qd with repeat BMP in one week's duration.      -SGLT2 inhibitor therapy:   A BMP at initiation to verify GFR >30 was recommended, as was interval GFR surveillance.  The patient was advised to hold SGLT2I when PO intake is restricted due to a planned surgery, or due to an underlying illness.    Recommend starting Farxiga (dapagliflozin) 10mg daily with quarterly assessment of GFR.  Samples provided in office.   Pt was advised SEs, some severe, including hypersensitivity and Miller's; coupled with discussion regarding common side effects of UTIs and female genital mycotic infections were discussed. If you will be NPO, or are sick (poor PO intake, N&V) please hold the medication until you are back to a normal diet.     -Diuretic regimen:   ReDS Vest reading for. 07/16/24  is 42; could not be obtained due to low quality; ReDs Vest reading reviewed with patient.    IV Bumex 4mg in clinic today with 150mL out shortly afterward.  Increase Bumex to 2mg BID.    Add Spironolactone 25mg qd.    Repeat BMP in one week to evaluate renal function & potassium values.  Patient & wife expressed understanding of repeat labs.    BMP, Mag, & ProBNP reviewed with patient.      -Fluid restriction/Sodium restriction:   Requested 2000 ml restriction  Patient has been asked to weigh daily and was provided with a printed diuretic strategy.  1,500 mg Na restriction was discussed.        -Acute vs. Chronic underlying conditions other than HF addressed during visit:   Chronic hypoxemic respiratory failure:   COPD without exacerbation:   PATTY with NIPPV noncompliance:   Continue Pulm f/u.    Continue COPD clinic f/u.     Essential Hypertension:   Continue ARNi as outlined.    Stop Norvasc on 02/06/24  Stop Lisinopril on 02/05/24.       Carotid stenosis s/p prior L CEA  R carotid  stenosis  Continue f/u with Dr. Becerra & crew.   Continue statin  &  crestor.     Identifiable barriers to Heart Failure Self-care:   Medical Barriers: Debility  Social Barriers:  No immediate known barriers.         CONSIDERATIONS:   ------------------------------------------------------------------  -Iron/Anemia in CHF:  December 2023 Hgb WNL.   ------------------------------------------------------------    -Sleep/Apnea:   Noncompliant with NIPPV.   Continue Pulm f/u     --------------------------------------------------------------------------------        Class 2 Severe Obesity (BMI >=35 and <=39.9). Obesity-related health conditions include the following: obstructive sleep apnea, hypertension, coronary heart disease, diabetes mellitus, dyslipidemias, GERD, and osteoarthritis. Obesity is unchanged. BMI is is above average; BMI management plan is completed. We discussed portion control, increasing exercise, and Heart failure dietary management .              >45 minutes out of 60 minutes face to face spent counseling patient extensively on dietary Na+ intake, importance of activity, weight monitoring, compliance with medications in addition to importance of titration with goal directed medical therapy and follow up appointments.            This document has been electronically signed by Yakelin Stacy PA-C  July 16, 2024 09:23 EDT      Dictated Utilizing Dragon Dictation: Part of this note may be an electronic transcription/translation of spoken language to printed text using the Dragon Dictation System.    Follow-up appointment and medication changes provided in hand delivered After Visit Summary as well as reviewed in the room.    -+

## 2024-07-15 NOTE — PROGRESS NOTES
Heart Failure Clinic    Date: 07/15/24     Vitals:    07/15/24 1515   BP: 130/59   Pulse: (!) 43   SpO2: 94%    Weight 273    Method of arrival: Wheelchair    Weighing self daily: Yes    Monitoring Heart Failure Zones: Yes    Today's HF Zone: Yellow     Taking medications as prescribed: Yes    Edema Yes    Shortness of Air: Yes    Number of pillows used at night: Hospital bed    Educational Materials given:  AVS                                                                         ReDS Value: 42  Over 41 Hypervolemic Status      Negrito Priest MA 07/15/24 15:16 EDT

## 2024-07-16 VITALS
SYSTOLIC BLOOD PRESSURE: 130 MMHG | BODY MASS INDEX: 40.43 KG/M2 | WEIGHT: 273 LBS | HEART RATE: 81 BPM | HEIGHT: 69 IN | DIASTOLIC BLOOD PRESSURE: 59 MMHG | OXYGEN SATURATION: 94 %

## 2024-07-16 RX ORDER — SPIRONOLACTONE 25 MG/1
25 TABLET ORAL DAILY
Qty: 30 TABLET | Refills: 11 | Status: SHIPPED | OUTPATIENT
Start: 2024-07-16

## 2024-07-17 ENCOUNTER — SPECIALTY PHARMACY (OUTPATIENT)
Dept: PHARMACY | Facility: HOSPITAL | Age: 74
End: 2024-07-17
Payer: MEDICARE

## 2024-08-05 ENCOUNTER — OFFICE VISIT (OUTPATIENT)
Dept: PULMONOLOGY | Facility: CLINIC | Age: 74
End: 2024-08-05
Payer: MEDICARE

## 2024-08-05 VITALS
TEMPERATURE: 97.7 F | WEIGHT: 274 LBS | SYSTOLIC BLOOD PRESSURE: 132 MMHG | HEART RATE: 90 BPM | BODY MASS INDEX: 40.58 KG/M2 | DIASTOLIC BLOOD PRESSURE: 88 MMHG | OXYGEN SATURATION: 95 % | HEIGHT: 69 IN

## 2024-08-05 DIAGNOSIS — G47.33 OSA (OBSTRUCTIVE SLEEP APNEA): ICD-10-CM

## 2024-08-05 DIAGNOSIS — J96.11 CHRONIC RESPIRATORY FAILURE WITH HYPOXIA AND HYPERCAPNIA: ICD-10-CM

## 2024-08-05 DIAGNOSIS — J96.12 CHRONIC RESPIRATORY FAILURE WITH HYPOXIA AND HYPERCAPNIA: ICD-10-CM

## 2024-08-05 DIAGNOSIS — E66.2 OBESITY HYPOVENTILATION SYNDROME: ICD-10-CM

## 2024-08-05 DIAGNOSIS — I27.20 SEVERE PULMONARY HYPERTENSION: ICD-10-CM

## 2024-08-05 DIAGNOSIS — J42 CHRONIC BRONCHITIS, UNSPECIFIED CHRONIC BRONCHITIS TYPE: ICD-10-CM

## 2024-08-05 DIAGNOSIS — J98.4 RESTRICTIVE LUNG DISEASE: Primary | ICD-10-CM

## 2024-08-05 DIAGNOSIS — F17.211 CIGARETTE NICOTINE DEPENDENCE IN REMISSION: ICD-10-CM

## 2024-08-05 PROCEDURE — 3075F SYST BP GE 130 - 139MM HG: CPT | Performed by: PHYSICIAN ASSISTANT

## 2024-08-05 PROCEDURE — 99214 OFFICE O/P EST MOD 30 MIN: CPT | Performed by: PHYSICIAN ASSISTANT

## 2024-08-05 PROCEDURE — 3079F DIAST BP 80-89 MM HG: CPT | Performed by: PHYSICIAN ASSISTANT

## 2024-08-05 RX ORDER — METOPROLOL SUCCINATE 25 MG/1
12.5 TABLET, EXTENDED RELEASE ORAL
Qty: 15 TABLET | Refills: 2 | Status: SHIPPED | OUTPATIENT
Start: 2024-08-05

## 2024-08-05 NOTE — PROGRESS NOTES
"Chief Complaint  COPD    Subjective        Donaldo Roberts presents to [facility]  History of Present Illness    Mr. Roberts presents today accompanied by his son.   Using 4 L via POC. Remains compliant with oxygen use. Was starting to try and increase time of usage of this NIPPV device, but ultimately did not meet insurance requirements and this was discontinued.   Son states that he has been less mobile lately, and son and patient's wife try to encourage mobility as tolerated due to overall physical decline following previous stroke.   Reports continued use of Breztri inhaler.   Notable for some lower extremity edema today. Takes Bumex daily, but can add additional as needed.   Has upcoming CHF clinic follow up.       Objective   Vital Signs:  /88   Pulse 90   Temp 97.7 °F (36.5 °C)   Ht 175.3 cm (69\")   Wt 124 kg (274 lb)   SpO2 95% Comment: 4l poc  BMI 40.46 kg/m²   Estimated body mass index is 40.46 kg/m² as calculated from the following:    Height as of this encounter: 175.3 cm (69\").    Weight as of this encounter: 124 kg (274 lb).         Physical Exam  Vitals reviewed.   Constitutional:       General: He is not in acute distress.     Appearance: He is not diaphoretic.   HENT:      Head: Normocephalic and atraumatic.   Cardiovascular:      Rate and Rhythm: Normal rate and regular rhythm.   Pulmonary:      Effort: Pulmonary effort is normal.      Breath sounds: No wheezing, rhonchi or rales.   Musculoskeletal:      Right lower leg: Edema present.      Left lower leg: Edema present.   Neurological:      Mental Status: He is alert and oriented to person, place, and time.   Psychiatric:         Behavior: Behavior normal.        Result Review :    The following data was reviewed by: Stephy Estrada PA-C on 08/05/2024:    Limited echo (march 2024)      Spirometry January 2024  FEV1/FVC was noted to 83%, but FEV1 was noted at 34%.      CT angiogram " chest imaging/report March 2024    IMPRESSION:  Impression:  1.  No CTA evidence of pulmonary embolism.  2.  Areas of chronic consolidation and chronic scarring involving the  lingula, right middle lobe, both lower lobes is unchanged.  No  superimposed acute infiltrates or effusions.     This report was finalized on 3/27/2024 12:15 AM by Hussain Crouch MD.          Assessment and Plan     Diagnoses and all orders for this visit:    1. Restrictive lung disease (Primary)    2. Chronic bronchitis, unspecified chronic bronchitis type    3. Chronic respiratory failure with hypoxia and hypercapnia  -     Blood Gas, Arterial -With Co-Ox Panel: Yes; Future    4. PATTY (obstructive sleep apnea)    5. Severe pulmonary hypertension    6. Cigarette nicotine dependence in remission    7. Obesity hypoventilation syndrome          Restrictive lung disease   Previous spirometry negative for true obstruction.   Suggested more restriction - could be related to body habitus/abdominal pressure, atelectasis/scarring noted on last CT, residual muscle weakness from previous stroke.   Breathing can also be impacted by volume overload from CHF -- following with HF Clinic  Encouraged exercise as tolerated (ex. Stationary bike), deep breathing exercises      Chronic bronchitis:   Previously suspected, but had difficulty obtaining PFT to check for obstruction. Recent spirometry did not reveal obstruction.   PFT ordered but unable to complete yet  Alpha 1 genotype MS - previous serum level within normal limits.   Continue albuterol inhaler as needed  Continue atrovent nebs as needed.   Continue Breztri as scheduled      Chronic hypoxic and hypercapnic respiratory failure, PATTY/OHS:   Compliant with continuous oxygen use of 3 L  Using 4 L today via POC with using wheelchair for ambulation  Previously had NIPPV, was building up tolerance and benefitting from use but did not obtain the required hours of usage per insurance, device was collected.    Previously tried to reorder device.   Tried and failed BIPAP/CPAP during previous attempts upon initial qualification for PATTY.   Patient later progressively started to tolerate AVAPS-AE use.   Ordered ABG      Pulmonary hypertension  Previous echocardiogram from 11/2023 notable for severe pulmonary hypertension with RVSP >55 mmHg.   Likely group 2, group 3 in the setting of underlying PATTY/OHS, CHF, restrictive pulmonary changes.        Cigarette nicotine dependence, in remission:   Former smoking history noted. Was able to quit smoking in 2023. Notable for 57 year history of use with 2 ppd average.   Can consider next LDCT around March 2025 (CT images from March 2024 angiogram reviewed)        Follow Up     Return in about 3 months (around 11/5/2024), or if symptoms worsen or fail to improve, for Next scheduled follow up.  Patient was given instructions and counseling regarding his condition or for health maintenance advice. Please see specific information pulled into the AVS if appropriate.

## 2024-08-13 ENCOUNTER — SPECIALTY PHARMACY (OUTPATIENT)
Dept: PHARMACY | Facility: HOSPITAL | Age: 74
End: 2024-08-13
Payer: MEDICARE

## 2024-08-15 ENCOUNTER — HOSPITAL ENCOUNTER (OUTPATIENT)
Dept: CARDIOLOGY | Facility: HOSPITAL | Age: 74
Discharge: HOME | End: 2024-08-15
Payer: MEDICARE

## 2024-08-15 VITALS
SYSTOLIC BLOOD PRESSURE: 101 MMHG | HEART RATE: 85 BPM | DIASTOLIC BLOOD PRESSURE: 57 MMHG | WEIGHT: 273 LBS | OXYGEN SATURATION: 93 % | BODY MASS INDEX: 40.32 KG/M2

## 2024-08-15 DIAGNOSIS — G47.33 OSA TREATED WITH BIPAP: ICD-10-CM

## 2024-08-15 DIAGNOSIS — J96.11 CHRONIC HYPOXEMIC RESPIRATORY FAILURE: ICD-10-CM

## 2024-08-15 DIAGNOSIS — I50.22 CHRONIC HFREF (HEART FAILURE WITH REDUCED EJECTION FRACTION): Primary | ICD-10-CM

## 2024-08-15 LAB
ABSOLUTE LUNG FLUID CONTENT: 41 % (ref 20–35)
ANION GAP SERPL CALCULATED.3IONS-SCNC: 2.9 MMOL/L (ref 5–15)
BUN SERPL-MCNC: 16 MG/DL (ref 8–23)
BUN/CREAT SERPL: 23.2 (ref 7–25)
CALCIUM SPEC-SCNC: 10.3 MG/DL (ref 8.6–10.5)
CHLORIDE SERPL-SCNC: 93 MMOL/L (ref 98–107)
CO2 SERPL-SCNC: 46.1 MMOL/L (ref 22–29)
CREAT SERPL-MCNC: 0.69 MG/DL (ref 0.76–1.27)
EGFRCR SERPLBLD CKD-EPI 2021: 98.3 ML/MIN/1.73
GLUCOSE SERPL-MCNC: 157 MG/DL (ref 65–99)
MAGNESIUM SERPL-MCNC: 2.1 MG/DL (ref 1.6–2.4)
NT-PROBNP SERPL-MCNC: 112 PG/ML (ref 0–900)
POTASSIUM SERPL-SCNC: 4.3 MMOL/L (ref 3.5–5.2)
SODIUM SERPL-SCNC: 142 MMOL/L (ref 136–145)

## 2024-08-15 PROCEDURE — 83880 ASSAY OF NATRIURETIC PEPTIDE: CPT | Performed by: PHYSICIAN ASSISTANT

## 2024-08-15 PROCEDURE — 36415 COLL VENOUS BLD VENIPUNCTURE: CPT | Performed by: PHYSICIAN ASSISTANT

## 2024-08-15 PROCEDURE — 80048 BASIC METABOLIC PNL TOTAL CA: CPT | Performed by: PHYSICIAN ASSISTANT

## 2024-08-15 PROCEDURE — 83735 ASSAY OF MAGNESIUM: CPT | Performed by: PHYSICIAN ASSISTANT

## 2024-08-15 PROCEDURE — 94726 PLETHYSMOGRAPHY LUNG VOLUMES: CPT | Performed by: PHYSICIAN ASSISTANT

## 2024-08-15 RX ORDER — BUMETANIDE 2 MG/1
TABLET ORAL
Qty: 75 TABLET | Refills: 2 | Status: SHIPPED | OUTPATIENT
Start: 2024-08-15

## 2024-08-15 RX ORDER — METOPROLOL SUCCINATE 25 MG/1
12.5 TABLET, EXTENDED RELEASE ORAL
Qty: 15 TABLET | Refills: 6 | Status: SHIPPED | OUTPATIENT
Start: 2024-08-15

## 2024-08-15 RX ORDER — DONEPEZIL HYDROCHLORIDE 5 MG/1
5 TABLET, FILM COATED ORAL DAILY
COMMUNITY
Start: 2024-08-13

## 2024-08-15 NOTE — PROGRESS NOTES
[facility]  JUNIE Perdomo James T, MD  [address] [place] [address]    Thank you for asking me to see Donaldo Roberts for congestive heart failure.     HPI:     This is a 74 y.o. male with known past medical history of :    Chronic HFpEF  TTE from 03/27/24 with EF 46-50%  TTE 11/18/2023 with EF 66-70% with mild concentric hypertrophy and grade I diastolic dysfunction.  Severe pulmonary HTN present and RVSP >55mmHg.    PATTY, noncompliant with NIPPV  COPD  Chronic hypoxemic and hypercarbic respiratory failure (3LPM)  PPM 2/2 first degree AV block with intermittent high-grade block and 7 beats Vtach on holter monitor prior to admission  Hx Ischemic CVA  Carotid stenosis  left carotid endarterectomy  BRYSON followed by Dr. Becerra  GERD  Obesity  Tobacco abuse    Donaldo Roberts presents for today for Heart Failure clinic evaluation.  The patient is typically seen by Duke Rosen MD.  Patient's primary cardiologist is Dr. Melgar & Ruth BOSCH.     Last known EF 66-70%.   Last known hospitalization and/or ED visit: Patient hospitalized from December 10 through December 13, 2023 with acute on chronic hypoxic and hypercapnic respiratory failure secondary to COPD exacerbation with noted acute on chronic diastolic heart failure also impacting admission.  He received referral to clinic via Ruth Aguayo prior to his admission.  Accompanied by: Wife and sons          08/15/24 visit data/details regarding:   Dyspnea: Dyspnea with significant exertion  Lower extremity swelling: Bilateral lower extremity swelling present but improved  Abdominal swelling: Ongoing abdominal distension  Home weight: Weight monitoring booklet provided during initial visit; Has scale.   Home BP: BP monitoring booklet provided during initial visit; Has BP cuff.   Home heart rate: HR monitoring booklet provided during initial visit; Has monitoring device  Daily activities of living:  Performing on her own  Pillows/lying flat: Hospital bed   HF zone: Yellow zone.   Mr. Roberts is chest pain free. He had improvement in swelling following IV diuresis last visit.  His wife reports he did have an episode of abdominal and worsening leg swelling a few weeks later that did improve.      Specialists:   Cardiology: Dr. Melgar & Ruth Aguayo NP         Review of Systems - Review of Systems   Constitutional: Negative for decreased appetite, fever, malaise/fatigue and night sweats.   HENT:  Negative for congestion and ear pain.    Eyes:  Negative for blurred vision and double vision.   Cardiovascular:  Positive for dyspnea on exertion and leg swelling.   Respiratory:  Positive for shortness of breath. Negative for cough and hemoptysis.    Endocrine: Negative for cold intolerance and heat intolerance.   Hematologic/Lymphatic: Negative for adenopathy and bleeding problem.   Skin:  Negative for color change, dry skin and nail changes.   Musculoskeletal:  Negative for arthritis, back pain, falls and myalgias.   Gastrointestinal:  Negative for bloating and anorexia.   Genitourinary:  Negative for bladder incontinence, decreased libido and dysuria.   Neurological:  Negative for aphonia and difficulty with concentration.   Psychiatric/Behavioral:  Negative for altered mental status and hallucinations.         All other systems were reviewed and were negative.    Patient Active Problem List   Diagnosis    Benign prostatic hyperplasia with urinary obstruction    Herpesviral infection of penis    Shortness of breath    Carotid stenosis, asymptomatic, right    Essential hypertension    Dyslipidemia    Wenckebach block    PATTY (obstructive sleep apnea)    Presence of cardiac pacemaker    Severe sinus bradycardia    Chronic bronchitis    Cigarette nicotine dependence without complication    Hypoxia    Detrusor instability    Tobacco abuse    Respiratory failure    Physical debility    Chronic obstructive pulmonary  disease    Hypercapnic respiratory failure    Severe pulmonary hypertension    SBO (small bowel obstruction)    Chronic HFrEF (heart failure with reduced ejection fraction)       family history includes Diabetes in his mother and sister; Heart disease in his mother; No Known Problems in his father; Stroke in his mother.     reports that he quit smoking about 10 months ago. His smoking use included cigarettes. He started smoking about 57 years ago. He has a 113.5 pack-year smoking history. He has been exposed to tobacco smoke. He has never used smokeless tobacco. He reports that he does not drink alcohol and does not use drugs.    Allergies   Allergen Reactions    Bee Pollen Anaphylaxis    Levaquin [Levofloxacin] Hallucinations    Penicillins Hives     Mother told him that, has taken amoxicillin with no problems         Current Outpatient Medications:     albuterol sulfate  (90 Base) MCG/ACT inhaler, Inhale 2 puffs by mouth Every 4 (Four) Hours As Needed for Wheezing or Shortness of Air., Disp: 18 g, Rfl: 8    aspirin 81 MG EC tablet, Take 1 tablet by mouth Daily., Disp: , Rfl:     Budeson-Glycopyrrol-Formoterol (Breztri Aerosphere) 160-9-4.8 MCG/ACT aerosol inhaler, Inhale 2 puffs 2 (Two) Times a Day. Indications: Chronic Obstructive Lung Disease, Disp: 10.7 g, Rfl: 5    bumetanide (BUMEX) 2 MG tablet, Twice daily. If worsening swelling, shortness of breath or weight gain, give 2 tablets for 8 AM Dose., Disp: 75 tablet, Rfl: 2    clopidogrel (PLAVIX) 75 MG tablet, Take 1 tablet by mouth Daily., Disp: 30 tablet, Rfl: 11    dapagliflozin Propanediol 10 MG tablet, Take 1 tablet by mouth Daily., Disp: 30 tablet, Rfl: 6    donepezil (ARICEPT) 5 MG tablet, Take 1 tablet by mouth Daily., Disp: , Rfl:     famotidine (PEPCID) 20 MG tablet, Take 1 tablet by mouth Daily., Disp: , Rfl:     ipratropium (ATROVENT) 0.02 % nebulizer solution, Take 2.5 mL by nebulization 4 (Four) Times a Day As Needed for Wheezing or  Shortness of Air., Disp: 360 mL, Rfl: 3    metoprolol succinate XL (TOPROL-XL) 25 MG 24 hr tablet, TAKE 1/2 TABLET BY MOUTH EVERY NIGHT, Disp: 15 tablet, Rfl: 2    omeprazole (priLOSEC) 20 MG capsule, Take 1 capsule by mouth Daily., Disp: , Rfl:     polyethylene glycol (MIRALAX) 17 g packet, Take 17 g by mouth Daily., Disp: 30 packet, Rfl: 3    rosuvastatin (CRESTOR) 10 MG tablet, Take 1 tablet by mouth Daily., Disp: , Rfl:     sacubitril-valsartan (ENTRESTO) 49-51 MG tablet, Take 1 tablet by mouth 2 Times a Day., Disp: 60 tablet, Rfl: 5    spironolactone (ALDACTONE) 25 MG tablet, Take 1 tablet by mouth Daily. Hold if top number of blood pressure is less than 110., Disp: 30 tablet, Rfl: 11    Zoster Vac Recomb Adjuvanted (Shingrix) 50 MCG/0.5ML reconstituted suspension, Inject 0.5 mL into the appropriate muscle as directed by prescriber 1 (One) Time for 1 dose., Disp: 0.5 mL, Rfl: 0      Physical Exam:  I have reviewed the patient's current vital signs as documented in the patient's EMR.   Vitals:    08/15/24 1103   BP: 101/57   Pulse: 85   SpO2: 93%         Body mass index is 40.32 kg/m².       08/15/24  1103   Weight: 124 kg (273 lb) (per pt)          Physical Exam  Vitals and nursing note reviewed.   Constitutional:       General: He is awake.      Appearance: He is morbidly obese.      Interventions: Nasal cannula in place.      Comments: 4L    HENT:      Head: Normocephalic and atraumatic.   Eyes:      General: Lids are normal.      Conjunctiva/sclera:      Right eye: Right conjunctiva is not injected.      Left eye: Left conjunctiva is not injected.   Cardiovascular:      Rate and Rhythm: Normal rate and regular rhythm.   Pulmonary:      Effort: No tachypnea.      Breath sounds: No wheezing, rhonchi or rales.      Comments: Bilateral breath sounds clear to auscultation  Abdominal:      General: Bowel sounds are normal.      Palpations: Abdomen is soft.      Tenderness: There is no abdominal tenderness.    Musculoskeletal:      Right lower le+ Edema present.      Left lower le+ Edema present.   Skin:     General: Skin is warm and dry.   Neurological:      Mental Status: He is alert and oriented to person, place, and time.   Psychiatric:         Attention and Perception: Attention normal.         Mood and Affect: Mood normal.         Behavior: Behavior is cooperative.         JVP: Volume/Pulsation: Normal.        DATA REVIEWED:     EKG. I personally reviewed and interpreted the EKG.      ---------------------------------------------------  TTE/SIXTO:  Results for orders placed during the hospital encounter of 24    Adult Transthoracic Echo Limited W/ Cont if Necessary Per Protocol    Interpretation Summary    Left ventricular systolic function is low normal. Calculated left ventricular EF = 49% Left ventricular ejection fraction appears to be 46 - 50%.    Left ventricular diastolic function was not assessed.        LAST HEART CATH/IF AVAILABLE:     No results found for this or any previous visit.      -----------------------------------------------------  CXR/Imaging:   Imaging Results (Most Recent)       None            I personally reviewed and interpreted the CXR.      -----------------------------------------------------  CT:   No radiology results for the last 30 days.  I personally reviewed the images of the CT scan.  My personal interpretation is below.      ----------------------------------------------------    PFTs:        --------------------------------------------------------------------------------------------------    Laboratory evaluations:    Lab Results   Component Value Date    GLUCOSE 157 (H) 08/15/2024    BUN 16 08/15/2024    CREATININE 0.69 (L) 08/15/2024    EGFRIFNONA 93 2021    BCR 23.2 08/15/2024    K 4.3 08/15/2024    CO2 46.1 (H) 08/15/2024    CALCIUM 10.3 08/15/2024    ALBUMIN 4.3 2024    AST 16 2024    ALT 19 2024     Lab Results   Component Value Date  "   WBC 7.10 03/29/2024    HGB 13.2 03/29/2024    HCT 43.1 03/29/2024    MCV 99.3 (H) 03/29/2024     03/29/2024     Lab Results   Component Value Date    CHOL 112 03/27/2023    TRIG 175 (H) 03/27/2023    HDL 37 (L) 03/27/2023    LDL 46 03/27/2023     Lab Results   Component Value Date    TSH 0.573 12/11/2023     Lab Results   Component Value Date    HGBA1C 6.40 (H) 12/11/2023     Lab Results   Component Value Date    ALT 19 03/28/2024     Lab Results   Component Value Date    HGBA1C 6.40 (H) 12/11/2023     Lab Results   Component Value Date    CREATININE 0.69 (L) 08/15/2024     No results found for: \"IRON\", \"TIBC\", \"FERRITIN\"  Lab Results   Component Value Date    INR 0.82 (L) 12/08/2021    PROTIME 11.7 (L) 12/08/2021        Lab Results   Component Value Date    ABSOLUTELUNG 41 (A) 08/15/2024    ABSOLUTELUNG 42 (A) 07/15/2024    ABSOLUTELUNG 39 (A) 05/06/2024       PAH RISK ASSESSMENT:      1. Chronic HFmrEF    2. Chronic hypoxemic respiratory failure    3. PATTY treated with BiPAP          ORDERS PLACED TODAY:  Orders Placed This Encounter   Procedures    ReDs Vest    Basic Metabolic Panel    Magnesium    proBNP    Basic Metabolic Panel    Magnesium    proBNP        Diagnoses and all orders for this visit:    1. Chronic HFmrEF (Primary)  -     Basic Metabolic Panel; Future  -     Magnesium; Future  -     proBNP; Future  -     Basic Metabolic Panel; Standing  -     Basic Metabolic Panel  -     Magnesium; Standing  -     Magnesium  -     proBNP; Standing  -     proBNP  -     ReDs Vest    2. Chronic hypoxemic respiratory failure    3. PATTY treated with BiPAP    Other orders  -     bumetanide (BUMEX) 2 MG tablet; Twice daily. If worsening swelling, shortness of breath or weight gain, give 2 tablets for 8 AM Dose.  Dispense: 75 tablet; Refill: 2             MEDS ORDERED TODAY:    New Medications Ordered This Visit   Medications    bumetanide (BUMEX) 2 MG tablet     Sig: Twice daily. If worsening swelling, shortness " of breath or weight gain, give 2 tablets for 8 AM Dose.     Dispense:  75 tablet     Refill:  2        ---------------------------------------------------------------------------------------------------------------------------          ASSESSMENT/PLAN:      Diagnosis Plan   1. Chronic HFmrEF  Basic Metabolic Panel    Magnesium    proBNP    Basic Metabolic Panel    Basic Metabolic Panel    Magnesium    Magnesium    proBNP    proBNP    ReDs Vest      2. Chronic hypoxemic respiratory failure        3. PATTY treated with BiPAP            not acutely decompensated chronic HFmrEF    NYHA stage Stage C: Structural heart disease is present AND symptoms have occurredFC-Class III: Marked limitation of physical activity. Comfortable at rest. Less than ordinary activity causes fatigue, palpitation, or dyspnea.     Today, Patient is approaching euvolemiaand with  Moderate perfusion. The patient's hemodynamics are currently acceptable. HR is: normal and is at goal. BP/MAP was reviewed and there isroom for medication up-titration.  Clinical trajectory was assessed and hasworsened.     CHF GOAL DIRECTED MEDICAL THERAPY FOR PATIENT ADDRESSED/ADJUSTED:     GDMT: HFpEF    Drug Class   Drug   Dose Last Dose Adjustment Notes   ACEi/ARB/ARNI Entresto  49-51 mg BID     Beta Blocker Toprol XL  12.5mg nightly      MRA Spironolactone 25mg      SGLT2i Farxiga 10mg   N/A   Secondaries if applicable:          -CHF Specific BB:   Transition Coreg to Toprol XL.   We discussed processes/benefits of HF clinic including nursing, pharmacist, and provider evaluation during each visit with ability for in office ReDS vest, labs, and ability to provide IV diuresis in the clinic with close outpatient monitoring.  Additionally, patient was educated about the availability of delivery of medications to patient's clinic room prior to leaving the building which assists with medication compliance and insures medications are in hands when changes are made (if  patient opts for apothecary usage) with thorough guidance regarding changes and medication schedule provided.          -ACE/ARB/ARNi:   Continue Entresto as outlined above.   See below regarding stopping CCB.      -MRA:   The patient is FC-NYHA Class III and MRA is indicated.   Continue Spironolactone 25mg qd.     -SGLT2 inhibitor therapy:   A BMP at initiation to verify GFR >30 was recommended, as was interval GFR surveillance.  The patient was advised to hold SGLT2I when PO intake is restricted due to a planned surgery, or due to an underlying illness.    Recommend starting Farxiga (dapagliflozin) 10mg daily with quarterly assessment of GFR.  Samples provided in office.   Pt was advised SEs, some severe, including hypersensitivity and Miller's; coupled with discussion regarding common side effects of UTIs and female genital mycotic infections were discussed. If you will be NPO, or are sick (poor PO intake, N&V) please hold the medication until you are back to a normal diet.     -Diuretic regimen:   ReDS Vest reading for. 08/15/24  is 41; suspect some chronic elevation of Reds vest score secondary to lung scarring ReDs Vest reading reviewed with patient.    Continue Bumex 2mg BID.  Instructed his wife to give AM dose of 4mg on days when he has worsening swelling/shortness of breath.      BMP, Mag, & ProBNP reviewed with patient.      -Fluid restriction/Sodium restriction:   Requested 2000 ml restriction  Patient has been asked to weigh daily and was provided with a printed diuretic strategy.  1,500 mg Na restriction was discussed.        -Acute vs. Chronic underlying conditions other than HF addressed during visit:   Chronic hypoxemic respiratory failure:   COPD without exacerbation:   PATTY with NIPPV noncompliance:   Continue Pulm f/u.    Continue COPD clinic f/u.     Essential Hypertension:   Continue ARNi as outlined.    Stopped Norvasc on 02/06/24  Stopped Lisinopril on 02/05/24.       Carotid stenosis s/p  prior L CEA  R carotid stenosis  Continue f/u with Dr. Becerra & marcela.   Continue statin  &  crestor.     Identifiable barriers to Heart Failure Self-care:   Medical Barriers: Debility  Social Barriers:  No immediate known barriers.         CONSIDERATIONS:   ------------------------------------------------------------------  -Iron/Anemia in CHF:  December 2023 Hgb WNL.   ------------------------------------------------------------    -Sleep/Apnea:   Noncompliant with NIPPV.   Continue Pulm f/u     --------------------------------------------------------------------------------        Class 2 Severe Obesity (BMI >=35 and <=39.9). Obesity-related health conditions include the following: obstructive sleep apnea, hypertension, coronary heart disease, diabetes mellitus, dyslipidemias, GERD, and osteoarthritis. Obesity is unchanged. BMI is is above average; BMI management plan is completed. We discussed portion control, increasing exercise, and Heart failure dietary management .              >45 minutes out of 60 minutes face to face spent counseling patient extensively on dietary Na+ intake, importance of activity, weight monitoring, compliance with medications in addition to importance of titration with goal directed medical therapy and follow up appointments.            This document has been electronically signed by Yakelin Stacy PA-C  August 15, 2024 13:22 EDT      Dictated Utilizing Dragon Dictation: Part of this note may be an electronic transcription/translation of spoken language to printed text using the Dragon Dictation System.    Follow-up appointment and medication changes provided in hand delivered After Visit Summary as well as reviewed in the room.    -+

## 2024-08-15 NOTE — PROGRESS NOTES
Heart Failure Clinic    Date: 08/15/24     Vitals:    08/15/24 1103   BP: 101/57   Pulse: 85   SpO2: 93%    Weight: 273lbs    Method of arrival: Other Wheelchair    Weighing self daily: Yes    Monitoring Heart Failure Zones: Yes    Today's HF Zone: Yellow     Taking medications as prescribed: Yes    Edema Yes    Shortness of Air: Yes    Number of pillows used at night: Elevated hospital bed    Educational Materials given:  AVS given                                                                         ReDS Value: 41  36-41 Possible Hypervolemic Status      Susan Dumont RN 08/15/24 11:04 EDT

## 2024-08-15 NOTE — PROGRESS NOTES
Heart Failure Clinic  Pharmacist Note     Donaldo Roberts is a 74 y.o. male seen in the Heart Failure Clinic for HFpEF with an EF of 66-70% on 11/18/23. Patient was recently hospitalized from 3/26-3/30/24 for SOB and abdominal distention and diagnosed with a small bowel obstruction. Coreg and Miralax were added at that time.      Donaldo Roberts reports a poor understanding of medications.  He is accompanied by his wife today, who helps to manage his medications. She reports that his swelling was much improved after the IV Bumex at last visit, but reports that the swelling has returned. She reports that a few days ago, she noticed that his belly was more pronounced with swelling, but since then, he has lost 2-3lbs and his belly looks better at this time. He is currently taking Bumex 2mg bid at 8AM and noon.     The wife reports that his BP at home are good, in the 110-130's/80-90's. She reports that patient has been out of Toprol for the past 4-5 days and she has noticed that his HR has been more elevated on those days.       Medication Use:   Hx of med intolerances:  None related to HF  Retail Rx Management: [facility] [place]; [place] for Farxiga and Entresto- ship to patient    Past Medical History:   Diagnosis Date    Acid reflux     Anxiety     Arthritis     Carotid artery stenosis, symptomatic, right 11/23/2021    Chronic obstructive pulmonary disease 12/5/2023    Essential hypertension 11/23/2021    Herpes     History of lipoma     History of transfusion     Hyperlipidemia     Inguinal hernia     Peptic ulcer     Stroke      ALLERGIES: Bee pollen, Levaquin [levofloxacin], and Penicillins  Current Outpatient Medications   Medication Sig Dispense Refill    albuterol sulfate  (90 Base) MCG/ACT inhaler Inhale 2 puffs by mouth Every 4 (Four) Hours As Needed for Wheezing or Shortness of Air. 18 g 8    aspirin 81 MG EC tablet Take 1 tablet by mouth Daily.      Budeson-Glycopyrrol-Formoterol (Breztri  Aerosphere) 160-9-4.8 MCG/ACT aerosol inhaler Inhale 2 puffs 2 (Two) Times a Day. Indications: Chronic Obstructive Lung Disease 10.7 g 5    bumetanide (BUMEX) 2 MG tablet Twice daily. If worsening swelling, shortness of breath or weight gain, give 2 tablets for 8 AM Dose. 75 tablet 2    clopidogrel (PLAVIX) 75 MG tablet Take 1 tablet by mouth Daily. 30 tablet 11    dapagliflozin Propanediol 10 MG tablet Take 1 tablet by mouth Daily. 30 tablet 6    donepezil (ARICEPT) 5 MG tablet Take 1 tablet by mouth Daily.      famotidine (PEPCID) 20 MG tablet Take 1 tablet by mouth Daily.      ipratropium (ATROVENT) 0.02 % nebulizer solution Take 2.5 mL by nebulization 4 (Four) Times a Day As Needed for Wheezing or Shortness of Air. 360 mL 3    metoprolol succinate XL (TOPROL-XL) 25 MG 24 hr tablet TAKE 1/2 TABLET BY MOUTH EVERY NIGHT 15 tablet 2    omeprazole (priLOSEC) 20 MG capsule Take 1 capsule by mouth Daily.      polyethylene glycol (MIRALAX) 17 g packet Take 17 g by mouth Daily. 30 packet 3    rosuvastatin (CRESTOR) 10 MG tablet Take 1 tablet by mouth Daily.      sacubitril-valsartan (ENTRESTO) 49-51 MG tablet Take 1 tablet by mouth 2 Times a Day. 60 tablet 5    spironolactone (ALDACTONE) 25 MG tablet Take 1 tablet by mouth Daily. Hold if top number of blood pressure is less than 110. 30 tablet 11    Zoster Vac Recomb Adjuvanted (Shingrix) 50 MCG/0.5ML reconstituted suspension Inject 0.5 mL into the appropriate muscle as directed by prescriber 1 (One) Time for 1 dose. 0.5 mL 0     No current facility-administered medications for this encounter.       Vaccination History:   Pneumonia: UTD 2/5/24  Annual Influenza: UTD 23-24  Shingles: 1st 5/6/24 in COPD; 2nd 8/15/24 now UTD  Covid: UTD 2/5/24    Objective  Vitals:    08/15/24 1103   BP: 101/57   BP Location: Right arm   Patient Position: Sitting   Cuff Size: Adult   Pulse: 85   SpO2: 93%   Weight: 124 kg (273 lb)           Wt Readings from Last 3 Encounters:   08/15/24 124  kg (273 lb)   08/05/24 124 kg (274 lb)   07/15/24 124 kg (273 lb)         08/15/24  1103   Weight: 124 kg (273 lb) (per pt)           Lab Results   Component Value Date    GLUCOSE 157 (H) 08/15/2024    BUN 16 08/15/2024    CREATININE 0.69 (L) 08/15/2024    EGFRIFNONA 93 12/11/2021    BCR 23.2 08/15/2024    K 4.3 08/15/2024    CO2 46.1 (H) 08/15/2024    CALCIUM 10.3 08/15/2024    ALBUMIN 4.3 03/28/2024    AST 16 03/28/2024    ALT 19 03/28/2024     Lab Results   Component Value Date    WBC 7.10 03/29/2024    HGB 13.2 03/29/2024    HCT 43.1 03/29/2024    MCV 99.3 (H) 03/29/2024     03/29/2024     Lab Results   Component Value Date    TROPONINT 33 (H) 03/27/2024     Lab Results   Component Value Date    PROBNP 112.0 08/15/2024     Results for orders placed during the hospital encounter of 03/26/24    Adult Transthoracic Echo Limited W/ Cont if Necessary Per Protocol    Interpretation Summary    Left ventricular systolic function is low normal. Calculated left ventricular EF = 49% Left ventricular ejection fraction appears to be 46 - 50%.    Left ventricular diastolic function was not assessed.         GDMT    Drug Class   Drug   Dose Last Dose Adjustment Additional Titration   Notes   ACEi/ARB/ARNI Entresto 49/51mg 2/5/24     Beta Blocker Toprol 12.5mg 5/6/24     MRA Spironolactone 25mg 7/14/24     SGLT2i Farxiga 10mg 1/25/24     Bumex 2mg bid      Drug Therapy Problems    1. Edema  2. Need refill on Toprol.   3. IMZ second Shingrix due        Recommendations:     Yakelin to allot an additional dose of Bumex daily PRN.   Yakelin to send in.   Gave to the patient in his left arm. Series complete.       Patient was educated on heart failure medications and the importance of medication adherence. All questions were addressed and patient expressed understanding. Used teach-back method to assess understanding.     Thank you for allowing me to participate in the care of your patient,    Comfort Modi [facility]  08/15/24  12:55  EDT

## 2024-08-20 PROBLEM — E66.2 OBESITY HYPOVENTILATION SYNDROME: Status: ACTIVE | Noted: 2024-08-20

## 2024-08-20 PROBLEM — J98.4 RESTRICTIVE LUNG DISEASE: Status: ACTIVE | Noted: 2024-08-20

## 2024-09-10 ENCOUNTER — SPECIALTY PHARMACY (OUTPATIENT)
Dept: PHARMACY | Facility: HOSPITAL | Age: 74
End: 2024-09-10
Payer: MEDICARE

## 2024-10-08 ENCOUNTER — SPECIALTY PHARMACY (OUTPATIENT)
Dept: PHARMACY | Facility: HOSPITAL | Age: 74
End: 2024-10-08
Payer: MEDICARE

## 2024-10-10 ENCOUNTER — CLINICAL SUPPORT NO REQUIREMENTS (OUTPATIENT)
Dept: CARDIOLOGY | Facility: CLINIC | Age: 74
End: 2024-10-10
Payer: MEDICARE

## 2024-10-10 DIAGNOSIS — Z95.0 CARDIAC PACEMAKER IN SITU: Primary | ICD-10-CM

## 2024-11-05 ENCOUNTER — OFFICE VISIT (OUTPATIENT)
Dept: PULMONOLOGY | Facility: CLINIC | Age: 74
End: 2024-11-05
Payer: MEDICARE

## 2024-11-05 VITALS
WEIGHT: 279 LBS | DIASTOLIC BLOOD PRESSURE: 68 MMHG | SYSTOLIC BLOOD PRESSURE: 126 MMHG | HEIGHT: 69 IN | BODY MASS INDEX: 41.32 KG/M2 | OXYGEN SATURATION: 92 % | TEMPERATURE: 97.4 F | HEART RATE: 97 BPM

## 2024-11-05 DIAGNOSIS — J42 CHRONIC BRONCHITIS, UNSPECIFIED CHRONIC BRONCHITIS TYPE: Primary | ICD-10-CM

## 2024-11-05 DIAGNOSIS — F17.211 CIGARETTE NICOTINE DEPENDENCE IN REMISSION: ICD-10-CM

## 2024-11-05 DIAGNOSIS — J98.4 RESTRICTIVE LUNG DISEASE: ICD-10-CM

## 2024-11-05 DIAGNOSIS — J96.11 CHRONIC RESPIRATORY FAILURE WITH HYPOXIA AND HYPERCAPNIA: ICD-10-CM

## 2024-11-05 DIAGNOSIS — G47.33 OSA (OBSTRUCTIVE SLEEP APNEA): ICD-10-CM

## 2024-11-05 DIAGNOSIS — I27.20 PULMONARY HYPERTENSION: ICD-10-CM

## 2024-11-05 DIAGNOSIS — J96.12 CHRONIC RESPIRATORY FAILURE WITH HYPOXIA AND HYPERCAPNIA: ICD-10-CM

## 2024-11-05 PROCEDURE — 3078F DIAST BP <80 MM HG: CPT | Performed by: PHYSICIAN ASSISTANT

## 2024-11-05 PROCEDURE — 3074F SYST BP LT 130 MM HG: CPT | Performed by: PHYSICIAN ASSISTANT

## 2024-11-05 PROCEDURE — 99214 OFFICE O/P EST MOD 30 MIN: CPT | Performed by: PHYSICIAN ASSISTANT

## 2024-11-05 PROCEDURE — 1160F RVW MEDS BY RX/DR IN RCRD: CPT | Performed by: PHYSICIAN ASSISTANT

## 2024-11-05 PROCEDURE — 1159F MED LIST DOCD IN RCRD: CPT | Performed by: PHYSICIAN ASSISTANT

## 2024-11-05 RX ORDER — SACUBITRIL AND VALSARTAN 49; 51 MG/1; MG/1
1 TABLET, FILM COATED ORAL 2 TIMES DAILY
Qty: 60 TABLET | Refills: 5 | Status: CANCELLED | OUTPATIENT
Start: 2024-11-05

## 2024-11-05 RX ORDER — GLYCOPYRROLATE AND FORMOTEROL FUMARATE 9; 4.8 UG/1; UG/1
2 AEROSOL, METERED RESPIRATORY (INHALATION) 2 TIMES DAILY
Qty: 10.7 G | Refills: 8 | Status: SHIPPED | OUTPATIENT
Start: 2024-11-05

## 2024-11-05 NOTE — PROGRESS NOTES
"Chief Complaint  COPD, respiratory failure    Subjective        Donaldo Roberts presents to [facility]  History of Present Illness    Patient presents today accompanied by his son.  No major changes in respiratory status at this time.  Still requires continuous supplemental oxygen support.  Using POC today on 4 L.  Has not had recent hospitalizations since last visit.  Previously had NIPPV device but due to decreased use this was later taken away.  He is willing to restart the device if qualification can be obtained.  Still has inhalers, but revealed today that the breztri makes him feel odd/ill, and does not use this as directed.  Son also confirms that he does not see him using the inhalers much.  May have recently used Breztri a few days ago.  Son states that over the last few months he has been less mobile.    Still notes significant issues with chronic lower extremity edema and abdominal distention, but no acute worsening at this time.  Continues to follow with cardiology.    Objective   Vital Signs:  /68   Pulse 97   Temp 97.4 °F (36.3 °C)   Ht 175.3 cm (69\")   Wt 127 kg (279 lb) Comment: Wheelchair  SpO2 92%   BMI 41.20 kg/m²   Estimated body mass index is 41.2 kg/m² as calculated from the following:    Height as of this encounter: 175.3 cm (69\").    Weight as of this encounter: 127 kg (279 lb).  POC 4 L         Physical Exam  Vitals reviewed.   Constitutional:       General: He is not in acute distress.     Appearance: He is not diaphoretic.   HENT:      Head: Normocephalic and atraumatic.   Cardiovascular:      Rate and Rhythm: Normal rate and regular rhythm.   Pulmonary:      Effort: Pulmonary effort is normal.      Breath sounds: No wheezing, rhonchi or rales.      Comments: Shallow breathing noted, but can breathe deep intentionally.  Musculoskeletal:         General: Swelling present.      Right lower leg: Edema present.      Left lower " leg: Edema present.   Neurological:      Mental Status: He is alert and oriented to person, place, and time.   Psychiatric:         Behavior: Behavior normal.        Result Review :  The following data was reviewed by: Stephy Estrada PA-C on 11/05/2024:    Previous ABGs reviewed.   Latest Reference Range & Units 11/26/23 07:36 12/10/23 22:06 12/11/23 00:04 12/11/23 03:19 12/11/23 12:14 12/12/23 02:15 03/26/24 22:15   pH, Arterial 7.350 - 7.450 pH units 7.405 7.209 (C) 7.286 (L) 7.379 7.368 7.454 (H) 7.419   pCO2, Arterial 35.0 - 45.0 mm Hg 66.2 (H) >104.0 (C) 87.4 (C) 71.5 (C) 80.1 (C) 57.7 (H) 56.4 (H)   pO2, Arterial 83.0 - 108.0 mm Hg 54.4 (C) 57.0 (L) 54.4 (C) 62.4 (L) 65.2 (L) 58.6 (L) 76.4 (L)   HCO3, Arterial 20.0 - 26.0 mmol/L 41.5 (H) 42.3 (H) 41.7 (H) 42.1 (H) 46.1 (H) 40.5 (H) 36.5 (H)   Base Excess 0.0 - 2.0 mmol/L 13.9 (H) 10.4 (H) 11.5 (H) 14.0 (H) 17.1 (H) 14.0 (H) 9.7 (H)   O2 Saturation, Arterial 94.0 - 99.0 % 90.0 (L) 88.2 (L) 89.2 (L) 94.0 93.7 (L) 92.6 (L) 95.1   CO2 Content 22 - 33 mmol/L 43.5 (H) 45.6 (H) 44.3 (H) 44.3 (H) 48.5 (H) 42.2 (H) 38.3 (H)   A-a DO2 0.0 - 300.0 mmHg 89.2 126.5 191.1 200.8 92.6 97.3 104.4   Carboxyhemoglobin 0 - 5 % 1.7 2.0 1.6 1.6 1.8 1.9 1.1   Methemoglobin 0.00 - 3.00 % 0.00 <-0.10 (L) <-0.10 (L) 0.30 0.40 0.00 <-0.10 (L)   Oxyhemoglobin 94 - 99 % 88.5 (L) 87.1 (L) 88.2 (L) 92.2 (L) 91.6 (L) 90.8 (L) 94.3   Hematocrit, Blood Gas 38.0 - 51.0 % 38.3 38.6 38.1 36.7 (L) 36.6 (L) 39.7 45.5   Hemoglobin, Blood Gas 14 - 18 g/dL 12.5 (L) 12.6 (L) 12.4 (L) 12.0 (L) 11.9 (L) 12.9 (L) 14.8   Site  Right Brachial Left Brachial Right Radial Right Radial Left Radial Right Radial Right Brachial   Russell's Test  N/A N/A Positive Positive Positive Positive N/A   Modality  Nasal Cannula Nasal Cannula BiPap BiPap Nasal Cannula Nasal Cannula Nasal Cannula   FIO2 % 32 44 50 50 36 32 36   Flow Rate lpm 3.0 6.0   4.0 3.0 4.0   Ventilator Mode  NA NA NA NA NA NA NA   Set Mech Resp Rate     24.0 24.0      IPAP    24 24      EPAP    8 8      Barometric Pressure for Blood Gas mmHg 726 727 727 [phone]   Notified By  [id number]  CALVIN   Notified Time  11/26/2023 07:45 12/10/2023 22:10 12/11/2023 00:07 12/11/2023 03:23 12/11/2023 12:23     Notified Who  DR WES BROWN // RN DR BROWN // RN DR BROWN // RN DR UBALDO JACINTO // RN   (C): Data is critically low  (L): Data is abnormally low  (H): Data is abnormally high      -----------------------------------------------------------------------------------    Spirometry January 2024    FEV1/FVC was noted to 83%, but FEV1 was noted at 34%     -----------------------------------------------------------------------------------    CT angiogram chest imaging/report March 2024    IMPRESSION:  Impression:  1.  No CTA evidence of pulmonary embolism.  2.  Areas of chronic consolidation and chronic scarring involving the  lingula, right middle lobe, both lower lobes is unchanged.  No  superimposed acute infiltrates or effusions.     This report was finalized on 3/27/2024 12:15 AM by Hussain Crouch MD.      -----------------------------------------------------------------------------------        Assessment and Plan   Diagnoses and all orders for this visit:    1. Chronic bronchitis, unspecified chronic bronchitis type (Primary)  -     Blood Gas, Arterial -With Co-Ox Panel: Yes; Future    2. Restrictive lung disease    3. Chronic respiratory failure with hypoxia and hypercapnia  -     Blood Gas, Arterial -With Co-Ox Panel: Yes; Future    4. PATTY (obstructive sleep apnea)    5. Cigarette nicotine dependence in remission    6. Pulmonary hypertension          Restrictive lung disease   Previous spirometry negative for true obstruction.   Suggested more restriction  likely related to body habitus/abdominal distention, atelectasis/scarring noted on last CT, residual muscle weakness from previous stroke.   Could also be impacted by  volume overload from CHF -- following with general cardiology/HF Clinic  Encouraged exercises as tolerated (ex. Stationary bike), deep breathing exercises.   Patient notable for naturally shallow breathing       Chronic bronchitis:   Previously suspected, but had difficulty obtaining PFT to check for obstruction.  Last available spirometry did not reveal true obstruction, but may have been limited by restriction.   Alpha 1 genotype MS - previous serum level within normal limits.   Continue albuterol inhaler as needed  Continue Atrovent nebs as needed.   Previously prescribed breztri, but states that this makes him feel somewhat ill and has not been using it regularly.  Son notes that he can sometimes develop thrush  Will try switching to Bevespi, 2 puffs twice daily use.          Chronic hypoxic and hypercapnic respiratory failure, PATTY/OHS:   Compliant with continuous oxygen use of 3-4 L  Using 4 L with POC today.  Uses wheelchair for ambulation assistance.  Previously had NIPPV device, was starting to increase tolerance with the device use but it was later collected due to lower hours of use per insurance approval.  Tried and failed BiPAP during previous attempts.  Was unable to reobtain the machine upon last ABG.  Ordered new ABG that he can complete at his convenience, we will reorder NIPPV if ABG is qualifying.  Otherwise, has not required recent hospitalization for this issue.      Pulmonary hypertension  Was notable for pulmonary hypertension on echocardiogram from 2023, but not able to appropriately visualize on March 2024 echo.  Likely group 2, group 3 in the setting of underlying PATTY/OHS, CHF, restrictive pulmonary changes.         Cigarette nicotine dependence, in remission:   Former smoker.   Was able to quit smoking in 2023.   Notable for 57 year history of use with 2 ppd average.   Can consider next LDCT around March 2025 (CT images from March 2024 angiogram reviewed)        Follow Up   Return in about  3 months (around 2/5/2025), or if symptoms worsen or fail to improve, for Recheck.  Patient was given instructions and counseling regarding his condition or for health maintenance advice. Please see specific information pulled into the AVS if appropriate.

## 2024-11-06 ENCOUNTER — PATIENT ROUNDING (BHMG ONLY) (OUTPATIENT)
Dept: PULMONOLOGY | Facility: CLINIC | Age: 74
End: 2024-11-06
Payer: MEDICARE

## 2024-11-06 NOTE — PROGRESS NOTES
November 6, 2024    Hello, may I speak with Donaldo Roberts?    My name is Janene Yusuf      I am  with [facility]IC[facility]  [address]  [place] [address] [phone].    Before we get started may I verify your date of birth? 1950    I am calling to officially welcome you to our practice and ask about your recent visit. Is this a good time to talk? yes    Tell me about your visit with us. What things went well?  Pleased with everyone and the care I received.       We're always looking for ways to make our patients' experiences even better. Do you have recommendations on ways we may improve?  no    Overall were you satisfied with your first visit to our practice? yes       I appreciate you taking the time to speak with me today. Is there anything else I can do for you? no      Thank you, and have a great day.

## 2024-11-07 ENCOUNTER — OFFICE VISIT (OUTPATIENT)
Dept: CARDIOLOGY | Facility: CLINIC | Age: 74
End: 2024-11-07
Payer: MEDICARE

## 2024-11-07 ENCOUNTER — SPECIALTY PHARMACY (OUTPATIENT)
Dept: PHARMACY | Facility: HOSPITAL | Age: 74
End: 2024-11-07
Payer: MEDICARE

## 2024-11-07 VITALS
RESPIRATION RATE: 16 BRPM | HEART RATE: 78 BPM | OXYGEN SATURATION: 89 % | BODY MASS INDEX: 41.2 KG/M2 | HEIGHT: 69 IN | DIASTOLIC BLOOD PRESSURE: 59 MMHG | SYSTOLIC BLOOD PRESSURE: 100 MMHG

## 2024-11-07 DIAGNOSIS — E78.5 DYSLIPIDEMIA: Primary | ICD-10-CM

## 2024-11-07 DIAGNOSIS — I10 ESSENTIAL HYPERTENSION: ICD-10-CM

## 2024-11-07 DIAGNOSIS — I50.22 CHRONIC HFREF (HEART FAILURE WITH REDUCED EJECTION FRACTION): ICD-10-CM

## 2024-11-07 DIAGNOSIS — I65.21 CAROTID STENOSIS, ASYMPTOMATIC, RIGHT: ICD-10-CM

## 2024-11-07 DIAGNOSIS — Z95.0 PRESENCE OF CARDIAC PACEMAKER: ICD-10-CM

## 2024-11-07 NOTE — PROGRESS NOTES
Rx refill request sent for Entresto. Discussed with provider, Yakelin Stacy. Sent in refills to [facility] for shipping.     Thank you,    Amparo Lees. Ashley, PharmD  11/07/24  14:36 EST

## 2024-11-07 NOTE — PROGRESS NOTES
[facility]             RuthLARY Forbes James T, MD  Donaldo Roberts  1950  10/10/2024    Patient Active Problem List   Diagnosis    Benign prostatic hyperplasia with urinary obstruction    Herpesviral infection of penis    Shortness of breath    Carotid stenosis, asymptomatic, right    Essential hypertension    Dyslipidemia    Wenckebach block    Cigarette nicotine dependence in remission    PATTY (obstructive sleep apnea)    Presence of cardiac pacemaker    Severe sinus bradycardia    Chronic bronchitis    Cigarette nicotine dependence without complication    Hypoxia    Detrusor instability    Tobacco abuse    Respiratory failure    Physical debility    Chronic obstructive pulmonary disease    Hypercapnic respiratory failure    Pulmonary hypertension    SBO (small bowel obstruction)    Chronic HFrEF (heart failure with reduced ejection fraction)    Restrictive lung disease    Obesity hypoventilation syndrome       Duke Alexander MD:    Subjective     Chief Complaint   Patient presents with    Follow-up     4 mos    Med Management     List provided       HPI:     This is a 74 y.o. male with known past medical history of hypertension, hyperlipidemia, permanent pacemaker implantation 2021, coronary artery disease, history of stroke, carotid artery disease and tobacco abuse.       Donaldo Roberts presents today for routine cardiology follow up.  Patient reports he has been doing overall well since his last visit.  No recent hospitalizations.  Breathing is at baseline.  Follows with pulmonology who recently switched his inhalers around.  Wears oxygen chronically.  Denies any chest pain.  Reports compliance of medications.  Has follow-ups with CT surgery and heart failure clinic in the next few weeks.    Diagnostic Testing  Echocardiogram 11/2021: EF 66 to 70%  Carotid ultrasound 11/2021: High-grade stenosis in the internal carotid artery on the  right  Nuclear stress test 11/2021: No evidence of ischemia  24-hour Holter monitor 12/2021: Predominant normal sinus rhythm with episodes of sinus tachycardia and sinus bradycardia with bradycardic episodes about 12.4% with first-degree AV block and intermittent episodes of high-grade block, 7 beat run of ventricular tachycardia  Implantation of permanent dual-chamber pacemaker 12/2021  Cardiac event monitor 7/2022: Normal sinus rhythm with underlying atrial fibrillation  Echocardiogram 11/2023: EF 66 to 70% with severe pulmonary hypertension       All other systems were reviewed and were negative.    Patient Active Problem List   Diagnosis    Benign prostatic hyperplasia with urinary obstruction    Herpesviral infection of penis    Shortness of breath    Carotid stenosis, asymptomatic, right    Essential hypertension    Dyslipidemia    Wenckebach block    Cigarette nicotine dependence in remission    PATTY (obstructive sleep apnea)    Presence of cardiac pacemaker    Severe sinus bradycardia    Chronic bronchitis    Cigarette nicotine dependence without complication    Hypoxia    Detrusor instability    Tobacco abuse    Respiratory failure    Physical debility    Chronic obstructive pulmonary disease    Hypercapnic respiratory failure    Pulmonary hypertension    SBO (small bowel obstruction)    Chronic HFrEF (heart failure with reduced ejection fraction)    Restrictive lung disease    Obesity hypoventilation syndrome       family history includes Diabetes in his mother and sister; Heart disease in his mother; No Known Problems in his father; Stroke in his mother.     reports that he quit smoking about 13 months ago. His smoking use included cigarettes. He started smoking about 57 years ago. He has a 113.5 pack-year smoking history. He has been exposed to tobacco smoke. He has never used smokeless tobacco. He reports that he does not drink alcohol and does not use drugs.    Allergies   Allergen Reactions    Bee  Pollen Anaphylaxis    Levaquin [Levofloxacin] Hallucinations    Penicillins Hives     Mother told him that, has taken amoxicillin with no problems         Current Outpatient Medications:     albuterol sulfate  (90 Base) MCG/ACT inhaler, Inhale 2 puffs by mouth Every 4 (Four) Hours As Needed for Wheezing or Shortness of Air., Disp: 18 g, Rfl: 8    aspirin 81 MG EC tablet, Take 1 tablet by mouth Daily., Disp: , Rfl:     Budeson-Glycopyrrol-Formoterol (Breztri Aerosphere) 160-9-4.8 MCG/ACT aerosol inhaler, Inhale 2 puffs 2 (Two) Times a Day. Indications: Chronic Obstructive Lung Disease, Disp: 10.7 g, Rfl: 5    bumetanide (BUMEX) 2 MG tablet, Twice daily. If worsening swelling, shortness of breath or weight gain, give 2 tablets for 8 AM Dose., Disp: 75 tablet, Rfl: 2    clopidogrel (PLAVIX) 75 MG tablet, Take 1 tablet by mouth Daily., Disp: 30 tablet, Rfl: 11    dapagliflozin Propanediol 10 MG tablet, Take 1 tablet by mouth Daily., Disp: 30 tablet, Rfl: 6    donepezil (ARICEPT) 5 MG tablet, Take 1 tablet by mouth Daily., Disp: , Rfl:     famotidine (PEPCID) 20 MG tablet, Take 1 tablet by mouth Daily., Disp: , Rfl:     Glycopyrrolate-Formoterol (Bevespi Aerosphere) 9-4.8 MCG/ACT aerosol, Inhale 2 sprays 2 (Two) Times a Day., Disp: 10.7 g, Rfl: 8    ipratropium (ATROVENT) 0.02 % nebulizer solution, Take 2.5 mL by nebulization 4 (Four) Times a Day As Needed for Wheezing or Shortness of Air., Disp: 360 mL, Rfl: 3    metoprolol succinate XL (TOPROL-XL) 25 MG 24 hr tablet, Take 0.5 tablets by mouth every night at bedtime., Disp: 15 tablet, Rfl: 6    omeprazole (priLOSEC) 20 MG capsule, Take 1 capsule by mouth Daily., Disp: , Rfl:     polyethylene glycol (MIRALAX) 17 g packet, Take 17 g by mouth Daily., Disp: 30 packet, Rfl: 3    rosuvastatin (CRESTOR) 10 MG tablet, Take 1 tablet by mouth Daily., Disp: , Rfl:     sacubitril-valsartan (ENTRESTO) 49-51 MG tablet, Take 1 tablet by mouth 2 Times a Day., Disp: 60 tablet,  Rfl: 5    spironolactone (ALDACTONE) 25 MG tablet, Take 1 tablet by mouth Daily. Hold if top number of blood pressure is less than 110., Disp: 30 tablet, Rfl: 11      Physical Exam:  I have reviewed the patient's current vital signs as documented in the patient's EMR.   Vitals:    11/07/24 1325   BP: 100/59   Pulse: 78   Resp: 16   SpO2: (!) 89%     Body mass index is 41.2 kg/m².   There were no vitals filed for this visit.   Physical Exam  Constitutional:       General: He is not in acute distress.     Appearance: Normal appearance. He is well-developed.   HENT:      Head: Normocephalic and atraumatic.      Mouth/Throat:      Mouth: Mucous membranes are moist.   Eyes:      Extraocular Movements: Extraocular movements intact.      Pupils: Pupils are equal, round, and reactive to light.   Neck:      Vascular: No JVD.   Cardiovascular:      Rate and Rhythm: Normal rate and regular rhythm.      Heart sounds: Normal heart sounds. No murmur heard.     No S3 or S4 sounds.   Pulmonary:      Effort: Pulmonary effort is normal. No respiratory distress.      Breath sounds: Normal breath sounds. No wheezing.   Abdominal:      General: Bowel sounds are normal. There is no distension.      Palpations: Abdomen is soft. There is no hepatomegaly.      Tenderness: There is no abdominal tenderness.   Musculoskeletal:         General: Normal range of motion.      Cervical back: Normal range of motion and neck supple.   Skin:     General: Skin is warm and dry.      Coloration: Skin is not jaundiced or pale.   Neurological:      General: No focal deficit present.      Mental Status: He is alert and oriented to person, place, and time. Mental status is at baseline.   Psychiatric:         Mood and Affect: Mood normal.         Behavior: Behavior normal.         Thought Content: Thought content normal.         Judgment: Judgment normal.          DATA REVIEWED:     TTE/SIXTO:  Results for orders placed during the hospital encounter of  "03/26/24    Adult Transthoracic Echo Limited W/ Cont if Necessary Per Protocol    Interpretation Summary    Left ventricular systolic function is low normal. Calculated left ventricular EF = 49% Left ventricular ejection fraction appears to be 46 - 50%.    Left ventricular diastolic function was not assessed.      Laboratory evaluations:    Lab Results   Component Value Date    GLUCOSE 157 (H) 08/15/2024    BUN 16 08/15/2024    CREATININE 0.69 (L) 08/15/2024    EGFRIFNONA 93 12/11/2021    BCR 23.2 08/15/2024    K 4.3 08/15/2024    CO2 46.1 (H) 08/15/2024    CALCIUM 10.3 08/15/2024    ALBUMIN 4.3 03/28/2024    AST 16 03/28/2024    ALT 19 03/28/2024     Lab Results   Component Value Date    WBC 7.10 03/29/2024    HGB 13.2 03/29/2024    HCT 43.1 03/29/2024    MCV 99.3 (H) 03/29/2024     03/29/2024     Lab Results   Component Value Date    CHOL 112 03/27/2023    TRIG 175 (H) 03/27/2023    HDL 37 (L) 03/27/2023    LDL 46 03/27/2023     Lab Results   Component Value Date    TSH 0.573 12/11/2023     Lab Results   Component Value Date    HGBA1C 6.40 (H) 12/11/2023     Lab Results   Component Value Date    ALT 19 03/28/2024     Lab Results   Component Value Date    HGBA1C 6.40 (H) 12/11/2023     Lab Results   Component Value Date    CREATININE 0.69 (L) 08/15/2024     No results found for: \"IRON\", \"TIBC\", \"FERRITIN\"  Lab Results   Component Value Date    INR 0.82 (L) 12/08/2021    PROTIME 11.7 (L) 12/08/2021        No components found for: \"ABSOLUTELUNG\"      --------------------------------------------------------------------------------------------------------------------------    ASSESSMENT/PLAN:      Diagnosis Plan   1. Dyslipidemia  Lipid Panel    CBC (No Diff)      2. Essential hypertension  Comprehensive Metabolic Panel    CBC (No Diff)      3. Carotid stenosis, asymptomatic, right  CBC (No Diff)      4. Presence of cardiac pacemaker        5. Chronic HFrEF (heart failure with reduced ejection fraction)      "       HFmrEF  Recent echocardiogram showed EF of 46 to 50%.  Appears overall compensated today.  Continue with Bumex and metoprolol succinate along with Entresto and spironolactone.  Labs have been ordered and states he would like to have these obtained at heart failure clinic.  Does have follow-up appointment soon with them.    Pacemaker implantation  Recent interrogation showed 7-year battery life with no acute episodes and normal function.  Continue routine monitoring.    3.  Carotid artery disease  4.  Dyslipidemia  Follows with CT surgery who recommended yearly follow-up.  Has an upcoming appointment.  Continue aspirin and statin  Lipid panel ordered.  Follow-up on LDL.    This document has been @Electronically signed by LARY Kelley, 10/10/24, 9:13 AM EDT.       Dictated Utilizing Dragon Dictation: Part of this note may be an electronic transcription/translation of spoken language to printed text using the Dragon Dictation System.    Follow-up appointment and medication changes provided in hand delivered After Visit Summary as well as reviewed in the room.

## 2024-11-14 ENCOUNTER — HOSPITAL ENCOUNTER (OUTPATIENT)
Dept: CARDIOLOGY | Facility: HOSPITAL | Age: 74
Discharge: HOME | End: 2024-11-14
Admitting: PHYSICIAN ASSISTANT
Payer: MEDICARE

## 2024-11-14 VITALS
DIASTOLIC BLOOD PRESSURE: 58 MMHG | WEIGHT: 280 LBS | HEART RATE: 50 BPM | SYSTOLIC BLOOD PRESSURE: 118 MMHG | BODY MASS INDEX: 41.35 KG/M2 | OXYGEN SATURATION: 91 %

## 2024-11-14 DIAGNOSIS — J96.11 CHRONIC HYPOXEMIC RESPIRATORY FAILURE: ICD-10-CM

## 2024-11-14 DIAGNOSIS — G47.33 OSA (OBSTRUCTIVE SLEEP APNEA): ICD-10-CM

## 2024-11-14 DIAGNOSIS — I50.23 ACUTE ON CHRONIC HFREF (HEART FAILURE WITH REDUCED EJECTION FRACTION): Primary | ICD-10-CM

## 2024-11-14 LAB
ANION GAP SERPL CALCULATED.3IONS-SCNC: 10.7 MMOL/L (ref 5–15)
BUN SERPL-MCNC: 14 MG/DL (ref 8–23)
BUN/CREAT SERPL: 19.7 (ref 7–25)
CALCIUM SPEC-SCNC: 10 MG/DL (ref 8.6–10.5)
CHLORIDE SERPL-SCNC: 93 MMOL/L (ref 98–107)
CO2 SERPL-SCNC: 43.3 MMOL/L (ref 22–29)
CREAT SERPL-MCNC: 0.71 MG/DL (ref 0.76–1.27)
EGFRCR SERPLBLD CKD-EPI 2021: 97.5 ML/MIN/1.73
GLUCOSE SERPL-MCNC: 118 MG/DL (ref 65–99)
MAGNESIUM SERPL-MCNC: 1.8 MG/DL (ref 1.6–2.4)
NT-PROBNP SERPL-MCNC: 298.2 PG/ML (ref 0–900)
POTASSIUM SERPL-SCNC: 4 MMOL/L (ref 3.5–5.2)
SODIUM SERPL-SCNC: 147 MMOL/L (ref 136–145)

## 2024-11-14 PROCEDURE — 80048 BASIC METABOLIC PNL TOTAL CA: CPT | Performed by: PHYSICIAN ASSISTANT

## 2024-11-14 PROCEDURE — 36415 COLL VENOUS BLD VENIPUNCTURE: CPT | Performed by: PHYSICIAN ASSISTANT

## 2024-11-14 PROCEDURE — 83880 ASSAY OF NATRIURETIC PEPTIDE: CPT | Performed by: PHYSICIAN ASSISTANT

## 2024-11-14 PROCEDURE — 83735 ASSAY OF MAGNESIUM: CPT | Performed by: PHYSICIAN ASSISTANT

## 2024-11-14 RX ORDER — SPIRONOLACTONE 25 MG/1
50 TABLET ORAL DAILY
Qty: 60 TABLET | Refills: 2 | Status: SHIPPED | OUTPATIENT
Start: 2024-11-14 | End: 2025-02-12

## 2024-11-14 RX ORDER — BUMETANIDE 2 MG/1
TABLET ORAL
Qty: 75 TABLET | Refills: 2 | Status: SHIPPED | OUTPATIENT
Start: 2024-11-14

## 2024-11-14 NOTE — PROGRESS NOTES
[facility]  JUNIE Perdomo James T, MD  [address] [place] [address]    Thank you for asking me to see Donaldo Roberts for congestive heart failure.     HPI:     This is a 74 y.o. male with known past medical history of :    Chronic HFpEF  TTE from 03/27/24 with EF 46-50%  TTE 11/18/2023 with EF 66-70% with mild concentric hypertrophy and grade I diastolic dysfunction.  Severe pulmonary HTN present and RVSP >55mmHg.    PATTY, noncompliant with NIPPV  COPD  Chronic hypoxemic and hypercarbic respiratory failure (3LPM)  PPM 2/2 first degree AV block with intermittent high-grade block and 7 beats Vtach on holter monitor prior to admission  Hx Ischemic CVA  Carotid stenosis  left carotid endarterectomy  BRYSON followed by Dr. Becerra  GERD  Obesity  Tobacco abuse    Donaldo Roberts presents for today for Heart Failure clinic evaluation.  The patient is typically seen by Duke Rosen MD.  Patient's primary cardiologist is Dr. Melgar & Ruth BOSCH.     Last known EF 66-70%.   Last known hospitalization and/or ED visit: Patient hospitalized from December 10 through December 13, 2023 with acute on chronic hypoxic and hypercapnic respiratory failure secondary to COPD exacerbation with noted acute on chronic diastolic heart failure also impacting admission.  He received referral to clinic via Ruth Aguayo prior to his admission.  Accompanied by: Wife and sons          11/14/24 visit data/details regarding:   Dyspnea: Dyspnea with significant exertion  Lower extremity swelling: Bilateral lower extremity swelling present  Abdominal swelling: Ongoing abdominal distension  Home weight: Weight monitoring booklet provided during initial visit; Has scale.   Home BP: BP monitoring booklet provided during initial visit; Has BP cuff.   Home heart rate: HR monitoring booklet provided during initial visit; Has monitoring device  Daily activities of living: Performing  on her own  Pillows/lying flat: Hospital bed   HF zone: Yellow zone.   Mr. Roberts is chest pain-free.  His son accompanies him on today's visit.  He reports since getting his electric wheelchair he is significantly less mobile and does not elevate his legs. His son reports he does not ever elevate his legs and the majority of the time they hand down and are collecting fluid.     Specialists:   Cardiology: Dr. Melgar & Ruth Aguayo NP         Review of Systems - Review of Systems   Constitutional: Negative for decreased appetite, fever, malaise/fatigue and night sweats.   HENT:  Negative for congestion and ear pain.    Eyes:  Negative for blurred vision and double vision.   Cardiovascular:  Positive for dyspnea on exertion and leg swelling.   Respiratory:  Positive for shortness of breath. Negative for cough and hemoptysis.    Endocrine: Negative for cold intolerance and heat intolerance.   Hematologic/Lymphatic: Negative for adenopathy and bleeding problem.   Skin:  Negative for color change, dry skin and nail changes.   Musculoskeletal:  Negative for arthritis, back pain, falls and myalgias.   Gastrointestinal:  Negative for bloating and anorexia.   Genitourinary:  Negative for bladder incontinence, decreased libido and dysuria.   Neurological:  Negative for aphonia and difficulty with concentration.   Psychiatric/Behavioral:  Negative for altered mental status and hallucinations.         All other systems were reviewed and were negative.    Patient Active Problem List   Diagnosis    Benign prostatic hyperplasia with urinary obstruction    Herpesviral infection of penis    Shortness of breath    Carotid stenosis, asymptomatic, right    Essential hypertension    Dyslipidemia    Wenckebach block    Cigarette nicotine dependence in remission    PATTY (obstructive sleep apnea)    Presence of cardiac pacemaker    Severe sinus bradycardia    Chronic bronchitis    Cigarette nicotine dependence without complication     Hypoxia    Detrusor instability    Tobacco abuse    Respiratory failure    Physical debility    Chronic obstructive pulmonary disease    Hypercapnic respiratory failure    Pulmonary hypertension    SBO (small bowel obstruction)    Chronic HFrEF (heart failure with reduced ejection fraction)    Restrictive lung disease    Obesity hypoventilation syndrome       family history includes Diabetes in his mother and sister; Heart disease in his mother; No Known Problems in his father; Stroke in his mother.     reports that he quit smoking about 13 months ago. His smoking use included cigarettes. He started smoking about 57 years ago. He has a 113.5 pack-year smoking history. He has been exposed to tobacco smoke. He has never used smokeless tobacco. He reports that he does not drink alcohol and does not use drugs.    Allergies   Allergen Reactions    Bee Pollen Anaphylaxis    Levaquin [Levofloxacin] Hallucinations    Penicillins Hives     Mother told him that, has taken amoxicillin with no problems         Current Outpatient Medications:     albuterol sulfate  (90 Base) MCG/ACT inhaler, Inhale 2 puffs by mouth Every 4 (Four) Hours As Needed for Wheezing or Shortness of Air., Disp: 18 g, Rfl: 8    aspirin 81 MG EC tablet, Take 1 tablet by mouth Daily., Disp: , Rfl:     bumetanide (BUMEX) 2 MG tablet, Twice daily. If worsening swelling, shortness of breath or weight gain, give 2 tablets for 8 AM Dose., Disp: 75 tablet, Rfl: 2    clopidogrel (PLAVIX) 75 MG tablet, Take 1 tablet by mouth Daily., Disp: 30 tablet, Rfl: 11    dapagliflozin Propanediol 10 MG tablet, Take 1 tablet by mouth Daily., Disp: 30 tablet, Rfl: 6    donepezil (ARICEPT) 5 MG tablet, Take 1 tablet by mouth Daily., Disp: , Rfl:     famotidine (PEPCID) 20 MG tablet, Take 1 tablet by mouth Daily., Disp: , Rfl:     ipratropium (ATROVENT) 0.02 % nebulizer solution, Take 2.5 mL by nebulization 4 (Four) Times a Day As Needed for Wheezing or Shortness of Air.,  Disp: 360 mL, Rfl: 3    metoprolol succinate XL (TOPROL-XL) 25 MG 24 hr tablet, Take 0.5 tablets by mouth every night at bedtime., Disp: 15 tablet, Rfl: 6    omeprazole (priLOSEC) 20 MG capsule, Take 1 capsule by mouth Daily., Disp: , Rfl:     polyethylene glycol (MIRALAX) 17 g packet, Take 17 g by mouth Daily., Disp: 30 packet, Rfl: 3    rosuvastatin (CRESTOR) 10 MG tablet, Take 1 tablet by mouth Daily., Disp: , Rfl:     sacubitril-valsartan (ENTRESTO) 49-51 MG tablet, Take 1 tablet by mouth 2 Times a Day., Disp: 60 tablet, Rfl: 2    spironolactone (ALDACTONE) 25 MG tablet, Take 2 tablets by mouth Daily for 90 days. Hold if top number of blood pressure is less than 110., Disp: 60 tablet, Rfl: 2    bumetanide (BUMEX) 2 MG tablet, 2 pills in the morning and 2 in the afternoon for 5 days then back 1 in the morning and 1 in the afternoon., Disp: 75 tablet, Rfl: 2    influenza vac split high-dose (Fluzone High-Dose) 0.5 ML suspension prefilled syringe injection, Inject 0.5 mL into the appropriate muscle as directed by prescriber 1 time for 1 dose., Disp: 0.5 mL, Rfl: 0    tiotropium bromide-olodaterol (STIOLTO RESPIMAT) 2.5-2.5 MCG/ACT aerosol solution inhaler, Inhale 2 puffs Daily., Disp: 4 g, Rfl: 8      Physical Exam:  I have reviewed the patient's current vital signs as documented in the patient's EMR.   Vitals:    11/14/24 1057   BP: 118/58   Pulse: 50   SpO2: 91%           Body mass index is 41.35 kg/m².       11/14/24  1057   Weight: 127 kg (280 lb) (per patient)            Physical Exam  Vitals and nursing note reviewed.   Constitutional:       General: He is awake.      Appearance: He is morbidly obese.      Interventions: Nasal cannula in place.      Comments: 4L    HENT:      Head: Normocephalic and atraumatic.   Eyes:      General: Lids are normal.      Conjunctiva/sclera:      Right eye: Right conjunctiva is not injected.      Left eye: Left conjunctiva is not injected.   Cardiovascular:      Rate and  Rhythm: Normal rate and regular rhythm.   Pulmonary:      Effort: No tachypnea.      Breath sounds: Examination of the right-lower field reveals decreased breath sounds. Examination of the left-lower field reveals decreased breath sounds. No wheezing, rhonchi or rales.      Comments: Bilateral breath sounds clear to auscultation  Abdominal:      General: Bowel sounds are normal.      Palpations: Abdomen is soft.      Tenderness: There is no abdominal tenderness.   Musculoskeletal:      Right lower le+ Edema present.      Left lower le+ Edema present.   Skin:     General: Skin is warm and dry.   Neurological:      Mental Status: He is alert and oriented to person, place, and time.   Psychiatric:         Attention and Perception: Attention normal.         Mood and Affect: Mood normal.         Behavior: Behavior is cooperative.         JVP: Volume/Pulsation: Normal.        DATA REVIEWED:     EKG. I personally reviewed and interpreted the EKG.      ---------------------------------------------------  TTE/SIXTO:  Results for orders placed during the hospital encounter of 24    Adult Transthoracic Echo Limited W/ Cont if Necessary Per Protocol    Interpretation Summary    Left ventricular systolic function is low normal. Calculated left ventricular EF = 49% Left ventricular ejection fraction appears to be 46 - 50%.    Left ventricular diastolic function was not assessed.        LAST HEART CATH/IF AVAILABLE:     No results found for this or any previous visit.      -----------------------------------------------------  CXR/Imaging:   Imaging Results (Most Recent)       None            I personally reviewed and interpreted the CXR.      -----------------------------------------------------  CT:   No radiology results for the last 30 days.  I personally reviewed the images of the CT scan.  My personal interpretation is below.   "    ----------------------------------------------------    PFTs:        --------------------------------------------------------------------------------------------------    Laboratory evaluations:    Lab Results   Component Value Date    GLUCOSE 118 (H) 11/14/2024    BUN 14 11/14/2024    CREATININE 0.71 (L) 11/14/2024    EGFRIFNONA 93 12/11/2021    BCR 19.7 11/14/2024    K 4.0 11/14/2024    CO2 43.3 (H) 11/14/2024    CALCIUM 10.0 11/14/2024    ALBUMIN 4.3 03/28/2024    AST 16 03/28/2024    ALT 19 03/28/2024     Lab Results   Component Value Date    WBC 7.10 03/29/2024    HGB 13.2 03/29/2024    HCT 43.1 03/29/2024    MCV 99.3 (H) 03/29/2024     03/29/2024     Lab Results   Component Value Date    CHOL 112 03/27/2023    TRIG 175 (H) 03/27/2023    HDL 37 (L) 03/27/2023    LDL 46 03/27/2023     Lab Results   Component Value Date    TSH 0.573 12/11/2023     Lab Results   Component Value Date    HGBA1C 6.40 (H) 12/11/2023     Lab Results   Component Value Date    ALT 19 03/28/2024     Lab Results   Component Value Date    HGBA1C 6.40 (H) 12/11/2023     Lab Results   Component Value Date    CREATININE 0.71 (L) 11/14/2024     No results found for: \"IRON\", \"TIBC\", \"FERRITIN\"  Lab Results   Component Value Date    INR 0.82 (L) 12/08/2021    PROTIME 11.7 (L) 12/08/2021        Lab Results   Component Value Date    ABSOLUTELUNG 41 (A) 08/15/2024    ABSOLUTELUNG 42 (A) 07/15/2024    ABSOLUTELUNG 39 (A) 05/06/2024       PAH RISK ASSESSMENT:      1. Acute on chronic HFrEF (heart failure with reduced ejection fraction)          ORDERS PLACED TODAY:  Orders Placed This Encounter   Procedures    Basic Metabolic Panel    Magnesium    proBNP    Basic Metabolic Panel    Magnesium    proBNP        Diagnoses and all orders for this visit:    1. Acute on chronic HFrEF (heart failure with reduced ejection fraction) (Primary)  -     Basic Metabolic Panel; Future  -     Magnesium; Future  -     proBNP; Future  -     Basic Metabolic " Panel; Standing  -     Basic Metabolic Panel  -     Magnesium; Standing  -     Magnesium  -     proBNP; Standing  -     proBNP  -     Cancel: ReDs Vest    Other orders  -     spironolactone (ALDACTONE) 25 MG tablet; Take 2 tablets by mouth Daily for 90 days. Hold if top number of blood pressure is less than 110.  Dispense: 60 tablet; Refill: 2  -     bumetanide (BUMEX) 2 MG tablet; 2 pills in the morning and 2 in the afternoon for 5 days then back 1 in the morning and 1 in the afternoon.  Dispense: 75 tablet; Refill: 2             MEDS ORDERED TODAY:    New Medications Ordered This Visit   Medications    spironolactone (ALDACTONE) 25 MG tablet     Sig: Take 2 tablets by mouth Daily for 90 days. Hold if top number of blood pressure is less than 110.     Dispense:  60 tablet     Refill:  2    bumetanide (BUMEX) 2 MG tablet     Si pills in the morning and 2 in the afternoon for 5 days then back 1 in the morning and 1 in the afternoon.     Dispense:  75 tablet     Refill:  2        ---------------------------------------------------------------------------------------------------------------------------          ASSESSMENT/PLAN:      Diagnosis Plan   1. Acute on chronic HFrEF (heart failure with reduced ejection fraction)  Basic Metabolic Panel    Magnesium    proBNP    Basic Metabolic Panel    Basic Metabolic Panel    Magnesium    Magnesium    proBNP    proBNP          not acutely decompensated chronic HFmrEF    NYHA stage Stage C: Structural heart disease is present AND symptoms have occurredFC-Class III: Marked limitation of physical activity. Comfortable at rest. Less than ordinary activity causes fatigue, palpitation, or dyspnea.     Today, Patient is approaching euvolemiaand with  Moderate perfusion. The patient's hemodynamics are currently acceptable. HR is: normal and is at goal. BP/MAP was reviewed and there isroom for medication up-titration.  Clinical trajectory was assessed and hasworsened.     CHF GOAL  DIRECTED MEDICAL THERAPY FOR PATIENT ADDRESSED/ADJUSTED:     GDMT: HFpEF    Drug Class   Drug   Dose Last Dose Adjustment Notes   ACEi/ARB/ARNI Entresto  49-51 mg BID     Beta Blocker Toprol XL  12.5mg nightly      MRA Spironolactone 25mg      SGLT2i Farxiga 10mg   N/A   Secondaries if applicable:          -CHF Specific BB:   Transition Coreg to Toprol XL.   We discussed processes/benefits of HF clinic including nursing, pharmacist, and provider evaluation during each visit with ability for in office ReDS vest, labs, and ability to provide IV diuresis in the clinic with close outpatient monitoring.  Additionally, patient was educated about the availability of delivery of medications to patient's clinic room prior to leaving the building which assists with medication compliance and insures medications are in hands when changes are made (if patient opts for apothecary usage) with thorough guidance regarding changes and medication schedule provided.          -ACE/ARB/ARNi:   Continue Entresto as outlined above.   See below regarding stopping CCB.      -MRA:   The patient is FC-NYHA Class III and MRA is indicated.   Continue Spironolactone @ increased dose of 50mg qd.      -SGLT2 inhibitor therapy:   A BMP at initiation to verify GFR >30 was recommended, as was interval GFR surveillance.  The patient was advised to hold SGLT2I when PO intake is restricted due to a planned surgery, or due to an underlying illness.    Recommend starting Farxiga (dapagliflozin) 10mg daily with quarterly assessment of GFR.  Samples provided in office.   Pt was advised SEs, some severe, including hypersensitivity and Miller's; coupled with discussion regarding common side effects of UTIs and female genital mycotic infections were discussed. If you will be NPO, or are sick (poor PO intake, N&V) please hold the medication until you are back to a normal diet.     -Diuretic regimen:   ReDS Vest reading for. 11/14/24  could not be obtained;  suspect some chronic elevation of Reds vest score secondary to lung scarring ReDs Vest reading reviewed with patient.    Continue Bumex at dose of 4mg BID for 5 days then back down to 2mg BID.    BMP, Mag, & ProBNP reviewed with patient.      -Fluid restriction/Sodium restriction:   Requested 2000 ml restriction  Patient has been asked to weigh daily and was provided with a printed diuretic strategy.  1,500 mg Na restriction was discussed.        -Acute vs. Chronic underlying conditions other than HF addressed during visit:   Chronic hypoxemic respiratory failure:   COPD without exacerbation:   PATTY with NIPPV noncompliance:   Continue Pulm f/u.    Continue COPD clinic f/u.     Essential Hypertension:   Continue ARNi as outlined.      Carotid stenosis s/p prior L CEA  R carotid stenosis  Continue f/u with Dr. Becerra & crew.   Continue statin  &  crestor.     Identifiable barriers to Heart Failure Self-care:   Medical Barriers: Debility  Social Barriers:  No immediate known barriers.         CONSIDERATIONS:   ------------------------------------------------------------------  -Iron/Anemia in CHF:  December 2023 Hgb WNL.   ------------------------------------------------------------    -Sleep/Apnea with NIPPV noncompliance:   Noncompliant with NIPPV.   Continue Pulm f/u     --------------------------------------------------------------------------------        Class 2 Severe Obesity (BMI >=35 and <=39.9). Obesity-related health conditions include the following: obstructive sleep apnea, hypertension, coronary heart disease, diabetes mellitus, dyslipidemias, GERD, and osteoarthritis. Obesity is unchanged. BMI is is above average; BMI management plan is completed. We discussed portion control, increasing exercise, and Heart failure dietary management .              >45 minutes out of 60 minutes face to face spent counseling patient extensively on dietary Na+ intake, importance of activity, weight monitoring, compliance  with medications in addition to importance of titration with goal directed medical therapy and follow up appointments.            This document has been electronically signed by Yakelin Stacy PA-C  November 14, 2024 13:27 EST      Dictated Utilizing Dragon Dictation: Part of this note may be an electronic transcription/translation of spoken language to printed text using the Dragon Dictation System.    Follow-up appointment and medication changes provided in hand delivered After Visit Summary as well as reviewed in the room.    -+

## 2024-11-14 NOTE — PROGRESS NOTES
Heart Failure Clinic    Date: 11/14/24     Vitals:    11/14/24 1057   BP: 118/58   Pulse: 50   SpO2: 91%    Haley: 280lbs PP    Method of arrival: Wheelchair     Weighing self daily: Yes    Monitoring Heart Failure Zones: Yes    Today's HF Zone: Yellow     Taking medications as prescribed: Yes    Edema Yes    Shortness of Air: Yes    Number of pillows used at night:<2 Hospital Bed with elevated head    Educational Materials given:  AVS Given                                                                          ReDS Value: LQ x3        Susan Dumont RN 11/14/24 11:02 EST

## 2024-11-14 NOTE — PROGRESS NOTES
Heart Failure Clinic  Pharmacist Note     Donaldo Roberts is a 74 y.o. male seen in the Heart Failure Clinic for HFpEF with an EF of 66-70% on 11/18/23.      Donaldo Roberts reports a poor understanding of medications.  He is accompanied by his son today, and not his wife who is usually with him. They report adherence to his meds, as the son reports that his mom makes sure that he gets them. He reports that he is getting Bumex bid and she does give him an additional tablet on some days and she has done that recently. He reports swelling in his legs and feet and SOB.     They report that his BP at home is good. They report that he is not out of Farxiga or BUmex and has been taking it.     Patient's son reports that he has not been using his Breztri or the new inhaler that PULM just put him on. He also reports that he has not been using his nebulizer or rescue inhalers like he should.     Medication Use:   Hx of med intolerances:  None related to HF  Retail Rx Management: [facility] [place]; [place] for Farxiga and Entresto- ship to patient    Past Medical History:   Diagnosis Date    Acid reflux     Anxiety     Arthritis     Carotid artery stenosis, symptomatic, right 11/23/2021    Chronic obstructive pulmonary disease 12/5/2023    Essential hypertension 11/23/2021    Herpes     History of lipoma     History of transfusion     Hyperlipidemia     Inguinal hernia     Peptic ulcer     Stroke      ALLERGIES: Bee pollen, Levaquin [levofloxacin], and Penicillins  Current Outpatient Medications   Medication Sig Dispense Refill    albuterol sulfate  (90 Base) MCG/ACT inhaler Inhale 2 puffs by mouth Every 4 (Four) Hours As Needed for Wheezing or Shortness of Air. 18 g 8    aspirin 81 MG EC tablet Take 1 tablet by mouth Daily.      bumetanide (BUMEX) 2 MG tablet Twice daily. If worsening swelling, shortness of breath or weight gain, give 2 tablets for 8 AM Dose. 75 tablet 2    clopidogrel (PLAVIX) 75 MG tablet Take 1  tablet by mouth Daily. 30 tablet 11    dapagliflozin Propanediol 10 MG tablet Take 1 tablet by mouth Daily. 30 tablet 6    donepezil (ARICEPT) 5 MG tablet Take 1 tablet by mouth Daily.      famotidine (PEPCID) 20 MG tablet Take 1 tablet by mouth Daily.      ipratropium (ATROVENT) 0.02 % nebulizer solution Take 2.5 mL by nebulization 4 (Four) Times a Day As Needed for Wheezing or Shortness of Air. 360 mL 3    metoprolol succinate XL (TOPROL-XL) 25 MG 24 hr tablet Take 0.5 tablets by mouth every night at bedtime. 15 tablet 6    omeprazole (priLOSEC) 20 MG capsule Take 1 capsule by mouth Daily.      polyethylene glycol (MIRALAX) 17 g packet Take 17 g by mouth Daily. 30 packet 3    rosuvastatin (CRESTOR) 10 MG tablet Take 1 tablet by mouth Daily.      sacubitril-valsartan (ENTRESTO) 49-51 MG tablet Take 1 tablet by mouth 2 Times a Day. 60 tablet 2    spironolactone (ALDACTONE) 25 MG tablet Take 2 tablets by mouth Daily for 90 days. Hold if top number of blood pressure is less than 110. 60 tablet 2    Budeson-Glycopyrrol-Formoterol (Breztri Aerosphere) 160-9-4.8 MCG/ACT aerosol inhaler Inhale 2 puffs 2 (Two) Times a Day. Indications: Chronic Obstructive Lung Disease (Patient not taking: Reported on 11/14/2024) 10.7 g 5    bumetanide (BUMEX) 2 MG tablet 2 pills in the morning and 2 in the afternoon for 5 days then back 1 in the morning and 1 in the afternoon. 75 tablet 2    Glycopyrrolate-Formoterol (Bevespi Aerosphere) 9-4.8 MCG/ACT aerosol Inhale 2 sprays 2 (Two) Times a Day. (Patient not taking: Reported on 11/14/2024) 10.7 g 8    influenza vac split high-dose (Fluzone High-Dose) 0.5 ML suspension prefilled syringe injection Inject 0.5 mL into the appropriate muscle as directed by prescriber 1 time for 1 dose. 0.5 mL 0     No current facility-administered medications for this encounter.       Vaccination History:   Pneumonia: UTD 2/5/24  Annual Influenza: UTD 24/25  Shingles: 1st 5/6/24 in COPD; 2nd 8/15/24 now  UTD  Covid: UTD 2/5/24    Objective  Vitals:    11/14/24 1057   BP: 118/58   BP Location: Left arm   Patient Position: Sitting   Cuff Size: Adult   Pulse: 50   SpO2: 91%   Weight: 127 kg (280 lb)             Wt Readings from Last 3 Encounters:   11/14/24 127 kg (280 lb)   11/05/24 127 kg (279 lb)   08/15/24 124 kg (273 lb)         11/14/24  1057   Weight: 127 kg (280 lb) (per patient)             Lab Results   Component Value Date    GLUCOSE 118 (H) 11/14/2024    BUN 14 11/14/2024    CREATININE 0.71 (L) 11/14/2024    EGFRIFNONA 93 12/11/2021    BCR 19.7 11/14/2024    K 4.0 11/14/2024    CO2 43.3 (H) 11/14/2024    CALCIUM 10.0 11/14/2024    ALBUMIN 4.3 03/28/2024    AST 16 03/28/2024    ALT 19 03/28/2024     Lab Results   Component Value Date    WBC 7.10 03/29/2024    HGB 13.2 03/29/2024    HCT 43.1 03/29/2024    MCV 99.3 (H) 03/29/2024     03/29/2024     Lab Results   Component Value Date    TROPONINT 33 (H) 03/27/2024     Lab Results   Component Value Date    PROBNP 298.2 11/14/2024     Results for orders placed during the hospital encounter of 03/26/24    Adult Transthoracic Echo Limited W/ Cont if Necessary Per Protocol    Interpretation Summary    Left ventricular systolic function is low normal. Calculated left ventricular EF = 49% Left ventricular ejection fraction appears to be 46 - 50%.    Left ventricular diastolic function was not assessed.         GDMT    Drug Class   Drug   Dose Last Dose Adjustment Additional Titration   Notes   ACEi/ARB/ARNI Entresto 49/51mg 2/5/24     Beta Blocker Toprol 12.5mg 5/6/24     MRA Spironolactone 50mg Increased 11/14/24     SGLT2i Farxiga 10mg 1/25/24     Bumex 2mg bid      Drug Therapy Problems    1. Edema  2. IMZ- wants flu shot   3. [facility]- Bumex and Farxiga had not been recently filled  4. COPD meds      Recommendations:     Nurse had trouble getting blood for labs, therefore would have had trouble with IV access. Yakelin to prescribe Bumex 4mg bid for the next 5  days, then decrease back down to 2mg bid. Yakelin to also increase Spironolactone to 50mg daily.    Stephy Roblero gave to the patient.   Patient and son report that he has been on them and report no issues with adherence.   Bevespi was not covered after running a test claim. I found that Stiolto is. I messaged Beverly Estrada to make her aware and she is going to send it in. I will call and make the patient aware to start using.       Patient was educated on heart failure medications and the importance of medication adherence. All questions were addressed and patient expressed understanding. Used teach-back method to assess understanding.     Thank you for allowing me to participate in the care of your patient,    Comfort Modi, PharmD  11/14/24  12:41 EST

## 2024-11-14 NOTE — PROGRESS NOTES
Pharmacy during CHF clinic today noted that Bevespi was not covered.   Had to discontinue breztri previously due to complication with use.    Insurance will cover Stiolto.  Order placed, pharmacist will notify him.    Contacted the pharmacy and canceled breztri and Bevespi.

## 2024-11-15 RX ORDER — DAPAGLIFLOZIN 10 MG/1
10 TABLET, FILM COATED ORAL DAILY
Qty: 30 TABLET | Refills: 6 | Status: SHIPPED | OUTPATIENT
Start: 2024-11-15 | End: 2024-12-18

## 2024-11-18 ENCOUNTER — TELEPHONE (OUTPATIENT)
Dept: CARDIAC SURGERY | Facility: CLINIC | Age: 74
End: 2024-11-18
Payer: MEDICARE

## 2024-11-18 DIAGNOSIS — I65.21 CAROTID STENOSIS, ASYMPTOMATIC, RIGHT: Primary | ICD-10-CM

## 2024-12-06 ENCOUNTER — HOSPITAL ENCOUNTER (OUTPATIENT)
Dept: CARDIOLOGY | Facility: HOSPITAL | Age: 74
Discharge: HOME | End: 2024-12-06
Payer: MEDICARE

## 2024-12-06 VITALS
SYSTOLIC BLOOD PRESSURE: 120 MMHG | DIASTOLIC BLOOD PRESSURE: 51 MMHG | BODY MASS INDEX: 39.99 KG/M2 | HEART RATE: 40 BPM | OXYGEN SATURATION: 94 % | HEIGHT: 69 IN | WEIGHT: 270 LBS

## 2024-12-06 DIAGNOSIS — S81.802A OPEN WOUND OF BOTH LOWER EXTREMITIES WITH COMPLICATION, INITIAL ENCOUNTER: ICD-10-CM

## 2024-12-06 DIAGNOSIS — I50.22 CHRONIC HFREF (HEART FAILURE WITH REDUCED EJECTION FRACTION): Primary | ICD-10-CM

## 2024-12-06 DIAGNOSIS — S81.801A OPEN WOUND OF BOTH LOWER EXTREMITIES WITH COMPLICATION, INITIAL ENCOUNTER: ICD-10-CM

## 2024-12-06 PROCEDURE — 93005 ELECTROCARDIOGRAM TRACING: CPT | Performed by: PHYSICIAN ASSISTANT

## 2024-12-06 RX ORDER — MUPIROCIN 20 MG/G
1 OINTMENT TOPICAL 2 TIMES DAILY
COMMUNITY
Start: 2024-12-02

## 2024-12-06 NOTE — PROGRESS NOTES
Heart Failure Clinic    Date: 12/06/24     Vitals:    12/06/24 1101   BP: 120/51   Pulse: (!) 40   SpO2: 94%    Weight 270    Method of arrival: Other wheelchair    Weighing self daily: Yes    Monitoring Heart Failure Zones: Yes    Today's HF Zone: Yellow     Taking medications as prescribed: Yes    Edema Yes    Shortness of Air: Yes    Number of pillows used at night:Recliner and adjustable bed    Educational Materials given:  AVS                                                                         ReDS Value: lqx3        Maria Luisa Rich RN 12/06/24 11:08 EST

## 2024-12-06 NOTE — PROGRESS NOTES
Heart Failure Clinic  Pharmacist Note     Donaldo Roberts is a 74 y.o. male seen in the Heart Failure Clinic for HFpEF with an EF of 66-70% on 11/18/23.      Donaldo Roberts reports a poor understanding of medications.  He is accompanied by his son today, and not his wife who is usually with him. They report adherence to his meds, as the son reports that his mom makes sure that he gets them. He reports swelling in his legs and feet and SOB. Patient reports he has been getting Bumex 2mg BID. Patient's son reports that the patient has leg infection and has started taking mupirocin ointment earlier this week.     Patient brought in his blood pressure log today. His SBP has ranged this week from 110s-140s and HR ranges from 60s-70s. His BP today was 120/51 and HR 40.     Patient's son reports that he has just started Stiolto this week.. He also reports that he has not been using his nebulizer like he should.     Medication Use:   Hx of med intolerances:  None related to HF  Retail Rx Management: [facility] [place]; [place] for Farxiga and Entresto- ship to patient    Past Medical History:   Diagnosis Date    Acid reflux     Anxiety     Arthritis     Carotid artery stenosis, symptomatic, right 11/23/2021    Chronic obstructive pulmonary disease 12/5/2023    Essential hypertension 11/23/2021    Herpes     History of lipoma     History of transfusion     Hyperlipidemia     Inguinal hernia     Peptic ulcer     Stroke      ALLERGIES: Bee pollen, Levaquin [levofloxacin], and Penicillins  Current Outpatient Medications   Medication Sig Dispense Refill    albuterol sulfate  (90 Base) MCG/ACT inhaler Inhale 2 puffs by mouth Every 4 (Four) Hours As Needed for Wheezing or Shortness of Air. 18 g 8    aspirin 81 MG EC tablet Take 1 tablet by mouth Daily.      bumetanide (BUMEX) 2 MG tablet Twice daily. If worsening swelling, shortness of breath or weight gain, give 2 tablets for 8 AM Dose. 75 tablet 2    bumetanide (BUMEX)  2 MG tablet 2 pills in the morning and 2 in the afternoon for 5 days then back 1 in the morning and 1 in the afternoon. 75 tablet 2    clopidogrel (PLAVIX) 75 MG tablet Take 1 tablet by mouth Daily. 30 tablet 11    dapagliflozin Propanediol 10 MG tablet Take 1 tablet by mouth Daily. 30 tablet 6    donepezil (ARICEPT) 5 MG tablet Take 1 tablet by mouth Daily.      famotidine (PEPCID) 20 MG tablet Take 1 tablet by mouth Daily.      ipratropium (ATROVENT) 0.02 % nebulizer solution Take 2.5 mL by nebulization 4 (Four) Times a Day As Needed for Wheezing or Shortness of Air. 360 mL 3    metoprolol succinate XL (TOPROL-XL) 25 MG 24 hr tablet Take 0.5 tablets by mouth every night at bedtime. 15 tablet 6    omeprazole (priLOSEC) 20 MG capsule Take 1 capsule by mouth Daily.      polyethylene glycol (MIRALAX) 17 g packet Take 17 g by mouth Daily. 30 packet 3    rosuvastatin (CRESTOR) 10 MG tablet Take 1 tablet by mouth Daily.      sacubitril-valsartan (ENTRESTO) 49-51 MG tablet Take 1 tablet by mouth 2 Times a Day. 60 tablet 2    spironolactone (ALDACTONE) 25 MG tablet Take 2 tablets by mouth Daily for 90 days. Hold if top number of blood pressure is less than 110. 60 tablet 2    tiotropium bromide-olodaterol (STIOLTO RESPIMAT) 2.5-2.5 MCG/ACT aerosol solution inhaler Inhale 2 puffs Daily. 4 g 8     No current facility-administered medications for this encounter.       Vaccination History:   Pneumonia: UTD 2/5/24  Annual Influenza: UTD 24/25  Shingles: 1st 5/6/24 in COPD; 2nd 8/15/24 now UTD  Covid: UTD 2/5/24    Objective  There were no vitals filed for this visit.            Wt Readings from Last 3 Encounters:   11/14/24 127 kg (280 lb)   11/05/24 127 kg (279 lb)   08/15/24 124 kg (273 lb)     There were no vitals filed for this visit.            Lab Results   Component Value Date    GLUCOSE 118 (H) 11/14/2024    BUN 14 11/14/2024    CREATININE 0.71 (L) 11/14/2024    EGFRIFNONA 93 12/11/2021    BCR 19.7 11/14/2024    K 4.0  11/14/2024    CO2 43.3 (H) 11/14/2024    CALCIUM 10.0 11/14/2024    ALBUMIN 4.3 03/28/2024    AST 16 03/28/2024    ALT 19 03/28/2024     Lab Results   Component Value Date    WBC 7.10 03/29/2024    HGB 13.2 03/29/2024    HCT 43.1 03/29/2024    MCV 99.3 (H) 03/29/2024     03/29/2024     Lab Results   Component Value Date    TROPONINT 33 (H) 03/27/2024     Lab Results   Component Value Date    PROBNP 298.2 11/14/2024     Results for orders placed during the hospital encounter of 03/26/24    Adult Transthoracic Echo Limited W/ Cont if Necessary Per Protocol    Interpretation Summary    Left ventricular systolic function is low normal. Calculated left ventricular EF = 49% Left ventricular ejection fraction appears to be 46 - 50%.    Left ventricular diastolic function was not assessed.         GDMT    Drug Class   Drug   Dose Last Dose Adjustment Additional Titration   Notes   ACEi/ARB/ARNI Entresto 49/51mg 2/5/24     Beta Blocker Toprol 12.5mg 5/6/24     MRA Spironolactone 50mg Increased 11/14/24     SGLT2i Farxiga 10mg 1/25/24     Bumex 2mg bid      Drug Therapy Problems    1. Low HR  2. Leg Infection  3. Open wounds with leg  infection        Recommendations:     Yakelin will stop Metoprolol and continue to monitor  Yakelin will refer to Wound Care   Yakelin will stop Farxiga         Patient was educated on heart failure medications and the importance of medication adherence. All questions were addressed and patient expressed understanding. Used teach-back method to assess understanding.     Thank you for allowing me to participate in the care of your patient,    Stephy Osbaldo, [facility]  12/06/24  10:51 EST

## 2024-12-06 NOTE — PROGRESS NOTES
[facility]  JUNIE Perdomo James T, MD  [address] [place] [address]    Thank you for asking me to see Donaldo Roberts for congestive heart failure.     HPI:     This is a 74 y.o. male with known past medical history of :    Chronic HFpEF  TTE from 03/27/24 with EF 46-50%  TTE 11/18/2023 with EF 66-70% with mild concentric hypertrophy and grade I diastolic dysfunction.  Severe pulmonary HTN present and RVSP >55mmHg.    PATTY, noncompliant with NIPPV  COPD  Chronic hypoxemic and hypercarbic respiratory failure (3LPM)  PPM 2/2 first degree AV block with intermittent high-grade block and 7 beats Vtach on holter monitor prior to admission  Hx Ischemic CVA  Carotid stenosis  left carotid endarterectomy  BRYSON followed by Dr. Becerra  GERD  Obesity  Tobacco abuse    Donaldo Roberts presents for today for Heart Failure clinic evaluation.  The patient is typically seen by Duke Rosen MD.  Patient's primary cardiologist is Dr. Melgar & Ruth BOSCH.     Last known EF 66-70%.   Last known hospitalization and/or ED visit: Patient hospitalized from December 10 through December 13, 2023 with acute on chronic hypoxic and hypercapnic respiratory failure secondary to COPD exacerbation with noted acute on chronic diastolic heart failure also impacting admission.  He received referral to clinic via Ruth Aguayo prior to his admission.  Accompanied by: Wife and sons          12/06/24 visit data/details regarding:   Dyspnea: Dyspnea with significant exertion  Lower extremity swelling: Bilateral lower extremity swelling present  Abdominal swelling: Ongoing abdominal distension  Home weight: Weight monitoring booklet provided during initial visit; Has scale.   Home BP: BP monitoring booklet provided during initial visit; Has BP cuff.   Home heart rate: HR monitoring booklet provided during initial visit; Has monitoring device  Daily activities of living: Performing  on her own  Pillows/lying flat: Hospital bed   HF zone: Yellow zone.   Mr. Roberts is chest pain-free.  His son accompanies him on today's visit.  He reports since getting his electric wheelchair he is significantly less mobile and does not elevate his legs.   Patient has some wounds on bilateral legs with son expressing concern.  They report they have not yet talk to PCP about it.  We discussed wound care referral.  Patient and son are agreeable.    Specialists:   Cardiology: Dr. Melgar & Ruth Aguayo NP         Review of Systems - Review of Systems   Constitutional: Negative for decreased appetite, fever, malaise/fatigue and night sweats.   HENT:  Negative for congestion and ear pain.    Eyes:  Negative for blurred vision and double vision.   Cardiovascular:  Positive for dyspnea on exertion and leg swelling.   Respiratory:  Positive for shortness of breath. Negative for cough and hemoptysis.    Endocrine: Negative for cold intolerance and heat intolerance.   Hematologic/Lymphatic: Negative for adenopathy and bleeding problem.   Skin:  Negative for color change, dry skin and nail changes.   Musculoskeletal:  Negative for arthritis, back pain, falls and myalgias.   Gastrointestinal:  Negative for bloating and anorexia.   Genitourinary:  Negative for bladder incontinence, decreased libido and dysuria.   Neurological:  Negative for aphonia and difficulty with concentration.   Psychiatric/Behavioral:  Negative for altered mental status and hallucinations.         All other systems were reviewed and were negative.    Patient Active Problem List   Diagnosis    Benign prostatic hyperplasia with urinary obstruction    Herpesviral infection of penis    Shortness of breath    Carotid stenosis, asymptomatic, right    Essential hypertension    Dyslipidemia    Wenckebach block    Cigarette nicotine dependence in remission    PATTY (obstructive sleep apnea)    Presence of cardiac pacemaker    Severe sinus bradycardia    Chronic  bronchitis    Cigarette nicotine dependence without complication    Hypoxia    Detrusor instability    Tobacco abuse    Respiratory failure    Physical debility    Chronic obstructive pulmonary disease    Hypercapnic respiratory failure    Pulmonary hypertension    SBO (small bowel obstruction)    Chronic HFrEF (heart failure with reduced ejection fraction)    Restrictive lung disease    Obesity hypoventilation syndrome       family history includes Diabetes in his mother and sister; Heart disease in his mother; No Known Problems in his father; Stroke in his mother.     reports that he quit smoking about 14 months ago. His smoking use included cigarettes. He started smoking about 57 years ago. He has a 113.5 pack-year smoking history. He has been exposed to tobacco smoke. He has never used smokeless tobacco. He reports that he does not drink alcohol and does not use drugs.    Allergies   Allergen Reactions    Bee Pollen Anaphylaxis    Levaquin [Levofloxacin] Hallucinations    Penicillins Hives     Mother told him that, has taken amoxicillin with no problems         Current Outpatient Medications:     albuterol sulfate  (90 Base) MCG/ACT inhaler, Inhale 2 puffs by mouth Every 4 (Four) Hours As Needed for Wheezing or Shortness of Air., Disp: 18 g, Rfl: 8    aspirin 81 MG EC tablet, Take 1 tablet by mouth Daily., Disp: , Rfl:     bumetanide (BUMEX) 2 MG tablet, Twice daily. If worsening swelling, shortness of breath or weight gain, give 2 tablets for 8 AM Dose., Disp: 75 tablet, Rfl: 2    clopidogrel (PLAVIX) 75 MG tablet, Take 1 tablet by mouth Daily., Disp: 30 tablet, Rfl: 11    donepezil (ARICEPT) 5 MG tablet, Take 1 tablet by mouth Daily., Disp: , Rfl:     famotidine (PEPCID) 20 MG tablet, Take 1 tablet by mouth Daily., Disp: , Rfl:     ipratropium (ATROVENT) 0.02 % nebulizer solution, Take 2.5 mL by nebulization 4 (Four) Times a Day As Needed for Wheezing or Shortness of Air., Disp: 360 mL, Rfl: 3     mupirocin (BACTROBAN) 2 % ointment, Apply 1 Application topically to the appropriate area as directed 2 (Two) Times a Day., Disp: , Rfl:     omeprazole (priLOSEC) 20 MG capsule, Take 1 capsule by mouth Daily., Disp: , Rfl:     polyethylene glycol (MIRALAX) 17 g packet, Take 17 g by mouth Daily., Disp: 30 packet, Rfl: 3    rosuvastatin (CRESTOR) 10 MG tablet, Take 1 tablet by mouth Daily., Disp: , Rfl:     sacubitril-valsartan (ENTRESTO) 49-51 MG tablet, Take 1 tablet by mouth 2 Times a Day., Disp: 60 tablet, Rfl: 2    spironolactone (ALDACTONE) 25 MG tablet, Take 2 tablets by mouth Daily for 90 days. Hold if top number of blood pressure is less than 110., Disp: 60 tablet, Rfl: 2    tiotropium bromide-olodaterol (STIOLTO RESPIMAT) 2.5-2.5 MCG/ACT aerosol solution inhaler, Inhale 2 puffs Daily., Disp: 4 g, Rfl: 8    bumetanide (BUMEX) 2 MG tablet, 2 pills in the morning and 2 in the afternoon for 5 days then back 1 in the morning and 1 in the afternoon., Disp: 75 tablet, Rfl: 2      Physical Exam:  I have reviewed the patient's current vital signs as documented in the patient's EMR.   Vitals:    12/06/24 1101   BP: 120/51   Pulse: (!) 40   SpO2: 94%           Body mass index is 39.87 kg/m².       12/06/24  1101   Weight: 122 kg (270 lb)            Physical Exam  Vitals and nursing note reviewed.   Constitutional:       General: He is awake.      Appearance: He is morbidly obese.      Interventions: Nasal cannula in place.      Comments: 4L    HENT:      Head: Normocephalic and atraumatic.   Eyes:      General: Lids are normal.      Conjunctiva/sclera:      Right eye: Right conjunctiva is not injected.      Left eye: Left conjunctiva is not injected.   Cardiovascular:      Rate and Rhythm: Normal rate and regular rhythm.   Pulmonary:      Effort: No tachypnea.      Breath sounds: Examination of the right-lower field reveals decreased breath sounds. Examination of the left-lower field reveals decreased breath sounds. No  wheezing, rhonchi or rales.      Comments: Bilateral breath sounds clear to auscultation  Abdominal:      General: Bowel sounds are normal.      Palpations: Abdomen is soft.      Tenderness: There is no abdominal tenderness.   Musculoskeletal:      Right lower le+ Edema present.      Left lower le+ Edema present.   Skin:     General: Skin is warm and dry.      Comments: Bilateral legs are wrapped by his wife this morning.  No visible purulence or drainage at the time of evaluation.   Neurological:      Mental Status: He is alert and oriented to person, place, and time.   Psychiatric:         Attention and Perception: Attention normal.         Mood and Affect: Mood normal.         Behavior: Behavior is cooperative.         JVP: Volume/Pulsation: Normal.        DATA REVIEWED:     EKG. I personally reviewed and interpreted the EKG.      ---------------------------------------------------  TTE/SIXTO:  Results for orders placed during the hospital encounter of 24    Adult Transthoracic Echo Limited W/ Cont if Necessary Per Protocol    Interpretation Summary    Left ventricular systolic function is low normal. Calculated left ventricular EF = 49% Left ventricular ejection fraction appears to be 46 - 50%.    Left ventricular diastolic function was not assessed.        LAST HEART CATH/IF AVAILABLE:     No results found for this or any previous visit.      -----------------------------------------------------  CXR/Imaging:   Imaging Results (Most Recent)       None            I personally reviewed and interpreted the CXR.      -----------------------------------------------------  CT:   No radiology results for the last 30 days.  I personally reviewed the images of the CT scan.  My personal interpretation is below.      ----------------------------------------------------    PFTs:        --------------------------------------------------------------------------------------------------    Laboratory  "evaluations:    Lab Results   Component Value Date    GLUCOSE 118 (H) 11/14/2024    BUN 14 11/14/2024    CREATININE 0.71 (L) 11/14/2024    EGFRIFNONA 93 12/11/2021    BCR 19.7 11/14/2024    K 4.0 11/14/2024    CO2 43.3 (H) 11/14/2024    CALCIUM 10.0 11/14/2024    ALBUMIN 4.3 03/28/2024    AST 16 03/28/2024    ALT 19 03/28/2024     Lab Results   Component Value Date    WBC 7.10 03/29/2024    HGB 13.2 03/29/2024    HCT 43.1 03/29/2024    MCV 99.3 (H) 03/29/2024     03/29/2024     Lab Results   Component Value Date    CHOL 112 03/27/2023    TRIG 175 (H) 03/27/2023    HDL 37 (L) 03/27/2023    LDL 46 03/27/2023     Lab Results   Component Value Date    TSH 0.573 12/11/2023     Lab Results   Component Value Date    HGBA1C 6.40 (H) 12/11/2023     Lab Results   Component Value Date    ALT 19 03/28/2024     Lab Results   Component Value Date    HGBA1C 6.40 (H) 12/11/2023     Lab Results   Component Value Date    CREATININE 0.71 (L) 11/14/2024     No results found for: \"IRON\", \"TIBC\", \"FERRITIN\"  Lab Results   Component Value Date    INR 0.82 (L) 12/08/2021    PROTIME 11.7 (L) 12/08/2021        Lab Results   Component Value Date    ABSOLUTELUNG 41 (A) 08/15/2024    ABSOLUTELUNG 42 (A) 07/15/2024    ABSOLUTELUNG 39 (A) 05/06/2024       PAH RISK ASSESSMENT:      1. Chronic HFrEF (heart failure with reduced ejection fraction)    2. Open wound of both lower extremities with complication, initial encounter          ORDERS PLACED TODAY:  Orders Placed This Encounter   Procedures    Ambulatory Referral to Wound Clinic    ECG 12 Lead Bradycardia        Diagnoses and all orders for this visit:    1. Chronic HFrEF (heart failure with reduced ejection fraction) (Primary)  -     Cancel: ReDs Vest    2. Open wound of both lower extremities with complication, initial encounter  -     Ambulatory Referral to Wound Clinic    Other orders  -     ECG 12 Lead Bradycardia; Standing  -     ECG 12 Lead Bradycardia             MEDS ORDERED " TODAY:    No orders of the defined types were placed in this encounter.       ---------------------------------------------------------------------------------------------------------------------------          ASSESSMENT/PLAN:      Diagnosis Plan   1. Chronic HFrEF (heart failure with reduced ejection fraction)        2. Open wound of both lower extremities with complication, initial encounter  Ambulatory Referral to Wound Clinic          not acutely decompensated chronic HFmrEF    NYHA stage Stage C: Structural heart disease is present AND symptoms have occurredFC-Class III: Marked limitation of physical activity. Comfortable at rest. Less than ordinary activity causes fatigue, palpitation, or dyspnea.     Today, Patient is approaching euvolemiaand with  Moderate perfusion. The patient's hemodynamics are currently acceptable. HR is: normal and is at goal. BP/MAP was reviewed and there isroom for medication up-titration.  Clinical trajectory was assessed and hasworsened.     CHF GOAL DIRECTED MEDICAL THERAPY FOR PATIENT ADDRESSED/ADJUSTED:     GDMT: HFpEF    Drug Class   Drug   Dose Last Dose Adjustment Notes   ACEi/ARB/ARNI Entresto  49-51 mg BID     Beta Blocker     MRA Spironolactone 25mg      SGLT2i   N/A   Secondaries if applicable:          -CHF Specific BB:   Stop Toprol secondary to fatigue  We discussed processes/benefits of HF clinic including nursing, pharmacist, and provider evaluation during each visit with ability for in office ReDS vest, labs, and ability to provide IV diuresis in the clinic with close outpatient monitoring.  Additionally, patient was educated about the availability of delivery of medications to patient's clinic room prior to leaving the building which assists with medication compliance and insures medications are in hands when changes are made (if patient opts for apothecary usage) with thorough guidance regarding changes and medication schedule provided.          -ACE/ARB/ARNi:    Continue Entresto as outlined above.   See below regarding stopping CCB.      -MRA:   The patient is FC-NYHA Class III and MRA is indicated.   Continue Spironolactone @ increased dose of 50mg qd.      -SGLT2 inhibitor therapy:   Not Farxiga given development of bilateral lower extremity wounds with some association of medication causing difficulty healing and wounds.  Have referred to wound care.    -Diuretic regimen:   ReDS Vest reading for. 12/06/24  could not be obtained; suspect some chronic elevation of Reds vest score secondary to lung scarring ReDs Vest reading reviewed with patient.    Continue Bumex at dose of 4mg BID for 5 days then back down to 2mg BID.    BMP, Mag, & ProBNP reviewed with patient.      -Fluid restriction/Sodium restriction:   Requested 2000 ml restriction  Patient has been asked to weigh daily and was provided with a printed diuretic strategy.  1,500 mg Na restriction was discussed.        -Acute vs. Chronic underlying conditions other than HF addressed during visit:   Chronic hypoxemic respiratory failure:   COPD without exacerbation:   PATTY with NIPPV noncompliance:   Continue Pulm f/u.    Continue COPD clinic f/u.     Essential Hypertension:   Continue ARNi as outlined.      Carotid stenosis s/p prior L CEA  R carotid stenosis  Continue f/u with Dr. Becerra & crew.   Continue statin  &  crestor.     BiLateral lower extremity wounds:   Wound care referral placed.     Identifiable barriers to Heart Failure Self-care:   Medical Barriers: Debility  Social Barriers:  No immediate known barriers.         CONSIDERATIONS:   ------------------------------------------------------------------  -Iron/Anemia in CHF:  December 2023 Hgb WNL.   ------------------------------------------------------------    -Sleep/Apnea with NIPPV noncompliance:   Noncompliant with NIPPV.   Continue Pulm f/u     --------------------------------------------------------------------------------        Class 2 Severe  Obesity (BMI >=35 and <=39.9). Obesity-related health conditions include the following: obstructive sleep apnea, hypertension, coronary heart disease, diabetes mellitus, dyslipidemias, GERD, and osteoarthritis. Obesity is unchanged. BMI is is above average; BMI management plan is completed. We discussed portion control, increasing exercise, and Heart failure dietary management .              >45 minutes out of 60 minutes face to face spent counseling patient extensively on dietary Na+ intake, importance of activity, weight monitoring, compliance with medications in addition to importance of titration with goal directed medical therapy and follow up appointments.            This document has been electronically signed by Yakelin Stacy PA-C  December 6, 2024 12:50 EST      Dictated Utilizing Dragon Dictation: Part of this note may be an electronic transcription/translation of spoken language to printed text using the Dragon Dictation System.    Follow-up appointment and medication changes provided in hand delivered After Visit Summary as well as reviewed in the room.    -+

## 2024-12-09 ENCOUNTER — SPECIALTY PHARMACY (OUTPATIENT)
Dept: PHARMACY | Facility: HOSPITAL | Age: 74
End: 2024-12-09
Payer: MEDICARE

## 2024-12-09 LAB
QT INTERVAL: 410 MS
QTC INTERVAL: 467 MS

## 2024-12-16 ENCOUNTER — HOSPITAL ENCOUNTER (OUTPATIENT)
Facility: HOSPITAL | Age: 74
Discharge: HOME | End: 2024-12-16
Admitting: NURSE PRACTITIONER
Payer: MEDICARE

## 2024-12-16 DIAGNOSIS — I65.21 CAROTID STENOSIS, ASYMPTOMATIC, RIGHT: ICD-10-CM

## 2024-12-16 PROCEDURE — 93880 EXTRACRANIAL BILAT STUDY: CPT | Performed by: RADIOLOGY

## 2024-12-16 PROCEDURE — 93880 EXTRACRANIAL BILAT STUDY: CPT

## 2024-12-17 ENCOUNTER — HOSPITAL ENCOUNTER (OUTPATIENT)
Dept: WOUND CARE | Facility: HOSPITAL | Age: 74
Discharge: HOME | End: 2024-12-17
Admitting: NURSE PRACTITIONER
Payer: MEDICARE

## 2024-12-17 VITALS
WEIGHT: 270 LBS | DIASTOLIC BLOOD PRESSURE: 47 MMHG | OXYGEN SATURATION: 98 % | TEMPERATURE: 98.3 F | HEIGHT: 69 IN | SYSTOLIC BLOOD PRESSURE: 120 MMHG | BODY MASS INDEX: 39.99 KG/M2 | HEART RATE: 50 BPM

## 2024-12-17 DIAGNOSIS — R60.0 EDEMA OF BOTH LOWER EXTREMITIES: ICD-10-CM

## 2024-12-17 DIAGNOSIS — L97.921 NON-PRESSURE CHRONIC ULCER OF UNSPECIFIED PART OF LEFT LOWER LEG LIMITED TO BREAKDOWN OF SKIN: ICD-10-CM

## 2024-12-17 DIAGNOSIS — T14.8XXA OPEN WOUND: Primary | ICD-10-CM

## 2024-12-17 DIAGNOSIS — I89.0 LYMPHEDEMA: ICD-10-CM

## 2024-12-17 PROCEDURE — G0463 HOSPITAL OUTPT CLINIC VISIT: HCPCS

## 2024-12-17 PROCEDURE — 97602 WOUND(S) CARE NON-SELECTIVE: CPT

## 2024-12-17 RX ORDER — LIDOCAINE HYDROCHLORIDE AND EPINEPHRINE BITARTRATE 20; .01 MG/ML; MG/ML
10 INJECTION, SOLUTION SUBCUTANEOUS ONCE
OUTPATIENT
Start: 2024-12-17 | End: 2024-12-17

## 2024-12-17 RX ORDER — MUPIROCIN 20 MG/G
1 OINTMENT TOPICAL AS NEEDED
OUTPATIENT
Start: 2024-12-17

## 2024-12-17 RX ORDER — SILVER SULFADIAZINE 10 MG/G
1 CREAM TOPICAL AS NEEDED
OUTPATIENT
Start: 2024-12-17

## 2024-12-17 RX ORDER — LIDOCAINE HYDROCHLORIDE 20 MG/ML
1 JELLY TOPICAL ONCE
OUTPATIENT
Start: 2024-12-17 | End: 2024-12-17

## 2024-12-17 RX ORDER — LIDOCAINE HYDROCHLORIDE 20 MG/ML
10 INJECTION, SOLUTION INFILTRATION; PERINEURAL ONCE
OUTPATIENT
Start: 2024-12-17 | End: 2024-12-17

## 2024-12-17 RX ORDER — CASTOR OIL AND BALSAM, PERU 788; 87 MG/G; MG/G
1 OINTMENT TOPICAL AS NEEDED
OUTPATIENT
Start: 2024-12-17

## 2024-12-17 RX ORDER — LIDOCAINE HYDROCHLORIDE 20 MG/ML
1 JELLY TOPICAL ONCE
Status: DISCONTINUED | OUTPATIENT
Start: 2024-12-17 | End: 2024-12-18 | Stop reason: HOSPADM

## 2024-12-17 RX ORDER — LIDOCAINE HYDROCHLORIDE 20 MG/ML
JELLY TOPICAL AS NEEDED
OUTPATIENT
Start: 2024-12-17

## 2024-12-17 RX ORDER — NYSTATIN 100000 [USP'U]/G
1 POWDER TOPICAL ONCE
OUTPATIENT
Start: 2024-12-17 | End: 2024-12-17

## 2024-12-17 RX ORDER — DIAPER,BRIEF,INFANT-TODD,DISP
1 EACH MISCELLANEOUS ONCE
OUTPATIENT
Start: 2024-12-17 | End: 2024-12-17

## 2024-12-17 RX ORDER — AMMONIUM LACTATE 12 G/100G
LOTION TOPICAL AS NEEDED
Qty: 396 G | Refills: 3 | Status: SHIPPED | OUTPATIENT
Start: 2024-12-17

## 2024-12-17 RX ORDER — SODIUM HYPOCHLORITE 2.5 MG/ML
1 SOLUTION TOPICAL AS NEEDED
OUTPATIENT
Start: 2024-12-17

## 2024-12-17 RX ORDER — SODIUM HYPOCHLORITE 1.25 MG/ML
1 SOLUTION TOPICAL AS NEEDED
OUTPATIENT
Start: 2024-12-17

## 2024-12-17 NOTE — PROGRESS NOTES
Wound Clinic Note  Patient Identification:  Name:  Donaldo Roberts  Age:  74 y.o.  Sex:  male  :  1950  MRN:  [id number]   Visit Number:  [id number]  Primary Care Physician:  Duke Rosen MD     Subjective     Chief complaint:     Bilateral lower legs    History of presenting illness:     Patient is a 74 y.o. male with past medical history significant for CHF, COPD, obesity, and former smoker. That presented today for evaluation of bilateral lower legs. Reports he has swelling on and off for the last several months. Reports he has been applying an antibiotic ointment to the area. He is currently on antibiotics, unsure of medication. Does not wear compression wraps at this time. Denies any vascular workup.   ---------------------------------------------------------------------------------------------------------------------   Review of Systems   Constitutional:  Negative for chills and fever.   Respiratory:  Negative for cough and shortness of breath.    Cardiovascular:  Positive for leg swelling. Negative for chest pain.   Gastrointestinal:  Negative for nausea and vomiting.   Musculoskeletal:  Negative for back pain and gait problem.   Skin:  Positive for wound.      ---------------------------------------------------------------------------------------------------------------------   Past Medical History:   Diagnosis Date    Acid reflux     Anxiety     Arthritis     Carotid artery stenosis, symptomatic, right 2021    Chronic obstructive pulmonary disease 2023    Essential hypertension 2021    Herpes     History of lipoma     History of transfusion     Hyperlipidemia     Inguinal hernia     Peptic ulcer     Stroke      Past Surgical History:   Procedure Laterality Date    ABDOMINAL SURGERY      CARDIAC ELECTROPHYSIOLOGY PROCEDURE N/A 12/10/2021    Procedure: Pacemaker DC new;  Surgeon: Jony Monte MD;  Location: [facility] CATH INVASIVE LOCATION;  Service: Cardiology;  Laterality:  "N/A;    CAROTID ENDARTERECTOMY Left     CHOLECYSTECTOMY WITH INTRAOPERATIVE CHOLANGIOGRAM N/A 2017    Procedure: CHOLECYSTECTOMY LAPAROSCOPIC INTRAOPERATIVE CHOLANGIOGRAM;  Surgeon: Luis Coe MD;  Location: [facility] OR;  Service:     HERNIA REPAIR      inguinal    SINUS SURGERY      TUMOR EXCISION       Family History   Problem Relation Age of Onset    Stroke Mother     Heart disease Mother     Diabetes Mother     No Known Problems Father     Diabetes Sister      Social History     Socioeconomic History    Marital status:     Number of children: 6   Tobacco Use    Smoking status: Former     Current packs/day: 0.00     Average packs/day: 2.0 packs/day for 56.7 years (113.5 ttl pk-yrs)     Types: Cigarettes     Start date:      Quit date: 10/2023     Years since quittin.2     Passive exposure: Current    Smokeless tobacco: Never   Vaping Use    Vaping status: Never Used   Substance and Sexual Activity    Alcohol use: No    Drug use: No    Sexual activity: Defer     ---------------------------------------------------------------------------------------------------------------------   Allergies:  Bee pollen, Levaquin [levofloxacin], and Penicillins  ---------------------------------------------------------------------------------------------------------------------  Objective     ---------------------------------------------------------------------------------------------------------------------   Vital Signs:  /47   Pulse 50   Temp 98.3 °F (36.8 °C) (Infrared)   Ht 175.3 cm (69\")   Wt 122 kg (270 lb)   SpO2 98%   BMI 39.87 kg/m²   Estimated body mass index is 39.87 kg/m² as calculated from the following:    Height as of this encounter: 175.3 cm (69\").    Weight as of this encounter: 122 kg (270 lb).  Body mass index is 39.87 kg/m².  Wt Readings from Last 3 Encounters:   24 122 kg (270 lb)   24 122 kg (270 lb)   24 127 kg (280 lb)       " ---------------------------------------------------------------------------------------------------------------------   Physical Exam    No open areas to left lower leg- Dry skin. No erythema or warmth. Lymphedema present    Wound Goal (s):Free of infection and No further symptoms  Assessment & Plan      Patient Active Problem List    Diagnosis     Lymphedema [I89.0]  -ammonimum lactate for dry skin of lower leg  -RXOIE- to assess for underlying vascular disease that can negatively impact wound healing   -Referred to lymphedema for evaluation   -Advised on proper compression therapy      Tobacco abuse [Z72.0]  -Tissue perfusion is lower in users of tobacco and other forms of nicotine. Reduced tissue perfusion strongly inhibits wound healing, increases risk of infection, and dramatically increases risk of limb loss.     Cigarette nicotine dependence in remission [F17.211]  -Tissue perfusion is lower in users of tobacco and other forms of nicotine. Reduced tissue perfusion strongly inhibits wound healing, increases risk of infection, and dramatically increases risk of limb loss.     Obesity  -An obese person?is at greater risk for wound infection and dehiscence or evisceration.      Clinical Impression:Moderate Complexity    Follow-up: 2 weeks    LARY Jara,CATRACHOS  [facility]  12/17/24  10:50 EST

## 2024-12-23 PROBLEM — I89.0 LYMPHEDEMA: Status: ACTIVE | Noted: 2024-12-23

## 2024-12-27 DIAGNOSIS — I65.21 CAROTID STENOSIS, ASYMPTOMATIC, RIGHT: Primary | ICD-10-CM

## 2024-12-30 ENCOUNTER — HOSPITAL ENCOUNTER (OUTPATIENT)
Dept: ULTRASOUND IMAGING | Facility: HOSPITAL | Age: 74
Discharge: HOME | End: 2024-12-30
Admitting: NURSE PRACTITIONER
Payer: MEDICARE

## 2024-12-30 DIAGNOSIS — L97.921 NON-PRESSURE CHRONIC ULCER OF UNSPECIFIED PART OF LEFT LOWER LEG LIMITED TO BREAKDOWN OF SKIN: ICD-10-CM

## 2024-12-30 DIAGNOSIS — R60.0 EDEMA OF BOTH LOWER EXTREMITIES: ICD-10-CM

## 2024-12-30 PROCEDURE — 93922 UPR/L XTREMITY ART 2 LEVELS: CPT

## 2024-12-31 ENCOUNTER — HOSPITAL ENCOUNTER (OUTPATIENT)
Dept: WOUND CARE | Facility: HOSPITAL | Age: 74
Discharge: HOME | End: 2024-12-31
Payer: MEDICARE

## 2025-01-03 ENCOUNTER — HOSPITAL ENCOUNTER (OUTPATIENT)
Dept: WOUND CARE | Facility: HOSPITAL | Age: 75
Discharge: HOME | End: 2025-01-03
Payer: MEDICARE

## 2025-01-03 VITALS
OXYGEN SATURATION: 94 % | HEART RATE: 60 BPM | TEMPERATURE: 98.5 F | DIASTOLIC BLOOD PRESSURE: 40 MMHG | SYSTOLIC BLOOD PRESSURE: 114 MMHG

## 2025-01-03 DIAGNOSIS — T14.8XXA OPEN WOUND: Primary | ICD-10-CM

## 2025-01-03 PROCEDURE — G0463 HOSPITAL OUTPT CLINIC VISIT: HCPCS

## 2025-01-03 RX ORDER — DIAPER,BRIEF,INFANT-TODD,DISP
1 EACH MISCELLANEOUS ONCE
OUTPATIENT
Start: 2025-01-03 | End: 2025-01-03

## 2025-01-03 RX ORDER — SODIUM HYPOCHLORITE 1.25 MG/ML
1 SOLUTION TOPICAL AS NEEDED
OUTPATIENT
Start: 2025-01-03

## 2025-01-03 RX ORDER — LIDOCAINE HYDROCHLORIDE 20 MG/ML
1 JELLY TOPICAL ONCE
OUTPATIENT
Start: 2025-01-03 | End: 2025-01-03

## 2025-01-03 RX ORDER — LIDOCAINE HYDROCHLORIDE 20 MG/ML
JELLY TOPICAL AS NEEDED
OUTPATIENT
Start: 2025-01-03

## 2025-01-03 RX ORDER — NYSTATIN 100000 [USP'U]/G
1 POWDER TOPICAL ONCE
OUTPATIENT
Start: 2025-01-03 | End: 2025-01-03

## 2025-01-03 RX ORDER — SODIUM HYPOCHLORITE 2.5 MG/ML
1 SOLUTION TOPICAL AS NEEDED
OUTPATIENT
Start: 2025-01-03

## 2025-01-03 RX ORDER — LIDOCAINE HYDROCHLORIDE 20 MG/ML
10 INJECTION, SOLUTION INFILTRATION; PERINEURAL ONCE
OUTPATIENT
Start: 2025-01-03 | End: 2025-01-03

## 2025-01-03 RX ORDER — MUPIROCIN 20 MG/G
1 OINTMENT TOPICAL AS NEEDED
OUTPATIENT
Start: 2025-01-03

## 2025-01-03 RX ORDER — CASTOR OIL AND BALSAM, PERU 788; 87 MG/G; MG/G
1 OINTMENT TOPICAL AS NEEDED
OUTPATIENT
Start: 2025-01-03

## 2025-01-03 RX ORDER — SILVER SULFADIAZINE 10 MG/G
1 CREAM TOPICAL AS NEEDED
OUTPATIENT
Start: 2025-01-03

## 2025-01-03 RX ORDER — LIDOCAINE HYDROCHLORIDE AND EPINEPHRINE BITARTRATE 20; .01 MG/ML; MG/ML
10 INJECTION, SOLUTION SUBCUTANEOUS ONCE
OUTPATIENT
Start: 2025-01-03 | End: 2025-01-03

## 2025-01-03 RX ORDER — LIDOCAINE HYDROCHLORIDE 20 MG/ML
1 JELLY TOPICAL ONCE
Status: DISCONTINUED | OUTPATIENT
Start: 2025-01-03 | End: 2025-01-04 | Stop reason: HOSPADM

## 2025-01-03 NOTE — PROGRESS NOTES
Wound Clinic Note  Patient Identification:  Name:  Donaldo Roberts  Age:  74 y.o.  Sex:  male  :  1950  MRN:  [id number]   Visit Number:  [id number]  Primary Care Physician:  Duke Rosen MD     Subjective     Chief complaint:     Bilateral lower legs    History of presenting illness:     Patient is a 74 y.o. male with past medical history significant for CHF, COPD, obesity, and former smoker. That presented today for evaluation of bilateral lower legs. Reports he has swelling on and off for the last several months. Reports he has been applying an antibiotic ointment to the area. He is currently on antibiotics, unsure of medication. Does not wear compression wraps at this time. Denies any vascular workup.     Interval History:   2025: Seen in clinic today for follow-up to bilateral lower legs. No open areas noted at this time.   ---------------------------------------------------------------------------------------------------------------------   Review of Systems   Constitutional:  Negative for chills and fever.   Respiratory:  Negative for cough and shortness of breath.    Cardiovascular:  Positive for leg swelling. Negative for chest pain.   Gastrointestinal:  Negative for nausea and vomiting.   Musculoskeletal:  Negative for back pain and gait problem.   Skin:  Positive for wound.      ---------------------------------------------------------------------------------------------------------------------   Past Medical History:   Diagnosis Date   • Acid reflux    • Anxiety    • Arthritis    • Carotid artery stenosis, symptomatic, right 2021   • Chronic obstructive pulmonary disease 2023   • Essential hypertension 2021   • Herpes    • History of lipoma    • History of transfusion    • Hyperlipidemia    • Inguinal hernia    • Peptic ulcer    • Stroke      Past Surgical History:   Procedure Laterality Date   • ABDOMINAL SURGERY     • CARDIAC ELECTROPHYSIOLOGY PROCEDURE N/A  "12/10/2021    Procedure: Pacemaker DC new;  Surgeon: Jony Monte MD;  Location: [facility] CATH INVASIVE LOCATION;  Service: Cardiology;  Laterality: N/A;   • CAROTID ENDARTERECTOMY Left    • CHOLECYSTECTOMY WITH INTRAOPERATIVE CHOLANGIOGRAM N/A 2017    Procedure: CHOLECYSTECTOMY LAPAROSCOPIC INTRAOPERATIVE CHOLANGIOGRAM;  Surgeon: Luis Coe MD;  Location: [facility] OR;  Service:    • HERNIA REPAIR      inguinal   • SINUS SURGERY     • TUMOR EXCISION       Family History   Problem Relation Age of Onset   • Stroke Mother    • Heart disease Mother    • Diabetes Mother    • No Known Problems Father    • Diabetes Sister      Social History     Socioeconomic History   • Marital status:    • Number of children: 6   Tobacco Use   • Smoking status: Former     Current packs/day: 0.00     Average packs/day: 2.0 packs/day for 56.7 years (113.5 ttl pk-yrs)     Types: Cigarettes     Start date:      Quit date: 10/2023     Years since quittin.2     Passive exposure: Current   • Smokeless tobacco: Never   Vaping Use   • Vaping status: Never Used   Substance and Sexual Activity   • Alcohol use: No   • Drug use: No   • Sexual activity: Defer     ---------------------------------------------------------------------------------------------------------------------   Allergies:  Bee pollen, Levaquin [levofloxacin], and Penicillins  ---------------------------------------------------------------------------------------------------------------------  Objective     ---------------------------------------------------------------------------------------------------------------------   Vital Signs:  /40   Pulse 60   Temp 98.5 °F (36.9 °C) (Infrared)   SpO2 94%   Estimated body mass index is 39.87 kg/m² as calculated from the following:    Height as of 24: 175.3 cm (69\").    Weight as of 24: 122 kg (270 lb).  There is no height or weight on file to calculate BMI.  Wt Readings from Last 3 " Encounters:   12/17/24 122 kg (270 lb)   12/06/24 122 kg (270 lb)   11/14/24 127 kg (280 lb)       ---------------------------------------------------------------------------------------------------------------------   Physical Exam    No open areas to left lower leg- Dry skin. No erythema or warmth. Lymphedema present    Wound Goal (s):Free of infection and No further symptoms  Assessment & Plan      Patient Active Problem List    Diagnosis    • Lymphedema [I89.0]  -ammonimum lactate for dry skin of lower leg  -ROXIE- to assess for underlying vascular disease that can negatively impact wound healing   -Referred to lymphedema for evaluation   -Advised on proper compression therapy     • Tobacco abuse [Z72.0]  -Tissue perfusion is lower in users of tobacco and other forms of nicotine. Reduced tissue perfusion strongly inhibits wound healing, increases risk of infection, and dramatically increases risk of limb loss.    • Cigarette nicotine dependence in remission [F17.211]  -Tissue perfusion is lower in users of tobacco and other forms of nicotine. Reduced tissue perfusion strongly inhibits wound healing, increases risk of infection, and dramatically increases risk of limb loss.    • Obesity  -An obese person?is at greater risk for wound infection and dehiscence or evisceration.      Clinical Impression:Moderate Complexity    Follow-up: 2 weeks    LARY Jara,CWS  [facility]  01/03/25  14:38 EST

## 2025-02-04 RX ORDER — SACUBITRIL AND VALSARTAN 49; 51 MG/1; MG/1
1 TABLET, FILM COATED ORAL 2 TIMES DAILY
Qty: 60 TABLET | Refills: 2 | Status: SHIPPED | OUTPATIENT
Start: 2025-02-04

## 2025-02-05 ENCOUNTER — OFFICE VISIT (OUTPATIENT)
Dept: PULMONOLOGY | Facility: CLINIC | Age: 75
End: 2025-02-05
Payer: MEDICARE

## 2025-02-05 VITALS
TEMPERATURE: 97.9 F | DIASTOLIC BLOOD PRESSURE: 68 MMHG | OXYGEN SATURATION: 93 % | SYSTOLIC BLOOD PRESSURE: 122 MMHG | HEIGHT: 69 IN | HEART RATE: 97 BPM | WEIGHT: 270 LBS | BODY MASS INDEX: 39.99 KG/M2

## 2025-02-05 DIAGNOSIS — J96.11 CHRONIC RESPIRATORY FAILURE WITH HYPOXIA AND HYPERCAPNIA: ICD-10-CM

## 2025-02-05 DIAGNOSIS — I27.20 PULMONARY HYPERTENSION: ICD-10-CM

## 2025-02-05 DIAGNOSIS — F17.211 CIGARETTE NICOTINE DEPENDENCE IN REMISSION: ICD-10-CM

## 2025-02-05 DIAGNOSIS — F17.210 CIGARETTE NICOTINE DEPENDENCE WITHOUT COMPLICATION: ICD-10-CM

## 2025-02-05 DIAGNOSIS — J96.12 CHRONIC RESPIRATORY FAILURE WITH HYPOXIA AND HYPERCAPNIA: ICD-10-CM

## 2025-02-05 DIAGNOSIS — J98.4 RESTRICTIVE LUNG DISEASE: ICD-10-CM

## 2025-02-05 DIAGNOSIS — J42 CHRONIC BRONCHITIS, UNSPECIFIED CHRONIC BRONCHITIS TYPE: Primary | ICD-10-CM

## 2025-02-05 DIAGNOSIS — G47.33 OSA (OBSTRUCTIVE SLEEP APNEA): ICD-10-CM

## 2025-02-05 PROCEDURE — 99214 OFFICE O/P EST MOD 30 MIN: CPT | Performed by: PHYSICIAN ASSISTANT

## 2025-02-05 PROCEDURE — 3078F DIAST BP <80 MM HG: CPT | Performed by: PHYSICIAN ASSISTANT

## 2025-02-05 PROCEDURE — 1159F MED LIST DOCD IN RCRD: CPT | Performed by: PHYSICIAN ASSISTANT

## 2025-02-05 PROCEDURE — 3074F SYST BP LT 130 MM HG: CPT | Performed by: PHYSICIAN ASSISTANT

## 2025-02-05 PROCEDURE — 1160F RVW MEDS BY RX/DR IN RCRD: CPT | Performed by: PHYSICIAN ASSISTANT

## 2025-02-05 NOTE — PROGRESS NOTES
"Chief Complaint  Chronic bronchitis, unspecified chronic bronchitis type    Subjective        Donaldo Roberts presents to [facility] PULMONARY & CRITICAL CARE MEDICINE  History of Present Illness    Mr. Roberts presents today for follow up. Accompanied today by Inna Roberts. He remains on continuous supplemental oxygen, currently on 4 L via large tank (continuous flow). As he had discontinued use, his NIV device was collected previously.  He has not completed a new ABG yet to try and requalify. States that he has been resting well since the last visit and prefers to hold off on the device at this time.   Sleeps with his head elevated. Plans to resume more frequent use of his stationary bicycle, has not been doing this as frequently.   Has been notable for partial improvement of peripheral edema since the last visit.   No recent hospitalzations      Objective   Vital Signs:  /68   Pulse 97   Temp 97.9 °F (36.6 °C)   Ht 175.3 cm (69.02\")   Wt 122 kg (270 lb) Comment: wheelchair  SpO2 93%   BMI 39.85 kg/m²   Estimated body mass index is 39.85 kg/m² as calculated from the following:    Height as of this encounter: 175.3 cm (69.02\").    Weight as of this encounter: 122 kg (270 lb).  4 L          Physical Exam  Vitals reviewed.   Constitutional:       General: He is not in acute distress.     Comments: Kyphotic posture,   Cardiovascular:      Rate and Rhythm: Normal rate and regular rhythm.   Pulmonary:      Effort: Pulmonary effort is normal.      Breath sounds: No wheezing, rhonchi or rales.   Abdominal:      General: There is distension.   Neurological:      Mental Status: He is alert and oriented to person, place, and time.   Psychiatric:         Behavior: Behavior normal.      Result Review :  The following data was reviewed by: Stephy Estrada PA-C on 02/05/2025:      Spirometry 2024    Spirometry suggest restriction.  Flow volume loop is restricted.  Recommend complete pulmonary " function test with lung volumes to confirm restrictive lung disease.       -----------------------------------------------------------------------------------    CT chest imaging/report March 2024     IMPRESSION:  Impression:  1.  No CTA evidence of pulmonary embolism.  2.  Areas of chronic consolidation and chronic scarring involving the  lingula, right middle lobe, both lower lobes is unchanged.  No  superimposed acute infiltrates or effusions.     This report was finalized on 3/27/2024 12:15 AM by Hussain Crouch MD.        -----------------------------------------------------------------------------------    Previous ABG reviewed.   -----------------------------------------------------------------------------------        Assessment and Plan   Diagnoses and all orders for this visit:    1. Chronic bronchitis, unspecified chronic bronchitis type (Primary)    2. Restrictive lung disease    3. Chronic respiratory failure with hypoxia and hypercapnia    4. Cigarette nicotine dependence in remission    5. PATTY (obstructive sleep apnea)    6. Pulmonary hypertension    7. Cigarette nicotine dependence without complication          Restrictive lung disease   Previous spirometry negative for true obstruction.   Suggested more restriction  Restriction likely related to body habitus (generalized abdominal distention noted with kyphotic posturing while in wheelchair today)  Could also have some residual muscle weakness from previous stroke  Discussed the importance of exercise (such as stationary bike) as tolerated.   Shortness of breath - can also be impacted by CHF/fluid retention.   Follows with cardiology/CHF clinic.   Starting to have notable reduction in edema since the last visit.          Chronic bronchitis:   Previously suspected, but had difficulty obtaining PFT to check for obstruction.    Last available spirometry did not reveal true obstruction, but may have been limited by restriction.   Alpha 1 genotype MS -  "previous serum level within normal limits.   Continue albuterol inhaler as needed  Continue atrovent nebs as needed.   Continue stiolto - recommend 2 puffs daily  Currently using intermittently.   Considered Ohtuvayre - patient states that he would not use this at this time. Currently avoided.         Breztri tried - did not tolerate (felt \"ill\" with use).        Chronic hypoxic and hypercapnic respiratory failure, PATTY/OHS:   Compliant with continuous oxygen use of 3-4 L  Ambulates into office with wheelchair.   Previously had NIPPV device, was starting to increase tolerance with the device use but it was later collected due to lower hours of use per insurance approval.  Tried and failed BiPAP during previous attempts.  Was unable to reobtain the machine upon last ABG.  Ordered new ABG that he can complete at his convenience, we will reorder NIPPV if ABG is qualifying.  Otherwise, has not required recent hospitalization for this issue.          Pulmonary hypertension  Was notable for pulmonary hypertension on echocardiogram from 2023, but not able to appropriately visualize on March 2024 echo.  Likely group 2, group 3 in the setting of underlying PATTY/OHS, CHF, restrictive pulmonary changes.           Cigarette nicotine dependence, in remission:   Former smoker.   Was able to quit smoking in 2023.   Notable for 57 year history of use with 2 ppd average.   Can consider LDCT around March 2025 or later.   Prefers to hold off on this being ordered today, wants to consider later.         Follow Up   Return in about 6 months (around 8/5/2025), or if symptoms worsen or fail to improve, for Next scheduled follow up.  Patient was given instructions and counseling regarding his condition or for health maintenance advice. Please see specific information pulled into the AVS if appropriate.             "

## 2025-02-06 ENCOUNTER — DISEASE STATE MANAGEMENT VISIT (OUTPATIENT)
Dept: PHARMACY | Facility: HOSPITAL | Age: 75
End: 2025-02-06
Payer: MEDICARE

## 2025-02-07 ENCOUNTER — HOSPITAL ENCOUNTER (OUTPATIENT)
Dept: CT IMAGING | Facility: HOSPITAL | Age: 75
Discharge: HOME | End: 2025-02-07
Payer: MEDICARE

## 2025-02-07 DIAGNOSIS — I65.21 CAROTID STENOSIS, ASYMPTOMATIC, RIGHT: ICD-10-CM

## 2025-02-07 PROCEDURE — 25510000001 IOPAMIDOL PER 1 ML: Performed by: NURSE PRACTITIONER

## 2025-02-07 PROCEDURE — 70498 CT ANGIOGRAPHY NECK: CPT

## 2025-02-07 RX ORDER — IOPAMIDOL 755 MG/ML
100 INJECTION, SOLUTION INTRAVASCULAR
Status: COMPLETED | OUTPATIENT
Start: 2025-02-07 | End: 2025-02-07

## 2025-02-07 RX ADMIN — IOPAMIDOL 70 ML: 755 INJECTION, SOLUTION INTRAVENOUS at 11:53

## 2025-03-17 ENCOUNTER — HOSPITAL ENCOUNTER (OUTPATIENT)
Dept: CARDIOLOGY | Facility: HOSPITAL | Age: 75
Discharge: HOME | End: 2025-03-17
Admitting: STUDENT IN AN ORGANIZED HEALTH CARE EDUCATION/TRAINING PROGRAM
Payer: MEDICARE

## 2025-03-17 VITALS
HEART RATE: 89 BPM | OXYGEN SATURATION: 99 % | WEIGHT: 270 LBS | BODY MASS INDEX: 39.99 KG/M2 | DIASTOLIC BLOOD PRESSURE: 70 MMHG | HEIGHT: 69 IN | SYSTOLIC BLOOD PRESSURE: 106 MMHG

## 2025-03-17 DIAGNOSIS — I50.22 CHRONIC HFREF (HEART FAILURE WITH REDUCED EJECTION FRACTION): Primary | ICD-10-CM

## 2025-03-17 DIAGNOSIS — J96.11 CHRONIC HYPOXEMIC RESPIRATORY FAILURE: ICD-10-CM

## 2025-03-17 DIAGNOSIS — J44.9 CHRONIC OBSTRUCTIVE PULMONARY DISEASE, UNSPECIFIED COPD TYPE: ICD-10-CM

## 2025-03-17 LAB
ABSOLUTE LUNG FLUID CONTENT: 35 % (ref 20–35)
ANION GAP SERPL CALCULATED.3IONS-SCNC: 2.9 MMOL/L (ref 5–15)
BUN SERPL-MCNC: 18 MG/DL (ref 8–23)
BUN/CREAT SERPL: 23.4 (ref 7–25)
CALCIUM SPEC-SCNC: 10.1 MG/DL (ref 8.6–10.5)
CHLORIDE SERPL-SCNC: 96 MMOL/L (ref 98–107)
CO2 SERPL-SCNC: 43.1 MMOL/L (ref 22–29)
CREAT SERPL-MCNC: 0.77 MG/DL (ref 0.76–1.27)
EGFRCR SERPLBLD CKD-EPI 2021: 95.1 ML/MIN/1.73
GLUCOSE SERPL-MCNC: 110 MG/DL (ref 65–99)
MAGNESIUM SERPL-MCNC: 1.6 MG/DL (ref 1.6–2.4)
NT-PROBNP SERPL-MCNC: 124 PG/ML (ref 0–900)
POTASSIUM SERPL-SCNC: 4.4 MMOL/L (ref 3.5–5.2)
SODIUM SERPL-SCNC: 142 MMOL/L (ref 136–145)

## 2025-03-17 PROCEDURE — 80048 BASIC METABOLIC PNL TOTAL CA: CPT | Performed by: PHYSICIAN ASSISTANT

## 2025-03-17 PROCEDURE — 36415 COLL VENOUS BLD VENIPUNCTURE: CPT | Performed by: PHYSICIAN ASSISTANT

## 2025-03-17 PROCEDURE — 94726 PLETHYSMOGRAPHY LUNG VOLUMES: CPT | Performed by: PHYSICIAN ASSISTANT

## 2025-03-17 PROCEDURE — 83880 ASSAY OF NATRIURETIC PEPTIDE: CPT | Performed by: PHYSICIAN ASSISTANT

## 2025-03-17 PROCEDURE — 83735 ASSAY OF MAGNESIUM: CPT | Performed by: PHYSICIAN ASSISTANT

## 2025-03-17 RX ORDER — SACUBITRIL AND VALSARTAN 49; 51 MG/1; MG/1
1 TABLET, FILM COATED ORAL 2 TIMES DAILY
Qty: 60 TABLET | Refills: 2 | Status: SHIPPED | OUTPATIENT
Start: 2025-03-17

## 2025-03-17 NOTE — PROGRESS NOTES
Heart Failure Clinic    Date: 03/17/25     Vitals:    03/17/25 1516   BP: 106/70   Pulse: 89   SpO2: 99%   Weight 270    Method of arrival:Wheelchair    Weighing self daily: Yes    Monitoring Heart Failure Zones: Yes    Today's HF Zone: Yellow     Taking medications as prescribed: Yes    Edema Yes    Shortness of Air: Yes    Number of pillows used at night:Recliner    Educational Materials given:  AVS                                                                         ReDS Value: 35  25-35 Optimal Value Status      Negrito Priest MA 03/17/25 15:17 EDT

## 2025-03-17 NOTE — PROGRESS NOTES
[facility]  JUNIE Perdomo James T, MD  [address] [place] [address]    Thank you for asking me to see Donaldo Roberts for congestive heart failure.     HPI:     This is a 74 y.o. male with known past medical history of :    Chronic HFpEF  TTE from 03/27/24 with EF 46-50%  TTE 11/18/2023 with EF 66-70% with mild concentric hypertrophy and grade I diastolic dysfunction.  Severe pulmonary HTN present and RVSP >55mmHg.    PATTY, noncompliant with NIPPV  COPD  Chronic hypoxemic and hypercarbic respiratory failure (3LPM)  PPM 2/2 first degree AV block with intermittent high-grade block and 7 beats Vtach on holter monitor prior to admission  Hx Ischemic CVA  Carotid stenosis  left carotid endarterectomy  BRYSON followed by Dr. Becerra  GERD  Obesity  Tobacco abuse    Donaldo Roberts presents for today for Heart Failure clinic evaluation.  The patient is typically seen by Duke Rosen MD.  Patient's primary cardiologist is Dr. Melgar & Rtuh BOSCH.     Last known EF 66-70%.   Last known hospitalization and/or ED visit: Patient hospitalized from December 10 through December 13, 2023 with acute on chronic hypoxic and hypercapnic respiratory failure secondary to COPD exacerbation with noted acute on chronic diastolic heart failure also impacting admission.  He received referral to clinic via Ruth Aguayo prior to his admission.  Accompanied by: Wife and sons          03/18/25 visit data/details regarding:   Dyspnea: Dyspnea with significant exertion  Lower extremity swelling: Bilateral lower extremity swelling present  Abdominal swelling: Ongoing abdominal distension  Home weight: Weight monitoring booklet provided during initial visit; Has scale.   Home BP: BP monitoring booklet provided during initial visit; Has BP cuff.   Home heart rate: HR monitoring booklet provided during initial visit; Has monitoring device  Daily activities of living: Performing  on her own  Pillows/lying flat: Hospital bed   HF zone: Yellow zone.   Mr. Roberts is chest pain free. He reports dyspnea on exertion and some swelling of his extremities that is at his baseline.  He keeps his legs down a large majority of the time and does not elevate them, according to patient and his son.      Specialists:   Cardiology: Dr. Melgar & Ruth Aguayo NP         Review of Systems - Review of Systems   Constitutional: Negative for decreased appetite, fever, malaise/fatigue and night sweats.   HENT:  Negative for congestion and ear pain.    Eyes:  Negative for blurred vision and double vision.   Cardiovascular:  Positive for dyspnea on exertion and leg swelling.   Respiratory:  Positive for shortness of breath. Negative for cough and hemoptysis.    Endocrine: Negative for cold intolerance and heat intolerance.   Hematologic/Lymphatic: Negative for adenopathy and bleeding problem.   Skin:  Negative for color change, dry skin and nail changes.   Musculoskeletal:  Negative for arthritis, back pain, falls and myalgias.   Gastrointestinal:  Negative for bloating and anorexia.   Genitourinary:  Negative for bladder incontinence, decreased libido and dysuria.   Neurological:  Negative for aphonia and difficulty with concentration.   Psychiatric/Behavioral:  Negative for altered mental status and hallucinations.         All other systems were reviewed and were negative.    Patient Active Problem List   Diagnosis    Benign prostatic hyperplasia with urinary obstruction    Herpesviral infection of penis    Shortness of breath    Carotid stenosis, asymptomatic, right    Essential hypertension    Dyslipidemia    Wenckebach block    Cigarette nicotine dependence in remission    PATTY (obstructive sleep apnea)    Presence of cardiac pacemaker    Severe sinus bradycardia    Chronic bronchitis    Cigarette nicotine dependence without complication    Hypoxia    Detrusor instability    Tobacco abuse    Respiratory failure     Physical debility    Chronic obstructive pulmonary disease    Hypercapnic respiratory failure    Pulmonary hypertension    SBO (small bowel obstruction)    Chronic HFrEF (heart failure with reduced ejection fraction)    Restrictive lung disease    Obesity hypoventilation syndrome    Open wound    Lymphedema       family history includes Diabetes in his mother and sister; Heart disease in his mother; No Known Problems in his father; Stroke in his mother.     reports that he quit smoking about 17 months ago. His smoking use included cigarettes. He started smoking about 58 years ago. He has a 113.5 pack-year smoking history. He has been exposed to tobacco smoke. He has never used smokeless tobacco. He reports that he does not drink alcohol and does not use drugs.    Allergies   Allergen Reactions    Bee Pollen Anaphylaxis    Levaquin [Levofloxacin] Hallucinations    Penicillins Hives     Mother told him that, has taken amoxicillin with no problems         Current Outpatient Medications:     albuterol sulfate  (90 Base) MCG/ACT inhaler, Inhale 2 puffs by mouth Every 4 (Four) Hours As Needed for Wheezing or Shortness of Air., Disp: 18 g, Rfl: 8    ammonium lactate (LAC-HYDRIN) 12 % lotion, Apply  topically to the appropriate area as directed As Needed for Dry Skin., Disp: 396 g, Rfl: 3    aspirin 81 MG EC tablet, Take 1 tablet by mouth Daily., Disp: , Rfl:     bumetanide (BUMEX) 2 MG tablet, Twice daily. If worsening swelling, shortness of breath or weight gain, give 2 tablets for 8 AM Dose., Disp: 75 tablet, Rfl: 2    bumetanide (BUMEX) 2 MG tablet, 2 pills in the morning and 2 in the afternoon for 5 days then back 1 in the morning and 1 in the afternoon., Disp: 75 tablet, Rfl: 2    clopidogrel (PLAVIX) 75 MG tablet, Take 1 tablet by mouth Daily., Disp: 30 tablet, Rfl: 11    donepezil (ARICEPT) 5 MG tablet, Take 1 tablet by mouth Daily., Disp: , Rfl:     famotidine (PEPCID) 20 MG tablet, Take 1 tablet by mouth  Daily., Disp: , Rfl:     ipratropium (ATROVENT) 0.02 % nebulizer solution, Take 2.5 mL by nebulization 4 (Four) Times a Day As Needed for Wheezing or Shortness of Air., Disp: 360 mL, Rfl: 3    mupirocin (BACTROBAN) 2 % ointment, Apply 1 Application topically to the appropriate area as directed 2 (Two) Times a Day., Disp: , Rfl:     omeprazole (priLOSEC) 20 MG capsule, Take 1 capsule by mouth Daily., Disp: , Rfl:     polyethylene glycol (MIRALAX) 17 g packet, Take 17 g by mouth Daily., Disp: 30 packet, Rfl: 3    rosuvastatin (CRESTOR) 10 MG tablet, Take 1 tablet by mouth Daily., Disp: , Rfl:     sacubitril-valsartan (Entresto) 49-51 MG tablet, Take 1 tablet by mouth 2 Times a Day., Disp: 60 tablet, Rfl: 2    spironolactone (ALDACTONE) 25 MG tablet, Take 2 tablets by mouth Daily for 90 days. Hold if top number of blood pressure is less than 110., Disp: 60 tablet, Rfl: 2    tiotropium bromide-olodaterol (STIOLTO RESPIMAT) 2.5-2.5 MCG/ACT aerosol solution inhaler, Inhale 2 puffs Daily., Disp: 4 g, Rfl: 8      Physical Exam:  I have reviewed the patient's current vital signs as documented in the patient's EMR.   Vitals:    03/17/25 1516   BP: 106/70   Pulse: 89   SpO2: 99%             Body mass index is 39.87 kg/m².       03/17/25  1516   Weight: 122 kg (270 lb)              Physical Exam  Vitals and nursing note reviewed.   Constitutional:       General: He is awake.      Appearance: He is morbidly obese.      Interventions: Nasal cannula in place.      Comments: 4L    HENT:      Head: Normocephalic and atraumatic.   Eyes:      General: Lids are normal.      Conjunctiva/sclera:      Right eye: Right conjunctiva is not injected.      Left eye: Left conjunctiva is not injected.   Cardiovascular:      Rate and Rhythm: Normal rate and regular rhythm.   Pulmonary:      Effort: No tachypnea.      Breath sounds: Examination of the right-lower field reveals decreased breath sounds. Examination of the left-lower field reveals  decreased breath sounds. No wheezing, rhonchi or rales.      Comments: Bilateral breath sounds clear to auscultation  Abdominal:      General: Bowel sounds are normal.      Palpations: Abdomen is soft.      Tenderness: There is no abdominal tenderness.   Musculoskeletal:      Right lower le+ Edema present.      Left lower le+ Edema present.   Skin:     General: Skin is warm and dry.      Comments: Bilateral legs are wrapped by his wife this morning.  No visible purulence or drainage at the time of evaluation.   Neurological:      Mental Status: He is alert and oriented to person, place, and time.   Psychiatric:         Attention and Perception: Attention normal.         Mood and Affect: Mood normal.         Behavior: Behavior is cooperative.         JVP: Volume/Pulsation: Normal.        DATA REVIEWED:     EKG. I personally reviewed and interpreted the EKG.      ---------------------------------------------------  TTE/SIXTO:  Results for orders placed during the hospital encounter of 24    Adult Transthoracic Echo Limited W/ Cont if Necessary Per Protocol    Interpretation Summary    Left ventricular systolic function is low normal. Calculated left ventricular EF = 49% Left ventricular ejection fraction appears to be 46 - 50%.    Left ventricular diastolic function was not assessed.        LAST HEART CATH/IF AVAILABLE:     No results found for this or any previous visit.      -----------------------------------------------------  CXR/Imaging:   Imaging Results (Most Recent)       None            I personally reviewed and interpreted the CXR.      -----------------------------------------------------  CT:   No radiology results for the last 30 days.  I personally reviewed the images of the CT scan.  My personal interpretation is below.   "    ----------------------------------------------------    PFTs:        --------------------------------------------------------------------------------------------------    Laboratory evaluations:    Lab Results   Component Value Date    GLUCOSE 110 (H) 03/17/2025    BUN 18 03/17/2025    CREATININE 0.77 03/17/2025    EGFRIFNONA 93 12/11/2021    BCR 23.4 03/17/2025    K 4.4 03/17/2025    CO2 43.1 (H) 03/17/2025    CALCIUM 10.1 03/17/2025    ALBUMIN 4.3 03/28/2024    AST 16 03/28/2024    ALT 19 03/28/2024     Lab Results   Component Value Date    WBC 7.10 03/29/2024    HGB 13.2 03/29/2024    HCT 43.1 03/29/2024    MCV 99.3 (H) 03/29/2024     03/29/2024     Lab Results   Component Value Date    CHOL 112 03/27/2023    TRIG 175 (H) 03/27/2023    HDL 37 (L) 03/27/2023    LDL 46 03/27/2023     Lab Results   Component Value Date    TSH 0.573 12/11/2023     Lab Results   Component Value Date    HGBA1C 6.40 (H) 12/11/2023     Lab Results   Component Value Date    ALT 19 03/28/2024     Lab Results   Component Value Date    HGBA1C 6.40 (H) 12/11/2023     Lab Results   Component Value Date    CREATININE 0.77 03/17/2025     No results found for: \"IRON\", \"TIBC\", \"FERRITIN\"  Lab Results   Component Value Date    INR 0.82 (L) 12/08/2021    PROTIME 11.7 (L) 12/08/2021        Lab Results   Component Value Date    ABSOLUTELUNG 35 03/17/2025    ABSOLUTELUNG 41 (A) 08/15/2024    ABSOLUTELUNG 42 (A) 07/15/2024       PAH RISK ASSESSMENT:      1. Chronic HFrEF (heart failure with reduced ejection fraction)    2. Chronic obstructive pulmonary disease, unspecified COPD type    3. Chronic hypoxemic respiratory failure          ORDERS PLACED TODAY:  Orders Placed This Encounter   Procedures    ReDs Vest    Basic Metabolic Panel    Magnesium    proBNP    Basic Metabolic Panel    Magnesium    proBNP        Diagnoses and all orders for this visit:    1. Chronic HFrEF (heart failure with reduced ejection fraction) (Primary)  -     Basic " Metabolic Panel; Future  -     Magnesium; Future  -     proBNP; Future  -     ReDs Vest  -     Basic Metabolic Panel; Standing  -     Basic Metabolic Panel  -     Magnesium; Standing  -     Magnesium  -     proBNP; Standing  -     proBNP    2. Chronic obstructive pulmonary disease, unspecified COPD type    3. Chronic hypoxemic respiratory failure    Other orders  -     sacubitril-valsartan (Entresto) 49-51 MG tablet; Take 1 tablet by mouth 2 Times a Day.  Dispense: 60 tablet; Refill: 2             MEDS ORDERED TODAY:    New Medications Ordered This Visit   Medications    sacubitril-valsartan (Entresto) 49-51 MG tablet     Sig: Take 1 tablet by mouth 2 Times a Day.     Dispense:  60 tablet     Refill:  2        ---------------------------------------------------------------------------------------------------------------------------          ASSESSMENT/PLAN:      Diagnosis Plan   1. Chronic HFrEF (heart failure with reduced ejection fraction)  Basic Metabolic Panel    Magnesium    proBNP    ReDs Vest    Basic Metabolic Panel    Basic Metabolic Panel    Magnesium    Magnesium    proBNP    proBNP      2. Chronic obstructive pulmonary disease, unspecified COPD type        3. Chronic hypoxemic respiratory failure            not acutely decompensated chronic HFmrEF    NYHA stage Stage C: Structural heart disease is present AND symptoms have occurredFC-Class III: Marked limitation of physical activity. Comfortable at rest. Less than ordinary activity causes fatigue, palpitation, or dyspnea.     Today, Patient is approaching euvolemiaand with  Moderate perfusion. The patient's hemodynamics are currently acceptable. HR is: normal and is at goal. BP/MAP was reviewed and there isroom for medication up-titration.  Clinical trajectory was assessed and hasworsened.     CHF GOAL DIRECTED MEDICAL THERAPY FOR PATIENT ADDRESSED/ADJUSTED:     GDMT: HFpEF    Drug Class   Drug   Dose Last Dose Adjustment Notes   ACEi/ARB/ARNI Entresto   49-51 mg BID     Beta Blocker     MRA Spironolactone 25mg      SGLT2i   N/A   Secondaries if applicable:          -CHF Specific BB:   Stopped Toprol secondary to fatigue previously.   We discussed processes/benefits of HF clinic including nursing, pharmacist, and provider evaluation during each visit with ability for in office ReDS vest, labs, and ability to provide IV diuresis in the clinic with close outpatient monitoring.  Additionally, patient was educated about the availability of delivery of medications to patient's clinic room prior to leaving the building which assists with medication compliance and insures medications are in hands when changes are made (if patient opts for apothecary usage) with thorough guidance regarding changes and medication schedule provided.          -ACE/ARB/ARNi:   Continue Entresto as outlined above.   See below regarding stopping CCB.      -MRA:   The patient is FC-NYHA Class III and MRA is indicated.   Continue Spironolactone @ increased dose of 50mg qd.      -SGLT2 inhibitor therapy:   Not Farxiga given development of bilateral lower extremity wounds with some association of medication causing difficulty healing and wounds.  Have referred to wound care.    -Diuretic regimen:   ReDS Vest reading for. 03/18/25  was 35; suspect some chronic elevation of Reds vest score secondary to lung scarring ReDs Vest reading reviewed with patient.    Continue Bumex at dose of 4mg BID for 5 days then back down to 2mg BID.    BMP, Mag, & ProBNP reviewed with patient.      -Fluid restriction/Sodium restriction:   Requested 2000 ml restriction  Patient has been asked to weigh daily and was provided with a printed diuretic strategy.  1,500 mg Na restriction was discussed.        -Acute vs. Chronic underlying conditions other than HF addressed during visit:   Chronic hypoxemic respiratory failure:   COPD without exacerbation:   PATTY with NIPPV noncompliance:   Continue Pulm f/u.    Continue COPD  clinic f/u.     Essential Hypertension:   Continue ARNi as outlined.      Carotid stenosis s/p prior L CEA  R carotid stenosis  Continue f/u with Dr. Becerra & crew.   Continue statin  &  crestor.     BiLateral lower extremity wounds:   Wound care referral placed.     Identifiable barriers to Heart Failure Self-care:   Medical Barriers: Debility  Social Barriers:  No immediate known barriers.         CONSIDERATIONS:   ------------------------------------------------------------------  -Iron/Anemia in CHF:  December 2023 Hgb WNL.   ------------------------------------------------------------    -Sleep/Apnea with NIPPV noncompliance:   Noncompliant with NIPPV.   Continue Pulm f/u     --------------------------------------------------------------------------------        Class 2 Severe Obesity (BMI >=35 and <=39.9). Obesity-related health conditions include the following: obstructive sleep apnea, hypertension, coronary heart disease, diabetes mellitus, dyslipidemias, GERD, and osteoarthritis. Obesity is unchanged. BMI is is above average; BMI management plan is completed. We discussed portion control, increasing exercise, and Heart failure dietary management .              This document has been electronically signed by Yakelin Stacy PA-C  March 18, 2025 08:38 EDT      Dictated Utilizing Dragon Dictation: Part of this note may be an electronic transcription/translation of spoken language to printed text using the Dragon Dictation System.    Follow-up appointment and medication changes provided in hand delivered After Visit Summary as well as reviewed in the room.    -+

## 2025-03-17 NOTE — PROGRESS NOTES
Heart Failure Clinic  Pharmacist Note     Donaldo Roberts is a 74 y.o. male seen in the Heart Failure Clinic for HFpEF with an EF of 66-70% on 11/18/23.      Donaldo Roberts reports a poor understanding of medications.  He is accompanied by his son today, and not his wife who is usually with him. They report adherence to his meds, as the son reports that his mom makes sure that he gets them. He reports swelling in his legs and feet and SOB. Patient reports he has been getting Bumex 2mg BID. Patients son reports he takes an additional dose of Bumex about once to twice weekly to help with swelling. Patient's son reports that the patient has leg infection and has started taking mupirocin ointment earlier this week.     Patient brought in his blood pressure log today. His SBP has ranged this week from 120s-140s and HR ranges from 70s-80s. His BP today was 106/70 and HR 89.     Medication Use:   Hx of med intolerances:  None related to HF  Retail Rx Management: [facility] [place]; [place] for Farxiga and Entresto- ship to patient    Past Medical History:   Diagnosis Date    Acid reflux     Anxiety     Arthritis     Carotid artery stenosis, symptomatic, right 11/23/2021    Chronic obstructive pulmonary disease 12/5/2023    Essential hypertension 11/23/2021    Herpes     History of lipoma     History of transfusion     Hyperlipidemia     Inguinal hernia     Peptic ulcer     Stroke      ALLERGIES: Bee pollen, Levaquin [levofloxacin], and Penicillins  Current Outpatient Medications   Medication Sig Dispense Refill    albuterol sulfate  (90 Base) MCG/ACT inhaler Inhale 2 puffs by mouth Every 4 (Four) Hours As Needed for Wheezing or Shortness of Air. 18 g 8    ammonium lactate (LAC-HYDRIN) 12 % lotion Apply  topically to the appropriate area as directed As Needed for Dry Skin. 396 g 3    aspirin 81 MG EC tablet Take 1 tablet by mouth Daily.      bumetanide (BUMEX) 2 MG tablet Twice daily. If worsening swelling,  "shortness of breath or weight gain, give 2 tablets for 8 AM Dose. 75 tablet 2    bumetanide (BUMEX) 2 MG tablet 2 pills in the morning and 2 in the afternoon for 5 days then back 1 in the morning and 1 in the afternoon. 75 tablet 2    clopidogrel (PLAVIX) 75 MG tablet Take 1 tablet by mouth Daily. 30 tablet 11    donepezil (ARICEPT) 5 MG tablet Take 1 tablet by mouth Daily.      famotidine (PEPCID) 20 MG tablet Take 1 tablet by mouth Daily.      ipratropium (ATROVENT) 0.02 % nebulizer solution Take 2.5 mL by nebulization 4 (Four) Times a Day As Needed for Wheezing or Shortness of Air. 360 mL 3    mupirocin (BACTROBAN) 2 % ointment Apply 1 Application topically to the appropriate area as directed 2 (Two) Times a Day.      omeprazole (priLOSEC) 20 MG capsule Take 1 capsule by mouth Daily.      polyethylene glycol (MIRALAX) 17 g packet Take 17 g by mouth Daily. 30 packet 3    rosuvastatin (CRESTOR) 10 MG tablet Take 1 tablet by mouth Daily.      sacubitril-valsartan (Entresto) 49-51 MG tablet Take 1 tablet by mouth 2 Times a Day. 60 tablet 2    spironolactone (ALDACTONE) 25 MG tablet Take 2 tablets by mouth Daily for 90 days. Hold if top number of blood pressure is less than 110. 60 tablet 2    tiotropium bromide-olodaterol (STIOLTO RESPIMAT) 2.5-2.5 MCG/ACT aerosol solution inhaler Inhale 2 puffs Daily. 4 g 8     No current facility-administered medications for this encounter.       Vaccination History:   Pneumonia: UTD 2/5/24  Annual Influenza: UTD 24/25  Shingles: 1st 5/6/24 in COPD; 2nd 8/15/24 now [facility]  Covid: UTD 2/5/24    Objective  Vitals:    03/17/25 1516   BP: 106/70   BP Location: Left arm   Patient Position: Sitting   Cuff Size: Large Adult   Pulse: 89   SpO2: 99%   Weight: 122 kg (270 lb)   Height: 175.3 cm (69\")               Wt Readings from Last 3 Encounters:   03/17/25 122 kg (270 lb)   02/05/25 122 kg (270 lb)   12/17/24 122 kg (270 lb)         03/17/25  1516   Weight: 122 kg (270 lb)               Lab " Results   Component Value Date    GLUCOSE 118 (H) 11/14/2024    BUN 14 11/14/2024    CREATININE 0.71 (L) 11/14/2024    EGFRIFNONA 93 12/11/2021    BCR 19.7 11/14/2024    K 4.0 11/14/2024    CO2 43.3 (H) 11/14/2024    CALCIUM 10.0 11/14/2024    ALBUMIN 4.3 03/28/2024    AST 16 03/28/2024    ALT 19 03/28/2024     Lab Results   Component Value Date    WBC 7.10 03/29/2024    HGB 13.2 03/29/2024    HCT 43.1 03/29/2024    MCV 99.3 (H) 03/29/2024     03/29/2024     Lab Results   Component Value Date    TROPONINT 33 (H) 03/27/2024     Lab Results   Component Value Date    PROBNP 298.2 11/14/2024     Results for orders placed during the hospital encounter of 03/26/24    Adult Transthoracic Echo Limited W/ Cont if Necessary Per Protocol    Interpretation Summary    Left ventricular systolic function is low normal. Calculated left ventricular EF = 49% Left ventricular ejection fraction appears to be 46 - 50%.    Left ventricular diastolic function was not assessed.         GDMT    Drug Class   Drug   Dose Last Dose Adjustment Additional Titration   Notes   ACEi/ARB/ARNI Entresto 49/51mg 2/5/24     Beta Blocker     MRA Spironolactone 50mg Increased 11/14/24     SGLT2i 1/25/24     Bumex 2mg bid      Drug Therapy Problems    Drug interactions  GDMT        Recommendations:     No significant interactions according to literature.   No recommended changes at this time.        Patient was educated on heart failure medications and the importance of medication adherence. All questions were addressed and patient expressed understanding. Used teach-back method to assess understanding.     Thank you for allowing me to participate in the care of your patient,    Sarahtereso Bialon, [facility]  03/17/25  15:41 EDT

## 2025-03-18 RX ORDER — BUMETANIDE 2 MG/1
TABLET ORAL
Qty: 75 TABLET | Refills: 2 | OUTPATIENT
Start: 2025-03-18

## 2025-03-18 RX ORDER — BUMETANIDE 2 MG/1
TABLET ORAL
Qty: 75 TABLET | Refills: 2 | Status: SHIPPED | OUTPATIENT
Start: 2025-03-18

## 2025-03-18 NOTE — PROGRESS NOTES
"     [facility] Cardiothoracic Surgery Office Follow Up Note     Date of Encounter: 2025     Name: Donaldo Roberts  : 1950     Referred By: No ref. provider found  PCP: Duke Rosen MD    Chief Complaint:    Chief Complaint   Patient presents with    Follow-up     1 year follow up with carotid duplex. Pt states that his legs are very weak and he is very SOB.        Subjective      History of Present Illness:    Donaldo Roberts is a 74 y.o. male former smoker (10/2023) with history of oxygen dependent COPD, PATTY (noncompliant), HTN, HLD on statin therapy, CAD, Wenckebach block s/p PPM, HFpEF, CVA , Chronic peripheral vision loss left eye (\"years ago from an accident\"), and carotid disease s/p remote LEFT CEA  at [facility]. Initially referred to Dr Becerra 2022 for his carotid disease with recommendations for medical management and annual surveillance as CTA imaging 2022 personally reviewed by Dr. Becerra demonstrated 60% stenosis R ICA and no significant disease on the left.  Last seen in clinic 2023 with updated carotid duplex which demonstrated stable R ICA stenosis.  Continued medical management was recommended given patient's functional status decline including wheelchair-bound status and continuous supplemental oxygen requirements.  Patient returns for surveillance imaging reviewed via carotid CTA performed 2025.  Pt denies hx of TIA or CVA since last visit, has had no new focal neurologic dysfunction, specifically vision changes or amaurosis fugax.      Review of Systems:  Review of Systems   Constitutional: Positive for malaise/fatigue. Negative for chills, fever, night sweats and weight loss.   HENT:  Positive for congestion (sinus allergies). Negative for hearing loss, nosebleeds and odynophagia.    Cardiovascular:  Positive for dyspnea on exertion and leg swelling. Negative for chest pain, claudication, orthopnea, palpitations and syncope.   Respiratory:  Negative " for cough, hemoptysis, shortness of breath and wheezing.    Endocrine: Negative for cold intolerance, heat intolerance, polydipsia, polyphagia and polyuria.   Hematologic/Lymphatic: Positive for bleeding problem. Bruises/bleeds easily.   Skin:  Positive for dry skin and poor wound healing. Negative for itching and rash.   Musculoskeletal:  Positive for joint pain (lower legs pain very swollen), neck pain and stiffness (left side). Negative for arthritis, back pain, joint swelling and myalgias.   Gastrointestinal:  Positive for constipation (sometimes). Negative for abdominal pain, diarrhea, hematemesis, melena, nausea and vomiting.   Genitourinary:  Negative for dysuria, frequency, hematuria, nocturia and urgency.   Neurological:  Positive for loss of balance (stiffness in both legs). Negative for dizziness, light-headedness and numbness.   Psychiatric/Behavioral:  Negative for depression and suicidal ideas. The patient has insomnia (very poor sleep). The patient is not nervous/anxious.    Allergic/Immunologic: Negative for environmental allergies and HIV exposure.       I have reviewed the following portions of the patient's history: problem list, current medications, allergies, past surgical history, past medical history, past social history, past family history, and ROS and confirm it's accurate.    Allergies:  Allergies   Allergen Reactions    Bee Pollen Anaphylaxis    Levaquin [Levofloxacin] Hallucinations    Penicillins Hives     Mother told him that, has taken amoxicillin with no problems       Medications:      Current Outpatient Medications:     albuterol sulfate  (90 Base) MCG/ACT inhaler, Inhale 2 puffs by mouth Every 4 (Four) Hours As Needed for Wheezing or Shortness of Air., Disp: 18 g, Rfl: 8    ammonium lactate (LAC-HYDRIN) 12 % lotion, Apply  topically to the appropriate area as directed As Needed for Dry Skin., Disp: 396 g, Rfl: 3    aspirin 81 MG EC tablet, Take 1 tablet by mouth Daily.,  Disp: , Rfl:     bumetanide (BUMEX) 2 MG tablet, 2 pills in the morning and 2 in the afternoon for 5 days then back 1 in the morning and 1 in the afternoon., Disp: 75 tablet, Rfl: 2    bumetanide (BUMEX) 2 MG tablet, Twice daily. If worsening swelling, shortness of breath or weight gain, give 2 tablets for 8 AM Dose., Disp: 75 tablet, Rfl: 2    clopidogrel (PLAVIX) 75 MG tablet, Take 1 tablet by mouth Daily., Disp: 30 tablet, Rfl: 11    donepezil (ARICEPT) 5 MG tablet, Take 1 tablet by mouth Daily., Disp: , Rfl:     famotidine (PEPCID) 20 MG tablet, Take 1 tablet by mouth Daily., Disp: , Rfl:     ipratropium (ATROVENT) 0.02 % nebulizer solution, Take 2.5 mL by nebulization 4 (Four) Times a Day As Needed for Wheezing or Shortness of Air., Disp: 360 mL, Rfl: 3    mupirocin (BACTROBAN) 2 % ointment, Apply 1 Application topically to the appropriate area as directed 2 (Two) Times a Day., Disp: , Rfl:     omeprazole (priLOSEC) 20 MG capsule, Take 1 capsule by mouth Daily., Disp: , Rfl:     polyethylene glycol (MIRALAX) 17 g packet, Take 17 g by mouth Daily., Disp: 30 packet, Rfl: 3    rosuvastatin (CRESTOR) 10 MG tablet, Take 1 tablet by mouth Daily., Disp: , Rfl:     sacubitril-valsartan (Entresto) 49-51 MG tablet, Take 1 tablet by mouth 2 Times a Day., Disp: 60 tablet, Rfl: 2    tiotropium bromide-olodaterol (STIOLTO RESPIMAT) 2.5-2.5 MCG/ACT aerosol solution inhaler, Inhale 2 puffs Daily., Disp: 4 g, Rfl: 8    spironolactone (ALDACTONE) 25 MG tablet, Take 2 tablets by mouth Daily for 90 days. Hold if top number of blood pressure is less than 110., Disp: 60 tablet, Rfl: 2    History:   Past Medical History:   Diagnosis Date    Acid reflux     Anxiety     Arthritis     Carotid artery stenosis, symptomatic, right 11/23/2021    Chronic obstructive pulmonary disease 12/5/2023    Essential hypertension 11/23/2021    Herpes     History of lipoma     History of transfusion     Hyperlipidemia     Inguinal hernia     Peptic  "ulcer     Stroke        Past Surgical History:   Procedure Laterality Date    ABDOMINAL SURGERY      CARDIAC ELECTROPHYSIOLOGY PROCEDURE N/A 12/10/2021    Procedure: Pacemaker DC new;  Surgeon: Jony Motne MD;  Location: [facility] CATH INVASIVE LOCATION;  Service: Cardiology;  Laterality: N/A;    CAROTID ENDARTERECTOMY Left     CHOLECYSTECTOMY WITH INTRAOPERATIVE CHOLANGIOGRAM N/A 2017    Procedure: CHOLECYSTECTOMY LAPAROSCOPIC INTRAOPERATIVE CHOLANGIOGRAM;  Surgeon: Luis Coe MD;  Location: [facility] OR;  Service:     HERNIA REPAIR      inguinal    SINUS SURGERY      TUMOR EXCISION         Social History     Socioeconomic History    Marital status:     Number of children: 6   Tobacco Use    Smoking status: Former     Current packs/day: 0.00     Average packs/day: 2.0 packs/day for 56.7 years (113.5 ttl pk-yrs)     Types: Cigarettes     Start date:      Quit date: 10/2023     Years since quittin.4     Passive exposure: Current    Smokeless tobacco: Never   Vaping Use    Vaping status: Never Used   Substance and Sexual Activity    Alcohol use: No    Drug use: No    Sexual activity: Defer        Family History   Problem Relation Age of Onset    Stroke Mother     Heart disease Mother     Diabetes Mother     No Known Problems Father     Diabetes Sister        Objective   Physical Exam:  Vitals:    25 0936 25 0937   BP: 142/78 138/84   BP Location: Left arm Right arm   Pulse: 87    Temp: 98.6 °F (37 °C)    SpO2: 98%  Comment: 4liters    Weight: 123 kg (271 lb)    Height: 175.3 cm (69\")  Comment: per pt       Body mass index is 40.02 kg/m².    Physical Exam  Vitals reviewed.   Constitutional:       General: He is not in acute distress.     Appearance: He is obese. He is not toxic-appearing.      Comments: Debilitated older gentleman seated in    HENT:      Head: Normocephalic and atraumatic.   Eyes:      General: Lids are normal.      Conjunctiva/sclera: Conjunctivae normal. "   Neck:      Vascular: No carotid bruit or JVD.   Cardiovascular:      Rate and Rhythm: Normal rate and regular rhythm.      Heart sounds: S1 normal and S2 normal. Heart sounds are distant. No murmur heard.  Pulmonary:      Effort: Pulmonary effort is normal. No respiratory distress.      Breath sounds: Decreased breath sounds present. No wheezing, rhonchi or rales.      Comments: Supplemental oxygen 3 liters  Musculoskeletal:         General: Normal range of motion.      Cervical back: Normal range of motion and neck supple.      Right lower le+ Edema present.      Left lower le+ Edema present.   Skin:     General: Skin is warm and dry.      Capillary Refill: Capillary refill takes less than 2 seconds.   Neurological:      General: No focal deficit present.      Mental Status: He is alert and oriented to person, place, and time.      Comments: Speech clear and appropriate   Psychiatric:         Attention and Perception: Attention normal.         Mood and Affect: Mood normal.         Speech: Speech normal.         Behavior: Behavior is cooperative.         Imaging/Labs:  CT Angiography Neck With Intravenous Contrast:  2025 (Personally reviewed and agree w/BRYSON stenosis 50-70% appearing stable compared to  imaging)   IMPRESSION:  1.  At least 50% to approximately 75% stenosis of the right ICA  proximally. Probably grossly stable.  2.  Streak artifact noted of the right proximal CCA due to adjacent vein contrast.  3.  Degenerative changes cervical spine as described.   This report was finalized on 2025 11:57 AM by Dr. Bashir Barron MD.    US CAROTID BILATERAL- 2024   Right carotid system:  CCA: 103 cm/s  ICA: 295 cm/s  ECA: 239 cm/s  Vertebral artery: Antegrade  ICA/CCA ratio: 2.9  Moderate plaque is identified at the bifurcation.   Left carotid system:  CCA: 83 cm/s  ICA: 92 cm/s  ECA: 118 cm/s  Vertebral artery: Antegrade  ICA/CCA ratio: 1.1  Mild plaque is identified at the bifurcation.  "  IMPRESSION:   1. Elevated peak systolic velocities in the right internal carotid  artery which is in the range of greater than 70% stenosis, however visually this appears to be a less than 70% stenosis. CT angiogram of the neck is recommended.  2. Mild plaque in the left carotid bulb and proximal ICA producing a less than 50% stenosis.   This report was finalized on 12/16/2024 1:29 PM by Radha Moore M.D.    US Carotid Bilateral (09/22/2023 15:33)   FINDINGS:     RIGHT:Velocity elevation   RIGHT ICA PSV: 216 cm/s  RIGHT ICA EDV: 0 cm/s.  Right ICA/CCA Ratio: 1.8  Anterograde flow is demonstrated in RIGHT vertebral artery.   LEFT:No significant intimal thickening or plaque is noted. No occlusion.   LEFT ICA PSV: 74 cm/s  LEFT EDV: 0 cm/s.  Left ICA/CCA Ratio: 1.2  Anterograde flow is demonstrated in LEFT vertebral artery.   IMPRESSION:   Result suggestive of moderate stenosis in the right internal carotid artery. Consider CT angiogram and/or vascular follow-up    This report was finalized on 9/22/2023 3:48 PM by Dr. Jeremy Jenkins MD.     CT Angiogram Carotids (07/29/2022 12:05)   Atherosclerotic plaques are noted involving the carotid systems. On the right side there is approximately 60% diameter stenosis.  There was no significant stenosis identified on the left.   This report was finalized on 7/29/2022 1:12 PM by Dr. Donaldo Rausch II, MD.    Assessment / Plan      Assessment / Plan:  1. Carotid stenosis, asymptomatic, right  - 74 y.o. male former smoker (10/2023) with history of oxygen dependent COPD, PATTY (noncompliant), HTN, HLD on statin therapy, CAD, Wenckebach block s/p PPM, HFpEF, CVA 2019, chronic peripheral vision loss left eye (\"years ago from an accident\"), and carotid disease s/p remote LEFT [facility]A 2019 at [facility].   - Referred to Dr Becerra 9/2022 for his carotid disease w/ recommendations for medical management and annual surveillance as CTA imaging 7/2022 personally reviewed by Dr. Becerra " demonstrated 60% stenosis R ICA and no significant disease on the left.    - Last seen in clinic 12/6/2023 with updated carotid duplex: stable R ICA stenosis.    - Returns for surveillance imaging.  Carotid duplex demonstrated BRYSON PSV increase to 295 cm/sec and therefore CTA ordered  - CTA 2/7/2025:  stable appearing BRYSON stenosis 50-70% @ bifurcation  - Denies hx of TIA or CVA since last visit, has had no new focal neurologic dysfunction, specifically new vision changes  - Should carotid disease progress, will consider referral to Dr. Sukumar Rocha for carotid angiography/ possible carotid stent due to poor surgical candidacy for CEA  - Patient agreeable with plans for continued medical management w/ aspirin, Plavix, and statin.  He understands he is a poor surgical candidate and would be willing to see interventional neurology in the future should his carotid disease progress.    Patient Education:Report any neurological symptoms promptly or call 911.  Be FAST: Balance issues, Eyesight loss, Face drooping, Arm weakness, Speech changes, Time to get help       Follow Up:   Return in about 1 year (around 3/19/2026) for Carotid duplex.   Or sooner for any further concerns or worsening sign and symptoms. If unable to reach us in the office please dial 911 or go to the nearest emergency department.      LARY Rao  [facility] Cardiothoracic Surgery    Time Spent: I spent 28 minutes caring for Donaldo on this date of service. This time includes time spent by me in the following activities: preparing for the visit, reviewing tests, obtaining and/or reviewing a separately obtained history, performing a medically appropriate examination and/or evaluation, counseling and educating the patient/family/caregiver, documenting information in the medical record, independently interpreting results and communicating that information with the patient/family/caregiver, and care coordination.

## 2025-03-19 ENCOUNTER — OFFICE VISIT (OUTPATIENT)
Dept: CARDIAC SURGERY | Facility: CLINIC | Age: 75
End: 2025-03-19
Payer: MEDICARE

## 2025-03-19 VITALS
TEMPERATURE: 98.6 F | WEIGHT: 271 LBS | BODY MASS INDEX: 40.14 KG/M2 | SYSTOLIC BLOOD PRESSURE: 138 MMHG | HEIGHT: 69 IN | HEART RATE: 87 BPM | DIASTOLIC BLOOD PRESSURE: 84 MMHG | OXYGEN SATURATION: 98 %

## 2025-03-19 DIAGNOSIS — I65.21 CAROTID STENOSIS, ASYMPTOMATIC, RIGHT: Primary | ICD-10-CM

## 2025-03-19 PROCEDURE — 1159F MED LIST DOCD IN RCRD: CPT | Performed by: NURSE PRACTITIONER

## 2025-03-19 PROCEDURE — 3078F DIAST BP <80 MM HG: CPT | Performed by: NURSE PRACTITIONER

## 2025-03-19 PROCEDURE — 1160F RVW MEDS BY RX/DR IN RCRD: CPT | Performed by: NURSE PRACTITIONER

## 2025-03-19 PROCEDURE — 99214 OFFICE O/P EST MOD 30 MIN: CPT | Performed by: NURSE PRACTITIONER

## 2025-03-19 PROCEDURE — 3075F SYST BP GE 130 - 139MM HG: CPT | Performed by: NURSE PRACTITIONER

## 2025-05-05 ENCOUNTER — DISEASE STATE MANAGEMENT VISIT (OUTPATIENT)
Dept: PHARMACY | Facility: HOSPITAL | Age: 75
End: 2025-05-05
Payer: MEDICARE

## 2025-05-07 ENCOUNTER — OFFICE VISIT (OUTPATIENT)
Dept: CARDIOLOGY | Facility: CLINIC | Age: 75
End: 2025-05-07
Payer: MEDICARE

## 2025-05-07 VITALS
OXYGEN SATURATION: 92 % | SYSTOLIC BLOOD PRESSURE: 114 MMHG | HEIGHT: 69 IN | DIASTOLIC BLOOD PRESSURE: 68 MMHG | HEART RATE: 88 BPM | WEIGHT: 267 LBS | BODY MASS INDEX: 39.55 KG/M2

## 2025-05-07 DIAGNOSIS — Z95.0 PRESENCE OF CARDIAC PACEMAKER: Primary | ICD-10-CM

## 2025-05-07 DIAGNOSIS — I50.22 CHRONIC HFREF (HEART FAILURE WITH REDUCED EJECTION FRACTION): ICD-10-CM

## 2025-05-07 DIAGNOSIS — I10 ESSENTIAL HYPERTENSION: ICD-10-CM

## 2025-05-07 NOTE — PROGRESS NOTES
[facility]             RuthLARY Forbes James T, MD  Donaldo Roberts  1950 05/07/2025    Patient Active Problem List   Diagnosis    Benign prostatic hyperplasia with urinary obstruction    Herpesviral infection of penis    Shortness of breath    Carotid stenosis, asymptomatic, right    Essential hypertension    Dyslipidemia    Wenckebach block    Cigarette nicotine dependence in remission    PATTY (obstructive sleep apnea)    Presence of cardiac pacemaker    Severe sinus bradycardia    Chronic bronchitis    Cigarette nicotine dependence without complication    Hypoxia    Detrusor instability    Tobacco abuse    Respiratory failure    Physical debility    Chronic obstructive pulmonary disease    Hypercapnic respiratory failure    Pulmonary hypertension    SBO (small bowel obstruction)    Chronic HFrEF (heart failure with reduced ejection fraction)    Restrictive lung disease    Obesity hypoventilation syndrome    Open wound    Lymphedema       Duke Alexander MD:    Subjective     Chief Complaint   Patient presents with    Follow-up     routine    Med Management     verbal       HPI:     This is a 74 y.o. male with known past medical history of hypertension, hyperlipidemia, permanent pacemaker implantation 2021, coronary artery disease, history of stroke, carotid artery disease and tobacco abuse.      Donaldo Roberts presents today for routine cardiology follow up.   History of Present Illness  Patient states he has been around his baseline health status since his last visit.  Wears oxygen at home chronically.  Has been following closely with the heart failure clinic for which medication adjustments have occurred.  Son is present and aids in the history.  States that heart failure clinic has been helping to keep him out of the hospital and they are grateful for this.  Blood pressure well-controlled.  No recent pacemaker interrogation on file.  Recent  lab work stable.  Denies any chest pain       Diagnostic Testing  Echocardiogram 11/2021: EF 66 to 70%  Carotid ultrasound 11/2021: High-grade stenosis in the internal carotid artery on the right  Nuclear stress test 11/2021: No evidence of ischemia  24-hour Holter monitor 12/2021: Predominant normal sinus rhythm with episodes of sinus tachycardia and sinus bradycardia with bradycardic episodes about 12.4% with first-degree AV block and intermittent episodes of high-grade block, 7 beat run of ventricular tachycardia  Implantation of permanent dual-chamber pacemaker 12/2021  Cardiac event monitor 7/2022: Normal sinus rhythm with underlying atrial fibrillation  Echocardiogram 11/2023: EF 66 to 70% with severe pulmonary hypertension          All other systems were reviewed and were negative.    Patient Active Problem List   Diagnosis    Benign prostatic hyperplasia with urinary obstruction    Herpesviral infection of penis    Shortness of breath    Carotid stenosis, asymptomatic, right    Essential hypertension    Dyslipidemia    Wenckebach block    Cigarette nicotine dependence in remission    PATTY (obstructive sleep apnea)    Presence of cardiac pacemaker    Severe sinus bradycardia    Chronic bronchitis    Cigarette nicotine dependence without complication    Hypoxia    Detrusor instability    Tobacco abuse    Respiratory failure    Physical debility    Chronic obstructive pulmonary disease    Hypercapnic respiratory failure    Pulmonary hypertension    SBO (small bowel obstruction)    Chronic HFrEF (heart failure with reduced ejection fraction)    Restrictive lung disease    Obesity hypoventilation syndrome    Open wound    Lymphedema       family history includes Diabetes in his mother and sister; Heart disease in his mother; No Known Problems in his father; Stroke in his mother.     reports that he quit smoking about 19 months ago. His smoking use included cigarettes. He started smoking about 58 years ago. He has  a 113.5 pack-year smoking history. He has been exposed to tobacco smoke. He has never used smokeless tobacco. He reports that he does not drink alcohol and does not use drugs.    Allergies   Allergen Reactions    Bee Pollen Anaphylaxis    Levaquin [Levofloxacin] Hallucinations    Penicillins Hives     Mother told him that, has taken amoxicillin with no problems         Current Outpatient Medications:     albuterol sulfate  (90 Base) MCG/ACT inhaler, Inhale 2 puffs by mouth Every 4 (Four) Hours As Needed for Wheezing or Shortness of Air., Disp: 18 g, Rfl: 8    ammonium lactate (LAC-HYDRIN) 12 % lotion, Apply  topically to the appropriate area as directed As Needed for Dry Skin., Disp: 396 g, Rfl: 3    aspirin 81 MG EC tablet, Take 1 tablet by mouth Daily., Disp: , Rfl:     bumetanide (BUMEX) 2 MG tablet, 2 pills in the morning and 2 in the afternoon for 5 days then back 1 in the morning and 1 in the afternoon., Disp: 75 tablet, Rfl: 2    bumetanide (BUMEX) 2 MG tablet, Twice daily. If worsening swelling, shortness of breath or weight gain, give 2 tablets for 8 AM Dose., Disp: 75 tablet, Rfl: 2    clopidogrel (PLAVIX) 75 MG tablet, Take 1 tablet by mouth Daily., Disp: 30 tablet, Rfl: 11    ipratropium (ATROVENT) 0.02 % nebulizer solution, Take 2.5 mL by nebulization 4 (Four) Times a Day As Needed for Wheezing or Shortness of Air., Disp: 360 mL, Rfl: 3    mupirocin (BACTROBAN) 2 % ointment, Apply 1 Application topically to the appropriate area as directed 2 (Two) Times a Day., Disp: , Rfl:     omeprazole (priLOSEC) 20 MG capsule, Take 1 capsule by mouth Daily., Disp: , Rfl:     polyethylene glycol (MIRALAX) 17 g packet, Take 17 g by mouth Daily., Disp: 30 packet, Rfl: 3    rosuvastatin (CRESTOR) 10 MG tablet, Take 1 tablet by mouth Daily., Disp: , Rfl:     sacubitril-valsartan (Entresto) 49-51 MG tablet, Take 1 tablet by mouth 2 Times a Day., Disp: 60 tablet, Rfl: 2    spironolactone (ALDACTONE) 25 MG tablet,  Take 2 tablets by mouth Daily for 90 days. Hold if top number of blood pressure is less than 110., Disp: 60 tablet, Rfl: 2    tiotropium bromide-olodaterol (STIOLTO RESPIMAT) 2.5-2.5 MCG/ACT aerosol solution inhaler, Inhale 2 puffs Daily., Disp: 4 g, Rfl: 8    donepezil (ARICEPT) 5 MG tablet, Take 1 tablet by mouth Daily. (Patient not taking: Reported on 5/7/2025), Disp: , Rfl:     famotidine (PEPCID) 20 MG tablet, Take 1 tablet by mouth Daily. (Patient not taking: Reported on 5/7/2025), Disp: , Rfl:       Physical Exam:  I have reviewed the patient's current vital signs as documented in the patient's EMR.   Vitals:    05/07/25 1304   BP: 114/68   Pulse: 88   SpO2: 92%     Body mass index is 39.41 kg/m².       05/07/25  1304   Weight: 121 kg (267 lb)     Physical Exam  Constitutional:       General: He is not in acute distress.     Appearance: Normal appearance. He is well-developed.   HENT:      Head: Normocephalic and atraumatic.      Mouth/Throat:      Mouth: Mucous membranes are moist.   Eyes:      Extraocular Movements: Extraocular movements intact.      Pupils: Pupils are equal, round, and reactive to light.   Neck:      Vascular: No JVD.   Cardiovascular:      Rate and Rhythm: Normal rate and regular rhythm.      Heart sounds: Normal heart sounds. No murmur heard.     No S3 or S4 sounds.   Pulmonary:      Effort: Pulmonary effort is normal. No respiratory distress.      Breath sounds: Normal breath sounds. No wheezing.   Abdominal:      General: Bowel sounds are normal. There is no distension.      Palpations: Abdomen is soft. There is no hepatomegaly.      Tenderness: There is no abdominal tenderness.   Musculoskeletal:         General: Normal range of motion.      Cervical back: Normal range of motion and neck supple.   Skin:     General: Skin is warm and dry.      Coloration: Skin is not jaundiced or pale.   Neurological:      General: No focal deficit present.      Mental Status: He is alert and oriented  "to person, place, and time. Mental status is at baseline.   Psychiatric:         Mood and Affect: Mood normal.         Behavior: Behavior normal.         Thought Content: Thought content normal.         Judgment: Judgment normal.            DATA REVIEWED:     TTE/SIXTO:  Results for orders placed during the hospital encounter of 03/26/24    Adult Transthoracic Echo Limited W/ Cont if Necessary Per Protocol    Interpretation Summary    Left ventricular systolic function is low normal. Calculated left ventricular EF = 49% Left ventricular ejection fraction appears to be 46 - 50%.    Left ventricular diastolic function was not assessed.      Laboratory evaluations:    Lab Results   Component Value Date    GLUCOSE 110 (H) 03/17/2025    BUN 18 03/17/2025    CREATININE 0.77 03/17/2025    EGFRIFNONA 93 12/11/2021    BCR 23.4 03/17/2025    K 4.4 03/17/2025    CO2 43.1 (H) 03/17/2025    CALCIUM 10.1 03/17/2025    ALBUMIN 4.3 03/28/2024    AST 16 03/28/2024    ALT 19 03/28/2024     Lab Results   Component Value Date    WBC 7.10 03/29/2024    HGB 13.2 03/29/2024    HCT 43.1 03/29/2024    MCV 99.3 (H) 03/29/2024     03/29/2024     Lab Results   Component Value Date    CHOL 112 03/27/2023    TRIG 175 (H) 03/27/2023    HDL 37 (L) 03/27/2023    LDL 46 03/27/2023     Lab Results   Component Value Date    TSH 0.573 12/11/2023     Lab Results   Component Value Date    HGBA1C 6.40 (H) 12/11/2023     Lab Results   Component Value Date    ALT 19 03/28/2024     Lab Results   Component Value Date    HGBA1C 6.40 (H) 12/11/2023     Lab Results   Component Value Date    CREATININE 0.77 03/17/2025     No results found for: \"IRON\", \"TIBC\", \"FERRITIN\"  Lab Results   Component Value Date    INR 0.82 (L) 12/08/2021    PROTIME 11.7 (L) 12/08/2021      No components found for: \"ABSOLUTELUNG\"    --------------------------------------------------------------------------------------------------------------------------    ASSESSMENT/PLAN:      " Diagnosis Plan   1. Presence of cardiac pacemaker        2. Essential hypertension        3. Chronic HFrEF (heart failure with reduced ejection fraction)          Assessment & Plan  HFmrEF   Recent echocardiogram showed EF of 46 to 50%. Appears overall compensated today.  Follows closely with the heart failure clinic for which he has not been hospitalized and has been doing well.  Continue Entresto and spironolactone.  Metoprolol discontinued in the past secondary to fatigue.    2.  Pacemaker implantation  No recent pacemaker interrogation on file.  Will be scheduled for next clinic    3.  Carotid artery disease  4.  Dyslipidemia  Followed up with CT surgery recently and is continuing with annual surveillance however did state that he is a poor surgical candidate and if he does have disease progression would refer to Dr. Valentine for carotid angiography/possible carotid stent due to poor surgical candidacy for CEA.  Continue aspirin, Plavix and statin.  Lipid panel showed LDL at goal.  Continue statin        This document has been @Electronically signed by LARY Kelley, 05/07/25, 11:37 AM EDT.       Dictated Utilizing Dragon Dictation: Part of this note may be an electronic transcription/translation of spoken language to printed text using the Dragon Dictation System.Patient or patient representative verbalized consent for the use of Ambient Listening during the visit with  LARY Kelley for chart documentation. 5/7/2025  14:29 EDT    Follow-up appointment and medication changes provided in hand delivered After Visit Summary as well as reviewed in the room.

## 2025-05-08 RX ORDER — SPIRONOLACTONE 25 MG/1
TABLET ORAL
Qty: 60 TABLET | Refills: 2 | Status: SHIPPED | OUTPATIENT
Start: 2025-05-08

## 2025-05-14 ENCOUNTER — CLINICAL SUPPORT NO REQUIREMENTS (OUTPATIENT)
Dept: CARDIOLOGY | Facility: CLINIC | Age: 75
End: 2025-05-14
Payer: MEDICARE

## 2025-05-14 DIAGNOSIS — Z95.0 CARDIAC PACEMAKER IN SITU: Primary | ICD-10-CM

## 2025-06-02 ENCOUNTER — DISEASE STATE MANAGEMENT VISIT (OUTPATIENT)
Dept: PHARMACY | Facility: HOSPITAL | Age: 75
End: 2025-06-02
Payer: MEDICARE

## 2025-07-02 LAB
MDC_IDC_MSMT_BATTERY_REMAINING_LONGEVITY: 78 MO
MDC_IDC_MSMT_BATTERY_REMAINING_PERCENTAGE: 66 %
MDC_IDC_MSMT_BATTERY_RRT_TRIGGER: 2.6
MDC_IDC_MSMT_BATTERY_STATUS: NORMAL
MDC_IDC_MSMT_BATTERY_VOLTAGE: 3.01
MDC_IDC_MSMT_LEADCHNL_RA_DTM: NORMAL
MDC_IDC_MSMT_LEADCHNL_RA_IMPEDANCE_VALUE: 410
MDC_IDC_MSMT_LEADCHNL_RA_PACING_THRESHOLD_AMPLITUDE: 0.75
MDC_IDC_MSMT_LEADCHNL_RA_PACING_THRESHOLD_POLARITY: NORMAL
MDC_IDC_MSMT_LEADCHNL_RA_PACING_THRESHOLD_PULSEWIDTH: 0.5
MDC_IDC_MSMT_LEADCHNL_RA_SENSING_INTR_AMPL: 3
MDC_IDC_MSMT_LEADCHNL_RV_DTM: NORMAL
MDC_IDC_MSMT_LEADCHNL_RV_IMPEDANCE_VALUE: 440
MDC_IDC_MSMT_LEADCHNL_RV_PACING_THRESHOLD_AMPLITUDE: 0.75
MDC_IDC_MSMT_LEADCHNL_RV_PACING_THRESHOLD_POLARITY: NORMAL
MDC_IDC_MSMT_LEADCHNL_RV_PACING_THRESHOLD_PULSEWIDTH: 0.5
MDC_IDC_MSMT_LEADCHNL_RV_SENSING_INTR_AMPL: 12
MDC_IDC_PG_IMPLANT_DTM: NORMAL
MDC_IDC_PG_MFG: NORMAL
MDC_IDC_PG_MODEL: NORMAL
MDC_IDC_PG_SERIAL: NORMAL
MDC_IDC_PG_TYPE: NORMAL
MDC_IDC_SESS_DTM: NORMAL
MDC_IDC_SESS_TYPE: NORMAL
MDC_IDC_SET_BRADY_AT_MODE_SWITCH_RATE: 180
MDC_IDC_SET_BRADY_LOWRATE: 60
MDC_IDC_SET_BRADY_MAX_SENSOR_RATE: 130
MDC_IDC_SET_BRADY_MAX_TRACKING_RATE: 130
MDC_IDC_SET_BRADY_MODE: NORMAL
MDC_IDC_SET_BRADY_PAV_DELAY: 250
MDC_IDC_SET_BRADY_SAV_DELAY: 225
MDC_IDC_SET_LEADCHNL_RA_PACING_AMPLITUDE: 1.75
MDC_IDC_SET_LEADCHNL_RA_PACING_POLARITY: NORMAL
MDC_IDC_SET_LEADCHNL_RA_PACING_PULSEWIDTH: 0.5
MDC_IDC_SET_LEADCHNL_RA_SENSING_POLARITY: NORMAL
MDC_IDC_SET_LEADCHNL_RA_SENSING_SENSITIVITY: 0.5
MDC_IDC_SET_LEADCHNL_RV_PACING_AMPLITUDE: 1
MDC_IDC_SET_LEADCHNL_RV_PACING_POLARITY: NORMAL
MDC_IDC_SET_LEADCHNL_RV_PACING_PULSEWIDTH: 0.5
MDC_IDC_SET_LEADCHNL_RV_SENSING_POLARITY: NORMAL
MDC_IDC_SET_LEADCHNL_RV_SENSING_SENSITIVITY: 2
MDC_IDC_STAT_AT_BURDEN_PERCENT: 1
MDC_IDC_STAT_BRADY_RA_PERCENT_PACED: 1
MDC_IDC_STAT_BRADY_RV_PERCENT_PACED: 99

## 2025-07-21 RX ORDER — SPIRONOLACTONE 25 MG/1
TABLET ORAL
Qty: 180 TABLET | Refills: 2 | Status: SHIPPED | OUTPATIENT
Start: 2025-07-21

## 2025-07-28 ENCOUNTER — HOSPITAL ENCOUNTER (OUTPATIENT)
Dept: CARDIOLOGY | Facility: HOSPITAL | Age: 75
Discharge: HOME | End: 2025-07-28
Admitting: PHYSICIAN ASSISTANT
Payer: MEDICARE

## 2025-07-28 VITALS
OXYGEN SATURATION: 93 % | HEART RATE: 90 BPM | DIASTOLIC BLOOD PRESSURE: 64 MMHG | HEIGHT: 69 IN | BODY MASS INDEX: 40.73 KG/M2 | SYSTOLIC BLOOD PRESSURE: 116 MMHG | WEIGHT: 275 LBS

## 2025-07-28 DIAGNOSIS — G47.33 OSA (OBSTRUCTIVE SLEEP APNEA): ICD-10-CM

## 2025-07-28 DIAGNOSIS — J96.11 CHRONIC HYPOXEMIC RESPIRATORY FAILURE: ICD-10-CM

## 2025-07-28 DIAGNOSIS — E66.813 CLASS 3 OBESITY: ICD-10-CM

## 2025-07-28 DIAGNOSIS — J98.4 RESTRICTIVE LUNG DISEASE: ICD-10-CM

## 2025-07-28 DIAGNOSIS — I10 ESSENTIAL HYPERTENSION: ICD-10-CM

## 2025-07-28 DIAGNOSIS — I50.22 CHRONIC HFREF (HEART FAILURE WITH REDUCED EJECTION FRACTION): Primary | ICD-10-CM

## 2025-07-28 DIAGNOSIS — J44.9 CHRONIC OBSTRUCTIVE PULMONARY DISEASE, UNSPECIFIED COPD TYPE: ICD-10-CM

## 2025-07-28 LAB
ALBUMIN SERPL-MCNC: 4.2 G/DL (ref 3.5–5.2)
ALBUMIN/GLOB SERPL: 1.4 G/DL
ALP SERPL-CCNC: 82 U/L (ref 39–117)
ALT SERPL W P-5'-P-CCNC: 14 U/L (ref 1–41)
ANION GAP SERPL CALCULATED.3IONS-SCNC: 10 MMOL/L (ref 5–15)
AST SERPL-CCNC: 22 U/L (ref 1–40)
BILIRUB SERPL-MCNC: 0.3 MG/DL (ref 0–1.2)
BUN SERPL-MCNC: 14.2 MG/DL (ref 8–23)
BUN/CREAT SERPL: 25.4 (ref 7–25)
CALCIUM SPEC-SCNC: 9.9 MG/DL (ref 8.6–10.5)
CHLORIDE SERPL-SCNC: 92 MMOL/L (ref 98–107)
CO2 SERPL-SCNC: 37 MMOL/L (ref 22–29)
CREAT SERPL-MCNC: 0.56 MG/DL (ref 0.76–1.27)
DEPRECATED RDW RBC AUTO: 45.9 FL (ref 37–54)
EGFRCR SERPLBLD CKD-EPI 2021: 104.1 ML/MIN/1.73
ERYTHROCYTE [DISTWIDTH] IN BLOOD BY AUTOMATED COUNT: 12.3 % (ref 12.3–15.4)
FERRITIN SERPL-MCNC: 514 NG/ML (ref 30–400)
GLOBULIN UR ELPH-MCNC: 3.1 GM/DL
GLUCOSE SERPL-MCNC: 120 MG/DL (ref 65–99)
HCT VFR BLD AUTO: 33.3 % (ref 37.5–51)
HGB BLD-MCNC: 10 G/DL (ref 13–17.7)
IRON 24H UR-MRATE: 66 MCG/DL (ref 59–158)
IRON SATN MFR SERPL: 17 % (ref 20–50)
MAGNESIUM SERPL-MCNC: 1.7 MG/DL (ref 1.6–2.4)
MCH RBC QN AUTO: 30.5 PG (ref 26.6–33)
MCHC RBC AUTO-ENTMCNC: 30 G/DL (ref 31.5–35.7)
MCV RBC AUTO: 101.5 FL (ref 79–97)
NT-PROBNP SERPL-MCNC: 134 PG/ML (ref 0–900)
PLATELET # BLD AUTO: 233 10*3/MM3 (ref 140–450)
PMV BLD AUTO: 10 FL (ref 6–12)
POTASSIUM SERPL-SCNC: 4.5 MMOL/L (ref 3.5–5.2)
PROT SERPL-MCNC: 7.3 G/DL (ref 6–8.5)
RBC # BLD AUTO: 3.28 10*6/MM3 (ref 4.14–5.8)
SODIUM SERPL-SCNC: 139 MMOL/L (ref 136–145)
TIBC SERPL-MCNC: 378 MCG/DL (ref 298–536)
TRANSFERRIN SERPL-MCNC: 254 MG/DL (ref 200–360)
WBC NRBC COR # BLD AUTO: 7.7 10*3/MM3 (ref 3.4–10.8)

## 2025-07-28 PROCEDURE — 82728 ASSAY OF FERRITIN: CPT | Performed by: PHYSICIAN ASSISTANT

## 2025-07-28 PROCEDURE — 80053 COMPREHEN METABOLIC PANEL: CPT | Performed by: PHYSICIAN ASSISTANT

## 2025-07-28 PROCEDURE — 84466 ASSAY OF TRANSFERRIN: CPT | Performed by: PHYSICIAN ASSISTANT

## 2025-07-28 PROCEDURE — 83540 ASSAY OF IRON: CPT | Performed by: PHYSICIAN ASSISTANT

## 2025-07-28 PROCEDURE — 83880 ASSAY OF NATRIURETIC PEPTIDE: CPT | Performed by: PHYSICIAN ASSISTANT

## 2025-07-28 PROCEDURE — 85027 COMPLETE CBC AUTOMATED: CPT | Performed by: PHYSICIAN ASSISTANT

## 2025-07-28 PROCEDURE — 36415 COLL VENOUS BLD VENIPUNCTURE: CPT | Performed by: PHYSICIAN ASSISTANT

## 2025-07-28 PROCEDURE — 83735 ASSAY OF MAGNESIUM: CPT | Performed by: PHYSICIAN ASSISTANT

## 2025-07-28 PROCEDURE — 94726 PLETHYSMOGRAPHY LUNG VOLUMES: CPT | Performed by: PHYSICIAN ASSISTANT

## 2025-07-28 RX ORDER — SACUBITRIL AND VALSARTAN 49; 51 MG/1; MG/1
1 TABLET, FILM COATED ORAL 2 TIMES DAILY
Qty: 90 TABLET | Refills: 2 | Status: SHIPPED | OUTPATIENT
Start: 2025-07-28

## 2025-07-28 RX ORDER — BUMETANIDE 2 MG/1
TABLET ORAL
Qty: 270 TABLET | Refills: 2 | Status: SHIPPED | OUTPATIENT
Start: 2025-07-28

## 2025-07-28 NOTE — PROGRESS NOTES
Heart Failure Clinic    Date: 07/28/25     Vitals:    07/28/25 1459   BP: 116/64   Pulse: 90   SpO2: 93%      Weight 275  Method of arrival: Other wheelchair O2@4LPM    Weighing self daily: No    Monitoring Heart Failure Zones: No    Today's HF Zone: Yellow     Taking medications as prescribed: Yes    Edema Yes    Shortness of Air: Yes    Number of pillows used at night:Recliner    Educational Materials given:  avs                                                                         ReDS Value: Low quality x3        Maria Luisa Rich RN 07/28/25 15:00 EDT

## 2025-07-28 NOTE — PROGRESS NOTES
Heart Failure Clinic  Pharmacist Note     Donaldo Roberts is a 75 y.o. male seen in the Heart Failure Clinic for HFpEF with an EF of 66-70% on 11/18/23.      Donaldo Roberts reports a poor understanding of medications.  He is accompanied by his son today, and not his wife who is usually with him. They report adherence to his meds, as the son reports that his mom makes sure that he gets them. He reports swelling in his legs, but son and wife, over the phone, report that this is his baseline swelling. He reports SOB with activity. The son reports that the patient will not elevate his feet at all, even at night, or try to move to make things better in the long run. He is getting  Bumex 2mg bid. They brought in his BP logs and BP's look good running in the 110-130's/60-70's with HR in the 70-80's.      Medication Use:   Hx of med intolerances:  None related to HF  Retail Rx Management: [facility] [place]; [place] for  Entresto- ship to patient    Past Medical History:   Diagnosis Date    Acid reflux     Anxiety     Arthritis     Carotid artery stenosis, symptomatic, right 11/23/2021    Chronic obstructive pulmonary disease 12/5/2023    Essential hypertension 11/23/2021    Herpes     History of lipoma     History of transfusion     Hyperlipidemia     Inguinal hernia     Peptic ulcer     Stroke      ALLERGIES: Bee pollen, Levaquin [levofloxacin], and Penicillins  Current Outpatient Medications   Medication Sig Dispense Refill    albuterol sulfate  (90 Base) MCG/ACT inhaler Inhale 2 puffs by mouth Every 4 (Four) Hours As Needed for Wheezing or Shortness of Air. 18 g 8    ammonium lactate (LAC-HYDRIN) 12 % lotion Apply  topically to the appropriate area as directed As Needed for Dry Skin. 396 g 3    aspirin 81 MG EC tablet Take 1 tablet by mouth Daily.      bumetanide (BUMEX) 2 MG tablet 1 pill twice a day with extra tablet as needed for worsening swelling or shortness of breath 270 tablet 2    clopidogrel  "(PLAVIX) 75 MG tablet Take 1 tablet by mouth Daily. 30 tablet 11    ipratropium (ATROVENT) 0.02 % nebulizer solution Take 2.5 mL by nebulization 4 (Four) Times a Day As Needed for Wheezing or Shortness of Air. 360 mL 3    mupirocin (BACTROBAN) 2 % ointment Apply 1 Application topically to the appropriate area as directed 2 (Two) Times a Day.      omeprazole (priLOSEC) 20 MG capsule Take 1 capsule by mouth Daily.      polyethylene glycol (MIRALAX) 17 g packet Take 17 g by mouth Daily. 30 packet 3    Psyllium (METAMUCIL 4 IN 1 FIBER PO) Take  by mouth.      rosuvastatin (CRESTOR) 10 MG tablet Take 1 tablet by mouth Daily.      sacubitril-valsartan (Entresto) 49-51 MG tablet Take 1 tablet by mouth 2 Times a Day. 90 tablet 2    spironolactone (ALDACTONE) 25 MG tablet TAKE TWO TABLETS BY MOUTH DAILY. HOLD IF BLOOD PRESSURE IS LESS THAN 110 180 tablet 2    tiotropium bromide-olodaterol (STIOLTO RESPIMAT) 2.5-2.5 MCG/ACT aerosol solution inhaler Inhale 2 puffs Daily. 4 g 8     No current facility-administered medications for this encounter.       Vaccination History:   Pneumonia: UTD 2/5/24  Annual Influenza: UTD 24/25  Shingles: 1st 5/6/24 in COPD; 2nd 8/15/24 now [facility]  Covid: UTD 2/5/24    Objective  Vitals:    07/28/25 1459   BP: 116/64   BP Location: Left arm   Patient Position: Sitting   Cuff Size: Adult   Pulse: 90   SpO2: 93%   Weight: 125 kg (275 lb)   Height: 175.3 cm (69\")         Wt Readings from Last 3 Encounters:   07/28/25 125 kg (275 lb)   05/07/25 121 kg (267 lb)   03/19/25 123 kg (271 lb)         07/28/25  1459   Weight: 125 kg (275 lb)       Lab Results   Component Value Date    GLUCOSE 120 (H) 07/28/2025    BUN 14.2 07/28/2025    CREATININE 0.56 (L) 07/28/2025    EGFRIFNONA 93 12/11/2021    BCR 25.4 (H) 07/28/2025    K 4.5 07/28/2025    CO2 37.0 (H) 07/28/2025    CALCIUM 9.9 07/28/2025    ALBUMIN 4.2 07/28/2025    AST 22 07/28/2025    ALT 14 07/28/2025     Lab Results   Component Value Date    WBC 7.70 " 07/28/2025    HGB 10.0 (L) 07/28/2025    HCT 33.3 (L) 07/28/2025    .5 (H) 07/28/2025     07/28/2025     Lab Results   Component Value Date    TROPONINT 33 (H) 03/27/2024     Lab Results   Component Value Date    PROBNP 134.0 07/28/2025     Results for orders placed during the hospital encounter of 03/26/24    Adult Transthoracic Echo Limited W/ Cont if Necessary Per Protocol 03/27/2024  6:45 PM    Interpretation Summary    Left ventricular systolic function is low normal. Calculated left ventricular EF = 49% Left ventricular ejection fraction appears to be 46 - 50%.    Left ventricular diastolic function was not assessed.         GDMT    Drug Class   Drug   Dose Last Dose Adjustment Additional Titration   Notes   ACEi/ARB/ARNI Entresto 49/51mg 2/5/24     Beta Blocker  Fatigue   MRA Spironolactone 50mg Increased 11/14/24     SGLT2i 1/25/24  Bilaterol extremity wounds- trouble healing   Bumex 2mg bid +PRN      Drug Therapy Problems    Edema      Recommendations:     Yakelin to add on a PRN dose in addition to Bumex 2mg bid dosing.       Patient was educated on heart failure medications and the importance of medication adherence. All questions were addressed and patient expressed understanding. Used teach-back method to assess understanding.     Thank you for allowing me to participate in the care of your patient,    Comfort Modi, PharmD  07/28/25  16:03 EDT

## 2025-07-28 NOTE — PATIENT INSTRUCTIONS
Heart Failure Action Plan  A heart failure action plan helps you know what to do when you have symptoms of heart failure. Your action plan is a color-coded plan that lists the symptoms to watch for and indicates what actions to take.  If you have symptoms in the green zone, you're doing well.  If you have symptoms in the yellow zone, you're having problems.  If you have symptoms in the red zone, you need medical care right away.  Follow the plan that was created by you and your health care provider. Review your plan each time you visit your provider.  Green zone  These signs mean you're doing well and can continue what you're doing:  You don't have new or worsening shortness of breath.  You have very little swelling or no new swelling.  Your weight is stable (no gain or loss).  You have a normal activity level.  You don't have chest pain or any other new symptoms.  Yellow zone  These signs and symptoms mean your condition may be getting worse and you should make some changes:  You have trouble breathing when you're active.  You have swelling in your feet or legs or have discomfort in your belly.  You gain 2-3 lb (0.9-1.4 kg) in 24 hours, or 5 lb (2.3 kg) in a week. This amount may be more or less depending on your condition.  You get tired easily.  You have trouble sleeping.  You have a dry cough.  If you have any of these symptoms:  Contact your provider within the next day.  Your provider may adjust your medicines.  Red zone  These signs and symptoms mean you should get medical help right away:  You have trouble breathing when resting or cannot lie flat and you need to raise your head to help you breathe.  You have a dry cough that's getting worse.  You have swelling or pain in your feet or legs or discomfort in your belly that's getting worse.  You suddenly gain more than 2-3 lb (0.9-1.4 kg) in 24 hours, or more than 5 lb (2.3 kg) in a week. This amount may be more or less depending on your condition.  You have  trouble staying awake or you feel confused.  You don't have an appetite.  You have worsening sadness or depression.  These symptoms may be an emergency. Call 911 right away.  Do not wait to see if the symptoms will go away.  Do not drive yourself to the hospital.  Follow these instructions at home:  Take medicines only as told.  Eat a heart-healthy diet. Work with a dietitian to create an eating plan that's best for you.  Weigh yourself each day.   Call your provider if you gain more than 3 lb in 24 hours, or more than 5 lb in a week.  Health care provider name: [facility] care provider phone number: [phone]

## 2025-07-28 NOTE — PROGRESS NOTES
[facility]  JUNIE Perdomo James T, MD  [address] [place] [address]    Thank you for asking me to see Donaldo Roberts for congestive heart failure.     HPI:     This is a 75 y.o. male with known past medical history of :    Chronic HFmrEF  TTE from 03/27/24 with EF 46-50%  TTE 11/18/2023 with EF 66-70% with mild concentric hypertrophy and grade I diastolic dysfunction.  Severe pulmonary HTN present and RVSP >55mmHg.    PATTY, noncompliant with NIPPV  COPD  Chronic hypoxemic and hypercarbic respiratory failure (4LPM)  PPM 2/2 first degree AV block with intermittent high-grade block and 7 beats Vtach on holter monitor prior to admission  Hx Ischemic CVA  Carotid stenosis  left carotid endarterectomy  BRYSON followed by Dr. Becerra  GERD  Obesity  Tobacco abuse    Donaldo Roberts presents for today for Heart Failure clinic evaluation.  The patient is typically seen by Duke Rosen MD.  Patient's primary cardiologist is Dr. Melgar & Ruth BOSCH.     Last known EF 66-70%.   Last known hospitalization and/or ED visit: Patient hospitalized from December 10 through December 13, 2023 with acute on chronic hypoxic and hypercapnic respiratory failure secondary to COPD exacerbation with noted acute on chronic diastolic heart failure also impacting admission.  He received referral to clinic via Ruth Aguayo prior to his admission.  Accompanied by: Wife and sons          07/29/25 visit data/details regarding:   Dyspnea: Dyspnea with significant exertion  Lower extremity swelling: Bilateral lower extremity swelling present but felt to bet at baseline  Abdominal swelling: Ongoing abdominal distension  Home weight: Weight monitoring booklet provided during initial visit; Has scale.   Home BP: BP monitoring booklet provided during initial visit; Has BP cuff.   Home heart rate: HR monitoring booklet provided during initial visit; Has monitoring device  Daily  activities of living: Performing on her own  Pillows/lying flat: Hospital bed   HF zone: Yellow zone.   Mr. Roberts is chest pain free.  He reports he has dyspnea on exertion at baseline.  He and his sone report his lower extremity swelling is at baseline.    He continues to not elevate his legs.  He is WC bound and on 4LPM at all times.      Specialists:   Cardiology: Dr. Melgar & Ruth Aguayo NP         Review of Systems - Review of Systems   Constitutional: Negative for decreased appetite, fever, malaise/fatigue and night sweats.   HENT:  Negative for congestion and ear pain.    Eyes:  Negative for blurred vision and double vision.   Cardiovascular:  Positive for dyspnea on exertion and leg swelling.   Respiratory:  Positive for shortness of breath. Negative for cough and hemoptysis.    Endocrine: Negative for cold intolerance and heat intolerance.   Hematologic/Lymphatic: Negative for adenopathy and bleeding problem.   Skin:  Negative for color change, dry skin and nail changes.   Musculoskeletal:  Negative for arthritis, back pain, falls and myalgias.   Gastrointestinal:  Negative for bloating and anorexia.   Genitourinary:  Negative for bladder incontinence, decreased libido and dysuria.   Neurological:  Negative for aphonia and difficulty with concentration.   Psychiatric/Behavioral:  Negative for altered mental status and hallucinations.         All other systems were reviewed and were negative.    Patient Active Problem List   Diagnosis    Benign prostatic hyperplasia with urinary obstruction    Herpesviral infection of penis    Shortness of breath    Carotid stenosis, asymptomatic, right    Essential hypertension    Dyslipidemia    Wenckebach block    Cigarette nicotine dependence in remission    PATTY (obstructive sleep apnea)    Presence of cardiac pacemaker    Severe sinus bradycardia    Chronic bronchitis    Cigarette nicotine dependence without complication    Hypoxia    Detrusor instability     Tobacco abuse    Respiratory failure    Physical debility    Chronic obstructive pulmonary disease    Hypercapnic respiratory failure    Pulmonary hypertension    SBO (small bowel obstruction)    Chronic HFrEF (heart failure with reduced ejection fraction)    Restrictive lung disease    Obesity hypoventilation syndrome    Open wound    Lymphedema       family history includes Diabetes in his mother and sister; Heart disease in his mother; No Known Problems in his father; Stroke in his mother.     reports that he quit smoking about 21 months ago. His smoking use included cigarettes. He started smoking about 58 years ago. He has a 113.5 pack-year smoking history. He has been exposed to tobacco smoke. He has never used smokeless tobacco. He reports that he does not drink alcohol and does not use drugs.    Allergies   Allergen Reactions    Bee Pollen Anaphylaxis    Levaquin [Levofloxacin] Hallucinations    Penicillins Hives     Mother told him that, has taken amoxicillin with no problems         Current Outpatient Medications:     albuterol sulfate  (90 Base) MCG/ACT inhaler, Inhale 2 puffs by mouth Every 4 (Four) Hours As Needed for Wheezing or Shortness of Air., Disp: 18 g, Rfl: 8    ammonium lactate (LAC-HYDRIN) 12 % lotion, Apply  topically to the appropriate area as directed As Needed for Dry Skin., Disp: 396 g, Rfl: 3    aspirin 81 MG EC tablet, Take 1 tablet by mouth Daily., Disp: , Rfl:     bumetanide (BUMEX) 2 MG tablet, 1 pill twice a day with extra tablet as needed for worsening swelling or shortness of breath, Disp: 270 tablet, Rfl: 2    clopidogrel (PLAVIX) 75 MG tablet, Take 1 tablet by mouth Daily., Disp: 30 tablet, Rfl: 11    ipratropium (ATROVENT) 0.02 % nebulizer solution, Take 2.5 mL by nebulization 4 (Four) Times a Day As Needed for Wheezing or Shortness of Air., Disp: 360 mL, Rfl: 3    mupirocin (BACTROBAN) 2 % ointment, Apply 1 Application topically to the appropriate area as directed 2  (Two) Times a Day., Disp: , Rfl:     omeprazole (priLOSEC) 20 MG capsule, Take 1 capsule by mouth Daily., Disp: , Rfl:     polyethylene glycol (MIRALAX) 17 g packet, Take 17 g by mouth Daily., Disp: 30 packet, Rfl: 3    Psyllium (METAMUCIL 4 IN 1 FIBER PO), Take  by mouth., Disp: , Rfl:     rosuvastatin (CRESTOR) 10 MG tablet, Take 1 tablet by mouth Daily., Disp: , Rfl:     sacubitril-valsartan (Entresto) 49-51 MG tablet, Take 1 tablet by mouth 2 Times a Day., Disp: 90 tablet, Rfl: 2    spironolactone (ALDACTONE) 25 MG tablet, TAKE TWO TABLETS BY MOUTH DAILY. HOLD IF BLOOD PRESSURE IS LESS THAN 110, Disp: 180 tablet, Rfl: 2    tiotropium bromide-olodaterol (STIOLTO RESPIMAT) 2.5-2.5 MCG/ACT aerosol solution inhaler, Inhale 2 puffs Daily., Disp: 4 g, Rfl: 8      Physical Exam:  I have reviewed the patient's current vital signs as documented in the patient's EMR.   Vitals:    07/28/25 1459   BP: 116/64   Pulse: 90   SpO2: 93%               Body mass index is 40.61 kg/m².       07/28/25  1459   Weight: 125 kg (275 lb)                Physical Exam  Vitals and nursing note reviewed.   Constitutional:       General: He is awake.      Appearance: He is morbidly obese.      Interventions: Nasal cannula in place.      Comments: 4L    HENT:      Head: Normocephalic and atraumatic.   Eyes:      General: Lids are normal.      Conjunctiva/sclera:      Right eye: Right conjunctiva is not injected.      Left eye: Left conjunctiva is not injected.   Cardiovascular:      Rate and Rhythm: Normal rate and regular rhythm.   Pulmonary:      Effort: No tachypnea.      Breath sounds: Examination of the right-lower field reveals decreased breath sounds. Examination of the left-lower field reveals decreased breath sounds. Decreased breath sounds present. No wheezing, rhonchi or rales.      Comments: Bilateral breath sounds clear to auscultation  Abdominal:      General: Bowel sounds are normal.      Palpations: Abdomen is soft.       Tenderness: There is no abdominal tenderness.   Musculoskeletal:      Right lower le+ Edema present.      Left lower le+ Edema present.   Skin:     General: Skin is warm and dry.      Comments: Compression socks on bilaterally   Neurological:      Mental Status: He is alert and oriented to person, place, and time.   Psychiatric:         Attention and Perception: Attention normal.         Mood and Affect: Mood normal.         Behavior: Behavior is cooperative.         JVP: Volume/Pulsation: Normal.        DATA REVIEWED:     EKG. I personally reviewed and interpreted the EKG.      ---------------------------------------------------  TTE/SIXTO:  Results for orders placed during the hospital encounter of 24    Adult Transthoracic Echo Limited W/ Cont if Necessary Per Protocol 2024  6:45 PM    Interpretation Summary    Left ventricular systolic function is low normal. Calculated left ventricular EF = 49% Left ventricular ejection fraction appears to be 46 - 50%.    Left ventricular diastolic function was not assessed.        LAST HEART CATH/IF AVAILABLE:     No results found for this or any previous visit.      -----------------------------------------------------  CXR/Imaging:   Imaging Results (Most Recent)       None            I personally reviewed and interpreted the CXR.      -----------------------------------------------------  CT:   No radiology results for the last 30 days.  I personally reviewed the images of the CT scan.  My personal interpretation is below.      ----------------------------------------------------    PFTs:        --------------------------------------------------------------------------------------------------    Laboratory evaluations:    Lab Results   Component Value Date    GLUCOSE 120 (H) 2025    BUN 14.2 2025    CREATININE 0.56 (L) 2025    EGFRIFNONA 93 2021    BCR 25.4 (H) 2025    K 4.5 2025    CO2 37.0 (H) 2025    CALCIUM 9.9  07/28/2025    ALBUMIN 4.2 07/28/2025    AST 22 07/28/2025    ALT 14 07/28/2025     Lab Results   Component Value Date    WBC 7.70 07/28/2025    HGB 10.0 (L) 07/28/2025    HCT 33.3 (L) 07/28/2025    .5 (H) 07/28/2025     07/28/2025     Lab Results   Component Value Date    CHOL 112 03/27/2023    TRIG 175 (H) 03/27/2023    HDL 37 (L) 03/27/2023    LDL 46 03/27/2023     Lab Results   Component Value Date    TSH 0.573 12/11/2023     Lab Results   Component Value Date    HGBA1C 6.40 (H) 12/11/2023     Lab Results   Component Value Date    ALT 14 07/28/2025     Lab Results   Component Value Date    HGBA1C 6.40 (H) 12/11/2023     Lab Results   Component Value Date    CREATININE 0.56 (L) 07/28/2025     Lab Results   Component Value Date    IRON 66 07/28/2025    TIBC 378 07/28/2025    FERRITIN 514.00 (H) 07/28/2025     Lab Results   Component Value Date    INR 0.82 (L) 12/08/2021    PROTIME 11.7 (L) 12/08/2021        Lab Results   Component Value Date    ABSOLUTELUNG 35 03/17/2025    ABSOLUTELUNG 41 (A) 08/15/2024    ABSOLUTELUNG 42 (A) 07/15/2024       PAH RISK ASSESSMENT:      1. Chronic HFrEF (heart failure with reduced ejection fraction)    2. Essential hypertension    3. Chronic hypoxemic respiratory failure    4. PATTY with NIPPV noncompliance    5. Class 3 obesity    6. Chronic obstructive pulmonary disease, unspecified COPD type    7. Restrictive lung disease          ORDERS PLACED TODAY:  Orders Placed This Encounter   Procedures    ReDs Vest    CBC (No Diff)    Ferritin    Comprehensive Metabolic Panel    Iron Profile w/o Ferritin    Magnesium    proBNP    CBC (No Diff)    Ferritin    Comprehensive Metabolic Panel    Iron Profile w/o Ferritin    Magnesium    proBNP        Diagnoses and all orders for this visit:    1. Chronic HFrEF (heart failure with reduced ejection fraction) (Primary)  -     CBC (No Diff); Future  -     Ferritin; Future  -     Comprehensive Metabolic Panel; Future  -     Iron Profile  w/o Ferritin; Future  -     Magnesium; Future  -     proBNP; Future  -     CBC (No Diff); Standing  -     CBC (No Diff)  -     Ferritin; Standing  -     Ferritin  -     Comprehensive Metabolic Panel; Standing  -     Comprehensive Metabolic Panel  -     Iron Profile w/o Ferritin; Standing  -     Iron Profile w/o Ferritin  -     Magnesium; Standing  -     Magnesium  -     proBNP; Standing  -     proBNP  -     ReDs Vest    2. Essential hypertension    3. Chronic hypoxemic respiratory failure    4. PATTY with NIPPV noncompliance    5. Class 3 obesity    6. Chronic obstructive pulmonary disease, unspecified COPD type    7. Restrictive lung disease    Other orders  -     sacubitril-valsartan (Entresto) 49-51 MG tablet; Take 1 tablet by mouth 2 Times a Day.  Dispense: 90 tablet; Refill: 2  -     bumetanide (BUMEX) 2 MG tablet; 1 pill twice a day with extra tablet as needed for worsening swelling or shortness of breath  Dispense: 270 tablet; Refill: 2             MEDS ORDERED TODAY:    New Medications Ordered This Visit   Medications    sacubitril-valsartan (Entresto) 49-51 MG tablet     Sig: Take 1 tablet by mouth 2 Times a Day.     Dispense:  90 tablet     Refill:  2    bumetanide (BUMEX) 2 MG tablet     Si pill twice a day with extra tablet as needed for worsening swelling or shortness of breath     Dispense:  270 tablet     Refill:  2        ---------------------------------------------------------------------------------------------------------------------------          ASSESSMENT/PLAN:      Diagnosis Plan   1. Chronic HFrEF (heart failure with reduced ejection fraction)  CBC (No Diff)    Ferritin    Comprehensive Metabolic Panel    Iron Profile w/o Ferritin    Magnesium    proBNP    CBC (No Diff)    CBC (No Diff)    Ferritin    Ferritin    Comprehensive Metabolic Panel    Comprehensive Metabolic Panel    Iron Profile w/o Ferritin    Iron Profile w/o Ferritin    Magnesium    Magnesium    proBNP    proBNP    ReDs  Vest      2. Essential hypertension        3. Chronic hypoxemic respiratory failure        4. PATTY with NIPPV noncompliance        5. Class 3 obesity        6. Chronic obstructive pulmonary disease, unspecified COPD type        7. Restrictive lung disease            not acutely decompensated chronic HFmrEF    NYHA stage Stage C: Structural heart disease is present AND symptoms have occurredFC-Class III: Marked limitation of physical activity. Comfortable at rest. Less than ordinary activity causes fatigue, palpitation, or dyspnea.     Today, Patient is approaching euvolemiaand with  Moderate perfusion. The patient's hemodynamics are currently acceptable. HR is: normal and is at goal. BP/MAP was reviewed and there isroom for medication up-titration.  Clinical trajectory was assessed and hasremained stable.     CHF GOAL DIRECTED MEDICAL THERAPY FOR PATIENT ADDRESSED/ADJUSTED:     GDMT: HFpEF    Drug Class   Drug   Dose Last Dose Adjustment Notes   ACEi/ARB/ARNI Entresto  49-51 mg BID     Beta Blocker     MRA Spironolactone 25mg      SGLT2i   N/A   Secondaries if applicable:          -CHF Specific BB:   Stopped Toprol secondary to fatigue previously.   We discussed processes/benefits of HF clinic including nursing, pharmacist, and provider evaluation during each visit with ability for in office ReDS vest, labs, and ability to provide IV diuresis in the clinic with close outpatient monitoring.  Additionally, patient was educated about the availability of delivery of medications to patient's clinic room prior to leaving the building which assists with medication compliance and insures medications are in hands when changes are made (if patient opts for apothecary usage) with thorough guidance regarding changes and medication schedule provided.          -ACE/ARB/ARNi:   Continue Entresto as outlined above.   See below regarding stopping CCB.      -MRA:   The patient is FC-NYHA Class III and MRA is indicated.   Continue  Spironolactone @ increased dose of 50mg qd.      -SGLT2 inhibitor therapy:   Not Farxiga given development of bilateral lower extremity wounds with some association of medication causing difficulty healing and wounds.  Have referred to wound care.    -Diuretic regimen:   ReDS Vest reading for. 07/29/25  was 35; suspect some chronic elevation of Reds vest score secondary to lung scarring ReDs Vest reading reviewed with patient.    Continue Bumex at dose of 4mg BID for 5 days then back down to 2mg BID.    BMP, Mag, & ProBNP reviewed with patient.      -Fluid restriction/Sodium restriction:   Requested 2000 ml restriction  Patient has been asked to weigh daily and was provided with a printed diuretic strategy.  1,500 mg Na restriction was discussed.        -Acute vs. Chronic underlying conditions other than HF addressed during visit:   Chronic hypoxemic respiratory failure:   COPD without exacerbation:   PATTY with NIPPV noncompliance:   Continue Pulm f/u.    Continue COPD clinic f/u.     Essential Hypertension:   Continue ARNi as outlined.      Carotid stenosis s/p prior L CEA  R carotid stenosis  Continue f/u with Dr. Becerra & crew.   Continue statin  &  crestor.       Identifiable barriers to Heart Failure Self-care:   Medical Barriers: Debility  Social Barriers: No immediate known barriers.         --------------------------------------------------------------------------------        Class 3 obesity with BMI 40.61 kg/m2. Obesity-related health conditions include the following: obstructive sleep apnea, hypertension, coronary heart disease, diabetes mellitus, dyslipidemias, GERD, and osteoarthritis. Obesity is unchanged. BMI is is above average; BMI management plan is completed. We discussed portion control, increasing exercise, and Heart failure dietary management.              This document has been electronically signed by Yakelin Stacy PA-C  July 29, 2025 08:52 EDT      Dictated Utilizing Dragon Dictation:  Part of this note may be an electronic transcription/translation of spoken language to printed text using the Dragon Dictation System.    Follow-up appointment and medication changes provided in hand delivered After Visit Summary as well as reviewed in the room.      Some documentation in this note has been copied forward from a previous note, as the information is still current and accurate.  Text has been changed to reflect changes.      -+

## 2025-08-05 ENCOUNTER — OFFICE VISIT (OUTPATIENT)
Dept: PULMONOLOGY | Facility: CLINIC | Age: 75
End: 2025-08-05
Payer: MEDICARE

## 2025-08-05 VITALS
TEMPERATURE: 97.3 F | OXYGEN SATURATION: 90 % | BODY MASS INDEX: 40.14 KG/M2 | HEART RATE: 88 BPM | SYSTOLIC BLOOD PRESSURE: 112 MMHG | DIASTOLIC BLOOD PRESSURE: 64 MMHG | WEIGHT: 271 LBS | HEIGHT: 69 IN

## 2025-08-05 DIAGNOSIS — I27.20 PULMONARY HYPERTENSION: ICD-10-CM

## 2025-08-05 DIAGNOSIS — G47.33 OSA (OBSTRUCTIVE SLEEP APNEA): ICD-10-CM

## 2025-08-05 DIAGNOSIS — J96.11 CHRONIC RESPIRATORY FAILURE WITH HYPOXIA AND HYPERCAPNIA: ICD-10-CM

## 2025-08-05 DIAGNOSIS — J96.12 CHRONIC RESPIRATORY FAILURE WITH HYPOXIA AND HYPERCAPNIA: ICD-10-CM

## 2025-08-05 DIAGNOSIS — E66.2 OBESITY HYPOVENTILATION SYNDROME: ICD-10-CM

## 2025-08-05 DIAGNOSIS — J98.4 RESTRICTIVE LUNG DISEASE: Primary | ICD-10-CM

## 2025-08-05 DIAGNOSIS — F17.211 CIGARETTE NICOTINE DEPENDENCE IN REMISSION: ICD-10-CM

## 2025-08-05 DIAGNOSIS — J42 CHRONIC BRONCHITIS, UNSPECIFIED CHRONIC BRONCHITIS TYPE: ICD-10-CM

## 2025-08-05 PROCEDURE — 1160F RVW MEDS BY RX/DR IN RCRD: CPT | Performed by: PHYSICIAN ASSISTANT

## 2025-08-05 PROCEDURE — 99214 OFFICE O/P EST MOD 30 MIN: CPT | Performed by: PHYSICIAN ASSISTANT

## 2025-08-05 PROCEDURE — 1159F MED LIST DOCD IN RCRD: CPT | Performed by: PHYSICIAN ASSISTANT

## 2025-08-05 PROCEDURE — 3078F DIAST BP <80 MM HG: CPT | Performed by: PHYSICIAN ASSISTANT

## 2025-08-05 PROCEDURE — 3074F SYST BP LT 130 MM HG: CPT | Performed by: PHYSICIAN ASSISTANT

## 2025-08-05 RX ORDER — ROSUVASTATIN CALCIUM 20 MG/1
TABLET, COATED ORAL
COMMUNITY
Start: 2025-08-03

## (undated) DEVICE — INSUFFLATION NEEDLE TO ESTABLISH PNEUMOPERITONEUM.: Brand: INSUFFLATION NEEDLE

## (undated) DEVICE — SHOULDER IMMOBILIZER: Brand: DEROYAL

## (undated) DEVICE — ENDOCUT SCISSOR TIP, DISPOSABLE: Brand: RENEW

## (undated) DEVICE — ENCORE® LATEX MICRO SIZE 7, STERILE LATEX POWDER-FREE SURGICAL GLOVE: Brand: ENCORE

## (undated) DEVICE — DRP C/ARM W/BAND W/CLIPS 41X74IN

## (undated) DEVICE — HOLDER: Brand: DEROYAL

## (undated) DEVICE — ENDOPATH XCEL BLADELESS TROCARS WITH STABILITY SLEEVES: Brand: ENDOPATH XCEL

## (undated) DEVICE — SUT PDS 2/0 CT2 27IN VIL PDP333H

## (undated) DEVICE — KT MINI ACC MAK COAXL 5F 10CM

## (undated) DEVICE — SUT PROLN 4/0 PS2 18IN 8682G

## (undated) DEVICE — DRSNG SURESITE123 6X8IN

## (undated) DEVICE — ENDOPATH XCEL UNIVERSAL TROCAR STABLILITY SLEEVES: Brand: ENDOPATH XCEL

## (undated) DEVICE — PENCL ES MEGADINE EZ/CLEAN BUTN W/HOLSTR 10FT

## (undated) DEVICE — COR LAP CHOLE: Brand: MEDLINE INDUSTRIES, INC.

## (undated) DEVICE — PROXIMATE PLUS MD MULTI-DIRECTIONAL RELEASE SKIN STAPLERS CONTAINS 35 STAINLESS STEEL STAPLES APPROXIMATE CLOSED DIMENSIONS: 6.9MM X 3.9MM WIDE: Brand: PROXIMATE

## (undated) DEVICE — ST CATH CHOLANG WKARLAN/BALN 2LUM 4F 1.25

## (undated) DEVICE — [HIGH FLOW INSUFFLATOR,  DO NOT USE IF PACKAGE IS DAMAGED,  KEEP DRY,  KEEP AWAY FROM SUNLIGHT,  PROTECT FROM HEAT AND RADIOACTIVE SOURCES.]: Brand: PNEUMOSURE

## (undated) DEVICE — 2, DISPOSABLE SUCTION/IRRIGATOR WITH DISPOSABLE TIP: Brand: STRYKEFLOW

## (undated) DEVICE — COR PACEMAKER: Brand: MEDLINE INDUSTRIES, INC.